# Patient Record
Sex: FEMALE | Race: WHITE | NOT HISPANIC OR LATINO | URBAN - METROPOLITAN AREA
[De-identification: names, ages, dates, MRNs, and addresses within clinical notes are randomized per-mention and may not be internally consistent; named-entity substitution may affect disease eponyms.]

---

## 2017-04-22 ENCOUNTER — INPATIENT (INPATIENT)
Facility: HOSPITAL | Age: 82
LOS: 10 days | Discharge: NURSING, REHAB OR LONG-TERM CARE | DRG: 291 | End: 2017-05-03
Attending: INTERNAL MEDICINE | Admitting: INTERNAL MEDICINE
Payer: MEDICARE

## 2017-04-22 VITALS
HEART RATE: 68 BPM | RESPIRATION RATE: 14 BRPM | HEIGHT: 60 IN | DIASTOLIC BLOOD PRESSURE: 69 MMHG | OXYGEN SATURATION: 95 % | WEIGHT: 125 LBS | SYSTOLIC BLOOD PRESSURE: 132 MMHG | TEMPERATURE: 98 F

## 2017-04-22 DIAGNOSIS — J96.01 ACUTE RESPIRATORY FAILURE WITH HYPOXIA: ICD-10-CM

## 2017-04-22 DIAGNOSIS — E89.2 POSTPROCEDURAL HYPOPARATHYROIDISM: Chronic | ICD-10-CM

## 2017-04-22 DIAGNOSIS — E87.1 HYPO-OSMOLALITY AND HYPONATREMIA: ICD-10-CM

## 2017-04-22 DIAGNOSIS — Z29.9 ENCOUNTER FOR PROPHYLACTIC MEASURES, UNSPECIFIED: ICD-10-CM

## 2017-04-22 DIAGNOSIS — E21.3 HYPERPARATHYROIDISM, UNSPECIFIED: ICD-10-CM

## 2017-04-22 DIAGNOSIS — I10 ESSENTIAL (PRIMARY) HYPERTENSION: ICD-10-CM

## 2017-04-22 DIAGNOSIS — I50.9 HEART FAILURE, UNSPECIFIED: ICD-10-CM

## 2017-04-22 DIAGNOSIS — Z90.49 ACQUIRED ABSENCE OF OTHER SPECIFIED PARTS OF DIGESTIVE TRACT: Chronic | ICD-10-CM

## 2017-04-22 DIAGNOSIS — Z90.89 ACQUIRED ABSENCE OF OTHER ORGANS: Chronic | ICD-10-CM

## 2017-04-22 LAB
ALBUMIN SERPL ELPH-MCNC: 3.4 G/DL — SIGNIFICANT CHANGE UP (ref 3.3–5)
ALP SERPL-CCNC: 93 U/L — SIGNIFICANT CHANGE UP (ref 40–120)
ALT FLD-CCNC: 28 U/L — SIGNIFICANT CHANGE UP (ref 12–78)
ANION GAP SERPL CALC-SCNC: 12 MMOL/L — SIGNIFICANT CHANGE UP (ref 5–17)
APTT BLD: 28.3 SEC — SIGNIFICANT CHANGE UP (ref 27.5–37.4)
AST SERPL-CCNC: 24 U/L — SIGNIFICANT CHANGE UP (ref 15–37)
BASOPHILS # BLD AUTO: 0.1 K/UL — SIGNIFICANT CHANGE UP (ref 0–0.2)
BASOPHILS NFR BLD AUTO: 0.7 % — SIGNIFICANT CHANGE UP (ref 0–2)
BILIRUB SERPL-MCNC: 0.5 MG/DL — SIGNIFICANT CHANGE UP (ref 0.2–1.2)
BUN SERPL-MCNC: 30 MG/DL — HIGH (ref 7–23)
CALCIUM SERPL-MCNC: 9.4 MG/DL — SIGNIFICANT CHANGE UP (ref 8.5–10.1)
CHLORIDE SERPL-SCNC: 91 MMOL/L — LOW (ref 96–108)
CK MB BLD-MCNC: 3.7 % — HIGH (ref 0–3.5)
CK MB CFR SERPL CALC: 3 NG/ML — SIGNIFICANT CHANGE UP (ref 0–3.6)
CK SERPL-CCNC: 81 U/L — SIGNIFICANT CHANGE UP (ref 26–192)
CO2 SERPL-SCNC: 24 MMOL/L — SIGNIFICANT CHANGE UP (ref 22–31)
CREAT SERPL-MCNC: 0.75 MG/DL — SIGNIFICANT CHANGE UP (ref 0.5–1.3)
EOSINOPHIL # BLD AUTO: 0.1 K/UL — SIGNIFICANT CHANGE UP (ref 0–0.5)
EOSINOPHIL NFR BLD AUTO: 0.8 % — SIGNIFICANT CHANGE UP (ref 0–6)
GLUCOSE SERPL-MCNC: 94 MG/DL — SIGNIFICANT CHANGE UP (ref 70–99)
HCT VFR BLD CALC: 32.5 % — LOW (ref 34.5–45)
HGB BLD-MCNC: 10.9 G/DL — LOW (ref 11.5–15.5)
INR BLD: 1.09 RATIO — SIGNIFICANT CHANGE UP (ref 0.88–1.16)
LACTATE SERPL-SCNC: 0.7 MMOL/L — SIGNIFICANT CHANGE UP (ref 0.7–2)
LYMPHOCYTES # BLD AUTO: 1.1 K/UL — SIGNIFICANT CHANGE UP (ref 1–3.3)
LYMPHOCYTES # BLD AUTO: 6.6 % — LOW (ref 13–44)
MCHC RBC-ENTMCNC: 29.9 PG — SIGNIFICANT CHANGE UP (ref 27–34)
MCHC RBC-ENTMCNC: 33.6 GM/DL — SIGNIFICANT CHANGE UP (ref 32–36)
MCV RBC AUTO: 89 FL — SIGNIFICANT CHANGE UP (ref 80–100)
MONOCYTES # BLD AUTO: 1.8 K/UL — HIGH (ref 0–0.9)
MONOCYTES NFR BLD AUTO: 11.1 % — HIGH (ref 1–9)
NEUTROPHILS # BLD AUTO: 13.4 K/UL — HIGH (ref 1.8–7.4)
NEUTROPHILS NFR BLD AUTO: 80.8 % — HIGH (ref 43–77)
NT-PROBNP SERPL-SCNC: 2347 PG/ML — HIGH (ref 0–450)
PLATELET # BLD AUTO: 227 K/UL — SIGNIFICANT CHANGE UP (ref 150–400)
POTASSIUM SERPL-MCNC: 4.6 MMOL/L — SIGNIFICANT CHANGE UP (ref 3.5–5.3)
POTASSIUM SERPL-SCNC: 4.6 MMOL/L — SIGNIFICANT CHANGE UP (ref 3.5–5.3)
PROCALCITONIN SERPL-MCNC: 0.07 NG/ML — HIGH (ref 0–0.05)
PROT SERPL-MCNC: 7.3 G/DL — SIGNIFICANT CHANGE UP (ref 6–8.3)
PROTHROM AB SERPL-ACNC: 11.9 SEC — SIGNIFICANT CHANGE UP (ref 9.8–12.7)
RBC # BLD: 3.65 M/UL — LOW (ref 3.8–5.2)
RBC # FLD: 12.5 % — SIGNIFICANT CHANGE UP (ref 10.3–14.5)
SODIUM SERPL-SCNC: 127 MMOL/L — LOW (ref 135–145)
TROPONIN I SERPL-MCNC: <.015 NG/ML — SIGNIFICANT CHANGE UP (ref 0.01–0.04)
TROPONIN I SERPL-MCNC: <.015 NG/ML — SIGNIFICANT CHANGE UP (ref 0.01–0.04)
WBC # BLD: 16.6 K/UL — HIGH (ref 3.8–10.5)
WBC # FLD AUTO: 16.6 K/UL — HIGH (ref 3.8–10.5)

## 2017-04-22 PROCEDURE — 99223 1ST HOSP IP/OBS HIGH 75: CPT

## 2017-04-22 PROCEDURE — 71010: CPT | Mod: 26

## 2017-04-22 PROCEDURE — 93010 ELECTROCARDIOGRAM REPORT: CPT

## 2017-04-22 PROCEDURE — 99285 EMERGENCY DEPT VISIT HI MDM: CPT

## 2017-04-22 RX ORDER — CARVEDILOL PHOSPHATE 80 MG/1
25 CAPSULE, EXTENDED RELEASE ORAL EVERY 12 HOURS
Qty: 0 | Refills: 0 | Status: DISCONTINUED | OUTPATIENT
Start: 2017-04-22 | End: 2017-05-03

## 2017-04-22 RX ORDER — HYDRALAZINE HCL 50 MG
10 TABLET ORAL ONCE
Qty: 0 | Refills: 0 | Status: COMPLETED | OUTPATIENT
Start: 2017-04-22 | End: 2017-04-22

## 2017-04-22 RX ORDER — AZITHROMYCIN 500 MG/1
500 TABLET, FILM COATED ORAL EVERY 24 HOURS
Qty: 0 | Refills: 0 | Status: COMPLETED | OUTPATIENT
Start: 2017-04-23 | End: 2017-04-26

## 2017-04-22 RX ORDER — FUROSEMIDE 40 MG
40 TABLET ORAL DAILY
Qty: 0 | Refills: 0 | Status: DISCONTINUED | OUTPATIENT
Start: 2017-04-22 | End: 2017-04-26

## 2017-04-22 RX ORDER — ALBUTEROL 90 UG/1
2.5 AEROSOL, METERED ORAL EVERY 8 HOURS
Qty: 0 | Refills: 0 | Status: DISCONTINUED | OUTPATIENT
Start: 2017-04-22 | End: 2017-05-03

## 2017-04-22 RX ORDER — CARVEDILOL PHOSPHATE 80 MG/1
25 CAPSULE, EXTENDED RELEASE ORAL ONCE
Qty: 0 | Refills: 0 | Status: COMPLETED | OUTPATIENT
Start: 2017-04-22 | End: 2017-04-22

## 2017-04-22 RX ORDER — FUROSEMIDE 40 MG
40 TABLET ORAL ONCE
Qty: 0 | Refills: 0 | Status: COMPLETED | OUTPATIENT
Start: 2017-04-22 | End: 2017-04-22

## 2017-04-22 RX ORDER — HYDRALAZINE HCL 50 MG
10 TABLET ORAL
Qty: 0 | Refills: 0 | Status: DISCONTINUED | OUTPATIENT
Start: 2017-04-22 | End: 2017-05-03

## 2017-04-22 RX ORDER — HYDRALAZINE HCL 50 MG
20 TABLET ORAL
Qty: 0 | Refills: 0 | Status: DISCONTINUED | OUTPATIENT
Start: 2017-04-22 | End: 2017-05-03

## 2017-04-22 RX ORDER — AZITHROMYCIN 500 MG/1
500 TABLET, FILM COATED ORAL ONCE
Qty: 0 | Refills: 0 | Status: COMPLETED | OUTPATIENT
Start: 2017-04-22 | End: 2017-04-22

## 2017-04-22 RX ORDER — AZITHROMYCIN 500 MG/1
500 TABLET, FILM COATED ORAL DAILY
Qty: 0 | Refills: 0 | Status: DISCONTINUED | OUTPATIENT
Start: 2017-04-23 | End: 2017-04-22

## 2017-04-22 RX ORDER — ENOXAPARIN SODIUM 100 MG/ML
40 INJECTION SUBCUTANEOUS EVERY 24 HOURS
Qty: 0 | Refills: 0 | Status: DISCONTINUED | OUTPATIENT
Start: 2017-04-22 | End: 2017-05-03

## 2017-04-22 RX ORDER — POTASSIUM CHLORIDE 20 MEQ
20 PACKET (EA) ORAL DAILY
Qty: 0 | Refills: 0 | Status: DISCONTINUED | OUTPATIENT
Start: 2017-04-22 | End: 2017-05-03

## 2017-04-22 RX ORDER — LACTOBACILLUS ACIDOPHILUS 100MM CELL
1 CAPSULE ORAL
Qty: 0 | Refills: 0 | Status: DISCONTINUED | OUTPATIENT
Start: 2017-04-22 | End: 2017-04-22

## 2017-04-22 RX ORDER — SODIUM CHLORIDE 9 MG/ML
1000 INJECTION INTRAMUSCULAR; INTRAVENOUS; SUBCUTANEOUS ONCE
Qty: 0 | Refills: 0 | Status: COMPLETED | OUTPATIENT
Start: 2017-04-22 | End: 2017-04-22

## 2017-04-22 RX ORDER — POTASSIUM CHLORIDE 20 MEQ
20 PACKET (EA) ORAL DAILY
Qty: 0 | Refills: 0 | Status: DISCONTINUED | OUTPATIENT
Start: 2017-04-22 | End: 2017-04-22

## 2017-04-22 RX ORDER — IPRATROPIUM/ALBUTEROL SULFATE 18-103MCG
3 AEROSOL WITH ADAPTER (GRAM) INHALATION ONCE
Qty: 0 | Refills: 0 | Status: COMPLETED | OUTPATIENT
Start: 2017-04-22 | End: 2017-04-22

## 2017-04-22 RX ORDER — ASPIRIN/CALCIUM CARB/MAGNESIUM 324 MG
81 TABLET ORAL ONCE
Qty: 0 | Refills: 0 | Status: COMPLETED | OUTPATIENT
Start: 2017-04-22 | End: 2017-04-22

## 2017-04-22 RX ORDER — CINACALCET 30 MG/1
30 TABLET, FILM COATED ORAL AT BEDTIME
Qty: 0 | Refills: 0 | Status: DISCONTINUED | OUTPATIENT
Start: 2017-04-22 | End: 2017-05-03

## 2017-04-22 RX ORDER — ASPIRIN/CALCIUM CARB/MAGNESIUM 324 MG
81 TABLET ORAL DAILY
Qty: 0 | Refills: 0 | Status: DISCONTINUED | OUTPATIENT
Start: 2017-04-23 | End: 2017-05-03

## 2017-04-22 RX ORDER — FAMOTIDINE 10 MG/ML
20 INJECTION INTRAVENOUS DAILY
Qty: 0 | Refills: 0 | Status: DISCONTINUED | OUTPATIENT
Start: 2017-04-22 | End: 2017-05-03

## 2017-04-22 RX ORDER — DOXAZOSIN MESYLATE 4 MG
4 TABLET ORAL AT BEDTIME
Qty: 0 | Refills: 0 | Status: DISCONTINUED | OUTPATIENT
Start: 2017-04-22 | End: 2017-05-03

## 2017-04-22 RX ORDER — LACTOBACILLUS ACIDOPHILUS 100MM CELL
1 CAPSULE ORAL
Qty: 0 | Refills: 0 | Status: DISCONTINUED | OUTPATIENT
Start: 2017-04-22 | End: 2017-05-03

## 2017-04-22 RX ORDER — CEFTRIAXONE 500 MG/1
1 INJECTION, POWDER, FOR SOLUTION INTRAMUSCULAR; INTRAVENOUS ONCE
Qty: 0 | Refills: 0 | Status: COMPLETED | OUTPATIENT
Start: 2017-04-22 | End: 2017-04-22

## 2017-04-22 RX ADMIN — Medication 3 MILLILITER(S): at 16:38

## 2017-04-22 RX ADMIN — CEFTRIAXONE 100 GRAM(S): 500 INJECTION, POWDER, FOR SOLUTION INTRAMUSCULAR; INTRAVENOUS at 17:37

## 2017-04-22 RX ADMIN — Medication 200 MILLIGRAM(S): at 23:30

## 2017-04-22 RX ADMIN — Medication 10 MILLIGRAM(S): at 19:40

## 2017-04-22 RX ADMIN — CINACALCET 30 MILLIGRAM(S): 30 TABLET, FILM COATED ORAL at 23:30

## 2017-04-22 RX ADMIN — AZITHROMYCIN 255 MILLIGRAM(S): 500 TABLET, FILM COATED ORAL at 19:00

## 2017-04-22 RX ADMIN — CARVEDILOL PHOSPHATE 25 MILLIGRAM(S): 80 CAPSULE, EXTENDED RELEASE ORAL at 19:40

## 2017-04-22 RX ADMIN — Medication 4 MILLIGRAM(S): at 23:30

## 2017-04-22 RX ADMIN — SODIUM CHLORIDE 1000 MILLILITER(S): 9 INJECTION INTRAMUSCULAR; INTRAVENOUS; SUBCUTANEOUS at 17:38

## 2017-04-22 RX ADMIN — ALBUTEROL 2.5 MILLIGRAM(S): 90 AEROSOL, METERED ORAL at 23:08

## 2017-04-22 RX ADMIN — Medication 40 MILLIGRAM(S): at 19:40

## 2017-04-22 RX ADMIN — ENOXAPARIN SODIUM 40 MILLIGRAM(S): 100 INJECTION SUBCUTANEOUS at 23:30

## 2017-04-22 RX ADMIN — Medication 81 MILLIGRAM(S): at 21:50

## 2017-04-22 NOTE — CONSULT NOTE ADULT - PROBLEM SELECTOR RECOMMENDATION 9
robitussin  check BIOMARKERS  albuterol BID  Zithromax - 5 day course  monitor sat at rest and on exertion  pt has LE doppler - 2 weeks ago, neg for DVT as per DTR  Incentive George  will rpt CXR post diuresis and cardiac optimization  discussed with CARDIO and Family

## 2017-04-22 NOTE — H&P ADULT - HISTORY OF PRESENT ILLNESS
89F PMHx HTN, hyperparathyroidism s/p parathyroidectomy, anemia, LE edema sent in from urgent care c/o cough, sore throat and weakness. Pt states sore throat began 6 days ago, 4 days ago developed productive cough. States she has been feeling weak and getting more tired the past few days. Tried Delsym relieved cough, but made her feel dizzy. CXRay at Urgent care showed B/L PNA and suggestive of CHF, sent her to ED. Pt has been having increased swelling in legs L>R for the past few months, had doppler US which was negative for DVT, planned to have carotid doppler ultrasound, but has not yet gone.   Received her flu shot this year. Denies fever, SOB, chest pain, palpitations, loss of appetite, N/V/D, sick contacts.     In the ER O2 90% on RA, afebrile, WBC 16.6, Na 127, procalcitonin 0.07, proBNP 2,347, troponin <0.015. CXR showed Stable cardiomegaly. New vascular congestion interstitial edema consistent with fluid overload or mild/early CHF. Chronic pleural  parenchymal changes left base similar to prior. No definite focal infiltrate. Given IV Rocephin and Azithromax, 1L NS, 40 IV Lasix, Duoneb. 89F PMHx HTN, hyperparathyroidism s/p parathyroidectomy, anemia, LE edema sent in from urgent care c/o cough, sore throat and weakness. Pt states sore throat began 6 days ago, 4 days ago developed productive cough. States she has been feeling weak and getting more tired the past few days. Tried Delsym relieved cough, but made her feel dizzy. CXRay at Urgent care showed B/L PNA and suggestive of CHF, sent her to ED. Pt has been having increased swelling in legs L>R for the past few months, had doppler US which was negative for DVT, planned to have carotid doppler ultrasound, but has not yet gone.   Received her flu shot this year. Denies fever, SOB, chest pain, palpitations, loss of appetite, N/V/D, sick contacts.     In the ER O2 90% on RA, afebrile, WBC 16.6, Na 127, procalcitonin 0.07, proBNP 2,347, troponin <0.015. CXR showed Stable cardiomegaly. New vascular congestion interstitial edema consistent with fluid overload or mild/early CHF. Chronic pleural  parenchymal changes left base similar to prior. No definite focal infiltrate. Given IV Rocephin and Azithromax, 1L NS, 40 IV Lasix, Duoneb. seen in er by dr king advised zmax to continue

## 2017-04-22 NOTE — H&P ADULT - NEGATIVE GASTROINTESTINAL SYMPTOMS
no vomiting/no constipation/no diarrhea/no nausea/no change in bowel habits/no abdominal pain/no hematochezia

## 2017-04-22 NOTE — CONSULT NOTE ADULT - SUBJECTIVE AND OBJECTIVE BOX
Date/Time Patient Seen:  		  Referring MD:   Data Reviewed	       Patient is a 89y old  Female who presents with a chief complaint of SOB and COUGH  vs and meds reviewed  seen in [facility]  sent in for eval of PNA  vs and meds reviewed      Subjective/HPI       Medication list         MEDICATIONS  (STANDING):  azithromycin  IVPB 500milliGRAM(s) IV Intermittent once  furosemide   Injectable 40milliGRAM(s) IV Push Once  hydrALAZINE 10milliGRAM(s) Oral Once  carvedilol 25milliGRAM(s) Oral once  ALBUTerol    0.083% 2.5milliGRAM(s) Nebulizer every 8 hours  guaiFENesin    Syrup 200milliGRAM(s) Oral every 8 hours    MEDICATIONS  (PRN):         Vitals log        ICU Vital Signs Last 24 Hrs  T(C): 36.7, Max: 36.7 (04-22 @ 15:48)  T(F): 98, Max: 98 (04-22 @ 15:48)  HR: 73 (68 - 73)  BP: 132/69 (132/69 - 132/69)  BP(mean): --  ABP: --  ABP(mean): --  RR: 14 (14 - 14)  SpO2: 95% (95% - 95%)           Input and Output:  I&O's Detail      Lab Data                        10.9   16.6  )-----------( 227      ( 22 Apr 2017 16:59 )             32.5     04-22    127<L>  |  91<L>  |  30<H>  ----------------------------<  94  4.6   |  24  |  0.75    Ca    9.4      22 Apr 2017 17:00    TPro  7.3  /  Alb  3.4  /  TBili  0.5  /  DBili  x   /  AST  24  /  ALT  28  /  AlkPhos  93  04-22      CARDIAC MARKERS ( 22 Apr 2017 17:00 )  <.015 ng/mL / x     / 81 U/L / x     / 3.0 ng/mL    non smoker  non drinker  retired  accompanied by dtr      Review of Systems	      Objective     Physical Examination    heart - s1s2  lungs - dec BS  abd - soft  cn grossly int  extr - left LE - edema chronic  kyphosis    Pertinent Lab findings & Imaging      Ramos:  NO   Adequate UO     I&O's Detail           Discussed with:     Cultures:	        Radiology    cxr PVC atelectasis

## 2017-04-22 NOTE — H&P ADULT - PROBLEM SELECTOR PLAN 2
likely hypervolemic hyponatremia due to chf  check ua, urine na and urine osmolality             serum tsh and uric acid  recheck bmp am   will ask nephrology to evaluate  oral fluid restrict for now 1 liter daily no water pitcher at bedside

## 2017-04-22 NOTE — PATIENT PROFILE ADULT. - VISION (WITH CORRECTIVE LENSES IF THE PATIENT USUALLY WEARS THEM):
Partially impaired: cannot see medication labels or newsprint, but can see obstacles in path, and the surrounding layout; can count fingers at arm's length/Reading glasses

## 2017-04-22 NOTE — CONSULT NOTE ADULT - SUBJECTIVE AND OBJECTIVE BOX
CHIEF COMPLAINT: Patient is a 89y old  Female who presents with a chief complaint of cough (22 Apr 2017 18:30)      HPI:  89F PMHx HTN, hyperparathyroidism s/p parathyroidectomy, anemia, LE edema sent in from urgent care c/o cough, sore throat and weakness. She has been complaining of increased orthopnea and cough that is productive with clear thin phlegm.    States she has been feeling weak and getting more tired the past few days. Tried Delsym relieved cough, but made her feel dizzy. CXRay at Urgent care showed B/L PNA and suggestive of CHF, sent her to ED. Pt has been having increased swelling in legs L>R for the past few months, had doppler US which was negative for DVT,    Received her flu shot this year. Denies fever, SOB, chest pain, palpitations, loss of appetite, N/V/D, sick contacts.     In the ER O2 90% on RA, afebrile, WBC 16.6, Na 127, procalcitonin 0.07, proBNP 2,347, troponin <0.015. CXR showed Stable cardiomegaly. New vascular congestion interstitial edema consistent with fluid overload or mild/early CHF. Chronic pleural  parenchymal changes left base similar to prior. No definite focal infiltrate. Given IV Rocephin and Azithromax, 1L NS, 40 IV Lasix, Duoneb. seen in er by dr king advised zmax to continue (22 Apr 2017 18:30)      PAST MEDICAL & SURGICAL HISTORY:  Anemia, unspecified type  Edema of lower extremity  Heart murmur  Hypertension  Hypercalcemia  History of appendectomy  S/P tonsillectomy  H/O parathyroidectomy      SOCIAL HISTORY:  No tobacco, ethanol, or drug abuse.    FAMILY HISTORY:  No pertinent family history in first degree relatives    No family history of acute MI or sudden cardiac death.    MEDICATIONS  (STANDING):  ALBUTerol    0.083% 2.5milliGRAM(s) Nebulizer every 8 hours  guaiFENesin    Syrup 200milliGRAM(s) Oral every 8 hours  enoxaparin Injectable 40milliGRAM(s) SubCutaneous every 24 hours  furosemide   Injectable 40milliGRAM(s) IV Push daily  doxazosin 4milliGRAM(s) Oral at bedtime  famotidine    Tablet 20milliGRAM(s) Oral daily  carvedilol 25milliGRAM(s) Oral every 12 hours  cinacalcet 30milliGRAM(s) Oral at bedtime  hydrALAZINE 10milliGRAM(s) Oral <User Schedule>  hydrALAZINE 20milliGRAM(s) Oral two times a day  aspirin enteric coated 81milliGRAM(s) Oral daily  potassium chloride   Powder 20milliEquivalent(s) Oral daily  azithromycin  IVPB 500milliGRAM(s) IV Intermittent every 24 hours  lactobacillus acidophilus 1Tablet(s) Oral three times a day with meals    MEDICATIONS  (PRN):      Allergies    Vasotec (Unknown)    Intolerances        REVIEW OF SYSTEMS:    CONSTITUTIONAL: No weakness, fevers or chills  EYES/ENT: No visual changes;  No vertigo or throat pain   NECK: No pain or stiffness  RESPIRATORY: see above  CARDIOVASCULAR: see above  GASTROINTESTINAL: No abdominal pain. No nausea, vomiting, or hematemesis; No diarrhea or constipation. No melena or hematochezia.  GENITOURINARY: No dysuria, frequency or hematuria  NEUROLOGICAL: No numbness or weakness  SKIN: No itching or rash  All other review of systems is negative unless indicated above    VITAL SIGNS:   Vital Signs Last 24 Hrs  T(C): 36.6, Max: 36.7 (04-22 @ 15:48)  T(F): 97.9, Max: 98 (04-22 @ 15:48)  HR: 62 (62 - 77)  BP: 147/55 (132/69 - 156/71)  BP(mean): --  RR: 17 (14 - 17)  SpO2: 95% (90% - 96%)    I&O's Summary    I & Os for current day (as of 23 Apr 2017 05:36)  =============================================  IN: 210 ml / OUT: 1075 ml / NET: -865 ml      On Exam:      Constitutional: NAD, awake and alert, well-developed  HEENT: Moist Mucous Membranes, Anicteric  Pulmonary: Non-labored, breath sounds are decreased bilaterally,    Cardiovascular: Regular, S1 and S2, , rubs, gallops oir clicks  Gastrointestinal: Bowel Sounds present, soft, nontender.   Lymph: No peripheral edema. No lymphadenopathy.  Skin: No visible rashes or ulcers.  Psych:  Mood & affect appropriate    LABS: All Labs Reviewed:                        10.9   16.6  )-----------( 227      ( 22 Apr 2017 16:59 )             32.5     22 Apr 2017 17:00    127    |  91     |  30     ----------------------------<  94     4.6     |  24     |  0.75     Ca    9.4        22 Apr 2017 17:00    TPro  7.3    /  Alb  3.4    /  TBili  0.5    /  DBili  x      /  AST  24     /  ALT  28     /  AlkPhos  93     22 Apr 2017 17:00    PT/INR - ( 22 Apr 2017 17:00 )   PT: 11.9 sec;   INR: 1.09 ratio         PTT - ( 22 Apr 2017 17:00 )  PTT:28.3 sec  CARDIAC MARKERS ( 22 Apr 2017 23:29 )  <.015 ng/mL / x     / 64 U/L / x     / 2.7 ng/mL  CARDIAC MARKERS ( 22 Apr 2017 17:00 )  <.015 ng/mL / x     / 81 U/L / x     / 3.0 ng/mL      Blood Culture:   04-22 @ 17:00  Pro Bnp [id number]        RADIOLOGY:   CHIEF COMPLAINT: Patient is a 89y old  Female who presents with a chief complaint of cough (22 Apr 2017 18:30)      HPI:  89F PMHx HTN, hyperparathyroidism s/p parathyroidectomy, anemia, LE edema, HFPEF sent in from urgent care c/o cough, sore throat and weakness. Pt states sore throat began 6 days ago, 4 days ago developed productive cough. States she has been feeling weak and getting more tired the past few days. Tried Delsym relieved cough, but made her feel dizzy. CXRay at Urgent care showed B/L PNA and suggestive of CHF, sent her to ED. Pt has been having increased swelling in legs L>R for the past few months, had doppler US which was negative for DVT, planned to have carotid doppler ultrasound, but has not yet gone.   Received her flu shot this year. Denies fever, SOB, chest pain, palpitations, loss of appetite, N/V/D, sick contacts.     In the ER O2 90% on RA, afebrile, WBC 16.6, Na 127, procalcitonin 0.07, proBNP 2,347, troponin <0.015. CXR showed Stable cardiomegaly. New vascular congestion interstitial edema consistent with fluid overload or mild/early CHF. Chronic pleural  parenchymal changes left base similar to prior. No definite focal infiltrate. Given IV Rocephin and Azithromax, 1L NS, 40 IV Lasix, Duoneb. seen in er by dr king advised zmax to continue (22 Apr 2017 18:30)      PAST MEDICAL & SURGICAL HISTORY:  Anemia, unspecified type  Edema of lower extremity  Heart murmur  Hypertension  Hypercalcemia  History of appendectomy  S/P tonsillectomy  H/O parathyroidectomy      SOCIAL HISTORY:  No tobacco, ethanol, or drug abuse.    FAMILY HISTORY:  No pertinent family history in first degree relatives    No family history of acute MI or sudden cardiac death.    MEDICATIONS  (STANDING):  ALBUTerol    0.083% 2.5milliGRAM(s) Nebulizer every 8 hours  guaiFENesin    Syrup 200milliGRAM(s) Oral every 8 hours  enoxaparin Injectable 40milliGRAM(s) SubCutaneous every 24 hours  furosemide   Injectable 40milliGRAM(s) IV Push daily  doxazosin 4milliGRAM(s) Oral at bedtime  famotidine    Tablet 20milliGRAM(s) Oral daily  carvedilol 25milliGRAM(s) Oral every 12 hours  cinacalcet 30milliGRAM(s) Oral at bedtime  hydrALAZINE 10milliGRAM(s) Oral <User Schedule>  hydrALAZINE 20milliGRAM(s) Oral two times a day  aspirin enteric coated 81milliGRAM(s) Oral daily  potassium chloride   Powder 20milliEquivalent(s) Oral daily  azithromycin  IVPB 500milliGRAM(s) IV Intermittent every 24 hours  lactobacillus acidophilus 1Tablet(s) Oral three times a day with meals    MEDICATIONS  (PRN):      Allergies    Vasotec (Unknown)    Intolerances        REVIEW OF SYSTEMS:    CONSTITUTIONAL: No weakness, fevers or chills  EYES/ENT: No visual changes;  No vertigo or throat pain   NECK: No pain or stiffness  RESPIRATORY: No cough, wheezing, hemoptysis; No shortness of breath  CARDIOVASCULAR: No chest pain or palpitations  GASTROINTESTINAL: No abdominal pain. No nausea, vomiting, or hematemesis; No diarrhea or constipation. No melena or hematochezia.  GENITOURINARY: No dysuria, frequency or hematuria  NEUROLOGICAL: No numbness or weakness  SKIN: No itching or rash  All other review of systems is negative unless indicated above    VITAL SIGNS:   Vital Signs Last 24 Hrs  T(C): 36.6, Max: 36.7 (04-22 @ 15:48)  T(F): 97.9, Max: 98 (04-22 @ 15:48)  HR: 62 (62 - 77)  BP: 147/55 (132/69 - 156/71)  BP(mean): --  RR: 17 (14 - 17)  SpO2: 95% (90% - 96%)    I&O's Summary    I & Os for current day (as of 23 Apr 2017 05:36)  =============================================  IN: 210 ml / OUT: 1075 ml / NET: -865 ml      On Exam:  TELE:   Constitutional: NAD, awake and alert, well-developed  HEENT: Moist Mucous Membranes, Anicteric  Pulmonary: Decreased breath sounds b/l.   Cardiovascular: Regular, S1 and S2, No sig murmurs, distant  Gastrointestinal: Bowel Sounds present, soft, nontender.   Lymph: L>R 2+ pitting edema in lower ext.   Skin: No visible rashes or ulcers.  Psych:  Mood & affect appropriate    LABS: All Labs Reviewed:                        10.9   16.6  )-----------( 227      ( 22 Apr 2017 16:59 )             32.5     22 Apr 2017 17:00    127    |  91     |  30     ----------------------------<  94     4.6     |  24     |  0.75     Ca    9.4        22 Apr 2017 17:00    TPro  7.3    /  Alb  3.4    /  TBili  0.5    /  DBili  x      /  AST  24     /  ALT  28     /  AlkPhos  93     22 Apr 2017 17:00    PT/INR - ( 22 Apr 2017 17:00 )   PT: 11.9 sec;   INR: 1.09 ratio         PTT - ( 22 Apr 2017 17:00 )  PTT:28.3 sec  CARDIAC MARKERS ( 22 Apr 2017 23:29 )  <.015 ng/mL / x     / 64 U/L / x     / 2.7 ng/mL  CARDIAC MARKERS ( 22 Apr 2017 17:00 )  <.015 ng/mL / x     / 81 U/L / x     / 3.0 ng/mL      Blood Culture:   04-22 @ 17:00  Pro Bnp 2347       CXR 4/22/17 -There is new vascular congestion interstitial edema   consistent with fluid overload or mild/early CHF. Chronic pleural   parenchymal changes left base similar to prior. No definite focal   infiltrate.      EKG:

## 2017-04-22 NOTE — H&P ADULT - PROBLEM SELECTOR PLAN 3
continue coreg with parameters   (hold sbp less than 115 hr less than 60)  continue hydralazine with parameters  (hold sbp less than 120)  HOLD losartan until clear bp renal function stable.  HOLD norvasc until clear bp stable  vs q 4.

## 2017-04-22 NOTE — H&P ADULT - NSHPSOCIALHISTORY_GEN_ALL_CORE
Never smoked. Rare alcohol use. No other illicit drugs.   Lives at home with her daughter.  Ambulates with walker.

## 2017-04-22 NOTE — ED ADULT NURSE NOTE - OBJECTIVE STATEMENT
Pt alert and oriented, came to ED with request of MD at urgent care center for pna, labs drawn and sent, advised pt and family on plan of care/hand hygiene, verbalized understanding, awaiting dispo

## 2017-04-22 NOTE — ED PROVIDER NOTE - OBJECTIVE STATEMENT
Pt is a 89 yo f who was sent in to er for admission for bl pn from urgent care. she went there for eval after having cough with phleghm for 3 days  no fever no chills no sob no ill contacts no tavel no trauma no cp sh ehas hx of fluid retention, htn thyroid surgeyr sp appy pmd dr weil

## 2017-04-22 NOTE — CONSULT NOTE ADULT - ASSESSMENT
89 F w HFPEF, HTN, edema p/w ADHF  - Pt sig vol ol. hypoNa likely from vol ol. Lasix 40mg IV q12. Trend Cr, Na. Keep net negative.   - No signs of ischemia.   - Check Echo.   - FU pulm recs.   - Continue Coreg and Hydralazine at home doses. May add nitrate for preload reduction   - Monitor and replete electrolytes. Keep K>4.0 and Mg>2.0.   Further cardiac workup will depend on clinical course.   All other workup per primary team. Will followup.

## 2017-04-22 NOTE — H&P ADULT - PROBLEM SELECTOR PLAN 6
d/w daughter at length.  they wish full resuscitative measures at this point, although would not want artificial feeding, should the situation arise.

## 2017-04-22 NOTE — H&P ADULT - PMH
Anemia, unspecified type    Edema of lower extremity    Heart murmur    Hypercalcemia    Hypertension

## 2017-04-22 NOTE — INPATIENT CERTIFICATION FOR MEDICARE PATIENTS - RISKS OF ADVERSE EVENTS
Concern for renal deterioration/Concern for worsening infectious process/Concern for delay in diagnosis and treatment/Concern for cardiopulmonary deterioration

## 2017-04-22 NOTE — H&P ADULT - PROBLEM SELECTOR PLAN 1
secondary to new onset chf and possible pneumonia  admit to tele with vs q 4 24 hr pulseox  serial ce added first set to er labs  serial ekg  cardio dr carmen kennedy  i/o daily wts  iv lasix 40 iv  once more dose tonight then 40 iv daily  check echo  iv rocephin and zmax to start tomorrow already was dosed in er today  bacid tid  o2 nasal cannula to keep sats above 92 percent  added procalcitonin to er labs  pulm dr king called secondary to new onset chf and possible pneumonia  admit to tele with vs q 4 24 hr pulseox  serial ce added first set to er labs  serial ekg  cardio dr carmen kennedy  i/o daily wts  iv lasix 40 iv  once more dose tonight then 40 iv daily  check echo  iv zmax to start tomorrow already was dosed in er today  bacid tid  o2 nasal cannula to keep sats above 92 percent  added procalcitonin to er labs  pulm dr king called

## 2017-04-23 DIAGNOSIS — Z71.89 OTHER SPECIFIED COUNSELING: ICD-10-CM

## 2017-04-23 LAB
ANION GAP SERPL CALC-SCNC: 10 MMOL/L — SIGNIFICANT CHANGE UP (ref 5–17)
BUN SERPL-MCNC: 25 MG/DL — HIGH (ref 7–23)
CALCIUM SERPL-MCNC: 9 MG/DL — SIGNIFICANT CHANGE UP (ref 8.5–10.1)
CHLORIDE SERPL-SCNC: 94 MMOL/L — LOW (ref 96–108)
CK MB BLD-MCNC: 4.2 % — HIGH (ref 0–3.5)
CK MB BLD-MCNC: 4.8 % — HIGH (ref 0–3.5)
CK MB CFR SERPL CALC: 2.7 NG/ML — SIGNIFICANT CHANGE UP (ref 0–3.6)
CK MB CFR SERPL CALC: 2.7 NG/ML — SIGNIFICANT CHANGE UP (ref 0–3.6)
CK SERPL-CCNC: 56 U/L — SIGNIFICANT CHANGE UP (ref 26–192)
CK SERPL-CCNC: 64 U/L — SIGNIFICANT CHANGE UP (ref 26–192)
CO2 SERPL-SCNC: 27 MMOL/L — SIGNIFICANT CHANGE UP (ref 22–31)
CREAT SERPL-MCNC: 0.69 MG/DL — SIGNIFICANT CHANGE UP (ref 0.5–1.3)
GLUCOSE SERPL-MCNC: 101 MG/DL — HIGH (ref 70–99)
HCT VFR BLD CALC: 29.9 % — LOW (ref 34.5–45)
HGB BLD-MCNC: 9.9 G/DL — LOW (ref 11.5–15.5)
MAGNESIUM SERPL-MCNC: 1.9 MG/DL — SIGNIFICANT CHANGE UP (ref 1.8–2.4)
MCHC RBC-ENTMCNC: 29.7 PG — SIGNIFICANT CHANGE UP (ref 27–34)
MCHC RBC-ENTMCNC: 33.1 GM/DL — SIGNIFICANT CHANGE UP (ref 32–36)
MCV RBC AUTO: 89.8 FL — SIGNIFICANT CHANGE UP (ref 80–100)
OSMOLALITY UR: 218 MOS/KG — SIGNIFICANT CHANGE UP (ref 50–1200)
PHOSPHATE SERPL-MCNC: 2.4 MG/DL — LOW (ref 2.5–4.5)
PLATELET # BLD AUTO: 201 K/UL — SIGNIFICANT CHANGE UP (ref 150–400)
POTASSIUM SERPL-MCNC: 3.5 MMOL/L — SIGNIFICANT CHANGE UP (ref 3.5–5.3)
POTASSIUM SERPL-SCNC: 3.5 MMOL/L — SIGNIFICANT CHANGE UP (ref 3.5–5.3)
RBC # BLD: 3.33 M/UL — LOW (ref 3.8–5.2)
RBC # FLD: 12.2 % — SIGNIFICANT CHANGE UP (ref 10.3–14.5)
SODIUM SERPL-SCNC: 131 MMOL/L — LOW (ref 135–145)
SODIUM UR-SCNC: 48 MMOL/L — SIGNIFICANT CHANGE UP
TROPONIN I SERPL-MCNC: <.015 NG/ML — SIGNIFICANT CHANGE UP (ref 0.01–0.04)
TSH SERPL-MCNC: 1.74 UIU/ML — SIGNIFICANT CHANGE UP (ref 0.36–3.74)
URATE SERPL-MCNC: 6.8 MG/DL — SIGNIFICANT CHANGE UP (ref 2.5–7)
WBC # BLD: 14 K/UL — HIGH (ref 3.8–10.5)
WBC # FLD AUTO: 14 K/UL — HIGH (ref 3.8–10.5)

## 2017-04-23 PROCEDURE — 93010 ELECTROCARDIOGRAM REPORT: CPT

## 2017-04-23 PROCEDURE — 93880 EXTRACRANIAL BILAT STUDY: CPT | Mod: 26

## 2017-04-23 PROCEDURE — 93306 TTE W/DOPPLER COMPLETE: CPT | Mod: 26

## 2017-04-23 PROCEDURE — 99233 SBSQ HOSP IP/OBS HIGH 50: CPT

## 2017-04-23 RX ORDER — ISOSORBIDE MONONITRATE 60 MG/1
30 TABLET, EXTENDED RELEASE ORAL DAILY
Qty: 0 | Refills: 0 | Status: DISCONTINUED | OUTPATIENT
Start: 2017-04-23 | End: 2017-05-03

## 2017-04-23 RX ADMIN — ALBUTEROL 2.5 MILLIGRAM(S): 90 AEROSOL, METERED ORAL at 07:34

## 2017-04-23 RX ADMIN — Medication 20 MILLIGRAM(S): at 17:26

## 2017-04-23 RX ADMIN — ALBUTEROL 2.5 MILLIGRAM(S): 90 AEROSOL, METERED ORAL at 14:38

## 2017-04-23 RX ADMIN — AZITHROMYCIN 255 MILLIGRAM(S): 500 TABLET, FILM COATED ORAL at 18:26

## 2017-04-23 RX ADMIN — Medication 10 MILLIGRAM(S): at 11:49

## 2017-04-23 RX ADMIN — Medication 1 TABLET(S): at 11:49

## 2017-04-23 RX ADMIN — Medication 81 MILLIGRAM(S): at 11:49

## 2017-04-23 RX ADMIN — FAMOTIDINE 20 MILLIGRAM(S): 10 INJECTION INTRAVENOUS at 11:49

## 2017-04-23 RX ADMIN — ALBUTEROL 2.5 MILLIGRAM(S): 90 AEROSOL, METERED ORAL at 23:12

## 2017-04-23 RX ADMIN — Medication 1 TABLET(S): at 08:13

## 2017-04-23 RX ADMIN — Medication 40 MILLIGRAM(S): at 05:27

## 2017-04-23 RX ADMIN — CARVEDILOL PHOSPHATE 25 MILLIGRAM(S): 80 CAPSULE, EXTENDED RELEASE ORAL at 05:27

## 2017-04-23 RX ADMIN — CARVEDILOL PHOSPHATE 25 MILLIGRAM(S): 80 CAPSULE, EXTENDED RELEASE ORAL at 17:26

## 2017-04-23 RX ADMIN — Medication 20 MILLIEQUIVALENT(S): at 11:49

## 2017-04-23 RX ADMIN — Medication 1 TABLET(S): at 17:27

## 2017-04-23 RX ADMIN — Medication 4 MILLIGRAM(S): at 22:20

## 2017-04-23 RX ADMIN — Medication 200 MILLIGRAM(S): at 13:59

## 2017-04-23 RX ADMIN — CINACALCET 30 MILLIGRAM(S): 30 TABLET, FILM COATED ORAL at 22:20

## 2017-04-23 RX ADMIN — Medication 20 MILLIGRAM(S): at 05:27

## 2017-04-23 RX ADMIN — Medication 200 MILLIGRAM(S): at 05:28

## 2017-04-23 RX ADMIN — Medication 200 MILLIGRAM(S): at 22:24

## 2017-04-23 RX ADMIN — ENOXAPARIN SODIUM 40 MILLIGRAM(S): 100 INJECTION SUBCUTANEOUS at 22:21

## 2017-04-23 NOTE — PROGRESS NOTE ADULT - SUBJECTIVE AND OBJECTIVE BOX
[facility] -- Christofer Isaacs Grossman, [place], Hieu Norris      Follow Up:  CHF    Subjective/Observations: Patient seen and examined. Events noted. No complaints of chest pain,  or palpitations reported. Cough and dyspnea  have improved    PAST MEDICAL & SURGICAL HISTORY:  Anemia, unspecified type  Edema of lower extremity  Heart murmur  Hypertension  Hypercalcemia  History of appendectomy  S/P tonsillectomy  H/O parathyroidectomy      MEDICATIONS  (STANDING):  ALBUTerol    0.083% 2.5milliGRAM(s) Nebulizer every 8 hours  guaiFENesin    Syrup 200milliGRAM(s) Oral every 8 hours  enoxaparin Injectable 40milliGRAM(s) SubCutaneous every 24 hours  furosemide   Injectable 40milliGRAM(s) IV Push daily  doxazosin 4milliGRAM(s) Oral at bedtime  famotidine    Tablet 20milliGRAM(s) Oral daily  carvedilol 25milliGRAM(s) Oral every 12 hours  cinacalcet 30milliGRAM(s) Oral at bedtime  hydrALAZINE 10milliGRAM(s) Oral <User Schedule>  hydrALAZINE 20milliGRAM(s) Oral two times a day  aspirin enteric coated 81milliGRAM(s) Oral daily  potassium chloride   Powder 20milliEquivalent(s) Oral daily  azithromycin  IVPB 500milliGRAM(s) IV Intermittent every 24 hours  lactobacillus acidophilus 1Tablet(s) Oral three times a day with meals    MEDICATIONS  (PRN):      Allergies    Vasotec (Unknown)    Intolerances        REVIEW OF SYSTEMS: All other review of systems is negative unless indicated above    Vital Signs Last 24 Hrs  T(C): 36.6, Max: 36.7 (04-22 @ 15:48)  T(F): 97.8, Max: 98 (04-22 @ 15:48)  HR: 64 (60 - 77)  BP: 132/64 (128/55 - 156/71)  BP(mean): --  RR: 16 (14 - 17)  SpO2: 97% (90% - 97%)    I&O's Summary    I & Os for current day (as of 23 Apr 2017 12:58)  =============================================  IN: 210 ml / OUT: 1075 ml / NET: -865 ml    Weight (kg): 56.7 (04-22 @ 15:48)    PHYSICAL EXAM:  TELE:   Constitutional: NAD, awake and alert, well-developed  HEENT: Moist Mucous Membranes, Anicteric  Pulmonary: Decreased breath sounds b/l. Crackles improved  Cardiovascular: Regular, S1 and S2, No sig murmurs, distant  Gastrointestinal: Bowel Sounds present, soft, nontender.   Lymph: L>R trace-1+ pitting edema in lower ext.   Skin: No visible rashes or ulcers.  Psych:  Mood & affect appropriate    LABS: All Labs Reviewed:                        9.9    14.0  )-----------( 201      ( 23 Apr 2017 07:21 )             29.9                         10.9   16.6  )-----------( 227      ( 22 Apr 2017 16:59 )             32.5     23 Apr 2017 07:21    131    |  94     |  25     ----------------------------<  101    3.5     |  27     |  0.69   22 Apr 2017 17:00    127    |  91     |  30     ----------------------------<  94     4.6     |  24     |  0.75     Ca    9.0        23 Apr 2017 07:21  Ca    9.4        22 Apr 2017 17:00  Phos  2.4       23 Apr 2017 07:21  Mg     1.9       23 Apr 2017 07:21    TPro  7.3    /  Alb  3.4    /  TBili  0.5    /  DBili  x      /  AST  24     /  ALT  28     /  AlkPhos  93     22 Apr 2017 17:00    PT/INR - ( 22 Apr 2017 17:00 )   PT: 11.9 sec;   INR: 1.09 ratio         PTT - ( 22 Apr 2017 17:00 )  PTT:28.3 sec  CARDIAC MARKERS ( 23 Apr 2017 07:21 )  <.015 ng/mL / x     / 56 U/L / x     / 2.7 ng/mL  CARDIAC MARKERS ( 22 Apr 2017 23:29 )  <.015 ng/mL / x     / 64 U/L / x     / 2.7 ng/mL  CARDIAC MARKERS ( 22 Apr 2017 17:00 )  <.015 ng/mL / x     / 81 U/L / x     / 3.0 ng/mL      Blood Culture:   04-22 @ 17:00  Pro Bnp 2347    04-23 @ 07:21  TSH: 1.74

## 2017-04-23 NOTE — PROGRESS NOTE ADULT - SUBJECTIVE AND OBJECTIVE BOX
Date/Time Patient Seen:  		  Referring MD:   Data Reviewed	       Patient is a 89y old  Female who presents with a chief complaint of cough (22 Apr 2017 18:30)      Subjective/HPI       Medication list         MEDICATIONS  (STANDING):  ALBUTerol    0.083% 2.5milliGRAM(s) Nebulizer every 8 hours  guaiFENesin    Syrup 200milliGRAM(s) Oral every 8 hours  enoxaparin Injectable 40milliGRAM(s) SubCutaneous every 24 hours  furosemide   Injectable 40milliGRAM(s) IV Push daily  doxazosin 4milliGRAM(s) Oral at bedtime  famotidine    Tablet 20milliGRAM(s) Oral daily  carvedilol 25milliGRAM(s) Oral every 12 hours  cinacalcet 30milliGRAM(s) Oral at bedtime  hydrALAZINE 10milliGRAM(s) Oral <User Schedule>  hydrALAZINE 20milliGRAM(s) Oral two times a day  aspirin enteric coated 81milliGRAM(s) Oral daily  potassium chloride   Powder 20milliEquivalent(s) Oral daily  azithromycin  IVPB 500milliGRAM(s) IV Intermittent every 24 hours  lactobacillus acidophilus 1Tablet(s) Oral three times a day with meals    MEDICATIONS  (PRN):         Vitals log        ICU Vital Signs Last 24 Hrs  T(C): 36.6, Max: 36.7 (04-22 @ 15:48)  T(F): 97.9, Max: 98 (04-22 @ 15:48)  HR: 62 (62 - 77)  BP: 147/55 (132/69 - 156/71)  BP(mean): --  ABP: --  ABP(mean): --  RR: 17 (14 - 17)  SpO2: 95% (90% - 96%)           Input and Output:  I&O's Detail    I & Os for current day (as of 23 Apr 2017 06:15)  =============================================  IN:    Oral Fluid: 210 ml    Total IN: 210 ml  ---------------------------------------------  OUT:    Voided: 1075 ml    Total OUT: 1075 ml  ---------------------------------------------  Total NET: -865 ml      Lab Data                        10.9   16.6  )-----------( 227      ( 22 Apr 2017 16:59 )             32.5     04-22    127<L>  |  91<L>  |  30<H>  ----------------------------<  94  4.6   |  24  |  0.75    Ca    9.4      22 Apr 2017 17:00    TPro  7.3  /  Alb  3.4  /  TBili  0.5  /  DBili  x   /  AST  24  /  ALT  28  /  AlkPhos  93  04-22      CARDIAC MARKERS ( 22 Apr 2017 23:29 )  <.015 ng/mL / x     / 64 U/L / x     / 2.7 ng/mL  CARDIAC MARKERS ( 22 Apr 2017 17:00 )  <.015 ng/mL / x     / 81 U/L / x     / 3.0 ng/mL        Review of Systems	      Objective     Physical Examination  heart - s1s2  lungs - rhonchi and crackles and occ wheeze        Pertinent Lab findings & Imaging      Richard:  NO   Adequate UO     I&O's Detail    I & Os for current day (as of 23 Apr 2017 06:15)  =============================================  IN:    Oral Fluid: 210 ml    Total IN: 210 ml  ---------------------------------------------  OUT:    Voided: 1075 ml    Total OUT: 1075 ml  ---------------------------------------------  Total NET: -865 ml           Discussed with:     Cultures:	        Radiology

## 2017-04-23 NOTE — CONSULT NOTE ADULT - ASSESSMENT
Admitted with apparent PNA and CHF.  Responding to antibiotics and Lasix.  Not toxic.  Hyponatremia secondary to CHF and possibility of reset osmostat in 89 year old.  No mental changes at all.      Will check intact PTH level.

## 2017-04-23 NOTE — PROGRESS NOTE ADULT - PROBLEM SELECTOR PLAN 2
am WBC pending  cont Zithro IV  cont Albuterol TID  procalcitonin very weak   CXR c/w CHF  if WBC trending down will cont current therapy, if WBC is persistently elevated and or climbing will consider addition ABX coverage with Beta Lactam  keep HOB > 30 deg  oral hygiene  out of bed as tolerated

## 2017-04-23 NOTE — PROGRESS NOTE ADULT - ASSESSMENT
89 F w HFPEF, HTN, edema p/w ADHF  - Vol status has markedly improved. hypoNa improving and likely from vol ol. Continue Lasix 40mg IV qday. Trend Cr, Na. Keep net negative.   - No signs of ischemia.   - Check Echo. F/U carotid results  - FU pulm recs. Cont Abx.   - Continue Coreg and Hydralazine at home doses.  add Imdur 30mg Qday for preload reduction   - Monitor and replete electrolytes. Keep K>4.0 and Mg>2.0.   Further cardiac workup will depend on clinical course.   All other workup per primary team. Will followup.

## 2017-04-23 NOTE — CONSULT NOTE ADULT - SUBJECTIVE AND OBJECTIVE BOX
NEPHROLOGY CONSULT  HPI:  89F PMHx HTN, hyperparathyroidism s/p remote parathyroidectomy with recurrent hypercalcemia about 1 year ago and Rx Sensipar Dr Cheryl Galvan, endocrinology, anemia.    LE edema sent in from urgent care c/o cough, sore throat and weakness. Pt states sore throat began 6 days ago, 4 days ago developed productive cough. States she has been feeling weak and getting more tired the past few days. Tried Delsym relieved cough, but made her feel dizzy. CXRay at Urgent care showed B/L PNA and suggestive of CHF, sent her to ED. Pt has been having increased swelling in legs L>R for the past few months, had doppler US which was negative for DVT, planned to have carotid doppler ultrasound, but has not yet gone.   Received her flu shot this year. Denies fever, SOB, chest pain, palpitations, loss of appetite, N/V/D, sick contacts.     In the ER O2 90% on RA, afebrile, WBC 16.6, Na 127, procalcitonin 0.07, proBNP 2,347, troponin <0.015. CXR showed Stable cardiomegaly. New vascular congestion interstitial edema consistent with fluid overload or mild/early CHF. Chronic pleural  parenchymal changes left base similar to prior. No definite focal infiltrate. Given IV Rocephin and Azithromax, 1L NS, 40 IV Lasix, Duoneb. seen in er by dr king advised zmax to continue (2017 18:30)      PAST MEDICAL & SURGICAL HISTORY:  Anemia, unspecified type  Edema of lower extremity  Heart murmur  Hypertension  Hypercalcemia Remote parathyroidectomy  History of appendectomy  S/P tonsillectomy    FAMILY HISTORY:  No pertinent family history in first degree relatives    MEDICATIONS  (STANDING):  ALBUTerol    0.083% 2.5milliGRAM(s) Nebulizer every 8 hours  guaiFENesin    Syrup 200milliGRAM(s) Oral every 8 hours  enoxaparin Injectable 40milliGRAM(s) SubCutaneous every 24 hours  furosemide   Injectable 40milliGRAM(s) IV Push daily  doxazosin 4milliGRAM(s) Oral at bedtime  famotidine    Tablet 20milliGRAM(s) Oral daily  carvedilol 25milliGRAM(s) Oral every 12 hours  cinacalcet 30milliGRAM(s) Oral at bedtime  hydrALAZINE 10milliGRAM(s) Oral <User Schedule>  hydrALAZINE 20milliGRAM(s) Oral two times a day  aspirin enteric coated 81milliGRAM(s) Oral daily  potassium chloride   Powder 20milliEquivalent(s) Oral daily  azithromycin  IVPB 500milliGRAM(s) IV Intermittent every 24 hours  lactobacillus acidophilus 1Tablet(s) Oral three times a day with meals  isosorbide   mononitrate ER Tablet (IMDUR) 30milliGRAM(s) Oral daily    MEDICATIONS  (PRN):      Allergies    Vasotec (Unknown)    Intolerances        I&O's Summary  I & Os for 24h ending 2017 07:00  =============================================  IN: 210 ml / OUT: 1075 ml / NET: -865 ml    I & Os for current day (as of 2017 21:48)  =============================================  IN: 880 ml / OUT: 60 ml / NET: 820 ml        REVIEW OF SYSTEMS:    CONSTITUTIONAL:  Weakness as per HPI.    HEENT:  Eyes:  No diplopia or blurred vision. ENT:  Sore throat no oral ulcerations    CARDIOVASCULAR:  No pressure, squeezing, strangling, tightness, heaviness or aching about the chest, neck, axilla or epigastrium.    RESPIRATORY:  Cough as per HPI.    GASTROINTESTINAL:  No nausea, vomiting or diarrhea.    GENITOURINARY:  No dysuria, frequency or urgency.    MUSCULOSKELETAL:  Negative    SKIN:  No change in skin, hair or nails.    NEUROLOGIC:  Walks with walker.  Chronic ataxia.    PSYCHIATRIC:  No disorder of thought or mood.    ENDOCRINE:  Known hyperparathyroidism.  No dysesthesias. No heat or cold intolerance, polyuria or polydipsia.    HEMATOLOGICAL:  No easy bruising or bleeding.    Vital Signs Last 24 Hrs  T(C): 36.7, Max: 36.7 ( @ 20:00)  T(F): 98.1, Max: 98.1 ( @ 20:00)  HR: 64 (60 - 74)  BP: 129/64 (128/55 - 152/64)  BP(mean): --  RR: 16 (16 - 17)  SpO2: 91% (91% - 97%)  Daily     Daily Weight in k.6 (2017 13:50)  I&O's Summary  I & Os for 24h ending 2017 07:00  =============================================  IN: 210 ml / OUT: 1075 ml / NET: -865 ml    I & Os for current day (as of 2017 21:48)  =============================================  IN: 880 ml / OUT: 60 ml / NET: 820 ml      PHYSICAL EXAM:    General:  Alert, well-developed ,No acute distress.  Alert conversant and totally appropriate.    Neuro:  Alert and oriented to person, place, and time. Able to communicate  well. Cranial nerves 2-12 grossly intact. 5/5 strength in all  extremities bilaterally. Sensation intact in all extremities.  Appropriate affect.     HEENT:  No JVD, no masses, Eyes anicteric, No lymphadenopathy,    Cardiovascular:  Regular rate and rhythm, with normal S1 and S2.Grade 1 systolic murmur,  No rub  or gallops. No JVD.     Lungs:  Coarse BS bilaterally    Abdomen:  Normoactive bowel sounds. Soft, flat, non-tender, and non-distended.  No hepatosplenomegaly, positive bowel sounds    Skin:  Warm, dry, well-perfused. No rashes or other lesions.     Extremities:  Pulses intact n upper and lower extremities. Edema has apparently lessened with bedrest and lasix since adm.  .    LABS:                        9.9    14.0  )-----------( 201      ( 2017 07:21 )             29.9     04-23    131<L>  |  94<L>  |  25<H>  ----------------------------<  101<H>  3.5   |  27  |  0.69    Ca    9.0      2017 07:21  Phos  2.4       Mg     1.9         TPro  7.3  /  Alb  3.4  /  TBili  0.5  /  DBili  x   /  AST  24  /  ALT  28  /  AlkPhos  93      PT/INR - ( 2017 17:00 )   PT: 11.9 sec;   INR: 1.09 ratio         PTT - ( 2017 17:00 )  PTT:28.3 sec    Magnesium, Serum: 1.9 mg/dL ( @ 07:21)  Phosphorus Level, Serum: 2.4 mg/dL ( @ 07:21)

## 2017-04-24 DIAGNOSIS — D64.9 ANEMIA, UNSPECIFIED: ICD-10-CM

## 2017-04-24 LAB
ANION GAP SERPL CALC-SCNC: 9 MMOL/L — SIGNIFICANT CHANGE UP (ref 5–17)
BASOPHILS # BLD AUTO: 0.1 K/UL — SIGNIFICANT CHANGE UP (ref 0–0.2)
BASOPHILS NFR BLD AUTO: 0.9 % — SIGNIFICANT CHANGE UP (ref 0–2)
BUN SERPL-MCNC: 26 MG/DL — HIGH (ref 7–23)
CALCIUM SERPL-MCNC: 9 MG/DL — SIGNIFICANT CHANGE UP (ref 8.5–10.1)
CALCIUM SERPL-MCNC: 9.5 MG/DL — SIGNIFICANT CHANGE UP (ref 8.4–10.5)
CHLORIDE SERPL-SCNC: 94 MMOL/L — LOW (ref 96–108)
CO2 SERPL-SCNC: 27 MMOL/L — SIGNIFICANT CHANGE UP (ref 22–31)
CREAT SERPL-MCNC: 0.62 MG/DL — SIGNIFICANT CHANGE UP (ref 0.5–1.3)
EOSINOPHIL # BLD AUTO: 0.2 K/UL — SIGNIFICANT CHANGE UP (ref 0–0.5)
EOSINOPHIL NFR BLD AUTO: 1.7 % — SIGNIFICANT CHANGE UP (ref 0–6)
GLUCOSE SERPL-MCNC: 108 MG/DL — HIGH (ref 70–99)
HCT VFR BLD CALC: 30.4 % — LOW (ref 34.5–45)
HGB BLD-MCNC: 9.6 G/DL — LOW (ref 11.5–15.5)
LYMPHOCYTES # BLD AUTO: 0.8 K/UL — LOW (ref 1–3.3)
LYMPHOCYTES # BLD AUTO: 5.9 % — LOW (ref 13–44)
MCHC RBC-ENTMCNC: 28.3 PG — SIGNIFICANT CHANGE UP (ref 27–34)
MCHC RBC-ENTMCNC: 31.7 GM/DL — LOW (ref 32–36)
MCV RBC AUTO: 89.3 FL — SIGNIFICANT CHANGE UP (ref 80–100)
MONOCYTES # BLD AUTO: 1.4 K/UL — HIGH (ref 0–0.9)
MONOCYTES NFR BLD AUTO: 11 % — HIGH (ref 1–9)
NEUTROPHILS # BLD AUTO: 10.7 K/UL — HIGH (ref 1.8–7.4)
NEUTROPHILS NFR BLD AUTO: 80.6 % — HIGH (ref 43–77)
PLATELET # BLD AUTO: 209 K/UL — SIGNIFICANT CHANGE UP (ref 150–400)
POTASSIUM SERPL-MCNC: 3.5 MMOL/L — SIGNIFICANT CHANGE UP (ref 3.5–5.3)
POTASSIUM SERPL-SCNC: 3.5 MMOL/L — SIGNIFICANT CHANGE UP (ref 3.5–5.3)
PTH-INTACT FLD-MCNC: 185 PG/ML — HIGH (ref 15–65)
RBC # BLD: 3.4 M/UL — LOW (ref 3.8–5.2)
RBC # FLD: 12.1 % — SIGNIFICANT CHANGE UP (ref 10.3–14.5)
SODIUM SERPL-SCNC: 130 MMOL/L — LOW (ref 135–145)
WBC # BLD: 13.2 K/UL — HIGH (ref 3.8–10.5)
WBC # FLD AUTO: 13.2 K/UL — HIGH (ref 3.8–10.5)

## 2017-04-24 PROCEDURE — 99233 SBSQ HOSP IP/OBS HIGH 50: CPT

## 2017-04-24 RX ORDER — POTASSIUM CHLORIDE 20 MEQ
40 PACKET (EA) ORAL ONCE
Qty: 0 | Refills: 0 | Status: COMPLETED | OUTPATIENT
Start: 2017-04-24 | End: 2017-04-24

## 2017-04-24 RX ADMIN — Medication 1 TABLET(S): at 09:30

## 2017-04-24 RX ADMIN — Medication 81 MILLIGRAM(S): at 13:18

## 2017-04-24 RX ADMIN — CARVEDILOL PHOSPHATE 25 MILLIGRAM(S): 80 CAPSULE, EXTENDED RELEASE ORAL at 18:15

## 2017-04-24 RX ADMIN — CINACALCET 30 MILLIGRAM(S): 30 TABLET, FILM COATED ORAL at 21:44

## 2017-04-24 RX ADMIN — ALBUTEROL 2.5 MILLIGRAM(S): 90 AEROSOL, METERED ORAL at 07:58

## 2017-04-24 RX ADMIN — Medication 200 MILLIGRAM(S): at 05:27

## 2017-04-24 RX ADMIN — Medication 20 MILLIEQUIVALENT(S): at 13:13

## 2017-04-24 RX ADMIN — Medication 1 TABLET(S): at 18:15

## 2017-04-24 RX ADMIN — Medication 20 MILLIGRAM(S): at 05:25

## 2017-04-24 RX ADMIN — FAMOTIDINE 20 MILLIGRAM(S): 10 INJECTION INTRAVENOUS at 13:15

## 2017-04-24 RX ADMIN — Medication 1 TABLET(S): at 13:14

## 2017-04-24 RX ADMIN — CARVEDILOL PHOSPHATE 25 MILLIGRAM(S): 80 CAPSULE, EXTENDED RELEASE ORAL at 05:25

## 2017-04-24 RX ADMIN — ENOXAPARIN SODIUM 40 MILLIGRAM(S): 100 INJECTION SUBCUTANEOUS at 23:11

## 2017-04-24 RX ADMIN — Medication 4 MILLIGRAM(S): at 21:44

## 2017-04-24 RX ADMIN — ISOSORBIDE MONONITRATE 30 MILLIGRAM(S): 60 TABLET, EXTENDED RELEASE ORAL at 13:13

## 2017-04-24 RX ADMIN — Medication 200 MILLIGRAM(S): at 21:45

## 2017-04-24 RX ADMIN — AZITHROMYCIN 255 MILLIGRAM(S): 500 TABLET, FILM COATED ORAL at 18:15

## 2017-04-24 RX ADMIN — Medication 20 MILLIGRAM(S): at 18:15

## 2017-04-24 RX ADMIN — ALBUTEROL 2.5 MILLIGRAM(S): 90 AEROSOL, METERED ORAL at 15:36

## 2017-04-24 RX ADMIN — Medication 200 MILLIGRAM(S): at 13:18

## 2017-04-24 RX ADMIN — Medication 40 MILLIGRAM(S): at 05:25

## 2017-04-24 RX ADMIN — Medication 40 MILLIEQUIVALENT(S): at 13:13

## 2017-04-24 RX ADMIN — Medication 10 MILLIGRAM(S): at 13:14

## 2017-04-24 RX ADMIN — ALBUTEROL 2.5 MILLIGRAM(S): 90 AEROSOL, METERED ORAL at 20:07

## 2017-04-24 NOTE — PROGRESS NOTE ADULT - SUBJECTIVE AND OBJECTIVE BOX
Date/Time Patient Seen:  		  Referring MD:   Data Reviewed	       Patient is a 89y old  Female who presents with a chief complaint of cough (22 Apr 2017 18:30)  in bed  seen and examined  on oxygen  occ cough  alert  nephro and cardio and medicine notes reviewed      Subjective/HPI       Medication list         MEDICATIONS  (STANDING):  ALBUTerol    0.083% 2.5milliGRAM(s) Nebulizer every 8 hours  guaiFENesin    Syrup 200milliGRAM(s) Oral every 8 hours  enoxaparin Injectable 40milliGRAM(s) SubCutaneous every 24 hours  furosemide   Injectable 40milliGRAM(s) IV Push daily  doxazosin 4milliGRAM(s) Oral at bedtime  famotidine    Tablet 20milliGRAM(s) Oral daily  carvedilol 25milliGRAM(s) Oral every 12 hours  cinacalcet 30milliGRAM(s) Oral at bedtime  hydrALAZINE 10milliGRAM(s) Oral <User Schedule>  hydrALAZINE 20milliGRAM(s) Oral two times a day  aspirin enteric coated 81milliGRAM(s) Oral daily  potassium chloride   Powder 20milliEquivalent(s) Oral daily  azithromycin  IVPB 500milliGRAM(s) IV Intermittent every 24 hours  lactobacillus acidophilus 1Tablet(s) Oral three times a day with meals  isosorbide   mononitrate ER Tablet (IMDUR) 30milliGRAM(s) Oral daily    MEDICATIONS  (PRN):         Vitals log        ICU Vital Signs Last 24 Hrs  T(C): 36.6, Max: 36.8 (04-23 @ 23:49)  T(F): 97.8, Max: 98.2 (04-23 @ 23:49)  HR: 75 (60 - 75)  BP: 149/73 (128/55 - 152/64)  BP(mean): --  ABP: --  ABP(mean): --  RR: 16 (16 - 17)  SpO2: 94% (91% - 97%)           Input and Output:  I&O's Detail  I & Os for 24h ending 23 Apr 2017 07:00  =============================================  IN:    Oral Fluid: 210 ml    Total IN: 210 ml  ---------------------------------------------  OUT:    Voided: 1075 ml    Total OUT: 1075 ml  ---------------------------------------------  Total NET: -865 ml    I & Os for current day (as of 24 Apr 2017 06:07)  =============================================  IN:    Oral Fluid: 880 ml    Total IN: 880 ml  ---------------------------------------------  OUT:    Voided: 360 ml    Total OUT: 360 ml  ---------------------------------------------  Total NET: 520 ml      Lab Data                        9.9    14.0  )-----------( 201      ( 23 Apr 2017 07:21 )             29.9     04-23    131<L>  |  94<L>  |  25<H>  ----------------------------<  101<H>  3.5   |  27  |  0.69    Ca    9.0      23 Apr 2017 07:21  Phos  2.4     04-23  Mg     1.9     04-23    TPro  7.3  /  Alb  3.4  /  TBili  0.5  /  DBili  x   /  AST  24  /  ALT  28  /  AlkPhos  93  04-22      CARDIAC MARKERS ( 23 Apr 2017 07:21 )  <.015 ng/mL / x     / 56 U/L / x     / 2.7 ng/mL  CARDIAC MARKERS ( 22 Apr 2017 23:29 )  <.015 ng/mL / x     / 64 U/L / x     / 2.7 ng/mL  CARDIAC MARKERS ( 22 Apr 2017 17:00 )  <.015 ng/mL / x     / 81 U/L / x     / 3.0 ng/mL        Review of Systems	      Objective     Physical Examination  frail  hard of hearing  head at  heart - s1s2  kyphosis  lungs - dec BS  occ crackles        Pertinent Lab findings & Imaging      Richard:  NO   Adequate UO     I&O's Detail  I & Os for 24h ending 23 Apr 2017 07:00  =============================================  IN:    Oral Fluid: 210 ml    Total IN: 210 ml  ---------------------------------------------  OUT:    Voided: 1075 ml    Total OUT: 1075 ml  ---------------------------------------------  Total NET: -865 ml    I & Os for current day (as of 24 Apr 2017 06:07)  =============================================  IN:    Oral Fluid: 880 ml    Total IN: 880 ml  ---------------------------------------------  OUT:    Voided: 360 ml    Total OUT: 360 ml  ---------------------------------------------  Total NET: 520 ml           Discussed with:     Cultures:	        Radiology

## 2017-04-24 NOTE — PROGRESS NOTE ADULT - PROBLEM SELECTOR PLAN 1
New onset. On lasix 40mg IVP daily.    Continue carvedilol 25mg BID. Holding acei for renal function.    Started on Imdur 30mg daily for preload reduction   Carotid dopplers neg for flow limiting stenosis. f/u ECHO.   Monitor I&Os, daily wts.   f/u cardio recs (Dr. Isaacs) New onset. On lasix 40mg IVP daily.    Continue carvedilol 25mg BID. Holding acei for renal function.    Started on Imdur 30mg daily for preload reduction   Carotid dopplers neg for flow limiting stenosis.   ECHO - normal EF  Monitor I&Os, daily wts.   f/u cardio recs (Dr. Isaacs) New onset. On lasix 40mg IVP daily.    Continue carvedilol 25mg BID. Holding acei for renal function.    Started on Imdur 30mg daily for preload reduction   Carotid dopplers neg for flow limiting stenosis.   ECHO - f/u official read   Monitor I&Os, daily wts.   f/u cardio recs (Dr. Isaacs)

## 2017-04-24 NOTE — PROGRESS NOTE ADULT - PROBLEM SELECTOR PLAN 1
on LASIX  monitor I and O  cont tele monitoring  02 support  keep sat > 88 pct  cvs regimen, BP control, cardio to follow up  responding well to lasix

## 2017-04-24 NOTE — PROGRESS NOTE ADULT - PROBLEM SELECTOR PLAN 2
cont CAP tx with Zithro, 5 day course, WBC trending down, Blood cx x 2 Neg, monitor clinical course, Vs and Sats, Resp Rate,   cont albuterol TID to promote cough and secretion mobilization

## 2017-04-24 NOTE — PROGRESS NOTE ADULT - SUBJECTIVE AND OBJECTIVE BOX
Patient is a 89y old  Female who presents with a chief complaint of cough (22 Apr 2017 18:30)      INTERVAL HPI/OVERNIGHT EVENTS:    MEDICATIONS  (STANDING):  ALBUTerol    0.083% 2.5milliGRAM(s) Nebulizer every 8 hours  guaiFENesin    Syrup 200milliGRAM(s) Oral every 8 hours  enoxaparin Injectable 40milliGRAM(s) SubCutaneous every 24 hours  furosemide   Injectable 40milliGRAM(s) IV Push daily  doxazosin 4milliGRAM(s) Oral at bedtime  famotidine    Tablet 20milliGRAM(s) Oral daily  carvedilol 25milliGRAM(s) Oral every 12 hours  cinacalcet 30milliGRAM(s) Oral at bedtime  hydrALAZINE 10milliGRAM(s) Oral <User Schedule>  hydrALAZINE 20milliGRAM(s) Oral two times a day  aspirin enteric coated 81milliGRAM(s) Oral daily  potassium chloride   Powder 20milliEquivalent(s) Oral daily  azithromycin  IVPB 500milliGRAM(s) IV Intermittent every 24 hours  lactobacillus acidophilus 1Tablet(s) Oral three times a day with meals  isosorbide   mononitrate ER Tablet (IMDUR) 30milliGRAM(s) Oral daily    MEDICATIONS  (PRN):      Allergies  Vasotec (Unknown)    Intolerances        REVIEW OF SYSTEMS:  CONSTITUTIONAL: No fever, No chills,No fatigue,No myalgia,No Body ache  EYES: No eye pain, visual disturbances, or discharge  ENMT:  No ear pain, No nose bleed, No vertigo; No sinus or throat pain  NECK: No pain, No stiffness  RESPIRATORY: No cough, wheezing, No  hemoptysis; No shortness of breath  CARDIOVASCULAR: No chest pain, palpitations, leg swelling  GASTROINTESTINAL: No abdominal or epigastric pain. No nausea, No vomiting; No diarrhea or constipation. [ ] BM  GENITOURINARY: No dysuria, No frequency, No urgency, No hematuria, or incontinence  NEUROLOGICAL: alert and oriented x 3,  No headaches, No dizziness, No numbness,  SKIN:   No itching, burning, rashes, or lesions   MUSCULOSKELETAL: No joint pain or swelling; No muscle pain, No back pain, No extremity pain  PSYCHIATRIC: No depression, anxiety, mood swings, or difficulty sleeping  ROS  [ ] Unable to obtain   REST OF REVIEW Of SYSTEM - [ ] Normal     Height (cm): 152.4 (04-22 @ 15:48)  Weight (kg): 56.7 (04-22 @ 15:48)  BMI (kg/m2): 24.4 (04-22 @ 15:48)  BSA (m2): 1.53 (04-22 @ 15:48)    Vital Signs Last 24 Hrs  T(C): 36.8, Max: 36.8 (04-23 @ 23:49)  T(F): 98.3, Max: 98.3 (04-24 @ 07:57)  HR: 66 (64 - 75)  BP: 122/68 (122/68 - 152/64)  RR: 20 (16 - 20)  SpO2: 93% (91% - 97%)  [ ] room air   [ ] 02    PHYSICAL EXAM:  GENERAL:  No acute distresss, well appearing, [ ] Agitated, [ ] Lethargy, [ ] confused   HEAD:  normal  ENMT: normal  NECK:  normal    NERVOUS SYSTEM:  Alert & Oriented X3, no focal deficits [ ]Confusion  [ ] Encephalopathic [ ] Sedated [ ] Other  CHEST/LUNG: Clear to auscultation bilaterally,  [ ] decreased breath sounds at bases  [ ] wheezing   [ ] rhonchi  [ ] crackles  HEART:  Regular rate and rhythm, No murmurs, rubs, or gallops,  [ ] irregular   ABDOMEN:  soft, nontender, nondistended, positive bowel sounds   [ ] obese  EXTREMITIES: No clubbing, cyanosis or edema  SKIN: [ ] venous stasis skin changes    LABS:                        9.6    13.2  )-----------( 209      ( 24 Apr 2017 06:58 )             30.4     24 Apr 2017 06:58    130    |  94     |  26     ----------------------------<  108    3.5     |  27     |  0.62     Ca    9.0        24 Apr 2017 06:58      PT/INR - ( 22 Apr 2017 17:00 )   PT: 11.9 sec;   INR: 1.09 ratio         PTT - ( 22 Apr 2017 17:00 )  PTT:28.3 sec      CAPILLARY BLOOD GLUCOSE    Cultures:  Blood Culture Results:   No growth to date. (04-22 @ 22:12)  Blood Culture Results:   No growth to date. (04-22 @ 22:12)      culture blood  -- .Blood Blood-Peripheral 04-22 @ 22:12    culture urine  --  04-22 @ 22:12          Care Discussed with [X] Consultants  [X] Patient  [ ] Family  [X]   /   [ ] Other; RN  DVT prophylaxis [X] lovenox   [ ] subq heparin  [ ] coumadin  [ ] venodynes [ ] ambulating frequently at how risk for vte and no pharm         or  mechanical prophylaxis required    [ ] other   Advanced directive:    [ ]pt has hcp     [ ] pt declined to assign hcp  Discussed with pt @ bedside Patient is a 89y old  Female who presents with a chief complaint of cough (22 Apr 2017 18:30)      INTERVAL HPI/OVERNIGHT EVENTS: Pt cough and LE edema has improved. Denies SOB. No acute events overnight.     MEDICATIONS  (STANDING):  ALBUTerol    0.083% 2.5milliGRAM(s) Nebulizer every 8 hours  guaiFENesin    Syrup 200milliGRAM(s) Oral every 8 hours  enoxaparin Injectable 40milliGRAM(s) SubCutaneous every 24 hours  furosemide   Injectable 40milliGRAM(s) IV Push daily  doxazosin 4milliGRAM(s) Oral at bedtime  famotidine    Tablet 20milliGRAM(s) Oral daily  carvedilol 25milliGRAM(s) Oral every 12 hours  cinacalcet 30milliGRAM(s) Oral at bedtime  hydrALAZINE 10milliGRAM(s) Oral <User Schedule>  hydrALAZINE 20milliGRAM(s) Oral two times a day  aspirin enteric coated 81milliGRAM(s) Oral daily  potassium chloride   Powder 20milliEquivalent(s) Oral daily  azithromycin  IVPB 500milliGRAM(s) IV Intermittent every 24 hours  lactobacillus acidophilus 1Tablet(s) Oral three times a day with meals  isosorbide   mononitrate ER Tablet (IMDUR) 30milliGRAM(s) Oral daily    MEDICATIONS  (PRN):      Allergies  Vasotec (Unknown)    Intolerances        REVIEW OF SYSTEMS:  CONSTITUTIONAL: No fever, No chills, No fatigue.   RESPIRATORY: Cough. No SOB, wheezing, No  hemoptysis.  CARDIOVASCULAR: No chest pain, palpitations. LE edema improved.   GASTROINTESTINAL: No abdominal or epigastric pain. No nausea, No vomiting; No diarrhea or constipation. [ ] BM  GENITOURINARY: No dysuria, No frequency, No urgency, No hematuria, or incontinence  NEUROLOGICAL: alert and oriented x 3,  No headaches, No dizziness, No numbness,  SKIN:   No itching, burning, rashes, or lesions   MUSCULOSKELETAL: No joint pain or swelling; No muscle pain, No back pain, No extremity pain  PSYCHIATRIC: No depression, anxiety, mood swings, or difficulty sleeping  ROS  [ ] Unable to obtain   REST OF REVIEW Of SYSTEM - [X] Normal     Height (cm): 152.4 (04-22 @ 15:48)  Weight (kg): 56.7 (04-22 @ 15:48)  BMI (kg/m2): 24.4 (04-22 @ 15:48)  BSA (m2): 1.53 (04-22 @ 15:48)    Vital Signs Last 24 Hrs  T(C): 36.8, Max: 36.8 (04-23 @ 23:49)  T(F): 98.3, Max: 98.3 (04-24 @ 07:57)  HR: 66 (64 - 75)  BP: 122/68 (122/68 - 152/64)  RR: 20 (16 - 20)  SpO2: 93% (91% - 97%)  [ X] room air   [ ] 02    PHYSICAL EXAM:  GENERAL:  NAD, well appearing  HEAD:  normal  ENMT: normal  NECK:  normal    NERVOUS SYSTEM:  Alert & Oriented X3, no focal deficits  CHEST/LUNG: [X] decreased breath sounds at bases  [ ] wheezing   [ ] rhonchi  [X] crackles  HEART:  Regular rate and rhythm, No murmurs, rubs, or gallops  ABDOMEN:  soft, nontender, nondistended, positive bowel sounds   EXTREMITIES: No clubbing, cyanosis or edema  SKIN: No venous stasis changes     LABS:                        9.6    13.2  )-----------( 209      ( 24 Apr 2017 06:58 )             30.4     24 Apr 2017 06:58    130    |  94     |  26     ----------------------------<  108    3.5     |  27     |  0.62     Ca    9.0        24 Apr 2017 06:58      PT/INR - ( 22 Apr 2017 17:00 )   PT: 11.9 sec;   INR: 1.09 ratio         PTT - ( 22 Apr 2017 17:00 )  PTT:28.3 sec      CAPILLARY BLOOD GLUCOSE    Cultures:  Blood Culture Results:   No growth to date. (04-22 @ 22:12)  Blood Culture Results:   No growth to date. (04-22 @ 22:12)      culture blood  -- .Blood Blood-Peripheral 04-22 @ 22:12    culture urine  --  04-22 @ 22:12          Care Discussed with [X] Consultants  [X] Patient  [ ] Family  [X]   /   [ ] Other; RN  DVT prophylaxis [X] lovenox   [ ] subq heparin  [ ] coumadin  [ ] venodynes [ ] ambulating frequently at how risk for vte and no pharm         or  mechanical prophylaxis required    [ ] other   Advanced directive:    [ ]pt has hcp     [ ] pt declined to assign hcp  Discussed with pt @ bedside Patient is a 89y old  Female who presents with a chief complaint of cough (22 Apr 2017 18:30)      HPI: 89F PMHx HTN, hyperparathyroidism s/p parathyroidectomy, anemia, LE edema sent in from urgent care c/o cough, sore throat and weakness. Pt states sore throat began 6 days ago, 4 days ago developed productive cough. States she has been feeling weak and getting more tired the past few days. Tried Delsym relieved cough, but made her feel dizzy. CXRay at Urgent care showed B/L PNA and suggestive of CHF, sent her to ED. Pt has been having increased swelling in legs L>R for the past few months, had doppler US which was negative for DVT, planned to have carotid doppler ultrasound, but has not yet gone.   Received her flu shot this year. Denies fever, SOB, chest pain, palpitations, loss of appetite, N/V/D, sick contacts.     In the ER O2 90% on RA, afebrile, WBC 16.6, Na 127, procalcitonin 0.07, proBNP 2,347, troponin <0.015. CXR showed Stable cardiomegaly. New vascular congestion interstitial edema consistent with fluid overload or mild/early CHF. Chronic pleural  parenchymal changes left base similar to prior. No definite focal infiltrate. Given IV Rocephin and Azithromax, 1L NS, 40 IV Lasix, Duoneb. seen in er by dr king advised zmax to continue      INTERVAL HPI/OVERNIGHT EVENTS:  Pt cough and LE edema has improved. Denies SOB. No acute events overnight.       MEDICATIONS  (STANDING):  ALBUTerol    0.083% 2.5milliGRAM(s) Nebulizer every 8 hours  guaiFENesin    Syrup 200milliGRAM(s) Oral every 8 hours  enoxaparin Injectable 40milliGRAM(s) SubCutaneous every 24 hours  furosemide   Injectable 40milliGRAM(s) IV Push daily  doxazosin 4milliGRAM(s) Oral at bedtime  famotidine    Tablet 20milliGRAM(s) Oral daily  carvedilol 25milliGRAM(s) Oral every 12 hours  cinacalcet 30milliGRAM(s) Oral at bedtime  hydrALAZINE 10milliGRAM(s) Oral <User Schedule>  hydrALAZINE 20milliGRAM(s) Oral two times a day  aspirin enteric coated 81milliGRAM(s) Oral daily  potassium chloride   Powder 20milliEquivalent(s) Oral daily  azithromycin  IVPB 500milliGRAM(s) IV Intermittent every 24 hours  lactobacillus acidophilus 1Tablet(s) Oral three times a day with meals  isosorbide   mononitrate ER Tablet (IMDUR) 30milliGRAM(s) Oral daily    MEDICATIONS  (PRN):      Allergies  Vasotec (Unknown)    Intolerances        REVIEW OF SYSTEMS:  CONSTITUTIONAL: No fever, No chills, No fatigue.   RESPIRATORY: Cough. No SOB, wheezing, No  hemoptysis.  CARDIOVASCULAR: No chest pain, palpitations. LE edema improved.   GASTROINTESTINAL: No abdominal or epigastric pain. No nausea, No vomiting; No diarrhea or constipation. [ ] BM  GENITOURINARY: No dysuria, No frequency, No urgency, No hematuria, or incontinence  NEUROLOGICAL: alert and oriented x 3,  No headaches, No dizziness, No numbness,  SKIN:   No itching, burning, rashes, or lesions   MUSCULOSKELETAL: No joint pain or swelling; No muscle pain, No back pain, No extremity pain  PSYCHIATRIC: No depression, anxiety, mood swings, or difficulty sleeping  ROS  [ ] Unable to obtain   REST OF REVIEW Of SYSTEM - [X] Normal     Height (cm): 152.4 (04-22 @ 15:48)  Weight (kg): 56.7 (04-22 @ 15:48)  BMI (kg/m2): 24.4 (04-22 @ 15:48)  BSA (m2): 1.53 (04-22 @ 15:48)    Vital Signs Last 24 Hrs  T(C): 36.8, Max: 36.8 (04-23 @ 23:49)  T(F): 98.3, Max: 98.3 (04-24 @ 07:57)  HR: 66 (64 - 75)  BP: 122/68 (122/68 - 152/64)  RR: 20 (16 - 20)  SpO2: 93% (91% - 97%)  [ X] room air   [ ] 02    PHYSICAL EXAM:  GENERAL:  NAD, well appearing  HEAD:  normal  ENMT: normal  NECK:  normal    NERVOUS SYSTEM:  Alert & Oriented X3, no focal deficits  CHEST/LUNG: [X] decreased breath sounds at bases  [ ] wheezing   [ ] rhonchi  [X] crackles  HEART:  Regular rate and rhythm, No murmurs, rubs, or gallops  ABDOMEN:  soft, nontender, nondistended, positive bowel sounds   EXTREMITIES: No clubbing, cyanosis or edema  SKIN: No venous stasis changes     LABS:                        9.6    13.2  )-----------( 209      ( 24 Apr 2017 06:58 )             30.4     24 Apr 2017 06:58    130    |  94     |  26     ----------------------------<  108    3.5     |  27     |  0.62     Ca    9.0        24 Apr 2017 06:58      PT/INR - ( 22 Apr 2017 17:00 )   PT: 11.9 sec;   INR: 1.09 ratio         PTT - ( 22 Apr 2017 17:00 )  PTT:28.3 sec      CAPILLARY BLOOD GLUCOSE    Cultures:  Blood Culture Results:   No growth to date. (04-22 @ 22:12)  Blood Culture Results:   No growth to date. (04-22 @ 22:12)      culture blood  -- .Blood Blood-Peripheral 04-22 @ 22:12    culture urine  --  04-22 @ 22:12          Care Discussed with [X] Consultants  [X] Patient  [ ] Family  [X]   /   [ ] Other; RN  DVT prophylaxis [X] lovenox   [ ] subq heparin  [ ] coumadin  [ ] venodynes [ ] ambulating frequently at how risk for vte and no pharm         or  mechanical prophylaxis required    [ ] other   Advanced directive:    [ ]pt has hcp     [ ] pt declined to assign hcp  Discussed with pt @ bedside

## 2017-04-24 NOTE — PROGRESS NOTE ADULT - PROBLEM SELECTOR PLAN 4
Continue carvedilol 25mg BID and hydralazine 10mg TID.   HOLD losartan until renal function stable.  HOLD norvasc until BP stable.

## 2017-04-24 NOTE — GOALS OF CARE CONVERSATION - PERSONAL ADVANCE DIRECTIVE - CONVERSATION DETAILS
met pt, A&O. daughter has all her papers, hcp. inquired pt has had discussions w daughter of her directives: pt may want resuscitation but not live on machines. contacted Eula on phone, will locate hcp & living will, she needs to look at her directives, knows pt does not want a feed tube, but would allow paddles. will revisit after finds papers and talk to pt. contact # given. will revisit after papers and daughter present.

## 2017-04-24 NOTE — PROGRESS NOTE ADULT - PROBLEM SELECTOR PLAN 3
Likely hypervolemic hyponatremia due to CHF  Urine sodium and osmolarity - normal    TSH and uric acid - normal   Fluid restriction.   Monitor BMP.   f/u renal recs (Dr. Keller)

## 2017-04-24 NOTE — PROGRESS NOTE ADULT - ASSESSMENT
89F PMHx HTN, hyperparathyroidism s/p parathyroidectomy, anemia, LE edema admitted for new onset CHF and possible pneumonia with hyponatremia.

## 2017-04-24 NOTE — PROGRESS NOTE ADULT - PROBLEM SELECTOR PLAN 2
2/2 to new onset chf and possible pneumonia. Procalcitonin elevated.  Continue zithromax for CAP (5 day course).    Continue albuterol TID.    O2 supplementation, keep O2 sats >88%   f/u pulm recs (Dr. Valdovinos) 2/2 to new onset chf and possible pneumonia.   Procalcitonin elevated. Blood cx - NEG   Continue zithromax for CAP (5 day course).    Continue albuterol TID.    O2 supplementation, keep O2 sats >88%   f/u pulm recs (Dr. Valdovinos) 2/2 to new onset CHF and possible pneumonia.   Procalcitonin elevated. Blood cx - NEG   Continue zithromax for CAP (5 day course).    Continue albuterol TID.    O2 supplementation, keep O2 sats >88%   f/u pulm recs (Dr. Valdovinos)

## 2017-04-24 NOTE — PROGRESS NOTE ADULT - ASSESSMENT
89 F w HFPEF, HTN, edema p/w ADHF  - Vol status has markedly improved. hypoNa improving and likely from vol ol. Continue Lasix 40mg IV qday. Trend Cr, Na. Keep net negative.   - No signs of ischemia.   - Echo. Normal EF, mild AS, Large Bilat pleural Eff, PAP=58F/U   carotid min plaque  - FU pulm recs. Cont Abx.   - Continue Coreg and Hydralazine at home doses.  add Imdur 30mg Qday for preload reduction   - Monitor and replete electrolytes. Keep K>4.0 and Mg>2.0   ABS.   Further cardiac workup will depend on clinical course.   All other workup per primary team. Will followup.

## 2017-04-24 NOTE — PROGRESS NOTE ADULT - SUBJECTIVE AND OBJECTIVE BOX
NEPHROLOGY INTERVAL HPI/OVERNIGHT EVENTS:    MEDICATIONS  (STANDING):  ALBUTerol    0.083% 2.5milliGRAM(s) Nebulizer every 8 hours  guaiFENesin    Syrup 200milliGRAM(s) Oral every 8 hours  enoxaparin Injectable 40milliGRAM(s) SubCutaneous every 24 hours  furosemide   Injectable 40milliGRAM(s) IV Push daily  doxazosin 4milliGRAM(s) Oral at bedtime  famotidine    Tablet 20milliGRAM(s) Oral daily  carvedilol 25milliGRAM(s) Oral every 12 hours  cinacalcet 30milliGRAM(s) Oral at bedtime  hydrALAZINE 10milliGRAM(s) Oral <User Schedule>  hydrALAZINE 20milliGRAM(s) Oral two times a day  aspirin enteric coated 81milliGRAM(s) Oral daily  potassium chloride   Powder 20milliEquivalent(s) Oral daily  azithromycin  IVPB 500milliGRAM(s) IV Intermittent every 24 hours  lactobacillus acidophilus 1Tablet(s) Oral three times a day with meals  isosorbide   mononitrate ER Tablet (IMDUR) 30milliGRAM(s) Oral daily    MEDICATIONS  (PRN):    Allergies  Vasotec (Unknown)    Vital Signs Last 24 Hrs  T(C): 36.8, Max: 36.8 ( @ 23:49)  T(F): 98.3, Max: 98.3 ( @ 07:57)  HR: 66 (64 - 75)  BP: 122/68 (122/68 - 152/64)  BP(mean): --  RR: 20 (16 - 20)  SpO2: 93% (91% - 97%)  Daily     Daily Weight in k.1 (2017 04:41)    PHYSICAL EXAM:    GENERAL: No complaints  EYES: No icterus  ENMT: No oral ulcerations  NECK: No JVD  CHEST/LUNG:  Coarse BS  HEART: Regular no rub  ABDOMEN: Neg  EXTREMITIES:  No edema in bedrest  SKIN: negative    LABS:Improved WBC .  Improved 'BUN. Stable Na                        9.6    13.2  )-----------( 209      ( 2017 06:58 )             30.4     04-24    130<L>  |  94<L>  |  26<H>  ----------------------------<  108<H>  3.5   |  27  |  0.62    Ca    9.0      2017 06:58  Phos  2.4       Mg     1.9         TPro  7.3  /  Alb  3.4  /  TBili  0.5  /  DBili  x   /  AST  24  /  ALT  28  /  AlkPhos  93  04-22    PT/INR - ( 2017 17:00 )   PT: 11.9 sec;   INR: 1.09 ratio         PTT - ( 2017 17:00 )  PTT:28.3 sec      Intact PTH pending      RADIOLOGY & ADDITIONAL TESTS:

## 2017-04-24 NOTE — PROGRESS NOTE ADULT - PROBLEM SELECTOR PLAN 7
Family wish full resuscitative measures at this point, although would not want artificial feeding, should the situation arise. DVT ppx with lovenox.   GI ppx with pepcid

## 2017-04-24 NOTE — PROGRESS NOTE ADULT - SUBJECTIVE AND OBJECTIVE BOX
[facility]    Christofer Isaacs, Sorin, Marisa, Hieu Norris      [phone]    CHIEF COMPLAINT: Patient is a 89y old  Female who presents with a chief complaint of cough (22 Apr 2017 18:30)      Follow Up: hypertension. heart failurewith preserved ejection fraction, volume overload, COPD    Interim history:he may begin improvement in cough and shortness of breath with Lasix. Edema has improved. No chest pain or dizziness. No    MEDICATIONS  (STANDING):  ALBUTerol    0.083% 2.5milliGRAM(s) Nebulizer every 8 hours  guaiFENesin    Syrup 200milliGRAM(s) Oral every 8 hours  enoxaparin Injectable 40milliGRAM(s) SubCutaneous every 24 hours  furosemide   Injectable 40milliGRAM(s) IV Push daily  doxazosin 4milliGRAM(s) Oral at bedtime  famotidine    Tablet 20milliGRAM(s) Oral daily  carvedilol 25milliGRAM(s) Oral every 12 hours  cinacalcet 30milliGRAM(s) Oral at bedtime  hydrALAZINE 10milliGRAM(s) Oral <User Schedule>  hydrALAZINE 20milliGRAM(s) Oral two times a day  aspirin enteric coated 81milliGRAM(s) Oral daily  potassium chloride   Powder 20milliEquivalent(s) Oral daily  azithromycin  IVPB 500milliGRAM(s) IV Intermittent every 24 hours  lactobacillus acidophilus 1Tablet(s) Oral three times a day with meals  isosorbide   mononitrate ER Tablet (IMDUR) 30milliGRAM(s) Oral daily  potassium chloride    Tablet ER 40milliEquivalent(s) Oral once    MEDICATIONS  (PRN):      REVIEW OF SYSTEMS:  eye, ent, GI, , allergic, dermatologic, musculoskeletal and neurologic are negative except as described above    Vital Signs Last 24 Hrs  T(C): 36.8, Max: 36.8 (04-23 @ 23:49)  T(F): 98.3, Max: 98.3 (04-24 @ 07:57)  HR: 66 (64 - 75)  BP: 122/68 (122/68 - 152/64)  BP(mean): --  RR: 20 (16 - 20)  SpO2: 93% (91% - 95%)    I&O's Summary    I & Os for current day (as of 24 Apr 2017 11:58)  =============================================  IN: 880 ml / OUT: 360 ml / NET: 520 ml      Telemetry past 24h:RSR    PHYSICAL EXAM:    Constitutional: well-nourished, well-developed, NAD   HEENT:  MMM, sclerae anicteric, conjunctivae clear, no oral cyanosis.  Pulmonary: Non-labored, breath sounds are clear anteriorly. Decreased breath sounds at bases appear, No wheezing, rales or rhonchi  Cardiovascular: Regular, S1 and S2, ALE, rubs, gallops or clicks  Gastrointestinal: Bowel Sounds present, soft, nontender.   Lymph: Trace to 1+ edema. No lymphadenopathy.  Neurological: Alert, no focal deficits  Skin: No rashes.  Psych:  Mood & affect appropriate    LABS: All Labs Reviewed:                        9.6    13.2  )-----------( 209      ( 24 Apr 2017 06:58 )             30.4                         9.9    14.0  )-----------( 201      ( 23 Apr 2017 07:21 )             29.9                         10.9   16.6  )-----------( 227      ( 22 Apr 2017 16:59 )             32.5     24 Apr 2017 06:58    130    |  94     |  26     ----------------------------<  108    3.5     |  27     |  0.62   23 Apr 2017 07:21    131    |  94     |  25     ----------------------------<  101    3.5     |  27     |  0.69   22 Apr 2017 17:00    127    |  91     |  30     ----------------------------<  94     4.6     |  24     |  0.75     Ca    9.0        24 Apr 2017 06:58  Ca    9.0        23 Apr 2017 07:21  Ca    9.4        22 Apr 2017 17:00  Phos  2.4       23 Apr 2017 07:21  Mg     1.9       23 Apr 2017 07:21    TPro  7.3    /  Alb  3.4    /  TBili  0.5    /  DBili  x      /  AST  24     /  ALT  28     /  AlkPhos  93     22 Apr 2017 17:00    PT/INR - ( 22 Apr 2017 17:00 )   PT: 11.9 sec;   INR: 1.09 ratio         PTT - ( 22 Apr 2017 17:00 )  PTT:28.3 sec  CARDIAC MARKERS ( 23 Apr 2017 07:21 )  <.015 ng/mL / x     / 56 U/L / x     / 2.7 ng/mL  CARDIAC MARKERS ( 22 Apr 2017 23:29 )  <.015 ng/mL / x     / 64 U/L / x     / 2.7 ng/mL  CARDIAC MARKERS ( 22 Apr 2017 17:00 )  <.015 ng/mL / x     / 81 U/L / x     / 3.0 ng/mL      Blood Culture: Organism --  Gram Stain Blood -- Gram Stain --  Specimen Source .Blood Blood-Peripheral  Culture-Blood --      04-22 @ 17:00  Pro Bnp [id number]    04-23 @ 07:21  TSH: 1.74      RADIOLOGY:    EKG:

## 2017-04-24 NOTE — PHYSICAL THERAPY INITIAL EVALUATION ADULT - PERTINENT HX OF CURRENT PROBLEM, REHAB EVAL
sent in from urgent care c/o cough, sore throat and weakness. Pt states sore throat began 6 days ago, 4 days ago developed productive cough. States she has been feeling weak and getting more tired the past few days.

## 2017-04-25 ENCOUNTER — TRANSCRIPTION ENCOUNTER (OUTPATIENT)
Age: 82
End: 2017-04-25

## 2017-04-25 DIAGNOSIS — I50.31 ACUTE DIASTOLIC (CONGESTIVE) HEART FAILURE: ICD-10-CM

## 2017-04-25 LAB
ANION GAP SERPL CALC-SCNC: 9 MMOL/L — SIGNIFICANT CHANGE UP (ref 5–17)
BASOPHILS # BLD AUTO: 0.1 K/UL — SIGNIFICANT CHANGE UP (ref 0–0.2)
BASOPHILS NFR BLD AUTO: 1.1 % — SIGNIFICANT CHANGE UP (ref 0–2)
BUN SERPL-MCNC: 24 MG/DL — HIGH (ref 7–23)
CALCIUM SERPL-MCNC: 9.8 MG/DL — SIGNIFICANT CHANGE UP (ref 8.5–10.1)
CHLORIDE SERPL-SCNC: 96 MMOL/L — SIGNIFICANT CHANGE UP (ref 96–108)
CO2 SERPL-SCNC: 26 MMOL/L — SIGNIFICANT CHANGE UP (ref 22–31)
CREAT SERPL-MCNC: 0.69 MG/DL — SIGNIFICANT CHANGE UP (ref 0.5–1.3)
EOSINOPHIL # BLD AUTO: 0.3 K/UL — SIGNIFICANT CHANGE UP (ref 0–0.5)
EOSINOPHIL NFR BLD AUTO: 2.5 % — SIGNIFICANT CHANGE UP (ref 0–6)
GLUCOSE SERPL-MCNC: 104 MG/DL — HIGH (ref 70–99)
HCT VFR BLD CALC: 31.5 % — LOW (ref 34.5–45)
HGB BLD-MCNC: 10.6 G/DL — LOW (ref 11.5–15.5)
LYMPHOCYTES # BLD AUTO: 1.5 K/UL — SIGNIFICANT CHANGE UP (ref 1–3.3)
LYMPHOCYTES # BLD AUTO: 11.2 % — LOW (ref 13–44)
MCHC RBC-ENTMCNC: 30.1 PG — SIGNIFICANT CHANGE UP (ref 27–34)
MCHC RBC-ENTMCNC: 33.8 GM/DL — SIGNIFICANT CHANGE UP (ref 32–36)
MCV RBC AUTO: 89.1 FL — SIGNIFICANT CHANGE UP (ref 80–100)
MONOCYTES # BLD AUTO: 1.2 K/UL — HIGH (ref 0–0.9)
MONOCYTES NFR BLD AUTO: 9.3 % — HIGH (ref 1–9)
NEUTROPHILS # BLD AUTO: 10.1 K/UL — HIGH (ref 1.8–7.4)
NEUTROPHILS NFR BLD AUTO: 76 % — SIGNIFICANT CHANGE UP (ref 43–77)
PLATELET # BLD AUTO: 263 K/UL — SIGNIFICANT CHANGE UP (ref 150–400)
POTASSIUM SERPL-MCNC: 4.3 MMOL/L — SIGNIFICANT CHANGE UP (ref 3.5–5.3)
POTASSIUM SERPL-SCNC: 4.3 MMOL/L — SIGNIFICANT CHANGE UP (ref 3.5–5.3)
RBC # BLD: 3.53 M/UL — LOW (ref 3.8–5.2)
RBC # FLD: 12.4 % — SIGNIFICANT CHANGE UP (ref 10.3–14.5)
SODIUM SERPL-SCNC: 131 MMOL/L — LOW (ref 135–145)
WBC # BLD: 13.3 K/UL — HIGH (ref 3.8–10.5)
WBC # FLD AUTO: 13.3 K/UL — HIGH (ref 3.8–10.5)

## 2017-04-25 PROCEDURE — 71010: CPT | Mod: 26

## 2017-04-25 PROCEDURE — 99233 SBSQ HOSP IP/OBS HIGH 50: CPT

## 2017-04-25 RX ADMIN — Medication 200 MILLIGRAM(S): at 21:39

## 2017-04-25 RX ADMIN — ALBUTEROL 2.5 MILLIGRAM(S): 90 AEROSOL, METERED ORAL at 20:02

## 2017-04-25 RX ADMIN — Medication 20 MILLIEQUIVALENT(S): at 11:46

## 2017-04-25 RX ADMIN — Medication 20 MILLIGRAM(S): at 17:47

## 2017-04-25 RX ADMIN — Medication 200 MILLIGRAM(S): at 13:33

## 2017-04-25 RX ADMIN — Medication 200 MILLIGRAM(S): at 06:04

## 2017-04-25 RX ADMIN — Medication 1 TABLET(S): at 11:46

## 2017-04-25 RX ADMIN — Medication 1 TABLET(S): at 17:47

## 2017-04-25 RX ADMIN — Medication 4 MILLIGRAM(S): at 21:39

## 2017-04-25 RX ADMIN — ALBUTEROL 2.5 MILLIGRAM(S): 90 AEROSOL, METERED ORAL at 16:00

## 2017-04-25 RX ADMIN — Medication 40 MILLIGRAM(S): at 06:04

## 2017-04-25 RX ADMIN — ALBUTEROL 2.5 MILLIGRAM(S): 90 AEROSOL, METERED ORAL at 07:52

## 2017-04-25 RX ADMIN — CARVEDILOL PHOSPHATE 25 MILLIGRAM(S): 80 CAPSULE, EXTENDED RELEASE ORAL at 06:04

## 2017-04-25 RX ADMIN — CINACALCET 30 MILLIGRAM(S): 30 TABLET, FILM COATED ORAL at 21:39

## 2017-04-25 RX ADMIN — Medication 20 MILLIGRAM(S): at 06:05

## 2017-04-25 RX ADMIN — FAMOTIDINE 20 MILLIGRAM(S): 10 INJECTION INTRAVENOUS at 11:46

## 2017-04-25 RX ADMIN — AZITHROMYCIN 255 MILLIGRAM(S): 500 TABLET, FILM COATED ORAL at 18:58

## 2017-04-25 RX ADMIN — Medication 81 MILLIGRAM(S): at 11:46

## 2017-04-25 RX ADMIN — CARVEDILOL PHOSPHATE 25 MILLIGRAM(S): 80 CAPSULE, EXTENDED RELEASE ORAL at 17:47

## 2017-04-25 RX ADMIN — Medication 10 MILLIGRAM(S): at 11:46

## 2017-04-25 RX ADMIN — ENOXAPARIN SODIUM 40 MILLIGRAM(S): 100 INJECTION SUBCUTANEOUS at 23:23

## 2017-04-25 RX ADMIN — ISOSORBIDE MONONITRATE 30 MILLIGRAM(S): 60 TABLET, EXTENDED RELEASE ORAL at 11:46

## 2017-04-25 NOTE — DISCHARGE NOTE ADULT - CARE PROVIDER_API CALL
Frankie Rowell), Cardiovascular Disease  [address] [place] [address]  Phone: [phone]  Fax: [phone]    brianna alvarado  Phone: (   )    -  Fax: (   )    -    Vance Coffey), Internal Medicine; Nephrology  [address] [place] [address]  Phone: [phone]  Fax: [phone] Frankie Rowell), Cardiovascular Disease  [address] [place] [address]  Phone: [phone]  Fax: [phone]    Vance Coffey), Internal Medicine; Nephrology  [address] [place] [address]  Phone: [phone]  Fax: [phone]    brianna alvarado  Phone: (   )    -  Fax: (   )    -    Weil, Peter A (MD), Critical Care Medicine; Internal Medicine; Pulmonary Disease  [address] [facility] [address]  [place] [address]  Phone: [phone]  Fax: [phone]

## 2017-04-25 NOTE — DISCHARGE NOTE ADULT - CARE PROVIDERS DIRECT ADDRESSES
,[email or web address],DirectAddress_Unknown,DirectAddress_Unknown,DirectAddress_Unknown ,[email or web address],DirectAddress_Unknown,DirectAddress_Unknown,[email or web address]

## 2017-04-25 NOTE — PROGRESS NOTE ADULT - PROBLEM SELECTOR PLAN 8
Family wish full resuscitative measures at this point, although would not want artificial feeding, should the situation arise. Family wish full resuscitative measures at this point, although would not want artificial feeding, should the situation arise.  will address hcp w family

## 2017-04-25 NOTE — PROGRESS NOTE ADULT - SUBJECTIVE AND OBJECTIVE BOX
Patient is a 89y old  Female who presents with a chief complaint of cough (22 Apr 2017 18:30)      INTERVAL HPI/OVERNIGHT EVENTS:    MEDICATIONS  (STANDING):  ALBUTerol    0.083% 2.5milliGRAM(s) Nebulizer every 8 hours  guaiFENesin    Syrup 200milliGRAM(s) Oral every 8 hours  enoxaparin Injectable 40milliGRAM(s) SubCutaneous every 24 hours  furosemide   Injectable 40milliGRAM(s) IV Push daily  doxazosin 4milliGRAM(s) Oral at bedtime  famotidine    Tablet 20milliGRAM(s) Oral daily  carvedilol 25milliGRAM(s) Oral every 12 hours  cinacalcet 30milliGRAM(s) Oral at bedtime  hydrALAZINE 10milliGRAM(s) Oral <User Schedule>  hydrALAZINE 20milliGRAM(s) Oral two times a day  aspirin enteric coated 81milliGRAM(s) Oral daily  potassium chloride   Powder 20milliEquivalent(s) Oral daily  azithromycin  IVPB 500milliGRAM(s) IV Intermittent every 24 hours  lactobacillus acidophilus 1Tablet(s) Oral three times a day with meals  isosorbide   mononitrate ER Tablet (IMDUR) 30milliGRAM(s) Oral daily    MEDICATIONS  (PRN):      Allergies  Vasotec (Unknown)    Intolerances        REVIEW OF SYSTEMS:  CONSTITUTIONAL: No fever, No chills,No fatigue,No myalgia,No Body ache  EYES: No eye pain, visual disturbances, or discharge  ENMT:  No ear pain, No nose bleed, No vertigo; No sinus or throat pain  NECK: No pain, No stiffness  RESPIRATORY: No cough, wheezing, No  hemoptysis; No shortness of breath  CARDIOVASCULAR: No chest pain, palpitations, leg swelling  GASTROINTESTINAL: No abdominal or epigastric pain. No nausea, No vomiting; No diarrhea or constipation. [ ] BM  GENITOURINARY: No dysuria, No frequency, No urgency, No hematuria, or incontinence  NEUROLOGICAL: alert and oriented x 3,  No headaches, No dizziness, No numbness,  SKIN:   No itching, burning, rashes, or lesions   MUSCULOSKELETAL: No joint pain or swelling; No muscle pain, No back pain, No extremity pain  PSYCHIATRIC: No depression, anxiety, mood swings, or difficulty sleeping  ROS  [ ] Unable to obtain   REST OF REVIEW Of SYSTEM - [ ] Normal     Height (cm): 152.4 (04-22 @ 15:48)  Weight (kg): 56.7 (04-22 @ 15:48)  BMI (kg/m2): 24.4 (04-22 @ 15:48)  BSA (m2): 1.53 (04-22 @ 15:48)    Vital Signs Last 24 Hrs  T(C): 36.7, Max: 36.7 (04-25 @ 04:58)  T(F): 98, Max: 98 (04-25 @ 04:58)  HR: 68 (62 - 77)  BP: 148/66 (125/60 - 148/66)  RR: 20 (20 - 22)  SpO2: 93% (87% - 95%)  [ ] room air   [X] 02    PHYSICAL EXAM:  GENERAL:  No acute distresss, well appearing, [ ] Agitated, [ ] Lethargy, [ ] confused   HEAD:  normal  ENMT: normal  NECK:  normal    NERVOUS SYSTEM:  Alert & Oriented X3, no focal deficits [ ]Confusion  [ ] Encephalopathic [ ] Sedated [ ] Other  CHEST/LUNG: Clear to auscultation bilaterally,  [ ] decreased breath sounds at bases  [ ] wheezing   [ ] rhonchi  [ ] crackles  HEART:  Regular rate and rhythm, No murmurs, rubs, or gallops,  [ ] irregular   ABDOMEN:  soft, nontender, nondistended, positive bowel sounds   [ ] obese  EXTREMITIES: No clubbing, cyanosis or edema  SKIN: [ ] venous stasis skin changes    LABS:                        10.6   13.3  )-----------( 263      ( 25 Apr 2017 06:19 )             31.5     25 Apr 2017 06:19    131    |  96     |  24     ----------------------------<  104    4.3     |  26     |  0.69     Ca    9.8        25 Apr 2017 06:19            CAPILLARY BLOOD GLUCOSE    Cultures  Culture Results:   No growth to date. (04-22 @ 22:12)  Culture Results:   No growth to date. (04-22 @ 22:12)          RADIOLOGY & ADDITIONAL TESTS:      Care Discussed with [X] Consultants  [ ] Patient  [ ] Family  [X]   /   [ ] Other; RN  DVT prophylaxis [ ] lovenox   [ ] subq heparin  [ ] coumadin  [ ] venodynes [ ] ambulating frequently at how risk for vte and no pharm         or  mechanical prophylaxis required    [ ] other   Advanced directive:    [ ]pt has hcp     [ ] pt declined to assign hcp  Discussed with pt @ bedside Patient is a 89y old  Female who presents with a chief complaint of cough (22 Apr 2017 18:30)      INTERVAL HPI/OVERNIGHT EVENTS: Pt it feeling well today. Pt desat to mid 80s overnight and with PT. States she took off her oxygen last night but was placed back on O2 and sats improved.     MEDICATIONS  (STANDING):  ALBUTerol    0.083% 2.5milliGRAM(s) Nebulizer every 8 hours  guaiFENesin    Syrup 200milliGRAM(s) Oral every 8 hours  enoxaparin Injectable 40milliGRAM(s) SubCutaneous every 24 hours  furosemide   Injectable 40milliGRAM(s) IV Push daily  doxazosin 4milliGRAM(s) Oral at bedtime  famotidine    Tablet 20milliGRAM(s) Oral daily  carvedilol 25milliGRAM(s) Oral every 12 hours  cinacalcet 30milliGRAM(s) Oral at bedtime  hydrALAZINE 10milliGRAM(s) Oral <User Schedule>  hydrALAZINE 20milliGRAM(s) Oral two times a day  aspirin enteric coated 81milliGRAM(s) Oral daily  potassium chloride   Powder 20milliEquivalent(s) Oral daily  azithromycin  IVPB 500milliGRAM(s) IV Intermittent every 24 hours  lactobacillus acidophilus 1Tablet(s) Oral three times a day with meals  isosorbide   mononitrate ER Tablet (IMDUR) 30milliGRAM(s) Oral daily    MEDICATIONS  (PRN):      Allergies  Vasotec (Unknown)    Intolerances        REVIEW OF SYSTEMS:  CONSTITUTIONAL: No fever, No chills, No fatigue, No myalgia, No Body ache  RESPIRATORY: Productive cough. No wheezing, No  hemoptysis; No shortness of breath  CARDIOVASCULAR: No chest pain, palpitations, leg swelling  GASTROINTESTINAL: No abdominal or epigastric pain. No nausea, No vomiting; No diarrhea or constipation. [ ] BM  GENITOURINARY: No dysuria, No frequency, No urgency, No hematuria, or incontinence  NEUROLOGICAL: alert and oriented x 3,  No headaches, No dizziness, No numbness,  SKIN:   No itching, burning, rashes, or lesions   MUSCULOSKELETAL: No joint pain or swelling; No muscle pain, No back pain, No extremity pain  PSYCHIATRIC: No depression, anxiety, mood swings, or difficulty sleeping  ROS  [ ] Unable to obtain   REST OF REVIEW Of SYSTEM - [ ] Normal     Height (cm): 152.4 (04-22 @ 15:48)  Weight (kg): 56.7 (04-22 @ 15:48)  BMI (kg/m2): 24.4 (04-22 @ 15:48)  BSA (m2): 1.53 (04-22 @ 15:48)    Vital Signs Last 24 Hrs  T(C): 36.7, Max: 36.7 (04-25 @ 04:58)  T(F): 98, Max: 98 (04-25 @ 04:58)  HR: 68 (62 - 77)  BP: 148/66 (125/60 - 148/66)  RR: 20 (20 - 22)  SpO2: 93% (87% - 95%)  [ ] room air   [X] 02    PHYSICAL EXAM:  GENERAL:  No acute distresss, well appearing, [ ] Agitated, [ ] Lethargy, [ ] confused   HEAD:  normal  ENMT: normal  NECK:  normal    NERVOUS SYSTEM:  Alert & Oriented X3, no focal deficits [ ]Confusion  [ ] Encephalopathic [ ] Sedated [ ] Other  CHEST/LUNG: Clear to auscultation bilaterally,  [ ] decreased breath sounds at bases  [ ] wheezing   [ ] rhonchi  [ ] crackles  HEART:  Regular rate and rhythm, No murmurs, rubs, or gallops,  [ ] irregular   ABDOMEN:  soft, nontender, nondistended, positive bowel sounds   [ ] obese  EXTREMITIES: No clubbing, cyanosis or edema  SKIN: [ ] venous stasis skin changes    LABS:                        10.6   13.3  )-----------( 263      ( 25 Apr 2017 06:19 )             31.5     25 Apr 2017 06:19    131    |  96     |  24     ----------------------------<  104    4.3     |  26     |  0.69     Ca    9.8        25 Apr 2017 06:19            CAPILLARY BLOOD GLUCOSE    Cultures  Culture Results:   No growth to date. (04-22 @ 22:12)  Culture Results:   No growth to date. (04-22 @ 22:12)          RADIOLOGY & ADDITIONAL TESTS:      Care Discussed with [X] Consultants  [ ] Patient  [ ] Family  [X]   /   [ ] Other; RN  DVT prophylaxis [ ] lovenox   [ ] subq heparin  [ ] coumadin  [ ] venodynes [ ] ambulating frequently at how risk for vte and no pharm         or  mechanical prophylaxis required    [ ] other   Advanced directive:    [ ]pt has hcp     [ ] pt declined to assign hcp  Discussed with pt @ bedside Patient is a 89y old  Female who presents with a chief complaint of cough (22 Apr 2017 18:30)      INTERVAL HPI/OVERNIGHT EVENTS: Pt it feeling well today. Pt desat to mid 80s overnight and with PT. States she took off her oxygen last night but was placed back on O2 and sats improved.     MEDICATIONS  (STANDING):  ALBUTerol    0.083% 2.5milliGRAM(s) Nebulizer every 8 hours  guaiFENesin    Syrup 200milliGRAM(s) Oral every 8 hours  enoxaparin Injectable 40milliGRAM(s) SubCutaneous every 24 hours  furosemide   Injectable 40milliGRAM(s) IV Push daily  doxazosin 4milliGRAM(s) Oral at bedtime  famotidine    Tablet 20milliGRAM(s) Oral daily  carvedilol 25milliGRAM(s) Oral every 12 hours  cinacalcet 30milliGRAM(s) Oral at bedtime  hydrALAZINE 10milliGRAM(s) Oral <User Schedule>  hydrALAZINE 20milliGRAM(s) Oral two times a day  aspirin enteric coated 81milliGRAM(s) Oral daily  potassium chloride   Powder 20milliEquivalent(s) Oral daily  azithromycin  IVPB 500milliGRAM(s) IV Intermittent every 24 hours  lactobacillus acidophilus 1Tablet(s) Oral three times a day with meals  isosorbide   mononitrate ER Tablet (IMDUR) 30milliGRAM(s) Oral daily    MEDICATIONS  (PRN):      Allergies  Vasotec (Unknown)    Intolerances        REVIEW OF SYSTEMS:  CONSTITUTIONAL: No fever, No chills, No fatigue, No myalgia, No Body ache  RESPIRATORY: Productive cough. No wheezing, No  hemoptysis; No shortness of breath  CARDIOVASCULAR: No chest pain, palpitations, leg swelling  GASTROINTESTINAL: No abdominal or epigastric pain. No nausea, No vomiting; No diarrhea or constipation. [ ] BM  GENITOURINARY: No dysuria, No frequency, No urgency, No hematuria, or incontinence  NEUROLOGICAL: alert and oriented x 3,  No headaches, No dizziness, No numbness,  SKIN:   No itching, burning, rashes, or lesions   MUSCULOSKELETAL: No joint pain or swelling; No muscle pain, No back pain, No extremity pain  PSYCHIATRIC: No depression, anxiety, mood swings, or difficulty sleeping  ROS  [ ] Unable to obtain   REST OF REVIEW Of SYSTEM - [ ] Normal     Height (cm): 152.4 (04-22 @ 15:48)  Weight (kg): 56.7 (04-22 @ 15:48)  BMI (kg/m2): 24.4 (04-22 @ 15:48)  BSA (m2): 1.53 (04-22 @ 15:48)    Vital Signs Last 24 Hrs  T(C): 36.7, Max: 36.7 (04-25 @ 04:58)  T(F): 98, Max: 98 (04-25 @ 04:58)  HR: 68 (62 - 77)  BP: 148/66 (125/60 - 148/66)  RR: 20 (20 - 22)  SpO2: 93% (87% - 95%)  [ ] room air   [X] 02    PHYSICAL EXAM:  GENERAL:  No acute distresss, well appearing, [ ] Agitated, [ ] Lethargy, [ ] confused   HEAD:  normal  ENMT: normal  NECK:  normal    NERVOUS SYSTEM:  Alert & Oriented X3, no focal deficits [ ]Confusion  [ ] Encephalopathic [ ] Sedated [ ] Other  CHEST/LUNG: Clear to auscultation bilaterally,  [ ] decreased breath sounds at bases  [ ] wheezing   [ ] rhonchi  [ x] crackles bilateral bases with decreased bs overall  HEART:  Regular rate and rhythm, No murmurs, rubs, or gallops,  [ ] irregular   ABDOMEN:  soft, nontender, nondistended, positive bowel sounds   [ ] obese  EXTREMITIES: No clubbing, cyanosis pos edema much improved  SKIN: [ ] venous stasis skin changes    LABS:                        10.6   13.3  )-----------( 263      ( 25 Apr 2017 06:19 )             31.5     25 Apr 2017 06:19    131    |  96     |  24     ----------------------------<  104    4.3     |  26     |  0.69     Ca    9.8        25 Apr 2017 06:19            CAPILLARY BLOOD GLUCOSE    Cultures  Culture Results:   No growth to date. (04-22 @ 22:12)  Culture Results:   No growth to date. (04-22 @ 22:12)          RADIOLOGY & ADDITIONAL TESTS:      Care Discussed with [X] Consultants  [ ] Patient  [ ] Family  [X]   /   [ ] Other; RN  DVT prophylaxis [ ] lovenox   [ ] subq heparin  [ ] coumadin  [ ] venodynes [ ] ambulating frequently at how risk for vte and no pharm         or  mechanical prophylaxis required    [ ] other   Advanced directive:    [ ]pt has hcp     [ ] pt declined to assign hcp  Discussed with pt @ bedside

## 2017-04-25 NOTE — DISCHARGE NOTE ADULT - ADDITIONAL INSTRUCTIONS
1. dr alvarado  2. dr cannon  3. dr ordonez 1. dr alvarado  2. dr cannon  3. dr grossman dw weil after dc from rehab

## 2017-04-25 NOTE — DISCHARGE NOTE ADULT - CARE PLAN
Principal Discharge DX:	Acute respiratory failure with hypoxia  Goal:	continued recovery  Instructions for follow-up, activity and diet:	secondary to acute diastolic chf and possible (less likely) comm acquired pneumonia  meds as above  completed 5 d course of z max  strict i/o daily wts  continued education on fluid and salt restriction   out pt follow up with cardiology  Secondary Diagnosis:	Hyponatremia  Instructions for follow-up, activity and diet:	hypervolemic hyponatremia due to chf  improved  out pt follow up dr cannon  follow labs at rehab and out pt  Secondary Diagnosis:	Essential hypertension  Instructions for follow-up, activity and diet:	2 gm na diet   meds as above  Secondary Diagnosis:	Other hypoparathyroidism  Instructions for follow-up, activity and diet:	secondary to hx of parathyroidectomy  continue sensipar  out pt follow up dr cannon Principal Discharge DX:	Acute respiratory failure with hypoxia  Goal:	Continued recovery  Instructions for follow-up, activity and diet:	secondary to acute diastolic chf and CAP  meds as above  completed 5 d course of z max  strict i/o daily wts  continued education on fluid and salt restriction   out pt follow up with cardiology  Secondary Diagnosis:	Hyponatremia  Instructions for follow-up, activity and diet:	hypervolemic hyponatremia due to chf  improved  out pt follow up dr cannon  follow labs at rehab and out pt  Secondary Diagnosis:	Essential hypertension  Instructions for follow-up, activity and diet:	Sodium restiction diet (2g/day)   meds as above  Secondary Diagnosis:	Other hypoparathyroidism  Instructions for follow-up, activity and diet:	secondary to hx of parathyroidectomy  continue sensipar  out pt follow up dr cannon Principal Discharge DX:	Acute respiratory failure with hypoxia  Goal:	Continued recovery  Instructions for follow-up, activity and diet:	secondary to acute diastolic chf and CAP  meds as above  completed 5 d course of z max  strict i/o daily wts  continued education on fluid and salt restriction   out pt follow up with cardiology  Secondary Diagnosis:	Hyponatremia  Instructions for follow-up, activity and diet:	hypervolemic hyponatremia due to chf  improved  out pt follow up dr cannon  follow labs at rehab and out pt  Secondary Diagnosis:	Essential hypertension  Instructions for follow-up, activity and diet:	Sodium restiction diet (2g/day)   meds as above  Secondary Diagnosis:	Other hypoparathyroidism  Instructions for follow-up, activity and diet:	secondary to hx of parathyroidectomy  continue sensipar  out pt follow up dr cannon  Secondary Diagnosis:	Pneumonia due to infectious organism, unspecified laterality, unspecified part of lung  Instructions for follow-up, activity and diet:	Continue with Levaquin for 3 more days. Principal Discharge DX:	Acute respiratory failure with hypoxia  Goal:	Continued recovery  Instructions for follow-up, activity and diet:	secondary to acute diastolic chf and CAP  meds as above  completed 5 d course of z max  strict i/o daily wts  continued education on fluid and salt restriction   out pt follow up with cardiology  Secondary Diagnosis:	Hyponatremia  Instructions for follow-up, activity and diet:	hypervolemic hyponatremia due to chf  improved  out pt follow up dr cannon  follow labs at rehab and out pt  Secondary Diagnosis:	Essential hypertension  Instructions for follow-up, activity and diet:	Sodium restiction diet (2g/day)   meds as above  Secondary Diagnosis:	Other hypoparathyroidism  Instructions for follow-up, activity and diet:	secondary to hx of parathyroidectomy  continue sensipar  out pt follow up dr cannon  Secondary Diagnosis:	Pneumonia due to infectious organism, unspecified laterality, unspecified part of lung  Instructions for follow-up, activity and diet:	community acquired pneumonia  finished antibiotics in hospital  continue bacid for 1 more week Principal Discharge DX:	Acute respiratory failure with hypoxia  Goal:	Continued recovery  Instructions for follow-up, activity and diet:	secondary to acute diastolic chf and CAP  meds as above antibiotic course. CT chest after discharge from rehab. pulm and  cardiology follow up post rehab.  please follow WBC daily for next few days (remains mildly elevated at 14.9 on dc, but trending downwards.  Secondary Diagnosis:	Hyponatremia  Instructions for follow-up, activity and diet:	hypervolemic hyponatremia due to chf  improved  out pt follow up dr cannon  follow labs at rehab and out pt  Secondary Diagnosis:	Essential hypertension  Instructions for follow-up, activity and diet:	Sodium restiction diet (2g/day)   meds as above  Secondary Diagnosis:	Other hypoparathyroidism  Instructions for follow-up, activity and diet:	secondary to hx of parathyroidectomy  continue sensipar  out pt follow up dr cannon  Secondary Diagnosis:	Pneumonia due to infectious organism, unspecified laterality, unspecified part of lung  Instructions for follow-up, activity and diet:	community acquired pneumonia  finished antibiotics in hospital  continue bacid for 1 more week

## 2017-04-25 NOTE — DISCHARGE NOTE ADULT - HOSPITAL COURSE
89F PMHx HTN, hyperparathyroidism s/p parathyroidectomy, anemia, LE edema sent in from urgent care c/o productive cough, sore throat and weakness admitted for new onset CHF, PNA and hyponatremia. During hospital stay, pt tx with IV zithromax for cap, seen by Dr. Valdovinos. Hyponatremia improved over hospital stay with fluid restruction, seen by Dr. Coffey. CHF improved with diuresis with lasix, seen by Dr. Shital newman. Pt still felt congestion after finishing 5 day course of iV zithromax. CT chest showd multifocal pna and pt subsequently put on PO levaquin by Dr Whitehead for another 5 days. Pt was medically optimized to be discharged to [facility]. 89F PMHx HTN, hyperparathyroidism s/p parathyroidectomy, anemia, LE edema sent in from urgent care c/o productive cough, sore throat and weakness admitted for new onset CHF, PNA and hyponatremia. During hospital stay, pt tx with IV zithromax for cap, seen by Dr. Valdovinos. Hyponatremia improved over hospital stay with fluid restruction, seen by Dr. Coffey. CHF improved with diuresis with lasix, seen by Dr. Shital newman. Pt still felt congestion after finishing 5 day course of iV zithromax. CT chest showd multifocal pna and pt subsequently put on PO levaquin by Dr Whitehead for another 5 days. Pt was medically optimized to be discharged to [facility].       REVIEW OF SYSTEMS:  CONSTITUTIONAL: No fever, No chills. Weak.   EYES: No eye pain, visual disturbances, or discharge  ENMT:  No ear pain, No nose bleed, No vertigo; No sinus or throat pain  NECK: No pain, No stiffness  RESPIRATORY: Cough and congestion. No wheezing, No hemoptysis; No shortness of breath  CARDIOVASCULAR: No chest pain, palpitations, leg swelling  GASTROINTESTINAL: No abdominal or epigastric pain. No nausea, No vomiting; No diarrhea or constipation.  GENITOURINARY: No dysuria, No frequency, No urgency, No hematuria, or incontinence  NEUROLOGICAL: alert and oriented x 3,  No headaches, No dizziness, No numbness,  SKIN:   No itching, burning, rashes, or lesions   MUSCULOSKELETAL: No joint pain or swelling; No muscle pain, No back pain, No extremity pain  PSYCHIATRIC: No depression, anxiety, mood swings, or difficulty sleeping  REST OF REVIEW Of SYSTEM - [ ] Normal     Height (cm): 152.4 (04-22 @ 15:48)  Weight (kg): 56.7 (04-22 @ 15:48)  BMI (kg/m2): 24.4 (04-22 @ 15:48)  BSA (m2): 1.53 (04-22 @ 15:48)  Vital Signs Last 24 Hrs  T(C): 36.7, Max: 36.7 (04-27 @ 23:17)  T(F): 98, Max: 98.1 (04-27 @ 23:17)  HR: 67 (67 - 77)  BP: 147/66 (119/60 - 147/73)  BP(mean): --  RR: 16 (16 - 16)  SpO2: 94% (93% - 97%)  [ ] room air   [X] 02: 2L     PHYSICAL EXAM:  GENERAL:  NAD, no respiratory distress, sitting comfortably in bed   HEAD:  normal  ENMT: normal  NECK:  normal    NERVOUS SYSTEM:  Alert & Oriented X3, no focal deficits  CHEST/LUNG:  Decreased breath sounds at bases. No wheezing or crackles.   HEART:  Regular rate and rhythm, No murmurs, rubs, or gallops  ABDOMEN:  soft, nontender, nondistended, positive bowel sounds  EXTREMITIES: No clubbing, cyanosis or edema  SKIN: No venous stasis skin changes    LABS:                        10.2   16.1  )-----------( 335      ( 28 Apr 2017 06:56 )             29.8     28 Apr 2017 06:56    132    |  97     |  28     ----------------------------<  101    3.9     |  26     |  0.88     Ca    9.6        28 Apr 2017 06:56 89F PMHx HTN, hyperparathyroidism s/p parathyroidectomy, anemia, LE edema sent in from urgent care c/o productive cough, sore throat and weakness admitted for new onset diastolic CHF, community acquired  PNA and hyponatremia. During hospital stay, pt tx with IV zithromax for cap, seen by Dr. Valdovinos. Hyponatremia improved over hospital stay , seen by Dr. Coffey. CHF improved with diuresis with iv lasix, seen by Dr. Isaacs. Pt still c/o cough  after finishing 4  day course of iV zithromax. and with persistent leukocytosis CT chest ordered showd multilobar  pna (as per id  partially treated cap) and subsequently put on PO levaquin by Dr Whitehead for another 5 days. leukocytosis persisted in absence of infection symptoms and hematology consult was requested. pt was seen by dr gray who felt it was stress mediated and not leukemia.    After being cleared for discharge by id, cardiology and hematology pt was discharged to [facility].

## 2017-04-25 NOTE — PROGRESS NOTE ADULT - SUBJECTIVE AND OBJECTIVE BOX
Date/Time Patient Seen:  		  Referring MD:   Data Reviewed	       Patient is a 89y old  Female who presents with a chief complaint of cough (22 Apr 2017 18:30)    in bed, seen and examined, on o2, vs and meds reviewed, am CBC noted  on Zithromax      Subjective/HPI       Medication list         MEDICATIONS  (STANDING):  ALBUTerol    0.083% 2.5milliGRAM(s) Nebulizer every 8 hours  guaiFENesin    Syrup 200milliGRAM(s) Oral every 8 hours  enoxaparin Injectable 40milliGRAM(s) SubCutaneous every 24 hours  furosemide   Injectable 40milliGRAM(s) IV Push daily  doxazosin 4milliGRAM(s) Oral at bedtime  famotidine    Tablet 20milliGRAM(s) Oral daily  carvedilol 25milliGRAM(s) Oral every 12 hours  cinacalcet 30milliGRAM(s) Oral at bedtime  hydrALAZINE 10milliGRAM(s) Oral <User Schedule>  hydrALAZINE 20milliGRAM(s) Oral two times a day  aspirin enteric coated 81milliGRAM(s) Oral daily  potassium chloride   Powder 20milliEquivalent(s) Oral daily  azithromycin  IVPB 500milliGRAM(s) IV Intermittent every 24 hours  lactobacillus acidophilus 1Tablet(s) Oral three times a day with meals  isosorbide   mononitrate ER Tablet (IMDUR) 30milliGRAM(s) Oral daily    MEDICATIONS  (PRN):         Vitals log        ICU Vital Signs Last 24 Hrs  T(C): 36.7, Max: 36.8 (04-24 @ 07:57)  T(F): 98, Max: 98.3 (04-24 @ 07:57)  HR: 68 (62 - 77)  BP: 148/66 (122/68 - 148/66)  BP(mean): --  ABP: --  ABP(mean): --  RR: 20 (20 - 22)  SpO2: 93% (87% - 95%)           Input and Output:  I&O's Detail    I & Os for current day (as of 25 Apr 2017 07:11)  =============================================  IN:    Solution: 250 ml    Oral Fluid: 240 ml    Total IN: 490 ml  ---------------------------------------------  OUT:    Voided: 200 ml    Total OUT: 200 ml  ---------------------------------------------  Total NET: 290 ml      Lab Data                        10.6   13.3  )-----------( 263      ( 25 Apr 2017 06:19 )             31.5     04-24    130<L>  |  94<L>  |  26<H>  ----------------------------<  108<H>  3.5   |  27  |  0.62    Ca    9.0      24 Apr 2017 06:58  Phos  2.4     04-23  Mg     1.9     04-23        CARDIAC MARKERS ( 23 Apr 2017 07:21 )  <.015 ng/mL / x     / 56 U/L / x     / 2.7 ng/mL        Review of Systems	      Objective     Physical Examination  head at, heart - s1s2, lungs - dec BS, occ crackles, abd - soft, cn grossly in,       Pertinent Lab findings & Imaging      Richard:  NO   Adequate UO     I&O's Detail    I & Os for current day (as of 25 Apr 2017 07:11)  =============================================  IN:    Solution: 250 ml    Oral Fluid: 240 ml    Total IN: 490 ml  ---------------------------------------------  OUT:    Voided: 200 ml    Total OUT: 200 ml  ---------------------------------------------  Total NET: 290 ml           Discussed with:     Cultures:	        Radiology

## 2017-04-25 NOTE — PROGRESS NOTE ADULT - PROBLEM SELECTOR PLAN 3
Likely hypervolemic hyponatremia due to CHF. Slowly Improving.   Urine sodium and osmolarity - normal    TSH and uric acid - normal   Fluid restriction.   Monitor BMP.   f/u renal recs (Dr. Keller)

## 2017-04-25 NOTE — PROGRESS NOTE ADULT - ASSESSMENT
89 F w HFPEF, HTN, edema p/w ADHF  - Vol status has markedly improved. hypoNa resolved. Continue Lasix 40mg IV qday. Trend Cr, Na. Keep net negative. Transition to PO in next 24 tp 48 hours.   - No signs of ischemia.   - Cont Abx.   - Continue Coreg and Hydralazine at home doses.  Cont Imdur 30mg Qday for preload reduction   - Monitor and replete electrolytes. Keep K>4.0 and Mg>2.0.   Resume ACE  Further cardiac workup will depend on clinical course.   All other workup per primary team. Will followup.

## 2017-04-25 NOTE — PROGRESS NOTE ADULT - PROBLEM SELECTOR PLAN 1
echo shows EF preserved, pulm HTN and valvular heart disease  cont lasix  monitor sats  cont tele monitor  monitor cr and lytes

## 2017-04-25 NOTE — DISCHARGE NOTE ADULT - PROVIDER TOKENS
TOKEN:'[id number]',FREE:[LAST:[christiano],FIRST:[brianna],PHONE:[(   )    -],FAX:[(   )    -]],TOKEN:'[id number]' TOKEN:'[id number]',TOKEN:'[id number]',FREE:[LAST:[christiano],FIRST:[brianna],PHONE:[(   )    -],FAX:[(   )    -]],TOKEN:'[id number]'

## 2017-04-25 NOTE — PROGRESS NOTE ADULT - SUBJECTIVE AND OBJECTIVE BOX
[facility] -- Christofer Isaacs Grossman, W[place]man, Hieu Norris      Follow Up:  CHF    Subjective/Observations: Patient seen and examined. Events noted. No complaints of chest pain, dyspnea, or palpitations reported.     PAST MEDICAL & SURGICAL HISTORY:  Anemia, unspecified type  Edema of lower extremity  Heart murmur  Hypertension  Hypercalcemia  History of appendectomy  S/P tonsillectomy  H/O parathyroidectomy      MEDICATIONS  (STANDING):  ALBUTerol    0.083% 2.5milliGRAM(s) Nebulizer every 8 hours  guaiFENesin    Syrup 200milliGRAM(s) Oral every 8 hours  enoxaparin Injectable 40milliGRAM(s) SubCutaneous every 24 hours  furosemide   Injectable 40milliGRAM(s) IV Push daily  doxazosin 4milliGRAM(s) Oral at bedtime  famotidine    Tablet 20milliGRAM(s) Oral daily  carvedilol 25milliGRAM(s) Oral every 12 hours  cinacalcet 30milliGRAM(s) Oral at bedtime  hydrALAZINE 10milliGRAM(s) Oral <User Schedule>  hydrALAZINE 20milliGRAM(s) Oral two times a day  aspirin enteric coated 81milliGRAM(s) Oral daily  potassium chloride   Powder 20milliEquivalent(s) Oral daily  azithromycin  IVPB 500milliGRAM(s) IV Intermittent every 24 hours  lactobacillus acidophilus 1Tablet(s) Oral three times a day with meals  isosorbide   mononitrate ER Tablet (IMDUR) 30milliGRAM(s) Oral daily    MEDICATIONS  (PRN):      Allergies    Vasotec (Unknown)    Intolerances        REVIEW OF SYSTEMS: All other review of systems is negative unless indicated above    Vital Signs Last 24 Hrs  T(C): 36.6, Max: 36.7 (04-25 @ 04:58)  T(F): 97.8, Max: 98 (04-25 @ 04:58)  HR: 68 (62 - 77)  BP: 133/77 (133/77 - 148/66)  BP(mean): --  RR: 19 (19 - 22)  SpO2: 96% (87% - 96%)    I&O's Summary  I & Os for 24h ending 25 Apr 2017 07:00  =============================================  IN: 490 ml / OUT: 200 ml / NET: 290 ml    I & Os for current day (as of 25 Apr 2017 13:11)  =============================================  IN: 120 ml / OUT: 0 ml / NET: 120 ml        PHYSICAL EXAM:  TELE:   Constitutional: NAD, awake and alert, well-developed  HEENT: Moist Mucous Membranes, Anicteric  Pulmonary: Decreased breath sounds b/l. Crackles improved now at bases  Cardiovascular: Regular, S1 and S2, 2/6 SM,    Gastrointestinal: Bowel Sounds present, soft, nontender.   Lymph: L>R trace  pitting edema in lower ext.   Skin: No visible rashes or ulcers.  Psych:  Mood & affect appropriate    LABS: All Labs Reviewed:                        10.6   13.3  )-----------( 263      ( 25 Apr 2017 06:19 )             31.5                         9.6    13.2  )-----------( 209      ( 24 Apr 2017 06:58 )             30.4                         9.9    14.0  )-----------( 201      ( 23 Apr 2017 07:21 )             29.9     25 Apr 2017 06:19    131    |  96     |  24     ----------------------------<  104    4.3     |  26     |  0.69   24 Apr 2017 06:58    130    |  94     |  26     ----------------------------<  108    3.5     |  27     |  0.62   23 Apr 2017 07:21    131    |  94     |  25     ----------------------------<  101    3.5     |  27     |  0.69     Ca    9.8        25 Apr 2017 06:19  Ca    9.0        24 Apr 2017 06:58  Ca    9.0        23 Apr 2017 07:21  Phos  2.4       23 Apr 2017 07:21  Mg     1.9       23 Apr 2017 07:21    TPro  7.3    /  Alb  3.4    /  TBili  0.5    /  DBili  x      /  AST  24     /  ALT  28     /  AlkPhos  93     22 Apr 2017 17:00          Blood Culture: Organism --  Gram Stain Blood -- Gram Stain --  Specimen Source .Blood Blood-Peripheral  Culture-Blood --      04-22 @ 17:00  Pro Bnp [id number]    04-23 @ 07:21  TSH: 1.74    TTE 4/23/17 - Normal chamber sizes with satisfactory contractility of the left   ventricle. Mild to moderate AS with mild AI. Mild MR. Moderate TR with   pulmonary hypertension. Bilateral pleural effusion

## 2017-04-25 NOTE — DISCHARGE NOTE ADULT - SECONDARY DIAGNOSIS.
Hyponatremia Essential hypertension Other hypoparathyroidism Pneumonia due to infectious organism, unspecified laterality, unspecified part of lung

## 2017-04-25 NOTE — DISCHARGE NOTE ADULT - VISION (WITH CORRECTIVE LENSES IF THE PATIENT USUALLY WEARS THEM):
Reading glasses/Partially impaired: cannot see medication labels or newsprint, but can see obstacles in path, and the surrounding layout; can count fingers at arm's length

## 2017-04-25 NOTE — PROGRESS NOTE ADULT - ATTENDING COMMENTS
pt seen examined agree with above as amended and planned with dr hansen. will continue w above plan  hypoxic will likely need o2 upon dc  pt recommending neftaly

## 2017-04-25 NOTE — DISCHARGE NOTE ADULT - NS AS ACTIVITY OBS
No Heavy lifting/straining/Do not make important decisions/Do not drive or operate machinery/with oxygen and assist/Walking-Indoors allowed/Bathing allowed

## 2017-04-25 NOTE — DISCHARGE NOTE ADULT - MEDICATION SUMMARY - MEDICATIONS TO CHANGE
I will SWITCH the dose or number of times a day I take the medications listed below when I get home from the hospital:    furosemide 20 mg oral tablet  -- 1 tab(s) by mouth once a day  -- occasionally takes a second dose in the afternoon

## 2017-04-25 NOTE — DISCHARGE NOTE ADULT - PLAN OF CARE
secondary to acute diastolic chf and possible (less likely) comm acquired pneumonia  meds as above  completed 5 d course of z max  strict i/o daily wts  continued education on fluid and salt restriction   out pt follow up with cardiology hypervolemic hyponatremia due to chf  improved  out pt follow up dr cannon  follow labs at rehab and out pt 2 gm na diet   meds as above secondary to hx of parathyroidectomy  continue sensipar  out pt follow up dr cannon continued recovery secondary to acute diastolic chf and CAP  meds as above  completed 5 d course of z max  strict i/o daily wts  continued education on fluid and salt restriction   out pt follow up with cardiology Sodium restiction diet (2g/day)   meds as above Continued recovery Continue with Levaquin for 3 more days. community acquired pneumonia  finished antibiotics in hospital  continue bacid for 1 more week secondary to acute diastolic chf and CAP  meds as above antibiotic course. CT chest after discharge from rehab. pulm and  cardiology follow up post rehab.  please follow WBC daily for next few days (remains mildly elevated at 14.9 on dc, but trending downwards.

## 2017-04-25 NOTE — PROGRESS NOTE ADULT - PROBLEM SELECTOR PLAN 2
o2 support  may need JESSICA  incentive renzo  albuterol  zithromax x 5 days  am WBC is still elevated, cont to monitor

## 2017-04-26 DIAGNOSIS — J18.9 PNEUMONIA, UNSPECIFIED ORGANISM: ICD-10-CM

## 2017-04-26 DIAGNOSIS — I50.21 ACUTE SYSTOLIC (CONGESTIVE) HEART FAILURE: ICD-10-CM

## 2017-04-26 LAB
ANION GAP SERPL CALC-SCNC: 10 MMOL/L — SIGNIFICANT CHANGE UP (ref 5–17)
BUN SERPL-MCNC: 24 MG/DL — HIGH (ref 7–23)
CALCIUM SERPL-MCNC: 9.9 MG/DL — SIGNIFICANT CHANGE UP (ref 8.5–10.1)
CHLORIDE SERPL-SCNC: 96 MMOL/L — SIGNIFICANT CHANGE UP (ref 96–108)
CO2 SERPL-SCNC: 26 MMOL/L — SIGNIFICANT CHANGE UP (ref 22–31)
CREAT SERPL-MCNC: 0.61 MG/DL — SIGNIFICANT CHANGE UP (ref 0.5–1.3)
GLUCOSE SERPL-MCNC: 104 MG/DL — HIGH (ref 70–99)
HCT VFR BLD CALC: 31.7 % — LOW (ref 34.5–45)
HGB BLD-MCNC: 10.9 G/DL — LOW (ref 11.5–15.5)
MCHC RBC-ENTMCNC: 30.5 PG — SIGNIFICANT CHANGE UP (ref 27–34)
MCHC RBC-ENTMCNC: 34.4 GM/DL — SIGNIFICANT CHANGE UP (ref 32–36)
MCV RBC AUTO: 88.8 FL — SIGNIFICANT CHANGE UP (ref 80–100)
PLATELET # BLD AUTO: 276 K/UL — SIGNIFICANT CHANGE UP (ref 150–400)
POTASSIUM SERPL-MCNC: 4.2 MMOL/L — SIGNIFICANT CHANGE UP (ref 3.5–5.3)
POTASSIUM SERPL-SCNC: 4.2 MMOL/L — SIGNIFICANT CHANGE UP (ref 3.5–5.3)
RBC # BLD: 3.57 M/UL — LOW (ref 3.8–5.2)
RBC # FLD: 12.3 % — SIGNIFICANT CHANGE UP (ref 10.3–14.5)
SODIUM SERPL-SCNC: 132 MMOL/L — LOW (ref 135–145)
WBC # BLD: 14 K/UL — HIGH (ref 3.8–10.5)
WBC # FLD AUTO: 14 K/UL — HIGH (ref 3.8–10.5)

## 2017-04-26 PROCEDURE — 99232 SBSQ HOSP IP/OBS MODERATE 35: CPT

## 2017-04-26 PROCEDURE — 71250 CT THORAX DX C-: CPT | Mod: 26

## 2017-04-26 RX ORDER — LOSARTAN POTASSIUM 100 MG/1
25 TABLET, FILM COATED ORAL DAILY
Qty: 0 | Refills: 0 | Status: DISCONTINUED | OUTPATIENT
Start: 2017-04-26 | End: 2017-05-03

## 2017-04-26 RX ORDER — FUROSEMIDE 40 MG
40 TABLET ORAL DAILY
Qty: 0 | Refills: 0 | Status: DISCONTINUED | OUTPATIENT
Start: 2017-04-26 | End: 2017-05-03

## 2017-04-26 RX ADMIN — ALBUTEROL 2.5 MILLIGRAM(S): 90 AEROSOL, METERED ORAL at 19:21

## 2017-04-26 RX ADMIN — ALBUTEROL 2.5 MILLIGRAM(S): 90 AEROSOL, METERED ORAL at 15:16

## 2017-04-26 RX ADMIN — Medication 200 MILLIGRAM(S): at 22:02

## 2017-04-26 RX ADMIN — Medication 81 MILLIGRAM(S): at 12:33

## 2017-04-26 RX ADMIN — LOSARTAN POTASSIUM 25 MILLIGRAM(S): 100 TABLET, FILM COATED ORAL at 12:39

## 2017-04-26 RX ADMIN — Medication 20 MILLIGRAM(S): at 17:04

## 2017-04-26 RX ADMIN — AZITHROMYCIN 255 MILLIGRAM(S): 500 TABLET, FILM COATED ORAL at 18:05

## 2017-04-26 RX ADMIN — Medication 40 MILLIGRAM(S): at 06:34

## 2017-04-26 RX ADMIN — CARVEDILOL PHOSPHATE 25 MILLIGRAM(S): 80 CAPSULE, EXTENDED RELEASE ORAL at 06:35

## 2017-04-26 RX ADMIN — Medication 200 MILLIGRAM(S): at 13:01

## 2017-04-26 RX ADMIN — ALBUTEROL 2.5 MILLIGRAM(S): 90 AEROSOL, METERED ORAL at 07:59

## 2017-04-26 RX ADMIN — Medication 20 MILLIGRAM(S): at 06:35

## 2017-04-26 RX ADMIN — CARVEDILOL PHOSPHATE 25 MILLIGRAM(S): 80 CAPSULE, EXTENDED RELEASE ORAL at 17:04

## 2017-04-26 RX ADMIN — Medication 1 TABLET(S): at 17:04

## 2017-04-26 RX ADMIN — Medication 200 MILLIGRAM(S): at 06:34

## 2017-04-26 RX ADMIN — ENOXAPARIN SODIUM 40 MILLIGRAM(S): 100 INJECTION SUBCUTANEOUS at 22:03

## 2017-04-26 RX ADMIN — Medication 4 MILLIGRAM(S): at 22:02

## 2017-04-26 RX ADMIN — Medication 20 MILLIEQUIVALENT(S): at 12:33

## 2017-04-26 RX ADMIN — Medication 10 MILLIGRAM(S): at 12:33

## 2017-04-26 RX ADMIN — CINACALCET 30 MILLIGRAM(S): 30 TABLET, FILM COATED ORAL at 22:02

## 2017-04-26 RX ADMIN — Medication 1 TABLET(S): at 12:33

## 2017-04-26 RX ADMIN — ISOSORBIDE MONONITRATE 30 MILLIGRAM(S): 60 TABLET, EXTENDED RELEASE ORAL at 12:33

## 2017-04-26 RX ADMIN — FAMOTIDINE 20 MILLIGRAM(S): 10 INJECTION INTRAVENOUS at 12:33

## 2017-04-26 RX ADMIN — Medication 1 TABLET(S): at 09:18

## 2017-04-26 NOTE — PROGRESS NOTE ADULT - SUBJECTIVE AND OBJECTIVE BOX
Patient is a 89y old  Female who presents with a chief complaint of cough (25 Apr 2017 19:53)      INTERVAL HPI/OVERNIGHT EVENTS:    MEDICATIONS  (STANDING):  ALBUTerol    0.083% 2.5milliGRAM(s) Nebulizer every 8 hours  guaiFENesin    Syrup 200milliGRAM(s) Oral every 8 hours  enoxaparin Injectable 40milliGRAM(s) SubCutaneous every 24 hours  furosemide   Injectable 40milliGRAM(s) IV Push daily  doxazosin 4milliGRAM(s) Oral at bedtime  famotidine    Tablet 20milliGRAM(s) Oral daily  carvedilol 25milliGRAM(s) Oral every 12 hours  cinacalcet 30milliGRAM(s) Oral at bedtime  hydrALAZINE 10milliGRAM(s) Oral <User Schedule>  hydrALAZINE 20milliGRAM(s) Oral two times a day  aspirin enteric coated 81milliGRAM(s) Oral daily  potassium chloride   Powder 20milliEquivalent(s) Oral daily  azithromycin  IVPB 500milliGRAM(s) IV Intermittent every 24 hours  lactobacillus acidophilus 1Tablet(s) Oral three times a day with meals  isosorbide   mononitrate ER Tablet (IMDUR) 30milliGRAM(s) Oral daily    MEDICATIONS  (PRN):      Allergies    Vasotec (Unknown)    Intolerances        REVIEW OF SYSTEMS:  CONSTITUTIONAL: No fever, No chills,No fatigue,No myalgia,No Body ache  EYES: No eye pain, visual disturbances, or discharge  ENMT:  No ear pain, No nose bleed, No vertigo; No sinus or throat pain  NECK: No pain, No stiffness  RESPIRATORY: No cough, wheezing, No  hemoptysis; No shortness of breath  CARDIOVASCULAR: No chest pain, palpitations, leg swelling  GASTROINTESTINAL: No abdominal or epigastric pain. No nausea, No vomiting; No diarrhea or constipation. [ ] BM  GENITOURINARY: No dysuria, No frequency, No urgency, No hematuria, or incontinence  NEUROLOGICAL: alert and oriented x 3,  No headaches, No dizziness, No numbness,  SKIN:   No itching, burning, rashes, or lesions   MUSCULOSKELETAL: No joint pain or swelling; No muscle pain, No back pain, No extremity pain  PSYCHIATRIC: No depression, anxiety, mood swings, or difficulty sleeping  ROS  [ ] Unable to obtain   REST OF REVIEW Of SYSTEM - [ ] Normal     Height (cm): 152.4 (04-22 @ 15:48)  Weight (kg): 56.7 (04-22 @ 15:48)  BMI (kg/m2): 24.4 (04-22 @ 15:48)  BSA (m2): 1.53 (04-22 @ 15:48)  Vital Signs Last 24 Hrs  T(C): 36.6, Max: 36.7 (04-25 @ 15:41)  T(F): 97.9, Max: 98.1 (04-25 @ 15:41)  HR: 81 (65 - 81)  BP: 156/66 (125/62 - 156/66)  BP(mean): --  RR: 20 (19 - 20)  SpO2: 90% (90% - 96%)  [ ] room air   [ ] 02    PHYSICAL EXAM:  GENERAL:  No acute distresss, well appearing, [ ] Agitated, [ ] Lethargy, [ ] confused   HEAD:  normal  ENMT: normal  NECK:  normal    NERVOUS SYSTEM:  Alert & Oriented X3, no focal deficits [ ]Confusion  [ ] Encephalopathic [ ] Sedated [ ] Other  CHEST/LUNG: Clear to auscultation bilaterally,  [ ] decreased breath sounds at bases  [ ] wheezing   [ ] rhonchi  [ ] crackles  HEART:  Regular rate and rhythm, No murmurs, rubs, or gallops,  [ ] irregular   ABDOMEN:  soft, nontender, nondistended, positive bowel sounds   [ ] obese  EXTREMITIES: No clubbing, cyanosis or edema  SKIN: [ ] venous stasis skin changes    LABS:                        10.9   14.0  )-----------( 276      ( 26 Apr 2017 07:01 )             31.7     26 Apr 2017 07:01    132    |  96     |  24     ----------------------------<  104    4.2     |  26     |  0.61     Ca    9.9        26 Apr 2017 07:01            CAPILLARY BLOOD GLUCOSE    Cultures  Culture Results:   No growth to date. (04-22 @ 22:12)  Culture Results:   No growth to date. (04-22 @ 22:12)          RADIOLOGY & ADDITIONAL TESTS:      Care Discussed with [X] Consultants  [ ] Patient  [ ] Family  [X]   /   [ ] Other; RN  DVT prophylaxis [ ] lovenox   [ ] subq heparin  [ ] coumadin  [ ] venodynes [ ] ambulating frequently at how risk for vte and no pharm         or  mechanical prophylaxis required    [ ] other   Advanced directive:    [ ]pt has hcp     [ ] pt declined to assign hcp  Discussed with pt @ bedside Patient is a 89y old  Female who presents with a chief complaint of cough (25 Apr 2017 19:53)  still w dry nonproductive cought is however improved  no sob or chest pain    INTERVAL HPI/OVERNIGHT EVENTS:    MEDICATIONS  (STANDING):  ALBUTerol    0.083% 2.5milliGRAM(s) Nebulizer every 8 hours  guaiFENesin    Syrup 200milliGRAM(s) Oral every 8 hours  enoxaparin Injectable 40milliGRAM(s) SubCutaneous every 24 hours  furosemide   Injectable 40milliGRAM(s) IV Push daily  doxazosin 4milliGRAM(s) Oral at bedtime  famotidine    Tablet 20milliGRAM(s) Oral daily  carvedilol 25milliGRAM(s) Oral every 12 hours  cinacalcet 30milliGRAM(s) Oral at bedtime  hydrALAZINE 10milliGRAM(s) Oral <User Schedule>  hydrALAZINE 20milliGRAM(s) Oral two times a day  aspirin enteric coated 81milliGRAM(s) Oral daily  potassium chloride   Powder 20milliEquivalent(s) Oral daily  azithromycin  IVPB 500milliGRAM(s) IV Intermittent every 24 hours  lactobacillus acidophilus 1Tablet(s) Oral three times a day with meals  isosorbide   mononitrate ER Tablet (IMDUR) 30milliGRAM(s) Oral daily    MEDICATIONS  (PRN):      Allergies    Vasotec (Unknown)    Intolerances        REVIEW OF SYSTEMS:  CONSTITUTIONAL: No fever, No chills,No fatigue,No myalgia,No Body ache  EYES: No eye pain, visual disturbances, or discharge  ENMT:  No ear pain, No nose bleed, No vertigo; No sinus or throat pain  NECK: No pain, No stiffness  RESPIRATORY: No cough, wheezing, No  hemoptysis; No shortness of breath  CARDIOVASCULAR: No chest pain, palpitations, leg swelling  GASTROINTESTINAL: No abdominal or epigastric pain. No nausea, No vomiting; No diarrhea or constipation. [ ] BM  GENITOURINARY: No dysuria, No frequency, No urgency, No hematuria, or incontinence  NEUROLOGICAL: alert and oriented x 3,  No headaches, No dizziness, No numbness,  SKIN:   No itching, burning, rashes, or lesions   MUSCULOSKELETAL: No joint pain or swelling; No muscle pain, No back pain, No extremity pain  PSYCHIATRIC: No depression, anxiety, mood swings, or difficulty sleeping  ROS  [ ] Unable to obtain   REST OF REVIEW Of SYSTEM - [ ] Normal     Height (cm): 152.4 (04-22 @ 15:48)  Weight (kg): 56.7 (04-22 @ 15:48)  BMI (kg/m2): 24.4 (04-22 @ 15:48)  BSA (m2): 1.53 (04-22 @ 15:48)  Vital Signs Last 24 Hrs  T(C): 36.6, Max: 36.7 (04-25 @ 15:41)  T(F): 97.9, Max: 98.1 (04-25 @ 15:41)  HR: 81 (65 - 81)  BP: 156/66 (125/62 - 156/66)  BP(mean): --  RR: 20 (19 - 20)  SpO2: 90% (90% - 96%)  [ ] room air   [ 2] 02    PHYSICAL EXAM:  GENERAL:  No acute distresss, well appearing, [ ] Agitated, [ ] Lethargy, [ ] confused   HEAD:  normal  ENMT: normal  NECK:  normal    NERVOUS SYSTEM:  Alert & Oriented X3, no focal deficits [ ]Confusion  [ ] Encephalopathic [ ] Sedated [ ] Other  CHEST/LUNG: Clear to auscultation bilaterally,  [ ] decreased breath sounds at bases  [ ] wheezing   [ ] rhonchi  [ x] crackles at bases bilaterally  HEART:  Regular rate and rhythm, No murmurs, rubs, or gallops,  [ ] irregular   ABDOMEN:  soft, nontender, nondistended, positive bowel sounds   [ ] obese  EXTREMITIES: No clubbing, cyanosis trace edema much improved  SKIN: [ ] venous stasis skin changes    LABS:                        10.9   14.0  )-----------( 276      ( 26 Apr 2017 07:01 )             31.7     26 Apr 2017 07:01    132    |  96     |  24     ----------------------------<  104    4.2     |  26     |  0.61     Ca    9.9        26 Apr 2017 07:01            CAPILLARY BLOOD GLUCOSE    Cultures  Culture Results:   No growth to date. (04-22 @ 22:12)  Culture Results:   No growth to date. (04-22 @ 22:12)          RADIOLOGY & ADDITIONAL TESTS:      Care Discussed with [X] Consultants  [ ] Patient  [ ] Family  [X]   /   [ ] Other; RN  DVT prophylaxis [ x] lovenox   [ ] subq heparin  [ ] coumadin  [ ] venodynes [ ] ambulating frequently at how risk for vte and no pharm         or  mechanical prophylaxis required    [ ] other   Advanced directive:    [ x]pt has hcp     [ ] pt declined to assign hcp  Discussed with pt @ bedside Patient is a 89y old  Female who presents with a chief complaint of cough (25 Apr 2017 19:53)      INTERVAL HPI/OVERNIGHT EVENTS: Pt still c/o dry nonproductive cough but better since admission. Denies sob or chest pain.     MEDICATIONS  (STANDING):  ALBUTerol    0.083% 2.5milliGRAM(s) Nebulizer every 8 hours  guaiFENesin    Syrup 200milliGRAM(s) Oral every 8 hours  enoxaparin Injectable 40milliGRAM(s) SubCutaneous every 24 hours  furosemide   Injectable 40milliGRAM(s) IV Push daily  doxazosin 4milliGRAM(s) Oral at bedtime  famotidine    Tablet 20milliGRAM(s) Oral daily  carvedilol 25milliGRAM(s) Oral every 12 hours  cinacalcet 30milliGRAM(s) Oral at bedtime  hydrALAZINE 10milliGRAM(s) Oral <User Schedule>  hydrALAZINE 20milliGRAM(s) Oral two times a day  aspirin enteric coated 81milliGRAM(s) Oral daily  potassium chloride   Powder 20milliEquivalent(s) Oral daily  azithromycin  IVPB 500milliGRAM(s) IV Intermittent every 24 hours  lactobacillus acidophilus 1Tablet(s) Oral three times a day with meals  isosorbide   mononitrate ER Tablet (IMDUR) 30milliGRAM(s) Oral daily    MEDICATIONS  (PRN):      Allergies: Vasotec (Unknown)    Intolerances: None         REVIEW OF SYSTEMS:  CONSTITUTIONAL: No fever, No chills, No fatigue, No Body ache  RESPIRATORY: Dry cough. No wheezing, No hemoptysis; No SOB.   CARDIOVASCULAR: No chest pain, palpitations, leg swelling  GASTROINTESTINAL: No abdominal or epigastric pain. No nausea, No vomiting; No diarrhea or constipation. [ ] BM  GENITOURINARY: No dysuria, No frequency, No urgency, No hematuria, or incontinence  NEUROLOGICAL: alert and oriented x 3,  No headaches, No dizziness, No numbness,  SKIN:   No itching, burning, rashes, or lesions   MUSCULOSKELETAL: No joint pain or swelling; No muscle pain, No back pain, No extremity pain  PSYCHIATRIC: No depression, anxiety, mood swings, or difficulty sleeping  ROS  [ ] Unable to obtain   REST OF REVIEW Of SYSTEM - [ ] Normal     Height (cm): 152.4 (04-22 @ 15:48)  Weight (kg): 56.7 (04-22 @ 15:48)  BMI (kg/m2): 24.4 (04-22 @ 15:48)  BSA (m2): 1.53 (04-22 @ 15:48)    Vital Signs Last 24 Hrs  T(C): 36.6, Max: 36.7 (04-25 @ 15:41)  T(F): 97.9, Max: 98.1 (04-25 @ 15:41)  HR: 81 (65 - 81)  BP: 156/66 (125/62 - 156/66)  RR: 20 (19 - 20)  SpO2: 90% (90% - 96%)  [ ] room air   [ X] 02: 2L NC    PHYSICAL EXAM:  GENERAL:  NAD, well appearing, laying comfortably in bed.   HEAD:  normal  ENMT: normal  NECK:  normal    NERVOUS SYSTEM:  Alert & Oriented X3, no focal deficits   CHEST/LUNG:  Crackles at bases bilaterally. No wheezing, rhonchi.   HEART:  Regular rate and rhythm, No murmurs, rubs, or gallops  ABDOMEN:  soft, nontender, nondistended, positive bowel sounds  EXTREMITIES: No clubbing, cyanosis trace edema much improved  SKIN: No venous stasis skin changes    LABS:                        10.9   14.0  )-----------( 276      ( 26 Apr 2017 07:01 )             31.7     26 Apr 2017 07:01    132    |  96     |  24     ----------------------------<  104    4.2     |  26     |  0.61     Ca    9.9        26 Apr 2017 07:01            CAPILLARY BLOOD GLUCOSE    Cultures  Culture Results:   No growth to date. (04-22 @ 22:12)  Culture Results:   No growth to date. (04-22 @ 22:12)          RADIOLOGY & ADDITIONAL TESTS:      Care Discussed with [X] Consultants  [ ] Patient  [ ] Family  [X]   /   [ ] Other; RN  DVT prophylaxis [ x] lovenox   [ ] subq heparin  [ ] coumadin  [ ] venodynes [ ] ambulating frequently at how risk for vte and no pharm or  mechanical prophylaxis required    [ ] other   Advanced directive:    [ x]pt has hcp     [ ] pt declined to assign hcp  Discussed with pt @ bedside Patient is a 89y old  Female who presents with a chief complaint of cough (25 Apr 2017 19:53)      INTERVAL HPI/OVERNIGHT EVENTS: Pt still c/o dry nonproductive cough but better since admission. Denies sob or chest pain.     MEDICATIONS  (STANDING):  ALBUTerol    0.083% 2.5milliGRAM(s) Nebulizer every 8 hours  guaiFENesin    Syrup 200milliGRAM(s) Oral every 8 hours  enoxaparin Injectable 40milliGRAM(s) SubCutaneous every 24 hours  furosemide   Injectable 40milliGRAM(s) IV Push daily  doxazosin 4milliGRAM(s) Oral at bedtime  famotidine    Tablet 20milliGRAM(s) Oral daily  carvedilol 25milliGRAM(s) Oral every 12 hours  cinacalcet 30milliGRAM(s) Oral at bedtime  hydrALAZINE 10milliGRAM(s) Oral <User Schedule>  hydrALAZINE 20milliGRAM(s) Oral two times a day  aspirin enteric coated 81milliGRAM(s) Oral daily  potassium chloride   Powder 20milliEquivalent(s) Oral daily  azithromycin  IVPB 500milliGRAM(s) IV Intermittent every 24 hours  lactobacillus acidophilus 1Tablet(s) Oral three times a day with meals  isosorbide   mononitrate ER Tablet (IMDUR) 30milliGRAM(s) Oral daily    MEDICATIONS  (PRN):      Allergies: Vasotec (Unknown)    Intolerances: None         REVIEW OF SYSTEMS:  CONSTITUTIONAL: No fever, No chills, No fatigue, No Body ache  RESPIRATORY: Dry cough. No wheezing, No hemoptysis; mild cain.   CARDIOVASCULAR: No chest pain, palpitations, leg swelling  GASTROINTESTINAL: No abdominal or epigastric pain. No nausea, No vomiting; No diarrhea or constipation. [ ] BM  GENITOURINARY: No dysuria, No frequency, No urgency, No hematuria, or incontinence  NEUROLOGICAL: alert and oriented x 3,  No headaches, No dizziness, No numbness,  SKIN:   No itching, burning, rashes, or lesions   MUSCULOSKELETAL: No joint pain No muscle pain, No back pain, No extremity pain edema improved  PSYCHIATRIC: No depression, anxiety, mood swings, or difficulty sleeping  ROS  [ ] Unable to obtain   REST OF REVIEW Of SYSTEM - [ ] Normal     Height (cm): 152.4 (04-22 @ 15:48)  Weight (kg): 56.7 (04-22 @ 15:48)  BMI (kg/m2): 24.4 (04-22 @ 15:48)  BSA (m2): 1.53 (04-22 @ 15:48)    Vital Signs Last 24 Hrs  T(C): 36.6, Max: 36.7 (04-25 @ 15:41)  T(F): 97.9, Max: 98.1 (04-25 @ 15:41)  HR: 81 (65 - 81)  BP: 156/66 (125/62 - 156/66)  RR: 20 (19 - 20)  SpO2: 90% (90% - 96%)  [ ] room air   [ X] 02: 2L NC    PHYSICAL EXAM:  GENERAL:  NAD, well appearing, laying comfortably in bed without orthopnea  HEAD:  normal  ENMT: normal  NECK:  normal    NERVOUS SYSTEM:  Alert & Oriented X3, no focal deficits   CHEST/LUNG:  Crackles at bases bilaterally. clears somewhat with cough.  No wheezing, rhonchi.   HEART:  Regular rate and rhythm, No murmurs, rubs, or gallops  ABDOMEN:  soft, nontender, nondistended, positive bowel sounds  EXTREMITIES: No clubbing, cyanosis trace edema much improved.  SKIN: No venous stasis skin changes    LABS:                        10.9   14.0  )-----------( 276      ( 26 Apr 2017 07:01 )             31.7     26 Apr 2017 07:01    132    |  96     |  24     ----------------------------<  104    4.2     |  26     |  0.61     Ca    9.9        26 Apr 2017 07:01            CAPILLARY BLOOD GLUCOSE    Cultures  Culture Results:   No growth to date. (04-22 @ 22:12)  Culture Results:   No growth to date. (04-22 @ 22:12)          RADIOLOGY & ADDITIONAL TESTS:      Care Discussed with [X] Consultants  [ ] Patient  [ ] Family  [X]   /   [ ] Other; RN  DVT prophylaxis [ x] lovenox   [ ] subq heparin  [ ] coumadin  [ ] venodynes [ ] ambulating frequently at how risk for vte and no pharm or  mechanical prophylaxis required    [ ] other   Advanced directive:    [ x]pt has hcp     [ ] pt declined to assign hcp  Discussed with pt @ bedside

## 2017-04-26 NOTE — PROGRESS NOTE ADULT - PROBLEM SELECTOR PLAN 4
Continue carvedilol 25mg BID and hydralazine 10mg TID.   HOLD losartan until renal function stable.  HOLD norvasc until BP stable. Continue carvedilol 25mg BID and hydralazine 10mg TID.   HOLD losartan for now sbp 120 range  HOLD norvasc for now Likely hypervolemic hyponatremia due to CHF. Slowly Improving.   Urine sodium and osmolarity - normal    TSH and uric acid - normal   Fluid restriction.   Monitor BMP.   renal follow up

## 2017-04-26 NOTE — PROGRESS NOTE ADULT - PROBLEM SELECTOR PLAN 2
CXR shows CHF  cont lasix  cvs regimen  monitor cr and lytes  likely for dc today to [facility]  no objection to dc from Pulmonary point  Cardio to follow up

## 2017-04-26 NOTE — PROGRESS NOTE ADULT - PROBLEM SELECTOR PLAN 9
Family wish full resuscitative measures at this point, although would not want artificial feeding, should the situation arise  hcp in place

## 2017-04-26 NOTE — PROGRESS NOTE ADULT - SUBJECTIVE AND OBJECTIVE BOX
NEPHROLOGY INTERVAL HPI/OVERNIGHT EVENTS:    MEDICATIONS  (STANDING):  ALBUTerol    0.083% 2.5milliGRAM(s) Nebulizer every 8 hours  guaiFENesin    Syrup 200milliGRAM(s) Oral every 8 hours  enoxaparin Injectable 40milliGRAM(s) SubCutaneous every 24 hours  doxazosin 4milliGRAM(s) Oral at bedtime  famotidine    Tablet 20milliGRAM(s) Oral daily  carvedilol 25milliGRAM(s) Oral every 12 hours  cinacalcet 30milliGRAM(s) Oral at bedtime  hydrALAZINE 10milliGRAM(s) Oral <User Schedule>  hydrALAZINE 20milliGRAM(s) Oral two times a day  aspirin enteric coated 81milliGRAM(s) Oral daily  potassium chloride   Powder 20milliEquivalent(s) Oral daily  lactobacillus acidophilus 1Tablet(s) Oral three times a day with meals  isosorbide   mononitrate ER Tablet (IMDUR) 30milliGRAM(s) Oral daily  losartan 25milliGRAM(s) Oral daily  furosemide    Tablet 40milliGRAM(s) Oral daily  levoFLOXacin  Tablet 750milliGRAM(s) Oral every 48 hours    MEDICATIONS  (PRN):      Allergies  Vasotec (Unknown)    Vital Signs Last 24 Hrs  T(C): 3, Max: 36.7 ( @ 11:42)  T(F): 37.4, Max: 98.1 ( @ 11:42)  HR: 74 (65 - 95)  BP: 135/71 (122/52 - 156/66)  BP(mean): --  RR: 20 (20 - 24)  SpO2: 61% (61% - 95%)  Daily     Daily Weight in k.3 (2017 05:13)    PHYSICAL EXAM:    GENERAL:   HEAD:  Unremarkable  EYES: No icterus  ENMT: No oral ulcerations  NECK: No JVD  NERVOUS SYSTEM:  Unremarkable  CHEST/LUNG:  Slight coarse BS  HEART: No rub  ABDOMEN: Negative  EXTREMITIES:  No phlebitis    LABS:                        10.9   14.0  )-----------( 276      ( 2017 07:01 )             31.7     -    132<L>  |  96  |  24<H>  ----------------------------<  104<H>  4.2   |  26  |  0.61    Ca    9.9      2017 07:01                  RADIOLOGY & ADDITIONAL TESTS:

## 2017-04-26 NOTE — PROGRESS NOTE ADULT - PROBLEM SELECTOR PLAN 2
2/2 to new onset CHF and possible pneumonia. WBC trending up.   Procalcitonin elevated. Blood cx - NEG   Last dose of zithromax today (5/5 days)   Continue albuterol TID.   O2 supplementation, keep O2 sats >88%   f/u pulm recs (Dr. Valdovinos) 2/2 to new onset CHF and possible pneumonia. WBC trending up.   Procalcitonin only mildly elevated. Blood cx - NEG   Last dose of zithromax today (5/5 days)   Continue albuterol TID.   still w wbc ct 13-14 range  check ct chest and id eval dr rivera will follow up  O2 supplementation, keep O2 sats >88%   f/u pulm recs (Dr. Valdovinos) New onset. Improving with diuresis.   On lasix 40mg IV daily. Transition to Lasix 40mg PO today  Continue carvedilol 25mg BID.   Continue Imdur 30mg daily for preload reduction   Carotid dopplers neg for flow limiting stenosis.   ECHO - EF 60-65%, valvular heart disease, pulm hypertension.  Monitor I&Os, daily wts.   f/u cardio recs (Dr. Cunningham)

## 2017-04-26 NOTE — PROGRESS NOTE ADULT - PROBLEM SELECTOR PLAN 5
PTH elevated. Ca wnl. Continue cincalcet 30mg QD. Continue carvedilol 25mg BID and hydralazine 10mg TID.   Restart losartan 25 QD. HOLD norvasc for now Continue carvedilol 25mg BID and hydralazine 10mg TID.   Restart losartan 25 QD. HOLD norvasc for now.

## 2017-04-26 NOTE — PROGRESS NOTE ADULT - ATTENDING COMMENTS
pt seen and examined agree with all of above as written together with dr hansen. will get ct chest and follow up with id. already cleared for dc by pulmonary and cardiology.   will follow up.   dw daughter dr emilio king pt seen and examined agree with all of above as written together with dr hansen. will get ct chest and follow up with id. already cleared for dc by pulmonary and cardiology.   will follow up.   dw daughter dr emilio king    follow up addendum.  ct chest results noted and reviewed with dr oreilly. bilateral infiltrates c/w pneumonia. d/w dr elmore probable partially tx pneumonia advised adding po levaquin for 5 days  cleared for dc by cardio id and pulmonary. discussed at length twice with daughter today. reviewed cat scan results and id recommendations with her at length. all questions answered to best of my ability.   Will follow up cbc tomorrow and reevaluate again at that time

## 2017-04-26 NOTE — PROGRESS NOTE ADULT - PROBLEM SELECTOR PLAN 3
Likely hypervolemic hyponatremia due to CHF. Slowly Improving.   Urine sodium and osmolarity - normal    TSH and uric acid - normal   Fluid restriction.   Monitor BMP.   f/u renal recs (Dr. Keller) Likely hypervolemic hyponatremia due to CHF. Slowly Improving.   Urine sodium and osmolarity - normal    TSH and uric acid - normal   Fluid restriction.   Monitor BMP.   renal follow up 2/2 to new onset CHF and pneumonia.   Continue albuterol TID.   O2 supplementation, keep O2 sats >88%   f/u pulm recs (Dr. Valdovinos) 2/2 to new onset CHF and possible pneumonia.   Continue albuterol TID.   O2 supplementation, keep O2 sats above 90 at rest 93 percent on room air.  f/u pulm recs (Dr. Valdovinos). advised stable for dc one more d zmax

## 2017-04-26 NOTE — PROGRESS NOTE ADULT - ASSESSMENT
89 F w HFPEF, HTN, edema p/w ADHF  - Vol status has overall improved. hypoNa further improved.   -has been on Lasix 40mg IV qday. can transition to 40 po daily, which is higher than her prior outpt dose of 20 daily. hopefully will remain euvolemic on this  - No signs of ischemia.   - Cont Abx.   - Continue Coreg and Hydralazine at home doses.  Cont Imdur 30mg Qday for preload reduction   - Monitor and replete electrolytes. Keep K>4.0 and Mg>2.0.   - resume low dose arb, losartan 25 daily    dc planning to [facility]

## 2017-04-26 NOTE — PROGRESS NOTE ADULT - PROBLEM SELECTOR PLAN 8
Family wish full resuscitative measures at this point, although would not want artificial feeding, should the situation arise.  will address hcp w family Family wish full resuscitative measures at this point, although would not want artificial feeding, should the situation arise  hcp in place DVT ppx with lovenox.   GI ppx with pepcid

## 2017-04-26 NOTE — PROGRESS NOTE ADULT - PROBLEM SELECTOR PLAN 1
New onset. Improving with diuresis.   Continue lasix 40mg IV and transition to PO in 24-48hrs per cardio.   Continue carvedilol 25mg BID. Holding acei for renal function.    Started on Imdur 30mg daily for preload reduction   Carotid dopplers neg for flow limiting stenosis.   ECHO - EF 60-65%, valvular heart disease, pulm hypertension.  Monitor I&Os, daily wts.   f/u cardio recs (Dr. Isaacs) New onset. Improving with diuresis.   Continue lasix 40mg IV and transition to PO today  Continue carvedilol 25mg BID.   Started on Imdur 30mg daily for preload reduction   Carotid dopplers neg for flow limiting stenosis.   ECHO - EF 60-65%, valvular heart disease, pulm hypertension.  Monitor I&Os, daily wts.   f/u cardio recs (Dr. Isaacs) Unclear whether CAP or HCAP.   Procalcitonin only mildly elevated at admission. Blood cx - NEG   Last dose of zithromax today (5/5 days). WBC still trending up.   ID consulted (Dr. Whitehead) Unclear whether CAP or HCAP.   Procalcitonin only mildly elevated at admission and adm cxr consistent w chf.    Blood cx - NEG   Last dose of zithromax today (5/5 days). WBC remains in 13-14 range.  given persistent leukocytosis  id evaluation.  ID consulted (Dr. Whitehead)

## 2017-04-26 NOTE — PROGRESS NOTE ADULT - SUBJECTIVE AND OBJECTIVE BOX
Date/Time Patient Seen:  		  Referring MD:   Data Reviewed	       Patient is a 89y old  Female who presents with a chief complaint of cough (25 Apr 2017 19:53)  in bed  seen and examined  vs and meds reviewed  on o2  am LABS pending      Subjective/HPI       Medication list         MEDICATIONS  (STANDING):  ALBUTerol    0.083% 2.5milliGRAM(s) Nebulizer every 8 hours  guaiFENesin    Syrup 200milliGRAM(s) Oral every 8 hours  enoxaparin Injectable 40milliGRAM(s) SubCutaneous every 24 hours  furosemide   Injectable 40milliGRAM(s) IV Push daily  doxazosin 4milliGRAM(s) Oral at bedtime  famotidine    Tablet 20milliGRAM(s) Oral daily  carvedilol 25milliGRAM(s) Oral every 12 hours  cinacalcet 30milliGRAM(s) Oral at bedtime  hydrALAZINE 10milliGRAM(s) Oral <User Schedule>  hydrALAZINE 20milliGRAM(s) Oral two times a day  aspirin enteric coated 81milliGRAM(s) Oral daily  potassium chloride   Powder 20milliEquivalent(s) Oral daily  azithromycin  IVPB 500milliGRAM(s) IV Intermittent every 24 hours  lactobacillus acidophilus 1Tablet(s) Oral three times a day with meals  isosorbide   mononitrate ER Tablet (IMDUR) 30milliGRAM(s) Oral daily    MEDICATIONS  (PRN):         Vitals log        ICU Vital Signs Last 24 Hrs  T(C): 36.6, Max: 36.7 (04-25 @ 15:41)  T(F): 97.9, Max: 98.1 (04-25 @ 15:41)  HR: 81 (65 - 81)  BP: 156/66 (125/62 - 156/66)  BP(mean): --  ABP: --  ABP(mean): --  RR: 20 (19 - 20)  SpO2: 90% (90% - 96%)           Input and Output:  I&O's Detail    I & Os for current day (as of 26 Apr 2017 07:21)  =============================================  IN:    Oral Fluid: 120 ml    Total IN: 120 ml  ---------------------------------------------  OUT:    Total OUT: 0 ml  ---------------------------------------------  Total NET: 120 ml      Lab Data                        10.6   13.3  )-----------( 263      ( 25 Apr 2017 06:19 )             31.5     04-25    131<L>  |  96  |  24<H>  ----------------------------<  104<H>  4.3   |  26  |  0.69    Ca    9.8      25 Apr 2017 06:19              Review of Systems	      Objective     Physical Examination  head at  heart - s1s2  lungs - dec BS, occ rhonchi  abd - soft        Pertinent Lab findings & Imaging      Richard:  NO   Adequate UO     I&O's Detail    I & Os for current day (as of 26 Apr 2017 07:21)  =============================================  IN:    Oral Fluid: 120 ml    Total IN: 120 ml  ---------------------------------------------  OUT:    Total OUT: 0 ml  ---------------------------------------------  Total NET: 120 ml           Discussed with:     Cultures:	        Radiology

## 2017-04-26 NOTE — PROGRESS NOTE ADULT - SUBJECTIVE AND OBJECTIVE BOX
[facility]    Christofer Isaacs, Sorin, Marisa, Jr, Hieu      [phone]    CHIEF COMPLAINT: Patient is a 89y old  Female who presents with a chief complaint of cough (25 Apr 2017 19:53)      Follow Up: vol ol    Interim history: does not report sob. denies cp    MEDICATIONS  (STANDING):  ALBUTerol    0.083% 2.5milliGRAM(s) Nebulizer every 8 hours  guaiFENesin    Syrup 200milliGRAM(s) Oral every 8 hours  enoxaparin Injectable 40milliGRAM(s) SubCutaneous every 24 hours  furosemide   Injectable 40milliGRAM(s) IV Push daily  doxazosin 4milliGRAM(s) Oral at bedtime  famotidine    Tablet 20milliGRAM(s) Oral daily  carvedilol 25milliGRAM(s) Oral every 12 hours  cinacalcet 30milliGRAM(s) Oral at bedtime  hydrALAZINE 10milliGRAM(s) Oral <User Schedule>  hydrALAZINE 20milliGRAM(s) Oral two times a day  aspirin enteric coated 81milliGRAM(s) Oral daily  potassium chloride   Powder 20milliEquivalent(s) Oral daily  azithromycin  IVPB 500milliGRAM(s) IV Intermittent every 24 hours  lactobacillus acidophilus 1Tablet(s) Oral three times a day with meals  isosorbide   mononitrate ER Tablet (IMDUR) 30milliGRAM(s) Oral daily    MEDICATIONS  (PRN):      REVIEW OF SYSTEMS:  eye, ent, GI, , allergic, dermatologic, musculoskeletal and neurologic are negative except as described above    Vital Signs Last 24 Hrs  T(C): 36.5, Max: 36.7 (04-25 @ 15:41)  T(F): 97.7, Max: 98.1 (04-25 @ 15:41)  HR: 76 (65 - 93)  BP: 122/52 (122/52 - 156/66)  BP(mean): --  RR: 24 (19 - 24)  SpO2: 95% (90% - 96%)    I&O's Summary    I & Os for current day (as of 26 Apr 2017 08:52)  =============================================  IN: 120 ml / OUT: 0 ml / NET: 120 ml      Telemetry past 24h: sr    PHYSICAL EXAM:    Constitutional: well-nourished, well-developed, NAD   HEENT:  MMM, sclerae anicteric, conjunctivae clear, no oral cyanosis.  Pulmonary: Non-labored, breath sounds are diminished at bases bilaterally, rhonchi left base  Cardiovascular: Regular, S1 and S2, 1/6 sys murmur, no rubs, gallops or clicks  Gastrointestinal: Bowel Sounds present, soft, nontender.   Lymph: No peripheral edema. No lymphadenopathy.  Neurological: Alert, no focal deficits  Skin: No rashes.  Psych:  Mood & affect appropriate    LABS: All Labs Reviewed:                        10.9   14.0  )-----------( 276      ( 26 Apr 2017 07:01 )             31.7                         10.6   13.3  )-----------( 263      ( 25 Apr 2017 06:19 )             31.5                         9.6    13.2  )-----------( 209      ( 24 Apr 2017 06:58 )             30.4     26 Apr 2017 07:01    132    |  96     |  24     ----------------------------<  104    4.2     |  26     |  0.61   25 Apr 2017 06:19    131    |  96     |  24     ----------------------------<  104    4.3     |  26     |  0.69   24 Apr 2017 06:58    130    |  94     |  26     ----------------------------<  108    3.5     |  27     |  0.62     Ca    9.9        26 Apr 2017 07:01  Ca    9.8        25 Apr 2017 06:19  Ca    9.0        24 Apr 2017 06:58            Blood Culture: Organism --  Gram Stain Blood -- Gram Stain --  Specimen Source .Blood Blood-Peripheral  Culture-Blood --            RADIOLOGY:    EKG:    Echo:     EXAM:  ECHO TTE W/O CON COMP W/DOPPLR         PROCEDURE DATE:  04/23/2017        INTERPRETATION:  Ordering Physician: TERESITA MCADAMS    Indication: Congestive heart failure    Technician: EDOUARD    Study Quality: Fair   A complete echocardiographic study was performed utilizing standard   protocol including spectral and color Doppler in all echocardiographic   windows.    Height: 1 52-cm  Weight: 56 kg  BSA: 1.5 sq m  Blood Pressure: 128/55    MEASUREMENTS  IVS: 1.0  PWT: 0.7  LA: 3.5  AO: 2.7  LVIDd: 3.3  LVIDs: 2.1  LVOT: 1.7    LVEF: 60-65%  RVSP: 59 mmHg  RA Pressure: 12 mmHg  IVC: IVC mildly dilated    FINDINGS  Left Ventricle: Normal size with satisfactory contractility  Right Ventricle: Normal size with satisfactory contractility  Left Atrium: Normal size  Right Atrium: Normal size  Mitral Valve:     Calcified leaflets with mild MR  Aortic Valve: Calcified leaflets with mild-mod aortic stenosis. Gradient   35 mmHg with valve area 1.1 sq cm. Mild AI  Tricuspid Valve: Moderate TR  Pulmonic Valve: No significant PI  Diastolic Function: Unremarkable  Pericardium/Pleura: No significant pericardial effusion. Moderate-sized   bilateral pleural effusion      CONCLUSIONS:  Normal chamber sizes with satisfactory contractility of the left   ventricle. Mild to moderate AS with mild AI. Mild MR. Moderate TR with   pulmonary hypertension. Bilateral pleural effusion                  RILEY MCNEIL M.D., ATTENDING CARDIOLOGIST  This document has been electronically signed. Apr 24 2017  5:07PM

## 2017-04-26 NOTE — PROGRESS NOTE ADULT - PROBLEM SELECTOR PLAN 1
keep sat > 88 pct, supp 02  HOB > 30 deg  oral hygiene  chest PT prn  albuterol TID to promote mucociliary clearance   pt has significant kyphosis which likely results in restrictive lung disease  may benefit from Incentive Spirometry  planned for discharge today to [facility]  complete last dose of Zithromax today

## 2017-04-26 NOTE — CONSULT NOTE ADULT - PROBLEM SELECTOR RECOMMENDATION 9
difficult presentation with initial presentation with unremarkable CXR but now diffuse areas of increased opacification on CT, compelling story. May be partially treated with Azithro but with continued elevation of WBC recommend:    Levaquin 750mg PO Q-day x 5days. Patient clinically very stable so this could be done in outpt or rehab setting unless other social or medical issues prevent a safe discharge. difficult presentation with initial presentation with unremarkable CXR but now diffuse areas of increased opacification on CT, compelling story. May be partially treated with Azithro but with continued elevation of WBC recommend:    Levaquin 750mg PO Q-day x 5days. (adjusted for renal function this will be q48)      Patient clinically very stable so this could be done in outpt or rehab setting unless other social or medical issues prevent a safe discharge.

## 2017-04-27 LAB
ANION GAP SERPL CALC-SCNC: 10 MMOL/L — SIGNIFICANT CHANGE UP (ref 5–17)
BUN SERPL-MCNC: 24 MG/DL — HIGH (ref 7–23)
CALCIUM SERPL-MCNC: 10 MG/DL — SIGNIFICANT CHANGE UP (ref 8.5–10.1)
CHLORIDE SERPL-SCNC: 94 MMOL/L — LOW (ref 96–108)
CO2 SERPL-SCNC: 27 MMOL/L — SIGNIFICANT CHANGE UP (ref 22–31)
CREAT SERPL-MCNC: 0.71 MG/DL — SIGNIFICANT CHANGE UP (ref 0.5–1.3)
CULTURE RESULTS: SIGNIFICANT CHANGE UP
CULTURE RESULTS: SIGNIFICANT CHANGE UP
GLUCOSE SERPL-MCNC: 102 MG/DL — HIGH (ref 70–99)
HCT VFR BLD CALC: 30.4 % — LOW (ref 34.5–45)
HGB BLD-MCNC: 10.4 G/DL — LOW (ref 11.5–15.5)
MCHC RBC-ENTMCNC: 30.4 PG — SIGNIFICANT CHANGE UP (ref 27–34)
MCHC RBC-ENTMCNC: 34.4 GM/DL — SIGNIFICANT CHANGE UP (ref 32–36)
MCV RBC AUTO: 88.6 FL — SIGNIFICANT CHANGE UP (ref 80–100)
PLATELET # BLD AUTO: 321 K/UL — SIGNIFICANT CHANGE UP (ref 150–400)
POTASSIUM SERPL-MCNC: 3.9 MMOL/L — SIGNIFICANT CHANGE UP (ref 3.5–5.3)
POTASSIUM SERPL-SCNC: 3.9 MMOL/L — SIGNIFICANT CHANGE UP (ref 3.5–5.3)
RAPID RVP RESULT: SIGNIFICANT CHANGE UP
RBC # BLD: 3.43 M/UL — LOW (ref 3.8–5.2)
RBC # FLD: 12.5 % — SIGNIFICANT CHANGE UP (ref 10.3–14.5)
SODIUM SERPL-SCNC: 131 MMOL/L — LOW (ref 135–145)
SPECIMEN SOURCE: SIGNIFICANT CHANGE UP
SPECIMEN SOURCE: SIGNIFICANT CHANGE UP
WBC # BLD: 15.5 K/UL — HIGH (ref 3.8–10.5)
WBC # FLD AUTO: 15.5 K/UL — HIGH (ref 3.8–10.5)

## 2017-04-27 PROCEDURE — 71010: CPT | Mod: 26

## 2017-04-27 PROCEDURE — 99233 SBSQ HOSP IP/OBS HIGH 50: CPT

## 2017-04-27 RX ORDER — POTASSIUM CHLORIDE 20 MEQ
20 PACKET (EA) ORAL ONCE
Qty: 0 | Refills: 0 | Status: COMPLETED | OUTPATIENT
Start: 2017-04-27 | End: 2017-04-27

## 2017-04-27 RX ADMIN — Medication 20 MILLIEQUIVALENT(S): at 12:43

## 2017-04-27 RX ADMIN — ISOSORBIDE MONONITRATE 30 MILLIGRAM(S): 60 TABLET, EXTENDED RELEASE ORAL at 12:43

## 2017-04-27 RX ADMIN — ALBUTEROL 2.5 MILLIGRAM(S): 90 AEROSOL, METERED ORAL at 19:53

## 2017-04-27 RX ADMIN — Medication 20 MILLIGRAM(S): at 17:53

## 2017-04-27 RX ADMIN — Medication 40 MILLIGRAM(S): at 05:55

## 2017-04-27 RX ADMIN — Medication 200 MILLIGRAM(S): at 05:55

## 2017-04-27 RX ADMIN — Medication 4 MILLIGRAM(S): at 21:43

## 2017-04-27 RX ADMIN — Medication 1 TABLET(S): at 12:43

## 2017-04-27 RX ADMIN — ALBUTEROL 2.5 MILLIGRAM(S): 90 AEROSOL, METERED ORAL at 15:27

## 2017-04-27 RX ADMIN — Medication 20 MILLIEQUIVALENT(S): at 13:43

## 2017-04-27 RX ADMIN — Medication 200 MILLIGRAM(S): at 21:43

## 2017-04-27 RX ADMIN — ALBUTEROL 2.5 MILLIGRAM(S): 90 AEROSOL, METERED ORAL at 08:03

## 2017-04-27 RX ADMIN — LOSARTAN POTASSIUM 25 MILLIGRAM(S): 100 TABLET, FILM COATED ORAL at 05:55

## 2017-04-27 RX ADMIN — Medication 1 TABLET(S): at 17:53

## 2017-04-27 RX ADMIN — FAMOTIDINE 20 MILLIGRAM(S): 10 INJECTION INTRAVENOUS at 12:43

## 2017-04-27 RX ADMIN — ENOXAPARIN SODIUM 40 MILLIGRAM(S): 100 INJECTION SUBCUTANEOUS at 21:43

## 2017-04-27 RX ADMIN — CINACALCET 30 MILLIGRAM(S): 30 TABLET, FILM COATED ORAL at 21:43

## 2017-04-27 RX ADMIN — Medication 10 MILLIGRAM(S): at 12:43

## 2017-04-27 RX ADMIN — CARVEDILOL PHOSPHATE 25 MILLIGRAM(S): 80 CAPSULE, EXTENDED RELEASE ORAL at 05:55

## 2017-04-27 RX ADMIN — Medication 200 MILLIGRAM(S): at 13:43

## 2017-04-27 RX ADMIN — Medication 20 MILLIGRAM(S): at 05:55

## 2017-04-27 RX ADMIN — CARVEDILOL PHOSPHATE 25 MILLIGRAM(S): 80 CAPSULE, EXTENDED RELEASE ORAL at 17:53

## 2017-04-27 RX ADMIN — Medication 81 MILLIGRAM(S): at 12:43

## 2017-04-27 NOTE — PROGRESS NOTE ADULT - ASSESSMENT
89F PMHx HTN, hyperparathyroidism s/p parathyroidectomy, anemia, LE edema admitted for new onset CHF and PNA with hyponatremia. 89F PMHx HTN, hyperparathyroidism s/p parathyroidectomy, anemia, LE edema admitted for new onset CHF and PNA with hyponatremia. Stable to be D/C to [facility].

## 2017-04-27 NOTE — PROGRESS NOTE ADULT - SUBJECTIVE AND OBJECTIVE BOX
Follow up: hfpef, HTN, mild AS    HPI:  89F PMHx HTN, hyperparathyroidism s/p parathyroidectomy, anemia, LE edema sent in from urgent care c/o cough, sore throat and weakness. Pt states sore throat began 6 days ago, 4 days ago developed productive cough. States she has been feeling weak and getting more tired the past few days. Tried Delsym relieved cough, but made her feel dizzy. CXRay at Urgent care showed B/L PNA and suggestive of CHF, sent her to ED. Pt has been having increased swelling in legs L>R for the past few months, had doppler US which was negative for DVT, planned to have carotid doppler ultrasound, but has not yet gone.   Myla is resting comfortably in bed this morning, breathing somewhat easier today when compared to yesterday. She has some phlegm at times but this is improved.      PAST MEDICAL & SURGICAL HISTORY:  Anemia, unspecified type  Edema of lower extremity  Heart murmur  Hypertension  Hypercalcemia  History of appendectomy  S/P tonsillectomy  H/O parathyroidectomy      MEDICATIONS  (STANDING):  ALBUTerol    0.083% 2.5milliGRAM(s) Nebulizer every 8 hours  guaiFENesin    Syrup 200milliGRAM(s) Oral every 8 hours  enoxaparin Injectable 40milliGRAM(s) SubCutaneous every 24 hours  doxazosin 4milliGRAM(s) Oral at bedtime  famotidine    Tablet 20milliGRAM(s) Oral daily  carvedilol 25milliGRAM(s) Oral every 12 hours  cinacalcet 30milliGRAM(s) Oral at bedtime  hydrALAZINE 10milliGRAM(s) Oral <User Schedule>  hydrALAZINE 20milliGRAM(s) Oral two times a day  aspirin enteric coated 81milliGRAM(s) Oral daily  potassium chloride   Powder 20milliEquivalent(s) Oral daily  lactobacillus acidophilus 1Tablet(s) Oral three times a day with meals  isosorbide   mononitrate ER Tablet (IMDUR) 30milliGRAM(s) Oral daily  losartan 25milliGRAM(s) Oral daily  furosemide    Tablet 40milliGRAM(s) Oral daily  levoFLOXacin  Tablet 750milliGRAM(s) Oral every 48 hours  potassium chloride    Tablet ER 20milliEquivalent(s) Oral once  Vital Signs Last 24 Hrs  T(C): 36.7, Max: 37.2 (04-27 @ 05:01)  T(F): 98.1, Max: 99 (04-27 @ 05:01)  HR: 67 (65 - 84)  BP: 124/73 (124/73 - 154/68)  BP(mean): --  RR: 16 (16 - 20)  SpO2: 95% (61% - 95%)    I&O's Summary  I & Os for 24h ending 27 Apr 2017 07:00  =============================================  IN: 1095 ml / OUT: 0 ml / NET: 1095 ml    I & Os for current day (as of 27 Apr 2017 12:38)  =============================================  IN: 240 ml / OUT: 0 ml / NET: 240 ml      PHYSICAL EXAM:    Constitutional: NAD, awake and alert, frail  Eyes:  EOMI,  Pupils round, no lesions  ENMT: no exudate or erythema  Pulmonary: Non-labored, breath sounds are clear bilaterally, dcreased BS at both bases  Cardiovascular: PMI not palpable RRR normal S1 and S2, no murmurs, rubs, gallops or clicks  Gastrointestinal: Bowel Sounds present, soft, nontender.   Lymph: No cervical lymphadenopathy.  Neurological: Alert, no focal deficits  Skin: No rashes. Changes of chronic venous stasis. No cyanosis.  Psych:  Mood & affect appropriate                                 10.4   15.5  )-----------( 321      ( 27 Apr 2017 06:54 )             30.4     CBC Full  -  ( 27 Apr 2017 06:54 )  WBC Count : 15.5 K/uL  Hemoglobin : 10.4 g/dL  Hematocrit : 30.4 %  Platelet Count - Automated : 321 K/uL  Mean Cell Volume : 88.6 fl  Mean Cell Hemoglobin : 30.4 pg  Mean Cell Hemoglobin Concentration : 34.4 gm/dL  Auto Neutrophil # : x  Auto Lymphocyte # : x  Auto Monocyte # : x  Auto Eosinophil # : x  Auto Basophil # : x  Auto Neutrophil % : x  Auto Lymphocyte % : x  Auto Monocyte % : x  Auto Eosinophil % : x  Auto Basophil % : x    04-27    131<L>  |  94<L>  |  24<H>  ----------------------------<  102<H>  3.9   |  27  |  0.71    Ca    10.0      27 Apr 2017 06:34      Ventricular Rate 62 BPM    Atrial Rate 62 BPM    P-R Interval 138 ms    QRS Duration 86 ms    Q-T Interval 416 ms    QTC Calculation(Bezet) 422 ms    P Axis 67 degrees    R Axis 9 degrees    T Axis 20 degrees    Diagnosis Line Sinus rhythm with premature atrial complexes  Otherwise normal ECG    Confirmed by Samson Worthington MD ([id number]) on 4/23/2017 8:41:27 AM     EXAM:  ECHO TTE W/O CON COMP W/DOPPLR         PROCEDURE DATE:  04/23/2017        INTERPRETATION:  Ordering Physician: TERESITA MCADAMS    Indication: Congestive heart failure    Technician: EDOUARD    Study Quality: Fair   A complete echocardiographic study was performed utilizing standard   protocol including spectral and color Doppler in all echocardiographic   windows.    Height: 1 52-cm  Weight: 56 kg  BSA: 1.5 sq m  Blood Pressure: 128/55    MEASUREMENTS  IVS: 1.0  PWT: 0.7  LA: 3.5  AO: 2.7  LVIDd: 3.3  LVIDs: 2.1  LVOT: 1.7    LVEF: 60-65%  RVSP: 59 mmHg  RA Pressure: 12 mmHg  IVC: IVC mildly dilated    FINDINGS  Left Ventricle: Normal size with satisfactory contractility  Right Ventricle: Normal size with satisfactory contractility  Left Atrium: Normal size  Right Atrium: Normal size  Mitral Valve:     Calcified leaflets with mild MR  Aortic Valve: Calcified leaflets with mild-mod aortic stenosis. Gradient   35 mmHg with valve area 1.1 sq cm. Mild AI  Tricuspid Valve: Moderate TR  Pulmonic Valve: No significant PI  Diastolic Function: Unremarkable  Pericardium/Pleura: No significant pericardial effusion. Moderate-sized   bilateral pleural effusion      CONCLUSIONS:  Normal chamber sizes with satisfactory contractility of the left   ventricle. Mild to moderate AS with mild AI. Mild MR. Moderate TR with   pulmonary hypertension. Bilateral pleural effusion                  RILEY MCNEIL M.D., ATTENDING CARDIOLOGIST  This document has been electronically signed. Apr 24 2017  5:07PM              EXAM:  PORTABLE CHEST                            PROCEDURE DATE:  04/27/2017        INTERPRETATION:  CLINICAL STATEMENT: Follow-up chest pain.    TECHNIQUE: AP view of the chest.    COMPARISON: 4/25/2017    FINDINGS/  IMPRESSION:  Surgical clips present.    Hyper aerated lungs with increased interstitial lung markings without   significant change. Better aeration of bilateral lungs compared to prior   exam overall with residual nodular opacities present. Small bilateral   pleural effusions withadjacent airspace opacities slightly increased   perfusion on the right.    Heart size cannot be accurately assessed in this projection, but appear   enlarged.              NICHOLAS OLIVA M.D., ATTENDING RADIOLOGIST  This document has been electronicallysigned. Apr 27 2017 11:10AM

## 2017-04-27 NOTE — PROGRESS NOTE ADULT - PROBLEM SELECTOR PLAN 1
Unclear whether CAP or HCAP. CT show multifocal pna. WBC trending upward.   Procalcitonin only mildly elevated at admission adm CXR consistent with CHF.    Completed 5 day course of zithromax, now on levaquin 750mg PO q48 for 5 days.   Blood cx - NEG  f/u ID recs (Dr. Whitehead) Unclear whether CAP or HCAP. CT show multifocal pna. WBC trending upward.   Procalcitonin only mildly elevated at admission adm CXR consistent with CHF.    Completed 5 day course of zithromax, now on levaquin 750mg PO q48 for 5 days.   Blood cx - NEG  f/u ID recs (Dr. Whitehead)  describes occasional "food stuck"- will get speech and swallow

## 2017-04-27 NOTE — PROGRESS NOTE ADULT - SUBJECTIVE AND OBJECTIVE BOX
infectious diseases progress note:    SAIRA SANTANA is a 89y y. o.Female patient    Patient reports: she could be better, pt is a bit confused    ROS:    EYES:  Negative  blurry vision or double vision  GASTROINTESTINAL:  Negative for nausea, vomiting, diarrhea  -otherwise negative except for subjective    Allergies    Vasotec (Unknown)    Intolerances        ANTIBIOTICS/RELEVANT:  antimicrobials  levoFLOXacin  Tablet 750milliGRAM(s) Oral every 48 hours    immunologic:    OTHER:  ALBUTerol    0.083% 2.5milliGRAM(s) Nebulizer every 8 hours  guaiFENesin    Syrup 200milliGRAM(s) Oral every 8 hours  enoxaparin Injectable 40milliGRAM(s) SubCutaneous every 24 hours  doxazosin 4milliGRAM(s) Oral at bedtime  famotidine    Tablet 20milliGRAM(s) Oral daily  carvedilol 25milliGRAM(s) Oral every 12 hours  cinacalcet 30milliGRAM(s) Oral at bedtime  hydrALAZINE 10milliGRAM(s) Oral <User Schedule>  hydrALAZINE 20milliGRAM(s) Oral two times a day  aspirin enteric coated 81milliGRAM(s) Oral daily  potassium chloride   Powder 20milliEquivalent(s) Oral daily  lactobacillus acidophilus 1Tablet(s) Oral three times a day with meals  isosorbide   mononitrate ER Tablet (IMDUR) 30milliGRAM(s) Oral daily  losartan 25milliGRAM(s) Oral daily  furosemide    Tablet 40milliGRAM(s) Oral daily  potassium chloride    Tablet ER 20milliEquivalent(s) Oral once      Objective:  Vital Signs Last 24 Hrs  T(C): 36.7, Max: 37.2 (04-27 @ 05:01)  T(F): 98.1, Max: 99 (04-27 @ 05:01)  HR: 73 (65 - 95)  BP: 147/61 (135/71 - 154/71)  BP(mean): --  RR: 16 (16 - 22)  SpO2: 93% (61% - 95%)    PHYSICAL EXAM:  Constitutional:Well-developed, well nourished  Eyes:PERRLA, EOMI, noted arcus senilis  Ear/Nose/Throat: oropharynx normal	  Neck:no JVD, no lymphadenopathy, supple  Respiratory: no accessory muscle use, lung fields clear on left but increased ronchi on left  Cardiovascular:RRR, normal S1, S2 no m/r/g  Gastrointestinal:soft, NT, no HSM, BS-normal  Extremities:no clubbing, no cyanosis, edema absent  Neuro-patient alert, but a little confused  Skin-no sig lesions      LABS:                        10.4   15.5  )-----------( 321      ( 27 Apr 2017 06:54 )             30.4   WBC Count: 14.0 K/uL (04.26.17 @ 07:01)    WBC Count: 13.3 K/uL (04.25.17 @ 06:19)    WBC Count: 13.2 K/uL (04.24.17 @ 06:58)        04-27    131<L>  |  94<L>  |  24<H>  ----------------------------<  102<H>  3.9   |  27  |  0.71    Ca    10.0      27 Apr 2017 06:34              MICROBIOLOGY:        RADIOLOGY & ADDITIONAL STUDIES:

## 2017-04-27 NOTE — PROGRESS NOTE ADULT - PROBLEM SELECTOR PLAN 2
completed Zithro course, now on Levaquin as per ID  CT chest reviewed - equivocal for PNA, may represent CHF plus / minus PNA  pt clinically appears better  WBC pending this am  overall this is a 89 plus female, weak and frail who is recovering from lower resp tract infection complicated by multiple comorbidities  prognosis guarded as expectations have to be realistic  planned for dc to [facility]

## 2017-04-27 NOTE — PROGRESS NOTE ADULT - SUBJECTIVE AND OBJECTIVE BOX
Patient is a 89y old  Female who presents with a chief complaint of cough (25 Apr 2017 19:53)      INTERVAL HPI/OVERNIGHT EVENTS: Pt     MEDICATIONS  (STANDING):  ALBUTerol    0.083% 2.5milliGRAM(s) Nebulizer every 8 hours  guaiFENesin    Syrup 200milliGRAM(s) Oral every 8 hours  enoxaparin Injectable 40milliGRAM(s) SubCutaneous every 24 hours  doxazosin 4milliGRAM(s) Oral at bedtime  famotidine    Tablet 20milliGRAM(s) Oral daily  carvedilol 25milliGRAM(s) Oral every 12 hours  cinacalcet 30milliGRAM(s) Oral at bedtime  hydrALAZINE 10milliGRAM(s) Oral <User Schedule>  hydrALAZINE 20milliGRAM(s) Oral two times a day  aspirin enteric coated 81milliGRAM(s) Oral daily  potassium chloride   Powder 20milliEquivalent(s) Oral daily  lactobacillus acidophilus 1Tablet(s) Oral three times a day with meals  isosorbide   mononitrate ER Tablet (IMDUR) 30milliGRAM(s) Oral daily  losartan 25milliGRAM(s) Oral daily  furosemide    Tablet 40milliGRAM(s) Oral daily  levoFLOXacin  Tablet 750milliGRAM(s) Oral every 48 hours    MEDICATIONS  (PRN):      Allergies: Vasotec (Unknown)    Intolerances        REVIEW OF SYSTEMS:  CONSTITUTIONAL: No fever, No chills,No fatigue,No myalgia,No Body ache  EYES: No eye pain, visual disturbances, or discharge  ENMT:  No ear pain, No nose bleed, No vertigo; No sinus or throat pain  NECK: No pain, No stiffness  RESPIRATORY: No cough, wheezing, No  hemoptysis; No shortness of breath  CARDIOVASCULAR: No chest pain, palpitations, leg swelling  GASTROINTESTINAL: No abdominal or epigastric pain. No nausea, No vomiting; No diarrhea or constipation. [ ] BM  GENITOURINARY: No dysuria, No frequency, No urgency, No hematuria, or incontinence  NEUROLOGICAL: alert and oriented x 3,  No headaches, No dizziness, No numbness,  SKIN:   No itching, burning, rashes, or lesions   MUSCULOSKELETAL: No joint pain or swelling; No muscle pain, No back pain, No extremity pain  PSYCHIATRIC: No depression, anxiety, mood swings, or difficulty sleeping  ROS  [ ] Unable to obtain   REST OF REVIEW Of SYSTEM - [ ] Normal     Height (cm): 152.4 (04-22 @ 15:48)  Weight (kg): 56.7 (04-22 @ 15:48)  BMI (kg/m2): 24.4 (04-22 @ 15:48)  BSA (m2): 1.53 (04-22 @ 15:48)  Vital Signs Last 24 Hrs  T(C): 37.2, Max: 37.2 (04-27 @ 05:01)  T(F): 99, Max: 99 (04-27 @ 05:01)  HR: 73 (65 - 95)  BP: 154/68 (135/71 - 154/71)  BP(mean): --  RR: 20 (20 - 22)  SpO2: 94% (61% - 95%)  [ ] room air   [ ] 02    PHYSICAL EXAM:  GENERAL:  No acute distresss, well appearing, [ ] Agitated, [ ] Lethargy, [ ] confused   HEAD:  normal  ENMT: normal  NECK:  normal    NERVOUS SYSTEM:  Alert & Oriented X3, no focal deficits [ ]Confusion  [ ] Encephalopathic [ ] Sedated [ ] Other  CHEST/LUNG: Clear to auscultation bilaterally,  [ ] decreased breath sounds at bases  [ ] wheezing   [ ] rhonchi  [ ] crackles  HEART:  Regular rate and rhythm, No murmurs, rubs, or gallops,  [ ] irregular   ABDOMEN:  soft, nontender, nondistended, positive bowel sounds   [ ] obese  EXTREMITIES: No clubbing, cyanosis or edema  SKIN: [ ] venous stasis skin changes    LABS:                        10.4   15.5  )-----------( 321      ( 27 Apr 2017 06:54 )             30.4     27 Apr 2017 06:34    131    |  94     |  24     ----------------------------<  102    3.9     |  27     |  0.71     Ca    10.0       27 Apr 2017 06:34            CAPILLARY BLOOD GLUCOSE    Cultures  Culture Results:   No growth to date. (04-22 @ 22:12)  Culture Results:   No growth to date. (04-22 @ 22:12)          RADIOLOGY & ADDITIONAL TESTS:      Care Discussed with [X] Consultants  [ ] Patient  [ ] Family  [X]   /   [ ] Other; RN  DVT prophylaxis [ ] lovenox   [ ] subq heparin  [ ] coumadin  [ ] venodynes [ ] ambulating frequently at how risk for vte and no pharm         or  mechanical prophylaxis required    [ ] other   Advanced directive:    [ ]pt has hcp     [ ] pt declined to assign hcp  Discussed with pt @ bedside Patient is a 89y old  Female who presents with a chief complaint of cough (25 Apr 2017 19:53)      INTERVAL HPI/OVERNIGHT EVENTS: Pt c/o congestion but denies SOB. No acute events overnight. Plan for DC to [facility].     MEDICATIONS  (STANDING):  ALBUTerol    0.083% 2.5milliGRAM(s) Nebulizer every 8 hours  guaiFENesin    Syrup 200milliGRAM(s) Oral every 8 hours  enoxaparin Injectable 40milliGRAM(s) SubCutaneous every 24 hours  doxazosin 4milliGRAM(s) Oral at bedtime  famotidine    Tablet 20milliGRAM(s) Oral daily  carvedilol 25milliGRAM(s) Oral every 12 hours  cinacalcet 30milliGRAM(s) Oral at bedtime  hydrALAZINE 10milliGRAM(s) Oral <User Schedule>  hydrALAZINE 20milliGRAM(s) Oral two times a day  aspirin enteric coated 81milliGRAM(s) Oral daily  potassium chloride   Powder 20milliEquivalent(s) Oral daily  lactobacillus acidophilus 1Tablet(s) Oral three times a day with meals  isosorbide   mononitrate ER Tablet (IMDUR) 30milliGRAM(s) Oral daily  losartan 25milliGRAM(s) Oral daily  furosemide    Tablet 40milliGRAM(s) Oral daily  levoFLOXacin  Tablet 750milliGRAM(s) Oral every 48 hours    MEDICATIONS  (PRN):      Allergies: Vasotec (Unknown)    Intolerances        REVIEW OF SYSTEMS:  CONSTITUTIONAL: No fever, No chills, No fatigue, No myalgia, No Body aches  RESPIRATORY: Cough and congestion. No wheezing, No  hemoptysis; No shortness of breath  CARDIOVASCULAR: No chest pain, palpitations, leg swelling  GASTROINTESTINAL: No abdominal or epigastric pain. No nausea, No vomiting; No diarrhea or constipation. [X] BM  GENITOURINARY: No dysuria, No frequency, No urgency, No hematuria, or incontinence  NEUROLOGICAL: alert and oriented x 3,  No headaches, No dizziness, No numbness,  SKIN:   No itching, burning, rashes, or lesions   MUSCULOSKELETAL: No joint pain or swelling; No muscle pain, No back pain, No extremity pain  PSYCHIATRIC: No depression, anxiety, mood swings, or difficulty sleeping  ROS  [ ] Unable to obtain   REST OF REVIEW Of SYSTEM - [ ] Normal     Height (cm): 152.4 (04-22 @ 15:48)  Weight (kg): 56.7 (04-22 @ 15:48)  BMI (kg/m2): 24.4 (04-22 @ 15:48)  BSA (m2): 1.53 (04-22 @ 15:48)    Vital Signs Last 24 Hrs  T(C): 37.2, Max: 37.2 (04-27 @ 05:01)  T(F): 99, Max: 99 (04-27 @ 05:01)  HR: 73 (65 - 95)  BP: 154/68 (135/71 - 154/71)  BP(mean): --  RR: 20 (20 - 22)  SpO2: 94% (61% - 95%)  [ ] room air   [X] 02: 2L     PHYSICAL EXAM:  GENERAL: NAD, well appearing, laying comfortably in bed.   HEAD:  normal  ENMT: normal  NECK:  normal    NERVOUS SYSTEM:  Alert & Oriented X3, no focal deficits [ ]Confusion  [ ] Encephalopathic [ ] Sedated [ ] Other  CHEST/LUNG: [X] decreased breath sounds bilaterally [ ] wheezing   [ ] rhonchi  [ ] crackles  HEART:  Regular rate and rhythm, No murmurs, rubs, or gallops,    ABDOMEN:  soft, nontender, nondistended, positive bowel sounds    EXTREMITIES: No clubbing, cyanosis or edema  SKIN: No venous stasis skin changes    LABS:                        10.4   15.5  )-----------( 321      ( 27 Apr 2017 06:54 )             30.4     27 Apr 2017 06:34    131    |  94     |  24     ----------------------------<  102    3.9     |  27     |  0.71     Ca    10.0       27 Apr 2017 06:34            CAPILLARY BLOOD GLUCOSE    Cultures  Culture Results:   No growth to date. (04-22 @ 22:12)  Culture Results:   No growth to date. (04-22 @ 22:12)          RADIOLOGY & ADDITIONAL TESTS:      Care Discussed with [X] Consultants  [X] Patient  [ ] Family  [X]   /   [ ] Other; RN  DVT prophylaxis [X] lovenox   [ ] subq heparin  [ ] coumadin  [ ] venodynes [ ] ambulating frequently at how risk for vte and no pharm or  mechanical prophylaxis required    [ ] other   Advanced directive:    [X]pt has hcp: Daughter     [ ] pt declined to assign hcp  Discussed with pt @ bedside

## 2017-04-27 NOTE — PROGRESS NOTE ADULT - SUBJECTIVE AND OBJECTIVE BOX
Date/Time Patient Seen:  		  Referring MD:   Data Reviewed	       Patient is a 89y old  Female who presents with a chief complaint of cough (25 Apr 2017 19:53)  in bed  seen and examined  vs and meds reviewed  ID eval noted, addition of ABX noted      Subjective/HPI       Medication list         MEDICATIONS  (STANDING):  ALBUTerol    0.083% 2.5milliGRAM(s) Nebulizer every 8 hours  guaiFENesin    Syrup 200milliGRAM(s) Oral every 8 hours  enoxaparin Injectable 40milliGRAM(s) SubCutaneous every 24 hours  doxazosin 4milliGRAM(s) Oral at bedtime  famotidine    Tablet 20milliGRAM(s) Oral daily  carvedilol 25milliGRAM(s) Oral every 12 hours  cinacalcet 30milliGRAM(s) Oral at bedtime  hydrALAZINE 10milliGRAM(s) Oral <User Schedule>  hydrALAZINE 20milliGRAM(s) Oral two times a day  aspirin enteric coated 81milliGRAM(s) Oral daily  potassium chloride   Powder 20milliEquivalent(s) Oral daily  lactobacillus acidophilus 1Tablet(s) Oral three times a day with meals  isosorbide   mononitrate ER Tablet (IMDUR) 30milliGRAM(s) Oral daily  losartan 25milliGRAM(s) Oral daily  furosemide    Tablet 40milliGRAM(s) Oral daily  levoFLOXacin  Tablet 750milliGRAM(s) Oral every 48 hours    MEDICATIONS  (PRN):         Vitals log        ICU Vital Signs Last 24 Hrs  T(C): 37.2, Max: 37.2 (04-27 @ 05:01)  T(F): 99, Max: 99 (04-27 @ 05:01)  HR: 73 (65 - 95)  BP: 154/68 (122/52 - 154/71)  BP(mean): --  ABP: --  ABP(mean): --  RR: 20 (20 - 24)  SpO2: 94% (61% - 95%)           Input and Output:  I&O's Detail  I & Os for 24h ending 26 Apr 2017 07:00  =============================================  IN:    Oral Fluid: 120 ml    Total IN: 120 ml  ---------------------------------------------  OUT:    Total OUT: 0 ml  ---------------------------------------------  Total NET: 120 ml    I & Os for current day (as of 27 Apr 2017 06:14)  =============================================  IN:    Oral Fluid: 840 ml    Solution: 255 ml    Total IN: 1095 ml  ---------------------------------------------  OUT:    Total OUT: 0 ml  ---------------------------------------------  Total NET: 1095 ml      Lab Data                        10.9   14.0  )-----------( 276      ( 26 Apr 2017 07:01 )             31.7     04-26    132<L>  |  96  |  24<H>  ----------------------------<  104<H>  4.2   |  26  |  0.61    Ca    9.9      26 Apr 2017 07:01              Review of Systems	  weak and frail      Objective     Physical Examination  heart - s1s2  lungs - dec BS  abd - soft  occ crackles        Pertinent Lab findings & Imaging      Richard:  NO   Adequate UO     I&O's Detail  I & Os for 24h ending 26 Apr 2017 07:00  =============================================  IN:    Oral Fluid: 120 ml    Total IN: 120 ml  ---------------------------------------------  OUT:    Total OUT: 0 ml  ---------------------------------------------  Total NET: 120 ml    I & Os for current day (as of 27 Apr 2017 06:14)  =============================================  IN:    Oral Fluid: 840 ml    Solution: 255 ml    Total IN: 1095 ml  ---------------------------------------------  OUT:    Total OUT: 0 ml  ---------------------------------------------  Total NET: 1095 ml           Discussed with:     Cultures:	        Radiology        ct chest reviewed  multifocal pna, although chf is in the differential as per rads interpretation

## 2017-04-27 NOTE — PROGRESS NOTE ADULT - PROBLEM SELECTOR PLAN 1
with asymetrical exam with ronchi on left and opacifications on CT consistent with PNA. Pt still with rising wbc and confusion but currently only requiring O2 for ambulation.  Would continue levaquin for full 5 days of therapy.  Spoke with daughter at length yesterday and again this morning about her concerns and our current treatment plan.

## 2017-04-27 NOTE — PROGRESS NOTE ADULT - ASSESSMENT
89 F w HFPEF, HTN, edema p/w ADHF; also being treated for pneumonia  - Vol status has overall improved.  -Cont furosemide 40 po daily, which is higher than her prior outpt dose of 20 daily. hopefully will remain euvolemic on this  - No signs of ischemia.   - Cont Abx.   - Continue Coreg and Hydralazine at home doses.  Cont Imdur 30mg Qday for preload reduction   - Monitor and replete electrolytes. Keep K>4.0 and Mg>2.0.   - resume low dose arb, losartan 25 daily  - DVT prophylaxis    dc planning to [facility]

## 2017-04-27 NOTE — PROGRESS NOTE ADULT - PROBLEM SELECTOR PLAN 9
Family wish full resuscitative measures at this point, although would not want artificial feeding, should the situation arise.  hcp in place

## 2017-04-27 NOTE — PROGRESS NOTE ADULT - PROBLEM SELECTOR PLAN 3
2/2 to new onset CHF and pneumonia.   Continue albuterol TID.   O2 supplementation, keep O2 sats above 90 at rest 93 percent on room air.  f/u pulm recs (Dr. Valdovinos).

## 2017-04-27 NOTE — PROGRESS NOTE ADULT - PROBLEM SELECTOR PLAN 5
Continue carvedilol 25mg BID and hydralazine 10mg TID.   Restart losartan 25 QD. HOLD norvasc for now. Continue carvedilol 25mg BID, losartan 25 QD, and hydralazine 10mg TID. HOLD norvasc for now.

## 2017-04-27 NOTE — PROGRESS NOTE ADULT - PROBLEM SELECTOR PLAN 2
New onset. Improving with diuresis.   Carotid dopplers neg for flow limiting stenosis.   ECHO - EF 60-65%, valvular heart disease, pulm hypertension.  Continue PO lasix 40mg.   Continue carvedilol 25mg BID.   Continue Imdur 30mg daily for preload reduction   Monitor I&Os, daily wts.   f/u cardio recs (Dr. Cunningham)

## 2017-04-28 DIAGNOSIS — R54 AGE-RELATED PHYSICAL DEBILITY: ICD-10-CM

## 2017-04-28 LAB
ANION GAP SERPL CALC-SCNC: 9 MMOL/L — SIGNIFICANT CHANGE UP (ref 5–17)
BASOPHILS NFR BLD AUTO: 1 % — SIGNIFICANT CHANGE UP (ref 0–2)
BUN SERPL-MCNC: 28 MG/DL — HIGH (ref 7–23)
CALCIUM SERPL-MCNC: 9.6 MG/DL — SIGNIFICANT CHANGE UP (ref 8.5–10.1)
CHLORIDE SERPL-SCNC: 97 MMOL/L — SIGNIFICANT CHANGE UP (ref 96–108)
CO2 SERPL-SCNC: 26 MMOL/L — SIGNIFICANT CHANGE UP (ref 22–31)
CREAT SERPL-MCNC: 0.88 MG/DL — SIGNIFICANT CHANGE UP (ref 0.5–1.3)
EOSINOPHIL NFR BLD AUTO: 3 % — SIGNIFICANT CHANGE UP (ref 0–6)
GLUCOSE SERPL-MCNC: 101 MG/DL — HIGH (ref 70–99)
HCT VFR BLD CALC: 29.8 % — LOW (ref 34.5–45)
HGB BLD-MCNC: 10.2 G/DL — LOW (ref 11.5–15.5)
LYMPHOCYTES # BLD AUTO: 7 % — LOW (ref 13–44)
MCHC RBC-ENTMCNC: 30.2 PG — SIGNIFICANT CHANGE UP (ref 27–34)
MCHC RBC-ENTMCNC: 34.1 GM/DL — SIGNIFICANT CHANGE UP (ref 32–36)
MCV RBC AUTO: 88.6 FL — SIGNIFICANT CHANGE UP (ref 80–100)
MONOCYTES NFR BLD AUTO: 11 % — HIGH (ref 1–9)
NEUTROPHILS NFR BLD AUTO: 75 % — SIGNIFICANT CHANGE UP (ref 43–77)
PLATELET # BLD AUTO: 335 K/UL — SIGNIFICANT CHANGE UP (ref 150–400)
POTASSIUM SERPL-MCNC: 3.9 MMOL/L — SIGNIFICANT CHANGE UP (ref 3.5–5.3)
POTASSIUM SERPL-SCNC: 3.9 MMOL/L — SIGNIFICANT CHANGE UP (ref 3.5–5.3)
RBC # BLD: 3.37 M/UL — LOW (ref 3.8–5.2)
RBC # FLD: 12.6 % — SIGNIFICANT CHANGE UP (ref 10.3–14.5)
SODIUM SERPL-SCNC: 132 MMOL/L — LOW (ref 135–145)
WBC # BLD: 16.1 K/UL — HIGH (ref 3.8–10.5)
WBC # FLD AUTO: 16.1 K/UL — HIGH (ref 3.8–10.5)

## 2017-04-28 PROCEDURE — 99233 SBSQ HOSP IP/OBS HIGH 50: CPT

## 2017-04-28 RX ORDER — ASPIRIN/CALCIUM CARB/MAGNESIUM 324 MG
1 TABLET ORAL
Qty: 0 | Refills: 0 | COMMUNITY
Start: 2017-04-28

## 2017-04-28 RX ORDER — ISOSORBIDE MONONITRATE 60 MG/1
1 TABLET, EXTENDED RELEASE ORAL
Qty: 0 | Refills: 0 | COMMUNITY
Start: 2017-04-28

## 2017-04-28 RX ADMIN — Medication 20 MILLIGRAM(S): at 17:44

## 2017-04-28 RX ADMIN — CARVEDILOL PHOSPHATE 25 MILLIGRAM(S): 80 CAPSULE, EXTENDED RELEASE ORAL at 05:16

## 2017-04-28 RX ADMIN — Medication 1 TABLET(S): at 17:44

## 2017-04-28 RX ADMIN — Medication 1 TABLET(S): at 08:34

## 2017-04-28 RX ADMIN — Medication 4 MILLIGRAM(S): at 22:21

## 2017-04-28 RX ADMIN — CARVEDILOL PHOSPHATE 25 MILLIGRAM(S): 80 CAPSULE, EXTENDED RELEASE ORAL at 17:44

## 2017-04-28 RX ADMIN — ALBUTEROL 2.5 MILLIGRAM(S): 90 AEROSOL, METERED ORAL at 07:51

## 2017-04-28 RX ADMIN — Medication 20 MILLIEQUIVALENT(S): at 12:53

## 2017-04-28 RX ADMIN — Medication 40 MILLIGRAM(S): at 05:16

## 2017-04-28 RX ADMIN — Medication 200 MILLIGRAM(S): at 22:21

## 2017-04-28 RX ADMIN — ISOSORBIDE MONONITRATE 30 MILLIGRAM(S): 60 TABLET, EXTENDED RELEASE ORAL at 12:53

## 2017-04-28 RX ADMIN — Medication 10 MILLIGRAM(S): at 12:53

## 2017-04-28 RX ADMIN — FAMOTIDINE 20 MILLIGRAM(S): 10 INJECTION INTRAVENOUS at 12:53

## 2017-04-28 RX ADMIN — Medication 1 TABLET(S): at 12:53

## 2017-04-28 RX ADMIN — Medication 200 MILLIGRAM(S): at 05:16

## 2017-04-28 RX ADMIN — Medication 81 MILLIGRAM(S): at 12:53

## 2017-04-28 RX ADMIN — ALBUTEROL 2.5 MILLIGRAM(S): 90 AEROSOL, METERED ORAL at 15:38

## 2017-04-28 RX ADMIN — ENOXAPARIN SODIUM 40 MILLIGRAM(S): 100 INJECTION SUBCUTANEOUS at 22:30

## 2017-04-28 RX ADMIN — Medication 200 MILLIGRAM(S): at 13:13

## 2017-04-28 RX ADMIN — ALBUTEROL 2.5 MILLIGRAM(S): 90 AEROSOL, METERED ORAL at 23:02

## 2017-04-28 RX ADMIN — Medication 20 MILLIGRAM(S): at 05:16

## 2017-04-28 RX ADMIN — CINACALCET 30 MILLIGRAM(S): 30 TABLET, FILM COATED ORAL at 22:21

## 2017-04-28 RX ADMIN — LOSARTAN POTASSIUM 25 MILLIGRAM(S): 100 TABLET, FILM COATED ORAL at 05:16

## 2017-04-28 NOTE — PROGRESS NOTE ADULT - SUBJECTIVE AND OBJECTIVE BOX
[facility] - Christofer Isaacs Grossman, Wachsman, Pannella, Patel    Patient sitting in chair in NAD.  Breathing reportedly improved.  No complaints of chest pain, dyspnea, palpitations, PND, or orthopnea.  Reports difficulty walking.    Telemetry:  Normal sinus rhythm with no events.    MEDICATIONS  (STANDING):  ALBUTerol    0.083% 2.5milliGRAM(s) Nebulizer every 8 hours  guaiFENesin    Syrup 200milliGRAM(s) Oral every 8 hours  enoxaparin Injectable 40milliGRAM(s) SubCutaneous every 24 hours  doxazosin 4milliGRAM(s) Oral at bedtime  famotidine    Tablet 20milliGRAM(s) Oral daily  carvedilol 25milliGRAM(s) Oral every 12 hours  cinacalcet 30milliGRAM(s) Oral at bedtime  hydrALAZINE 10milliGRAM(s) Oral <User Schedule>  hydrALAZINE 20milliGRAM(s) Oral two times a day  aspirin enteric coated 81milliGRAM(s) Oral daily  potassium chloride   Powder 20milliEquivalent(s) Oral daily  lactobacillus acidophilus 1Tablet(s) Oral three times a day with meals  isosorbide   mononitrate ER Tablet (IMDUR) 30milliGRAM(s) Oral daily  losartan 25milliGRAM(s) Oral daily  furosemide    Tablet 40milliGRAM(s) Oral daily  levoFLOXacin  Tablet 750milliGRAM(s) Oral every 48 hours      Allergies    Vasotec (Unknown)          Vital Signs Last 24 Hrs  T(C): 36.6, Max: 36.7 (04-27 @ 23:17)  T(F): 97.9, Max: 98.1 (04-27 @ 23:17)  HR: 68 (67 - 77)  BP: 131/68 (119/60 - 147/73)  BP(mean): --  RR: 16 (16 - 16)  SpO2: 93% (93% - 97%)    I&O's Summary    I & Os for current day (as of 28 Apr 2017 07:58)  =============================================  IN: 240 ml / OUT: 0 ml / NET: 240 ml      ON EXAM:    General: NAD, awake and alert, oriented x 3  HEENT: Mucous membranes are dry, anicteric  Lungs: Non-labored, breath sounds are clear bilaterally, No wheezing, rales or rhonchi.  Decreased breath sounds at the bases  Cardiovascular: Regular, S1 and S2, no rubs, or gallops.  2/6 systolic murmur  Gastrointestinal: Bowel Sounds present, soft, nontender.   Lymph: Minimal bilateral edema  Skin: No rashes or ulcers  Psych:  Mood & affect appropriate    LABS: All Labs Reviewed:                        10.4   15.5  )-----------( 321      ( 27 Apr 2017 06:54 )             30.4                         10.9   14.0  )-----------( 276      ( 26 Apr 2017 07:01 )             31.7     28 Apr 2017 06:56    132    |  97     |  28     ----------------------------<  101    3.9     |  26     |  0.88   27 Apr 2017 06:34    131    |  94     |  24     ----------------------------<  102    3.9     |  27     |  0.71   26 Apr 2017 07:01    132    |  96     |  24     ----------------------------<  104    4.2     |  26     |  0.61     Ca    9.6        28 Apr 2017 06:56  Ca    10.0       27 Apr 2017 06:34  Ca    9.9        26 Apr 2017 07:01        Assessment/Plan:  89 F w HFPEF, HTN, edema p/w ADHF; also being treated for pneumonia:    - Vol status has overall improved.  - Cont furosemide 40 po daily, which is higher than her prior outpt dose of 20 daily. Hopefully will remain euvolemic on this  - No signs of ischemia.   - Continue Coreg and Hydralazine at home doses.  Cont Imdur 30mg Qday for preload reduction   - Monitor and replete electrolytes. Keep K>4.0 and Mg>2.0.   - Continue losartan 25 daily  - Continue aspirin 81 QD  - Supplemental oxygen as needed  - D/C plan to [facility]. Stable for d/c today from a cardiac point of view.

## 2017-04-28 NOTE — PROGRESS NOTE ADULT - PROBLEM SELECTOR PLAN 1
completed Zithromax  at present on Levaquin  WBC cont to rise  consider CLL in this patient  doubt patient needs LEVAQUIN  ct chest most likely a mix of CHF and possible Lower resp tract infection  would caution against over - antibiosing this frail and elderly patient at the risk of C Diff and other complications of prolonged ABX therapy

## 2017-04-28 NOTE — PROGRESS NOTE ADULT - ASSESSMENT
89F PMHx HTN, hyperparathyroidism s/p parathyroidectomy, anemia, LE edema admitted for new onset CHF and PNA with hyponatremia. WBC trending up on abx.

## 2017-04-28 NOTE — SWALLOW BEDSIDE ASSESSMENT ADULT - COMMENTS
Pt alert, cooperative.  Pt with CHF, pna.  Pt reported difficulty swallowing large pills.  Pt presents with oraldysphagia characterized by labored formation of the bolus and prolonged mastication.  She was trialed with hard solids and thin liquids with not overt s/s of aspiration.  RN advised to crush meds when feasible and pt was instructed to drink water with medication.

## 2017-04-28 NOTE — SWALLOW BEDSIDE ASSESSMENT ADULT - ASR SWALLOW ASPIRATION MONITOR
pneumonia/change of breathing pattern/position upright (90Y)/oral hygiene/fever/throat clearing/upper respiratory infection/gurgly voice/cough

## 2017-04-28 NOTE — PROGRESS NOTE ADULT - SUBJECTIVE AND OBJECTIVE BOX
Date/Time Patient Seen:  		  Referring MD:   Data Reviewed	       Patient is a 89y old  Female who presents with a chief complaint of Cough (25 Apr 2017 19:53)  in bed  seen and examined  frail and weak  vs and meds reviewed  on Levaquin      Subjective/HPI       Medication list         MEDICATIONS  (STANDING):  ALBUTerol    0.083% 2.5milliGRAM(s) Nebulizer every 8 hours  guaiFENesin    Syrup 200milliGRAM(s) Oral every 8 hours  enoxaparin Injectable 40milliGRAM(s) SubCutaneous every 24 hours  doxazosin 4milliGRAM(s) Oral at bedtime  famotidine    Tablet 20milliGRAM(s) Oral daily  carvedilol 25milliGRAM(s) Oral every 12 hours  cinacalcet 30milliGRAM(s) Oral at bedtime  hydrALAZINE 10milliGRAM(s) Oral <User Schedule>  hydrALAZINE 20milliGRAM(s) Oral two times a day  aspirin enteric coated 81milliGRAM(s) Oral daily  potassium chloride   Powder 20milliEquivalent(s) Oral daily  lactobacillus acidophilus 1Tablet(s) Oral three times a day with meals  isosorbide   mononitrate ER Tablet (IMDUR) 30milliGRAM(s) Oral daily  losartan 25milliGRAM(s) Oral daily  furosemide    Tablet 40milliGRAM(s) Oral daily  levoFLOXacin  Tablet 750milliGRAM(s) Oral every 48 hours    MEDICATIONS  (PRN):         Vitals log        ICU Vital Signs Last 24 Hrs  T(C): 36.6, Max: 36.7 (04-27 @ 07:55)  T(F): 97.9, Max: 98.1 (04-27 @ 07:55)  HR: 68 (67 - 77)  BP: 131/68 (119/60 - 147/73)  BP(mean): --  ABP: --  ABP(mean): --  RR: 16 (16 - 16)  SpO2: 93% (92% - 97%)           Input and Output:  I&O's Detail  I & Os for 24h ending 27 Apr 2017 07:00  =============================================  IN:    Oral Fluid: 840 ml    Solution: 255 ml    Total IN: 1095 ml  ---------------------------------------------  OUT:    Total OUT: 0 ml  ---------------------------------------------  Total NET: 1095 ml    I & Os for current day (as of 28 Apr 2017 06:12)  =============================================  IN:    Oral Fluid: 240 ml    Total IN: 240 ml  ---------------------------------------------  OUT:    Total OUT: 0 ml  ---------------------------------------------  Total NET: 240 ml      Lab Data                        10.4   15.5  )-----------( 321      ( 27 Apr 2017 06:54 )             30.4     04-27    131<L>  |  94<L>  |  24<H>  ----------------------------<  102<H>  3.9   |  27  |  0.71    Ca    10.0      27 Apr 2017 06:34              Review of Systems	      Objective     Physical Examination    heart - s1s2  lungs - dec BS  abd - soft  extr - chr changes  kyphosis  frail and weak      Pertinent Lab findings & Imaging      Richard:  NO   Adequate UO     I&O's Detail  I & Os for 24h ending 27 Apr 2017 07:00  =============================================  IN:    Oral Fluid: 840 ml    Solution: 255 ml    Total IN: 1095 ml  ---------------------------------------------  OUT:    Total OUT: 0 ml  ---------------------------------------------  Total NET: 1095 ml    I & Os for current day (as of 28 Apr 2017 06:12)  =============================================  IN:    Oral Fluid: 240 ml    Total IN: 240 ml  ---------------------------------------------  OUT:    Total OUT: 0 ml  ---------------------------------------------  Total NET: 240 ml           Discussed with:     Cultures:	        Radiology

## 2017-04-28 NOTE — PROGRESS NOTE ADULT - ATTENDING COMMENTS
pt comfortable has a rising white count and  slight increase in the pleural effusion on repaet cxr today.  discussed with ID. will conmtinue IV abx

## 2017-04-28 NOTE — PROGRESS NOTE ADULT - PROBLEM SELECTOR PLAN 1
with asymetrical exam with ronchi and opacifications on CT consistent with PNA. Pt still with elevated wbc and confusion but currently only requiring O2 for ambulation.  Would continue levaquin for full 5 days of therapy.

## 2017-04-28 NOTE — PROGRESS NOTE ADULT - SUBJECTIVE AND OBJECTIVE BOX
Patient is a 89y old  Female who presents with a chief complaint of Cough (25 Apr 2017 19:53)      INTERVAL HPI/OVERNIGHT EVENTS: Pt feels weak and not ready to go to [facility]. WBC trending up on abx. Cough and breathing have improved. Denies CP and SOB.     MEDICATIONS  (STANDING):  ALBUTerol    0.083% 2.5milliGRAM(s) Nebulizer every 8 hours  guaiFENesin    Syrup 200milliGRAM(s) Oral every 8 hours  enoxaparin Injectable 40milliGRAM(s) SubCutaneous every 24 hours  doxazosin 4milliGRAM(s) Oral at bedtime  famotidine    Tablet 20milliGRAM(s) Oral daily  carvedilol 25milliGRAM(s) Oral every 12 hours  cinacalcet 30milliGRAM(s) Oral at bedtime  hydrALAZINE 10milliGRAM(s) Oral <User Schedule>  hydrALAZINE 20milliGRAM(s) Oral two times a day  aspirin enteric coated 81milliGRAM(s) Oral daily  potassium chloride   Powder 20milliEquivalent(s) Oral daily  lactobacillus acidophilus 1Tablet(s) Oral three times a day with meals  isosorbide   mononitrate ER Tablet (IMDUR) 30milliGRAM(s) Oral daily  losartan 25milliGRAM(s) Oral daily  furosemide    Tablet 40milliGRAM(s) Oral daily  levoFLOXacin  Tablet 750milliGRAM(s) Oral every 48 hours    MEDICATIONS  (PRN):      Allergies    Vasotec (Unknown)    Intolerances        REVIEW OF SYSTEMS:  CONSTITUTIONAL: No fever, No chills. Weak.   EYES: No eye pain, visual disturbances, or discharge  ENMT:  No ear pain, No nose bleed, No vertigo; No sinus or throat pain  NECK: No pain, No stiffness  RESPIRATORY: Cough and congestion. No wheezing, No hemoptysis; No shortness of breath  CARDIOVASCULAR: No chest pain, palpitations, leg swelling  GASTROINTESTINAL: No abdominal or epigastric pain. No nausea, No vomiting; No diarrhea or constipation.  GENITOURINARY: No dysuria, No frequency, No urgency, No hematuria, or incontinence  NEUROLOGICAL: alert and oriented x 3,  No headaches, No dizziness, No numbness,  SKIN:   No itching, burning, rashes, or lesions   MUSCULOSKELETAL: No joint pain or swelling; No muscle pain, No back pain, No extremity pain  PSYCHIATRIC: No depression, anxiety, mood swings, or difficulty sleeping  REST OF REVIEW Of SYSTEM - [ ] Normal     Height (cm): 152.4 (04-22 @ 15:48)  Weight (kg): 56.7 (04-22 @ 15:48)  BMI (kg/m2): 24.4 (04-22 @ 15:48)  BSA (m2): 1.53 (04-22 @ 15:48)  Vital Signs Last 24 Hrs  T(C): 36.5, Max: 36.7 (04-27 @ 23:17)  T(F): 97.7, Max: 98.1 (04-27 @ 23:17)  HR: 67 (67 - 77)  BP: 152/68 (119/60 - 152/68)  BP(mean): --  RR: 18 (16 - 18)  SpO2: 91% (91% - 97%)  [ ] room air   [ ] 02    PHYSICAL EXAM:  GENERAL:  NAD, no respiratory distress, sitting comfortably in bed but weak.   HEAD:  normal  ENMT: normal  NECK:  normal    NERVOUS SYSTEM:  Alert & Oriented X3, no focal deficits  CHEST/LUNG:  Decreased breath sounds at bases. No wheezing or crackles.   HEART:  Regular rate and rhythm, No murmurs, rubs, or gallops  ABDOMEN:  soft, nontender, nondistended, positive bowel sounds  EXTREMITIES: No clubbing, cyanosis or edema  SKIN: No venous stasis skin changes    LABS:                        10.2   16.1  )-----------( 335      ( 28 Apr 2017 06:56 )             29.8     28 Apr 2017 06:56    132    |  97     |  28     ----------------------------<  101    3.9     |  26     |  0.88     Ca    9.6        28 Apr 2017 06:56            CAPILLARY BLOOD GLUCOSE    Cultures  Culture Results:   No growth at 5 days. (04-22 @ 22:12)  Culture Results:   No growth at 5 days. (04-22 @ 22:12)          RADIOLOGY & ADDITIONAL TESTS:      Care Discussed with [X] Consultants  [X] Patient  [ ] Family  [X]   /   [ ] Other; RN  DVT prophylaxis [X] lovenox   [ ] subq heparin  [ ] coumadin  [ ] venodynes [ ] ambulating frequently at how risk for vte and no pharm or  mechanical prophylaxis required    [ ] other   Advanced directive:    [X]pt has hcp     [ ] pt declined to assign hcp  Discussed with pt @ bedside

## 2017-04-28 NOTE — SWALLOW BEDSIDE ASSESSMENT ADULT - SWALLOW EVAL: RECOMMENDED FEEDING/EATING TECHNIQUES
check mouth frequently for oral residue/pocketing/maintain upright posture during/after eating for 30 mins/crush medication (when feasible)/hard swallow w/ each bite or sip

## 2017-04-28 NOTE — PROGRESS NOTE ADULT - PROBLEM SELECTOR PLAN 3
frail and weak  significant kyphosis which probably contributes to PEPE in the form of restrictive lung disease  overall age, functional capacity and comorbidities make prognosis poor  will need [facility]

## 2017-04-28 NOTE — PROGRESS NOTE ADULT - PROBLEM SELECTOR PLAN 1
Unclear whether CAP or HCAP. CT show multifocal pna. WBC trending upward.   Procalcitonin only mildly elevated at admission adm CXR consistent with CHF.    Completed 5 day course of zithromax, now on levaquin 750mg PO q48 for 5 days.   Blood cx - NEG  f/u ID recs (Dr. Whitehead)

## 2017-04-29 DIAGNOSIS — D72.829 ELEVATED WHITE BLOOD CELL COUNT, UNSPECIFIED: ICD-10-CM

## 2017-04-29 LAB
ANION GAP SERPL CALC-SCNC: 8 MMOL/L — SIGNIFICANT CHANGE UP (ref 5–17)
BUN SERPL-MCNC: 29 MG/DL — HIGH (ref 7–23)
CALCIUM SERPL-MCNC: 9.7 MG/DL — SIGNIFICANT CHANGE UP (ref 8.5–10.1)
CHLORIDE SERPL-SCNC: 98 MMOL/L — SIGNIFICANT CHANGE UP (ref 96–108)
CO2 SERPL-SCNC: 27 MMOL/L — SIGNIFICANT CHANGE UP (ref 22–31)
CREAT SERPL-MCNC: 0.81 MG/DL — SIGNIFICANT CHANGE UP (ref 0.5–1.3)
GLUCOSE SERPL-MCNC: 99 MG/DL — SIGNIFICANT CHANGE UP (ref 70–99)
HCT VFR BLD CALC: 28.8 % — LOW (ref 34.5–45)
HGB BLD-MCNC: 9.8 G/DL — LOW (ref 11.5–15.5)
MCHC RBC-ENTMCNC: 30.6 PG — SIGNIFICANT CHANGE UP (ref 27–34)
MCHC RBC-ENTMCNC: 34.2 GM/DL — SIGNIFICANT CHANGE UP (ref 32–36)
MCV RBC AUTO: 89.4 FL — SIGNIFICANT CHANGE UP (ref 80–100)
PLATELET # BLD AUTO: 365 K/UL — SIGNIFICANT CHANGE UP (ref 150–400)
POTASSIUM SERPL-MCNC: 4 MMOL/L — SIGNIFICANT CHANGE UP (ref 3.5–5.3)
POTASSIUM SERPL-SCNC: 4 MMOL/L — SIGNIFICANT CHANGE UP (ref 3.5–5.3)
RBC # BLD: 3.22 M/UL — LOW (ref 3.8–5.2)
RBC # FLD: 12.7 % — SIGNIFICANT CHANGE UP (ref 10.3–14.5)
SODIUM SERPL-SCNC: 133 MMOL/L — LOW (ref 135–145)
WBC # BLD: 17 K/UL — HIGH (ref 3.8–10.5)
WBC # FLD AUTO: 17 K/UL — HIGH (ref 3.8–10.5)

## 2017-04-29 PROCEDURE — 99291 CRITICAL CARE FIRST HOUR: CPT

## 2017-04-29 RX ORDER — PIPERACILLIN AND TAZOBACTAM 4; .5 G/20ML; G/20ML
3.38 INJECTION, POWDER, LYOPHILIZED, FOR SOLUTION INTRAVENOUS EVERY 12 HOURS
Qty: 0 | Refills: 0 | Status: DISCONTINUED | OUTPATIENT
Start: 2017-04-29 | End: 2017-05-01

## 2017-04-29 RX ADMIN — ISOSORBIDE MONONITRATE 30 MILLIGRAM(S): 60 TABLET, EXTENDED RELEASE ORAL at 12:45

## 2017-04-29 RX ADMIN — Medication 40 MILLIGRAM(S): at 06:56

## 2017-04-29 RX ADMIN — ALBUTEROL 2.5 MILLIGRAM(S): 90 AEROSOL, METERED ORAL at 23:01

## 2017-04-29 RX ADMIN — Medication 200 MILLIGRAM(S): at 13:27

## 2017-04-29 RX ADMIN — Medication 1 TABLET(S): at 08:38

## 2017-04-29 RX ADMIN — Medication 10 MILLIGRAM(S): at 12:48

## 2017-04-29 RX ADMIN — ALBUTEROL 2.5 MILLIGRAM(S): 90 AEROSOL, METERED ORAL at 15:08

## 2017-04-29 RX ADMIN — Medication 200 MILLIGRAM(S): at 21:17

## 2017-04-29 RX ADMIN — Medication 1 TABLET(S): at 17:59

## 2017-04-29 RX ADMIN — ENOXAPARIN SODIUM 40 MILLIGRAM(S): 100 INJECTION SUBCUTANEOUS at 21:59

## 2017-04-29 RX ADMIN — FAMOTIDINE 20 MILLIGRAM(S): 10 INJECTION INTRAVENOUS at 12:45

## 2017-04-29 RX ADMIN — Medication 4 MILLIGRAM(S): at 21:17

## 2017-04-29 RX ADMIN — Medication 20 MILLIEQUIVALENT(S): at 12:45

## 2017-04-29 RX ADMIN — Medication 81 MILLIGRAM(S): at 12:45

## 2017-04-29 RX ADMIN — Medication 200 MILLIGRAM(S): at 06:56

## 2017-04-29 RX ADMIN — CARVEDILOL PHOSPHATE 25 MILLIGRAM(S): 80 CAPSULE, EXTENDED RELEASE ORAL at 17:59

## 2017-04-29 RX ADMIN — PIPERACILLIN AND TAZOBACTAM 25 GRAM(S): 4; .5 INJECTION, POWDER, LYOPHILIZED, FOR SOLUTION INTRAVENOUS at 18:59

## 2017-04-29 RX ADMIN — Medication 1 TABLET(S): at 12:45

## 2017-04-29 RX ADMIN — CINACALCET 30 MILLIGRAM(S): 30 TABLET, FILM COATED ORAL at 21:17

## 2017-04-29 RX ADMIN — CARVEDILOL PHOSPHATE 25 MILLIGRAM(S): 80 CAPSULE, EXTENDED RELEASE ORAL at 06:56

## 2017-04-29 RX ADMIN — LOSARTAN POTASSIUM 25 MILLIGRAM(S): 100 TABLET, FILM COATED ORAL at 06:56

## 2017-04-29 RX ADMIN — Medication 20 MILLIGRAM(S): at 06:56

## 2017-04-29 RX ADMIN — Medication 20 MILLIGRAM(S): at 17:59

## 2017-04-29 RX ADMIN — ALBUTEROL 2.5 MILLIGRAM(S): 90 AEROSOL, METERED ORAL at 08:06

## 2017-04-29 NOTE — PROGRESS NOTE ADULT - SUBJECTIVE AND OBJECTIVE BOX
[facility] - Christofer Isaacs, Marisa Rowell, Hieu Norris    Patient resting comfortably in bed in NAD.  No complaints of chest pain, increased dyspnea, palpitations.        MEDICATIONS  (STANDING):  ALBUTerol    0.083% 2.5milliGRAM(s) Nebulizer every 8 hours  guaiFENesin    Syrup 200milliGRAM(s) Oral every 8 hours  enoxaparin Injectable 40milliGRAM(s) SubCutaneous every 24 hours  doxazosin 4milliGRAM(s) Oral at bedtime  famotidine    Tablet 20milliGRAM(s) Oral daily  carvedilol 25milliGRAM(s) Oral every 12 hours  cinacalcet 30milliGRAM(s) Oral at bedtime  hydrALAZINE 10milliGRAM(s) Oral <User Schedule>  hydrALAZINE 20milliGRAM(s) Oral two times a day  aspirin enteric coated 81milliGRAM(s) Oral daily  potassium chloride   Powder 20milliEquivalent(s) Oral daily  lactobacillus acidophilus 1Tablet(s) Oral three times a day with meals  isosorbide   mononitrate ER Tablet (IMDUR) 30milliGRAM(s) Oral daily  losartan 25milliGRAM(s) Oral daily  furosemide    Tablet 40milliGRAM(s) Oral daily  levoFLOXacin  Tablet 750milliGRAM(s) Oral every 48 hours      Allergies    Vasotec (Unknown)        Vital Signs Last 24 Hrs  T(C): 36.7, Max: 36.7 (04-28 @ 08:28)  T(F): 98.1, Max: 98.1 (04-29 @ 05:23)  HR: 72 (67 - 88)  BP: 128/58 (111/54 - 152/68)  BP(mean): --  RR: 18 (16 - 18)  SpO2: 95% (91% - 97%)    I&O's Summary    I & Os for current day (as of 29 Apr 2017 06:39)  =============================================  IN: 240 ml / OUT: 0 ml / NET: 240 ml      ON EXAM:    General: NAD, awake and alert, oriented x 3  HEENT: Mucous membranes are dry, anicteric  Lungs: Non-labored, breath sounds are clear bilaterally, No wheezing, rales or rhonchi.  Decreased breath sounds at the bases  Cardiovascular: Regular, S1 and S2, no rubs, or gallops.  2/6 systolic murmur  Gastrointestinal: Bowel Sounds present, soft, nontender.   Lymph: Minimal bilateral edema  Skin: No rashes or ulcers  Psych:  Mood & affect appropriate    LABS: All Labs Reviewed:                        10.2   16.1  )-----------( 335      ( 28 Apr 2017 06:56 )             29.8                         10.4   15.5  )-----------( 321      ( 27 Apr 2017 06:54 )             30.4                         10.9   14.0  )-----------( 276      ( 26 Apr 2017 07:01 )             31.7     28 Apr 2017 06:56    132    |  97     |  28     ----------------------------<  101    3.9     |  26     |  0.88   27 Apr 2017 06:34    131    |  94     |  24     ----------------------------<  102    3.9     |  27     |  0.71   26 Apr 2017 07:01    132    |  96     |  24     ----------------------------<  104    4.2     |  26     |  0.61     Ca    9.6        28 Apr 2017 06:56  Ca    10.0       27 Apr 2017 06:34  Ca    9.9        26 Apr 2017 07:01      Assessment/Plan:  89 F w HFPEF, HTN, edema p/w ADHF; also being treated for pneumonia, clinically improved:    - Vol status has overall improved.  - Cont furosemide 40 po daily, which is higher than her prior outpatient dose of 20 daily. Hopefully will remain euvolemic on this  - No signs of ischemia.   - Continue Coreg and Hydralazine at home doses.  Cont Imdur 30mg Qday for preload reduction   - Monitor and replete electrolytes. Keep K>4.0 and Mg>2.0.   - Continue losartan 25 daily  - Continue aspirin 81 QD  - Supplemental oxygen as needed  - D/C plan to [facility]. Stable for d/c today from a cardiac point of view.

## 2017-04-29 NOTE — PROGRESS NOTE ADULT - PROBLEM SELECTOR PLAN 1
completed Zithromax  on Levaquin  WBC cont to be increased  consider causes other than infection that cause increased WBC

## 2017-04-29 NOTE — PROGRESS NOTE ADULT - SUBJECTIVE AND OBJECTIVE BOX
Patient is a 89y old  Female who presents with a chief complaint of Cough (25 Apr 2017 19:53) adm with dch pneumonia  no cough sob feeling better       INTERVAL HPI/OVERNIGHT EVENTS:    MEDICATIONS  (STANDING):  ALBUTerol    0.083% 2.5milliGRAM(s) Nebulizer every 8 hours  guaiFENesin    Syrup 200milliGRAM(s) Oral every 8 hours  enoxaparin Injectable 40milliGRAM(s) SubCutaneous every 24 hours  doxazosin 4milliGRAM(s) Oral at bedtime  famotidine    Tablet 20milliGRAM(s) Oral daily  carvedilol 25milliGRAM(s) Oral every 12 hours  cinacalcet 30milliGRAM(s) Oral at bedtime  hydrALAZINE 10milliGRAM(s) Oral <User Schedule>  hydrALAZINE 20milliGRAM(s) Oral two times a day  aspirin enteric coated 81milliGRAM(s) Oral daily  potassium chloride   Powder 20milliEquivalent(s) Oral daily  lactobacillus acidophilus 1Tablet(s) Oral three times a day with meals  isosorbide   mononitrate ER Tablet (IMDUR) 30milliGRAM(s) Oral daily  losartan 25milliGRAM(s) Oral daily  furosemide    Tablet 40milliGRAM(s) Oral daily  levoFLOXacin  Tablet 750milliGRAM(s) Oral every 48 hours    MEDICATIONS  (PRN):      Allergies    Vasotec (Unknown)    Intolerances        REVIEW OF SYSTEMS:  CONSTITUTIONAL: No fever, No chills,No fatigue,No myalgia,No Body ache  EYES: No eye pain, visual disturbances, or discharge  ENMT:  No ear pain, No nose bleed, No vertigo; No sinus or throat pain  NECK: No pain, No stiffness  RESPIRATORY: No cough, wheezing, No  hemoptysis; No shortness of breath  CARDIOVASCULAR: No chest pain, palpitations, edema present improved  GASTROINTESTINAL: No abdominal or epigastric pain. No nausea, No vomiting; No diarrhea or constipation. [ ] BM  GENITOURINARY: No dysuria, No frequency, No urgency, No hematuria, or incontinence  NEUROLOGICAL: alert and oriented x 3,  No headaches, No dizziness, No numbness,  SKIN:   No itching, burning, rashes, or lesions   MUSCULOSKELETAL: No joint pain or swelling; No muscle pain, No back pain, No extremity pain  PSYCHIATRIC: No depression, anxiety, mood swings, or difficulty sleeping  ROS  [ ] Unable to obtain   REST OF REVIEW Of SYSTEM - [ ] Normal     Height (cm): 152.4 (04-22 @ 15:48)  Weight (kg): 56.7 (04-22 @ 15:48)  BMI (kg/m2): 24.4 (04-22 @ 15:48)  BSA (m2): 1.53 (04-22 @ 15:48)  Vital Signs Last 24 Hrs  T(C): 36.6, Max: 36.7 (04-29 @ 05:23)  T(F): 97.9, Max: 98.1 (04-29 @ 05:23)  HR: 64 (63 - 93)  BP: 140/68 (111/54 - 146/63)  BP(mean): --  RR: 19 (17 - 20)  SpO2: 98% (87% - 98%)  [ ] room air   [x ] 02    PHYSICAL EXAM:  GENERAL:  No acute distresss, well appearing, [ ] Agitated, [ ] Lethargy, [ ] confused   HEAD:  normal  ENMT: normal  NECK:  normal    NERVOUS SYSTEM:  Alert & Oriented X3, no focal deficits [ ]Confusion  [ ] Encephalopathic [ ] Sedated [ ] Other  CHEST/LUNG: no rhonchi crackles decreased bs bases  HEART:  Regular rate and rhythm, No murmurs, rubs, or gallops,  [ ] irregular   ABDOMEN:  soft, nontender, nondistended, positive bowel sounds   [ ] obese  EXTREMITIES: No clubbing, cyanosis or edema  SKIN: [x ] venous stasis skin changes edema improved    LABS:                        9.8    17.0  )-----------( 365      ( 29 Apr 2017 06:36 )             28.8     29 Apr 2017 06:36    133    |  98     |  29     ----------------------------<  99     4.0     |  27     |  0.81     Ca    9.7        29 Apr 2017 06:36            CAPILLARY BLOOD GLUCOSE    Cultures  Culture Results:   No growth at 5 days. (04-22 @ 22:12)  Culture Results:   No growth at 5 days. (04-22 @ 22:12)          RADIOLOGY & ADDITIONAL TESTS:      Care Discussed with [X] Consultants  [ ] Patient  [ ] Family  []   /   [ ] Other; RN  DVT prophylaxis [ x] lovenox   [ ] subq heparin  [ ] coumadin  [ ] venodynes [ ] ambulating frequently at how risk for vte and no pharm         or  mechanical prophylaxis required    [ ] other   Advanced directive:    [ x]pt has hcp     [ ] pt declined to assign hcp  Discussed with pt @ bedside

## 2017-04-29 NOTE — PROGRESS NOTE ADULT - PROBLEM SELECTOR PLAN 4
consider other causes  doubt pt has residual PNA  cxr better  pt better  overall very frail and weak  supportive care

## 2017-04-29 NOTE — PROGRESS NOTE ADULT - PROBLEM SELECTOR PLAN 1
Unclear whether CAP or HCAP. CT show multifocal pna. WBC trending upward. 17 today  Completed 5 day course of zithromax, now on levaquin 750mg PO q48 for 5 days.   Blood cx - NEG  over all clinically improved will discuss with dr priscilla rodriguez after he evaluates if no new recommendations made will ask heme to eval for persistent leukocytosis

## 2017-04-29 NOTE — PROGRESS NOTE ADULT - SUBJECTIVE AND OBJECTIVE BOX
ID progress note covering [facility]    Name: SAIRA SANTANA  Age: 89y  Gender: Female  MRN: [id number]    Interval History-- Events noted, chart reviewed , and discussed with daughter who is very concerned about  progressive leukocytosis. Patient feels well, not using any oxygen. has minimal cough . Daughter feels she is more confused . denies any diarrhea   Notes reviewed    Past Medical History--  Anemia, unspecified type  Edema of lower extremity  Heart murmur  Hypertension  Hypercalcemia  History of appendectomy  S/P tonsillectomy  H/O parathyroidectomy      For details regarding the patient's social history, family history, and other miscellaneous elements, please refer the initial infectious diseases consultation and/or the admitting history and physical examination for this admission.    Allergies--  Allergies    Vasotec (Unknown)    Intolerances  Medications--  Antibiotics:  levoFLOXacin  Tablet 750milliGRAM(s) Oral every 48 hours    Immunologic:    Other:  ALBUTerol    0.083%  guaiFENesin    Syrup  enoxaparin Injectable  doxazosin  famotidine    Tablet  carvedilol  cinacalcet  hydrALAZINE  hydrALAZINE  aspirin enteric coated  potassium chloride   Powder  lactobacillus acidophilus  isosorbide   mononitrate ER Tablet (IMDUR)  losartan  furosemide    Tablet      Review of Systems--  A 10-point review of systems was obtained.     Pertinent positives and negatives--  Constitutional: No fevers. No Chills. No Rigors.   Cardiovascular: No chest pain. No palpitations.  Respiratory: Pos for shortness of breath and  cough.  Gastrointestinal: No nausea or vomiting. No diarrhea or constipation.   Psychiatric: Pleasant. Appropriate affect.    Review of systems otherwise negative except as previously noted.    Physical Examination--  PHYSICAL EXAM:  General: Nontoxic-appearing Female in no acute distress.  Vital Signs: T(F): 97.6, Max: 98.1 (04-29 @ 05:23)  HR: 74  BP: 113/59  RR: 18  SpO2: 94%  Wt(kg): --    HEENT: AT/NC. PERRL. EOMI. Oropharynx clear. Dentition fair.  Neck: No rigid. No sense of mass.  Nodes: None palpable.  Lungs: Coarse bilateral  rales, No wheezing or rhonchi  Heart: Regular rate and rhythm. No Murmur. No rub. No gallop. No palpable thrill.  Abdomen: Bowel sounds present and normoactive. Soft. Nondistended. Nontender. No sense of mass. No organomegaly.  Back: No spinal tenderness. No costovertebral angle tenderness.   Extremities: No cyanosis or clubbing. No edema.   Skin: Warm. Dry. Good turgor. No rash. No vasculitic stigmata.  Psychiatric: Appropriate affect and mood for situation.         Laboratory Studies--  CBC                        9.8    17.0  )-----------( 365      ( 29 Apr 2017 06:36 )             28.8       Chemistries  04-29    133<L>  |  98  |  29<H>  ----------------------------<  99  4.0   |  27  |  0.81    Ca    9.7      29 Apr 2017 06:36        Assessment--    89F PMHx HTN, hyperparathyroidism s/p parathyroidectomy, anemia, LE edema, HFPEF sent in from urgent care c/o cough, sore throat and weakness admitted with acute hypoxic respiratory failure secondary to acute diastolic heart failure . She had leukocytosis and CXR and CT chest consistent with bilateral multifocal pneumonia . She was initially treated with Zithromax then changed to Levaquin due to ct chest findings and  leukocytosis .    Patient is non toxic and looking good. Leukocytosis likely reactive . Confusion could be related to Levaquin as she is getting a very high dose. She has seen hematology Dr. Temple for anemia .    Suggestions--  - will change to IV Zosyn 3.375 gm q 12 hours , needs 3 more days of antibiotics .  - repeat procalcitonin , her initial level was 0.07 .  - trend CBC   - consider hematology evaluation .    Discussed with Dr. Issa.    I have discussed the above plan of care with patient and her daughter in detail. They expressed understanding of the treatment plan . Risks, benefits and alternatives discussed in detail. I have asked if they have any questions or concerns and appropriately addressed them to the best of my ability .      > 35 minutes spent in direct patient care reviewing  the notes, lab data/ imaging , discussion with multidisciplinary team. All questions were addressed and answered to the best of my capacity .    Thank you for allowing me to participate in the care of your patient .      Cassandra Galvan MD  [phone]

## 2017-04-29 NOTE — PROGRESS NOTE ADULT - SUBJECTIVE AND OBJECTIVE BOX
Date/Time Patient Seen:  		  Referring MD:   Data Reviewed	       Patient is a 89y old  Female who presents with a chief complaint of Cough (25 Apr 2017 19:53)  in bed  seen and examined  on oxygen  vs and meds reviewed  weak and frail      Subjective/HPI       Medication list         MEDICATIONS  (STANDING):  ALBUTerol    0.083% 2.5milliGRAM(s) Nebulizer every 8 hours  guaiFENesin    Syrup 200milliGRAM(s) Oral every 8 hours  enoxaparin Injectable 40milliGRAM(s) SubCutaneous every 24 hours  doxazosin 4milliGRAM(s) Oral at bedtime  famotidine    Tablet 20milliGRAM(s) Oral daily  carvedilol 25milliGRAM(s) Oral every 12 hours  cinacalcet 30milliGRAM(s) Oral at bedtime  hydrALAZINE 10milliGRAM(s) Oral <User Schedule>  hydrALAZINE 20milliGRAM(s) Oral two times a day  aspirin enteric coated 81milliGRAM(s) Oral daily  potassium chloride   Powder 20milliEquivalent(s) Oral daily  lactobacillus acidophilus 1Tablet(s) Oral three times a day with meals  isosorbide   mononitrate ER Tablet (IMDUR) 30milliGRAM(s) Oral daily  losartan 25milliGRAM(s) Oral daily  furosemide    Tablet 40milliGRAM(s) Oral daily  levoFLOXacin  Tablet 750milliGRAM(s) Oral every 48 hours    MEDICATIONS  (PRN):         Vitals log        ICU Vital Signs Last 24 Hrs  T(C): 36.7, Max: 36.7 (04-28 @ 08:28)  T(F): 98.1, Max: 98.1 (04-29 @ 05:23)  HR: 72 (67 - 88)  BP: 128/58 (111/54 - 152/68)  BP(mean): --  ABP: --  ABP(mean): --  RR: 18 (16 - 18)  SpO2: 95% (91% - 97%)           Input and Output:  I&O's Detail    I & Os for current day (as of 29 Apr 2017 06:33)  =============================================  IN:    Oral Fluid: 240 ml    Total IN: 240 ml  ---------------------------------------------  OUT:    Total OUT: 0 ml  ---------------------------------------------  Total NET: 240 ml      Lab Data                        10.2   16.1  )-----------( 335      ( 28 Apr 2017 06:56 )             29.8     04-28    132<L>  |  97  |  28<H>  ----------------------------<  101<H>  3.9   |  26  |  0.88    Ca    9.6      28 Apr 2017 06:56              Review of Systems	      Objective     Physical Examination    heart - s1s2, lungs - dec BS, abd - soft, extr - chr changes, frail and weak      Pertinent Lab findings & Imaging      Richard:  NO   Adequate UO     I&O's Detail    I & Os for current day (as of 29 Apr 2017 06:33)  =============================================  IN:    Oral Fluid: 240 ml    Total IN: 240 ml  ---------------------------------------------  OUT:    Total OUT: 0 ml  ---------------------------------------------  Total NET: 240 ml           Discussed with:     Cultures:	        Radiology

## 2017-04-29 NOTE — PROGRESS NOTE ADULT - PROBLEM SELECTOR PLAN 2
New onset. Improving with diuresis.  now euvolemic  ECHO - EF 60-65%, valvular heart disease, pulm hypertension.  Continue PO lasix 40mg.   Continue carvedilol 25mg BID.   Continue Imdur 30mg daily for preload reduction   Monitor I&Os, daily wts.   cardio follow up

## 2017-04-30 DIAGNOSIS — D72.828 OTHER ELEVATED WHITE BLOOD CELL COUNT: ICD-10-CM

## 2017-04-30 LAB
ANION GAP SERPL CALC-SCNC: 9 MMOL/L — SIGNIFICANT CHANGE UP (ref 5–17)
BASOPHILS # BLD AUTO: 0.1 K/UL — SIGNIFICANT CHANGE UP (ref 0–0.2)
BASOPHILS NFR BLD AUTO: 0.7 % — SIGNIFICANT CHANGE UP (ref 0–2)
BUN SERPL-MCNC: 37 MG/DL — HIGH (ref 7–23)
CALCIUM SERPL-MCNC: 10.1 MG/DL — SIGNIFICANT CHANGE UP (ref 8.5–10.1)
CHLORIDE SERPL-SCNC: 100 MMOL/L — SIGNIFICANT CHANGE UP (ref 96–108)
CO2 SERPL-SCNC: 25 MMOL/L — SIGNIFICANT CHANGE UP (ref 22–31)
CREAT SERPL-MCNC: 0.84 MG/DL — SIGNIFICANT CHANGE UP (ref 0.5–1.3)
EOSINOPHIL # BLD AUTO: 0.6 K/UL — HIGH (ref 0–0.5)
EOSINOPHIL NFR BLD AUTO: 3.3 % — SIGNIFICANT CHANGE UP (ref 0–6)
GLUCOSE SERPL-MCNC: 116 MG/DL — HIGH (ref 70–99)
HCT VFR BLD CALC: 30.8 % — LOW (ref 34.5–45)
HGB BLD-MCNC: 10.5 G/DL — LOW (ref 11.5–15.5)
LYMPHOCYTES # BLD AUTO: 1.8 K/UL — SIGNIFICANT CHANGE UP (ref 1–3.3)
LYMPHOCYTES # BLD AUTO: 10.2 % — LOW (ref 13–44)
MCHC RBC-ENTMCNC: 30.5 PG — SIGNIFICANT CHANGE UP (ref 27–34)
MCHC RBC-ENTMCNC: 34.1 GM/DL — SIGNIFICANT CHANGE UP (ref 32–36)
MCV RBC AUTO: 89.5 FL — SIGNIFICANT CHANGE UP (ref 80–100)
MONOCYTES # BLD AUTO: 1.2 K/UL — HIGH (ref 0–0.9)
MONOCYTES NFR BLD AUTO: 7 % — SIGNIFICANT CHANGE UP (ref 1–9)
NEUTROPHILS # BLD AUTO: 14 K/UL — HIGH (ref 1.8–7.4)
NEUTROPHILS NFR BLD AUTO: 78.7 % — HIGH (ref 43–77)
PLATELET # BLD AUTO: 375 K/UL — SIGNIFICANT CHANGE UP (ref 150–400)
POTASSIUM SERPL-MCNC: 4 MMOL/L — SIGNIFICANT CHANGE UP (ref 3.5–5.3)
POTASSIUM SERPL-SCNC: 4 MMOL/L — SIGNIFICANT CHANGE UP (ref 3.5–5.3)
PROCALCITONIN SERPL-MCNC: <0.05 NG/ML — SIGNIFICANT CHANGE UP (ref 0–0.05)
RBC # BLD: 3.43 M/UL — LOW (ref 3.8–5.2)
RBC # FLD: 12.5 % — SIGNIFICANT CHANGE UP (ref 10.3–14.5)
SODIUM SERPL-SCNC: 134 MMOL/L — LOW (ref 135–145)
WBC # BLD: 17.8 K/UL — HIGH (ref 3.8–10.5)
WBC # FLD AUTO: 17.8 K/UL — HIGH (ref 3.8–10.5)

## 2017-04-30 PROCEDURE — 99232 SBSQ HOSP IP/OBS MODERATE 35: CPT

## 2017-04-30 RX ADMIN — ENOXAPARIN SODIUM 40 MILLIGRAM(S): 100 INJECTION SUBCUTANEOUS at 21:17

## 2017-04-30 RX ADMIN — PIPERACILLIN AND TAZOBACTAM 25 GRAM(S): 4; .5 INJECTION, POWDER, LYOPHILIZED, FOR SOLUTION INTRAVENOUS at 17:44

## 2017-04-30 RX ADMIN — Medication 20 MILLIGRAM(S): at 17:44

## 2017-04-30 RX ADMIN — Medication 4 MILLIGRAM(S): at 21:16

## 2017-04-30 RX ADMIN — LOSARTAN POTASSIUM 25 MILLIGRAM(S): 100 TABLET, FILM COATED ORAL at 05:37

## 2017-04-30 RX ADMIN — ALBUTEROL 2.5 MILLIGRAM(S): 90 AEROSOL, METERED ORAL at 23:12

## 2017-04-30 RX ADMIN — Medication 20 MILLIGRAM(S): at 05:37

## 2017-04-30 RX ADMIN — Medication 200 MILLIGRAM(S): at 13:22

## 2017-04-30 RX ADMIN — ALBUTEROL 2.5 MILLIGRAM(S): 90 AEROSOL, METERED ORAL at 07:30

## 2017-04-30 RX ADMIN — FAMOTIDINE 20 MILLIGRAM(S): 10 INJECTION INTRAVENOUS at 12:29

## 2017-04-30 RX ADMIN — Medication 1 TABLET(S): at 12:29

## 2017-04-30 RX ADMIN — Medication 40 MILLIGRAM(S): at 05:37

## 2017-04-30 RX ADMIN — Medication 20 MILLIEQUIVALENT(S): at 12:29

## 2017-04-30 RX ADMIN — CARVEDILOL PHOSPHATE 25 MILLIGRAM(S): 80 CAPSULE, EXTENDED RELEASE ORAL at 05:37

## 2017-04-30 RX ADMIN — Medication 200 MILLIGRAM(S): at 05:37

## 2017-04-30 RX ADMIN — ISOSORBIDE MONONITRATE 30 MILLIGRAM(S): 60 TABLET, EXTENDED RELEASE ORAL at 12:30

## 2017-04-30 RX ADMIN — CINACALCET 30 MILLIGRAM(S): 30 TABLET, FILM COATED ORAL at 21:14

## 2017-04-30 RX ADMIN — PIPERACILLIN AND TAZOBACTAM 25 GRAM(S): 4; .5 INJECTION, POWDER, LYOPHILIZED, FOR SOLUTION INTRAVENOUS at 05:37

## 2017-04-30 RX ADMIN — Medication 10 MILLIGRAM(S): at 12:29

## 2017-04-30 RX ADMIN — Medication 1 TABLET(S): at 17:44

## 2017-04-30 RX ADMIN — Medication 81 MILLIGRAM(S): at 12:30

## 2017-04-30 RX ADMIN — Medication 200 MILLIGRAM(S): at 21:14

## 2017-04-30 RX ADMIN — CARVEDILOL PHOSPHATE 25 MILLIGRAM(S): 80 CAPSULE, EXTENDED RELEASE ORAL at 17:44

## 2017-04-30 RX ADMIN — Medication 1 TABLET(S): at 08:28

## 2017-04-30 RX ADMIN — ALBUTEROL 2.5 MILLIGRAM(S): 90 AEROSOL, METERED ORAL at 14:33

## 2017-04-30 NOTE — CONSULT NOTE ADULT - PROBLEM SELECTOR RECOMMENDATION 9
most likely reactive to underlying process. Review of PBS without obvious evidence of a primary bone marrow process

## 2017-04-30 NOTE — PROGRESS NOTE ADULT - PROBLEM SELECTOR PLAN 3
weak  frail  unable to clear secretions  cont albuterol  assist with adl  supportive care  elderly female in frail state  prognosis guarded to poor

## 2017-04-30 NOTE — PROGRESS NOTE ADULT - SUBJECTIVE AND OBJECTIVE BOX
Patient is a 89y old  Female who presents with a chief complaint of Cough (25 Apr 2017 19:53)      INTERVAL HPI/OVERNIGHT EVENTS: lookd better, no cough  T(C): 36.4, Max: 36.8 (04-29 @ 20:12)  HR: 65 (59 - 94)  BP: 125/66 (125/66 - 145/70)  RR: 19 (16 - 20)  SpO2: 95% (91% - 98%)  Wt(kg): --  I&O's Summary    I & Os for current day (as of 30 Apr 2017 16:11)  =============================================  IN: 690 ml / OUT: 525 ml / NET: 165 ml      LABS:                        10.5   17.8  )-----------( 375      ( 30 Apr 2017 06:55 )             30.8     04-30    134<L>  |  100  |  37<H>  ----------------------------<  116<H>  4.0   |  25  |  0.84    Ca    10.1      30 Apr 2017 06:55          CAPILLARY BLOOD GLUCOSE            MEDICATIONS  (STANDING):  ALBUTerol    0.083% 2.5milliGRAM(s) Nebulizer every 8 hours  guaiFENesin    Syrup 200milliGRAM(s) Oral every 8 hours  enoxaparin Injectable 40milliGRAM(s) SubCutaneous every 24 hours  doxazosin 4milliGRAM(s) Oral at bedtime  famotidine    Tablet 20milliGRAM(s) Oral daily  carvedilol 25milliGRAM(s) Oral every 12 hours  cinacalcet 30milliGRAM(s) Oral at bedtime  hydrALAZINE 10milliGRAM(s) Oral <User Schedule>  hydrALAZINE 20milliGRAM(s) Oral two times a day  aspirin enteric coated 81milliGRAM(s) Oral daily  potassium chloride   Powder 20milliEquivalent(s) Oral daily  lactobacillus acidophilus 1Tablet(s) Oral three times a day with meals  isosorbide   mononitrate ER Tablet (IMDUR) 30milliGRAM(s) Oral daily  losartan 25milliGRAM(s) Oral daily  furosemide    Tablet 40milliGRAM(s) Oral daily  piperacillin/tazobactam IVPB. 3.375Gram(s) IV Intermittent every 12 hours    MEDICATIONS  (PRN):      REVIEW OF SYSTEMS:  CONSTITUTIONAL: No fever, weight loss, or fatigue  EYES: No eye pain, visual disturbances, or discharge  ENMT:  No difficulty hearing, tinnitus, vertigo; No sinus or throat pain  NECK: No pain or stiffness  RESPIRATORY: No cough, wheezing, chills or hemoptysis; No shortness of breath  CARDIOVASCULAR: No chest pain, palpitations, dizziness, or leg swelling  GASTROINTESTINAL: No abdominal or epigastric pain. No nausea, vomiting, or hematemesis; No diarrhea or constipation. No melena or hematochezia.  GENITOURINARY: No dysuria, frequency, hematuria, or incontinence  NEUROLOGICAL: No headaches, memory loss, loss of strength, numbness, or tremors  SKIN: No itching, burning, rashes, or lesions   LYMPH NODES: No enlarged glands  ENDOCRINE: No heat or cold intolerance; No hair loss  MUSCULOSKELETAL: No joint pain or swelling; No muscle, back, or extremity pain  PSYCHIATRIC: No depression, anxiety, mood swings, or difficulty sleeping  HEME/LYMPH: No easy bruising, or bleeding gums  ALLERY AND IMMUNOLOGIC: No hives or eczema    RADIOLOGY & ADDITIONAL TESTS:    Imaging Personally Reviewed:  [ ] YES  [ ] NO    Consultant(s) Notes Reviewed:  [ ] YES  [ ] NO    PHYSICAL EXAM:  GENERAL: NAD, well-groomed, well-developed  HEAD:  Atraumatic, Normocephalic  EYES: EOMI, PERRLA, conjunctiva and sclera clear  ENMT: No tonsillar erythema, exudates, or enlargement; Moist mucous membranes, Good dentition, No lesions  NECK: Supple, No JVD, Normal thyroid  NERVOUS SYSTEM:  Alert & Oriented X3, Good concentration; Motor Strength 5/5 B/L upper and lower extremities; DTRs 2+ intact and symmetric  CHEST/LUNG: Clear to percussion bilaterally; No rales, rhonchi, wheezing, or rubs  HEART: Regular rate and rhythm; No murmurs, rubs, or gallops  ABDOMEN: Soft, Nontender, Nondistended; Bowel sounds present  EXTREMITIES:  2+ Peripheral Pulses, No clubbing, cyanosis, or edema  LYMPH: No lymphadenopathy noted  SKIN: No rashes or lesions    Care Discussed with Consultants/Other Providers [ ] YES  [ ] NO

## 2017-04-30 NOTE — CONSULT NOTE ADULT - ASSESSMENT
leukocytosis secondary to persistent pulmonary process. is clinically improved but still has changes on CT and CXR which may take time to improve. review of PBS without obvious evidence of a primary bone marrow disorder  Is now on zosyn and will evaluate for improvement

## 2017-04-30 NOTE — CONSULT NOTE ADULT - PROBLEM SELECTOR RECOMMENDATION 2
lasix  monitor cr and lytes  Na rpt  I and O  tele admit  echo   ProBNP  cardio following  dietary discretion
Patient currently is well managed with no orthopnea ans with current weight matching intake weight this may be due in part to improvement of PNA.    Thank you for consulting us and involving us in the management of this most interesting and challenging case.     We will follow along in the care of this patient.
is clinically improved, but still has elevated WBC, abnormal CT scan. has had a change in abx.

## 2017-04-30 NOTE — PROGRESS NOTE ADULT - SUBJECTIVE AND OBJECTIVE BOX
Date/Time Patient Seen:  		  Referring MD:   Data Reviewed	       Patient is a 89y old  Female who presents with a chief complaint of Cough (25 Apr 2017 19:53)  in bed  seen and examined  on oxygen  vs and meds reviewed      Subjective/HPI       Medication list         MEDICATIONS  (STANDING):  ALBUTerol    0.083% 2.5milliGRAM(s) Nebulizer every 8 hours  guaiFENesin    Syrup 200milliGRAM(s) Oral every 8 hours  enoxaparin Injectable 40milliGRAM(s) SubCutaneous every 24 hours  doxazosin 4milliGRAM(s) Oral at bedtime  famotidine    Tablet 20milliGRAM(s) Oral daily  carvedilol 25milliGRAM(s) Oral every 12 hours  cinacalcet 30milliGRAM(s) Oral at bedtime  hydrALAZINE 10milliGRAM(s) Oral <User Schedule>  hydrALAZINE 20milliGRAM(s) Oral two times a day  aspirin enteric coated 81milliGRAM(s) Oral daily  potassium chloride   Powder 20milliEquivalent(s) Oral daily  lactobacillus acidophilus 1Tablet(s) Oral three times a day with meals  isosorbide   mononitrate ER Tablet (IMDUR) 30milliGRAM(s) Oral daily  losartan 25milliGRAM(s) Oral daily  furosemide    Tablet 40milliGRAM(s) Oral daily  piperacillin/tazobactam IVPB. 3.375Gram(s) IV Intermittent every 12 hours    MEDICATIONS  (PRN):         Vitals log        ICU Vital Signs Last 24 Hrs  T(C): 36.6, Max: 36.8 (04-29 @ 20:12)  T(F): 97.8, Max: 98.2 (04-29 @ 20:12)  HR: 66 (63 - 94)  BP: 145/70 (113/59 - 145/70)  BP(mean): --  ABP: --  ABP(mean): --  RR: 18 (16 - 20)  SpO2: 98% (87% - 98%)           Input and Output:  I&O's Detail    I & Os for current day (as of 30 Apr 2017 06:04)  =============================================  IN:    Oral Fluid: 240 ml    Solution: 50 ml    Total IN: 290 ml  ---------------------------------------------  OUT:    Voided: 225 ml    Total OUT: 225 ml  ---------------------------------------------  Total NET: 65 ml      Lab Data                        9.8    17.0  )-----------( 365      ( 29 Apr 2017 06:36 )             28.8     04-29    133<L>  |  98  |  29<H>  ----------------------------<  99  4.0   |  27  |  0.81    Ca    9.7      29 Apr 2017 06:36              Review of Systems	      Objective     Physical Examination    heart - s1s2  lungs - dec BS  abd - soft  frail and weak      Pertinent Lab findings & Imaging      Richard:  NO   Adequate UO     I&O's Detail    I & Os for current day (as of 30 Apr 2017 06:04)  =============================================  IN:    Oral Fluid: 240 ml    Solution: 50 ml    Total IN: 290 ml  ---------------------------------------------  OUT:    Voided: 225 ml    Total OUT: 225 ml  ---------------------------------------------  Total NET: 65 ml           Discussed with:     Cultures:	        Radiology

## 2017-04-30 NOTE — CONSULT NOTE ADULT - PROBLEM SELECTOR PROBLEM 2
Acute systolic congestive heart failure
CHF (congestive heart failure)
Pneumonia due to infectious organism, unspecified laterality, unspecified part of lung

## 2017-04-30 NOTE — PROGRESS NOTE ADULT - SUBJECTIVE AND OBJECTIVE BOX
[facility] - Christofer Isaacs, Sorin, Marisa, Hieu Norris    Patient resting comfortably in bed in NAD.  Laying flat with no respiratory distress.  No complaints of chest pain, dyspnea, palpitations, PND, or orthopnea.    Telemetry:  OFF    MEDICATIONS  (STANDING):  ALBUTerol    0.083% 2.5milliGRAM(s) Nebulizer every 8 hours  guaiFENesin    Syrup 200milliGRAM(s) Oral every 8 hours  enoxaparin Injectable 40milliGRAM(s) SubCutaneous every 24 hours  doxazosin 4milliGRAM(s) Oral at bedtime  famotidine    Tablet 20milliGRAM(s) Oral daily  carvedilol 25milliGRAM(s) Oral every 12 hours  cinacalcet 30milliGRAM(s) Oral at bedtime  hydrALAZINE 10milliGRAM(s) Oral <User Schedule>  hydrALAZINE 20milliGRAM(s) Oral two times a day  aspirin enteric coated 81milliGRAM(s) Oral daily  potassium chloride   Powder 20milliEquivalent(s) Oral daily  lactobacillus acidophilus 1Tablet(s) Oral three times a day with meals  isosorbide   mononitrate ER Tablet (IMDUR) 30milliGRAM(s) Oral daily  losartan 25milliGRAM(s) Oral daily  furosemide    Tablet 40milliGRAM(s) Oral daily  piperacillin/tazobactam IVPB. 3.375Gram(s) IV Intermittent every 12 hours      Allergies    Vasotec (Unknown)      Vital Signs Last 24 Hrs  T(C): 36.6, Max: 36.8 (04-29 @ 20:12)  T(F): 97.8, Max: 98.2 (04-29 @ 20:12)  HR: 66 (63 - 94)  BP: 145/70 (113/59 - 145/70)  BP(mean): --  RR: 18 (16 - 20)  SpO2: 98% (87% - 98%)    I&O's Summary    I & Os for current day (as of 30 Apr 2017 07:16)  =============================================  IN: 690 ml / OUT: 525 ml / NET: 165 ml      ON EXAM:  General: NAD, awake and alert, oriented x 3  HEENT: Mucous membranes are dry, anicteric  Lungs: Non-labored, breath sounds are clear bilaterally, No wheezing, rales or rhonchi.  Decreased breath sounds at the bases  Cardiovascular: Regular, S1 and S2, no rubs, or gallops.  2/6 systolic murmur  Gastrointestinal: Bowel Sounds present, soft, nontender.   Lymph: No significant LE edema  Skin: No rashes or ulcers    LABS: All Labs Reviewed:                        9.8    17.0  )-----------( 365      ( 29 Apr 2017 06:36 )             28.8                         10.2   16.1  )-----------( 335      ( 28 Apr 2017 06:56 )             29.8     29 Apr 2017 06:36    133    |  98     |  29     ----------------------------<  99     4.0     |  27     |  0.81   28 Apr 2017 06:56    132    |  97     |  28     ----------------------------<  101    3.9     |  26     |  0.88     Ca    9.7        29 Apr 2017 06:36  Ca    9.6        28 Apr 2017 06:56        Assessment/Plan:  89 F w HFPEF, HTN, edema p/w ADHF; also being treated for pneumonia, clinically improved:    - Vol status has overall improved.  - Cont furosemide 40 po daily, which is higher than her prior outpatient dose of 20 daily. Hopefully will remain euvolemic on this  - No signs of ischemia.   - Continue Coreg and Hydralazine at home doses.  Cont Imdur 30mg Qday for preload reduction   - Monitor and replete electrolytes. Keep K>4.0 and Mg>2.0.   - Continue losartan 25 daily  - Continue aspirin 81 QD  - Supplemental oxygen as needed  - D/C plan to [facility]. Stable for d/c today from a cardiac point of view.

## 2017-04-30 NOTE — PROGRESS NOTE ADULT - PROBLEM SELECTOR PLAN 2
doubt pt has residual PNA  now on 3rd ABX  caution with SE and complications  pt is overall better  does not show clinical evidence of untreated PNA

## 2017-04-30 NOTE — CONSULT NOTE ADULT - SUBJECTIVE AND OBJECTIVE BOX
Patient is a 89y old  Female who presents with a chief complaint of Cough (25 Apr 2017 19:53)      HPI:  89F PMHx HTN, hyperparathyroidism s/p parathyroidectomy, anemia, LE edema sent in from urgent care c/o cough, sore throat and weakness. Pt states sore throat began 6 days ago, 4 days ago developed productive cough. States she has been feeling weak and getting more tired the past few days. Tried Delsym relieved cough, but made her feel dizzy. CXRay at Urgent care showed B/L PNA and suggestive of CHF, sent her to ED. Pt has been having increased swelling in legs L>R for the past few months, had doppler US which was negative for DVT, planned to have carotid doppler ultrasound, but has not yet gone.   Received her flu shot this year. Denies fever, SOB, chest pain, palpitations, loss of appetite, N/V/D, sick contacts.     In the ER O2 90% on RA, afebrile, WBC 16.6, Na 127, procalcitonin 0.07, proBNP 2,347, troponin <0.015. CXR showed Stable cardiomegaly. New vascular congestion interstitial edema consistent with fluid overload or mild/early CHF. Chronic pleural  parenchymal changes left base similar to prior. No definite focal infiltrate. Given IV Rocephin and Azithromax, 1L NS, 40 IV Lasix, Duoneb. seen in er by dr king advised zmax to continue (22 Apr 2017 18:30)     is followed by me in office for fe deficiency anemia. has been off venofer since 2014. last seen 4/7/2017 with a WBC 7.0    ROS:  Negative except for: weakness and fatigue. no hemoptysis. breathing is improved    PAST MEDICAL & SURGICAL HISTORY:  Anemia, unspecified type. ioe fe deficiency. has been off venofer since 5/2014 with stable labs  Edema of lower extremity  Heart murmur  Hypertension  Hypercalcemia  History of appendectomy  S/P tonsillectomy  H/O parathyroidectomy      SOCIAL HISTORY:    FAMILY HISTORY:  No pertinent family history in first degree relatives      MEDICATIONS  (STANDING):  ALBUTerol    0.083% 2.5milliGRAM(s) Nebulizer every 8 hours  guaiFENesin    Syrup 200milliGRAM(s) Oral every 8 hours  enoxaparin Injectable 40milliGRAM(s) SubCutaneous every 24 hours  doxazosin 4milliGRAM(s) Oral at bedtime  famotidine    Tablet 20milliGRAM(s) Oral daily  carvedilol 25milliGRAM(s) Oral every 12 hours  cinacalcet 30milliGRAM(s) Oral at bedtime  hydrALAZINE 10milliGRAM(s) Oral <User Schedule>  hydrALAZINE 20milliGRAM(s) Oral two times a day  aspirin enteric coated 81milliGRAM(s) Oral daily  potassium chloride   Powder 20milliEquivalent(s) Oral daily  lactobacillus acidophilus 1Tablet(s) Oral three times a day with meals  isosorbide   mononitrate ER Tablet (IMDUR) 30milliGRAM(s) Oral daily  losartan 25milliGRAM(s) Oral daily  furosemide    Tablet 40milliGRAM(s) Oral daily  piperacillin/tazobactam IVPB. 3.375Gram(s) IV Intermittent every 12 hours    MEDICATIONS  (PRN):      Allergies    Vasotec (Unknown)    Intolerances        Vital Signs Last 24 Hrs  T(C): 36.4, Max: 36.8 (04-29 @ 20:12)  T(F): 97.5, Max: 98.2 (04-29 @ 20:12)  HR: 59 (59 - 94)  BP: 129/75 (113/59 - 145/70)  BP(mean): --  RR: 19 (16 - 19)  SpO2: 91% (91% - 98%)    PHYSICAL EXAM  General: adult in NAD  HEENT: clear oropharynx, anicteric sclera, pink conjunctivae  Neck: supple  CV: normal S1S2 with no murmur rubs or gallops  Lungs: decreased breath sounds bilaterally  Abdomen: soft non-tender non-distended, no hepato/splenomegaly  Ext: no clubbing cyanosis or edema  Skin: no rashes and no petichiae  Neuro: alert and oriented X3 no focal deficits      LABS:    CBC Full  -  ( 30 Apr 2017 06:55 )  WBC Count : 17.8 K/uL  Hemoglobin : 10.5 g/dL  Hematocrit : 30.8 %  Platelet Count - Automated : 375 K/uL  Mean Cell Volume : 89.5 fl  Mean Cell Hemoglobin : 30.5 pg  Mean Cell Hemoglobin Concentration : 34.1 gm/dL  Auto Neutrophil # : 14.0 K/uL  Auto Lymphocyte # : 1.8 K/uL  Auto Monocyte # : 1.2 K/uL  Auto Eosinophil # : 0.6 K/uL  Auto Basophil # : 0.1 K/uL  Auto Neutrophil % : 78.7 %  Auto Lymphocyte % : 10.2 %  Auto Monocyte % : 7.0 %  Auto Eosinophil % : 3.3 %  Auto Basophil % : 0.7 %    04-30    134<L>  |  100  |  37<H>  ----------------------------<  116<H>  4.0   |  25  |  0.84    Ca    10.1      30 Apr 2017 06:55                BLOOD SMEAR INTERPRETATION:    WBC: increased in number with predominant neutophils with slight left shift  RBC:  NCNC. no NRBC or polychromatophilia. mild aniso, poik  platelet: normal number without clumping    RADIOLOGY & ADDITIONAL STUDIES:    EXAM:  PORTABLE CHEST                            PROCEDURE DATE:  04/27/2017        INTERPRETATION:  CLINICAL STATEMENT: Follow-up chest pain.    TECHNIQUE: AP view of the chest.    COMPARISON: 4/25/2017    FINDINGS/  IMPRESSION:  Surgical clips present.    Hyper aerated lungs with increased interstitial lung markings without   significant change. Better aeration of bilateral lungs compared to prior   exam overall with residual nodular opacities present. Small bilateral   pleural effusions withadjacent airspace opacities slightly increased   perfusion on the right.    Heart size cannot be accurately assessed in this projection, but appear   enlarged.              NICHOLAS OLIVA M.D., ATTENDING RADIOLOGIST  This document has been electronicallysigned. Apr 27 2017 11:10AM        EXAM:  CT CHEST                            PROCEDURE DATE:  04/26/2017        INTERPRETATION:  CLINICAL INFORMATION:  Shortness of breath, elevated   white blood cell count.    PROCEDURE:  Using multislice helical CT, 2.5 mm sections were obtained   from the thoracic inlet through the lung bases.  Multiplanar reformatted images.    COMPARISON: Chest radiograph 4/25/2017.    FINDINGS:      There are patchy airspace opacities and surrounding gland glass opacities   bilateral lung apices, anteriorand posterior segment right upper lobe,   right middle lobe, and left lingula lobe. Some of these particularly in   the lingula and right middle lobe are associated with bronchial wall   thickening. Findings may represent multilobar pneumonia in the   appropriate clinical setting. Atelectasis.    The central airways remain patent. No endobronchial lesion is noted.    There is a low-density nodule right lobe of the thyroid gland, correlate   clinically.  There are small left axillary lymph nodes.  No enlarged mediastinal lymphadenopathy is noted.  The evaluation of the pulmonary hilum is limited without intravenous   contrast.    There is arteriosclerotic calcification of the thoracic aorta.  Coronary artery calcification is present.    There is fusiform thickening of the left adrenal gland.    There are multilevel degenerative changes of the thoracic spine.    There are prominent degenerative changes at both shoulder joints.    Impression:    Multilobar airspace/groundglass opacities as discussed may reflect   multilobar pneumonia.    Small bilateral pleural effusions with underlying atelectasis.    Other findings as discussed above.                            UMAIR LIM M.D., ATTENDING RADIOLOGIST  This document has been electronically signed. Apr 26 2017 12:09PM

## 2017-04-30 NOTE — CONSULT NOTE ADULT - PROBLEM SELECTOR PROBLEM 1
Acute respiratory failure with hypoxia
Other elevated white blood cell (WBC) count
Pneumonia due to infectious organism, unspecified laterality, unspecified part of lung

## 2017-04-30 NOTE — PROGRESS NOTE ADULT - PROBLEM SELECTOR PLAN 1
Unclear whether CAP or HCAP. CT show multifocal pna. WBC trending upward. 17 today  2 more days iv zosyn per ID.   Blood cx - NEG  over all clinically improved will discuss with dr priscilla rodriguez after he evaluates if no new recommendations made will ask heme to eval for persistent leukocytosis

## 2017-05-01 DIAGNOSIS — D50.9 IRON DEFICIENCY ANEMIA, UNSPECIFIED: ICD-10-CM

## 2017-05-01 PROCEDURE — 71010: CPT | Mod: 26

## 2017-05-01 PROCEDURE — 99233 SBSQ HOSP IP/OBS HIGH 50: CPT

## 2017-05-01 RX ORDER — POTASSIUM CHLORIDE 20 MEQ
1 PACKET (EA) ORAL
Qty: 0 | Refills: 0 | COMMUNITY
Start: 2017-05-01

## 2017-05-01 RX ORDER — PIPERACILLIN AND TAZOBACTAM 4; .5 G/20ML; G/20ML
3.38 INJECTION, POWDER, LYOPHILIZED, FOR SOLUTION INTRAVENOUS EVERY 8 HOURS
Qty: 0 | Refills: 0 | Status: DISCONTINUED | OUTPATIENT
Start: 2017-05-01 | End: 2017-05-03

## 2017-05-01 RX ORDER — ALBUTEROL 90 UG/1
1 AEROSOL, METERED ORAL
Qty: 0 | Refills: 0 | COMMUNITY
Start: 2017-05-01

## 2017-05-01 RX ORDER — LOSARTAN POTASSIUM 100 MG/1
1 TABLET, FILM COATED ORAL
Qty: 0 | Refills: 0 | COMMUNITY
Start: 2017-05-01

## 2017-05-01 RX ORDER — LACTOBACILLUS ACIDOPHILUS 100MM CELL
1 CAPSULE ORAL
Qty: 0 | Refills: 0 | COMMUNITY
Start: 2017-05-01

## 2017-05-01 RX ORDER — ENOXAPARIN SODIUM 100 MG/ML
40 INJECTION SUBCUTANEOUS
Qty: 0 | Refills: 0 | COMMUNITY
Start: 2017-05-01

## 2017-05-01 RX ORDER — FAMOTIDINE 10 MG/ML
1 INJECTION INTRAVENOUS
Qty: 0 | Refills: 0 | COMMUNITY
Start: 2017-05-01

## 2017-05-01 RX ORDER — HYDRALAZINE HCL 50 MG
1 TABLET ORAL
Qty: 0 | Refills: 0 | COMMUNITY
Start: 2017-05-01

## 2017-05-01 RX ORDER — HYDRALAZINE HCL 50 MG
2 TABLET ORAL
Qty: 0 | Refills: 0 | COMMUNITY
Start: 2017-05-01

## 2017-05-01 RX ORDER — FUROSEMIDE 40 MG
1 TABLET ORAL
Qty: 0 | Refills: 0 | COMMUNITY
Start: 2017-05-01

## 2017-05-01 RX ADMIN — FAMOTIDINE 20 MILLIGRAM(S): 10 INJECTION INTRAVENOUS at 12:38

## 2017-05-01 RX ADMIN — Medication 200 MILLIGRAM(S): at 13:35

## 2017-05-01 RX ADMIN — Medication 1 TABLET(S): at 12:38

## 2017-05-01 RX ADMIN — ALBUTEROL 2.5 MILLIGRAM(S): 90 AEROSOL, METERED ORAL at 07:42

## 2017-05-01 RX ADMIN — ALBUTEROL 2.5 MILLIGRAM(S): 90 AEROSOL, METERED ORAL at 14:50

## 2017-05-01 RX ADMIN — Medication 10 MILLIGRAM(S): at 12:38

## 2017-05-01 RX ADMIN — PIPERACILLIN AND TAZOBACTAM 25 GRAM(S): 4; .5 INJECTION, POWDER, LYOPHILIZED, FOR SOLUTION INTRAVENOUS at 05:04

## 2017-05-01 RX ADMIN — Medication 200 MILLIGRAM(S): at 05:04

## 2017-05-01 RX ADMIN — Medication 81 MILLIGRAM(S): at 12:38

## 2017-05-01 RX ADMIN — Medication 20 MILLIGRAM(S): at 05:03

## 2017-05-01 RX ADMIN — PIPERACILLIN AND TAZOBACTAM 25 GRAM(S): 4; .5 INJECTION, POWDER, LYOPHILIZED, FOR SOLUTION INTRAVENOUS at 13:35

## 2017-05-01 RX ADMIN — Medication 40 MILLIGRAM(S): at 05:04

## 2017-05-01 RX ADMIN — CINACALCET 30 MILLIGRAM(S): 30 TABLET, FILM COATED ORAL at 21:49

## 2017-05-01 RX ADMIN — CARVEDILOL PHOSPHATE 25 MILLIGRAM(S): 80 CAPSULE, EXTENDED RELEASE ORAL at 05:04

## 2017-05-01 RX ADMIN — ISOSORBIDE MONONITRATE 30 MILLIGRAM(S): 60 TABLET, EXTENDED RELEASE ORAL at 12:38

## 2017-05-01 RX ADMIN — CARVEDILOL PHOSPHATE 25 MILLIGRAM(S): 80 CAPSULE, EXTENDED RELEASE ORAL at 18:23

## 2017-05-01 RX ADMIN — ENOXAPARIN SODIUM 40 MILLIGRAM(S): 100 INJECTION SUBCUTANEOUS at 23:24

## 2017-05-01 RX ADMIN — Medication 20 MILLIGRAM(S): at 18:23

## 2017-05-01 RX ADMIN — Medication 1 TABLET(S): at 18:23

## 2017-05-01 RX ADMIN — LOSARTAN POTASSIUM 25 MILLIGRAM(S): 100 TABLET, FILM COATED ORAL at 05:04

## 2017-05-01 RX ADMIN — PIPERACILLIN AND TAZOBACTAM 25 GRAM(S): 4; .5 INJECTION, POWDER, LYOPHILIZED, FOR SOLUTION INTRAVENOUS at 23:24

## 2017-05-01 RX ADMIN — Medication 4 MILLIGRAM(S): at 21:49

## 2017-05-01 RX ADMIN — Medication 20 MILLIEQUIVALENT(S): at 12:38

## 2017-05-01 RX ADMIN — Medication 1 TABLET(S): at 08:57

## 2017-05-01 RX ADMIN — Medication 200 MILLIGRAM(S): at 21:49

## 2017-05-01 NOTE — PROGRESS NOTE ADULT - PROBLEM SELECTOR PLAN 7
Has been off venofer since 2014.  Monitor H/H, transfuse as needed.   f/u Heme recs Has been off venofer since 2014.  Monitor H/H, transfuse as needed.   f/u Heme recs ([place]) Has been off venofer since 2014.  Monitor H/H, transfuse as needed.   f/u Heme recs ([facility])

## 2017-05-01 NOTE — PROGRESS NOTE ADULT - PROBLEM SELECTOR PLAN 2
New onset. Improved with diuresis.  Now euvolemic  ECHO - EF 60-65%, valvular heart disease, pulm hypertension.  Continue PO lasix 40mg.   Continue carvedilol 25mg BID.   Continue Imdur 30mg daily for preload reduction   Monitor I&Os, daily wts.   f/u cardio recs New onset. Improved with diuresis.  Now euvolemic  ECHO - EF 60-65%, valvular heart disease, pulm hypertension.  Continue PO lasix 40mg.   Continue carvedilol 25mg BID.   Continue Imdur 30mg daily for preload reduction   Monitor I&Os, daily wts.   f/u cardio recs (H[facility])

## 2017-05-01 NOTE — PROGRESS NOTE ADULT - SUBJECTIVE AND OBJECTIVE BOX
Date/Time Patient Seen:  		  Referring MD:   Data Reviewed	       Patient is a 89y old  Female who presents with a chief complaint of Cough (25 Apr 2017 19:53)  weak and frail  heme eval noted  on Zosyn ABX  vs and meds reviewed      Subjective/HPI       Medication list         MEDICATIONS  (STANDING):  ALBUTerol    0.083% 2.5milliGRAM(s) Nebulizer every 8 hours  guaiFENesin    Syrup 200milliGRAM(s) Oral every 8 hours  enoxaparin Injectable 40milliGRAM(s) SubCutaneous every 24 hours  doxazosin 4milliGRAM(s) Oral at bedtime  famotidine    Tablet 20milliGRAM(s) Oral daily  carvedilol 25milliGRAM(s) Oral every 12 hours  cinacalcet 30milliGRAM(s) Oral at bedtime  hydrALAZINE 10milliGRAM(s) Oral <User Schedule>  hydrALAZINE 20milliGRAM(s) Oral two times a day  aspirin enteric coated 81milliGRAM(s) Oral daily  potassium chloride   Powder 20milliEquivalent(s) Oral daily  lactobacillus acidophilus 1Tablet(s) Oral three times a day with meals  isosorbide   mononitrate ER Tablet (IMDUR) 30milliGRAM(s) Oral daily  losartan 25milliGRAM(s) Oral daily  furosemide    Tablet 40milliGRAM(s) Oral daily  piperacillin/tazobactam IVPB. 3.375Gram(s) IV Intermittent every 12 hours    MEDICATIONS  (PRN):         Vitals log        ICU Vital Signs Last 24 Hrs  T(C): 36.3, Max: 36.4 (04-30 @ 08:30)  T(F): 97.3, Max: 97.5 (04-30 @ 08:30)  HR: 67 (59 - 94)  BP: 149/72 (121/62 - 149/72)  BP(mean): --  ABP: --  ABP(mean): --  RR: 18 (18 - 20)  SpO2: 95% (91% - 96%)           Input and Output:  I&O's Detail  I & Os for 24h ending 30 Apr 2017 07:00  =============================================  IN:    Oral Fluid: 540 ml    Solution: 200 ml    Total IN: 740 ml  ---------------------------------------------  OUT:    Voided: 525 ml    Total OUT: 525 ml  ---------------------------------------------  Total NET: 215 ml    I & Os for current day (as of 01 May 2017 06:42)  =============================================  IN:    Oral Fluid: 200 ml    Solution: 200 ml    Total IN: 400 ml  ---------------------------------------------  OUT:    Total OUT: 0 ml  ---------------------------------------------  Total NET: 400 ml      Lab Data                        10.5   17.8  )-----------( 375      ( 30 Apr 2017 06:55 )             30.8     04-30    134<L>  |  100  |  37<H>  ----------------------------<  116<H>  4.0   |  25  |  0.84    Ca    10.1      30 Apr 2017 06:55              Review of Systems	      Objective     Physical Examination  heart - s1s2  lungs - dec BS  abd - soft  weak and frail        Pertinent Lab findings & Imaging      iRchard:  NO   Adequate UO     I&O's Detail  I & Os for 24h ending 30 Apr 2017 07:00  =============================================  IN:    Oral Fluid: 540 ml    Solution: 200 ml    Total IN: 740 ml  ---------------------------------------------  OUT:    Voided: 525 ml    Total OUT: 525 ml  ---------------------------------------------  Total NET: 215 ml    I & Os for current day (as of 01 May 2017 06:42)  =============================================  IN:    Oral Fluid: 200 ml    Solution: 200 ml    Total IN: 400 ml  ---------------------------------------------  OUT:    Total OUT: 0 ml  ---------------------------------------------  Total NET: 400 ml           Discussed with:     Cultures:	        Radiology

## 2017-05-01 NOTE — PROGRESS NOTE ADULT - PROBLEM SELECTOR PLAN 1
Unclear whether CAP or HCAP. CT show multifocal pna. WBC trending upward - 17.8  2 more days iv zosyn per ID.   Blood cx - NEG  Clinically improved will discuss with dr priscilla rodriguez after he evaluates if no new recommendations made will ask heme to eval for persistent leukocytosis Unclear whether CAP or HCAP. CT show multifocal pna. repeat CXR - No active infiltrates.   WBC trending upward but clinically improving.   Continue with Zosyn q8h  Blood cx - NEG  f/u ID recs ([facility])   f/u pulm recs (christine)

## 2017-05-01 NOTE — PROGRESS NOTE ADULT - SUBJECTIVE AND OBJECTIVE BOX
Patient is a 89y old  Female who presents with a chief complaint of Cough (25 Apr 2017 19:53)      INTERVAL HPI/OVERNIGHT EVENTS: Pt clinically improving and doing better. Pt states she feels well and cough has improved significantly. Denies CP and SOB.     MEDICATIONS  (STANDING):  ALBUTerol    0.083% 2.5milliGRAM(s) Nebulizer every 8 hours  guaiFENesin    Syrup 200milliGRAM(s) Oral every 8 hours  enoxaparin Injectable 40milliGRAM(s) SubCutaneous every 24 hours  doxazosin 4milliGRAM(s) Oral at bedtime  famotidine    Tablet 20milliGRAM(s) Oral daily  carvedilol 25milliGRAM(s) Oral every 12 hours  cinacalcet 30milliGRAM(s) Oral at bedtime  hydrALAZINE 10milliGRAM(s) Oral <User Schedule>  hydrALAZINE 20milliGRAM(s) Oral two times a day  aspirin enteric coated 81milliGRAM(s) Oral daily  potassium chloride   Powder 20milliEquivalent(s) Oral daily  lactobacillus acidophilus 1Tablet(s) Oral three times a day with meals  isosorbide   mononitrate ER Tablet (IMDUR) 30milliGRAM(s) Oral daily  losartan 25milliGRAM(s) Oral daily  furosemide    Tablet 40milliGRAM(s) Oral daily  piperacillin/tazobactam IVPB. 3.375Gram(s) IV Intermittent every 8 hours    MEDICATIONS  (PRN):      Allergies    Vasotec (Unknown)    Intolerances        REVIEW OF SYSTEMS:  CONSTITUTIONAL: No fever, No chills, No fatigue No myalgia, No Body ache  EYES: No eye pain, visual disturbances, or discharge  ENMT:  No ear pain, No nose bleed, No vertigo; No sinus or throat pain  NECK: No pain, No stiffness  RESPIRATORy: cough. No wheezing, No  hemoptysis; No shortness of breath  CARDIOVASCULAR: No chest pain, palpitations, leg swelling  GASTROINTESTINAL: No abdominal or epigastric pain. No nausea, No vomiting; No diarrhea or constipation. [X] BM  GENITOURINARY: No dysuria, No frequency, No urgency, No hematuria, or incontinence  NEUROLOGICAL: alert and oriented x 3,  No headaches, No dizziness, No numbness,  SKIN:   No itching, burning, rashes, or lesions   MUSCULOSKELETAL: No joint pain or swelling; No muscle pain, No back pain, No extremity pain  PSYCHIATRIC: No depression, anxiety, mood swings, or difficulty sleeping  ROS  [ ] Unable to obtain   REST OF REVIEW Of SYSTEM - [X] Normal     Height (cm): 152.4 (04-22 @ 15:48)  Weight (kg): 56.7 (04-22 @ 15:48)  BMI (kg/m2): 24.4 (04-22 @ 15:48)  BSA (m2): 1.53 (04-22 @ 15:48)    Vital Signs Last 24 Hrs  T(C): 36.6, Max: 36.6 (05-01 @ 11:36)  T(F): 97.8, Max: 97.8 (05-01 @ 11:36)  HR: 70 (64 - 94)  BP: 146/77 (121/62 - 149/72)  RR: 20 (16 - 20)  SpO2: 98% (94% - 98%)  [X] room air   [ ] 02    PHYSICAL EXAM:  GENERAL:  NAD , well appearing, sitting comfortably in chair.   HEAD:  normal  ENMT: normal  NECK:  normal    NERVOUS SYSTEM:  Alert & Oriented X3, no focal deficits   CHEST/LUNG: Clear to auscultation bilaterally, No wheezing, rhonchi, crackles.   HEART:  Regular rate and rhythm, No murmurs, rubs, or gallops  ABDOMEN:  soft, nontender, nondistended, positive bowel sounds  EXTREMITIES: No clubbing, cyanosis or edema  SKIN: No venous stasis skin changes    LABS:                        10.2   17.6  )-----------( 384      ( 01 May 2017 07:38 )             30.6     01 May 2017 07:38    136    |  100    |  38     ----------------------------<  115    3.7     |  26     |  0.82     Ca    10.1       01 May 2017 07:38            CAPILLARY BLOOD GLUCOSE    Cultures  Culture Results:   No growth at 5 days. (04-22 @ 22:12)  Culture Results:   No growth at 5 days. (04-22 @ 22:12)          RADIOLOGY & ADDITIONAL TESTS:      Care Discussed with [X] Consultants  [X] Patient  [ ] Family  [X]   /   [ ] Other; RN  DVT prophylaxis [X] lovenox   [ ] subq heparin  [ ] coumadin  [ ] venodynes [ ] ambulating frequently at how risk for vte and no pharm or  mechanical prophylaxis required    [ ] other   Advanced directive:    [ ]pt has hcp     [ ] pt declined to assign hcp  Discussed with pt @ bedside

## 2017-05-01 NOTE — PROGRESS NOTE ADULT - SUBJECTIVE AND OBJECTIVE BOX
[facility] -- Christofer Isaacs, Sorin, Marisa, Jr, Hieu      Follow Up:      Subjective/Observations: Patient seen and examined. Events noted. Sitting comfortably. SOB improved. No complaints of chest pain,  or palpitations reported.     PAST MEDICAL & SURGICAL HISTORY:  Anemia, unspecified type  Edema of lower extremity  Heart murmur  Hypertension  Hypercalcemia  History of appendectomy  S/P tonsillectomy  H/O parathyroidectomy      MEDICATIONS  (STANDING):  ALBUTerol    0.083% 2.5milliGRAM(s) Nebulizer every 8 hours  guaiFENesin    Syrup 200milliGRAM(s) Oral every 8 hours  enoxaparin Injectable 40milliGRAM(s) SubCutaneous every 24 hours  doxazosin 4milliGRAM(s) Oral at bedtime  famotidine    Tablet 20milliGRAM(s) Oral daily  carvedilol 25milliGRAM(s) Oral every 12 hours  cinacalcet 30milliGRAM(s) Oral at bedtime  hydrALAZINE 10milliGRAM(s) Oral <User Schedule>  hydrALAZINE 20milliGRAM(s) Oral two times a day  aspirin enteric coated 81milliGRAM(s) Oral daily  potassium chloride   Powder 20milliEquivalent(s) Oral daily  lactobacillus acidophilus 1Tablet(s) Oral three times a day with meals  isosorbide   mononitrate ER Tablet (IMDUR) 30milliGRAM(s) Oral daily  losartan 25milliGRAM(s) Oral daily  furosemide    Tablet 40milliGRAM(s) Oral daily  piperacillin/tazobactam IVPB. 3.375Gram(s) IV Intermittent every 8 hours    MEDICATIONS  (PRN):      Allergies    Vasotec (Unknown)    Intolerances        REVIEW OF SYSTEMS: All other review of systems is negative unless indicated above    Vital Signs Last 24 Hrs  T(C): 36.6, Max: 36.6 (05-01 @ 11:36)  T(F): 97.8, Max: 97.8 (05-01 @ 11:36)  HR: 70 (64 - 94)  BP: 146/77 (121/62 - 149/72)  BP(mean): --  RR: 20 (16 - 20)  SpO2: 98% (94% - 98%)    I&O's Summary  I & Os for 24h ending 01 May 2017 07:00  =============================================  IN: 400 ml / OUT: 0 ml / NET: 400 ml    I & Os for current day (as of 01 May 2017 12:46)  =============================================  IN: 300 ml / OUT: 0 ml / NET: 300 ml        PHYSICAL EXAM:  TELE: off  Constitutional: NAD, awake and alert, well-developed  HEENT: Moist Mucous Membranes, Anicteric  Pulmonary: Decreased breath sounds b/l. crackles improved.   Cardiovascular: Regular, S1 and S2, No murmurs, rubs, gallops oir clicks  Gastrointestinal: Bowel Sounds present, soft, nontender.   Lymph: No peripheral edema. No lymphadenopathy.  Skin: No visible rashes or ulcers.  Psych:  Mood & affect appropriate    LABS: All Labs Reviewed:                        10.2   17.6  )-----------( 384      ( 01 May 2017 07:38 )             30.6                         10.5   17.8  )-----------( 375      ( 30 Apr 2017 06:55 )             30.8                         9.8    17.0  )-----------( 365      ( 29 Apr 2017 06:36 )             28.8     01 May 2017 07:38    136    |  100    |  38     ----------------------------<  115    3.7     |  26     |  0.82   30 Apr 2017 06:55    134    |  100    |  37     ----------------------------<  116    4.0     |  25     |  0.84   29 Apr 2017 06:36    133    |  98     |  29     ----------------------------<  99     4.0     |  27     |  0.81     Ca    10.1       01 May 2017 07:38  Ca    10.1       30 Apr 2017 06:55  Ca    9.7        29 Apr 2017 06:36            Blood Culture: [facility] -- Christofer Isaacs, Sorin, Marisa, Hieu Norris      Follow Up:  CHF    Subjective/Observations: Patient seen and examined. Events noted. Sitting comfortably. SOB improved. No complaints of chest pain,  or palpitations reported.     PAST MEDICAL & SURGICAL HISTORY:  Anemia, unspecified type  Edema of lower extremity  Heart murmur  Hypertension  Hypercalcemia  History of appendectomy  S/P tonsillectomy  H/O parathyroidectomy      MEDICATIONS  (STANDING):  ALBUTerol    0.083% 2.5milliGRAM(s) Nebulizer every 8 hours  guaiFENesin    Syrup 200milliGRAM(s) Oral every 8 hours  enoxaparin Injectable 40milliGRAM(s) SubCutaneous every 24 hours  doxazosin 4milliGRAM(s) Oral at bedtime  famotidine    Tablet 20milliGRAM(s) Oral daily  carvedilol 25milliGRAM(s) Oral every 12 hours  cinacalcet 30milliGRAM(s) Oral at bedtime  hydrALAZINE 10milliGRAM(s) Oral <User Schedule>  hydrALAZINE 20milliGRAM(s) Oral two times a day  aspirin enteric coated 81milliGRAM(s) Oral daily  potassium chloride   Powder 20milliEquivalent(s) Oral daily  lactobacillus acidophilus 1Tablet(s) Oral three times a day with meals  isosorbide   mononitrate ER Tablet (IMDUR) 30milliGRAM(s) Oral daily  losartan 25milliGRAM(s) Oral daily  furosemide    Tablet 40milliGRAM(s) Oral daily  piperacillin/tazobactam IVPB. 3.375Gram(s) IV Intermittent every 8 hours    MEDICATIONS  (PRN):      Allergies    Vasotec (Unknown)    Intolerances        REVIEW OF SYSTEMS: All other review of systems is negative unless indicated above    Vital Signs Last 24 Hrs  T(C): 36.6, Max: 36.6 (05-01 @ 11:36)  T(F): 97.8, Max: 97.8 (05-01 @ 11:36)  HR: 70 (64 - 94)  BP: 146/77 (121/62 - 149/72)  BP(mean): --  RR: 20 (16 - 20)  SpO2: 98% (94% - 98%)    I&O's Summary  I & Os for 24h ending 01 May 2017 07:00  =============================================  IN: 400 ml / OUT: 0 ml / NET: 400 ml    I & Os for current day (as of 01 May 2017 12:46)  =============================================  IN: 300 ml / OUT: 0 ml / NET: 300 ml        PHYSICAL EXAM:  TELE: off  Constitutional: NAD, awake and alert, well-developed  HEENT: Moist Mucous Membranes, Anicteric  Pulmonary: Decreased breath sounds b/l. crackles improved.   Cardiovascular: Regular, S1 and S2, No murmurs, rubs, gallops oir clicks  Gastrointestinal: Bowel Sounds present, soft, nontender.   Lymph: No peripheral edema. No lymphadenopathy.  Skin: No visible rashes or ulcers.  Psych:  Mood & affect appropriate    LABS: All Labs Reviewed:                        10.2   17.6  )-----------( 384      ( 01 May 2017 07:38 )             30.6                         10.5   17.8  )-----------( 375      ( 30 Apr 2017 06:55 )             30.8                         9.8    17.0  )-----------( 365      ( 29 Apr 2017 06:36 )             28.8     01 May 2017 07:38    136    |  100    |  38     ----------------------------<  115    3.7     |  26     |  0.82   30 Apr 2017 06:55    134    |  100    |  37     ----------------------------<  116    4.0     |  25     |  0.84   29 Apr 2017 06:36    133    |  98     |  29     ----------------------------<  99     4.0     |  27     |  0.81     Ca    10.1       01 May 2017 07:38  Ca    10.1       30 Apr 2017 06:55  Ca    9.7        29 Apr 2017 06:36            Blood Culture:

## 2017-05-01 NOTE — PROGRESS NOTE ADULT - PROBLEM SELECTOR PLAN 1
seriously doubt that pt has Leukocytosis due to PNA  at present she has been antibiosed and there is no clinical evidence of untreated PNA  pt has been seen by ID and Heme  Procalcitonin is negative  pt clinically better, however, very weak and frail  would consider causes other than PNA as the source of Leukocytosis  supportive care, dc Planning to [facility]  monitor sat, resp rate and vs  prognosis guarded to poor  age and functional capacity make prognosis poor

## 2017-05-01 NOTE — PROGRESS NOTE ADULT - ASSESSMENT
89 yo woman with history of iron deficiency followed in office and receives IV iron therapy, presented on 4/22/17 with cough and weakness; found to have bilateral infiltrates on CT chest supsicious for multilobar pneumonia as per radiology however per pulmonary clinically no change in respiratory symptoms and no clinical evidence of pneumonia; Patient with leukocytosis which is likely reactive as peripheral blood smear without obvious evidence of a primary bone marrow disorder;

## 2017-05-01 NOTE — PROGRESS NOTE ADULT - SUBJECTIVE AND OBJECTIVE BOX
Patient seen and examined;  Chart reviewed and events noted;   Feeling much better; reports breathing is better  Afebrile      MEDICATIONS  (STANDING):  ALBUTerol    0.083% 2.5milliGRAM(s) Nebulizer every 8 hours  guaiFENesin    Syrup 200milliGRAM(s) Oral every 8 hours  enoxaparin Injectable 40milliGRAM(s) SubCutaneous every 24 hours  doxazosin 4milliGRAM(s) Oral at bedtime  famotidine    Tablet 20milliGRAM(s) Oral daily  carvedilol 25milliGRAM(s) Oral every 12 hours  cinacalcet 30milliGRAM(s) Oral at bedtime  hydrALAZINE 10milliGRAM(s) Oral <User Schedule>  hydrALAZINE 20milliGRAM(s) Oral two times a day  aspirin enteric coated 81milliGRAM(s) Oral daily  potassium chloride   Powder 20milliEquivalent(s) Oral daily  lactobacillus acidophilus 1Tablet(s) Oral three times a day with meals  isosorbide   mononitrate ER Tablet (IMDUR) 30milliGRAM(s) Oral daily  losartan 25milliGRAM(s) Oral daily  furosemide    Tablet 40milliGRAM(s) Oral daily  piperacillin/tazobactam IVPB. 3.375Gram(s) IV Intermittent every 8 hours      Vital Signs Last 24 Hrs  T(C): 36.3, Max: 36.4 (04-30 @ 15:34)  T(F): 97.4, Max: 97.5 (04-30 @ 15:34)  HR: 73 (62 - 94)  BP: 135/66 (121/62 - 149/72)  BP(mean): --  RR: 16 (16 - 20)  SpO2: 95% (94% - 96%)    PHYSICAL EXAM  General: adult elderly frail female in NAD  HEENT: clear oropharynx, anicteric sclera, pink conjunctivae  Neck: supple  CV: normal S1S2 with no murmur rubs or gallops  Lungs: clear to auscultation, no wheezes, no rhales  Abdomen: soft non-tender non-distended, no hepato/splenomegaly  Ext: no clubbing cyanosis or edema  Skin: senile purpura  Neuro: alert and oriented X3 no focal deficits      LABS:                        10.2   17.6  )-----------( 384      ( 01 May 2017 07:38 )             30.6     WBC Count: 17.6 K/uL (05-01 @ 07:38)  WBC Count: 17.8 K/uL (04-30 @ 06:55)  WBC Count: 17.0 K/uL (04-29 @ 06:36)  WBC Count: 16.1 K/uL (04-28 @ 06:56)  WBC Count: 15.5 K/uL (04-27 @ 06:54)        136  |  100  |  38<H>  ----------------------------<  115<H>  3.7   |  26  |  0.82    Ca    10.1      01 May 2017 07:38

## 2017-05-01 NOTE — PROGRESS NOTE ADULT - SUBJECTIVE AND OBJECTIVE BOX
[facility], Division of Infectious Diseases  [facility]    Name: SAIRA SANTANA  Age: 89y  Gender: Female  MRN: [id number]    Interval History--  Notes reviewed. Feels ok. Oriented x3. No specific complaints. "My oxygen was 96." No SOB. No CP. Denies diarrhea. No abdominal pain. LE edema much improved "It's remarkable!" Quiet night. Main complaint presently is that the food has no taste "because there is no salt."    Past Medical History--  Anemia, unspecified type  Edema of lower extremity  Heart murmur  Hypertension  Hypercalcemia  History of appendectomy  S/P tonsillectomy  H/O parathyroidectomy      For details regarding the patient's social history, family history, and other miscellaneous elements, please refer the initial infectious diseases consultation and/or the admitting history and physical examination for this admission.    Allergies    Vasotec (Unknown)        Medications--  Antibiotics:  piperacillin/tazobactam IVPB. 3.375Gram(s) IV Intermittent every 12 hours    Immunologic:    Other:  ALBUTerol    0.083%  guaiFENesin    Syrup  enoxaparin Injectable  doxazosin  famotidine    Tablet  carvedilol  cinacalcet  hydrALAZINE  hydrALAZINE  aspirin enteric coated  potassium chloride   Powder  lactobacillus acidophilus  isosorbide   mononitrate ER Tablet (IMDUR)  losartan  furosemide    Tablet      Review of Systems--  A 10-point review of systems was obtained.     Pertinent positives and negatives--  Constitutional: No fevers. No Chills. No Rigors.   Cardiovascular: No chest pain. No palpitations.  Respiratory: No shortness of breath. No cough.  Gastrointestinal: No nausea or vomiting. No diarrhea or constipation.   Psychiatric: Pleasant. Appropriate affect.    Review of systems otherwise negative except as previously noted.    Physical Examination--  PHYSICAL EXAM:  General: Nontoxic-appearing frail elderly woman in no acute distress.  Vital Signs: T(F): 97.4, Max: 97.5 (04-30 @ 15:34)  HR: 65  BP: 135/66  RR: 16  SpO2: 95%  Wt(kg): --  HEENT: AT/NC. PERRL. EOMI. Oropharynx clear. Dentition fair.  Neck: Not rigid. No sense of mass.  Nodes: None palpable.  Lungs: diminshed breath sounds bilaterally without rales, wheezing or ronchi  Heart: Regular rate and rhythm. No Murmur. No rub. No gallop. No palpable thrill.  Abdomen: Bowel sounds present and normoactive. Soft. Mildly distended. Nontender. No sense of mass. No organomegaly.  Back: No spinal tenderness. No costovertebral angle tenderness.   Extremities: No cyanosis or clubbing. 1+ LE edema. Somewhat wasted appearing.   Skin: Warm. Dry. Good turgor. Fragile. No rash. No vasculitic stigmata.  Psychiatric: Appropriate affect and mood for situation.         Laboratory Studies--  CBC                        10.2   17.6  )-----------( 384      ( 01 May 2017 07:38 )             30.6       Chemistries  05-01    136  |  100  |  38<H>  ----------------------------<  115<H>  3.7   |  26  |  0.82    Ca    10.1      01 May 2017 07:38      Procalcitonin, Serum (04.30.17 @ 16:43)    Procalcitonin, Serum: <0.05    Culture Data  Culture - Blood (04.22.17 @ 22:12)    Specimen Source: .Blood Blood-Peripheral    Culture Results:   No growth at 5 days.    Culture - Blood (04.22.17 @ 22:12)    Specimen Source: .Blood Blood-Peripheral    Culture Results:   No growth at 5 days.      Assessment--  89F with persistent leukocytosis despite Rx for pneumonia and CHF. No concern of uncontrolled infection exam. Oxygenation is normal on room air, and chest exam is unimpressive (though CT with GGO prior). ?Atypicals. Doesn't seem ill enough to have legionella. Patient seen by Dr. Temple of H/O, WBC previously normal. Presently his suspicion is that WBC is reactive to pulmonary process. No concern of leukemia. No left shift/toxic granulation/Dohle bodies etc. to implicate infection. Not on steroids. No diarrhea to suggest active C. difficile (leukocytosis may herald). Abdomen remains completely benign.  Abx have not really made a positive impact on her WBC. Improvement in oxygenation is at least in part due to diuresis. Procalcitonin, for whatever it may or may not be work, remains normal.    Suggestions--  Liberalize Zosyn dose to Q8H  Follow up CBC  Serial exams  Heme follow up.  Chest CT findings will need to be followed to resolution of changes.      Willem Helton MD  [phone]

## 2017-05-01 NOTE — PROGRESS NOTE ADULT - ASSESSMENT
89F PMHx HTN, hyperparathyroidism s/p parathyroidectomy, anemia, LE edema admitted for new onset CHF and PNA with hyponatremia. WBC still trending up on abx.

## 2017-05-01 NOTE — PROGRESS NOTE ADULT - PROBLEM SELECTOR PLAN 1
- likely reactive with no evidence of left shift on peripheral smear  - clinically improving on antibiotics although per pulmonary no clinical evidence of pneumonia  - given patient's advanced age and comorbidities, would opt conservative management and avoid invasive procedures such as bone marrow aspirate/biopsy to further evaluate leukocytosis; would consider flow cytometry if persistent as outpatient

## 2017-05-01 NOTE — PROGRESS NOTE ADULT - ASSESSMENT
89 F w HFPEF, HTN, edema p/w ADHF; also being treated for pneumonia, clinically improved:    - Vol status has overall improved.  - Cont furosemide 40 po daily, which is higher than her prior outpatient dose of 20 daily. Hopefully will remain euvolemic on this  - No signs of ischemia.   - Continue Coreg and Hydralazine at home doses.  Cont Imdur 30mg Qday for preload reduction   - Monitor and replete electrolytes. Keep K>4.0 and Mg>2.0.   - Continue losartan 25 daily  - Continue aspirin 81 QD  - Supplemental oxygen as needed  - D/C plan to [facility]. Stable for d/c today from a cardiac point of view. 89 F w HFPEF, HTN, edema p/w ADHF; also being treated for pneumonia, clinically improved:  - No signs of significant ischemia.  - Appears compensated from a HF POV. Vol status has overall improved. Cont furosemide 40 po daily   - No signs of ischemia.   - Continue Coreg and Hydralazine at home doses.  Cont Imdur 30mg Qday for preload reduction   - Monitor and replete electrolytes. Keep K>4.0 and Mg>2.0.   - Continue losartan 25 daily  - Continue aspirin 81 QD  - Supplemental oxygen as needed  - Abx per ID  - All other workup per primary team. Will followup.

## 2017-05-02 LAB
ANION GAP SERPL CALC-SCNC: 7 MMOL/L — SIGNIFICANT CHANGE UP (ref 5–17)
BASOPHILS # BLD AUTO: 0.1 K/UL — SIGNIFICANT CHANGE UP (ref 0–0.2)
BASOPHILS NFR BLD AUTO: 0.5 % — SIGNIFICANT CHANGE UP (ref 0–2)
BUN SERPL-MCNC: 34 MG/DL — HIGH (ref 7–23)
CALCIUM SERPL-MCNC: 10.2 MG/DL — HIGH (ref 8.5–10.1)
CHLORIDE SERPL-SCNC: 99 MMOL/L — SIGNIFICANT CHANGE UP (ref 96–108)
CO2 SERPL-SCNC: 27 MMOL/L — SIGNIFICANT CHANGE UP (ref 22–31)
CREAT SERPL-MCNC: 0.81 MG/DL — SIGNIFICANT CHANGE UP (ref 0.5–1.3)
EOSINOPHIL # BLD AUTO: 0.6 K/UL — HIGH (ref 0–0.5)
EOSINOPHIL NFR BLD AUTO: 3.5 % — SIGNIFICANT CHANGE UP (ref 0–6)
GLUCOSE SERPL-MCNC: 100 MG/DL — HIGH (ref 70–99)
HCT VFR BLD CALC: 29.7 % — LOW (ref 34.5–45)
HGB BLD-MCNC: 10 G/DL — LOW (ref 11.5–15.5)
LYMPHOCYTES # BLD AUTO: 1.8 K/UL — SIGNIFICANT CHANGE UP (ref 1–3.3)
LYMPHOCYTES # BLD AUTO: 10.9 % — LOW (ref 13–44)
MCHC RBC-ENTMCNC: 29.9 PG — SIGNIFICANT CHANGE UP (ref 27–34)
MCHC RBC-ENTMCNC: 33.5 GM/DL — SIGNIFICANT CHANGE UP (ref 32–36)
MCV RBC AUTO: 89.3 FL — SIGNIFICANT CHANGE UP (ref 80–100)
MONOCYTES # BLD AUTO: 1.4 K/UL — HIGH (ref 0–0.9)
MONOCYTES NFR BLD AUTO: 8.7 % — SIGNIFICANT CHANGE UP (ref 1–9)
NEUTROPHILS # BLD AUTO: 12.5 K/UL — HIGH (ref 1.8–7.4)
NEUTROPHILS NFR BLD AUTO: 76.4 % — SIGNIFICANT CHANGE UP (ref 43–77)
PLATELET # BLD AUTO: 400 K/UL — SIGNIFICANT CHANGE UP (ref 150–400)
POTASSIUM SERPL-MCNC: 4.2 MMOL/L — SIGNIFICANT CHANGE UP (ref 3.5–5.3)
POTASSIUM SERPL-SCNC: 4.2 MMOL/L — SIGNIFICANT CHANGE UP (ref 3.5–5.3)
RBC # BLD: 3.33 M/UL — LOW (ref 3.8–5.2)
RBC # FLD: 12.5 % — SIGNIFICANT CHANGE UP (ref 10.3–14.5)
SODIUM SERPL-SCNC: 133 MMOL/L — LOW (ref 135–145)
WBC # BLD: 16.4 K/UL — HIGH (ref 3.8–10.5)
WBC # FLD AUTO: 16.4 K/UL — HIGH (ref 3.8–10.5)

## 2017-05-02 PROCEDURE — 99233 SBSQ HOSP IP/OBS HIGH 50: CPT

## 2017-05-02 RX ADMIN — FAMOTIDINE 20 MILLIGRAM(S): 10 INJECTION INTRAVENOUS at 11:49

## 2017-05-02 RX ADMIN — Medication 20 MILLIGRAM(S): at 17:22

## 2017-05-02 RX ADMIN — ISOSORBIDE MONONITRATE 30 MILLIGRAM(S): 60 TABLET, EXTENDED RELEASE ORAL at 11:50

## 2017-05-02 RX ADMIN — PIPERACILLIN AND TAZOBACTAM 25 GRAM(S): 4; .5 INJECTION, POWDER, LYOPHILIZED, FOR SOLUTION INTRAVENOUS at 15:10

## 2017-05-02 RX ADMIN — PIPERACILLIN AND TAZOBACTAM 25 GRAM(S): 4; .5 INJECTION, POWDER, LYOPHILIZED, FOR SOLUTION INTRAVENOUS at 23:24

## 2017-05-02 RX ADMIN — ALBUTEROL 2.5 MILLIGRAM(S): 90 AEROSOL, METERED ORAL at 07:41

## 2017-05-02 RX ADMIN — Medication 1 TABLET(S): at 11:50

## 2017-05-02 RX ADMIN — Medication 40 MILLIGRAM(S): at 06:24

## 2017-05-02 RX ADMIN — Medication 20 MILLIEQUIVALENT(S): at 11:50

## 2017-05-02 RX ADMIN — LOSARTAN POTASSIUM 25 MILLIGRAM(S): 100 TABLET, FILM COATED ORAL at 06:24

## 2017-05-02 RX ADMIN — Medication 1 TABLET(S): at 17:22

## 2017-05-02 RX ADMIN — ALBUTEROL 2.5 MILLIGRAM(S): 90 AEROSOL, METERED ORAL at 15:49

## 2017-05-02 RX ADMIN — ALBUTEROL 2.5 MILLIGRAM(S): 90 AEROSOL, METERED ORAL at 19:15

## 2017-05-02 RX ADMIN — ENOXAPARIN SODIUM 40 MILLIGRAM(S): 100 INJECTION SUBCUTANEOUS at 23:24

## 2017-05-02 RX ADMIN — Medication 81 MILLIGRAM(S): at 11:49

## 2017-05-02 RX ADMIN — Medication 200 MILLIGRAM(S): at 06:23

## 2017-05-02 RX ADMIN — Medication 200 MILLIGRAM(S): at 15:10

## 2017-05-02 RX ADMIN — Medication 1 TABLET(S): at 09:00

## 2017-05-02 RX ADMIN — Medication 200 MILLIGRAM(S): at 22:10

## 2017-05-02 RX ADMIN — CINACALCET 30 MILLIGRAM(S): 30 TABLET, FILM COATED ORAL at 22:10

## 2017-05-02 RX ADMIN — Medication 4 MILLIGRAM(S): at 22:10

## 2017-05-02 RX ADMIN — Medication 20 MILLIGRAM(S): at 06:23

## 2017-05-02 RX ADMIN — Medication 10 MILLIGRAM(S): at 11:50

## 2017-05-02 RX ADMIN — CARVEDILOL PHOSPHATE 25 MILLIGRAM(S): 80 CAPSULE, EXTENDED RELEASE ORAL at 06:26

## 2017-05-02 RX ADMIN — PIPERACILLIN AND TAZOBACTAM 25 GRAM(S): 4; .5 INJECTION, POWDER, LYOPHILIZED, FOR SOLUTION INTRAVENOUS at 06:24

## 2017-05-02 RX ADMIN — CARVEDILOL PHOSPHATE 25 MILLIGRAM(S): 80 CAPSULE, EXTENDED RELEASE ORAL at 17:22

## 2017-05-02 NOTE — PROGRESS NOTE ADULT - PROBLEM SELECTOR PLAN 1
- normal differential and morphology on review of peripheral blood smear. Most c/w reactive changes. No acute Heme intervention needed, especially as serial levels stable and given patient's advanced age and comorbidities. Continue conservative monitoring from Heme standpoint.

## 2017-05-02 NOTE — PROGRESS NOTE ADULT - PROBLEM SELECTOR PLAN 2
on 3rd ABX  ID following  monitor for SE   dc planning  at risk for asp pna  frail and weak, with advanced age and comorbidities  CT chest - non specific findings / atelectasis / CHD / possible LRTI  supportive care  prognosis guarded to poor

## 2017-05-02 NOTE — PROGRESS NOTE ADULT - PROBLEM SELECTOR PLAN 3
2/2 to new onset CHF and pneumonia.   Continue albuterol TID.   O2 supplementation, keep O2 sats above 90 at rest 93 percent on room air.  f/u pulm recs (Dr. Valdovinos). 2/2 to new onset CHF and cap  Continue albuterol TID.   O2 supplementation, keep O2 sats above 90 at rest 93 percent on room air.  f/u pulm recs (Dr. Valdovinos).

## 2017-05-02 NOTE — PROGRESS NOTE ADULT - ATTENDING COMMENTS
pt seen and examined with dr olea   agree with all of above as outlined together with her.   will follow reevaluate for possible dc tomorrow pending cbc

## 2017-05-02 NOTE — PROGRESS NOTE ADULT - SUBJECTIVE AND OBJECTIVE BOX
All interim records and events noted.    Asleep in chair, readily arousable.  Reports feeling much improved since hospitalization, no longer w the cough and weakness      MEDICATIONS  (STANDING):  ALBUTerol    0.083% 2.5milliGRAM(s) Nebulizer every 8 hours  guaiFENesin    Syrup 200milliGRAM(s) Oral every 8 hours  enoxaparin Injectable 40milliGRAM(s) SubCutaneous every 24 hours  doxazosin 4milliGRAM(s) Oral at bedtime  famotidine    Tablet 20milliGRAM(s) Oral daily  carvedilol 25milliGRAM(s) Oral every 12 hours  cinacalcet 30milliGRAM(s) Oral at bedtime  hydrALAZINE 10milliGRAM(s) Oral <User Schedule>  hydrALAZINE 20milliGRAM(s) Oral two times a day  aspirin enteric coated 81milliGRAM(s) Oral daily  potassium chloride   Powder 20milliEquivalent(s) Oral daily  lactobacillus acidophilus 1Tablet(s) Oral three times a day with meals  isosorbide   mononitrate ER Tablet (IMDUR) 30milliGRAM(s) Oral daily  losartan 25milliGRAM(s) Oral daily  furosemide    Tablet 40milliGRAM(s) Oral daily  piperacillin/tazobactam IVPB. 3.375Gram(s) IV Intermittent every 8 hours    MEDICATIONS  (PRN):      Vital Signs Last 24 Hrs  T(C): 36.3, Max: 36.6 (05-01 @ 11:36)  T(F): 97.3, Max: 97.8 (05-01 @ 11:36)  HR: 62 (61 - 74)  BP: 155/71 (108/64 - 155/71)  BP(mean): --  RR: 19 (18 - 20)  SpO2: 99% (95% - 99%)    PHYSICAL EXAM  General: well developed  well nourished, but frail, in no acute distress  Head: atraumatic, normocephalic  ENT: sclera anicteric, buccal mucosa moist  Neck: supple, trachea midline  CV: S1 S2, regular rate   Lungs: clear to auscultation  Abdomen: soft, nontender, bowel sounds present, no palpable masses  Extrem: no clubbing/cyanosis/edema  Skin: no significant increased ecchymosis/petechiae  Neuro: alert and oriented X3,  no focal deficits      LABS:                        10.0   16.4  )-----------( 400      ( 02 May 2017 06:20 )             29.7   Complete Blood Count + Automated Diff in AM (05.02.17 @ 06:20)    WBC Count: 16.4 K/uL    RBC Count: 3.33 M/uL    Hemoglobin: 10.0 g/dL    Hematocrit: 29.7 %    Mean Cell Volume: 89.3 fl    Mean Cell Hemoglobin: 29.9 pg    Mean Cell Hemoglobin Conc: 33.5 gm/dL    Red Cell Distrib Width: 12.5 %    Platelet Count - Automated: 400 K/uL    Auto Neutrophil #: 12.5 K/uL    Auto Lymphocyte #: 1.8 K/uL    Auto Monocyte #: 1.4 K/uL    Auto Eosinophil #: 0.6 K/uL    Auto Basophil #: 0.1 K/uL    Auto Neutrophil %: 76.4: Differential percentages must be correlated with absolute numbers for  clinical significance. %    Auto Lymphocyte %: 10.9 %    Auto Monocyte %: 8.7 %    Auto Eosinophil %: 3.5 %    Auto Basophil %: 0.5 %          05-02    133<L>  |  99  |  34<H>  ----------------------------<  100<H>  4.2   |  27  |  0.81    Ca    10.2<H>      02 May 2017 06:20            RADIOLOGY & ADDITIONAL STUDIES:    IMPRESSION/RECOMMENDATIONS:

## 2017-05-02 NOTE — PROGRESS NOTE ADULT - SUBJECTIVE AND OBJECTIVE BOX
Patient is a 89y old  Female who presents with a chief complaint of Cough (25 Apr 2017 19:53)      INTERVAL HPI/OVERNIGHT EVENTS:    MEDICATIONS  (STANDING):  ALBUTerol    0.083% 2.5milliGRAM(s) Nebulizer every 8 hours  guaiFENesin    Syrup 200milliGRAM(s) Oral every 8 hours  enoxaparin Injectable 40milliGRAM(s) SubCutaneous every 24 hours  doxazosin 4milliGRAM(s) Oral at bedtime  famotidine    Tablet 20milliGRAM(s) Oral daily  carvedilol 25milliGRAM(s) Oral every 12 hours  cinacalcet 30milliGRAM(s) Oral at bedtime  hydrALAZINE 10milliGRAM(s) Oral <User Schedule>  hydrALAZINE 20milliGRAM(s) Oral two times a day  aspirin enteric coated 81milliGRAM(s) Oral daily  potassium chloride   Powder 20milliEquivalent(s) Oral daily  lactobacillus acidophilus 1Tablet(s) Oral three times a day with meals  isosorbide   mononitrate ER Tablet (IMDUR) 30milliGRAM(s) Oral daily  losartan 25milliGRAM(s) Oral daily  furosemide    Tablet 40milliGRAM(s) Oral daily  piperacillin/tazobactam IVPB. 3.375Gram(s) IV Intermittent every 8 hours    MEDICATIONS  (PRN):      Allergies    Vasotec (Unknown)    Intolerances        REVIEW OF SYSTEMS:  CONSTITUTIONAL: No fever, No chills,No fatigue,No myalgia,No Body ache  EYES: No eye pain, visual disturbances, or discharge  ENMT:  No ear pain, No nose bleed, No vertigo; No sinus or throat pain  NECK: No pain, No stiffness  RESPIRATORY: No cough, wheezing, No  hemoptysis; No shortness of breath  CARDIOVASCULAR: No chest pain, palpitations, leg swelling  GASTROINTESTINAL: No abdominal or epigastric pain. No nausea, No vomiting; No diarrhea or constipation. [ ] BM  GENITOURINARY: No dysuria, No frequency, No urgency, No hematuria, or incontinence  NEUROLOGICAL: alert and oriented x 3,  No headaches, No dizziness, No numbness,  SKIN:   No itching, burning, rashes, or lesions   MUSCULOSKELETAL: No joint pain or swelling; No muscle pain, No back pain, No extremity pain  PSYCHIATRIC: No depression, anxiety, mood swings, or difficulty sleeping  ROS  [ ] Unable to obtain   REST OF REVIEW Of SYSTEM - [ ] Normal     Height (cm): 152.4 (04-22 @ 15:48)  Weight (kg): 56.7 (04-22 @ 15:48)  BMI (kg/m2): 24.4 (04-22 @ 15:48)  BSA (m2): 1.53 (04-22 @ 15:48)    Vital Signs Last 24 Hrs  T(C): 36.4, Max: 36.6 (05-01 @ 11:36)  T(F): 97.6, Max: 97.8 (05-01 @ 11:36)  HR: 69 (61 - 74)  BP: 146/61 (108/64 - 151/72)  RR: 18 (16 - 20)  SpO2: 95% (95% - 99%)  [X] room air   [ ] 02    PHYSICAL EXAM:  GENERAL:  NAD, well appearing,   HEAD:  normal  ENMT: normal  NECK:  normal    NERVOUS SYSTEM:  Alert & Oriented X3, no focal deficits [ ]Confusion  [ ] Encephalopathic [ ] Sedated [ ] Other  CHEST/LUNG: Clear to auscultation bilaterally,  [ ] decreased breath sounds at bases  [ ] wheezing   [ ] rhonchi  [ ] crackles  HEART:  Regular rate and rhythm, No murmurs, rubs, or gallops,  [ ] irregular   ABDOMEN:  soft, nontender, nondistended, positive bowel sounds   [ ] obese  EXTREMITIES: No clubbing, cyanosis or edema  SKIN: [ ] venous stasis skin changes    LABS:                        10.0   16.4  )-----------( 400      ( 02 May 2017 06:20 )             29.7     02 May 2017 06:20    133    |  99     |  34     ----------------------------<  100    4.2     |  27     |  0.81     Ca    10.2       02 May 2017 06:20            CAPILLARY BLOOD GLUCOSE    Cultures  Culture Results:   No growth at 5 days. (04-22 @ 22:12)  Culture Results:   No growth at 5 days. (04-22 @ 22:12)          RADIOLOGY & ADDITIONAL TESTS:      Care Discussed with [X] Consultants  [ ] Patient  [ ] Family  [X]   /   [ ] Other; RN  DVT prophylaxis [ ] lovenox   [ ] subq heparin  [ ] coumadin  [ ] venodynes [ ] ambulating frequently at how risk for vte and no pharm         or  mechanical prophylaxis required    [ ] other   Advanced directive:    [ ]pt has hcp     [ ] pt declined to assign hcp  Discussed with pt @ bedside Patient is a 89y old  Female who presents with a chief complaint of Cough (25 Apr 2017 19:53)      INTERVAL HPI/OVERNIGHT EVENTS: Pt feeling and looking clinically better. No acute events overnight.     MEDICATIONS  (STANDING):  ALBUTerol    0.083% 2.5milliGRAM(s) Nebulizer every 8 hours  guaiFENesin    Syrup 200milliGRAM(s) Oral every 8 hours  enoxaparin Injectable 40milliGRAM(s) SubCutaneous every 24 hours  doxazosin 4milliGRAM(s) Oral at bedtime  famotidine    Tablet 20milliGRAM(s) Oral daily  carvedilol 25milliGRAM(s) Oral every 12 hours  cinacalcet 30milliGRAM(s) Oral at bedtime  hydrALAZINE 10milliGRAM(s) Oral <User Schedule>  hydrALAZINE 20milliGRAM(s) Oral two times a day  aspirin enteric coated 81milliGRAM(s) Oral daily  potassium chloride   Powder 20milliEquivalent(s) Oral daily  lactobacillus acidophilus 1Tablet(s) Oral three times a day with meals  isosorbide   mononitrate ER Tablet (IMDUR) 30milliGRAM(s) Oral daily  losartan 25milliGRAM(s) Oral daily  furosemide    Tablet 40milliGRAM(s) Oral daily  piperacillin/tazobactam IVPB. 3.375Gram(s) IV Intermittent every 8 hours    MEDICATIONS  (PRN):      Allergies    Vasotec (Unknown)    Intolerances        REVIEW OF SYSTEMS:  CONSTITUTIONAL: No fever, No chills,No fatigue,No myalgia,No Body ache  EYES: No eye pain, visual disturbances, or discharge  ENMT:  No ear pain, No nose bleed, No vertigo; No sinus or throat pain  NECK: No pain, No stiffness  RESPIRATORY: No cough, wheezing, No  hemoptysis; No shortness of breath  CARDIOVASCULAR: No chest pain, palpitations, leg swelling  GASTROINTESTINAL: No abdominal or epigastric pain. No nausea, No vomiting; No diarrhea or constipation. [ ] BM  GENITOURINARY: No dysuria, No frequency, No urgency, No hematuria, or incontinence  NEUROLOGICAL: alert and oriented x 3,  No headaches, No dizziness, No numbness,  SKIN:   No itching, burning, rashes, or lesions   MUSCULOSKELETAL: No joint pain or swelling; No muscle pain, No back pain, No extremity pain  PSYCHIATRIC: No depression, anxiety, mood swings, or difficulty sleeping  ROS  [ ] Unable to obtain   REST OF REVIEW Of SYSTEM - [ ] Normal     Height (cm): 152.4 (04-22 @ 15:48)  Weight (kg): 56.7 (04-22 @ 15:48)  BMI (kg/m2): 24.4 (04-22 @ 15:48)  BSA (m2): 1.53 (04-22 @ 15:48)    Vital Signs Last 24 Hrs  T(C): 36.4, Max: 36.6 (05-01 @ 11:36)  T(F): 97.6, Max: 97.8 (05-01 @ 11:36)  HR: 69 (61 - 74)  BP: 146/61 (108/64 - 151/72)  RR: 18 (16 - 20)  SpO2: 95% (95% - 99%)  [X] room air   [ ] 02    PHYSICAL EXAM:  GENERAL:  NAD, well appearing, laying comfortably in bed.   HEAD:  normal  ENMT: normal  NECK:  normal    NERVOUS SYSTEM:  Alert & Oriented X3, no focal deficits [ ]Confusion  [ ] Encephalopathic [ ] Sedated [ ] Other  CHEST/LUNG: Clear to auscultation bilaterally,  [ ] decreased breath sounds at bases  [ ] wheezing   [ ] rhonchi  [ ] crackles  HEART:  Regular rate and rhythm, No murmurs, rubs, or gallops  ABDOMEN:  soft, nontender, nondistended, positive bowel sounds  EXTREMITIES: No clubbing, cyanosis or edema  SKIN: No venous stasis changes     LABS:                        10.0   16.4  )-----------( 400      ( 02 May 2017 06:20 )             29.7     02 May 2017 06:20    133    |  99     |  34     ----------------------------<  100    4.2     |  27     |  0.81     Ca    10.2       02 May 2017 06:20            CAPILLARY BLOOD GLUCOSE    Cultures  Culture Results:   No growth at 5 days. (04-22 @ 22:12)  Culture Results:   No growth at 5 days. (04-22 @ 22:12)          RADIOLOGY & ADDITIONAL TESTS:      Care Discussed with [X] Consultants  [X Patient  [ ] Family  [X]   /   [ ] Other; RN  DVT prophylaxis [X] lovenox   [ ] subq heparin  [ ] coumadin  [ ] venodynes [ ] ambulating frequently at how risk for vte and no pharm or  mechanical prophylaxis required    [ ] other   Advanced directive:  HCP - daughter   Discussed with pt @ bedside Patient is a 89y old  Female who presents with a chief complaint of Cough (25 Apr 2017 19:53)      INTERVAL HPI/OVERNIGHT EVENTS: Pt feeling and looking clinically better. Denies SOB, wheezing, persistent cough. WBC trending down. No acute events overnight.     MEDICATIONS  (STANDING):  ALBUTerol    0.083% 2.5milliGRAM(s) Nebulizer every 8 hours  guaiFENesin    Syrup 200milliGRAM(s) Oral every 8 hours  enoxaparin Injectable 40milliGRAM(s) SubCutaneous every 24 hours  doxazosin 4milliGRAM(s) Oral at bedtime  famotidine    Tablet 20milliGRAM(s) Oral daily  carvedilol 25milliGRAM(s) Oral every 12 hours  cinacalcet 30milliGRAM(s) Oral at bedtime  hydrALAZINE 10milliGRAM(s) Oral <User Schedule>  hydrALAZINE 20milliGRAM(s) Oral two times a day  aspirin enteric coated 81milliGRAM(s) Oral daily  potassium chloride   Powder 20milliEquivalent(s) Oral daily  lactobacillus acidophilus 1Tablet(s) Oral three times a day with meals  isosorbide   mononitrate ER Tablet (IMDUR) 30milliGRAM(s) Oral daily  losartan 25milliGRAM(s) Oral daily  furosemide    Tablet 40milliGRAM(s) Oral daily  piperacillin/tazobactam IVPB. 3.375Gram(s) IV Intermittent every 8 hours    MEDICATIONS  (PRN):      Allergies    Vasotec (Unknown)    Intolerances        REVIEW OF SYSTEMS:  CONSTITUTIONAL: No fever, No chills, No fatigue, No myalgia, No Body ache  EYES: No eye pain, visual disturbances, or discharge  ENMT:  No ear pain, No nose bleed, No vertigo; No sinus or throat pain  NECK: No pain, No stiffness  RESPIRATORY: Slight cough. No wheezing, No  hemoptysis; No shortness of breath  CARDIOVASCULAR: No chest pain, palpitations, leg swelling  GASTROINTESTINAL: No abdominal or epigastric pain. No nausea, No vomiting; No diarrhea or constipation. [X] BM: this AM   GENITOURINARY: No dysuria, No frequency, No urgency, No hematuria, or incontinence  NEUROLOGICAL: alert and oriented x 3,  No headaches, No dizziness, No numbness,  SKIN:   No itching, burning, rashes, or lesions   MUSCULOSKELETAL: No joint pain or swelling; No muscle pain, No back pain, No extremity pain  PSYCHIATRIC: No depression, anxiety, mood swings, or difficulty sleeping  ROS  [ ] Unable to obtain   REST OF REVIEW Of SYSTEM - [X] Normal     Height (cm): 152.4 (04-22 @ 15:48)  Weight (kg): 56.7 (04-22 @ 15:48)  BMI (kg/m2): 24.4 (04-22 @ 15:48)  BSA (m2): 1.53 (04-22 @ 15:48)    Vital Signs Last 24 Hrs  T(C): 36.4, Max: 36.6 (05-01 @ 11:36)  T(F): 97.6, Max: 97.8 (05-01 @ 11:36)  HR: 69 (61 - 74)  BP: 146/61 (108/64 - 151/72)  RR: 18 (16 - 20)  SpO2: 95% (95% - 99%)  [X] room air   [ ] 02    PHYSICAL EXAM:  GENERAL:  NAD, well appearing, laying comfortably in bed.   HEAD:  normal  ENMT: normal  NECK:  normal    NERVOUS SYSTEM:  Alert & Oriented X3, no focal deficits [ ]Confusion  [ ] Encephalopathic [ ] Sedated [ ] Other  CHEST/LUNG: Clear to auscultation bilaterally  HEART:  Regular rate and rhythm, No murmurs, rubs, or gallops  ABDOMEN:  soft, nontender, nondistended, positive bowel sounds  EXTREMITIES: No clubbing, cyanosis or edema  SKIN: No venous stasis changes     LABS:                        10.0   16.4  )-----------( 400      ( 02 May 2017 06:20 )             29.7     02 May 2017 06:20    133    |  99     |  34     ----------------------------<  100    4.2     |  27     |  0.81     Ca    10.2       02 May 2017 06:20            CAPILLARY BLOOD GLUCOSE    Cultures  Culture Results:   No growth at 5 days. (04-22 @ 22:12)  Culture Results:   No growth at 5 days. (04-22 @ 22:12)          RADIOLOGY & ADDITIONAL TESTS:      Care Discussed with [X] Consultants  [X Patient  [ ] Family  [X]   /   [ ] Other; RN  DVT prophylaxis [X] lovenox   [ ] subq heparin  [ ] coumadin  [ ] venodynes [ ] ambulating frequently at how risk for vte and no pharm or  mechanical prophylaxis required    [ ] other   Advanced directive:  HCP - daughter   Discussed with pt @ bedside Patient is a 89y old  Female who presents with a chief complaint of Cough (25 Apr 2017 19:53)      INTERVAL HPI/OVERNIGHT EVENTS: Pt feeling and looking clinically better. Denies SOB, wheezing, persistent cough. WBC trending down slightly but remains elevated . no nausea vomiting abd pain no diarrhea no dysuria.  No acute events overnight.     MEDICATIONS  (STANDING):  ALBUTerol    0.083% 2.5milliGRAM(s) Nebulizer every 8 hours  guaiFENesin    Syrup 200milliGRAM(s) Oral every 8 hours  enoxaparin Injectable 40milliGRAM(s) SubCutaneous every 24 hours  doxazosin 4milliGRAM(s) Oral at bedtime  famotidine    Tablet 20milliGRAM(s) Oral daily  carvedilol 25milliGRAM(s) Oral every 12 hours  cinacalcet 30milliGRAM(s) Oral at bedtime  hydrALAZINE 10milliGRAM(s) Oral <User Schedule>  hydrALAZINE 20milliGRAM(s) Oral two times a day  aspirin enteric coated 81milliGRAM(s) Oral daily  potassium chloride   Powder 20milliEquivalent(s) Oral daily  lactobacillus acidophilus 1Tablet(s) Oral three times a day with meals  isosorbide   mononitrate ER Tablet (IMDUR) 30milliGRAM(s) Oral daily  losartan 25milliGRAM(s) Oral daily  furosemide    Tablet 40milliGRAM(s) Oral daily  piperacillin/tazobactam IVPB. 3.375Gram(s) IV Intermittent every 8 hours    MEDICATIONS  (PRN):      Allergies    Vasotec (Unknown)    Intolerances        REVIEW OF SYSTEMS:  CONSTITUTIONAL: No fever, No chills, No fatigue, No myalgia, No Body ache  EYES: No eye pain, visual disturbances, or discharge  ENMT:  No ear pain, No nose bleed, No vertigo; No sinus or throat pain  NECK: No pain, No stiffness  RESPIRATORY: Slight cough. No wheezing, No  hemoptysis; No shortness of breath  CARDIOVASCULAR: No chest pain, palpitations, leg swelling  GASTROINTESTINAL: No abdominal or epigastric pain. No nausea, No vomiting; No diarrhea or constipation. [X] BM: this AM   GENITOURINARY: No dysuria, No frequency, No urgency, No hematuria, or incontinence  NEUROLOGICAL: alert and oriented x 3,  No headaches, No dizziness, No numbness,  SKIN:   No itching, burning, rashes, or lesions   MUSCULOSKELETAL: No joint pain or swelling; No muscle pain, No back pain, No extremity pain  PSYCHIATRIC: No depression, anxiety, mood swings, or difficulty sleeping  ROS  [ ] Unable to obtain   REST OF REVIEW Of SYSTEM - [X] Normal     Height (cm): 152.4 (04-22 @ 15:48)  Weight (kg): 56.7 (04-22 @ 15:48)  BMI (kg/m2): 24.4 (04-22 @ 15:48)  BSA (m2): 1.53 (04-22 @ 15:48)    Vital Signs Last 24 Hrs  T(C): 36.4, Max: 36.6 (05-01 @ 11:36)  T(F): 97.6, Max: 97.8 (05-01 @ 11:36)  HR: 69 (61 - 74)  BP: 146/61 (108/64 - 151/72)  RR: 18 (16 - 20)  SpO2: 95% (95% - 99%)  [X] room air   [ ] 02    PHYSICAL EXAM:  GENERAL:  NAD, well appearing, laying comfortably in bed.   HEAD:  normal  ENMT: normal  NECK:  normal    NERVOUS SYSTEM:  Alert & Oriented X3, no focal deficits [ ]Confusion  [ ] Encephalopathic [ ] Sedated [ ] Other  CHEST/LUNG: Clear to auscultation bilaterally  HEART:  Regular rate and rhythm, No murmurs, rubs, or gallops  ABDOMEN:  soft, nontender, nondistended, positive bowel sounds  EXTREMITIES: No clubbing, cyanosis or edema  SKIN: No venous stasis changes     LABS:                        10.0   16.4  )-----------( 400      ( 02 May 2017 06:20 )             29.7     02 May 2017 06:20    133    |  99     |  34     ----------------------------<  100    4.2     |  27     |  0.81     Ca    10.2       02 May 2017 06:20            CAPILLARY BLOOD GLUCOSE    Cultures  Culture Results:   No growth at 5 days. (04-22 @ 22:12)  Culture Results:   No growth at 5 days. (04-22 @ 22:12)          RADIOLOGY & ADDITIONAL TESTS:      Care Discussed with [X] Consultants  [X Patient  [ ] Family  [X]   /   [ ] Other; RN  DVT prophylaxis [X] lovenox   [ ] subq heparin  [ ] coumadin  [ ] venodynes [ ] ambulating frequently at how risk for vte and no pharm or  mechanical prophylaxis required    [ ] other   Advanced directive:  HCP - daughter   Discussed with pt @ bedside

## 2017-05-02 NOTE — PROGRESS NOTE ADULT - ASSESSMENT
89 yo woman with history of iron deficiency followed in office,on IV iron therapy. Adm 4/22/17 with cough and weakness, noted leukocytosis. Found with bilateral infiltrates on CT chest suspicious for multilobar pneumonia as per radiology but per pulmonary no change in respiratory symptoms and no clinical evidence of pneumonia. On Zosyn.   Clinically improving but leukocytosis persists

## 2017-05-02 NOTE — PROGRESS NOTE ADULT - SUBJECTIVE AND OBJECTIVE BOX
[facility]    Christofer Isaacs, Sorin, Marisa, Jr, Hieu      [phone]    CHIEF COMPLAINT: Patient is a 89y old  Female who presents with a chief complaint of Cough (25 Apr 2017 19:53)      Follow Up:  vol ol, improved    Interim history: The patient reports no new symptoms.  Denies chest discomfort and shortness of breath.  No abdominal pain.  No new neurologic symptoms.      MEDICATIONS  (STANDING):  ALBUTerol    0.083% 2.5milliGRAM(s) Nebulizer every 8 hours  guaiFENesin    Syrup 200milliGRAM(s) Oral every 8 hours  enoxaparin Injectable 40milliGRAM(s) SubCutaneous every 24 hours  doxazosin 4milliGRAM(s) Oral at bedtime  famotidine    Tablet 20milliGRAM(s) Oral daily  carvedilol 25milliGRAM(s) Oral every 12 hours  cinacalcet 30milliGRAM(s) Oral at bedtime  hydrALAZINE 10milliGRAM(s) Oral <User Schedule>  hydrALAZINE 20milliGRAM(s) Oral two times a day  aspirin enteric coated 81milliGRAM(s) Oral daily  potassium chloride   Powder 20milliEquivalent(s) Oral daily  lactobacillus acidophilus 1Tablet(s) Oral three times a day with meals  isosorbide   mononitrate ER Tablet (IMDUR) 30milliGRAM(s) Oral daily  losartan 25milliGRAM(s) Oral daily  furosemide    Tablet 40milliGRAM(s) Oral daily  piperacillin/tazobactam IVPB. 3.375Gram(s) IV Intermittent every 8 hours    MEDICATIONS  (PRN):      REVIEW OF SYSTEMS:  eye, ent, GI, , allergic, dermatologic, musculoskeletal and neurologic are negative except as described above    Vital Signs Last 24 Hrs  T(C): 36.4, Max: 36.6 (05-01 @ 11:36)  T(F): 97.6, Max: 97.8 (05-01 @ 11:36)  HR: 69 (61 - 74)  BP: 146/61 (108/64 - 151/72)  BP(mean): --  RR: 18 (18 - 20)  SpO2: 95% (95% - 99%)    I&O's Summary    I & Os for current day (as of 02 May 2017 08:58)  =============================================  IN: 500 ml / OUT: 0 ml / NET: 500 ml      Telemetry past 24h: ot    PHYSICAL EXAM:    Constitutional: well-nourished, well-developed, NAD   HEENT:  MMM, sclerae anicteric, conjunctivae clear, no oral cyanosis.  Pulmonary: Non-labored, breath sounds are clear bilaterally, No wheezing, rales or rhonchi  Cardiovascular: Regular, S1 and S2, No murmurs, rubs, gallops or clicks  Gastrointestinal: Bowel Sounds present, soft, nontender.   Lymph: 1+ peripheral edema. No lymphadenopathy.  Neurological: Alert, no focal deficits  Skin: No rashes.  Psych:  Mood & affect appropriate    LABS: All Labs Reviewed:                        10.0   16.4  )-----------( 400      ( 02 May 2017 06:20 )             29.7                         10.2   17.6  )-----------( 384      ( 01 May 2017 07:38 )             30.6                         10.5   17.8  )-----------( 375      ( 30 Apr 2017 06:55 )             30.8     02 May 2017 06:20    133    |  99     |  34     ----------------------------<  100    4.2     |  27     |  0.81   01 May 2017 07:38    136    |  100    |  38     ----------------------------<  115    3.7     |  26     |  0.82   30 Apr 2017 06:55    134    |  100    |  37     ----------------------------<  116    4.0     |  25     |  0.84     Ca    10.2       02 May 2017 06:20  Ca    10.1       01 May 2017 07:38  Ca    10.1       30 Apr 2017 06:55            Blood Culture:         RADIOLOGY:    EKG:    Echo:       EXAM:  ECHO TTE W/O CON COMP W/DOPPLR         PROCEDURE DATE:  04/23/2017        INTERPRETATION:  Ordering Physician: TERESITA MCADAMS    Indication: Congestive heart failure    Technician: EDOUARD    Study Quality: Fair   A complete echocardiographic study was performed utilizing standard   protocol including spectral and color Doppler in all echocardiographic   windows.    Height: 1 52-cm  Weight: 56 kg  BSA: 1.5 sq m  Blood Pressure: 128/55    MEASUREMENTS  IVS: 1.0  PWT: 0.7  LA: 3.5  AO: 2.7  LVIDd: 3.3  LVIDs: 2.1  LVOT: 1.7    LVEF: 60-65%  RVSP: 59 mmHg  RA Pressure: 12 mmHg  IVC: IVC mildly dilated    FINDINGS  Left Ventricle: Normal size with satisfactory contractility  Right Ventricle: Normal size with satisfactory contractility  Left Atrium: Normal size  Right Atrium: Normal size  Mitral Valve:     Calcified leaflets with mild MR  Aortic Valve: Calcified leaflets with mild-mod aortic stenosis. Gradient   35 mmHg with valve area 1.1 sq cm. Mild AI  Tricuspid Valve: Moderate TR  Pulmonic Valve: No significant PI  Diastolic Function: Unremarkable  Pericardium/Pleura: No significant pericardial effusion. Moderate-sized   bilateral pleural effusion      CONCLUSIONS:  Normal chamber sizes with satisfactory contractility of the left   ventricle. Mild to moderate AS with mild AI. Mild MR. Moderate TR with   pulmonary hypertension. Bilateral pleural effusion                  RILEY MCNEIL M.D., ATTENDING CARDIOLOGIST  This document has been electronically signed. Apr 24 2017  5:07PM

## 2017-05-02 NOTE — PROGRESS NOTE ADULT - PROBLEM SELECTOR PLAN 1
am WBC pending  again, doubt that Leukocytosis is solely in response to Lower resp tract infection  pt is clinically better and has been better  would not correlate clinical status and WBC response

## 2017-05-02 NOTE — PROGRESS NOTE ADULT - PROBLEM SELECTOR PLAN 2
New onset. Improved with diuresis.  Now euvolemic  ECHO - EF 60-65%, valvular heart disease, pulm hypertension.  Continue PO lasix 40mg.   Continue carvedilol 25mg BID.   Continue Imdur 30mg daily for preload reduction   Monitor I&Os, daily wts.   f/u cardio recs (Marisa)

## 2017-05-02 NOTE — PROGRESS NOTE ADULT - ASSESSMENT
89F PMHx HTN, hyperparathyroidism s/p parathyroidectomy, anemia, LE edema admitted for new onset CHF and PNA with hyponatremia. WBC trending down on increased dose of abx. 89F PMHx HTN, hyperparathyroidism s/p parathyroidectomy, anemia, LE edema admitted for new onset CHF and PNA with hyponatremia. WBC trending down slightly on increased dose of abx.

## 2017-05-02 NOTE — PROGRESS NOTE ADULT - SUBJECTIVE AND OBJECTIVE BOX
[facility], Division of Infectious Diseases  [facility]    Name: SAIRA SANTANA  Age: 89y  Gender: Female  MRN: [id number]    Interval History--  Notes reviewed. Slightly forgetful. No new issues. Scant dry cough. No SOB, CP. Denies F/C/R. No N/V/D.    Past Medical History--  Anemia, unspecified type  Edema of lower extremity  Heart murmur  Hypertension  Hypercalcemia  History of appendectomy  S/P tonsillectomy  H/O parathyroidectomy      For details regarding the patient's social history, family history, and other miscellaneous elements, please refer the initial infectious diseases consultation and/or the admitting history and physical examination for this admission.    Allergies    Vasotec (Unknown)    Intolerances        Medications--  Antibiotics:  piperacillin/tazobactam IVPB. 3.375Gram(s) IV Intermittent every 8 hours    Immunologic:    Other:  ALBUTerol    0.083%  guaiFENesin    Syrup  enoxaparin Injectable  doxazosin  famotidine    Tablet  carvedilol  cinacalcet  hydrALAZINE  hydrALAZINE  aspirin enteric coated  potassium chloride   Powder  lactobacillus acidophilus  isosorbide   mononitrate ER Tablet (IMDUR)  losartan  furosemide    Tablet      Review of Systems--  ROS unchanged compared with yesterday's visit.    Physical Examination--  PHYSICAL EXAM:  General: Frail but nontoxic-appearing Female in no acute distress.  Vital Signs: T(F): 97.3, Max: 97.6 (05-01 @ 15:49)  HR: 63  BP: 136/63  RR: 17  SpO2: 95%  Wt(kg): --  HEENT: AT/NC. PERRL. EOMI. Oropharynx clear. Dentition fair.  Neck: Not rigid. No sense of mass.  Nodes: None palpable.  Lungs: diminshed breath sounds bilaterally without rales, wheezing or ronchi  Heart: Regular rate and rhythm. No Murmur. No rub. No gallop. No palpable thrill.  Abdomen: Bowel sounds present and normoactive. Soft. Mildly distended. Nontender. No sense of mass. No organomegaly.  Back: No spinal tenderness. No costovertebral angle tenderness.   Extremities: No cyanosis or clubbing. 1+ LE edema. Somewhat wasted appearing.   Skin: Warm. Dry. Good turgor. Fragile. No rash. No vasculitic stigmata.  Psychiatric: Appropriate affect and mood for situation.       Laboratory Studies--  CBC                        10.0   16.4  )-----------( 400      ( 02 May 2017 06:20 )             29.7       Chemistries  05-02    133<L>  |  99  |  34<H>  ----------------------------<  100<H>  4.2   |  27  |  0.81    Ca    10.2<H>      02 May 2017 06:20        Culture Data: no new data    Assessment--  CHF, diuresis  Possible pneumonia, on serial antibiotics  Leukocytosis about the same range but no concern of active/uncontrolled infection on exam.  Cultures negative thus far.    Suggestions--  Follow up CBC tomorrow  Likely complete antibiotics tomorrow and reassess  If WBC significantly increases will consider CT A/P but if it improves would just follow up the WBC at rehab to make sure that it normalizes.  Discussed with Aruna Gómez and Brittanie.      Willem Helton MD  [phone]

## 2017-05-02 NOTE — PROGRESS NOTE ADULT - PROBLEM SELECTOR PLAN 9
Family wish full resuscitative measures at this point, although would not want artificial feeding, should the situation arise.  hcp in place Family wish full resuscitative measures at this point, although would not want artificial feeding, should the situation arise.  hcp in place  D/C planner to [facility] - [facility].

## 2017-05-02 NOTE — PROGRESS NOTE ADULT - SUBJECTIVE AND OBJECTIVE BOX
Date/Time Patient Seen:  		  Referring MD:   Data Reviewed	       Patient is a 89y old  Female who presents with a chief complaint of Cough (25 Apr 2017 19:53)  pt in bed  on o2  feels better  vs and meds reviewed  remains on ABX      Subjective/HPI       Medication list         MEDICATIONS  (STANDING):  ALBUTerol    0.083% 2.5milliGRAM(s) Nebulizer every 8 hours  guaiFENesin    Syrup 200milliGRAM(s) Oral every 8 hours  enoxaparin Injectable 40milliGRAM(s) SubCutaneous every 24 hours  doxazosin 4milliGRAM(s) Oral at bedtime  famotidine    Tablet 20milliGRAM(s) Oral daily  carvedilol 25milliGRAM(s) Oral every 12 hours  cinacalcet 30milliGRAM(s) Oral at bedtime  hydrALAZINE 10milliGRAM(s) Oral <User Schedule>  hydrALAZINE 20milliGRAM(s) Oral two times a day  aspirin enteric coated 81milliGRAM(s) Oral daily  potassium chloride   Powder 20milliEquivalent(s) Oral daily  lactobacillus acidophilus 1Tablet(s) Oral three times a day with meals  isosorbide   mononitrate ER Tablet (IMDUR) 30milliGRAM(s) Oral daily  losartan 25milliGRAM(s) Oral daily  furosemide    Tablet 40milliGRAM(s) Oral daily  piperacillin/tazobactam IVPB. 3.375Gram(s) IV Intermittent every 8 hours    MEDICATIONS  (PRN):         Vitals log        ICU Vital Signs Last 24 Hrs  T(C): 36.4, Max: 36.6 (05-01 @ 11:36)  T(F): 97.6, Max: 97.8 (05-01 @ 11:36)  HR: 69 (61 - 74)  BP: 146/61 (108/64 - 151/72)  BP(mean): --  ABP: --  ABP(mean): --  RR: 18 (16 - 20)  SpO2: 95% (95% - 99%)           Input and Output:  I&O's Detail  I & Os for 24h ending 01 May 2017 07:00  =============================================  IN:    Oral Fluid: 200 ml    Solution: 200 ml    Total IN: 400 ml  ---------------------------------------------  OUT:    Total OUT: 0 ml  ---------------------------------------------  Total NET: 400 ml    I & Os for current day (as of 02 May 2017 06:42)  =============================================  IN:    Oral Fluid: 500 ml    Total IN: 500 ml  ---------------------------------------------  OUT:    Total OUT: 0 ml  ---------------------------------------------  Total NET: 500 ml      Lab Data                        10.0   16.4  )-----------( 400      ( 02 May 2017 06:20 )             29.7     05-01    136  |  100  |  38<H>  ----------------------------<  115<H>  3.7   |  26  |  0.82    Ca    10.1      01 May 2017 07:38              Review of Systems	      Objective     Physical Examination  heart - s1s2, lungs - dec BS, abd - soft        Pertinent Lab findings & Imaging      Richard:  NO   Adequate UO     I&O's Detail  I & Os for 24h ending 01 May 2017 07:00  =============================================  IN:    Oral Fluid: 200 ml    Solution: 200 ml    Total IN: 400 ml  ---------------------------------------------  OUT:    Total OUT: 0 ml  ---------------------------------------------  Total NET: 400 ml    I & Os for current day (as of 02 May 2017 06:42)  =============================================  IN:    Oral Fluid: 500 ml    Total IN: 500 ml  ---------------------------------------------  OUT:    Total OUT: 0 ml  ---------------------------------------------  Total NET: 500 ml           Discussed with:     Cultures:	        Radiology

## 2017-05-02 NOTE — PROGRESS NOTE ADULT - ASSESSMENT
89 F w HFPEF, HTN, edema p/w ADHF; also being treated for pneumonia, clinically improved:  - No signs of significant ischemia.  - Appears compensated from a HF POV. Vol status has overall improved. Cont furosemide 40 po daily   - No signs of ischemia.   - Continue Coreg and Hydralazine at home doses.  Cont Imdur 30mg Qday for preload reduction   - Monitor and replete electrolytes. Keep K>4.0 and Mg>2.0.   - Continue losartan 25 daily  - Continue aspirin 81 QD  - Supplemental oxygen as needed  - Abx per ID  - All other workup per primary team. Will followup.

## 2017-05-02 NOTE — PROGRESS NOTE ADULT - PROBLEM SELECTOR PLAN 7
Has been off venofer since 2014.  Monitor H/H, transfuse as needed.   f/u Heme recs ([facility]) Has been off venofer since 2014.  Monitor H/H, transfuse as needed.   f/u Heme recs (sun)

## 2017-05-03 VITALS
HEART RATE: 63 BPM | OXYGEN SATURATION: 98 % | SYSTOLIC BLOOD PRESSURE: 162 MMHG | DIASTOLIC BLOOD PRESSURE: 67 MMHG | RESPIRATION RATE: 18 BRPM | TEMPERATURE: 97 F

## 2017-05-03 LAB
ANION GAP SERPL CALC-SCNC: 10 MMOL/L — SIGNIFICANT CHANGE UP (ref 5–17)
BASOPHILS # BLD AUTO: 0.1 K/UL — SIGNIFICANT CHANGE UP (ref 0–0.2)
BASOPHILS NFR BLD AUTO: 0.7 % — SIGNIFICANT CHANGE UP (ref 0–2)
BUN SERPL-MCNC: 29 MG/DL — HIGH (ref 7–23)
CALCIUM SERPL-MCNC: 10.2 MG/DL — HIGH (ref 8.5–10.1)
CHLORIDE SERPL-SCNC: 100 MMOL/L — SIGNIFICANT CHANGE UP (ref 96–108)
CO2 SERPL-SCNC: 27 MMOL/L — SIGNIFICANT CHANGE UP (ref 22–31)
CREAT SERPL-MCNC: 0.96 MG/DL — SIGNIFICANT CHANGE UP (ref 0.5–1.3)
EOSINOPHIL # BLD AUTO: 0.4 K/UL — SIGNIFICANT CHANGE UP (ref 0–0.5)
EOSINOPHIL NFR BLD AUTO: 2.7 % — SIGNIFICANT CHANGE UP (ref 0–6)
GLUCOSE SERPL-MCNC: 187 MG/DL — HIGH (ref 70–99)
HCT VFR BLD CALC: 29.7 % — LOW (ref 34.5–45)
HGB BLD-MCNC: 9.8 G/DL — LOW (ref 11.5–15.5)
LYMPHOCYTES # BLD AUTO: 1.7 K/UL — SIGNIFICANT CHANGE UP (ref 1–3.3)
LYMPHOCYTES # BLD AUTO: 12.3 % — LOW (ref 13–44)
MCHC RBC-ENTMCNC: 29.8 PG — SIGNIFICANT CHANGE UP (ref 27–34)
MCHC RBC-ENTMCNC: 33 GM/DL — SIGNIFICANT CHANGE UP (ref 32–36)
MCV RBC AUTO: 90.3 FL — SIGNIFICANT CHANGE UP (ref 80–100)
MONOCYTES # BLD AUTO: 1.1 K/UL — HIGH (ref 0–0.9)
MONOCYTES NFR BLD AUTO: 8.2 % — SIGNIFICANT CHANGE UP (ref 1–9)
NEUTROPHILS # BLD AUTO: 10.5 K/UL — HIGH (ref 1.8–7.4)
NEUTROPHILS NFR BLD AUTO: 76 % — SIGNIFICANT CHANGE UP (ref 43–77)
PLATELET # BLD AUTO: 351 K/UL — SIGNIFICANT CHANGE UP (ref 150–400)
POTASSIUM SERPL-MCNC: 3.5 MMOL/L — SIGNIFICANT CHANGE UP (ref 3.5–5.3)
POTASSIUM SERPL-SCNC: 3.5 MMOL/L — SIGNIFICANT CHANGE UP (ref 3.5–5.3)
RBC # BLD: 3.29 M/UL — LOW (ref 3.8–5.2)
RBC # FLD: 12.7 % — SIGNIFICANT CHANGE UP (ref 10.3–14.5)
SODIUM SERPL-SCNC: 137 MMOL/L — SIGNIFICANT CHANGE UP (ref 135–145)
WBC # BLD: 13.9 K/UL — HIGH (ref 3.8–10.5)
WBC # FLD AUTO: 13.9 K/UL — HIGH (ref 3.8–10.5)

## 2017-05-03 PROCEDURE — 87798 DETECT AGENT NOS DNA AMP: CPT

## 2017-05-03 PROCEDURE — 84484 ASSAY OF TROPONIN QUANT: CPT

## 2017-05-03 PROCEDURE — 84100 ASSAY OF PHOSPHORUS: CPT

## 2017-05-03 PROCEDURE — 96365 THER/PROPH/DIAG IV INF INIT: CPT

## 2017-05-03 PROCEDURE — 87633 RESP VIRUS 12-25 TARGETS: CPT

## 2017-05-03 PROCEDURE — 97161 PT EVAL LOW COMPLEX 20 MIN: CPT

## 2017-05-03 PROCEDURE — 93306 TTE W/DOPPLER COMPLETE: CPT

## 2017-05-03 PROCEDURE — 85610 PROTHROMBIN TIME: CPT

## 2017-05-03 PROCEDURE — 84145 PROCALCITONIN (PCT): CPT

## 2017-05-03 PROCEDURE — 93005 ELECTROCARDIOGRAM TRACING: CPT

## 2017-05-03 PROCEDURE — 85730 THROMBOPLASTIN TIME PARTIAL: CPT

## 2017-05-03 PROCEDURE — 99233 SBSQ HOSP IP/OBS HIGH 50: CPT

## 2017-05-03 PROCEDURE — 82553 CREATINE MB FRACTION: CPT

## 2017-05-03 PROCEDURE — 87040 BLOOD CULTURE FOR BACTERIA: CPT

## 2017-05-03 PROCEDURE — 83935 ASSAY OF URINE OSMOLALITY: CPT

## 2017-05-03 PROCEDURE — 96367 TX/PROPH/DG ADDL SEQ IV INF: CPT

## 2017-05-03 PROCEDURE — 84550 ASSAY OF BLOOD/URIC ACID: CPT

## 2017-05-03 PROCEDURE — 84443 ASSAY THYROID STIM HORMONE: CPT

## 2017-05-03 PROCEDURE — 87581 M.PNEUMON DNA AMP PROBE: CPT

## 2017-05-03 PROCEDURE — 83735 ASSAY OF MAGNESIUM: CPT

## 2017-05-03 PROCEDURE — 94640 AIRWAY INHALATION TREATMENT: CPT

## 2017-05-03 PROCEDURE — 97530 THERAPEUTIC ACTIVITIES: CPT

## 2017-05-03 PROCEDURE — 99285 EMERGENCY DEPT VISIT HI MDM: CPT | Mod: 25

## 2017-05-03 PROCEDURE — 85027 COMPLETE CBC AUTOMATED: CPT

## 2017-05-03 PROCEDURE — 83880 ASSAY OF NATRIURETIC PEPTIDE: CPT

## 2017-05-03 PROCEDURE — 93880 EXTRACRANIAL BILAT STUDY: CPT

## 2017-05-03 PROCEDURE — 94760 N-INVAS EAR/PLS OXIMETRY 1: CPT

## 2017-05-03 PROCEDURE — 82550 ASSAY OF CK (CPK): CPT

## 2017-05-03 PROCEDURE — 80053 COMPREHEN METABOLIC PANEL: CPT

## 2017-05-03 PROCEDURE — 84300 ASSAY OF URINE SODIUM: CPT

## 2017-05-03 PROCEDURE — 83970 ASSAY OF PARATHORMONE: CPT

## 2017-05-03 PROCEDURE — 83605 ASSAY OF LACTIC ACID: CPT

## 2017-05-03 PROCEDURE — 80048 BASIC METABOLIC PNL TOTAL CA: CPT

## 2017-05-03 PROCEDURE — 99221 1ST HOSP IP/OBS SF/LOW 40: CPT

## 2017-05-03 PROCEDURE — 99231 SBSQ HOSP IP/OBS SF/LOW 25: CPT

## 2017-05-03 PROCEDURE — 87486 CHLMYD PNEUM DNA AMP PROBE: CPT

## 2017-05-03 PROCEDURE — 71250 CT THORAX DX C-: CPT

## 2017-05-03 PROCEDURE — 71045 X-RAY EXAM CHEST 1 VIEW: CPT

## 2017-05-03 PROCEDURE — 97116 GAIT TRAINING THERAPY: CPT

## 2017-05-03 PROCEDURE — 82310 ASSAY OF CALCIUM: CPT

## 2017-05-03 RX ORDER — POTASSIUM CHLORIDE 20 MEQ
20 PACKET (EA) ORAL
Qty: 0 | Refills: 0 | COMMUNITY

## 2017-05-03 RX ADMIN — Medication 1 TABLET(S): at 11:44

## 2017-05-03 RX ADMIN — LOSARTAN POTASSIUM 25 MILLIGRAM(S): 100 TABLET, FILM COATED ORAL at 06:05

## 2017-05-03 RX ADMIN — ISOSORBIDE MONONITRATE 30 MILLIGRAM(S): 60 TABLET, EXTENDED RELEASE ORAL at 11:44

## 2017-05-03 RX ADMIN — ALBUTEROL 2.5 MILLIGRAM(S): 90 AEROSOL, METERED ORAL at 07:41

## 2017-05-03 RX ADMIN — Medication 10 MILLIGRAM(S): at 11:44

## 2017-05-03 RX ADMIN — FAMOTIDINE 20 MILLIGRAM(S): 10 INJECTION INTRAVENOUS at 11:44

## 2017-05-03 RX ADMIN — CARVEDILOL PHOSPHATE 25 MILLIGRAM(S): 80 CAPSULE, EXTENDED RELEASE ORAL at 06:06

## 2017-05-03 RX ADMIN — Medication 20 MILLIGRAM(S): at 06:05

## 2017-05-03 RX ADMIN — Medication 20 MILLIEQUIVALENT(S): at 11:44

## 2017-05-03 RX ADMIN — PIPERACILLIN AND TAZOBACTAM 25 GRAM(S): 4; .5 INJECTION, POWDER, LYOPHILIZED, FOR SOLUTION INTRAVENOUS at 06:05

## 2017-05-03 RX ADMIN — Medication 81 MILLIGRAM(S): at 11:44

## 2017-05-03 RX ADMIN — Medication 1 TABLET(S): at 09:47

## 2017-05-03 RX ADMIN — Medication 200 MILLIGRAM(S): at 06:05

## 2017-05-03 RX ADMIN — Medication 40 MILLIGRAM(S): at 06:06

## 2017-05-03 NOTE — PROGRESS NOTE ADULT - SUBJECTIVE AND OBJECTIVE BOX
Date/Time Patient Seen:  		  Referring MD:   Data Reviewed	       Patient is a 89y old  Female who presents with a chief complaint of Cough (25 Apr 2017 19:53)  in bed  seen and examined  vs and meds reviewed  frail and weak  on IV abx      Subjective/HPI       Medication list         MEDICATIONS  (STANDING):  ALBUTerol    0.083% 2.5milliGRAM(s) Nebulizer every 8 hours  guaiFENesin    Syrup 200milliGRAM(s) Oral every 8 hours  enoxaparin Injectable 40milliGRAM(s) SubCutaneous every 24 hours  doxazosin 4milliGRAM(s) Oral at bedtime  famotidine    Tablet 20milliGRAM(s) Oral daily  carvedilol 25milliGRAM(s) Oral every 12 hours  cinacalcet 30milliGRAM(s) Oral at bedtime  hydrALAZINE 10milliGRAM(s) Oral <User Schedule>  hydrALAZINE 20milliGRAM(s) Oral two times a day  aspirin enteric coated 81milliGRAM(s) Oral daily  potassium chloride   Powder 20milliEquivalent(s) Oral daily  lactobacillus acidophilus 1Tablet(s) Oral three times a day with meals  isosorbide   mononitrate ER Tablet (IMDUR) 30milliGRAM(s) Oral daily  losartan 25milliGRAM(s) Oral daily  furosemide    Tablet 40milliGRAM(s) Oral daily  piperacillin/tazobactam IVPB. 3.375Gram(s) IV Intermittent every 8 hours    MEDICATIONS  (PRN):         Vitals log        ICU Vital Signs Last 24 Hrs  T(C): 36.3, Max: 36.6 (05-02 @ 16:25)  T(F): 97.3, Max: 97.9 (05-02 @ 16:25)  HR: 63 (62 - 72)  BP: 155/63 (125/75 - 175/67)  BP(mean): --  ABP: --  ABP(mean): --  RR: 18 (16 - 19)  SpO2: 96% (94% - 100%)           Input and Output:  I&O's Detail    I & Os for current day (as of 03 May 2017 06:21)  =============================================  IN:    Oral Fluid: 500 ml    Total IN: 500 ml  ---------------------------------------------  OUT:    Total OUT: 0 ml  ---------------------------------------------  Total NET: 500 ml      Lab Data                        10.0   16.4  )-----------( 400      ( 02 May 2017 06:20 )             29.7     05-02    133<L>  |  99  |  34<H>  ----------------------------<  100<H>  4.2   |  27  |  0.81    Ca    10.2<H>      02 May 2017 06:20              Review of Systems	      Objective     Physical Examination  heart - s1s2, lungs - dec BS, abd - soft, extr - chr changes        Pertinent Lab findings & Imaging      Richard:  NO   Adequate UO     I&O's Detail    I & Os for current day (as of 03 May 2017 06:21)  =============================================  IN:    Oral Fluid: 500 ml    Total IN: 500 ml  ---------------------------------------------  OUT:    Total OUT: 0 ml  ---------------------------------------------  Total NET: 500 ml           Discussed with:     Cultures:	        Radiology

## 2017-05-03 NOTE — PROGRESS NOTE ADULT - PROBLEM SELECTOR PLAN 1
CT show multifocal pna. as per dr rivera partially treated CAP. Repeat CXR - improved Leukocytosis most c/w reactive changes per heme. WBC now trending down.   On Zosyn q8h (day 5/5)   Blood cx - NEG  f/u ID recs (Dr. Helton)

## 2017-05-03 NOTE — PROGRESS NOTE ADULT - ASSESSMENT
89F PMHx HTN, hyperparathyroidism s/p parathyroidectomy, anemia, LE edema admitted for new onset CHF and PNA with hyponatremia. WBC now trending down on increased dose of abx. Clinically better.

## 2017-05-03 NOTE — PROGRESS NOTE ADULT - PROBLEM SELECTOR PLAN 2
on ABX  supportive care  on 3rd abx  ID following  overall better  for dc to [facility]  frail and weak  advanced age

## 2017-05-03 NOTE — PROGRESS NOTE ADULT - SUBJECTIVE AND OBJECTIVE BOX
Patient is a 89y old  Female who presents with a chief complaint of Cough (25 Apr 2017 19:53)      INTERVAL HPI/OVERNIGHT EVENTS: Pt feeling well with no complaints. Cough has improved. Denies CP and SOB. No acute events overnight    MEDICATIONS  (STANDING):  ALBUTerol    0.083% 2.5milliGRAM(s) Nebulizer every 8 hours  guaiFENesin    Syrup 200milliGRAM(s) Oral every 8 hours  enoxaparin Injectable 40milliGRAM(s) SubCutaneous every 24 hours  doxazosin 4milliGRAM(s) Oral at bedtime  famotidine    Tablet 20milliGRAM(s) Oral daily  carvedilol 25milliGRAM(s) Oral every 12 hours  cinacalcet 30milliGRAM(s) Oral at bedtime  hydrALAZINE 10milliGRAM(s) Oral <User Schedule>  hydrALAZINE 20milliGRAM(s) Oral two times a day  aspirin enteric coated 81milliGRAM(s) Oral daily  potassium chloride   Powder 20milliEquivalent(s) Oral daily  lactobacillus acidophilus 1Tablet(s) Oral three times a day with meals  isosorbide   mononitrate ER Tablet (IMDUR) 30milliGRAM(s) Oral daily  losartan 25milliGRAM(s) Oral daily  furosemide    Tablet 40milliGRAM(s) Oral daily  piperacillin/tazobactam IVPB. 3.375Gram(s) IV Intermittent every 8 hours    MEDICATIONS  (PRN):      Allergies    Vasotec (Unknown)    Intolerances        REVIEW OF SYSTEMS:  CONSTITUTIONAL: No fever, No chills,No fatigue, No body aches.   EYES: No eye pain, visual disturbances, or discharge  ENMT:  No ear pain, No nose bleed, No vertigo; No sinus or throat pain  NECK: No pain, No stiffness  RESPIRATORY: Cough. No wheezing, No  hemoptysis; No shortness of breath  CARDIOVASCULAR: No chest pain, palpitations, leg swelling  GASTROINTESTINAL: No abdominal or epigastric pain. No nausea, No vomiting; No diarrhea or constipation. [X] BM  GENITOURINARY: No dysuria, No frequency, No urgency, No hematuria, or incontinence  NEUROLOGICAL: alert and oriented x 3,  No headaches, No dizziness, No numbness,  SKIN:   No itching, burning, rashes, or lesions   MUSCULOSKELETAL: No joint pain or swelling; No muscle pain, No back pain, No extremity pain  PSYCHIATRIC: No depression, anxiety, mood swings, or difficulty sleeping  ROS  [ ] Unable to obtain   REST OF REVIEW Of SYSTEM - [X] Normal     Height (cm): 152.4 (04-22 @ 15:48)  Weight (kg): 56.7 (04-22 @ 15:48)  BMI (kg/m2): 24.4 (04-22 @ 15:48)  BSA (m2): 1.53 (04-22 @ 15:48)    Vital Signs Last 24 Hrs  T(C): 36.3, Max: 36.6 (05-02 @ 16:25)  T(F): 97.3, Max: 97.9 (05-02 @ 16:25)  HR: 63 (63 - 72)  BP: 155/63 (125/75 - 175/67)  RR: 18 (16 - 18)  SpO2: 96% (94% - 100%)  [X] room air   [ ] 02    PHYSICAL EXAM:  GENERAL:  No acute distress, well appearing, laying comfortably in bed.   HEAD:  normal  ENMT: normal  NECK:  normal    NERVOUS SYSTEM:  Alert & Oriented X3, no focal deficits  CHEST/LUNG: Clear to auscultation bilaterally  HEART:  Regular rate and rhythm, No murmurs, rubs, or gallops  ABDOMEN:  soft, nontender, nondistended, positive bowel sounds  EXTREMITIES: No clubbing, cyanosis or edema  SKIN: No venous stasis skin changes    LABS:                        9.8    13.9  )-----------( 351      ( 03 May 2017 07:22 )             29.7       Ca    10.2       02 May 2017 06:20            CAPILLARY BLOOD GLUCOSE    Cultures          RADIOLOGY & ADDITIONAL TESTS:      Care Discussed with [X] Consultants  [X] Patient  [ ] Family  [X]   /   [ ] Other; RN  DVT prophylaxis [X] lovenox   [ ] subq heparin  [ ] coumadin  [ ] venodynes [ ] ambulating frequently at how risk for vte and no pharm or  mechanical prophylaxis required    [ ] other   Advanced directive:    [X]pt has hcp     [ ] pt declined to assign hcp  Discussed with pt @ bedside

## 2017-05-03 NOTE — PROGRESS NOTE ADULT - PROBLEM SELECTOR PLAN 3
2/2 to new onset CHF and cap  Continue albuterol TID.   O2 supplementation, keep O2 sats above 90 at rest 93 percent on room air.  f/u pulm recs (Dr. Valdovinos).

## 2017-05-03 NOTE — PROGRESS NOTE ADULT - PROBLEM SELECTOR PLAN 2
New onset. Improved with diuresis.  Now euvolemic  ECHO - EF 60-65%, valvular heart disease, pulm hypertension.  Continue PO lasix 40mg.   Continue carvedilol 25mg BID.   Continue Imdur 30mg daily for preload reduction   Monitor I&Os, daily wts.   f/u cardio recs (Dr. Landaverde)

## 2017-05-03 NOTE — PROGRESS NOTE ADULT - PROBLEM SELECTOR PROBLEM 9
Advance care planning

## 2017-05-03 NOTE — PROGRESS NOTE ADULT - ATTENDING COMMENTS
pt seen and agree w housestaff.  dw daughter at length- dc to rehab.  completed abx  follow ct chest post rehab.

## 2017-05-03 NOTE — PROGRESS NOTE ADULT - PROBLEM SELECTOR PLAN 9
Family wish full resuscitative measures at this point, although would not want artificial feeding, should the situation arise.  hcp in place  D/C planner to [facility] [place] [facility].

## 2017-05-03 NOTE — PROGRESS NOTE ADULT - SUBJECTIVE AND OBJECTIVE BOX
[facility] - Christofer Isaacs, Marisa Rowell, Hieu Norris    Patient sitting comfortably in a chair in NAD.  No complaints of chest pain, dyspnea, palpitations, PND, or orthopnea.    MEDICATIONS  (STANDING):  ALBUTerol    0.083% 2.5milliGRAM(s) Nebulizer every 8 hours  guaiFENesin    Syrup 200milliGRAM(s) Oral every 8 hours  enoxaparin Injectable 40milliGRAM(s) SubCutaneous every 24 hours  doxazosin 4milliGRAM(s) Oral at bedtime  famotidine    Tablet 20milliGRAM(s) Oral daily  carvedilol 25milliGRAM(s) Oral every 12 hours  cinacalcet 30milliGRAM(s) Oral at bedtime  hydrALAZINE 10milliGRAM(s) Oral <User Schedule>  hydrALAZINE 20milliGRAM(s) Oral two times a day  aspirin enteric coated 81milliGRAM(s) Oral daily  potassium chloride   Powder 20milliEquivalent(s) Oral daily  lactobacillus acidophilus 1Tablet(s) Oral three times a day with meals  isosorbide   mononitrate ER Tablet (IMDUR) 30milliGRAM(s) Oral daily  losartan 25milliGRAM(s) Oral daily  furosemide    Tablet 40milliGRAM(s) Oral daily  piperacillin/tazobactam IVPB. 3.375Gram(s) IV Intermittent every 8 hours    Allergies    Vasotec (Unknown)        Vital Signs Last 24 Hrs  T(C): 36.3, Max: 36.6 (05-02 @ 16:25)  T(F): 97.3, Max: 97.9 (05-02 @ 16:25)  HR: 63 (63 - 72)  BP: 155/63 (125/75 - 175/67)  BP(mean): --  RR: 18 (16 - 18)  SpO2: 96% (94% - 100%)    I&O's Summary    I & Os for current day (as of 03 May 2017 08:32)  =============================================  IN: 200 ml / OUT: 0 ml / NET: 200 ml      ON EXAM:    General: NAD, awake and alert, oriented x 3  HEENT: Mucous membranes are dry, anicteric  Lungs: Non-labored, breath sounds are clear bilaterally, No wheezing, rales or rhonchi.  Decreased breath sounds at the bases  Cardiovascular: Regular, S1 and S2, no rubs, or gallops.  2/6 systolic murmur  Gastrointestinal: Bowel Sounds present, soft, nontender.   Lymph: No significant LE edema  Skin: No rashes or ulcers    LABS: All Labs Reviewed:                        9.8    13.9  )-----------( 351      ( 03 May 2017 07:22 )             29.7                         10.0   16.4  )-----------( 400      ( 02 May 2017 06:20 )             29.7                         10.2   17.6  )-----------( 384      ( 01 May 2017 07:38 )             30.6     02 May 2017 06:20    133    |  99     |  34     ----------------------------<  100    4.2     |  27     |  0.81   01 May 2017 07:38    136    |  100    |  38     ----------------------------<  115    3.7     |  26     |  0.82     Ca    10.2       02 May 2017 06:20  Ca    10.1       01 May 2017 07:38      Assessment/Plan:  89 F w HFPEF, HTN, edema p/w ADHF; also being treated for pneumonia, clinically improved:    - No signs of significant ischemia.  - Appears compensated from a HF POV. Vol status has overall improved. Cont furosemide 40 po daily   - Continue Coreg and Hydralazine at home doses.  Cont Imdur 30mg Qday for preload reduction   - Monitor and replete electrolytes. Keep K>4.0 and Mg>2.0.   - Continue losartan 25 daily  - Continue aspirin 81 QD  - Supplemental oxygen as needed  - Abx per ID  - To follow

## 2017-05-03 NOTE — PROGRESS NOTE ADULT - PROBLEM SELECTOR PLAN 1
normal differential and morphology on review of peripheral blood smear.   neutrophilic, most likely reactive to inflammation   remains stable and is trending down with benign fluctuation , which is most c/w reactive changes  stable for d/c from hem standpoint  with  conservative monitoring

## 2017-05-03 NOTE — PROGRESS NOTE ADULT - SUBJECTIVE AND OBJECTIVE BOX
[facility], Division of Infectious Diseases  TD Hadley A. Lee    Name: SAIRA SANTANA  Age: 89y  Gender: Female  MRN: [id number]    Interval History--  Notes reviewed. Quiet night. No complaints. Ambulated with me approximately 100 feet with rolling walker without any problems.  Discussed with patient's daughter yesterday, who essentially demanded that the patient stay in hospital until her WBC is normal.    Past Medical History--  Anemia, unspecified type  Edema of lower extremity  Heart murmur  Hypertension  Hypercalcemia  History of appendectomy  S/P tonsillectomy  H/O parathyroidectomy      For details regarding the patient's social history, family history, and other miscellaneous elements, please refer the initial infectious diseases consultation and/or the admitting history and physical examination for this admission.    Allergies    Vasotec (Unknown)    Intolerances        Medications--  Antibiotics:  piperacillin/tazobactam IVPB. 3.375Gram(s) IV Intermittent every 8 hours    Immunologic:    Other:  ALBUTerol    0.083%  guaiFENesin    Syrup  enoxaparin Injectable  doxazosin  famotidine    Tablet  carvedilol  cinacalcet  hydrALAZINE  hydrALAZINE  aspirin enteric coated  potassium chloride   Powder  lactobacillus acidophilus  isosorbide   mononitrate ER Tablet (IMDUR)  losartan  furosemide    Tablet      Review of Systems--  A 10-point review of systems was obtained.     Pertinent positives and negatives--  Constitutional: No fevers. No Chills. No Rigors.   Cardiovascular: No chest pain. No palpitations.  Respiratory: No shortness of breath. No cough.  Gastrointestinal: No nausea or vomiting. No diarrhea or constipation.   Psychiatric: Pleasant. Appropriate affect.    Review of systems otherwise negative except as previously noted.    Physical Examination--  PHYSICAL EXAM:  General: Nontoxic-appearing Female in no acute distress.  Vital Signs: T(F): 97.7, Max: 97.9 (05-02 @ 16:25)  HR: 62  BP: 133/67  RR: 18  SpO2: 95%  Wt(kg): --  HEENT: AT/NC. PERRL. EOMI. Oropharynx clear. Dentition fair.  Neck: Not rigid. No sense of mass.  Nodes: None palpable.  Lungs: diminshed breath sounds bilaterally without rales, wheezing or ronchi  Heart: Regular rate and rhythm. No Murmur. No rub. No gallop. No palpable thrill.  Abdomen: Bowel sounds present and normoactive. Soft. Mildly distended. Nontender. No sense of mass. No organomegaly.  Back: No spinal tenderness. No costovertebral angle tenderness.   Extremities: No cyanosis or clubbing. 1+ LE edema. Somewhat wasted appearing.   Skin: Warm. Dry. Good turgor. Fragile. No rash. No vasculitic stigmata.  Psychiatric: Appropriate affect and mood for situation.       Laboratory Studies--  CBC                        9.8    13.9  )-----------( 351      ( 03 May 2017 07:22 )             29.7       WBC Count: 16.4 K/uL (05.02.17 @ 06:20)      Chemistries  05-02    133<L>  |  99  |  34<H>  ----------------------------<  100<H>  4.2   |  27  |  0.81    Ca    10.2<H>      02 May 2017 06:20        Culture Data  No new culture data    Assessment--  Finishing treatement for possible pneumonia today, though I suspect that the bulk of her presentation was due to volume overload. No concern of active/uncontroled infection on exam. WBC continues to moderate.    I do not feel that the risk:benefit ratio of keeping this frail elderly woman in hospital is favorable. I am worried she will develop a complication from staying in hospital or some other untoward event (e.g. fall). The patient tells me that she has not been walked much. I think she needs physical therapy more than inpatient hospitalization at this point and I am comfortable with sending patient to rehab, with WBC to be followed up as an outpatient.    Suggestions--  Stop Abx  Transfer to rehab  Monitor CBC as outpatient  Discussed with Dr. Valdovinos  Discussed with Dr. Issa yesterday.  If the patient's daughter disagrees with my assessment then I would endorse a second ID opinion.      Willem Helton MD  [phone]

## 2017-05-03 NOTE — PROGRESS NOTE ADULT - SUBJECTIVE AND OBJECTIVE BOX
Interval History:    Chart reviewed and events noted;   ROS:negative exept    MEDICATIONS  (STANDING):  ALBUTerol    0.083% 2.5milliGRAM(s) Nebulizer every 8 hours  guaiFENesin    Syrup 200milliGRAM(s) Oral every 8 hours  enoxaparin Injectable 40milliGRAM(s) SubCutaneous every 24 hours  doxazosin 4milliGRAM(s) Oral at bedtime  famotidine    Tablet 20milliGRAM(s) Oral daily  carvedilol 25milliGRAM(s) Oral every 12 hours  cinacalcet 30milliGRAM(s) Oral at bedtime  hydrALAZINE 10milliGRAM(s) Oral <User Schedule>  hydrALAZINE 20milliGRAM(s) Oral two times a day  aspirin enteric coated 81milliGRAM(s) Oral daily  potassium chloride   Powder 20milliEquivalent(s) Oral daily  lactobacillus acidophilus 1Tablet(s) Oral three times a day with meals  isosorbide   mononitrate ER Tablet (IMDUR) 30milliGRAM(s) Oral daily  losartan 25milliGRAM(s) Oral daily  furosemide    Tablet 40milliGRAM(s) Oral daily  piperacillin/tazobactam IVPB. 3.375Gram(s) IV Intermittent every 8 hours    MEDICATIONS  (PRN):      Vital Signs Last 24 Hrs  T(C): 36.5, Max: 36.6 (05-02 @ 16:25)  T(F): 97.7, Max: 97.9 (05-02 @ 16:25)  HR: 62 (62 - 72)  BP: 133/67 (125/75 - 175/67)  BP(mean): --  RR: 18 (16 - 18)  SpO2: 95% (94% - 100%)    PHYSICAL EXAM  General: adult in NAD  HEENT: clear oropharynx, anicteric sclera, pink conjunctivae  Neck: supple  CV: normal S1S2 with no murmur rubs or gallops  Lungs: occasional scattered crackles with rhonchi in left base  Abdomen: soft non-tender non-distended, no hepatosplenomegaly  Ext: no clubbing cyanosis or edema  Skin: no rashes and no petechiae  Neuro: alert and oriented X3 no focal deficits      LABS:                        9.8    13.9  )-----------( 351      ( 03 May 2017 07:22 )             29.7     05-03    137  |  100  |  29<H>  ----------------------------<  187<H>  3.5   |  27  |  0.96    Ca    10.2<H>      03 May 2017 09:58            RADIOLOGY & ADDITIONAL STUDIES:    IMPRESSION/RECOMMENDATIONS: Interval History:no acute events , feels better , no complains     Chart reviewed and events noted;       MEDICATIONS  (STANDING):  ALBUTerol    0.083% 2.5milliGRAM(s) Nebulizer every 8 hours  guaiFENesin    Syrup 200milliGRAM(s) Oral every 8 hours  enoxaparin Injectable 40milliGRAM(s) SubCutaneous every 24 hours  doxazosin 4milliGRAM(s) Oral at bedtime  famotidine    Tablet 20milliGRAM(s) Oral daily  carvedilol 25milliGRAM(s) Oral every 12 hours  cinacalcet 30milliGRAM(s) Oral at bedtime  hydrALAZINE 10milliGRAM(s) Oral <User Schedule>  hydrALAZINE 20milliGRAM(s) Oral two times a day  aspirin enteric coated 81milliGRAM(s) Oral daily  potassium chloride   Powder 20milliEquivalent(s) Oral daily  lactobacillus acidophilus 1Tablet(s) Oral three times a day with meals  isosorbide   mononitrate ER Tablet (IMDUR) 30milliGRAM(s) Oral daily  losartan 25milliGRAM(s) Oral daily  furosemide    Tablet 40milliGRAM(s) Oral daily  piperacillin/tazobactam IVPB. 3.375Gram(s) IV Intermittent every 8 hours    MEDICATIONS  (PRN):      Vital Signs Last 24 Hrs  T(C): 36.5, Max: 36.6 (05-02 @ 16:25)  T(F): 97.7, Max: 97.9 (05-02 @ 16:25)  HR: 62 (62 - 72)  BP: 133/67 (125/75 - 175/67)  BP(mean): --  RR: 18 (16 - 18)  SpO2: 95% (94% - 100%)    PHYSICAL EXAM  General: adult in NAD  HEENT: clear oropharynx, anicteric sclera, pink conjunctivae  Neck: supple  CV: normal S1S2 with no murmur rubs or gallops  Lungs: occasional scattered crackles with rhonchi in left base  Abdomen: soft non-tender non-distended, no hepatosplenomegaly  Ext: no clubbing cyanosis or edema  Skin: no rashes and no petechiae  Neuro: alert and oriented X3 no focal deficits      LABS:                        9.8    13.9  )-----------( 351      ( 03 May 2017 07:22 )             29.7     05-03    137  |  100  |  29<H>  ----------------------------<  187<H>  3.5   |  27  |  0.96    Ca    10.2<H>      03 May 2017 09:58

## 2017-05-03 NOTE — PROGRESS NOTE ADULT - ASSESSMENT
89 yo woman with history of iron deficiency followed in office,on IV iron therapy. Adm 4/22/17 with cough and weakness, noted leukocytosis. Found with bilateral infiltrates on CT chest suspicious for multilobar pneumonia as per radiology but per pulmonary no change in respiratory symptoms and no clinical evidence of pneumonia. patient improved on Zosyn, plan is to d.c on Levaquin as per ID. 89 yo woman with history of iron deficiency followed in office,on IV iron therapy. Adm 4/22/17 with cough and weakness, noted leukocytosis. Found with bilateral infiltrates on CT chest suspicious for multilobar pneumonia as per radiology but per pulmonary no change in respiratory symptoms and no clinical evidence of pneumonia. patient improved on Zosyn, pending  d.c home  on Levaquin as per ID.

## 2017-05-08 DIAGNOSIS — I07.1 RHEUMATIC TRICUSPID INSUFFICIENCY: ICD-10-CM

## 2017-05-08 DIAGNOSIS — E87.1 HYPO-OSMOLALITY AND HYPONATREMIA: ICD-10-CM

## 2017-05-08 DIAGNOSIS — I35.1 NONRHEUMATIC AORTIC (VALVE) INSUFFICIENCY: ICD-10-CM

## 2017-05-08 DIAGNOSIS — H91.90 UNSPECIFIED HEARING LOSS, UNSPECIFIED EAR: ICD-10-CM

## 2017-05-08 DIAGNOSIS — D50.9 IRON DEFICIENCY ANEMIA, UNSPECIFIED: ICD-10-CM

## 2017-05-08 DIAGNOSIS — R01.1 CARDIAC MURMUR, UNSPECIFIED: ICD-10-CM

## 2017-05-08 DIAGNOSIS — I34.0 NONRHEUMATIC MITRAL (VALVE) INSUFFICIENCY: ICD-10-CM

## 2017-05-08 DIAGNOSIS — R63.0 ANOREXIA: ICD-10-CM

## 2017-05-08 DIAGNOSIS — I11.0 HYPERTENSIVE HEART DISEASE WITH HEART FAILURE: ICD-10-CM

## 2017-05-08 DIAGNOSIS — R54 AGE-RELATED PHYSICAL DEBILITY: ICD-10-CM

## 2017-05-08 DIAGNOSIS — E21.3 HYPERPARATHYROIDISM, UNSPECIFIED: ICD-10-CM

## 2017-05-08 DIAGNOSIS — I35.0 NONRHEUMATIC AORTIC (VALVE) STENOSIS: ICD-10-CM

## 2017-05-08 DIAGNOSIS — J44.9 CHRONIC OBSTRUCTIVE PULMONARY DISEASE, UNSPECIFIED: ICD-10-CM

## 2017-05-08 DIAGNOSIS — D72.829 ELEVATED WHITE BLOOD CELL COUNT, UNSPECIFIED: ICD-10-CM

## 2017-05-08 DIAGNOSIS — I50.31 ACUTE DIASTOLIC (CONGESTIVE) HEART FAILURE: ICD-10-CM

## 2017-05-08 DIAGNOSIS — Z88.8 ALLERGY STATUS TO OTHER DRUGS, MEDICAMENTS AND BIOLOGICAL SUBSTANCES STATUS: ICD-10-CM

## 2017-05-08 DIAGNOSIS — E83.52 HYPERCALCEMIA: ICD-10-CM

## 2017-05-08 DIAGNOSIS — I27.2 OTHER SECONDARY PULMONARY HYPERTENSION: ICD-10-CM

## 2017-05-08 DIAGNOSIS — J96.01 ACUTE RESPIRATORY FAILURE WITH HYPOXIA: ICD-10-CM

## 2017-05-08 DIAGNOSIS — M40.209 UNSPECIFIED KYPHOSIS, SITE UNSPECIFIED: ICD-10-CM

## 2017-05-08 DIAGNOSIS — I38 ENDOCARDITIS, VALVE UNSPECIFIED: ICD-10-CM

## 2017-05-08 DIAGNOSIS — J18.9 PNEUMONIA, UNSPECIFIED ORGANISM: ICD-10-CM

## 2017-06-05 ENCOUNTER — RESULT CHARGE (OUTPATIENT)
Age: 82
End: 2017-06-05

## 2017-06-05 ENCOUNTER — APPOINTMENT (OUTPATIENT)
Dept: CARDIOLOGY | Facility: CLINIC | Age: 82
End: 2017-06-05

## 2017-06-05 ENCOUNTER — NON-APPOINTMENT (OUTPATIENT)
Age: 82
End: 2017-06-05

## 2017-06-05 VITALS
DIASTOLIC BLOOD PRESSURE: 80 MMHG | OXYGEN SATURATION: 96 % | HEART RATE: 64 BPM | WEIGHT: 117 LBS | HEIGHT: 60 IN | BODY MASS INDEX: 22.97 KG/M2 | SYSTOLIC BLOOD PRESSURE: 223 MMHG

## 2017-06-05 DIAGNOSIS — Z86.2 PERSONAL HISTORY OF DISEASES OF THE BLOOD AND BLOOD-FORMING ORGANS AND CERTAIN DISORDERS INVOLVING THE IMMUNE MECHANISM: ICD-10-CM

## 2017-06-05 RX ORDER — CINACALCET HYDROCHLORIDE 30 MG/1
30 TABLET, COATED ORAL
Refills: 0 | Status: ACTIVE | COMMUNITY

## 2017-06-05 RX ORDER — DOXAZOSIN 4 MG/1
4 TABLET ORAL DAILY
Qty: 90 | Refills: 3 | Status: ACTIVE | COMMUNITY

## 2017-06-06 ENCOUNTER — NON-APPOINTMENT (OUTPATIENT)
Age: 82
End: 2017-06-06

## 2017-06-06 PROBLEM — Z86.2 HISTORY OF ANEMIA: Status: RESOLVED | Noted: 2017-06-06 | Resolved: 2017-06-06

## 2017-07-03 ENCOUNTER — EMERGENCY (EMERGENCY)
Facility: HOSPITAL | Age: 82
LOS: 1 days | Discharge: HOME | End: 2017-07-03
Attending: EMERGENCY MEDICINE | Admitting: EMERGENCY MEDICINE
Payer: MEDICARE

## 2017-07-03 VITALS
OXYGEN SATURATION: 96 % | HEART RATE: 60 BPM | TEMPERATURE: 98 F | DIASTOLIC BLOOD PRESSURE: 73 MMHG | RESPIRATION RATE: 16 BRPM | SYSTOLIC BLOOD PRESSURE: 206 MMHG

## 2017-07-03 DIAGNOSIS — Z90.49 ACQUIRED ABSENCE OF OTHER SPECIFIED PARTS OF DIGESTIVE TRACT: Chronic | ICD-10-CM

## 2017-07-03 DIAGNOSIS — E89.2 POSTPROCEDURAL HYPOPARATHYROIDISM: Chronic | ICD-10-CM

## 2017-07-03 DIAGNOSIS — Z90.89 ACQUIRED ABSENCE OF OTHER ORGANS: Chronic | ICD-10-CM

## 2017-07-03 DIAGNOSIS — I10 ESSENTIAL (PRIMARY) HYPERTENSION: ICD-10-CM

## 2017-07-03 DIAGNOSIS — Z90.49 ACQUIRED ABSENCE OF OTHER SPECIFIED PARTS OF DIGESTIVE TRACT: ICD-10-CM

## 2017-07-03 DIAGNOSIS — Z98.890 OTHER SPECIFIED POSTPROCEDURAL STATES: ICD-10-CM

## 2017-07-03 DIAGNOSIS — N39.0 URINARY TRACT INFECTION, SITE NOT SPECIFIED: ICD-10-CM

## 2017-07-03 DIAGNOSIS — Z88.8 ALLERGY STATUS TO OTHER DRUGS, MEDICAMENTS AND BIOLOGICAL SUBSTANCES STATUS: ICD-10-CM

## 2017-07-03 PROCEDURE — 99285 EMERGENCY DEPT VISIT HI MDM: CPT | Mod: 25

## 2017-07-03 RX ORDER — DOXAZOSIN MESYLATE 4 MG
1 TABLET ORAL
Qty: 0 | Refills: 0 | COMMUNITY

## 2017-07-03 RX ORDER — ONDANSETRON 8 MG/1
4 TABLET, FILM COATED ORAL ONCE
Qty: 0 | Refills: 0 | Status: COMPLETED | OUTPATIENT
Start: 2017-07-03 | End: 2017-07-03

## 2017-07-03 RX ORDER — CINACALCET 30 MG/1
1 TABLET, FILM COATED ORAL
Qty: 0 | Refills: 0 | COMMUNITY

## 2017-07-03 RX ORDER — SODIUM CHLORIDE 9 MG/ML
1000 INJECTION INTRAMUSCULAR; INTRAVENOUS; SUBCUTANEOUS
Qty: 0 | Refills: 0 | Status: DISCONTINUED | OUTPATIENT
Start: 2017-07-03 | End: 2017-07-03

## 2017-07-03 NOTE — ED PROVIDER NOTE - PROGRESS NOTE DETAILS
pt reevalutaed, feeling better, pt got up and ambulated to the bathroom, pt to be d/ chome follow up with pmd and return if any symtpms rigoberto

## 2017-07-03 NOTE — ED ADULT NURSE NOTE - OBJECTIVE STATEMENT
89 year old female brought in by EMS from home with daughter for complaints of increased weakness, nausea and midsternal chest pain. Daughter reports the pain lasted about 30 minutes before called EMS. On arrival to the ED patient is bradycardic and hypertensive. Patient reports the pain feels more like indigestion, midsternal chest with nausea. Denies fevers/chills. Denies palpitations or shortness of breath.

## 2017-07-03 NOTE — ED PROVIDER NOTE - OBJECTIVE STATEMENT
89yo female bib ems with weakness and nausea tonite. pt states she ate and then had chest tightness with abd pain, +nausea no vomiting or diarrhea, no fever, chills, pt feels weakness and states she could not stand, no dizziness, no headache, no other compalint

## 2017-07-04 VITALS
TEMPERATURE: 98 F | OXYGEN SATURATION: 96 % | SYSTOLIC BLOOD PRESSURE: 150 MMHG | DIASTOLIC BLOOD PRESSURE: 56 MMHG | RESPIRATION RATE: 14 BRPM | HEART RATE: 53 BPM

## 2017-07-04 LAB
ALBUMIN SERPL ELPH-MCNC: 3.7 G/DL — SIGNIFICANT CHANGE UP (ref 3.3–5)
ALP SERPL-CCNC: 78 U/L — SIGNIFICANT CHANGE UP (ref 40–120)
ALT FLD-CCNC: 27 U/L — SIGNIFICANT CHANGE UP (ref 12–78)
ANION GAP SERPL CALC-SCNC: 8 MMOL/L — SIGNIFICANT CHANGE UP (ref 5–17)
APPEARANCE UR: ABNORMAL
AST SERPL-CCNC: 22 U/L — SIGNIFICANT CHANGE UP (ref 15–37)
BACTERIA # UR AUTO: ABNORMAL
BASOPHILS # BLD AUTO: 0.1 K/UL — SIGNIFICANT CHANGE UP (ref 0–0.2)
BASOPHILS NFR BLD AUTO: 1.2 % — SIGNIFICANT CHANGE UP (ref 0–2)
BILIRUB SERPL-MCNC: 0.2 MG/DL — SIGNIFICANT CHANGE UP (ref 0.2–1.2)
BILIRUB UR-MCNC: NEGATIVE — SIGNIFICANT CHANGE UP
BUN SERPL-MCNC: 23 MG/DL — SIGNIFICANT CHANGE UP (ref 7–23)
CALCIUM SERPL-MCNC: 9.6 MG/DL — SIGNIFICANT CHANGE UP (ref 8.5–10.1)
CHLORIDE SERPL-SCNC: 90 MMOL/L — LOW (ref 96–108)
CK MB BLD-MCNC: 4 % — HIGH (ref 0–3.5)
CK MB CFR SERPL CALC: 3 NG/ML — SIGNIFICANT CHANGE UP (ref 0–3.6)
CK SERPL-CCNC: 75 U/L — SIGNIFICANT CHANGE UP (ref 26–192)
CO2 SERPL-SCNC: 28 MMOL/L — SIGNIFICANT CHANGE UP (ref 22–31)
COLOR SPEC: YELLOW — SIGNIFICANT CHANGE UP
CREAT SERPL-MCNC: 0.71 MG/DL — SIGNIFICANT CHANGE UP (ref 0.5–1.3)
DIFF PNL FLD: ABNORMAL
EOSINOPHIL # BLD AUTO: 0.2 K/UL — SIGNIFICANT CHANGE UP (ref 0–0.5)
EOSINOPHIL NFR BLD AUTO: 1.7 % — SIGNIFICANT CHANGE UP (ref 0–6)
EPI CELLS # UR: ABNORMAL
GLUCOSE SERPL-MCNC: 106 MG/DL — HIGH (ref 70–99)
GLUCOSE UR QL: NEGATIVE — SIGNIFICANT CHANGE UP
HCT VFR BLD CALC: 31.5 % — LOW (ref 34.5–45)
HGB BLD-MCNC: 10.9 G/DL — LOW (ref 11.5–15.5)
KETONES UR-MCNC: NEGATIVE — SIGNIFICANT CHANGE UP
LACTATE SERPL-SCNC: 0.5 MMOL/L — LOW (ref 0.7–2)
LEUKOCYTE ESTERASE UR-ACNC: ABNORMAL
LYMPHOCYTES # BLD AUTO: 1.8 K/UL — SIGNIFICANT CHANGE UP (ref 1–3.3)
LYMPHOCYTES # BLD AUTO: 20.4 % — SIGNIFICANT CHANGE UP (ref 13–44)
MCHC RBC-ENTMCNC: 30.4 PG — SIGNIFICANT CHANGE UP (ref 27–34)
MCHC RBC-ENTMCNC: 34.5 GM/DL — SIGNIFICANT CHANGE UP (ref 32–36)
MCV RBC AUTO: 87.9 FL — SIGNIFICANT CHANGE UP (ref 80–100)
MONOCYTES # BLD AUTO: 0.9 K/UL — SIGNIFICANT CHANGE UP (ref 0–0.9)
MONOCYTES NFR BLD AUTO: 9.9 % — HIGH (ref 1–9)
NEUTROPHILS # BLD AUTO: 5.9 K/UL — SIGNIFICANT CHANGE UP (ref 1.8–7.4)
NEUTROPHILS NFR BLD AUTO: 66.7 % — SIGNIFICANT CHANGE UP (ref 43–77)
NITRITE UR-MCNC: NEGATIVE — SIGNIFICANT CHANGE UP
PH UR: 6 — SIGNIFICANT CHANGE UP (ref 5–8)
PLATELET # BLD AUTO: 227 K/UL — SIGNIFICANT CHANGE UP (ref 150–400)
POTASSIUM SERPL-MCNC: 4 MMOL/L — SIGNIFICANT CHANGE UP (ref 3.5–5.3)
POTASSIUM SERPL-SCNC: 4 MMOL/L — SIGNIFICANT CHANGE UP (ref 3.5–5.3)
PROT SERPL-MCNC: 6.7 G/DL — SIGNIFICANT CHANGE UP (ref 6–8.3)
PROT UR-MCNC: 75 MG/DL
RBC # BLD: 3.58 M/UL — LOW (ref 3.8–5.2)
RBC # FLD: 11.8 % — SIGNIFICANT CHANGE UP (ref 10.3–14.5)
RBC CASTS # UR COMP ASSIST: SIGNIFICANT CHANGE UP /HPF (ref 0–4)
SODIUM SERPL-SCNC: 126 MMOL/L — LOW (ref 135–145)
SP GR SPEC: 1.01 — SIGNIFICANT CHANGE UP (ref 1.01–1.02)
TROPONIN I SERPL-MCNC: 0.02 NG/ML — SIGNIFICANT CHANGE UP (ref 0.01–0.04)
UROBILINOGEN FLD QL: NEGATIVE — SIGNIFICANT CHANGE UP
WBC # BLD: 8.8 K/UL — SIGNIFICANT CHANGE UP (ref 3.8–10.5)
WBC # FLD AUTO: 8.8 K/UL — SIGNIFICANT CHANGE UP (ref 3.8–10.5)
WBC UR QL: >50

## 2017-07-04 PROCEDURE — 71010: CPT | Mod: 26

## 2017-07-04 PROCEDURE — 93010 ELECTROCARDIOGRAM REPORT: CPT

## 2017-07-04 RX ORDER — CARVEDILOL PHOSPHATE 80 MG/1
1 CAPSULE, EXTENDED RELEASE ORAL
Qty: 0 | Refills: 0 | COMMUNITY

## 2017-07-04 RX ORDER — HYDRALAZINE HCL 50 MG
25 TABLET ORAL ONCE
Qty: 0 | Refills: 0 | Status: COMPLETED | OUTPATIENT
Start: 2017-07-04 | End: 2017-07-04

## 2017-07-04 RX ORDER — SODIUM CHLORIDE 9 MG/ML
1000 INJECTION INTRAMUSCULAR; INTRAVENOUS; SUBCUTANEOUS ONCE
Qty: 0 | Refills: 0 | Status: COMPLETED | OUTPATIENT
Start: 2017-07-04 | End: 2017-07-04

## 2017-07-04 RX ORDER — CEFOXITIN 1 G/1
1 INJECTION, POWDER, FOR SOLUTION INTRAVENOUS
Qty: 14 | Refills: 0 | OUTPATIENT
Start: 2017-07-04 | End: 2017-07-11

## 2017-07-04 RX ORDER — CEFTRIAXONE 500 MG/1
1 INJECTION, POWDER, FOR SOLUTION INTRAMUSCULAR; INTRAVENOUS ONCE
Qty: 0 | Refills: 0 | Status: COMPLETED | OUTPATIENT
Start: 2017-07-04 | End: 2017-07-04

## 2017-07-04 RX ORDER — HYDRALAZINE HCL 50 MG
30 TABLET ORAL
Qty: 0 | Refills: 0 | COMMUNITY

## 2017-07-04 RX ORDER — CARVEDILOL PHOSPHATE 80 MG/1
6.25 CAPSULE, EXTENDED RELEASE ORAL EVERY 12 HOURS
Qty: 0 | Refills: 0 | Status: DISCONTINUED | OUTPATIENT
Start: 2017-07-04 | End: 2017-07-07

## 2017-07-04 RX ADMIN — ONDANSETRON 4 MILLIGRAM(S): 8 TABLET, FILM COATED ORAL at 00:22

## 2017-07-04 RX ADMIN — CEFTRIAXONE 100 GRAM(S): 500 INJECTION, POWDER, FOR SOLUTION INTRAMUSCULAR; INTRAVENOUS at 02:11

## 2017-07-04 RX ADMIN — Medication 25 MILLIGRAM(S): at 06:46

## 2017-07-04 RX ADMIN — Medication 0.1 MILLIGRAM(S): at 03:55

## 2017-07-04 RX ADMIN — SODIUM CHLORIDE 500 MILLILITER(S): 9 INJECTION INTRAMUSCULAR; INTRAVENOUS; SUBCUTANEOUS at 00:21

## 2017-07-04 NOTE — ED ADULT NURSE REASSESSMENT NOTE - NS ED NURSE REASSESS COMMENT FT1
Daughter refusing staff to wake patient up to ambulate due to time. ED MD bedside explaining plan to discharge home. Daughter increasingly agitated, raising voice and cursing at MD Rivera. Charge RN bedside. Daughter leaving at this time to get clothes for patient, will reassess when she returns. Patient remains on bedside cardiac monitor, vital signs as documented. Denies pain, sleeping comfortably. Safety maintained.

## 2017-07-04 NOTE — ED ADULT NURSE REASSESSMENT NOTE - NS ED NURSE REASSESS COMMENT FT1
Patient given medication for elevated BP, patient remaining asymptomatic. Recheck 139/67, ED MD aware. Plan to ambulate patient and discharge home. Daughter bedside. Safety maintained.

## 2017-07-05 ENCOUNTER — APPOINTMENT (OUTPATIENT)
Dept: CARDIOLOGY | Facility: CLINIC | Age: 82
End: 2017-07-05

## 2017-07-05 ENCOUNTER — NON-APPOINTMENT (OUTPATIENT)
Age: 82
End: 2017-07-05

## 2017-07-05 VITALS
OXYGEN SATURATION: 94 % | DIASTOLIC BLOOD PRESSURE: 81 MMHG | HEIGHT: 60 IN | BODY MASS INDEX: 23.36 KG/M2 | HEART RATE: 67 BPM | SYSTOLIC BLOOD PRESSURE: 238 MMHG | WEIGHT: 119 LBS

## 2017-07-05 LAB
CULTURE RESULTS: SIGNIFICANT CHANGE UP
SPECIMEN SOURCE: SIGNIFICANT CHANGE UP

## 2017-07-05 RX ORDER — LOSARTAN POTASSIUM 100 MG/1
100 TABLET, FILM COATED ORAL DAILY
Qty: 90 | Refills: 3 | Status: DISCONTINUED | COMMUNITY
End: 2017-07-05

## 2017-07-05 RX ORDER — POTASSIUM CHLORIDE 1500 MG/1
20 TABLET, EXTENDED RELEASE ORAL
Qty: 90 | Refills: 3 | Status: DISCONTINUED | COMMUNITY
End: 2017-07-05

## 2017-07-06 ENCOUNTER — INPATIENT (INPATIENT)
Facility: HOSPITAL | Age: 82
LOS: 7 days | Discharge: NURSING, REHAB OR LONG-TERM CARE | DRG: 640 | End: 2017-07-14
Attending: INTERNAL MEDICINE | Admitting: INTERNAL MEDICINE
Payer: MEDICARE

## 2017-07-06 VITALS
RESPIRATION RATE: 15 BRPM | HEART RATE: 71 BPM | TEMPERATURE: 98 F | SYSTOLIC BLOOD PRESSURE: 154 MMHG | WEIGHT: 110.01 LBS | DIASTOLIC BLOOD PRESSURE: 86 MMHG | OXYGEN SATURATION: 95 %

## 2017-07-06 DIAGNOSIS — I16.0 HYPERTENSIVE URGENCY: ICD-10-CM

## 2017-07-06 DIAGNOSIS — I10 ESSENTIAL (PRIMARY) HYPERTENSION: ICD-10-CM

## 2017-07-06 DIAGNOSIS — Z90.89 ACQUIRED ABSENCE OF OTHER ORGANS: Chronic | ICD-10-CM

## 2017-07-06 DIAGNOSIS — N39.0 URINARY TRACT INFECTION, SITE NOT SPECIFIED: ICD-10-CM

## 2017-07-06 DIAGNOSIS — Z90.49 ACQUIRED ABSENCE OF OTHER SPECIFIED PARTS OF DIGESTIVE TRACT: Chronic | ICD-10-CM

## 2017-07-06 DIAGNOSIS — E87.1 HYPO-OSMOLALITY AND HYPONATREMIA: ICD-10-CM

## 2017-07-06 DIAGNOSIS — Z71.89 OTHER SPECIFIED COUNSELING: ICD-10-CM

## 2017-07-06 DIAGNOSIS — G93.41 METABOLIC ENCEPHALOPATHY: ICD-10-CM

## 2017-07-06 DIAGNOSIS — R60.0 LOCALIZED EDEMA: ICD-10-CM

## 2017-07-06 DIAGNOSIS — E89.2 POSTPROCEDURAL HYPOPARATHYROIDISM: Chronic | ICD-10-CM

## 2017-07-06 PROCEDURE — 70450 CT HEAD/BRAIN W/O DYE: CPT | Mod: 26

## 2017-07-06 PROCEDURE — 99223 1ST HOSP IP/OBS HIGH 75: CPT

## 2017-07-06 PROCEDURE — 71010: CPT | Mod: 26

## 2017-07-06 PROCEDURE — 93971 EXTREMITY STUDY: CPT | Mod: 26,LT

## 2017-07-06 PROCEDURE — 74177 CT ABD & PELVIS W/CONTRAST: CPT | Mod: 26

## 2017-07-06 RX ORDER — CARVEDILOL PHOSPHATE 80 MG/1
6.25 CAPSULE, EXTENDED RELEASE ORAL EVERY 12 HOURS
Qty: 0 | Refills: 0 | Status: DISCONTINUED | OUTPATIENT
Start: 2017-07-06 | End: 2017-07-11

## 2017-07-06 RX ORDER — CEFUROXIME AXETIL 250 MG
500 TABLET ORAL EVERY 12 HOURS
Qty: 0 | Refills: 0 | Status: DISCONTINUED | OUTPATIENT
Start: 2017-07-06 | End: 2017-07-09

## 2017-07-06 RX ORDER — HYDRALAZINE HCL 50 MG
30 TABLET ORAL
Qty: 0 | Refills: 0 | COMMUNITY

## 2017-07-06 RX ORDER — HYDRALAZINE HCL 50 MG
10 TABLET ORAL ONCE
Qty: 0 | Refills: 0 | Status: DISCONTINUED | OUTPATIENT
Start: 2017-07-06 | End: 2017-07-14

## 2017-07-06 RX ORDER — CINACALCET 30 MG/1
30 TABLET, FILM COATED ORAL AT BEDTIME
Qty: 0 | Refills: 0 | Status: DISCONTINUED | OUTPATIENT
Start: 2017-07-06 | End: 2017-07-11

## 2017-07-06 RX ORDER — ONDANSETRON 8 MG/1
4 TABLET, FILM COATED ORAL EVERY 6 HOURS
Qty: 0 | Refills: 0 | Status: DISCONTINUED | OUTPATIENT
Start: 2017-07-06 | End: 2017-07-14

## 2017-07-06 RX ORDER — ENOXAPARIN SODIUM 100 MG/ML
40 INJECTION SUBCUTANEOUS DAILY
Qty: 0 | Refills: 0 | Status: DISCONTINUED | OUTPATIENT
Start: 2017-07-06 | End: 2017-07-06

## 2017-07-06 RX ORDER — HYDRALAZINE HCL 50 MG
40 TABLET ORAL THREE TIMES A DAY
Qty: 0 | Refills: 0 | Status: DISCONTINUED | OUTPATIENT
Start: 2017-07-06 | End: 2017-07-08

## 2017-07-06 RX ORDER — ACETAMINOPHEN 500 MG
650 TABLET ORAL EVERY 6 HOURS
Qty: 0 | Refills: 0 | Status: DISCONTINUED | OUTPATIENT
Start: 2017-07-06 | End: 2017-07-14

## 2017-07-06 RX ORDER — VALSARTAN 80 MG/1
320 TABLET ORAL DAILY
Qty: 0 | Refills: 0 | Status: DISCONTINUED | OUTPATIENT
Start: 2017-07-06 | End: 2017-07-14

## 2017-07-06 RX ORDER — ENOXAPARIN SODIUM 100 MG/ML
30 INJECTION SUBCUTANEOUS DAILY
Qty: 0 | Refills: 0 | Status: DISCONTINUED | OUTPATIENT
Start: 2017-07-06 | End: 2017-07-14

## 2017-07-06 RX ORDER — DOXAZOSIN MESYLATE 4 MG
4 TABLET ORAL AT BEDTIME
Qty: 0 | Refills: 0 | Status: DISCONTINUED | OUTPATIENT
Start: 2017-07-06 | End: 2017-07-14

## 2017-07-06 RX ADMIN — ENOXAPARIN SODIUM 30 MILLIGRAM(S): 100 INJECTION SUBCUTANEOUS at 22:49

## 2017-07-06 RX ADMIN — CINACALCET 30 MILLIGRAM(S): 30 TABLET, FILM COATED ORAL at 21:49

## 2017-07-06 RX ADMIN — Medication 40 MILLIGRAM(S): at 21:49

## 2017-07-06 RX ADMIN — Medication 4 MILLIGRAM(S): at 22:21

## 2017-07-06 NOTE — CONSULT NOTE ADULT - ASSESSMENT
Myla's decompensation is unclear but her mental status changes may be due to hyponatremia. She has no evidence for an acute coronary process. She continues to have accelerated hypertension.    Plan    Admit to telemetry  Neurology evaluation  Nephrology evaluation  Fluid restriction  Continue antihypertensive therapy including hydralazine  Review old records  Echocardiography evaluation  Follow electrolytes closely  We will follow closely with you

## 2017-07-06 NOTE — H&P ADULT - PROBLEM SELECTOR PLAN 2
-start clonidine 0.1mg q8h  -con't coreg 6.25 q12h  -con't hydralazine 40mg q8h  -con't valsartan 320mg qd  -cardio consult -start clonidine 0.1mg q8h  -con't coreg 6.25 q12h (daughter reports patient become bradycardic when coreg dose was increased in the past)  -con't hydralazine 40mg q8h  -con't valsartan 320mg qd  -cardio consult

## 2017-07-06 NOTE — PATIENT PROFILE ADULT. - FALL HARM RISK
confused at times/bones(Osteoporosis,prev fx,steroid use,metastatic bone ca/age(85 years old or older)

## 2017-07-06 NOTE — H&P ADULT - HISTORY OF PRESENT ILLNESS
This is a 89 F with PMH of HTN, LE edema who was brought in by her daughter b/c of increased confusion this morning associated with nausea and weakness. She also complained of SOB. She denies fevers, chills, dysuria, cough and CP. Patient was in the ER on 7/3, diagnosed with UTI and sent home on po antibiotics.

## 2017-07-06 NOTE — H&P ADULT - PROBLEM SELECTOR PLAN 1
-Admit  -hold all diuretics  -fluid restrict 1000cc/day  -renal consult  -serial labs -Admit  -hold all diuretics  -fluid restrict 1000cc/day  -regular diet (not low salt)  -renal consult  -serial labs

## 2017-07-06 NOTE — CONSULT NOTE ADULT - SUBJECTIVE AND OBJECTIVE BOX
Nephrology Consultation: MD ESTHER Mckinney, SAIRA  89y    89 white female with a history of critical AS, HTN (poorly controlled) now presented with mental status changes associated with nausea. She had UTI recently and was treated with ceftin. She also has been on lasix for CHF and has been drinking a lot of water as advised by the nurse. she denies any prior history of renal disease or kidney stone disease. Denies any history of fever, chills. She complains of head aches now and has a BP of around 200 systolic. she has a history of parathyroidectomy and has been on sensipar.       PAST MEDICAL & SURGICAL HISTORY:  Anemia, unspecified type  Edema of lower extremity  Heart murmur  Hypertension  Hypercalcemia  History of appendectomy  S/P tonsillectomy  H/O parathyroidectomy      Allergies    Vasotec (Unknown)    Intolerances            FAMILY HISTORY:  No pertinent family history in first degree relatives      SOCIAL HISTORY:    REVIEW OF SYSTEMS:    Constitutional: No fever, weight loss or fatigue  Eyes: No eye pain, visual disturbances, or discharge  ENT:  No difficulty hearing, tinnitus, vertigo; No sinus or throat pain  Neck: No pain or stiffness  Breasts: No pain, masses or nipple discharge  Respiratory: No cough, wheezing, chills or hemoptysis  Cardiovascular: No chest pain, palpitations, shortness of breath, dizziness or leg swelling  Gastrointestinal: No abdominal or epigastric pain. No nausea, vomiting or hematemesis; No diarrhea or constipation. No melena or hematochezia.  Genitourinary: No dysuria, frequency, hematuria or incontinence  Rectal: No pain, hemorrhoids or incontinence  Neurological: No headaches, memory loss, loss of strength, numbness or tremors  Skin: No itching, burning, rashes or lesions   Lymph Nodes: No enlarged glands  Endocrine: No heat or cold intolerance; No hair loss  Musculoskeletal: No joint pain or swelling; No muscle, back or extremity pain  Psychiatric: No depression, anxiety, mood swings or difficulty sleeping  Heme/Lymph: No easy bruising or bleeding gums  Allergy and Immunologic: No hives or eczema    carvedilol 6.25 milliGRAM(s) Oral every 12 hours      Vital Signs Last 24 Hrs  T(C): --  T(F): --  HR: --  BP: --  BP(mean): --  RR: --  SpO2: --    PHYSICAL EXAM:    Constitutional: NAD, well-groomed, well-developed  HEENT: PERRLA, EOMI, Normal Hearing, MMM  Neck: No LAD, No JVD  Back: Normal spine flexure, No CVA tenderness  Respiratory: CTAB/L   Cardiovascular: S1 and S2, RRR, no M/G/R  Gastrointestinal: BS+, soft, NT/ND  Extremities: No peripheral edema  Vascular: 2+ peripheral pulses  Neurological: A/O x 3, no focal deficits  Skin: No rashes      LABS:      HGB 11.4, , K 3.7, CL 82, BUN 21, CR 0.61          MICROBIOLOGY:  RECENT CULTURES:        RADIOLOGY & ADDITIONAL STUDIES:    < from: Xray Chest 1 View AP/PA (07.04.17 @ 00:39) >  INTERPRETATION:  Portable chest radiograph        CLINICAL INFORMATION:   Generalized weakness. Malaise. Prior history of   pneumonia and effusions on 5/1/2017.    TECHNIQUE:  Portable  AP view of the chest was obtained.    COMPARISON: 5/1/2017 chest available for review.    FINDINGS:   The lungs  are clear.  No pleural abnormality is seen.         The  heart is enlarged in transverse diameter. No hilar mass. Trachea   midline.        Visualized osseous structures are intact. Bilateral shoulder   osteoarthritis noted.        IMPRESSION:   No evidence of active chest disease.

## 2017-07-06 NOTE — CONSULT NOTE ADULT - ASSESSMENT
89 White female with accelerated HTN, CHF, AF now admitted with head aches, nausea associated with accelerated HTN and hyponatremia.  this may be due to diuretics and excessive water intake.     plan for HTN, maintain on coreg, hydralazine and valsartan    will give a dose of clonidine at 0.1 mg stat    in view of severe HTN, will hold off on hypertonic saline or NS for now.    recheck the sodium and magnesium. over all prognosis is poor. spoke to patient's dtr at bed side in detail. 89 White female with accelerated HTN, CHF, AF now admitted with head aches, nausea associated with accelerated HTN and hyponatremia.  this may be due to diuretics and excessive water intake.   hold the lasix and other diuretics for now.   plan for HTN, maintain on coreg, hydralazine and valsartan    will give a dose of clonidine at 0.1 mg stat    in view of severe HTN, will hold off on hypertonic saline or NS for now.    recheck the sodium and magnesium. over all prognosis is poor. spoke to patient's dtr at bed side in detail.

## 2017-07-06 NOTE — H&P ADULT - PROBLEM SELECTOR PLAN 4
-r/o chf  -minimal LE edema, CXR clear  -r/o chf  -check 2decho  -cardio consult  -hold diuretics for

## 2017-07-07 ENCOUNTER — TRANSCRIPTION ENCOUNTER (OUTPATIENT)
Age: 82
End: 2017-07-07

## 2017-07-07 DIAGNOSIS — I10 ESSENTIAL (PRIMARY) HYPERTENSION: ICD-10-CM

## 2017-07-07 DIAGNOSIS — D64.9 ANEMIA, UNSPECIFIED: ICD-10-CM

## 2017-07-07 DIAGNOSIS — Z29.9 ENCOUNTER FOR PROPHYLACTIC MEASURES, UNSPECIFIED: ICD-10-CM

## 2017-07-07 DIAGNOSIS — I50.30 UNSPECIFIED DIASTOLIC (CONGESTIVE) HEART FAILURE: ICD-10-CM

## 2017-07-07 DIAGNOSIS — E87.6 HYPOKALEMIA: ICD-10-CM

## 2017-07-07 LAB
ALBUMIN SERPL ELPH-MCNC: 3.3 G/DL — SIGNIFICANT CHANGE UP (ref 3.3–5)
ALP SERPL-CCNC: 63 U/L — SIGNIFICANT CHANGE UP (ref 40–120)
ALT FLD-CCNC: 25 U/L — SIGNIFICANT CHANGE UP (ref 12–78)
ANION GAP SERPL CALC-SCNC: 6 MMOL/L — SIGNIFICANT CHANGE UP (ref 5–17)
ANION GAP SERPL CALC-SCNC: 6 MMOL/L — SIGNIFICANT CHANGE UP (ref 5–17)
APPEARANCE UR: ABNORMAL
AST SERPL-CCNC: 21 U/L — SIGNIFICANT CHANGE UP (ref 15–37)
BILIRUB SERPL-MCNC: 0.3 MG/DL — SIGNIFICANT CHANGE UP (ref 0.2–1.2)
BILIRUB UR-MCNC: NEGATIVE — SIGNIFICANT CHANGE UP
BUN SERPL-MCNC: 16 MG/DL — SIGNIFICANT CHANGE UP (ref 7–23)
BUN SERPL-MCNC: 21 MG/DL — SIGNIFICANT CHANGE UP (ref 7–23)
CALCIUM SERPL-MCNC: 8.1 MG/DL — LOW (ref 8.5–10.1)
CALCIUM SERPL-MCNC: 8.6 MG/DL — SIGNIFICANT CHANGE UP (ref 8.5–10.1)
CHLORIDE SERPL-SCNC: 87 MMOL/L — LOW (ref 96–108)
CHLORIDE SERPL-SCNC: 88 MMOL/L — LOW (ref 96–108)
CO2 SERPL-SCNC: 29 MMOL/L — SIGNIFICANT CHANGE UP (ref 22–31)
CO2 SERPL-SCNC: 31 MMOL/L — SIGNIFICANT CHANGE UP (ref 22–31)
COLOR SPEC: SIGNIFICANT CHANGE UP
CREAT SERPL-MCNC: 0.62 MG/DL — SIGNIFICANT CHANGE UP (ref 0.5–1.3)
CREAT SERPL-MCNC: 0.73 MG/DL — SIGNIFICANT CHANGE UP (ref 0.5–1.3)
DIFF PNL FLD: NEGATIVE — SIGNIFICANT CHANGE UP
GLUCOSE SERPL-MCNC: 102 MG/DL — HIGH (ref 70–99)
GLUCOSE SERPL-MCNC: 89 MG/DL — SIGNIFICANT CHANGE UP (ref 70–99)
GLUCOSE UR QL: NEGATIVE — SIGNIFICANT CHANGE UP
HCT VFR BLD CALC: 30.7 % — LOW (ref 34.5–45)
HGB BLD-MCNC: 10.5 G/DL — LOW (ref 11.5–15.5)
KETONES UR-MCNC: NEGATIVE — SIGNIFICANT CHANGE UP
LEUKOCYTE ESTERASE UR-ACNC: ABNORMAL
MAGNESIUM SERPL-MCNC: 1.6 MG/DL — SIGNIFICANT CHANGE UP (ref 1.6–2.6)
MCHC RBC-ENTMCNC: 30 PG — SIGNIFICANT CHANGE UP (ref 27–34)
MCHC RBC-ENTMCNC: 34.1 GM/DL — SIGNIFICANT CHANGE UP (ref 32–36)
MCV RBC AUTO: 87.9 FL — SIGNIFICANT CHANGE UP (ref 80–100)
NITRITE UR-MCNC: NEGATIVE — SIGNIFICANT CHANGE UP
PH UR: 7 — SIGNIFICANT CHANGE UP (ref 5–8)
PLATELET # BLD AUTO: 208 K/UL — SIGNIFICANT CHANGE UP (ref 150–400)
POTASSIUM SERPL-MCNC: 3.3 MMOL/L — LOW (ref 3.5–5.3)
POTASSIUM SERPL-MCNC: 3.9 MMOL/L — SIGNIFICANT CHANGE UP (ref 3.5–5.3)
POTASSIUM SERPL-SCNC: 3.3 MMOL/L — LOW (ref 3.5–5.3)
POTASSIUM SERPL-SCNC: 3.9 MMOL/L — SIGNIFICANT CHANGE UP (ref 3.5–5.3)
PROT SERPL-MCNC: 6.4 G/DL — SIGNIFICANT CHANGE UP (ref 6–8.3)
PROT UR-MCNC: 75 MG/DL
RBC # BLD: 3.49 M/UL — LOW (ref 3.8–5.2)
RBC # FLD: 11.7 % — SIGNIFICANT CHANGE UP (ref 10.3–14.5)
SODIUM SERPL-SCNC: 122 MMOL/L — LOW (ref 135–145)
SODIUM SERPL-SCNC: 125 MMOL/L — LOW (ref 135–145)
SP GR SPEC: 1 — LOW (ref 1.01–1.02)
UROBILINOGEN FLD QL: NEGATIVE — SIGNIFICANT CHANGE UP
WBC # BLD: 9.7 K/UL — SIGNIFICANT CHANGE UP (ref 3.8–10.5)
WBC # FLD AUTO: 9.7 K/UL — SIGNIFICANT CHANGE UP (ref 3.8–10.5)

## 2017-07-07 PROCEDURE — 99233 SBSQ HOSP IP/OBS HIGH 50: CPT

## 2017-07-07 RX ORDER — SODIUM CHLORIDE 9 MG/ML
1 INJECTION INTRAMUSCULAR; INTRAVENOUS; SUBCUTANEOUS THREE TIMES A DAY
Qty: 0 | Refills: 0 | Status: DISCONTINUED | OUTPATIENT
Start: 2017-07-07 | End: 2017-07-08

## 2017-07-07 RX ORDER — POTASSIUM CHLORIDE 20 MEQ
40 PACKET (EA) ORAL ONCE
Qty: 0 | Refills: 0 | Status: COMPLETED | OUTPATIENT
Start: 2017-07-07 | End: 2017-07-07

## 2017-07-07 RX ADMIN — VALSARTAN 320 MILLIGRAM(S): 80 TABLET ORAL at 05:09

## 2017-07-07 RX ADMIN — Medication 40 MILLIGRAM(S): at 22:13

## 2017-07-07 RX ADMIN — Medication 40 MILLIGRAM(S): at 14:14

## 2017-07-07 RX ADMIN — Medication 500 MILLIGRAM(S): at 05:09

## 2017-07-07 RX ADMIN — CARVEDILOL PHOSPHATE 6.25 MILLIGRAM(S): 80 CAPSULE, EXTENDED RELEASE ORAL at 17:29

## 2017-07-07 RX ADMIN — ENOXAPARIN SODIUM 30 MILLIGRAM(S): 100 INJECTION SUBCUTANEOUS at 13:28

## 2017-07-07 RX ADMIN — Medication 500 MILLIGRAM(S): at 17:30

## 2017-07-07 RX ADMIN — Medication 4 MILLIGRAM(S): at 22:14

## 2017-07-07 RX ADMIN — Medication 40 MILLIGRAM(S): at 05:09

## 2017-07-07 RX ADMIN — SODIUM CHLORIDE 1 GRAM(S): 9 INJECTION INTRAMUSCULAR; INTRAVENOUS; SUBCUTANEOUS at 13:27

## 2017-07-07 RX ADMIN — Medication 40 MILLIEQUIVALENT(S): at 13:27

## 2017-07-07 RX ADMIN — CINACALCET 30 MILLIGRAM(S): 30 TABLET, FILM COATED ORAL at 22:13

## 2017-07-07 RX ADMIN — SODIUM CHLORIDE 1 GRAM(S): 9 INJECTION INTRAMUSCULAR; INTRAVENOUS; SUBCUTANEOUS at 22:13

## 2017-07-07 NOTE — PROGRESS NOTE ADULT - PROBLEM SELECTOR PLAN 3
likely 2/2 lasix - D/C lasix   Na 122 - fluid restriction  F/U uric acid  NaCl tabs TID - F/U repeat BMP at 14:00  Apprec Dr. Sarah housers

## 2017-07-07 NOTE — INPATIENT CERTIFICATION FOR MEDICARE PATIENTS - RISKS OF ADVERSE EVENTS
Concern for renal deterioration/Concern for delay in diagnosis and treatment Concern for renal deterioration/Concern for cardiopulmonary deterioration/Concern for delay in diagnosis and treatment

## 2017-07-07 NOTE — DISCHARGE NOTE ADULT - HOSPITAL COURSE
89y/o F PMHx HTN, hypoparathyroidism s/p parathyroidectomy, anemia, diastolic HF presented with AMS, nausea, and lethargy. Patient was seen in [place] ED on 7/4/17 for nausea and elevated BP. Diagnosed at that time with UTI started on ceftin. The next day was seen by Dr. Rowell outpatient for elevated BP and hydralazine was increased to 40mg TID. Later that night nausea returned with delirium. Patient was also recently admitted to [place] on 4/28/17 for PNA, new onset diastolic HF, and hyponatremia and D/C to rehab on 5/3/17. In the ED, trop neg. BNP 1285.  CT abd/pelvis found no specific acute inflammatory or obstructive pathology. Nonobstructive left nephrolithiasis. Markedly distended urinary bladder.Trace pelvic ascites.CT head no cont - No acute intracranial path. Doppler Left LE - NO DVT. left bakers cyst. CXR 7/4/2017. No change heart mediastinum. Hyperinflated lungs. No consolidation pneumothorax or effusion. 7/4/17: Blood cx neg x 2. Urine cx contaminated. Repeated.     Patient was admitted with metabolic encephalopathy likely 2/2 to UTI, hyponatremia, and accelerated HTN. Ceftin was continued for UTI. Furosemide and KCl was held. Dr. Rowell cardio consulted. Echo ordered.  (Renal) started NaCl tabs TID and clonidine patch. Potassium was repleted. 89y/o F PMHx HTN, hypoparathyroidism s/p parathyroidectomy, anemia, diastolic HF presented with AMS, nausea, and lethargy. Patient was seen in [place] ED on 7/4/17 for nausea and elevated BP. Diagnosed at that time with UTI started on ceftin. The next day was seen by Dr. Rowell outpatient for elevated BP and hydralazine was increased to 40mg TID. Later that night nausea returned with delirium. Patient was also recently admitted to [place] on 4/28/17 for PNA, new onset diastolic HF, and hyponatremia and D/C to rehab on 5/3/17. In the ED, trop neg. BNP 1285.  CT abd/pelvis found no specific acute inflammatory or obstructive pathology. Nonobstructive left nephrolithiasis. Markedly distended urinary bladder.Trace pelvic ascites.CT head no cont - No acute intracranial path. Doppler Left LE - NO DVT. left bakers cyst. CXR 7/4/2017. No change heart mediastinum. Hyperinflated lungs. No consolidation pneumothorax or effusion. 7/4/17: Blood cx neg x 2. Urine cx contaminated. Repeated.     Patient was admitted with metabolic encephalopathy likely 2/2 to UTI, hyponatremia, and accelerated HTN. Ceftin was continued for UTI. Furosemide and KCl was held. Dr. Rowell cardio consulted. Echo ordered.  (Renal) started NaCl tabs TID and clonidine patch. Potassium was repleted.   VRE in urine - seen by id- asymptomatic bacteriuria, no role for further tx.  lasix remains on hold for now, will likely need to be resumed post dc.

## 2017-07-07 NOTE — PROGRESS NOTE ADULT - PROBLEM SELECTOR PLAN 4
Improved but still elevated  - Continue with Coreg, po hydralazine, and clonidine topical patch with hold parameters  s/p IV hydralazine 10mg x1

## 2017-07-07 NOTE — DISCHARGE NOTE ADULT - MEDICATION SUMMARY - MEDICATIONS TO CHANGE
I will SWITCH the dose or number of times a day I take the medications listed below when I get home from the hospital:    carvedilol 6.25 mg oral tablet  -- 1 tab(s) by mouth 2 times a day    hydrALAZINE  -- 40 milligram(s) by mouth 3 times a day

## 2017-07-07 NOTE — DISCHARGE NOTE ADULT - CARE PROVIDER_API CALL
Weil, Peter A (MD), Critical Care Medicine; Internal Medicine; Pulmonary Disease  [address]  [place] [address]  Phone: [phone]  Fax: [phone]    Frankie Rowell), Cardiovascular Disease  [address] [place] [address]  Phone: [phone]  Fax: [phone]

## 2017-07-07 NOTE — PROGRESS NOTE ADULT - ASSESSMENT
89 white female with history of poorly controlled HTN, CHF now admitted with decreased PO intake and nausea.  she is found to have accelerated HTN and also hyponatremia. Diuretic induced hyponatremia.  lasix was stopped. will check uric acid.  will start on salt tablets and also clonidine patch  spoke to patient's dtr at bed side.  may need to straight cath today.  continue on fluid restriction.

## 2017-07-07 NOTE — PROGRESS NOTE ADULT - ASSESSMENT
89y/o F PMHx HTN, hyperparathyroidism s/p parathyroidectomy, anemia, diastolic HF admitted with metabolic encephalopathy likely 2/2 UTI vs hyponatremic and accelerated HTN. 89y/o F PMHx HTN, hypoparathyroidism s/p parathyroidectomy, anemia, diastolic HF admitted with metabolic encephalopathy likely 2/2 UTI vs hyponatremic and accelerated HTN.

## 2017-07-07 NOTE — PROGRESS NOTE ADULT - SUBJECTIVE AND OBJECTIVE BOX
[facility] - Christofer Isaacs, Sorin, Marisa, Hieu Norris  Office Number:  [phone]    Patient resting comfortably in bed in NAD.  Laying flat with no respiratory distress.  No complaints of chest pain, dyspnea, palpitations, PND, or orthopnea.    Telemetry:  Sinus bradycardia    MEDICATIONS  (STANDING):  valsartan 320 milliGRAM(s) Oral daily  doxazosin 4 milliGRAM(s) Oral at bedtime  carvedilol 6.25 milliGRAM(s) Oral every 12 hours  cefuroxime   Tablet 500 milliGRAM(s) Oral every 12 hours  cinacalcet 30 milliGRAM(s) Oral at bedtime  hydrALAZINE 40 milliGRAM(s) Oral three times a day  enoxaparin Injectable 30 milliGRAM(s) SubCutaneous daily  hydrALAZINE Injectable 10 milliGRAM(s) IV Push once    MEDICATIONS  (PRN):  ondansetron Injectable 4 milliGRAM(s) IV Push every 6 hours PRN Nausea and/or Vomiting  acetaminophen   Tablet 650 milliGRAM(s) Oral every 6 hours PRN For Temp greater than 38 C (100.4 F)  acetaminophen   Tablet. 650 milliGRAM(s) Oral every 6 hours PRN Mild Pain (1 - 3)      Allergies    Vasotec (Unknown)      Vital Signs Last 24 Hrs  T(C): 36.3 (07 Jul 2017 08:00), Max: 36.8 (06 Jul 2017 19:45)  T(F): 97.4 (07 Jul 2017 08:00), Max: 98.2 (06 Jul 2017 19:45)  HR: 67 (07 Jul 2017 08:00) (58 - 71)  BP: 153/76 (07 Jul 2017 08:00) (127/61 - 158/60)  BP(mean): --  RR: 18 (07 Jul 2017 08:00) (15 - 18)  SpO2: 93% (07 Jul 2017 08:00) (93% - 97%)    I&O's Summary    06 Jul 2017 07:01  -  07 Jul 2017 07:00  --------------------------------------------------------  IN: 180 mL / OUT: 600 mL / NET: -420 mL        ON EXAM:    General: NAD  HEENT: Mucous membranes are dry, anicteric  Lungs: Non-labored, breath sounds are clear bilaterally, No wheezing, rales or rhonchi  Cardiovascular: Regular, S1 and S2, 2/6 systolic murmur no rubs, or gallops  Gastrointestinal: Bowel Sounds present, soft, nontender.   Lymph: No peripheral edema. No lymphadenopathy.  Skin: No rashes or ulcers  Psych:  Mood & affect appropriate    LABS: All Labs Reviewed:                        10.5   9.7   )-----------( 208      ( 07 Jul 2017 06:38 )             30.7                         11.4   8.6   )-----------( 240      ( 06 Jul 2017 12:23 )             33.4     07 Jul 2017 06:38    122    |  87     |  16     ----------------------------<  89     3.3     |  29     |  0.62   06 Jul 2017 12:23    123    |  86     |  21     ----------------------------<  106    3.7     |  30     |  0.61     Ca    8.1        07 Jul 2017 06:38  Ca    8.7        06 Jul 2017 12:23  Mg     1.6       07 Jul 2017 06:38    TPro  7.3    /  Alb  3.8    /  TBili  0.3    /  DBili  x      /  AST  23     /  ALT  26     /  AlkPhos  67     06 Jul 2017 12:23    PT/INR - ( 06 Jul 2017 12:23 )   PT: 11.9 sec;   INR: 1.09 ratio         PTT - ( 06 Jul 2017 12:23 )  PTT:29.4 sec  CARDIAC MARKERS ( 06 Jul 2017 12:23 )  .026 ng/mL / x     / 75 U/L / x     / 4.6 ng/mL    07-06 @ 12:23  Pro Bnp 1285        Assessment/Plan:  89y Female with hypertension, mental status changes, hyponatremia:    - Continue telemetry  - Neuro and renal follow up  - Fluid restriction  - Continue antihypertensive therapy including hydralazine as written.  BP better controlled  - Echo pending  - Follow electrolytes closely

## 2017-07-07 NOTE — DISCHARGE NOTE ADULT - SECONDARY DIAGNOSIS.
Hyponatremia Accelerated hypertension Diastolic heart failure Anemia, unspecified type Hypercalcemia

## 2017-07-07 NOTE — DISCHARGE NOTE ADULT - PLAN OF CARE
prevent recurrence Continue with hydralazine, coreg  F/U outpatient with Dr. Rowell Stable Continue with valsartan Continue with Ceftin   Continue with Asymptomatic - F/U outpatient with Continue with sensipar please check BMP twice weekly at rehab.  if low, may need to consider NaCl 1 g daily (low dose) but caution re chf hx resolve Continue with hydralazine, coreg, valsartan  F/U outpatient with Dr. Rowell lasix on hold- clinically euvolemic presently.  may need to resume lasix in next few days Completed ceftin.  VRE in urine - asymptomatic bacteruria per ID eval

## 2017-07-07 NOTE — DISCHARGE NOTE ADULT - PATIENT PORTAL LINK FT
“You can access the Follow[facility] Patient Portal, offered by [facility], by registering with the following website: [email or web address]”

## 2017-07-07 NOTE — PROVIDER CONTACT NOTE (OTHER) - SITUATION
post void bladder scan 526 cc.  Orders received to straight cath pt.  When pt notified of these orders she declined to be straight cathed stating she did not want it done then stated she thought she

## 2017-07-07 NOTE — DISCHARGE NOTE ADULT - CARE PROVIDERS DIRECT ADDRESSES
[email or web address]

## 2017-07-07 NOTE — DISCHARGE NOTE ADULT - MEDICATION SUMMARY - MEDICATIONS TO TAKE
I will START or STAY ON the medications listed below when I get home from the hospital:    acetaminophen 325 mg oral tablet  -- 2 tab(s) by mouth every 6 hours, As needed, Mild Pain (1 - 3)  -- Indication: For Discomfort    valsartan 320 mg oral tablet  -- 1 tab(s) by mouth once a day  -- Indication: For Htn    doxazosin 4 mg oral tablet  -- 1 tab(s) by mouth once a day (at bedtime)  -- Indication: For Htn    enoxaparin  -- 30 milligram(s) subcutaneously once a day  -- Indication: For Dvt proph    carvedilol 12.5 mg oral tablet  -- 1 tab(s) by mouth every 12 hours  -- Indication: For Htn    lidocaine 5% topical film  -- Apply on skin to affected area once a day  -- Indication: For Pain    furosemide 40 mg oral tablet  -- 1 tab(s) by mouth once a day  -- Indication: For History of chf- please hold this for now- may need to resume over next several days    senna oral tablet  -- 2 tab(s) by mouth once a day (at bedtime)  -- Indication: For Constipation    docusate sodium 100 mg oral capsule  -- 1 cap(s) by mouth 2 times a day  -- Indication: For Constipation    polyethylene glycol 3350 oral powder for reconstitution  -- 17 gram(s) by mouth 2 times a day  -- Indication: For Constipation    Sensipar 30 mg oral tablet  -- 1 tab(s) by mouth once a day  -- Indication: For Hypercalcemia    hydrALAZINE 50 mg oral tablet  -- 1 tab(s) by mouth 3 times a day  -- Indication: For Htn

## 2017-07-07 NOTE — DISCHARGE NOTE ADULT - CARE PLAN
Principal Discharge DX:	Urinary tract infection without hematuria, site unspecified  Goal:	prevent recurrence  Instructions for follow-up, activity and diet:	Continue with Ceftin   Continue with  Secondary Diagnosis:	Hyponatremia  Goal:	prevent recurrence  Secondary Diagnosis:	Accelerated hypertension  Goal:	prevent recurrence  Instructions for follow-up, activity and diet:	Continue with hydralazine, coreg  F/U outpatient with Dr. Rowell  Secondary Diagnosis:	Diastolic heart failure  Goal:	Stable  Instructions for follow-up, activity and diet:	Continue with valsartan  Secondary Diagnosis:	Anemia, unspecified type  Goal:	Stable  Instructions for follow-up, activity and diet:	Asymptomatic - F/U outpatient with Principal Discharge DX:	Urinary tract infection without hematuria, site unspecified  Goal:	prevent recurrence  Instructions for follow-up, activity and diet:	Continue with Ceftin   Continue with  Secondary Diagnosis:	Hyponatremia  Goal:	prevent recurrence  Secondary Diagnosis:	Accelerated hypertension  Goal:	prevent recurrence  Instructions for follow-up, activity and diet:	Continue with hydralazine, coreg  F/U outpatient with Dr. Rowell  Secondary Diagnosis:	Diastolic heart failure  Goal:	Stable  Instructions for follow-up, activity and diet:	Continue with valsartan  Secondary Diagnosis:	Anemia, unspecified type  Goal:	Stable  Instructions for follow-up, activity and diet:	Asymptomatic - F/U outpatient with  Secondary Diagnosis:	Hypercalcemia  Goal:	Stable  Instructions for follow-up, activity and diet:	Continue with sensipar Principal Discharge DX:	Urinary tract infection without hematuria, site unspecified  Goal:	resolve  Instructions for follow-up, activity and diet:	Completed ceftin.  VRE in urine - asymptomatic bacteruria per ID eval  Secondary Diagnosis:	Hyponatremia  Goal:	prevent recurrence  Instructions for follow-up, activity and diet:	please check BMP twice weekly at rehab.  if low, may need to consider NaCl 1 g daily (low dose) but caution re chf hx  Secondary Diagnosis:	Accelerated hypertension  Goal:	prevent recurrence  Instructions for follow-up, activity and diet:	Continue with hydralazine, coreg, valsartan  F/U outpatient with Dr. Rowell  Secondary Diagnosis:	Diastolic heart failure  Goal:	Stable  Instructions for follow-up, activity and diet:	lasix on hold- clinically euvolemic presently.  may need to resume lasix in next few days  Secondary Diagnosis:	Anemia, unspecified type  Goal:	Stable  Instructions for follow-up, activity and diet:	Asymptomatic - F/U outpatient with  Secondary Diagnosis:	Hypercalcemia  Goal:	Stable  Instructions for follow-up, activity and diet:	Continue with sensipar

## 2017-07-07 NOTE — PROVIDER CONTACT NOTE (OTHER) - BACKGROUND
Pt stted she thought she could void more.    amb to   BR - with asst and voided and additional 150 cc

## 2017-07-07 NOTE — PROGRESS NOTE ADULT - SUBJECTIVE AND OBJECTIVE BOX
SAIRA SANTANA  89y  Female    Patient is a 89y old  Female who presents with a chief complaint of weakness, nausea, confusion today (2017 18:33)    awake and alert. family at bed side. BP has improved with present anti HTN meds.         PAST MEDICAL & SURGICAL HISTORY:  Anemia, unspecified type  Edema of lower extremity  Heart murmur  Hypertension  Hypercalcemia  History of appendectomy  S/P tonsillectomy  H/O parathyroidectomy          PHYSICAL EXAM:    T(C): 36.3 (17 @ 08:00), Max: 36.8 (17 @ 19:45)  HR: 67 (17 @ 08:00) (58 - 71)  BP: 153/76 (17 @ 08:00) (127/61 - 158/60)  RR: 18 (17 @ 08:00) (15 - 18)  SpO2: 93% (17 @ 08:00) (93% - 97%)  Wt(kg): --    I&O's Detail    2017 07:01  -  2017 07:00  --------------------------------------------------------  IN:    Oral Fluid: 180 mL  Total IN: 180 mL    OUT:    Voided: 600 mL  Total OUT: 600 mL    Total NET: -420 mL          Respiratory: clear anteriorly, decreased BS at bases  Cardiovascular: S1 S2  Gastrointestinal: soft NT ND +BS  Extremities: edema   Neuro: Awake and alert    MEDICATIONS  (STANDING):  valsartan 320 milliGRAM(s) Oral daily  doxazosin 4 milliGRAM(s) Oral at bedtime  carvedilol 6.25 milliGRAM(s) Oral every 12 hours  cefuroxime   Tablet 500 milliGRAM(s) Oral every 12 hours  cinacalcet 30 milliGRAM(s) Oral at bedtime  hydrALAZINE 40 milliGRAM(s) Oral three times a day  enoxaparin Injectable 30 milliGRAM(s) SubCutaneous daily  hydrALAZINE Injectable 10 milliGRAM(s) IV Push once  sodium chloride 1 Gram(s) Oral three times a day  cloNIDine Patch 0.1 mG/24Hr(s) 1 patch Topical every 7 days  potassium chloride    Tablet ER 40 milliEquivalent(s) Oral once    MEDICATIONS  (PRN):  ondansetron Injectable 4 milliGRAM(s) IV Push every 6 hours PRN Nausea and/or Vomiting  acetaminophen   Tablet 650 milliGRAM(s) Oral every 6 hours PRN For Temp greater than 38 C (100.4 F)  acetaminophen   Tablet. 650 milliGRAM(s) Oral every 6 hours PRN Mild Pain (1 - 3)                            10.5   9.7   )-----------( 208      ( 2017 06:38 )             30.7           122<L>  |  87<L>  |  16  ----------------------------<  89  3.3<L>   |  29  |  0.62    Ca    8.1<L>      2017 06:38  Mg     1.6         TPro  7.3  /  Alb  3.8  /  TBili  0.3  /  DBili  x   /  AST  23  /  ALT  26  /  AlkPhos  67        Urinalysis Basic - ( 2017 14:33 )    Color: x / Appearance: x / S.010 / pH: x  Gluc: x / Ketone: Negative  / Bili: Negative / Urobili: Negative   Blood: x / Protein: 75 mg/dL / Nitrite: Negative   Leuk Esterase: Small / RBC: x / WBC 6-10   Sq Epi: x / Non Sq Epi: Few / Bacteria: Moderate      INTERPRETATION:  History: Nausea vomiting abdominal pain.    Double contrast CT abdomen and pelvis. Prior 2016.  100 cc Omnipaque 350 injected intravenously.  Global cardiomegaly with coronary artery calcification. Hypoventilatory   changes at the lung bases.  No calcified gallstones or biliary dilatation. Liver spleen unremarkable.   Mild prominence main pancreatic duct likely age-related. Stomach not   adequately distended.  Multiple punctate nonobstructive left renal calculi. Mild fullness of the   left renal collecting system and ureter probably related to markedly   distended urinary bladder. No obstructing urinary tract calculi.  No bowel obstruction or inflammation. Moderate retained colonic stool.  Trace pelvic ascites. No extraluminal gas.  Atrophic postmenopausal uterus. Stable right adnexal cystic lesion.  Advanced spinal degenerative change. Severe degenerative change bilateral   hips.    Impression:    No specific acute inflammatory or obstructive pathology.  Nonobstructive left nephrolithiasis.  Markedly distended urinary bladder.  Trace pelvic ascites.

## 2017-07-07 NOTE — PROGRESS NOTE ADULT - SUBJECTIVE AND OBJECTIVE BOX
Patient is a 89y old  Female who presents with a chief complaint of weakness, nausea, confusion today (2017 18:33)    INTERVAL HPI/OVERNIGHT EVENTS:    MEDICATIONS  (STANDING):  valsartan 320 milliGRAM(s) Oral daily  doxazosin 4 milliGRAM(s) Oral at bedtime  carvedilol 6.25 milliGRAM(s) Oral every 12 hours  cefuroxime   Tablet 500 milliGRAM(s) Oral every 12 hours  cinacalcet 30 milliGRAM(s) Oral at bedtime  hydrALAZINE 40 milliGRAM(s) Oral three times a day  enoxaparin Injectable 30 milliGRAM(s) SubCutaneous daily  hydrALAZINE Injectable 10 milliGRAM(s) IV Push once    MEDICATIONS  (PRN):  ondansetron Injectable 4 milliGRAM(s) IV Push every 6 hours PRN Nausea and/or Vomiting  acetaminophen   Tablet 650 milliGRAM(s) Oral every 6 hours PRN For Temp greater than 38 C (100.4 F)  acetaminophen   Tablet. 650 milliGRAM(s) Oral every 6 hours PRN Mild Pain (1 - 3)      Allergies    Vasotec (Unknown)    Intolerances    REVIEW OF SYSTEMS:  CONSTITUTIONAL: No fever, No chills,No fatigue,No myalgia,No Body ache  EYES: No eye pain, visual disturbances, or discharge  ENMT:  No ear pain, No nose bleed, No vertigo; No sinus or throat pain  NECK: No pain, No stiffness  RESPIRATORY: No cough, wheezing, No  hemoptysis; No shortness of breath  CARDIOVASCULAR: No chest pain, palpitations, leg swelling  GASTROINTESTINAL: No abdominal or epigastric pain. No nausea, No vomiting; No diarrhea or constipation. [ ] BM  GENITOURINARY: No dysuria, No frequency, No urgency, No hematuria, or incontinence  NEUROLOGICAL: alert and oriented x 3,  No headaches, No dizziness, No numbness,  SKIN:   No itching, burning, rashes, or lesions   MUSCULOSKELETAL: No joint pain or swelling; No muscle pain, No back pain, No extremity pain  PSYCHIATRIC: No depression, anxiety, mood swings, or difficulty sleeping  ROS  [ ] Unable to obtain   REST OF REVIEW Of SYSTEM - [ ] Normal     Height (cm): 152.4 ( @ 21:16)  Weight (kg): 49.9 ( @ 19:45)  BMI (kg/m2): 21.5 ( @ 21:16)  BSA (m2): 1.45 ( @ 21:16)  Vital Signs Last 24 Hrs  T(C): 36.3 (2017 08:00), Max: 36.8 (2017 19:45)  T(F): 97.4 (2017 08:00), Max: 98.2 (2017 19:45)  HR: 67 (2017 08:00) (58 - 71)  BP: 153/76 (2017 08:00) (127/61 - 158/60)  BP(mean): --  RR: 18 (2017 08:00) (15 - 18)  SpO2: 93% (2017 08:00) (93% - 97%)  [ ] room air   [ ] 02    PHYSICAL EXAM:  GENERAL:  No acute distresss,  [ ] Agitated, [ ] Lethargy, [ ] confused   HEAD:  normal  ENMT: normal  NECK:  normal    NERVOUS SYSTEM:  Alert & Oriented X3, no focal deficits [ ]Confusion  [ ] Encephalopathic [ ] Sedated [ ] Other  CHEST/LUNG: Clear to auscultation bilaterally,  [ ] decreased breath sounds at bases  [ ] wheezing   [ ] rhonchi  [ ] crackles  HEART:  Regular rate and rhythm, No murmurs, rubs, or gallops,  [ ] irregular   ABDOMEN:  soft, nontender, nondistended, positive bowel sounds   [ ] obese  EXTREMITIES: No clubbing, cyanosis or edema  SKIN: [ ] venous stasis skin changes    LABS:                        10.5   9.7   )-----------( 208      ( 2017 06:38 )             30.7     2017 06:38    122    |  87     |  16     ----------------------------<  89     3.3     |  29     |  0.62     Ca    8.1        2017 06:38  Mg     1.6       2017 06:38    TPro  7.3    /  Alb  3.8    /  TBili  0.3    /  DBili  x      /  AST  23     /  ALT  26     /  AlkPhos  67     2017 12:23    PT/INR - ( 2017 12:23 )   PT: 11.9 sec;   INR: 1.09 ratio         PTT - ( 2017 12:23 )  PTT:29.4 sec    Urinalysis Basic - ( 2017 14:33 )    Color: x / Appearance: x / S.010 / pH: x  Gluc: x / Ketone: Negative  / Bili: Negative / Urobili: Negative   Blood: x / Protein: 75 mg/dL / Nitrite: Negative   Leuk Esterase: Small / RBC: x / WBC 6-10   Sq Epi: x / Non Sq Epi: Few / Bacteria: Moderate      CAPILLARY BLOOD GLUCOSE    Cultures    RADIOLOGY & ADDITIONAL TESTS:      Care Discussed with [X] Consultants  [ ] Patient  [ ] Family  [X]   /   [ ] Other; RN  DVT prophylaxis [ ] lovenox   [ ] subq heparin  [ ] coumadin  [ ] venodynes [ ] ambulating frequently at how risk for vte and no pharm         or  mechanical prophylaxis required    [ ] other   Advanced directive:    [ ]pt has hcp     [ ] pt declined to assign hcp  Discussed with pt @ bedside Patient is a 89y old  Female who presents with a chief complaint of weakness, nausea, confusion today (2017 18:33)    INTERVAL HPI : 89y/o F PMHx HTN, hypoparathyroidism s/p parathyroidectomy, anemia, diastolic HF presented with AMS, nausea, and lethargy admitted with AMS likely 2/2 UTI, hyponatremia, and accelerated HTN. Patient was seen in [place] ED on 17 for nausea and elevated BP. Diagnosed at that time with UTI started on ceftin. The next day was seen by Dr. Rowell outpatient for elevated BP and hydralazine was increased to 40mg TID. Later that night nausea returned with delirium. Patient was also recently admitted to [facility] on 17 for PNA, new onset diastolic HF, and hyponatremia and D/C to rehab on 5/3/17.    OVERNIGHT EVENTS: No acute events overnight. Denies SOB, CP, N/V/D, abd pain, dysuria, hematuria.     MEDICATIONS  (STANDING):  valsartan 320 milliGRAM(s) Oral daily  doxazosin 4 milliGRAM(s) Oral at bedtime  carvedilol 6.25 milliGRAM(s) Oral every 12 hours  cefuroxime   Tablet 500 milliGRAM(s) Oral every 12 hours  cinacalcet 30 milliGRAM(s) Oral at bedtime  hydrALAZINE 40 milliGRAM(s) Oral three times a day  enoxaparin Injectable 30 milliGRAM(s) SubCutaneous daily  hydrALAZINE Injectable 10 milliGRAM(s) IV Push once  sodium chloride 1 Gram(s) Oral three times a day  cloNIDine Patch 0.1 mG/24Hr(s) 1 patch Topical every 7 days  potassium chloride    Tablet ER 40 milliEquivalent(s) Oral once    MEDICATIONS  (PRN):  ondansetron Injectable 4 milliGRAM(s) IV Push every 6 hours PRN Nausea and/or Vomiting  acetaminophen   Tablet 650 milliGRAM(s) Oral every 6 hours PRN For Temp greater than 38 C (100.4 F)  acetaminophen   Tablet. 650 milliGRAM(s) Oral every 6 hours PRN Mild Pain (1 - 3)      Allergies    Vasotec (Unknown)    Intolerances    REVIEW OF SYSTEMS:  CONSTITUTIONAL: No fever, No chills, No fatigue, No myalgia, No Body ache  EYES: No eye pain, visual disturbances, or discharge  ENMT:  No ear pain, No nose bleed, No vertigo; No sinus or throat pain  NECK: No pain, No stiffness  RESPIRATORY: No cough, wheezing, No  hemoptysis; No shortness of breath  CARDIOVASCULAR: No chest pain, palpitations, leg swelling  GASTROINTESTINAL: No abdominal or epigastric pain. No nausea, No vomiting; No diarrhea or constipation. [ ] BM  GENITOURINARY: No dysuria, No frequency, No urgency, No hematuria, or incontinence  NEUROLOGICAL: alert and oriented x 3,  No headaches, No dizziness, No numbness,  SKIN:   No itching, burning, rashes, or lesions   MUSCULOSKELETAL: No joint pain or swelling; No muscle pain, No back pain, No extremity pain  PSYCHIATRIC: No depression, anxiety, mood swings, or difficulty sleeping  REST OF REVIEW Of SYSTEM - [X] Normal     Height (cm): 152.4 ( @ 21:16)  Weight (kg): 49.9 ( @ 19:45)  BMI (kg/m2): 21.5 ( @ 21:16)  BSA (m2): 1.45 ( @ 21:16)  Vital Signs Last 24 Hrs  T(C): 36.3 (2017 08:00), Max: 36.8 (2017 19:45)  T(F): 97.4 (2017 08:00), Max: 98.2 (2017 19:45)  HR: 67 (2017 08:00) (58 - 71)  BP: 153/76 (2017 08:00) (127/61 - 158/60)  RR: 18 (2017 08:00) (15 - 18)  SpO2: 93% (2017 08:00) (93% - 97%)  [ ] room air   [ ] 02    PHYSICAL EXAM:  GENERAL:  No acute distresss,    HEAD:  normal, NCAT  ENMT: normal  NECK:  normal    NERVOUS SYSTEM:  Alert & Oriented X3, no focal deficits   CHEST/LUNG: Clear to auscultation bilaterally,    HEART:  Regular rate and rhythm, No murmurs, rubs, or gallops,    ABDOMEN:  soft, nontender, nondistended, positive bowel sounds     EXTREMITIES: No clubbing, cyanosis or edema  SKIN: No venous stasis skin changes    LABS:                        10.5   9.7   )-----------( 208      ( 2017 06:38 )             30.7     2017 06:38    122    |  87     |  16     ----------------------------<  89     3.3     |  29     |  0.62     Ca    8.1        2017 06:38  Mg     1.6       2017 06:38    TPro  7.3    /  Alb  3.8    /  TBili  0.3    /  DBili  x      /  AST  23     /  ALT  26     /  AlkPhos  67     2017 12:23    PT/INR - ( 2017 12:23 )   PT: 11.9 sec;   INR: 1.09 ratio         PTT - ( 2017 12:23 )  PTT:29.4 sec    Urinalysis Basic - ( 2017 14:33 )    Color: x / Appearance: x / S.010 / pH: x  Gluc: x / Ketone: Negative  / Bili: Negative / Urobili: Negative   Blood: x / Protein: 75 mg/dL / Nitrite: Negative   Leuk Esterase: Small / RBC: x / WBC 6-10   Sq Epi: x / Non Sq Epi: Few / Bacteria: Moderate      CAPILLARY BLOOD GLUCOSE    Cultures:  Blood cx negative x2 (17)  Urine Cx contaminated (17)    RADIOLOGY & ADDITIONAL TESTS:    CT head no cont  Involutional and chronic microangiopathic changes commensurate with the   patient's stated chronologic age, similar to prior. There are no adverse   interval changes. No intra-axial or extra-axial hemorrhage territorial   infarct or mass effect. Clear sinuses. Cranium intact. 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 Status post bilateral scleral banding and   cataract surgery. Impression: No acute intracranial pathology. Stable brain appearance    CT ABD/PELVIS  Impression:  No specific acute inflammatory or obstructive pathology.  Nonobstructive left nephrolithiasis.  Markedly distended urinary bladder.  Trace pelvic ascites.  Additional findings as discussed    CXR - AP chest. Prior 2017. No change heart mediastinum. Hyperinflated   lungs. No consolidation pneumothorax or effusion.    Care Discussed with [X] Consultants  [X] Patient  [X] Family  []   /   [ ] Other; RN  DVT prophylaxis [X] lovenox   [ ] subq heparin  [ ] coumadin  [ ] venodynes [ ] ambulating frequently at how risk for vte and no pharm         or  mechanical prophylaxis required    [ ] other   Advanced directive:    [ ]pt has hcp     [ ] pt declined to assign hcp  Discussed with pt @ bedside

## 2017-07-07 NOTE — DISCHARGE NOTE ADULT - MEDICATION SUMMARY - MEDICATIONS TO STOP TAKING
I will STOP taking the medications listed below when I get home from the hospital:    furosemide 40 mg oral tablet  -- 1 tab(s) by mouth once a day    potassium chloride 20 mEq oral granule, extended release  -- 20 milliequivalent(s) by mouth once a day    Ceftin 500 mg oral tablet  -- 1 tab(s) by mouth every 12 hours  -- Finish all this medication unless otherwise directed by prescriber.  Medication should be taken with plenty of water.  Take with food or milk.

## 2017-07-07 NOTE — PROGRESS NOTE ADULT - PROBLEM SELECTOR PLAN 8
DVT PPX - LMWH  Fall risk, OOB with assistance, ambulate with assistance  DIET DASH/ TLC fluid rest 1000mL DVT PPX - LMWH  Fall risk, OOB with assistance, ambulate with assistance  DIET DASH/ TLC fluid rest 1000mL  Consider Hold Sensipar - Ca WNL

## 2017-07-07 NOTE — PROGRESS NOTE ADULT - PROBLEM SELECTOR PLAN 1
Resolved - likely 2/2 UTI and hyponatremia. Resolved - likely 2/2 UTI and hyponatremia.  Ct head neg  Dr. Nuñez consulted.

## 2017-07-08 LAB
ANION GAP SERPL CALC-SCNC: 7 MMOL/L — SIGNIFICANT CHANGE UP (ref 5–17)
BUN SERPL-MCNC: 16 MG/DL — SIGNIFICANT CHANGE UP (ref 7–23)
CALCIUM SERPL-MCNC: 8.9 MG/DL — SIGNIFICANT CHANGE UP (ref 8.5–10.1)
CHLORIDE SERPL-SCNC: 95 MMOL/L — LOW (ref 96–108)
CO2 SERPL-SCNC: 29 MMOL/L — SIGNIFICANT CHANGE UP (ref 22–31)
CREAT SERPL-MCNC: 0.59 MG/DL — SIGNIFICANT CHANGE UP (ref 0.5–1.3)
FOLATE SERPL-MCNC: 11.5 NG/ML — SIGNIFICANT CHANGE UP (ref 4.8–24.2)
GLUCOSE SERPL-MCNC: 102 MG/DL — HIGH (ref 70–99)
HCT VFR BLD CALC: 33.2 % — LOW (ref 34.5–45)
HGB BLD-MCNC: 11.5 G/DL — SIGNIFICANT CHANGE UP (ref 11.5–15.5)
POTASSIUM SERPL-MCNC: 4.1 MMOL/L — SIGNIFICANT CHANGE UP (ref 3.5–5.3)
POTASSIUM SERPL-SCNC: 4.1 MMOL/L — SIGNIFICANT CHANGE UP (ref 3.5–5.3)
SODIUM SERPL-SCNC: 131 MMOL/L — LOW (ref 135–145)
TSH SERPL-MCNC: 3.23 UIU/ML — SIGNIFICANT CHANGE UP (ref 0.36–3.74)
VIT B12 SERPL-MCNC: 425 PG/ML — SIGNIFICANT CHANGE UP (ref 243–894)

## 2017-07-08 PROCEDURE — 99233 SBSQ HOSP IP/OBS HIGH 50: CPT

## 2017-07-08 RX ORDER — LANOLIN ALCOHOL/MO/W.PET/CERES
3 CREAM (GRAM) TOPICAL AT BEDTIME
Qty: 0 | Refills: 0 | Status: DISCONTINUED | OUTPATIENT
Start: 2017-07-08 | End: 2017-07-14

## 2017-07-08 RX ORDER — HYDRALAZINE HCL 50 MG
50 TABLET ORAL THREE TIMES A DAY
Qty: 0 | Refills: 0 | Status: DISCONTINUED | OUTPATIENT
Start: 2017-07-08 | End: 2017-07-09

## 2017-07-08 RX ADMIN — VALSARTAN 320 MILLIGRAM(S): 80 TABLET ORAL at 08:16

## 2017-07-08 RX ADMIN — Medication 50 MILLIGRAM(S): at 22:05

## 2017-07-08 RX ADMIN — CARVEDILOL PHOSPHATE 6.25 MILLIGRAM(S): 80 CAPSULE, EXTENDED RELEASE ORAL at 18:15

## 2017-07-08 RX ADMIN — ENOXAPARIN SODIUM 30 MILLIGRAM(S): 100 INJECTION SUBCUTANEOUS at 12:05

## 2017-07-08 RX ADMIN — CINACALCET 30 MILLIGRAM(S): 30 TABLET, FILM COATED ORAL at 22:05

## 2017-07-08 RX ADMIN — SODIUM CHLORIDE 1 GRAM(S): 9 INJECTION INTRAMUSCULAR; INTRAVENOUS; SUBCUTANEOUS at 06:42

## 2017-07-08 RX ADMIN — Medication 500 MILLIGRAM(S): at 06:42

## 2017-07-08 RX ADMIN — Medication 50 MILLIGRAM(S): at 13:51

## 2017-07-08 RX ADMIN — SODIUM CHLORIDE 1 GRAM(S): 9 INJECTION INTRAMUSCULAR; INTRAVENOUS; SUBCUTANEOUS at 12:05

## 2017-07-08 RX ADMIN — Medication 500 MILLIGRAM(S): at 18:15

## 2017-07-08 RX ADMIN — Medication 40 MILLIGRAM(S): at 06:42

## 2017-07-08 RX ADMIN — Medication 4 MILLIGRAM(S): at 22:05

## 2017-07-08 RX ADMIN — CARVEDILOL PHOSPHATE 6.25 MILLIGRAM(S): 80 CAPSULE, EXTENDED RELEASE ORAL at 08:16

## 2017-07-08 NOTE — PROGRESS NOTE ADULT - SUBJECTIVE AND OBJECTIVE BOX
[facility] - Christofer Isaacs, Marisa Rowell, Hieu Norris  Office Number:  [phone]    Patient resting comfortably in bed in NAD.  Laying flat with no respiratory distress.  No complaints of chest pain, dyspnea, palpitations, PND, or orthopnea.    Telemetry:  Sinus rhythm and sinus tachycardia.    MEDICATIONS  (STANDING):  valsartan 320 milliGRAM(s) Oral daily  doxazosin 4 milliGRAM(s) Oral at bedtime  carvedilol 6.25 milliGRAM(s) Oral every 12 hours  cefuroxime   Tablet 500 milliGRAM(s) Oral every 12 hours  cinacalcet 30 milliGRAM(s) Oral at bedtime  hydrALAZINE 40 milliGRAM(s) Oral three times a day  enoxaparin Injectable 30 milliGRAM(s) SubCutaneous daily  hydrALAZINE Injectable 10 milliGRAM(s) IV Push once  sodium chloride 1 Gram(s) Oral three times a day  cloNIDine Patch 0.1 mG/24Hr(s) 1 patch Topical every 7 days    MEDICATIONS  (PRN):  ondansetron Injectable 4 milliGRAM(s) IV Push every 6 hours PRN Nausea and/or Vomiting  acetaminophen   Tablet 650 milliGRAM(s) Oral every 6 hours PRN For Temp greater than 38 C (100.4 F)  acetaminophen   Tablet. 650 milliGRAM(s) Oral every 6 hours PRN Mild Pain (1 - 3)  melatonin 3 milliGRAM(s) Oral at bedtime PRN Insomnia      Allergies    Vasotec (Unknown)        Vital Signs Last 24 Hrs  T(C): 36.7 (08 Jul 2017 08:00), Max: 36.7 (08 Jul 2017 08:00)  T(F): 98 (08 Jul 2017 08:00), Max: 98 (08 Jul 2017 08:00)  HR: 72 (08 Jul 2017 08:00) (58 - 72)  BP: 186/60 (08 Jul 2017 08:00) (149/64 - 187/62)  BP(mean): --  RR: 15 (08 Jul 2017 08:00) (15 - 18)  SpO2: 95% (08 Jul 2017 08:00) (95% - 97%)    I&O's Summary    07 Jul 2017 07:01  -  08 Jul 2017 07:00  --------------------------------------------------------  IN: 480 mL / OUT: 1050 mL / NET: -570 mL        ON EXAM:    General: NAD  HEENT: Mucous membranes are dry, anicteric  Lungs: Non-labored, breath sounds are clear bilaterally, No wheezing, rales or rhonchi  Cardiovascular: Regular, S1 and S2, 2/6 systolic murmur no rubs, or gallops  Gastrointestinal: Bowel Sounds present, soft, nontender.   Lymph: No peripheral edema. No lymphadenopathy.  Skin: No rashes or ulcers  Psych:  Mood & affect appropriate    LABS: All Labs Reviewed:                        11.5   x     )-----------( x        ( 08 Jul 2017 06:26 )             33.2                         10.5   9.7   )-----------( 208      ( 07 Jul 2017 06:38 )             30.7                         11.4   8.6   )-----------( 240      ( 06 Jul 2017 12:23 )             33.4     08 Jul 2017 06:26    131    |  95     |  16     ----------------------------<  102    4.1     |  29     |  0.59   07 Jul 2017 14:54    125    |  88     |  21     ----------------------------<  102    3.9     |  31     |  0.73   07 Jul 2017 06:38    122    |  87     |  16     ----------------------------<  89     3.3     |  29     |  0.62     Ca    8.9        08 Jul 2017 06:26  Ca    8.6        07 Jul 2017 14:54  Ca    8.1        07 Jul 2017 06:38  Mg     1.6       07 Jul 2017 06:38    TPro  6.4    /  Alb  3.3    /  TBili  0.3    /  DBili  x      /  AST  21     /  ALT  25     /  AlkPhos  63     07 Jul 2017 14:54  TPro  7.3    /  Alb  3.8    /  TBili  0.3    /  DBili  x      /  AST  23     /  ALT  26     /  AlkPhos  67     06 Jul 2017 12:23    PT/INR - ( 06 Jul 2017 12:23 )   PT: 11.9 sec;   INR: 1.09 ratio         PTT - ( 06 Jul 2017 12:23 )  PTT:29.4 sec  CARDIAC MARKERS ( 06 Jul 2017 12:23 )  .026 ng/mL / x     / 75 U/L / x     / 4.6 ng/mL    07-06 @ 12:23  Pro Bnp 1285    07-08 @ 06:26  TSH: 3.23      Assessment/Plan:  89y Female with hypertension, mental status changes, hyponatremia:    - d/c telemetry  - Sodium improving with salt tablets  - Neuro and renal follow up  - Fluid restriction  - BP continues high.  Increase hydralazine to 50 TID.  Continue clonidine patch  - Continue coreg 6.25 BID  - Continue valsartan 320 QD  - Echo pending  - Follow electrolytes closely

## 2017-07-08 NOTE — PROGRESS NOTE ADULT - SUBJECTIVE AND OBJECTIVE BOX
Patient is a 89y old  Female who presents with a chief complaint of weakness, nausea, confusion today (2017 13:18)      INTERVAL HPI/OVERNIGHT EVENTS: Patient seen and examined. NAD. Confused.      Vital Signs Last 24 Hrs  T(C): 36.4 (2017 11:35), Max: 36.7 (2017 08:00)  T(F): 97.6 (2017 11:35), Max: 98 (2017 08:00)  HR: 67 (2017 11:35) (61 - 74)  BP: 147/73 (2017 11:35) (145/70 - 187/62)  BP(mean): --  RR: 16 (2017 11:35) (15 - 18)  SpO2: 94% (2017 11:35) (94% - 97%)I&O's Summary    2017 07:01  -  2017 07:00  --------------------------------------------------------  IN: 480 mL / OUT: 1050 mL / NET: -570 mL    2017 07:01  -  2017 14:59  --------------------------------------------------------  IN: 0 mL / OUT: 500 mL / NET: -500 mL        LABS:                        11.5   x     )-----------( x        ( 2017 06:26 )             33.2     07-08    131<L>  |  95<L>  |  16  ----------------------------<  102<H>  4.1   |  29  |  0.59    Ca    8.9      2017 06:26  Mg     1.6     07-07    TPro  6.4  /  Alb  3.3  /  TBili  0.3  /  DBili  x   /  AST  21  /  ALT  25  /  AlkPhos  63  07-07      Urinalysis Basic - ( 2017 15:36 )    Color: x / Appearance: x / S.005 / pH: x  Gluc: x / Ketone: Negative  / Bili: Negative / Urobili: Negative   Blood: x / Protein: 75 mg/dL / Nitrite: Negative   Leuk Esterase: Trace / RBC: 0-2 /HPF / WBC 6-10   Sq Epi: x / Non Sq Epi: Few / Bacteria: Moderate      CAPILLARY BLOOD GLUCOSE              valsartan 320 milliGRAM(s) Oral daily  doxazosin 4 milliGRAM(s) Oral at bedtime  carvedilol 6.25 milliGRAM(s) Oral every 12 hours  cefuroxime   Tablet 500 milliGRAM(s) Oral every 12 hours  cinacalcet 30 milliGRAM(s) Oral at bedtime  ondansetron Injectable 4 milliGRAM(s) IV Push every 6 hours PRN  acetaminophen   Tablet 650 milliGRAM(s) Oral every 6 hours PRN  acetaminophen   Tablet. 650 milliGRAM(s) Oral every 6 hours PRN  enoxaparin Injectable 30 milliGRAM(s) SubCutaneous daily  hydrALAZINE Injectable 10 milliGRAM(s) IV Push once  sodium chloride 1 Gram(s) Oral three times a day  cloNIDine Patch 0.1 mG/24Hr(s) 1 patch Topical every 7 days  melatonin 3 milliGRAM(s) Oral at bedtime PRN  hydrALAZINE 50 milliGRAM(s) Oral three times a day      REVIEW OF SYSTEMS:   CONSTITUTIONAL: No fever, weight loss, or fatigue  NECK: No pain or stiffness  RESPIRATORY: No cough, wheezing, chills or hemoptysis; No shortness of breath  CARDIOVASCULAR: No chest pain, palpitations, dizziness, or leg swelling  GASTROINTESTINAL: No abdominal or epigastric pain. No nausea, vomiting, or hematemesis; No diarrhea or constipation. No melena or hematochezia.  GENITOURINARY: No dysuria, frequency, hematuria, or incontinence  NEUROLOGICAL: No headaches, loss of strength, numbness, or tremors  SKIN: No itching, burning  MUSCULOSKELETAL: No joint pain or swelling; No muscle, back, or extremity pain  PSYCHIATRIC: confused  HEME/LYMPH: No easy bruising, or bleeding gums  ALLERY AND IMMUNOLOGIC: No hives       Consultant(s) Notes Reviewed:  [x ] YES  [ ] NO    PHYSICAL EXAM:  GENERAL: NAD, well-groomed, well-developed  HEAD:  Atraumatic, Normocephalic  EYES: EOMI, PERRLA, conjunctiva and sclera clear  ENMT: No tonsillar erythema, exudates, or enlargement; Moist mucous membranes  NECK: Supple, No JVD  NERVOUS SYSTEM:  Awake & alert but confused   CHEST/LUNG: Clear to auscultation bilaterally; No rales, rhonchi, wheezing,  HEART: Regular rate and rhythm  ABDOMEN: Soft, Nontender, Nondistended; Bowel sounds present  EXTREMITIES:  No clubbing, cyanosis, or edema  LYMPH: No lymphadenopathy noted  SKIN: No rashes      Advanced care planning discussed with patient/family [x ] YES   [ ] NO

## 2017-07-08 NOTE — PROGRESS NOTE ADULT - PROBLEM SELECTOR PLAN 2
Continue with Ceftin  F/U repeat UA and Urine cx  Continue with doxazosin   Zofran prn for N/V  Tylenol prn for fever/pain

## 2017-07-08 NOTE — PHYSICAL THERAPY INITIAL EVALUATION ADULT - PERTINENT HX OF CURRENT PROBLEM, REHAB EVAL
This is a 89 F with PMH of HTN, LE edema who was brought in by her daughter b/c of increased confusion this morning associated with nausea and weakness. She also complained of SOB.

## 2017-07-08 NOTE — PROGRESS NOTE ADULT - PROBLEM SELECTOR PLAN 8
DVT PPX - LMWH  Fall risk, OOB with assistance, ambulate with assistance  DIET DASH/ TLC fluid rest 1000mL  Consider Hold Sensipar - Ca WNL

## 2017-07-08 NOTE — PROGRESS NOTE ADULT - SUBJECTIVE AND OBJECTIVE BOX
Subjective: c/o weakness. BP improved.       MEDICATIONS  (STANDING):  valsartan 320 milliGRAM(s) Oral daily  doxazosin 4 milliGRAM(s) Oral at bedtime  carvedilol 6.25 milliGRAM(s) Oral every 12 hours  cefuroxime   Tablet 500 milliGRAM(s) Oral every 12 hours  cinacalcet 30 milliGRAM(s) Oral at bedtime  enoxaparin Injectable 30 milliGRAM(s) SubCutaneous daily  hydrALAZINE Injectable 10 milliGRAM(s) IV Push once  cloNIDine Patch 0.1 mG/24Hr(s) 1 patch Topical every 7 days  hydrALAZINE 50 milliGRAM(s) Oral three times a day    MEDICATIONS  (PRN):  ondansetron Injectable 4 milliGRAM(s) IV Push every 6 hours PRN Nausea and/or Vomiting  acetaminophen   Tablet 650 milliGRAM(s) Oral every 6 hours PRN For Temp greater than 38 C (100.4 F)  acetaminophen   Tablet. 650 milliGRAM(s) Oral every 6 hours PRN Mild Pain (1 - 3)  melatonin 3 milliGRAM(s) Oral at bedtime PRN Insomnia          T(C): 36.4 (17 @ 15:22), Max: 36.7 (17 @ 08:00)  HR: 88 (17 @ 15:22) (61 - 88)  BP: 166/75 (17 @ 15:22) (145/70 - 187/62)  RR: 18 (17 @ 15:22) (15 - 18)  SpO2: 96% (17 @ 15:22) (94% - 97%)  Wt(kg): --        I&O's Detail    2017 07:  -  2017 07:00  --------------------------------------------------------  IN:    Oral Fluid: 480 mL  Total IN: 480 mL    OUT:    Voided: 1050 mL  Total OUT: 1050 mL    Total NET: -570 mL      2017 07:  -  2017 15:37  --------------------------------------------------------  IN:  Total IN: 0 mL    OUT:    Voided: 500 mL  Total OUT: 500 mL    Total NET: -500 mL               PHYSICAL EXAM:    GENERAL: anxious  EYES: EOMI, PERRLA, conjunctiva and sclera clear  NECK: Supple, no inc in JVP  CHEST/LUNG: Clear  HEART: S1S2  ABDOMEN: Soft, Nontender, Nondistended; Bowel sounds present  EXTREMITIES: no edema   NEURO: no asterixis      LABS:  CBC Full  -  ( 2017 06:26 )  WBC Count : x  Hemoglobin : 11.5 g/dL  Hematocrit : 33.2 %  Platelet Count - Automated : x  Mean Cell Volume : x  Mean Cell Hemoglobin : x  Mean Cell Hemoglobin Concentration : x  Auto Neutrophil # : x  Auto Lymphocyte # : x  Auto Monocyte # : x  Auto Eosinophil # : x  Auto Basophil # : x  Auto Neutrophil % : x  Auto Lymphocyte % : x  Auto Monocyte % : x  Auto Eosinophil % : x  Auto Basophil % : x    07-08    131<L>  |  95<L>  |  16  ----------------------------<  102<H>  4.1   |  29  |  0.59    Ca    8.9      2017 06:26  Mg     1.6     -    TPro  6.4  /  Alb  3.3  /  TBili  0.3  /  DBili  x   /  AST  21  /  ALT  25  /  AlkPhos  63  07-07      Urinalysis Basic - ( 2017 15:36 )    Color: x / Appearance: x / S.005 / pH: x  Gluc: x / Ketone: Negative  / Bili: Negative / Urobili: Negative   Blood: x / Protein: 75 mg/dL / Nitrite: Negative   Leuk Esterase: Trace / RBC: 0-2 /HPF / WBC 6-10   Sq Epi: x / Non Sq Epi: Few / Bacteria: Moderate          ASSESSMENT and PLAN:    * Hypovolemic hypoNa -- improved off diuretic, on salt tabs. D/c NaCl tabs at this juncture. Monitor on oral FR. Urate is inconsistent with SIADH.

## 2017-07-08 NOTE — PROGRESS NOTE ADULT - ASSESSMENT
89y/o F PMHx HTN, hypoparathyroidism s/p parathyroidectomy, anemia, diastolic HF admitted with metabolic encephalopathy likely 2/2 UTI vs hyponatremic and accelerated HTN.

## 2017-07-09 LAB
ANION GAP SERPL CALC-SCNC: 7 MMOL/L — SIGNIFICANT CHANGE UP (ref 5–17)
BUN SERPL-MCNC: 19 MG/DL — SIGNIFICANT CHANGE UP (ref 7–23)
CALCIUM SERPL-MCNC: 9.3 MG/DL — SIGNIFICANT CHANGE UP (ref 8.5–10.1)
CHLORIDE SERPL-SCNC: 97 MMOL/L — SIGNIFICANT CHANGE UP (ref 96–108)
CO2 SERPL-SCNC: 28 MMOL/L — SIGNIFICANT CHANGE UP (ref 22–31)
CREAT SERPL-MCNC: 0.61 MG/DL — SIGNIFICANT CHANGE UP (ref 0.5–1.3)
CULTURE RESULTS: SIGNIFICANT CHANGE UP
CULTURE RESULTS: SIGNIFICANT CHANGE UP
GLUCOSE SERPL-MCNC: 104 MG/DL — HIGH (ref 70–99)
HCT VFR BLD CALC: 32.8 % — LOW (ref 34.5–45)
HGB BLD-MCNC: 11.1 G/DL — LOW (ref 11.5–15.5)
MAGNESIUM SERPL-MCNC: 1.6 MG/DL — SIGNIFICANT CHANGE UP (ref 1.6–2.6)
MCHC RBC-ENTMCNC: 30.2 PG — SIGNIFICANT CHANGE UP (ref 27–34)
MCHC RBC-ENTMCNC: 34 GM/DL — SIGNIFICANT CHANGE UP (ref 32–36)
MCV RBC AUTO: 89 FL — SIGNIFICANT CHANGE UP (ref 80–100)
PLATELET # BLD AUTO: 257 K/UL — SIGNIFICANT CHANGE UP (ref 150–400)
POTASSIUM SERPL-MCNC: 3.8 MMOL/L — SIGNIFICANT CHANGE UP (ref 3.5–5.3)
POTASSIUM SERPL-SCNC: 3.8 MMOL/L — SIGNIFICANT CHANGE UP (ref 3.5–5.3)
RBC # BLD: 3.68 M/UL — LOW (ref 3.8–5.2)
RBC # FLD: 12.1 % — SIGNIFICANT CHANGE UP (ref 10.3–14.5)
SODIUM SERPL-SCNC: 132 MMOL/L — LOW (ref 135–145)
SPECIMEN SOURCE: SIGNIFICANT CHANGE UP
SPECIMEN SOURCE: SIGNIFICANT CHANGE UP
WBC # BLD: 10.8 K/UL — HIGH (ref 3.8–10.5)
WBC # FLD AUTO: 10.8 K/UL — HIGH (ref 3.8–10.5)

## 2017-07-09 PROCEDURE — 99233 SBSQ HOSP IP/OBS HIGH 50: CPT | Mod: 25

## 2017-07-09 PROCEDURE — 93306 TTE W/DOPPLER COMPLETE: CPT | Mod: 26

## 2017-07-09 RX ORDER — POTASSIUM CHLORIDE 20 MEQ
40 PACKET (EA) ORAL ONCE
Qty: 0 | Refills: 0 | Status: DISCONTINUED | OUTPATIENT
Start: 2017-07-09 | End: 2017-07-09

## 2017-07-09 RX ORDER — HYDRALAZINE HCL 50 MG
50 TABLET ORAL THREE TIMES A DAY
Qty: 0 | Refills: 0 | Status: DISCONTINUED | OUTPATIENT
Start: 2017-07-10 | End: 2017-07-14

## 2017-07-09 RX ORDER — POTASSIUM CHLORIDE 20 MEQ
40 PACKET (EA) ORAL ONCE
Qty: 0 | Refills: 0 | Status: COMPLETED | OUTPATIENT
Start: 2017-07-09 | End: 2017-07-09

## 2017-07-09 RX ORDER — MAGNESIUM SULFATE 500 MG/ML
2 VIAL (ML) INJECTION ONCE
Qty: 0 | Refills: 0 | Status: COMPLETED | OUTPATIENT
Start: 2017-07-09 | End: 2017-07-09

## 2017-07-09 RX ORDER — HYDRALAZINE HCL 50 MG
75 TABLET ORAL THREE TIMES A DAY
Qty: 0 | Refills: 0 | Status: DISCONTINUED | OUTPATIENT
Start: 2017-07-09 | End: 2017-07-09

## 2017-07-09 RX ADMIN — CARVEDILOL PHOSPHATE 6.25 MILLIGRAM(S): 80 CAPSULE, EXTENDED RELEASE ORAL at 18:24

## 2017-07-09 RX ADMIN — Medication 500 MILLIGRAM(S): at 06:08

## 2017-07-09 RX ADMIN — ENOXAPARIN SODIUM 30 MILLIGRAM(S): 100 INJECTION SUBCUTANEOUS at 11:02

## 2017-07-09 RX ADMIN — CINACALCET 30 MILLIGRAM(S): 30 TABLET, FILM COATED ORAL at 21:41

## 2017-07-09 RX ADMIN — Medication 4 MILLIGRAM(S): at 21:41

## 2017-07-09 RX ADMIN — Medication 50 GRAM(S): at 11:12

## 2017-07-09 RX ADMIN — Medication 50 MILLIGRAM(S): at 06:08

## 2017-07-09 RX ADMIN — Medication 40 MILLIEQUIVALENT(S): at 11:02

## 2017-07-09 RX ADMIN — VALSARTAN 320 MILLIGRAM(S): 80 TABLET ORAL at 06:08

## 2017-07-09 RX ADMIN — CARVEDILOL PHOSPHATE 6.25 MILLIGRAM(S): 80 CAPSULE, EXTENDED RELEASE ORAL at 06:08

## 2017-07-09 RX ADMIN — Medication 75 MILLIGRAM(S): at 13:49

## 2017-07-09 NOTE — PROGRESS NOTE ADULT - SUBJECTIVE AND OBJECTIVE BOX
HPI:  This is a 89 F with PMH of HTN, LE edema who was brought in by her daughter b/c of increased confusion this morning associated with nausea and weakness. She also complained of SOB. She denies fevers, chills, dysuria, cough and CP. Patient was in the ER on 7/3, diagnosed with UTI and sent home on po antibiotics. (2017 18:33)      SUBJECTIVE:  Patient is a 89y old  Female who presents with a chief complaint of weakness, nausea, confusion today (2017 13:18)  Seen awake and alert, sitting up in chair, able to do simple self-care.  Denies CP, palpitations, SOB.  Comfortable on RA.    OBJECTIVE:  Review Of Systems:  Constitutional: [ ] Fever [ ] Chills [ ] Fatigue [ ] Weight change   HEENT: [ ] Blurred vision [ ] Eye Pain [ ] Headache [ ] Runny nose [ ] Sore Throat   Respiratory: [ ] Cough [ ] Wheezing [ ] Shortness of breath  Cardiovascular: [ ] Chest Pain [ ] Palpitations [ ] PEPE [ ] PND [ ] Orthopnea  Gastrointestinal: [ ] Abdominal Pain [ ] Diarrhea [ ] Constipation [ ] Hemorrhoids [ ] Nausea [ ] Vomiting  Genitourinary: [ ] Nocturia [ ] Dysuria [ ] Incontinence  Extremities: [ ] Swelling [ ] Joint Pain  Neurologic: [ ] Focal deficit [ ] Paresthesias [ ] Syncope  Lymphatic: [ ] Swelling [ ] Lymphadenopathy   Skin: [ ] Rash [ ] Ecchymoses [ ] Wounds [ ] Lesions  Psychiatry: [ ] Depression [ ] Suicidal/Homicidal Ideation [ ] Anxiety [ ] Sleep Disturbances  [ ] 10 point review of systems is otherwise negative except as mentioned above            [ ]Unable to obtain    Allergy:  Allergies    Vasotec (Unknown)    Intolerances    Medications:  MEDICATIONS  (STANDING):  valsartan 320 milliGRAM(s) Oral daily  doxazosin 4 milliGRAM(s) Oral at bedtime  carvedilol 6.25 milliGRAM(s) Oral every 12 hours  cefuroxime   Tablet 500 milliGRAM(s) Oral every 12 hours  cinacalcet 30 milliGRAM(s) Oral at bedtime  enoxaparin Injectable 30 milliGRAM(s) SubCutaneous daily  hydrALAZINE Injectable 10 milliGRAM(s) IV Push once  cloNIDine Patch 0.1 mG/24Hr(s) 1 patch Topical every 7 days  hydrALAZINE 50 milliGRAM(s) Oral three times a day  potassium chloride   Solution 40 milliEquivalent(s) Oral once  magnesium sulfate  IVPB 2 Gram(s) IV Intermittent once    MEDICATIONS  (PRN):  ondansetron Injectable 4 milliGRAM(s) IV Push every 6 hours PRN Nausea and/or Vomiting  acetaminophen   Tablet 650 milliGRAM(s) Oral every 6 hours PRN For Temp greater than 38 C (100.4 F)  acetaminophen   Tablet. 650 milliGRAM(s) Oral every 6 hours PRN Mild Pain (1 - 3)  melatonin 3 milliGRAM(s) Oral at bedtime PRN Insomnia      PMH/PSH/FH/SH: [ ] Unchanged    Vitals:  T(C): 36.6 (17 @ 04:57), Max: 37 (17 @ 20:23)  HR: 90 (17 @ 04:57) (67 - 90)  BP: 189/63 (17 @ 04:57) (147/73 - 189/63)  BP(mean): --  RR: 18 (17 @ 04:57) (16 - 18)  SpO2: 95% (17 @ 04:57) (94% - 96%)  Wt(kg): --  Daily     Daily Weight in k.2 (2017 04:57)  I&O's Summary    2017 07:01  -  2017 07:00  --------------------------------------------------------  IN: 0 mL / OUT: 1000 mL / NET: -1000 mL    Labs:                        11.1   10.8  )-----------( 257      ( 2017 06:17 )             32.8         132<L>  |  97  |  19  ----------------------------<  104<H>  3.8   |  28  |  0.61    Ca    9.3      2017 06:17  Mg     1.6         TPro  6.4  /  Alb  3.3  /  TBili  0.3  /  DBili  x   /  AST  21  /  ALT  25  /  AlkPhos  63  -      Magnesium, Serum: 1.6 mg/dL ( @ 06:17)      ECG:  < from: 12 Lead ECG (17 @ 00:02) >    Ventricular Rate 59 BPM    Atrial Rate 59 BPM    P-R Interval 172 ms    QRS Duration 86 ms    Q-T Interval 406 ms    QTC Calculation(Bezet) 401 ms    P Axis 60 degrees    R Axis 7 degrees    T Axis 39 degrees    Diagnosis Line Sinus bradycardia  Possible Left atrial enlargement  Nonspecific ST and T wave abnormality    Confirmed by Vance Stephenson MD ([id number]) on 2017 1:15:49 PM    < end of copied text >    Echo:  < from: TTE Echo Doppler w/o Cont (17 @ 08:32) >     EXAM:  ECHO TTE W/O CON COMP W/DOPPLR         PROCEDURE DATE:  2017        INTERPRETATION:  Ordering Physician: Unknown Doctor    Indication: Hypertension    Study Quality: Technically fair  A complete echocardiographic study was performed utilizing standard   protocol including spectral and color Doppler in all echocardiographic   windows.    Height: 152 cm  Weight: 49 kg  BSA: 1.4 sq m  Blood Pressure: 189/63    MEASUREMENTS  IVS: 1.0cm  PWT: 1.0cm  LA: 4.2cm  AO: 2.7cm  LVIDd: 4.5cm  LVIDs: 3.3cm    LVEF: 65%  RVSP: 54mmHg    FINDINGS  Left Ventricle:  Normal left ventricular systolic function.  Aortic Valve: Calcified trileaflet aortic valve. Mild aortic   insufficiency.  Mitral Valve: Mitral annular calcification and calcified mitral valve   leaflets with normal diastolic opening. Mild mitral insufficiency.  Tricuspid Valve: Normal tricuspid valve. Mild tricuspid insufficiency.  Pulmonic Valve: Normal pulmonic valve. Mild pulmonic insufficiency.  Left Atrium: Mildly enlarged.  Right Ventricle: Normal right ventricular size and systolic function.  Right Atrium: Normal  Diastolic Function: Grade 1 diastolic dysfunction.  Pericardium/Pleura: Normal pericardium with no pericardial effusion.    MORENA ADORNO M.D., ATTENDING CARDIOLOGIST  This document has been electronically signed. 2017 10:13AM      < end of copied text >    Stress Testing:     Cath:    Imaging:  < from: CT Head No Cont (17 @ 15:49) >    EXAM:  CT BRAIN                            PROCEDURE DATE:  2017      INTERPRETATION:  History: Altered mental status, hypertension.    Noncontrast brain CT. Prior 3/30/2016.    Involutional and chronic microangiopathic changes commensurate with the   patient's stated chronologic age, similar to prior. There are no adverse   interval changes. No intra-axial or extra-axial hemorrhage territorial   infarct or mass effect. Clear sinuses. Cranium intact. 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 Status post bilateral scleral banding and   cataract surgery.    Impression: No acute intracranial pathology. Stable brain appearance    JULIETTE PATRICIA M.D., ATTENDING RADIOLOGIST  This document has been electronically signed. 2017  3:55PM    < end of copied text >    Interpretation of Telemetry:  Not on tele    Physical Exam:  Appearance: [x ] Normal  [ ] abnormal [x ] NAD   Eyes: [x ] PERRL [ ] EOMI  HENT: [x ] Normal [ ] Abnormal oral muscosa [ ]NC/AT  Cardiovascular: [x ] S1 [x ] S2 [x ] RRR [x ] m/r/g [ ]edema [ ] JVP  Procedural Access Site: [ ]  hematoma [ ] tender to palpation [ ] 2+ pulse [ ] bruit [ ] Ecchymosis  Respiratory: [ ] Clear to auscultation bilaterally [x] fine rales on right base  Gastrointestinal: [x ] Soft [ ] tenderness[ ] distension [x ] BS  Musculoskeletal: [ ] clubbing [ ] joint deformity   Neurologic: [ ] Non-focal  Lymphatic: [ ] lymphadenopathy  Psychiatry: [x ] AAOx3  [ ] confused [ ] disoriented [ ] Mood & affect appropriate  Skin: [ ]  rashes [ ] ecchymoses [ ] cyanosis

## 2017-07-09 NOTE — PROGRESS NOTE ADULT - SUBJECTIVE AND OBJECTIVE BOX
Patient is a 89y old  Female who presents with a chief complaint of weakness, nausea, confusion today (2017 13:18)      INTERVAL HPI/OVERNIGHT EVENTS:  T(C): 36.8 (17 @ 11:45), Max: 37 (17 @ 20:23)  HR: 74 (17 @ 11:45) (74 - 90)  BP: 129/68 (17 @ 11:45) (129/68 - 189/63)  RR: 15 (17 @ 11:45) (15 - 18)  SpO2: 96% (17 @ 11:45) (94% - 96%)  Wt(kg): --  I&O's Summary    2017 07:01  -  2017 07:00  --------------------------------------------------------  IN: 0 mL / OUT: 1000 mL / NET: -1000 mL        LABS:                        11.1   10.8  )-----------( 257      ( 2017 06:17 )             32.8         132<L>  |  97  |  19  ----------------------------<  104<H>  3.8   |  28  |  0.61    Ca    9.3      2017 06:17  Mg     1.6     -        Urinalysis Basic - ( 2017 15:36 )    Color: x / Appearance: x / S.005 / pH: x  Gluc: x / Ketone: Negative  / Bili: Negative / Urobili: Negative   Blood: x / Protein: 75 mg/dL / Nitrite: Negative   Leuk Esterase: Trace / RBC: 0-2 /HPF / WBC 6-10   Sq Epi: x / Non Sq Epi: Few / Bacteria: Moderate              Urinalysis Basic - ( 2017 15:36 )    Color: x / Appearance: x / S.005 / pH: x  Gluc: x / Ketone: Negative  / Bili: Negative / Urobili: Negative   Blood: x / Protein: 75 mg/dL / Nitrite: Negative   Leuk Esterase: Trace / RBC: 0-2 /HPF / WBC 6-10   Sq Epi: x / Non Sq Epi: Few / Bacteria: Moderate        MEDICATIONS  (STANDING):  valsartan 320 milliGRAM(s) Oral daily  doxazosin 4 milliGRAM(s) Oral at bedtime  carvedilol 6.25 milliGRAM(s) Oral every 12 hours  cinacalcet 30 milliGRAM(s) Oral at bedtime  enoxaparin Injectable 30 milliGRAM(s) SubCutaneous daily  hydrALAZINE Injectable 10 milliGRAM(s) IV Push once  cloNIDine Patch 0.1 mG/24Hr(s) 1 patch Topical every 7 days  hydrALAZINE 75 milliGRAM(s) Oral three times a day    MEDICATIONS  (PRN):  ondansetron Injectable 4 milliGRAM(s) IV Push every 6 hours PRN Nausea and/or Vomiting  acetaminophen   Tablet 650 milliGRAM(s) Oral every 6 hours PRN For Temp greater than 38 C (100.4 F)  acetaminophen   Tablet. 650 milliGRAM(s) Oral every 6 hours PRN Mild Pain (1 - 3)  melatonin 3 milliGRAM(s) Oral at bedtime PRN Insomnia      REVIEW OF SYSTEMS:  CONSTITUTIONAL: No fever, weight loss, or fatigue  EYES: No eye pain, visual disturbances, or discharge  ENMT:  No difficulty hearing, tinnitus, vertigo; No sinus or throat pain  NECK: No pain or stiffness  RESPIRATORY: No cough, wheezing, chills or hemoptysis; No shortness of breath  CARDIOVASCULAR: No chest pain, palpitations, dizziness, or leg swelling  GASTROINTESTINAL: No abdominal or epigastric pain. No nausea, vomiting, or hematemesis; No diarrhea or constipation. No melena or hematochezia.  GENITOURINARY: No dysuria, frequency, hematuria, or incontinence  NEUROLOGICAL: No headaches, memory loss, loss of strength, numbness, or tremors  SKIN: No itching, burning, rashes, or lesions   LYMPH NODES: No enlarged glands  ENDOCRINE: No heat or cold intolerance; No hair loss  MUSCULOSKELETAL: No joint pain or swelling; No muscle, back, or extremity pain  PSYCHIATRIC: No depression, anxiety, mood swings, or difficulty sleeping  HEME/LYMPH: No easy bruising, or bleeding gums  ALLERY AND IMMUNOLOGIC: No hives or eczema    RADIOLOGY & ADDITIONAL TESTS:    Imaging Personally Reviewed:  [x ] YES  [ ] NO    Consultant(s) Notes Reviewed:  [ ] YES  [ ] NO    PHYSICAL EXAM:  GENERAL: NAD, well-groomed, well-developed  HEAD:  Atraumatic, Normocephalic  EYES: EOMI, PERRLA, conjunctiva and sclera clear  ENMT: No tonsillar erythema, exudates, or enlargement; Moist mucous membranes, Good dentition, No lesions  NECK: Supple, No JVD, Normal thyroid  NERVOUS SYSTEM:  Alert & Oriented X3, Good concentration; Motor Strength 5/5 B/L upper and lower extremities; DTRs 2+ intact and symmetric  CHEST/LUNG: Clear to percussion bilaterally; No rales, rhonchi, wheezing, or rubs  HEART: Regular rate and rhythm; No murmurs, rubs, or gallops  ABDOMEN: Soft, Nontender, Nondistended; Bowel sounds present  EXTREMITIES:  2+ Peripheral Pulses, No clubbing, cyanosis, or edema  LYMPH: No lymphadenopathy noted  SKIN: No rashes or lesions    Care Discussed with Consultants/Other Providers [ ] YES  [ ] NO)

## 2017-07-09 NOTE — CHART NOTE - NSCHARTNOTEFT_GEN_A_CORE
Called by RN to examine patient for dizziness and tremors, and for patient seeming more lethargic as per family. Patient was assessed and examined at bedside. Patient seemed lethargic, exam limited due to patient falling asleep intermittently. Patient was AAOx3 and admitted to weakness, dizziness, and lethargy. They did not complain of any CP, SOB, f/c, headaches. Patient also went for an Echo earlier in the AM.    Vital Signs Last 24 Hrs  T(C): 36.7 (09 Jul 2017 15:25), Max: 37 (08 Jul 2017 20:23)  T(F): 98 (09 Jul 2017 15:25), Max: 98.6 (08 Jul 2017 20:23)  HR: 85 (09 Jul 2017 15:25) (74 - 90)  BP: 154/66 (09 Jul 2017 15:25) (129/68 - 189/63)  BP(mean): --  RR: 18 (09 Jul 2017 15:25) (15 - 18)  SpO2: 96% (09 Jul 2017 15:25) (94% - 96%)    Physical Exam:  General: lethargic, tired appearing, NAD  HEENT: NCAT, PERRLA, EOMI bl, moist mucous membranes   Neck: Supple, nontender, no mass  Neurology: A&Ox3, CN exam limited 2/2 patient feeling sleepy  Respiratory: CTA B/L, No W/R/R  CV: RRR, +S1/S2, no murmurs, rubs or gallops  Abdominal: Soft, NT, ND +BSx4  Extremities: 2+ peripheral pulses      A/P: This is a 89 y.o. F patient with PMH of diastolic CHF admitted with metabolic encephalopathy 2/2 UTI  vs hyponatremia, now complaining of dizziness and tremors and lethargy.  -Hydralazine was changed from 50 mg  tid to 75 mg tid by cardio today. Discussed with Dr. Perlman, he recommended to hold the evening dose of hydralazine 75mg and to decrease next day dose to 50mg tid.  -Plan discussed on phone with Dr. Perlman, for which he agreed upon.

## 2017-07-09 NOTE — PROGRESS NOTE ADULT - ASSESSMENT
89y/o F PMHx HTN, hypoparathyroidism s/p parathyroidectomy, anemia, diastolic HF admitted with metabolic encephalopathy likely 2/2 UTI vs hyponatremic and accelerated HTN.

## 2017-07-09 NOTE — PROGRESS NOTE ADULT - SUBJECTIVE AND OBJECTIVE BOX
[facility] - Christofer Isaacs, Marisa Rowell, Hieu Norris  Office Number:  [phone]    Patient resting comfortably in bed in NAD.  Laying flat with no respiratory distress.  No complaints of chest pain, dyspnea, palpitations, PND, or orthopnea.  BP remains uncontrolled.     MEDICATIONS  (STANDING):  valsartan 320 milliGRAM(s) Oral daily  doxazosin 4 milliGRAM(s) Oral at bedtime  carvedilol 6.25 milliGRAM(s) Oral every 12 hours  cefuroxime   Tablet 500 milliGRAM(s) Oral every 12 hours  cinacalcet 30 milliGRAM(s) Oral at bedtime  enoxaparin Injectable 30 milliGRAM(s) SubCutaneous daily  hydrALAZINE Injectable 10 milliGRAM(s) IV Push once  cloNIDine Patch 0.1 mG/24Hr(s) 1 patch Topical every 7 days  hydrALAZINE 50 milliGRAM(s) Oral three times a day  magnesium sulfate  IVPB 2 Gram(s) IV Intermittent once  potassium chloride   Powder 40 milliEquivalent(s) Oral once    MEDICATIONS  (PRN):  ondansetron Injectable 4 milliGRAM(s) IV Push every 6 hours PRN Nausea and/or Vomiting  acetaminophen   Tablet 650 milliGRAM(s) Oral every 6 hours PRN For Temp greater than 38 C (100.4 F)  acetaminophen   Tablet. 650 milliGRAM(s) Oral every 6 hours PRN Mild Pain (1 - 3)  melatonin 3 milliGRAM(s) Oral at bedtime PRN Insomnia      Allergies    Vasotec (Unknown)        Vital Signs Last 24 Hrs  T(C): 36.7 (09 Jul 2017 08:00), Max: 37 (08 Jul 2017 20:23)  T(F): 98 (09 Jul 2017 08:00), Max: 98.6 (08 Jul 2017 20:23)  HR: 86 (09 Jul 2017 08:00) (67 - 90)  BP: 180/60 (09 Jul 2017 08:00) (147/73 - 189/63)  BP(mean): --  RR: 17 (09 Jul 2017 08:00) (16 - 18)  SpO2: 96% (09 Jul 2017 08:00) (94% - 96%)    I&O's Summary    08 Jul 2017 07:01  -  09 Jul 2017 07:00  --------------------------------------------------------  IN: 0 mL / OUT: 1000 mL / NET: -1000 mL        ON EXAM:    General: NAD  HEENT: Mucous membranes are dry, anicteric  Lungs: Non-labored, breath sounds are clear bilaterally, No wheezing, rales or rhonchi  Cardiovascular: Regular, S1 and S2, 2/6 systolic murmur no rubs, or gallops  Gastrointestinal: Bowel Sounds present, soft, nontender.   Lymph: No peripheral edema. No lymphadenopathy.  Skin: No rashes or ulcers  Psych:  Mood & affect appropriate      LABS: All Labs Reviewed:                        11.1   10.8  )-----------( 257      ( 09 Jul 2017 06:17 )             32.8                         11.5   x     )-----------( x        ( 08 Jul 2017 06:26 )             33.2                         10.5   9.7   )-----------( 208      ( 07 Jul 2017 06:38 )             30.7     09 Jul 2017 06:17    132    |  97     |  19     ----------------------------<  104    3.8     |  28     |  0.61   08 Jul 2017 06:26    131    |  95     |  16     ----------------------------<  102    4.1     |  29     |  0.59   07 Jul 2017 14:54    125    |  88     |  21     ----------------------------<  102    3.9     |  31     |  0.73     Ca    9.3        09 Jul 2017 06:17  Ca    8.9        08 Jul 2017 06:26  Ca    8.6        07 Jul 2017 14:54  Mg     1.6       09 Jul 2017 06:17  Mg     1.6       07 Jul 2017 06:38    TPro  6.4    /  Alb  3.3    /  TBili  0.3    /  DBili  x      /  AST  21     /  ALT  25     /  AlkPhos  63     07 Jul 2017 14:54  TPro  7.3    /  Alb  3.8    /  TBili  0.3    /  DBili  x      /  AST  23     /  ALT  26     /  AlkPhos  67     06 Jul 2017 12:23     07-06 @ 12:23  Pro Bnp 1285    07-08 @ 06:26  TSH: 3.23      Echo:    < from: TTE Echo Doppler w/o Cont (07.09.17 @ 08:32) >  LVEF: 65%  RVSP: 54mmHg    FINDINGS  Left Ventricle:  Normal left ventricular systolic function.  Aortic Valve: Calcified trileaflet aortic valve. Mild aortic   insufficiency.  Mitral Valve: Mitral annular calcification and calcified mitral valve   leaflets with normal diastolic opening. Mild mitral insufficiency.  Tricuspid Valve: Normal tricuspid valve. Mild tricuspid insufficiency.  Pulmonic Valve: Normal pulmonic valve. Mild pulmonic insufficiency.  Left Atrium: Mildly enlarged.  Right Ventricle: Normal right ventricular size and systolic function.  Right Atrium: Normal  Diastolic Function: Grade 1 diastolic dysfunction.  Pericardium/Pleura: Normal pericardium with no pericardial effusion.    < end of copied text >      Assessment/Plan:   89y Female with hypertension, mental status changes, hyponatremia:    - Sodium improved.  Renal follow up.  - BP continues high.  Increase hydralazine to 75 TID.  Continue clonidine patch  - Continue coreg 6.25 BID  - Continue valsartan 320 QD  - Echo unremarkable  - Follow electrolytes closely

## 2017-07-09 NOTE — PROGRESS NOTE ADULT - PROBLEM SELECTOR PLAN 4
Improved but still elevated  - Continue with Coreg, po hydralazine, and clonidine topical patch with hold parameters  s/p IV hydralazine 10mg x1  hydralazine to be increased

## 2017-07-09 NOTE — PROGRESS NOTE ADULT - ASSESSMENT
Assessment/Plan:  89y Female with hypertension, mental status changes, hyponatremia:    - Sodium improving with salt tablets, now = 132  - Neuro and renal follow up  - Fluid restriction  - BP continues to be high at 180's.  Increase hydralazine to 75 TID.  Continue clonidine patch  - Continue coreg 6.25 BID  - Continue valsartan 320 QD  - Echo shows normal LVSF with no significant valvular abnormalities  - Follow electrolytes closely; Keep K>4 and Mag>.  Replete as needed.  - Will follow with you.

## 2017-07-09 NOTE — PROGRESS NOTE ADULT - SUBJECTIVE AND OBJECTIVE BOX
Subjective: multiple non-specific complaints. Mainly fatigued.       MEDICATIONS  (STANDING):  valsartan 320 milliGRAM(s) Oral daily  doxazosin 4 milliGRAM(s) Oral at bedtime  carvedilol 6.25 milliGRAM(s) Oral every 12 hours  cinacalcet 30 milliGRAM(s) Oral at bedtime  enoxaparin Injectable 30 milliGRAM(s) SubCutaneous daily  hydrALAZINE Injectable 10 milliGRAM(s) IV Push once  cloNIDine Patch 0.1 mG/24Hr(s) 1 patch Topical every 7 days  hydrALAZINE 75 milliGRAM(s) Oral three times a day    MEDICATIONS  (PRN):  ondansetron Injectable 4 milliGRAM(s) IV Push every 6 hours PRN Nausea and/or Vomiting  acetaminophen   Tablet 650 milliGRAM(s) Oral every 6 hours PRN For Temp greater than 38 C (100.4 F)  acetaminophen   Tablet. 650 milliGRAM(s) Oral every 6 hours PRN Mild Pain (1 - 3)  melatonin 3 milliGRAM(s) Oral at bedtime PRN Insomnia          T(C): 36.8 (17 @ 11:45), Max: 37 (17 @ 20:23)  HR: 74 (17 @ 11:45) (74 - 90)  BP: 129/68 (17 @ 11:45) (129/68 - 189/63)  RR: 15 (17 @ 11:45) (15 - 18)  SpO2: 96% (17 @ 11:45) (94% - 96%)  Wt(kg): --        I&O's Detail    2017 07:01  -  2017 07:00  --------------------------------------------------------  IN:  Total IN: 0 mL    OUT:    Voided: 1000 mL  Total OUT: 1000 mL    Total NET: -1000 mL               PHYSICAL EXAM:    GENERAL: anxious  EYES: EOMI, PERRLA, conjunctiva and sclera clear  NECK: Supple, no inc in JVP  CHEST/LUNG: Clear  HEART: S1S2  ABDOMEN: Soft, Nontender, Nondistended; Bowel sounds present  EXTREMITIES: no edema   NEURO: no asterixis      LABS:  CBC Full  -  ( 2017 06:17 )  WBC Count : 10.8 K/uL  Hemoglobin : 11.1 g/dL  Hematocrit : 32.8 %  Platelet Count - Automated : 257 K/uL  Mean Cell Volume : 89.0 fl  Mean Cell Hemoglobin : 30.2 pg  Mean Cell Hemoglobin Concentration : 34.0 gm/dL  Auto Neutrophil # : x  Auto Lymphocyte # : x  Auto Monocyte # : x  Auto Eosinophil # : x  Auto Basophil # : x  Auto Neutrophil % : x  Auto Lymphocyte % : x  Auto Monocyte % : x  Auto Eosinophil % : x  Auto Basophil % : x        132<L>  |  97  |  19  ----------------------------<  104<H>  3.8   |  28  |  0.61    Ca    9.3      2017 06:17  Mg     1.6     -        Urinalysis Basic - ( 2017 15:36 )    Color: x / Appearance: x / S.005 / pH: x  Gluc: x / Ketone: Negative  / Bili: Negative / Urobili: Negative   Blood: x / Protein: 75 mg/dL / Nitrite: Negative   Leuk Esterase: Trace / RBC: 0-2 /HPF / WBC 6-10   Sq Epi: x / Non Sq Epi: Few / Bacteria: Moderate              ASSESSMENT and PLAN:      * Hypovolemic hypoNa -- improved off diuretic, on salt tabs. NaCl tabs dced at this juncture. Monitor on oral FR. Urate is inconsistent with SIADH.

## 2017-07-09 NOTE — PROGRESS NOTE ADULT - SUBJECTIVE AND OBJECTIVE BOX
Neurology Follow up note    SAIRA [id number]    HPI:  This is a 89 F with PMH of HTN, LE edema who was brought in by her daughter b/c of increased confusion this morning associated with nausea and weakness. She also complained of SOB. She denies fevers, chills, dysuria, cough and CP. Patient was in the ER on 7/3, diagnosed with UTI and sent home on po antibiotics. (06 Jul 2017 18:33)      Interval History - I am feeling weak.    Patient is seen, chart was reviewed and case was discussed with the treatment team.  Pt is not in any distress.   Lying on bed comfortably.   No events reported overnight.   No clinical seizure was reported.  Sitting on chair bed comfortably.    is at bedside.    Vital Signs Last 24 Hrs  T(C): 36.7 (09 Jul 2017 15:25), Max: 37 (08 Jul 2017 20:23)  T(F): 98 (09 Jul 2017 15:25), Max: 98.6 (08 Jul 2017 20:23)  HR: 85 (09 Jul 2017 15:25) (74 - 90)  BP: 154/66 (09 Jul 2017 15:25) (129/68 - 189/63)  BP(mean): --  RR: 18 (09 Jul 2017 15:25) (15 - 18)  SpO2: 96% (09 Jul 2017 15:25) (94% - 96%)        REVIEW OF SYSTEMS:    Constitutional: No fever, weight loss or fatigue  Eyes: No eye pain, visual disturbances, or discharge  ENT:  No difficulty hearing, tinnitus, vertigo; No sinus or throat pain  Neck: No pain or stiffness  Respiratory: No cough, wheezing, chills or hemoptysis  Cardiovascular: No chest pain, palpitations, shortness of breath, dizziness or leg swelling  Gastrointestinal: No abdominal or epigastric pain. No nausea, vomiting or hematemesis; No diarrhea or constipation. No melena or hematochezia.  Genitourinary: No dysuria, frequency, hematuria or incontinence  Neurological: No headaches, memory loss, loss of strength, numbness or tremors  Psychiatric: No depression, anxiety, mood swings or difficulty sleeping  Musculoskeletal: No joint pain or swelling; No muscle, back or extremity pain  Skin: No itching, burning, rashes or lesions   Lymph Nodes: No enlarged glands  Endocrine: No heat or cold intolerance; No hair loss, No h/o diabetes or thyroid dysfunction  Allergy and Immunologic: No hives or eczema    On Neurological Examination:    Mental Status - Pt is alert, awake, oriented X2, Follows commands well and able to answer questions.    Speech - dysarthria.    Cranial Nerves - Pupils 3 mm equal and reactive to light, extraocular eye movements intact. Pt has no visual field deficit.  Pt has no facial asymmetry. Facial sensation is intact.Tongue - is in midline.    Muscle tone - is normal all over. Moves all extremities equally. No asymmetry is seen.      Motor Exam - 4/5 all over, No drift. No shaking or tremors.    Sensory Exam - Pin prick, temperature, joint position and vibration are intact on either side. Pt withdraws all extremities equally on stimulation. No asymmetry seen. No complaints of tingling, numbness.  .        coordination:    Finger to nose: normal.    Heel to shin:     Deep tendon Reflexes - 2 plus all over.        Romberg - Negative.    Neck Supple -  Yes.     MEDICATIONS    valsartan 320 milliGRAM(s) Oral daily  doxazosin 4 milliGRAM(s) Oral at bedtime  carvedilol 6.25 milliGRAM(s) Oral every 12 hours  cinacalcet 30 milliGRAM(s) Oral at bedtime  ondansetron Injectable 4 milliGRAM(s) IV Push every 6 hours PRN  acetaminophen   Tablet 650 milliGRAM(s) Oral every 6 hours PRN  acetaminophen   Tablet. 650 milliGRAM(s) Oral every 6 hours PRN  enoxaparin Injectable 30 milliGRAM(s) SubCutaneous daily  hydrALAZINE Injectable 10 milliGRAM(s) IV Push once  cloNIDine Patch 0.1 mG/24Hr(s) 1 patch Topical every 7 days  melatonin 3 milliGRAM(s) Oral at bedtime PRN      Allergies    Vasotec (Unknown)    Intolerances        LABS:  CBC Full  -  ( 09 Jul 2017 06:17 )  WBC Count : 10.8 K/uL  Hemoglobin : 11.1 g/dL  Hematocrit : 32.8 %  Platelet Count - Automated : 257 K/uL  Mean Cell Volume : 89.0 fl  Mean Cell Hemoglobin : 30.2 pg  Mean Cell Hemoglobin Concentration : 34.0 gm/dL  Auto Neutrophil # : x  Auto Lymphocyte # : x  Auto Monocyte # : x  Auto Eosinophil # : x  Auto Basophil # : x  Auto Neutrophil % : x  Auto Lymphocyte % : x  Auto Monocyte % : x  Auto Eosinophil % : x  Auto Basophil % : x      07-09    132<L>  |  97  |  19  ----------------------------<  104<H>  3.8   |  28  |  0.61    Ca    9.3      09 Jul 2017 06:17  Mg     1.6     07-09      Hemoglobin A1C:     Vitamin B12     RADIOLOGY    ASSESSMENT AND PLAN:      AMS IMPROVING.    FOLLOW UP LYTES.  Physical therapy evaluation.  OOB to chair/ambulation with assistance only.  Would continue to follow.

## 2017-07-09 NOTE — PROGRESS NOTE ADULT - PROBLEM SELECTOR PLAN 2
dc ceftin for now  F/U repeat UA and Urine cx  Continue with doxazosin   Zofran prn for N/V  Tylenol prn for fever/pain

## 2017-07-10 DIAGNOSIS — Z29.9 ENCOUNTER FOR PROPHYLACTIC MEASURES, UNSPECIFIED: ICD-10-CM

## 2017-07-10 DIAGNOSIS — R82.71 BACTERIURIA: ICD-10-CM

## 2017-07-10 LAB
-  AMPICILLIN: SIGNIFICANT CHANGE UP
-  CIPROFLOXACIN: SIGNIFICANT CHANGE UP
-  NITROFURANTOIN: SIGNIFICANT CHANGE UP
-  TETRACYCLINE: SIGNIFICANT CHANGE UP
-  VANCOMYCIN: SIGNIFICANT CHANGE UP
CULTURE RESULTS: SIGNIFICANT CHANGE UP
METHOD TYPE: SIGNIFICANT CHANGE UP
ORGANISM # SPEC MICROSCOPIC CNT: SIGNIFICANT CHANGE UP
ORGANISM # SPEC MICROSCOPIC CNT: SIGNIFICANT CHANGE UP
SPECIMEN SOURCE: SIGNIFICANT CHANGE UP

## 2017-07-10 PROCEDURE — 99233 SBSQ HOSP IP/OBS HIGH 50: CPT

## 2017-07-10 RX ORDER — LIDOCAINE 4 G/100G
1 CREAM TOPICAL DAILY
Qty: 0 | Refills: 0 | Status: DISCONTINUED | OUTPATIENT
Start: 2017-07-10 | End: 2017-07-14

## 2017-07-10 RX ADMIN — LIDOCAINE 1 PATCH: 4 CREAM TOPICAL at 12:02

## 2017-07-10 RX ADMIN — Medication 50 MILLIGRAM(S): at 13:45

## 2017-07-10 RX ADMIN — CARVEDILOL PHOSPHATE 6.25 MILLIGRAM(S): 80 CAPSULE, EXTENDED RELEASE ORAL at 06:22

## 2017-07-10 RX ADMIN — ENOXAPARIN SODIUM 30 MILLIGRAM(S): 100 INJECTION SUBCUTANEOUS at 12:02

## 2017-07-10 RX ADMIN — CINACALCET 30 MILLIGRAM(S): 30 TABLET, FILM COATED ORAL at 21:36

## 2017-07-10 RX ADMIN — Medication 50 MILLIGRAM(S): at 21:36

## 2017-07-10 RX ADMIN — Medication 50 MILLIGRAM(S): at 06:22

## 2017-07-10 RX ADMIN — Medication 4 MILLIGRAM(S): at 21:36

## 2017-07-10 RX ADMIN — VALSARTAN 320 MILLIGRAM(S): 80 TABLET ORAL at 06:22

## 2017-07-10 RX ADMIN — CARVEDILOL PHOSPHATE 6.25 MILLIGRAM(S): 80 CAPSULE, EXTENDED RELEASE ORAL at 17:53

## 2017-07-10 NOTE — PROGRESS NOTE ADULT - ASSESSMENT
89y/o F PMHx HTN, hypoparathyroidism s/p parathyroidectomy, anemia, diastolic HF admitted with metabolic encephalopathy likely 2/2 UTI vs hyponatremic and accelerated HTN. Currently has no complaints.

## 2017-07-10 NOTE — PROGRESS NOTE ADULT - SUBJECTIVE AND OBJECTIVE BOX
Patient is a 89y old  Female who presents with a chief complaint of weakness, nausea, confusion today (07 Jul 2017 13:18)      Patient seen in follow up for hyponatremia. Improving sodium levels. BP remains elevated. Dose titrated by cardio. Pt's son at bedside.     PAST MEDICAL HISTORY:  Anemia, unspecified type  Edema of lower extremity  Heart murmur  Hypertension  Hypercalcemia    MEDICATIONS  (STANDING):  valsartan 320 milliGRAM(s) Oral daily  doxazosin 4 milliGRAM(s) Oral at bedtime  carvedilol 6.25 milliGRAM(s) Oral every 12 hours  cinacalcet 30 milliGRAM(s) Oral at bedtime  enoxaparin Injectable 30 milliGRAM(s) SubCutaneous daily  hydrALAZINE Injectable 10 milliGRAM(s) IV Push once  cloNIDine Patch 0.1 mG/24Hr(s) 1 patch Topical every 7 days  hydrALAZINE 50 milliGRAM(s) Oral three times a day  lidocaine   Patch 1 Patch Transdermal daily    MEDICATIONS  (PRN):  ondansetron Injectable 4 milliGRAM(s) IV Push every 6 hours PRN Nausea and/or Vomiting  acetaminophen   Tablet 650 milliGRAM(s) Oral every 6 hours PRN For Temp greater than 38 C (100.4 F)  acetaminophen   Tablet. 650 milliGRAM(s) Oral every 6 hours PRN Mild Pain (1 - 3)  melatonin 3 milliGRAM(s) Oral at bedtime PRN Insomnia    T(C): 36.7 (07-10-17 @ 07:38), Max: 37.2 (07-10-17 @ 00:09)  HR: 76 (07-10-17 @ 07:38) (69 - 90)  BP: 172/62 (07-10-17 @ 07:38) (122/57 - 189/63)  RR: 16 (07-10-17 @ 07:38) (15 - 18)  SpO2: 16% (07-10-17 @ 07:38) (16% - 96%)  Wt(kg): --  I&O's Detail      PHYSICAL EXAM:  General: NAD  Respiratory: b/l air entry  Cardiovascular: S1 S2  Gastrointestinal: soft  Extremities:  no edema                          11.1   10.8  )-----------( 257      ( 09 Jul 2017 06:17 )             32.8     07-09    132<L>  |  97  |  19  ----------------------------<  104<H>  3.8   |  28  |  0.61    Ca    9.3      09 Jul 2017 06:17  Mg     1.6     07-09                Sodium, Serum: 132 (07-09 @ 06:17)  Sodium, Serum: 131 (07-08 @ 06:26)  Sodium, Serum: 125 (07-07 @ 14:54)  Sodium, Serum: 122 (07-07 @ 06:38)    Creatinine, Serum: 0.61 (07-09 @ 06:17)  Creatinine, Serum: 0.59 (07-08 @ 06:26)  Creatinine, Serum: 0.73 (07-07 @ 14:54)  Creatinine, Serum: 0.62 (07-07 @ 06:38)  Creatinine, Serum: 0.61 (07-06 @ 12:23)    Potassium, Serum: 3.8 (07-09 @ 06:17)  Potassium, Serum: 4.1 (07-08 @ 06:26)  Potassium, Serum: 3.9 (07-07 @ 14:54)  Potassium, Serum: 3.3 (07-07 @ 06:38)    Hemoglobin: 11.1 (07-09 @ 06:17)  Hemoglobin: 11.5 (07-08 @ 06:26)  Hemoglobin: 10.5 (07-07 @ 06:38)  Hemoglobin: 11.4 (07-06 @ 12:23)

## 2017-07-10 NOTE — PROGRESS NOTE ADULT - PROBLEM SELECTOR PLAN 1
likely 2/2 UTI and hyponatremia.  urine cx as noted- dc ceftin, ID eval  Ct head neg  Dr. Nuñez consulted.

## 2017-07-10 NOTE — PROGRESS NOTE ADULT - SUBJECTIVE AND OBJECTIVE BOX
Patient is a 89y old  Female who presents with a chief complaint of weakness, nausea, confusion today (07 Jul 2017 13:18)      INTERVAL HPI/OVERNIGHT EVENTS: seemingly more lucid (reportedly more confused yesterday afternoon)  T(C): 36.7 (07-10-17 @ 07:38), Max: 37.2 (07-10-17 @ 00:09)  HR: 76 (07-10-17 @ 07:38) (69 - 85)  BP: 172/62 (07-10-17 @ 07:38) (122/57 - 187/74)  RR: 16 (07-10-17 @ 07:38) (15 - 18)  SpO2: 16% (07-10-17 @ 07:38) (16% - 96%)  Wt(kg): --  I&O's Summary      LABS:                        11.1   10.8  )-----------( 257      ( 09 Jul 2017 06:17 )             32.8     07-09    132<L>  |  97  |  19  ----------------------------<  104<H>  3.8   |  28  |  0.61    Ca    9.3      09 Jul 2017 06:17  Mg     1.6     07-09            MEDICATIONS  (STANDING):  valsartan 320 milliGRAM(s) Oral daily  doxazosin 4 milliGRAM(s) Oral at bedtime  carvedilol 6.25 milliGRAM(s) Oral every 12 hours  cinacalcet 30 milliGRAM(s) Oral at bedtime  enoxaparin Injectable 30 milliGRAM(s) SubCutaneous daily  hydrALAZINE Injectable 10 milliGRAM(s) IV Push once  cloNIDine Patch 0.1 mG/24Hr(s) 1 patch Topical every 7 days  hydrALAZINE 50 milliGRAM(s) Oral three times a day  lidocaine   Patch 1 Patch Transdermal daily    MEDICATIONS  (PRN):  ondansetron Injectable 4 milliGRAM(s) IV Push every 6 hours PRN Nausea and/or Vomiting  acetaminophen   Tablet 650 milliGRAM(s) Oral every 6 hours PRN For Temp greater than 38 C (100.4 F)  acetaminophen   Tablet. 650 milliGRAM(s) Oral every 6 hours PRN Mild Pain (1 - 3)  melatonin 3 milliGRAM(s) Oral at bedtime PRN Insomnia      REVIEW OF SYSTEMS:  CONSTITUTIONAL: No fever  EYES: No eye pain, visual disturbances, or discharge  NECK: No pain or stiffness  RESPIRATORY: No cough, wheezing, chills or hemoptysis; No shortness of breath  CARDIOVASCULAR: No chest pain, palpitations, dizziness, or leg swelling  GASTROINTESTINAL: No abdominal or epigastric pain. No nausea, vomiting, or hematemesis; No diarrhea or constipation. No melena or hematochezia.  GENITOURINARY: No dysuria, frequency, hematuria,  NEUROLOGICAL: No headaches, memory loss, loss of strength, numbness, or tremors  SKIN: No itching, burning, rashes, or lesions   LYMPH NODES: No enlarged glands  ENDOCRINE: No heat or cold intolerance; No hair loss  MUSCULOSKELETAL: No joint pain or swelling; No muscle, back, or extremity pain  PSYCHIATRIC: No depression, anxiety, mood swings, or difficulty sleeping  HEME/LYMPH: No easy bruising, or bleeding gums  ALLERY AND IMMUNOLOGIC: No hives or eczema    RADIOLOGY & ADDITIONAL TESTS:    Imaging Personally Reviewed:  x[ ] YES  [ ] NO    Consultant(s) Notes Reviewed:  [x ] YES  [ ] NO    PHYSICAL EXAM:  GENERAL: NAD  HEAD:  Atraumatic, Normocephalic  EYES: EOMI, PERRLA, conjunctiva and sclera clear  ENMT: No tonsillar erythema, exudates, or enlargement; Moist mucous membranes, Good dentition, No lesions  NECK: Supple, No JVD, Normal thyroid  NERVOUS SYSTEM:  Alert & Oriented X2,  Motor Strength 5/5 B/L upper and lower extremities; DTRs 2+ intact and symmetric  CHEST/LUNG: Clear to percussion bilaterally; No rales, rhonchi, wheezing, or rubs  HEART: Regular rate and rhythm; No murmurs, rubs, or gallops  ABDOMEN: Soft, Nontender, Nondistended; Bowel sounds present  EXTREMITIES:  2+ Peripheral Pulses, No clubbing, cyanosis, or edema  LYMPH: No lymphadenopathy noted  SKIN: No rashes or lesions    Care Discussed with Consultants/Other Providers [x ] YES  [ ] NO

## 2017-07-10 NOTE — PROGRESS NOTE ADULT - ASSESSMENT
89y Female with hypertension, mental status changes, hyponatremia:    - Sodium improving  - Neuro and renal follow up  - free water restriction  - BP continues to be high to the 180's at times.    -suggest increasing clonidine patch to 0.2  - Continue coreg 6.25 BID  - Continue valsartan 320 QD  - continue hydralazine  - Echo shows normal LVEF with no significant valvular abnormalities  - Follow electrolytes closely; Keep K>4 and Mag>.  Replete as needed.  - Will follow with you.

## 2017-07-10 NOTE — PROGRESS NOTE ADULT - ASSESSMENT
·	Hyponatremia  ·	Hypertension    Improving sodium levels. Maintain PO fluid restriction. Avoid hypotonic fluids. Monitor BP closely. Check serial sodium levels. BP meds adjusted by cardio. D/w pt's son at bedside.

## 2017-07-10 NOTE — CONSULT NOTE ADULT - SUBJECTIVE AND OBJECTIVE BOX
HPI:  This is a 89 F with PMH of HTN, LE edema who was brought in by her daughter b/c of increased confusion this morning associated with nausea and weakness. She also complained of SOB. She denies fevers, chills, dysuria, cough and CP. Patient was in the ER on 7/3, diagnosed with UTI and sent home on po antibiotics. (06 Jul 2017 18:33) Patient hospitalizecd and treated with cephalosporin therapy. Now we are called to evaluate with enterococcus growing in urine culture.      PAST MEDICAL & SURGICAL HISTORY:  Anemia, unspecified type  Edema of lower extremity  Heart murmur  Hypertension  Hypercalcemia  History of appendectomy  S/P tonsillectomy  H/O parathyroidectomy      Antimicrobials  NONE    Immunological      Other  valsartan 320 milliGRAM(s) Oral daily  doxazosin 4 milliGRAM(s) Oral at bedtime  carvedilol 6.25 milliGRAM(s) Oral every 12 hours  cinacalcet 30 milliGRAM(s) Oral at bedtime  ondansetron Injectable 4 milliGRAM(s) IV Push every 6 hours PRN  acetaminophen   Tablet 650 milliGRAM(s) Oral every 6 hours PRN  acetaminophen   Tablet. 650 milliGRAM(s) Oral every 6 hours PRN  enoxaparin Injectable 30 milliGRAM(s) SubCutaneous daily  hydrALAZINE Injectable 10 milliGRAM(s) IV Push once  cloNIDine Patch 0.1 mG/24Hr(s) 1 patch Topical every 7 days  melatonin 3 milliGRAM(s) Oral at bedtime PRN  hydrALAZINE 50 milliGRAM(s) Oral three times a day  lidocaine   Patch 1 Patch Transdermal daily      Allergies    Vasotec (Unknown)    Intolerances        SOCIAL HISTORY: no current tobacco    FAMILY HISTORY:  No pertinent family history in first degree relatives      ROS:    EYES:  Negative  blurry vision or double vision  GASTROINTESTINAL:  Negative for nausea, vomiting, diarrhea  -otherwise negative except for subjective    Vital Signs Last 24 Hrs  T(C): 36.6 (10 Jul 2017 12:26), Max: 37.2 (10 Jul 2017 00:09)  T(F): 97.9 (10 Jul 2017 12:26), Max: 98.9 (10 Jul 2017 00:09)  HR: 65 (10 Jul 2017 12:26) (65 - 85)  BP: 152/54 (10 Jul 2017 12:26) (122/57 - 187/74)  BP(mean): --  RR: 16 (10 Jul 2017 12:26) (15 - 18)  SpO2: 97% (10 Jul 2017 12:26) (16% - 97%)    PE:  WDWN in no distress  HEENT:  NC, PERRL, sclerae anicteric, arcus senilis, conjunctivae clear, EOMI.  Sinuses nontender, no nasal exudate.  No buccal or pharyngeal lesions, erythema or exudate  Neck:  Supple, no adenopathy  Lungs:  No accessory muscle use, bilaterally clear to auscultation  Cor:  RRR, S1, S2, no murmur appreciated  Abd:  Symmetric, normoactive BS.  Soft, nontender, no masses, guarding or rebound.  Liver and spleen not enlarged  Extrem:  No cyanosis or edema  Skin:  No rashes.  Neuro: grossly intact, some cognitive decline  Musc: moving all limbs freely, no focal deficits    LABS:                        11.1   10.8  )-----------( 257      ( 09 Jul 2017 06:17 )             32.8     07-09    132<L>  |  97  |  19  ----------------------------<  104<H>  3.8   |  28  |  0.61    Ca    9.3      09 Jul 2017 06:17  Mg     1.6     07-09      Urinalysis (07.07.17 @ 15:36)    Glucose Qualitative, Urine: Negative    Blood, Urine: Negative    pH Urine: 7.0    Color: Pale Yellow    Urine Appearance: Slightly Turbid    Bilirubin: Negative    Ketone - Urine: Negative    Specific Gravity: 1.005    Protein, Urine: 75 mg/dL    Urobilinogen: Negative    Nitrite: Negative    Leukocyte Esterase Concentration: Trace        MICROBIOLOGY:    Culture - Urine (07.07.17 @ 22:19)    Specimen Source: .Urine Clean Catch (Midstream)    Culture Results:   >100,000 CFU/ml Enterococcus faecium  <10,000 CFU/ml Normal Urogenital storm present        RADIOLOGY & ADDITIONAL STUDIES:    --< from: CT Abdomen and Pelvis w/ Oral Cont and w/ IV Cont (07.06.17 @ 15:55) >  EXAM:  CT ABDOMEN AND PELVIS OC IC                            PROCEDURE DATE:  07/06/2017          INTERPRETATION:  History: Nausea vomiting abdominal pain.    Double contrast CT abdomen and pelvis. Prior 7/7/2016.  100 cc Omnipaque 350 injected intravenously.  Global cardiomegaly with coronary artery calcification. Hypoventilatory   changes at the lung bases.  No calcified gallstones or biliary dilatation. Liver spleen unremarkable.   Mild prominence main pancreatic duct likely age-related. Stomach not   adequately distended.  Multiple punctate nonobstructive left renal calculi. Mild fullness of the   left renal collecting system and ureter probably related to markedly   distended urinary bladder. No obstructing urinary tract calculi.  No bowel obstruction or inflammation. Moderate retained colonic stool.  Trace pelvic ascites. No extraluminal gas.  Atrophic postmenopausal uterus. Stable right adnexal cystic lesion.  Advanced spinal degenerative change. Severe degenerative change bilateral   hips.    Impression:    No specific acute inflammatory or obstructive pathology.  Nonobstructive left nephrolithiasis.  Markedly distended urinary bladder.  Trace pelvic ascites.  Additional findings as discussed    < end of copied text >
CMS possible metabolic questionable uti and hyponatremia  also suspect MCI   antibiotics as needed  monitor electrolytes  spoke to daughter mentals status is better
[facility] Consultation    CHIEF COMPLAINT: Patient is a 89y old  Female who presents with a chief complaint of weakness, nausea, confusion today (06 Jul 2017 18:33)      HPI:  This is a 89 F with PMH of HTN, LE edema who was brought in by her daughter b/c of increased confusion this morning associated with nausea and weakness. She also complained of SOB. She denies fevers, chills, dysuria, cough and CP. Patient was in the ER on 7/3, diagnosed with UTI and sent home on po antibiotics. (06 Jul 2017 18:33)      PAST MEDICAL & SURGICAL HISTORY:  Anemia, unspecified type  Edema of lower extremity  Heart murmur  Hypertension  Hypercalcemia  History of appendectomy  S/P tonsillectomy  H/O parathyroidectomy      SOCIAL HISTORY: no tob/etoh    FAMILY HISTORY:   No pertinent family history in first degree relatives      MEDICATIONS  (STANDING):  valsartan 320 milliGRAM(s) Oral daily  doxazosin 4 milliGRAM(s) Oral at bedtime  carvedilol 6.25 milliGRAM(s) Oral every 12 hours  cefuroxime   Tablet 500 milliGRAM(s) Oral every 12 hours  cinacalcet 30 milliGRAM(s) Oral at bedtime  hydrALAZINE 40 milliGRAM(s) Oral three times a day  enoxaparin Injectable 30 milliGRAM(s) SubCutaneous daily    MEDICATIONS  (PRN):  ondansetron Injectable 4 milliGRAM(s) IV Push every 6 hours PRN Nausea and/or Vomiting  acetaminophen   Tablet 650 milliGRAM(s) Oral every 6 hours PRN For Temp greater than 38 C (100.4 F)  acetaminophen   Tablet. 650 milliGRAM(s) Oral every 6 hours PRN Mild Pain (1 - 3)      Allergies    Vasotec (Unknown)      REVIEW OF SYSTEMS: unobtainable    VITAL SIGNS:   Vital Signs Last 24 Hrs  T(C): 36.5 (06 Jul 2017 21:12), Max: 36.8 (06 Jul 2017 19:45)  T(F): 97.7 (06 Jul 2017 21:12), Max: 98.2 (06 Jul 2017 19:45)  HR: 59 (06 Jul 2017 21:12) (59 - 71)  BP: 156/50 (06 Jul 2017 21:12) (154/86 - 156/50)  BP(mean): --  RR: 16 (06 Jul 2017 21:12) (15 - 16)  SpO2: 97% (06 Jul 2017 21:12) (95% - 97%)    I&O's Summary      PHYSICAL EXAM:    Constitutional: NAD, frail  Eyes: ,  Pupils round, no lesions  ENMT: no exudate or erythema  Pulmonary: Non-labored, breath sounds are clear bilaterally, No wheezing, rales or rhonchi  Cardiovascular: PMI not palpable  Regular S1 and S2, no murmurs, rubs, gallops or clicks  Gastrointestinal: Bowel Sounds present, soft, nontender.   Lymph: No peripheral edema. No cervical lymphadenopathy.  Neurological:  no focal deficits  Skin: No rashes.. No cyanosis.  Psych:cannot assess    LABS: All Labs Reviewed:                        11.4   8.6   )-----------( 240      ( 06 Jul 2017 12:23 )             33.4                         10.9   8.8   )-----------( 227      ( 04 Jul 2017 00:47 )             31.5     06 Jul 2017 12:23    123    |  86     |  21     ----------------------------<  106    3.7     |  30     |  0.61   04 Jul 2017 00:47    126    |  90     |  23     ----------------------------<  106    4.0     |  28     |  0.71     Ca    8.7        06 Jul 2017 12:23  Ca    9.6        04 Jul 2017 00:47    TPro  7.3    /  Alb  3.8    /  TBili  0.3    /  DBili  x      /  AST  23     /  ALT  26     /  AlkPhos  67     06 Jul 2017 12:23  TPro  6.7    /  Alb  3.7    /  TBili  0.2    /  DBili  x      /  AST  22     /  ALT  27     /  AlkPhos  78     04 Jul 2017 00:47    PT/INR - ( 06 Jul 2017 12:23 )   PT: 11.9 sec;   INR: 1.09 ratio         PTT - ( 06 Jul 2017 12:23 )  PTT:29.4 sec  CARDIAC MARKERS ( 06 Jul 2017 12:23 )  .026 ng/mL / x     / 75 U/L / x     / 4.6 ng/mL        07-06 @ 12:23  Pro Bnp [id number]        RADIOLOGY/EKG:    ECG: sinus rhythm, possible left ventricular hypertrophy, early transition, nonspecific T wave abnormalities    < from: Xray Chest 1 View AP-PORTABLE IMMEDIATE (07.06.17 @ 12:35) >    EXAM:  CHEST PORTABLE IMMED                            PROCEDURE DATE:  07/06/2017          INTERPRETATION:  Hypertension, altered mental status.    AP chest. Prior 7/4/2017. No change heart mediastinum. Hyperinflated   lungs. No consolidation pneumothorax or effusion.    Impression: As above                JULIETTE PATRICIA M.D., ATTENDING RADIOLOGIST  This document has been electronically signed. Jul 6 2017 12:43PM                < end of copied text >

## 2017-07-10 NOTE — PROGRESS NOTE ADULT - PROBLEM SELECTOR PLAN 4
Improved but still elevated  - Continue with Coreg, po hydralazine, and clonidine topical patch with hold parameters  s/p IV hydralazine 10mg x1  hydralazine dose decreased yesterday

## 2017-07-10 NOTE — CONSULT NOTE ADULT - ASSESSMENT
89 yo female recently treated with abx for UTI now growing Enterococcus in urine with no sig inflammation

## 2017-07-10 NOTE — PROGRESS NOTE ADULT - PROBLEM SELECTOR PLAN 3
BP continues to be high at 180's.  Continue valsartan 320 QD  Continue coreg 6.25 BID  Continue Hydralazine 50mg tid

## 2017-07-10 NOTE — PROGRESS NOTE ADULT - SUBJECTIVE AND OBJECTIVE BOX
Patient is a 89y old  Female who presents with a chief complaint of weakness, nausea, confusion today (07 Jul 2017 13:18) Patient ha      INTERVAL HPI/OVERNIGHT EVENTS:    MEDICATIONS  (STANDING):  valsartan 320 milliGRAM(s) Oral daily  doxazosin 4 milliGRAM(s) Oral at bedtime  carvedilol 6.25 milliGRAM(s) Oral every 12 hours  cinacalcet 30 milliGRAM(s) Oral at bedtime  enoxaparin Injectable 30 milliGRAM(s) SubCutaneous daily  hydrALAZINE Injectable 10 milliGRAM(s) IV Push once  cloNIDine Patch 0.1 mG/24Hr(s) 1 patch Topical every 7 days  hydrALAZINE 50 milliGRAM(s) Oral three times a day    MEDICATIONS  (PRN):  ondansetron Injectable 4 milliGRAM(s) IV Push every 6 hours PRN Nausea and/or Vomiting  acetaminophen   Tablet 650 milliGRAM(s) Oral every 6 hours PRN For Temp greater than 38 C (100.4 F)  acetaminophen   Tablet. 650 milliGRAM(s) Oral every 6 hours PRN Mild Pain (1 - 3)  melatonin 3 milliGRAM(s) Oral at bedtime PRN Insomnia      Allergies    Vasotec (Unknown)    Intolerances        REVIEW OF SYSTEMS:  CONSTITUTIONAL: No fever, No chills,No fatigue,No myalgia,No Body ache  EYES: No eye pain, visual disturbances, or discharge  ENMT:  No ear pain, No nose bleed, No vertigo; No sinus or throat pain  NECK: No pain, No stiffness  RESPIRATORY: No cough, wheezing, No  hemoptysis; No shortness of breath  CARDIOVASCULAR: No chest pain, palpitations, leg swelling  GASTROINTESTINAL: No abdominal or epigastric pain. No nausea, No vomiting; No diarrhea or constipation. [ ] BM  GENITOURINARY: No dysuria, No frequency, No urgency, No hematuria, or incontinence  NEUROLOGICAL: alert and oriented x 3,  No headaches, No dizziness, No numbness,  SKIN:   No itching, burning, rashes, or lesions   MUSCULOSKELETAL: No joint pain or swelling; No muscle pain, No back pain, No extremity pain  PSYCHIATRIC: No depression, anxiety, mood swings, or difficulty sleeping  ROS  [ ] Unable to obtain   REST OF REVIEW Of SYSTEM - [ ] Normal     Height (cm): 152.4 (07-06 @ 21:16)  Weight (kg): 49.9 (07-06 @ 19:45)  BMI (kg/m2): 21.5 (07-06 @ 21:16)  BSA (m2): 1.45 (07-06 @ 21:16)  Vital Signs Last 24 Hrs  T(C): 36.7 (10 Jul 2017 07:38), Max: 37.2 (10 Jul 2017 00:09)  T(F): 98.1 (10 Jul 2017 07:38), Max: 98.9 (10 Jul 2017 00:09)  HR: 76 (10 Jul 2017 07:38) (69 - 85)  BP: 172/62 (10 Jul 2017 07:38) (122/57 - 187/74)  BP(mean): --  RR: 16 (10 Jul 2017 07:38) (15 - 18)  SpO2: 16% (10 Jul 2017 07:38) (16% - 96%)  [ ] room air   [ ] 02    PHYSICAL EXAM:  GENERAL:  No acute distresss,  [ ] Agitated, [ ] Lethargy, [ ] confused   HEAD:  normal  ENMT: normal  NECK:  normal    NERVOUS SYSTEM:  Alert & Oriented X3, no focal deficits [ ]Confusion  [ ] Encephalopathic [ ] Sedated [ ] Other  CHEST/LUNG: Clear to auscultation bilaterally,  [ ] decreased breath sounds at bases  [ ] wheezing   [ ] rhonchi  [ ] crackles  HEART:  Regular rate and rhythm, No murmurs, rubs, or gallops,  [ ] irregular   ABDOMEN:  soft, nontender, nondistended, positive bowel sounds   [ ] obese  EXTREMITIES: No clubbing, cyanosis or edema  SKIN: [ ] venous stasis skin changes    LABS:      Ca    9.3        09 Jul 2017 06:17            CAPILLARY BLOOD GLUCOSE        CulturesPatient is a 89y old  Female who presents with a chief complaint of weakness, nausea, confusion today (07 Jul 2017 13:18)      INTERVAL HPI/OVERNIGHT EVENTS:    MEDICATIONS  (STANDING):  valsartan 320 milliGRAM(s) Oral daily  doxazosin 4 milliGRAM(s) Oral at bedtime  carvedilol 6.25 milliGRAM(s) Oral every 12 hours  cinacalcet 30 milliGRAM(s) Oral at bedtime  enoxaparin Injectable 30 milliGRAM(s) SubCutaneous daily  hydrALAZINE Injectable 10 milliGRAM(s) IV Push once  cloNIDine Patch 0.1 mG/24Hr(s) 1 patch Topical every 7 days  hydrALAZINE 50 milliGRAM(s) Oral three times a day    MEDICATIONS  (PRN):  ondansetron Injectable 4 milliGRAM(s) IV Push every 6 hours PRN Nausea and/or Vomiting  acetaminophen   Tablet 650 milliGRAM(s) Oral every 6 hours PRN For Temp greater than 38 C (100.4 F)  acetaminophen   Tablet. 650 milliGRAM(s) Oral every 6 hours PRN Mild Pain (1 - 3)  melatonin 3 milliGRAM(s) Oral at bedtime PRN Insomnia      Allergies    Vasotec (Unknown)    Intolerances        REVIEW OF SYSTEMS:  CONSTITUTIONAL: No fever, No chills,No fatigue,No myalgia,No Body ache  EYES: No eye pain, visual disturbances, or discharge  ENMT:  No ear pain, No nose bleed, No vertigo; No sinus or throat pain  NECK: No pain, No stiffness  RESPIRATORY: No cough, wheezing, No  hemoptysis; No shortness of breath  CARDIOVASCULAR: No chest pain, palpitations, leg swelling  GASTROINTESTINAL: No abdominal or epigastric pain. No nausea, No vomiting; No diarrhea or constipation. [ ] BM  GENITOURINARY: No dysuria, No frequency, No urgency, No hematuria, or incontinence  NEUROLOGICAL: alert and oriented x 3,  No headaches, No dizziness, No numbness,  SKIN:   No itching, burning, rashes, or lesions   MUSCULOSKELETAL: No joint pain or swelling; No muscle pain, No back pain, No extremity pain  PSYCHIATRIC: No depression, anxiety, mood swings, or difficulty sleeping  ROS  [ ] Unable to obtain   REST OF REVIEW Of SYSTEM - [ ] Normal     Height (cm): 152.4 (07-06 @ 21:16)  Weight (kg): 49.9 (07-06 @ 19:45)  BMI (kg/m2): 21.5 (07-06 @ 21:16)  BSA (m2): 1.45 (07-06 @ 21:16)  Vital Signs Last 24 Hrs  T(C): 36.7 (10 Jul 2017 07:38), Max: 37.2 (10 Jul 2017 00:09)  T(F): 98.1 (10 Jul 2017 07:38), Max: 98.9 (10 Jul 2017 00:09)  HR: 76 (10 Jul 2017 07:38) (69 - 85)  BP: 172/62 (10 Jul 2017 07:38) (122/57 - 187/74)  BP(mean): --  RR: 16 (10 Jul 2017 07:38) (15 - 18)  SpO2: 16% (10 Jul 2017 07:38) (16% - 96%)  [ ] room air   [ ] 02    PHYSICAL EXAM:  GENERAL:  No acute distresss,  [ ] Agitated, [ ] Lethargy, [ ] confused   HEAD:  normal  ENMT: normal  NECK:  normal    NERVOUS SYSTEM:  Alert & Oriented X3, no focal deficits [ ]Confusion  [ ] Encephalopathic [ ] Sedated [ ] Other  CHEST/LUNG: Clear to auscultation bilaterally,  [ ] decreased breath sounds at bases  [ ] wheezing   [ ] rhonchi  [ ] crackles  HEART:  Regular rate and rhythm, No murmurs, rubs, or gallops,  [ ] irregular   ABDOMEN:  soft, nontender, nondistended, positive bowel sounds   [ ] obese  EXTREMITIES: No clubbing, cyanosis or edema  SKIN: [ ] venous stasis skin changes    LABS:      Ca    9.3        09 Jul 2017 06:17            CAPILLARY BLOOD GLUCOSE        CulturesPatient is a 89y old  Female who presents with a chief complaint of weakness, nausea, confusion today (07 Jul 2017 13:18)      INTERVAL HPI/OVERNIGHT EVENTS:    MEDICATIONS  (STANDING):  valsartan 320 milliGRAM(s) Oral daily  doxazosin 4 milliGRAM(s) Oral at bedtime  carvedilol 6.25 milliGRAM(s) Oral every 12 hours  cinacalcet 30 milliGRAM(s) Oral at bedtime  enoxaparin Injectable 30 milliGRAM(s) SubCutaneous daily  hydrALAZINE Injectable 10 milliGRAM(s) IV Push once  cloNIDine Patch 0.1 mG/24Hr(s) 1 patch Topical every 7 days  hydrALAZINE 50 milliGRAM(s) Oral three times a day    MEDICATIONS  (PRN):  ondansetron Injectable 4 milliGRAM(s) IV Push every 6 hours PRN Nausea and/or Vomiting  acetaminophen   Tablet 650 milliGRAM(s) Oral every 6 hours PRN For Temp greater than 38 C (100.4 F)  acetaminophen   Tablet. 650 milliGRAM(s) Oral every 6 hours PRN Mild Pain (1 - 3)  melatonin 3 milliGRAM(s) Oral at bedtime PRN Insomnia      Allergies    Vasotec (Unknown)    Intolerances        REVIEW OF SYSTEMS:  CONSTITUTIONAL: No fever, No chills,No fatigue,No myalgia,No Body ache  EYES: No eye pain, visual disturbances, or discharge  ENMT:  No ear pain, No nose bleed, No vertigo; No sinus or throat pain  NECK: No pain, No stiffness  RESPIRATORY: No cough, wheezing, No  hemoptysis; No shortness of breath  CARDIOVASCULAR: No chest pain, palpitations, leg swelling  GASTROINTESTINAL: No abdominal or epigastric pain. No nausea, No vomiting; No diarrhea or constipation. [ ] BM  GENITOURINARY: No dysuria, No frequency, No urgency, No hematuria, or incontinence  NEUROLOGICAL: alert and oriented x 3,  No headaches, No dizziness, No numbness,  SKIN:   No itching, burning, rashes, or lesions   MUSCULOSKELETAL: No joint pain or swelling; No muscle pain, No back pain, No extremity pain  PSYCHIATRIC: No depression, anxiety, mood swings, or difficulty sleeping  ROS  [ ] Unable to obtain   REST OF REVIEW Of SYSTEM - [ ] Normal     Height (cm): 152.4 (07-06 @ 21:16)  Weight (kg): 49.9 (07-06 @ 19:45)  BMI (kg/m2): 21.5 (07-06 @ 21:16)  BSA (m2): 1.45 (07-06 @ 21:16)  Vital Signs Last 24 Hrs  T(C): 36.7 (10 Jul 2017 07:38), Max: 37.2 (10 Jul 2017 00:09)  T(F): 98.1 (10 Jul 2017 07:38), Max: 98.9 (10 Jul 2017 00:09)  HR: 76 (10 Jul 2017 07:38) (69 - 85)  BP: 172/62 (10 Jul 2017 07:38) (122/57 - 187/74)  BP(mean): --  RR: 16 (10 Jul 2017 07:38) (15 - 18)  SpO2: 16% (10 Jul 2017 07:38) (16% - 96%)  [ ] room air   [ ] 02    PHYSICAL EXAM:  GENERAL:  No acute distresss,  [ ] Agitated, [ ] Lethargy, [ ] confused   HEAD:  normal  ENMT: normal  NECK:  normal    NERVOUS SYSTEM:  Alert & Oriented X3, no focal deficits [ ]Confusion  [ ] Encephalopathic [ ] Sedated [ ] Other  CHEST/LUNG: Clear to auscultation bilaterally,  [ ] decreased breath sounds at bases  [ ] wheezing   [ ] rhonchi  [ ] crackles  HEART:  Regular rate and rhythm, No murmurs, rubs, or gallops,  [ ] irregular   ABDOMEN:  soft, nontender, nondistended, positive bowel sounds   [ ] obese  EXTREMITIES: No clubbing, cyanosis or edema  SKIN: [ ] venous stasis skin changes    LABS:      Ca    9.3        09 Jul 2017 06:17            CAPILLARY BLOOD GLUCOSE        Cultures          RADIOLOGY & ADDITIONAL TESTS:      Care Discussed with [X] Consultants  [ ] Patient  [ ] Family  [X]   /   [ ] Other; RN  DVT prophylaxis [ ] lovenox   [ ] subq heparin  [ ] coumadin  [ ] venodynes [ ] ambulating frequently at how risk for vte and no pharm         or  mechanical prophylaxis required    [ ] other   Advanced directive:    [ ]pt has hcp     [ ] pt declined to assign hcp  Discussed with pt @ bedside            RADIOLOGY & ADDITIONAL TESTS:      Care Discussed with [X] Consultants  [ ] Patient  [ ] Family  [X]   /   [ ] Other; RN  DVT prophylaxis [ ] lovenox   [ ] subq heparin  [ ] coumadin  [ ] venodynes [ ] ambulating frequently at how risk for vte and no pharm         or  mechanical prophylaxis required    [ ] other   Advanced directive:    [ ]pt has hcp     [ ] pt declined to assign hcp  Discussed with pt @ bedside            RADIOLOGY & ADDITIONAL TESTS:      Care Discussed with [X] Consultants  [ ] Patient  [ ] Family  [X]   /   [ ] Other; RN  DVT prophylaxis [ ] lovenox   [ ] subq heparin  [ ] coumadin  [ ] venodynes [ ] ambulating frequently at how risk for vte and no pharm         or  mechanical prophylaxis required    [ ] other   Advanced directive:    [ ]pt has hcp     [ ] pt declined to assign hcp  Discussed with pt @ bedside Patient is a 89y old  Female who presents with a chief complaint of weakness, nausea, confusion today (07 Jul 2017 13:18)       INTERVAL HPI/OVERNIGHT EVENTS: PGY 1 called overnight by RN to examine patient for dizziness and tremors, and for patient seeming more lethargic as per family. Patient seemed lethargic and was falling asleep intermittently. Evening dose of hydralazine 75mg was held and today's dose was decreased to 50mg tid.  Symptoms resolved. Patient currently has no complaints. She is AAOx3. Denies SOB, CP, palpitations, HA, weakness, dizziness, and fatigue.     MEDICATIONS  (STANDING):  valsartan 320 milliGRAM(s) Oral daily  doxazosin 4 milliGRAM(s) Oral at bedtime  carvedilol 6.25 milliGRAM(s) Oral every 12 hours  cinacalcet 30 milliGRAM(s) Oral at bedtime  enoxaparin Injectable 30 milliGRAM(s) SubCutaneous daily  hydrALAZINE Injectable 10 milliGRAM(s) IV Push once  cloNIDine Patch 0.1 mG/24Hr(s) 1 patch Topical every 7 days  hydrALAZINE 50 milliGRAM(s) Oral three times a day    MEDICATIONS  (PRN):  ondansetron Injectable 4 milliGRAM(s) IV Push every 6 hours PRN Nausea and/or Vomiting  acetaminophen   Tablet 650 milliGRAM(s) Oral every 6 hours PRN For Temp greater than 38 C (100.4 F)  acetaminophen   Tablet. 650 milliGRAM(s) Oral every 6 hours PRN Mild Pain (1 - 3)  melatonin 3 milliGRAM(s) Oral at bedtime PRN Insomnia      Allergies    Vasotec (Unknown)    Intolerances        REVIEW OF SYSTEMS:  CONSTITUTIONAL: No fever, No chills,No fatigue,No myalgia,No Body ache  EYES: No eye pain or visual disturbances  ENMT:  No ear pain, No nose bleed, No vertigo; No sinus or throat pain  NECK: No pain, No stiffness  RESPIRATORY: No cough, wheezing, No  hemoptysis; No shortness of breath  CARDIOVASCULAR: No chest pain, palpitations, leg swelling  GASTROINTESTINAL: No abdominal or epigastric pain. No nausea, No vomiting; No diarrhea or constipation. [ ] BM  GENITOURINARY: No dysuria, No frequency, No urgency, No hematuria, or incontinence  NEUROLOGICAL: alert and oriented x 3,  No headaches, No dizziness, No numbness,  SKIN:   No itching, burning, rashes, or lesions   MUSCULOSKELETAL: No joint pain or swelling; No muscle pain, No back pain, No extremity pain  PSYCHIATRIC: No depression, anxiety, mood swings, or difficulty sleeping  ROS  [ ] Unable to obtain   REST OF REVIEW Of SYSTEM - [ ] Normal     Height (cm): 152.4 (07-06 @ 21:16)  Weight (kg): 49.9 (07-06 @ 19:45)  BMI (kg/m2): 21.5 (07-06 @ 21:16)  BSA (m2): 1.45 (07-06 @ 21:16)  Vital Signs Last 24 Hrs  T(C): 36.7 (10 Jul 2017 07:38), Max: 37.2 (10 Jul 2017 00:09)  T(F): 98.1 (10 Jul 2017 07:38), Max: 98.9 (10 Jul 2017 00:09)  HR: 76 (10 Jul 2017 07:38) (69 - 85)  BP: 172/62 (10 Jul 2017 07:38) (122/57 - 187/74)  BP(mean): --  RR: 16 (10 Jul 2017 07:38) (15 - 18)  SpO2: 16% (10 Jul 2017 07:38) (16% - 96%)  [X] room air   [ ] 02    PHYSICAL EXAM:  GENERAL:  No acute distresss, Seated bedside eating breakfast   HEAD:  normal  ENMT: normal  NECK:  normal    NERVOUS SYSTEM:  Alert & Oriented X3, no focal deficits [ ]Confusion  [ ] Encephalopathic [ ] Sedated [ ] Other  CHEST/LUNG: Clear to auscultation bilaterally,  [ ] decreased breath sounds at bases  [ ] wheezing   [ ] rhonchi  [ ] crackles  HEART:  Regular rate and rhythm, No murmurs, rubs, or gallops,  [ ] irregular   ABDOMEN:  soft, nontender, nondistended, positive bowel sounds   [ ] obese  EXTREMITIES: No clubbing, cyanosis or edema  SKIN: [ ] venous stasis skin changes    LABS:      Ca    9.3        09 Jul 2017 06:17          MEDICATIONS  (PRN):  ondansetron Injectable 4 milliGRAM(s) IV Push every 6 hours PRN Nausea and/or Vomiting  acetaminophen   Tablet 650 milliGRAM(s) Oral every 6 hours PRN For Temp greater than 38 C (100.4 F)  acetaminophen   Tablet. 650 milliGRAM(s) Oral every 6 hours PRN Mild Pain (1 - 3)  melatonin 3 milliGRAM(s) Oral at bedtime PRN Insomnia      Allergies    Vasotec (Unknown)    Intolerances      LABS:  Glucose, Serum: 104 mg/dL (07.09.17 @ 06:17)        Ca    9.3        09 Jul 2017 06:17      RADIOLOGY & ADDITIONAL TESTS:  < from: TTE Echo Doppler w/o Cont (07.09.17 @ 08:32) >  EXAM:  ECHO TTE W/O CON COMP W/DOPPLR         PROCEDURE DATE:  07/09/2017        INTERPRETATION:  Ordering Physician: Unknown Doctor    Indication: Hypertension    Study Quality: Technically fair  A complete echocardiographic study was performed utilizing standard   protocol including spectral and color Doppler in all echocardiographic   windows.    Height: 152 cm  Weight: 49 kg  BSA: 1.4 sq m  Blood Pressure: 189/63    MEASUREMENTS  IVS: 1.0cm  PWT: 1.0cm  LA: 4.2cm  AO: 2.7cm  LVIDd: 4.5cm  LVIDs: 3.3cm    LVEF: 65%  RVSP: 54mmHg    FINDINGS  Left Ventricle:  Normal left ventricular systolic function.  Aortic Valve: Calcified trileaflet aortic valve. Mild aortic   insufficiency.  Mitral Valve: Mitral annular calcification and calcified mitral valve   leaflets with normal diastolic opening. Mild mitral insufficiency.  Tricuspid Valve: Normal tricuspid valve. Mild tricuspid insufficiency.  Pulmonic Valve: Normal pulmonic valve. Mild pulmonic insufficiency.  Left Atrium: Mildly enlarged.  Right Ventricle: Normal right ventricular size and systolic function.  Right Atrium: Normal  Diastolic Function: Grade 1 diastolic dysfunction.  Pericardium/Pleura: Normal pericardium with no pericardial effusion.      MORENA ADORNO M.D., ATTENDING CARDIOLOGIST  This document has been electronically signed. Jul 9 2017 10:13AM        Care Discussed with [X] Consultants  [ ] Patient  [ ] Family  [X]   /   [ ] Other; RN  DVT prophylaxis [ ] lovenox   [ ] subq heparin  [ ] coumadin  [ ] venodynes [ ] ambulating frequently at how risk for vte and no pharm         or  mechanical prophylaxis required    [ ] other   Advanced directive:    [ ]pt has hcp     [ ] pt declined to assign hcp  Discussed with pt @ bedside Patient is a 89y old  Female who presents with a chief complaint of weakness, nausea, confusion today (07 Jul 2017 13:18)       INTERVAL HPI/OVERNIGHT EVENTS: PGY 1 called overnight by RN to examine patient for dizziness and tremors, and for patient seeming more lethargic as per family. Patient seemed lethargic and was falling asleep intermittently. Evening dose of hydralazine 75mg was held and today's dose was decreased to 50mg tid.  Symptoms resolved. Patient currently has no complaints. She is AAOx3. Denies SOB, CP, palpitations, HA, weakness, dizziness, and fatigue.     MEDICATIONS  (STANDING):  valsartan 320 milliGRAM(s) Oral daily  doxazosin 4 milliGRAM(s) Oral at bedtime  carvedilol 6.25 milliGRAM(s) Oral every 12 hours  cinacalcet 30 milliGRAM(s) Oral at bedtime  enoxaparin Injectable 30 milliGRAM(s) SubCutaneous daily  hydrALAZINE Injectable 10 milliGRAM(s) IV Push once  cloNIDine Patch 0.1 mG/24Hr(s) 1 patch Topical every 7 days  hydrALAZINE 50 milliGRAM(s) Oral three times a day    MEDICATIONS  (PRN):  ondansetron Injectable 4 milliGRAM(s) IV Push every 6 hours PRN Nausea and/or Vomiting  acetaminophen   Tablet 650 milliGRAM(s) Oral every 6 hours PRN For Temp greater than 38 C (100.4 F)  acetaminophen   Tablet. 650 milliGRAM(s) Oral every 6 hours PRN Mild Pain (1 - 3)  melatonin 3 milliGRAM(s) Oral at bedtime PRN Insomnia      Allergies    Vasotec (Unknown)    Intolerances        REVIEW OF SYSTEMS:  CONSTITUTIONAL: No fever, No chills,No fatigue,No myalgia,No Body ache  EYES: No eye pain or visual disturbances  ENMT:  No ear pain, No nose bleed, No vertigo; No sinus or throat pain  NECK: No pain, No stiffness  RESPIRATORY: No cough, wheezing, No  hemoptysis; No shortness of breath  CARDIOVASCULAR: No chest pain, palpitations, leg swelling  GASTROINTESTINAL: No abdominal or epigastric pain. No nausea, No vomiting; No diarrhea or constipation. [ ] BM  GENITOURINARY: No dysuria, No frequency, No urgency, No hematuria, or incontinence  NEUROLOGICAL: alert and oriented x 3,  No headaches, No dizziness, No numbness,  SKIN:   No itching, burning, rashes, or lesions   MUSCULOSKELETAL: No joint pain or swelling; No muscle pain, No back pain, No extremity pain  PSYCHIATRIC: No depression, anxiety, mood swings, or difficulty sleeping  ROS  [ ] Unable to obtain   REST OF REVIEW Of SYSTEM - [ ] Normal     Height (cm): 152.4 (07-06 @ 21:16)  Weight (kg): 49.9 (07-06 @ 19:45)  BMI (kg/m2): 21.5 (07-06 @ 21:16)  BSA (m2): 1.45 (07-06 @ 21:16)  Vital Signs Last 24 Hrs  T(C): 36.7 (10 Jul 2017 07:38), Max: 37.2 (10 Jul 2017 00:09)  T(F): 98.1 (10 Jul 2017 07:38), Max: 98.9 (10 Jul 2017 00:09)  HR: 76 (10 Jul 2017 07:38) (69 - 85)  BP: 172/62 (10 Jul 2017 07:38) (122/57 - 187/74)  BP(mean): --  RR: 16 (10 Jul 2017 07:38) (15 - 18)  SpO2: 16% (10 Jul 2017 07:38) (16% - 96%)  [X] room air   [ ] 02    PHYSICAL EXAM:  GENERAL:  No acute distresss, Seated bedside eating breakfast   HEAD:  normal  ENMT: normal  NECK:  normal    NERVOUS SYSTEM:  Alert & Oriented X3, no focal deficits [ ]Confusion  [ ] Encephalopathic [ ] Sedated [ ] Other  CHEST/LUNG: Clear to auscultation bilaterally,  [ ] decreased breath sounds at bases  [ ] wheezing   [ ] rhonchi  [ ] crackles  HEART:  Regular rate and rhythm, No murmurs, rubs, or gallops,  [ ] irregular   ABDOMEN:  soft, nontender, nondistended, positive bowel sounds   [ ] obese  EXTREMITIES: No clubbing, cyanosis or edema  SKIN: [ ] venous stasis skin changes    LABS:  Glucose, Serum: 104 mg/dL (07.09.17 @ 06:17)        Ca    9.3        09 Jul 2017 06:17      RADIOLOGY & ADDITIONAL TESTS:  < from: TTE Echo Doppler w/o Cont (07.09.17 @ 08:32) >  EXAM:  ECHO TTE W/O CON COMP W/DOPPLR         PROCEDURE DATE:  07/09/2017        INTERPRETATION:  Ordering Physician: Unknown Doctor    Indication: Hypertension    Study Quality: Technically fair  A complete echocardiographic study was performed utilizing standard   protocol including spectral and color Doppler in all echocardiographic   windows.    Height: 152 cm  Weight: 49 kg  BSA: 1.4 sq m  Blood Pressure: 189/63    MEASUREMENTS  IVS: 1.0cm  PWT: 1.0cm  LA: 4.2cm  AO: 2.7cm  LVIDd: 4.5cm  LVIDs: 3.3cm    LVEF: 65%  RVSP: 54mmHg    FINDINGS  Left Ventricle:  Normal left ventricular systolic function.  Aortic Valve: Calcified trileaflet aortic valve. Mild aortic   insufficiency.  Mitral Valve: Mitral annular calcification and calcified mitral valve   leaflets with normal diastolic opening. Mild mitral insufficiency.  Tricuspid Valve: Normal tricuspid valve. Mild tricuspid insufficiency.  Pulmonic Valve: Normal pulmonic valve. Mild pulmonic insufficiency.  Left Atrium: Mildly enlarged.  Right Ventricle: Normal right ventricular size and systolic function.  Right Atrium: Normal  Diastolic Function: Grade 1 diastolic dysfunction.  Pericardium/Pleura: Normal pericardium with no pericardial effusion.      MORENA ADORNO M.D., ATTENDING CARDIOLOGIST  This document has been electronically signed. Jul 9 2017 10:13AM        Care Discussed with [X] Consultants  [ ] Patient  [ ] Family  [X]   /   [ ] Other; RN  DVT prophylaxis [ ] lovenox   [ ] subq heparin  [ ] coumadin  [ ] venodynes [ ] ambulating frequently at how risk for vte and no pharm         or  mechanical prophylaxis required    [ ] other   Advanced directive:    [ ]pt has hcp     [ ] pt declined to assign hcp  Discussed with pt @ bedside Patient is a 89y old  Female who presents with a chief complaint of weakness, nausea, confusion today (07 Jul 2017 13:18)       INTERVAL HPI/OVERNIGHT EVENTS: PGY 1 called overnight by RN to examine patient for dizziness and tremors, and for patient seeming more lethargic as per family. Evening dose of hydralazine 75mg was held and today's dose was decreased to 50mg tid.  Symptoms resolved. Patient currently has no complaints. She is AAOx3. Denies SOB, CP, palpitations, HA, weakness, dizziness, and fatigue. Denies abdominal pain, suprapubic pain, dysuria, hematuria and blood in the stool.    MEDICATIONS  (STANDING):  valsartan 320 milliGRAM(s) Oral daily  doxazosin 4 milliGRAM(s) Oral at bedtime  carvedilol 6.25 milliGRAM(s) Oral every 12 hours  cinacalcet 30 milliGRAM(s) Oral at bedtime  enoxaparin Injectable 30 milliGRAM(s) SubCutaneous daily  hydrALAZINE Injectable 10 milliGRAM(s) IV Push once  cloNIDine Patch 0.1 mG/24Hr(s) 1 patch Topical every 7 days  hydrALAZINE 50 milliGRAM(s) Oral three times a day    MEDICATIONS  (PRN):  ondansetron Injectable 4 milliGRAM(s) IV Push every 6 hours PRN Nausea and/or Vomiting  acetaminophen   Tablet 650 milliGRAM(s) Oral every 6 hours PRN For Temp greater than 38 C (100.4 F)  acetaminophen   Tablet. 650 milliGRAM(s) Oral every 6 hours PRN Mild Pain (1 - 3)  melatonin 3 milliGRAM(s) Oral at bedtime PRN Insomnia      Allergies    Vasotec (Unknown)    Intolerances      REVIEW OF SYSTEMS:  CONSTITUTIONAL: No fever, No chills, No fatigue, No myalgia, No Body ache  EYES: No eye pain or visual disturbances  ENMT:  No ear pain, No nose bleed, No vertigo; No sinus or throat pain  NECK: No pain, No stiffness  RESPIRATORY: No cough, wheezing, No  hemoptysis; No shortness of breath  CARDIOVASCULAR: No chest pain, palpitations, leg swelling  GASTROINTESTINAL: No abdominal or epigastric pain. No nausea, No vomiting; No diarrhea or constipation. [ ] BM  GENITOURINARY: No dysuria, No frequency, No urgency, No hematuria, or incontinence  NEUROLOGICAL: alert and oriented x 3,  No headaches, No dizziness, No numbness,  SKIN:   No itching, burning, rashes, or lesions   MUSCULOSKELETAL: No muscle pain, No back pain, b/l knee pain upon standing  PSYCHIATRIC: No depression, anxiety, mood swings, or difficulty sleeping  ROS  [ ] Unable to obtain   REST OF REVIEW Of SYSTEM - [ ] Normal     Height (cm): 152.4 (07-06 @ 21:16)  Weight (kg): 49.9 (07-06 @ 19:45)  BMI (kg/m2): 21.5 (07-06 @ 21:16)  BSA (m2): 1.45 (07-06 @ 21:16)  Vital Signs Last 24 Hrs  T(C): 36.7 (10 Jul 2017 07:38), Max: 37.2 (10 Jul 2017 00:09)  T(F): 98.1 (10 Jul 2017 07:38), Max: 98.9 (10 Jul 2017 00:09)  HR: 76 (10 Jul 2017 07:38) (69 - 85)  BP: 172/62 (10 Jul 2017 07:38) (122/57 - 187/74)  BP(mean): --  RR: 16 (10 Jul 2017 07:38) (15 - 18)  SpO2: 16% (10 Jul 2017 07:38) (16% - 96%)  [X] room air   [ ] 02    PHYSICAL EXAM:  GENERAL:  No acute distress.  HEAD:  normal  ENMT: normal  NECK:  normal    NERVOUS SYSTEM:  Alert & Oriented X3, no focal deficits [ ]Confusion  [ ] Encephalopathic [ ] Sedated [ ] Other  CHEST/LUNG: Clear to auscultation bilaterally,  [ ] decreased breath sounds at bases  [ ] wheezing   [ ] rhonchi  [ ] crackles  HEART:  Regular rate and rhythm, No murmurs, rubs, or gallops,  [ ] irregular   ABDOMEN:  soft, nontender, nondistended, positive bowel sounds   [ ] obese  EXTREMITIES: No clubbing, cyanosis or edema  SKIN: [ ] venous stasis skin changes    LABS:  Glucose, Serum: 104 mg/dL (07.09.17 @ 06:17)        Ca    9.3        09 Jul 2017 06:17      RADIOLOGY & ADDITIONAL TESTS:  < from: TTE Echo Doppler w/o Cont (07.09.17 @ 08:32) >  EXAM:  ECHO TTE W/O CON COMP W/DOPPLR         PROCEDURE DATE:  07/09/2017        INTERPRETATION:  Ordering Physician: Unknown Doctor    Indication: Hypertension    Study Quality: Technically fair  A complete echocardiographic study was performed utilizing standard   protocol including spectral and color Doppler in all echocardiographic   windows.    Height: 152 cm  Weight: 49 kg  BSA: 1.4 sq m  Blood Pressure: 189/63    MEASUREMENTS  IVS: 1.0cm  PWT: 1.0cm  LA: 4.2cm  AO: 2.7cm  LVIDd: 4.5cm  LVIDs: 3.3cm    LVEF: 65%  RVSP: 54mmHg    FINDINGS  Left Ventricle:  Normal left ventricular systolic function.  Aortic Valve: Calcified trileaflet aortic valve. Mild aortic   insufficiency.  Mitral Valve: Mitral annular calcification and calcified mitral valve   leaflets with normal diastolic opening. Mild mitral insufficiency.  Tricuspid Valve: Normal tricuspid valve. Mild tricuspid insufficiency.  Pulmonic Valve: Normal pulmonic valve. Mild pulmonic insufficiency.  Left Atrium: Mildly enlarged.  Right Ventricle: Normal right ventricular size and systolic function.  Right Atrium: Normal  Diastolic Function: Grade 1 diastolic dysfunction.  Pericardium/Pleura: Normal pericardium with no pericardial effusion.      MORENA ADORNO M.D., ATTENDING CARDIOLOGIST  This document has been electronically signed. Jul 9 2017 10:13AM    Culture - Urine (07.07.17 @ 22:19)    Specimen Source: .Urine Clean Catch (Midstream)    Culture Results:   >100,000 CFU/ml Enterococcus faecium  <10,000 CFU/ml Normal Urogenital storm present      Care Discussed with [X] Consultants  [ ] Patient  [ ] Family  [X]   /   [ ] Other; RN  DVT prophylaxis [ ] lovenox   [ ] subq heparin  [ ] coumadin  [ ] venodynes [ ] ambulating frequently at how risk for vte and no pharm         or  mechanical prophylaxis required    [ ] other   Advanced directive:    [ ]pt has hcp     [ ] pt declined to assign hcp  Discussed with pt @ bedside

## 2017-07-10 NOTE — PROGRESS NOTE ADULT - SUBJECTIVE AND OBJECTIVE BOX
Neurology follow up note    SAIRA [id number]      Interval History:    Patient complaints of knee pain     MEDICATIONS    valsartan 320 milliGRAM(s) Oral daily  doxazosin 4 milliGRAM(s) Oral at bedtime  carvedilol 6.25 milliGRAM(s) Oral every 12 hours  cinacalcet 30 milliGRAM(s) Oral at bedtime  ondansetron Injectable 4 milliGRAM(s) IV Push every 6 hours PRN  acetaminophen   Tablet 650 milliGRAM(s) Oral every 6 hours PRN  acetaminophen   Tablet. 650 milliGRAM(s) Oral every 6 hours PRN  enoxaparin Injectable 30 milliGRAM(s) SubCutaneous daily  hydrALAZINE Injectable 10 milliGRAM(s) IV Push once  cloNIDine Patch 0.1 mG/24Hr(s) 1 patch Topical every 7 days  melatonin 3 milliGRAM(s) Oral at bedtime PRN  hydrALAZINE 50 milliGRAM(s) Oral three times a day      Allergies    Vasotec (Unknown)    Intolerances            Vital Signs Last 24 Hrs  T(C): 36.7 (10 Jul 2017 07:38), Max: 37.2 (10 Jul 2017 00:09)  T(F): 98.1 (10 Jul 2017 07:38), Max: 98.9 (10 Jul 2017 00:09)  HR: 76 (10 Jul 2017 07:38) (69 - 85)  BP: 172/62 (10 Jul 2017 07:38) (122/57 - 187/74)  BP(mean): --  RR: 16 (10 Jul 2017 07:38) (15 - 18)  SpO2: 16% (10 Jul 2017 07:38) (16% - 96%)      REVIEW OF SYSTEMS:     Constitutional: No fever, chills, fatigue, weakness  Eyes: no eye pain, visual disturbances, or discharge  ENT:  No difficulty hearing, tinnitus, vertigo; No sinus or throat pain  Neck: No pain or stiffness  Respiratory: No cough, dyspnea, wheezing   Cardiovascular: No chest pain, palpitations,   Gastrointestinal: No abdominal or epigastric pain. No nausea, vomiting  No diarrhea or constipation.   Genitourinary: No dysuria, frequency, hematuria or incontinence  Neurological: No headaches, lightheadedness, vertigo, numbness or tremors  Psychiatric: No depression, anxiety, mood swings or difficulty sleeping  Musculoskeletal: No joint pain or swelling; No muscle, back or extremity pain  Skin: No itching, burning, rashes or lesions   Lymph Nodes: No enlarged glands  Endocrine: No heat or cold intolerance; No hair loss   Allergy and Immunologic: No hives or eczema    On Neurological Examination:    Mental Status - Patient is alert, awake,   forgetfulness   [facility] year 17 month july     Follow simple commands    Speech -   Fluent                   Cranial Nerves - Pupils 3 mm equal and reactive to light,   extraocular eye movements intact.   smile symmetric  intact bilateral NLF    Motor Exam -   Right upper 4/5  Left upper 4/5  Right lower3/5  Left lower 3/5    With stimuli positive movement of all 4 extremities    Muscle tone - is normal all over.  No asymmetry is seen.      Sensory    Bilateral intact to light touch    GENERAL Exam: Nontoxic , No Acute Distress   	  HEENT:  normocephalic, atraumatic  		  LUNGS: Clear bilaterally    	  HEART: Normal S1S2   No murmur RRR        	  GI/ ABDOMEN:  Soft  Non tender    EXTREMITIES:   No Edema  No Clubbing  No Cyanosis No Edema    MUSCULOSKELETAL: decreased  bilateral knees Range of Motion  	   SKIN: Normal  No Ecchymosis               LABS:  CBC Full  -  ( 09 Jul 2017 06:17 )  WBC Count : 10.8 K/uL  Hemoglobin : 11.1 g/dL  Hematocrit : 32.8 %  Platelet Count - Automated : 257 K/uL  Mean Cell Volume : 89.0 fl  Mean Cell Hemoglobin : 30.2 pg  Mean Cell Hemoglobin Concentration : 34.0 gm/dL  Auto Neutrophil # : x  Auto Lymphocyte # : x  Auto Monocyte # : x  Auto Eosinophil # : x  Auto Basophil # : x  Auto Neutrophil % : x      07-09    132<L>  |  97  |  19  ----------------------------<  104<H>  3.8   |  28  |  0.61    Ca    9.3      09 Jul 2017 06:17  Mg     1.6     07-09      Hemoglobin A1C:       Vitamin B12         RADIOLOGY        ANALYSIS AND PLAN:  This is a 89-year-old with episodes of changes in mental status.    1.	Changes in mental status, possibly metabolic encephalopathy, questionable secondary to underlying urinary tract infection along with hyponatremia, also suspect the patient has subtle mild cognitive impairment that may become worse in the hospital setting.  2.	Recommend antibiotics as needed.  3.	Recommend to monitor electrolytes and sodium.  4.	For mild cognitive impairment versus subtle dementia secondary to the patient's age, would recommend supportive therapy.    Spoke with daughter, Eula, at [phone]. left message today 7/10/17  5 knee pain will try lidoderm patches

## 2017-07-10 NOTE — CONSULT NOTE ADULT - PROBLEM SELECTOR RECOMMENDATION 9
At this point with minimal inflammatory changes, no fever, no symptoms suggest this si asymptomatic bacteriuria.  Recommend no abx now. Patient is clearly at risk for future UTIs.    Thank you for consulting us and involving us in the management of this most interesting and challenging case.     Please Call with any further questions

## 2017-07-10 NOTE — PROGRESS NOTE ADULT - SUBJECTIVE AND OBJECTIVE BOX
[facility]    Christofer Isaacs, Sorin, Marisa, Hieu Norris      [phone]    CHIEF COMPLAINT: Patient is a 89y old  Female who presents with a chief complaint of weakness, nausea, confusion today (07 Jul 2017 13:18)      Follow Up: htn, altered ms    Interim history: confused, discussing her orthopedic pains and apparent disorientation    MEDICATIONS  (STANDING):  valsartan 320 milliGRAM(s) Oral daily  doxazosin 4 milliGRAM(s) Oral at bedtime  carvedilol 6.25 milliGRAM(s) Oral every 12 hours  cinacalcet 30 milliGRAM(s) Oral at bedtime  enoxaparin Injectable 30 milliGRAM(s) SubCutaneous daily  hydrALAZINE Injectable 10 milliGRAM(s) IV Push once  cloNIDine Patch 0.1 mG/24Hr(s) 1 patch Topical every 7 days  hydrALAZINE 50 milliGRAM(s) Oral three times a day  lidocaine   Patch 1 Patch Transdermal daily    MEDICATIONS  (PRN):  ondansetron Injectable 4 milliGRAM(s) IV Push every 6 hours PRN Nausea and/or Vomiting  acetaminophen   Tablet 650 milliGRAM(s) Oral every 6 hours PRN For Temp greater than 38 C (100.4 F)  acetaminophen   Tablet. 650 milliGRAM(s) Oral every 6 hours PRN Mild Pain (1 - 3)  melatonin 3 milliGRAM(s) Oral at bedtime PRN Insomnia      REVIEW OF SYSTEMS:  eye, ent, GI, , allergic, dermatologic, musculoskeletal and neurologic are negative except as described above    Vital Signs Last 24 Hrs  T(C): 36.7 (10 Jul 2017 07:38), Max: 37.2 (10 Jul 2017 00:09)  T(F): 98.1 (10 Jul 2017 07:38), Max: 98.9 (10 Jul 2017 00:09)  HR: 76 (10 Jul 2017 07:38) (69 - 85)  BP: 172/62 (10 Jul 2017 07:38) (122/57 - 187/74)  BP(mean): --  RR: 16 (10 Jul 2017 07:38) (15 - 18)  SpO2: 16% (10 Jul 2017 07:38) (16% - 96%)    I&O's Summary      Telemetry past 24h: off    PHYSICAL EXAM:    Constitutional: well-nourished, well-developed, NAD   HEENT:  MMM, sclerae anicteric, conjunctivae clear, no oral cyanosis.  Pulmonary: Non-labored, breath sounds are clear bilaterally, No wheezing, rales or rhonchi  Cardiovascular: Regular, S1 and S2.  No murmur.  No rubs, gallops or clicks  Gastrointestinal: Bowel Sounds present, soft, nontender.   Lymph: No peripheral edema.   Neurological: Alert, no focal deficits  Skin: No rashes.  Psych:  Mood & affect appropriate    LABS: All Labs Reviewed:                        11.1   10.8  )-----------( 257      ( 09 Jul 2017 06:17 )             32.8                         11.5   x     )-----------( x        ( 08 Jul 2017 06:26 )             33.2     09 Jul 2017 06:17    132    |  97     |  19     ----------------------------<  104    3.8     |  28     |  0.61   08 Jul 2017 06:26    131    |  95     |  16     ----------------------------<  102    4.1     |  29     |  0.59   07 Jul 2017 14:54    125    |  88     |  21     ----------------------------<  102    3.9     |  31     |  0.73     Ca    9.3        09 Jul 2017 06:17  Ca    8.9        08 Jul 2017 06:26  Ca    8.6        07 Jul 2017 14:54  Mg     1.6       09 Jul 2017 06:17    TPro  6.4    /  Alb  3.3    /  TBili  0.3    /  DBili  x      /  AST  21     /  ALT  25     /  AlkPhos  63     07 Jul 2017 14:54          Blood Culture:     07-08 @ 06:26  TSH: 3.23      RADIOLOGY:    EKG:    Echo:    < from: TTE Echo Doppler w/o Cont (07.09.17 @ 08:32) >     EXAM:  ECHO TTE W/O CON COMP W/DOPPLR         PROCEDURE DATE:  07/09/2017        INTERPRETATION:  Ordering Physician: Unknown Doctor    Indication: Hypertension    Study Quality: Technically fair  A complete echocardiographic study was performed utilizing standard   protocol including spectral and color Doppler in all echocardiographic   windows.    Height: 152 cm  Weight: 49 kg  BSA: 1.4 sq m  Blood Pressure: 189/63    MEASUREMENTS  IVS: 1.0cm  PWT: 1.0cm  LA: 4.2cm  AO: 2.7cm  LVIDd: 4.5cm  LVIDs: 3.3cm    LVEF: 65%  RVSP: 54mmHg    FINDINGS  Left Ventricle:  Normal left ventricular systolic function.  Aortic Valve: Calcified trileaflet aortic valve. Mild aortic   insufficiency.  Mitral Valve: Mitral annular calcification and calcified mitral valve   leaflets with normal diastolic opening. Mild mitral insufficiency.  Tricuspid Valve: Normal tricuspid valve. Mild tricuspid insufficiency.  Pulmonic Valve: Normal pulmonic valve. Mild pulmonic insufficiency.  Left Atrium: Mildly enlarged.  Right Ventricle: Normal right ventricular size and systolic function.  Right Atrium: Normal  Diastolic Function: Grade 1 diastolic dysfunction.  Pericardium/Pleura: Normal pericardium with no pericardial effusion.                      MORENA ADORNO M.D., ATTENDING CARDIOLOGIST  This document has been electronically signed. Jul 9 2017 10:13AM                < end of copied text >

## 2017-07-11 DIAGNOSIS — M25.561 PAIN IN RIGHT KNEE: ICD-10-CM

## 2017-07-11 DIAGNOSIS — K59.00 CONSTIPATION, UNSPECIFIED: ICD-10-CM

## 2017-07-11 LAB
ANION GAP SERPL CALC-SCNC: 5 MMOL/L — SIGNIFICANT CHANGE UP (ref 5–17)
BUN SERPL-MCNC: 22 MG/DL — SIGNIFICANT CHANGE UP (ref 7–23)
CALCIUM SERPL-MCNC: 10.5 MG/DL — SIGNIFICANT CHANGE UP (ref 8.4–10.5)
CALCIUM SERPL-MCNC: 9.9 MG/DL — SIGNIFICANT CHANGE UP (ref 8.5–10.1)
CHLORIDE SERPL-SCNC: 96 MMOL/L — SIGNIFICANT CHANGE UP (ref 96–108)
CO2 SERPL-SCNC: 28 MMOL/L — SIGNIFICANT CHANGE UP (ref 22–31)
CREAT SERPL-MCNC: 0.62 MG/DL — SIGNIFICANT CHANGE UP (ref 0.5–1.3)
GLUCOSE SERPL-MCNC: 103 MG/DL — HIGH (ref 70–99)
POTASSIUM SERPL-MCNC: 4.4 MMOL/L — SIGNIFICANT CHANGE UP (ref 3.5–5.3)
POTASSIUM SERPL-SCNC: 4.4 MMOL/L — SIGNIFICANT CHANGE UP (ref 3.5–5.3)
PTH-INTACT FLD-MCNC: 163 PG/ML — HIGH (ref 15–65)
SODIUM SERPL-SCNC: 129 MMOL/L — LOW (ref 135–145)

## 2017-07-11 PROCEDURE — 99233 SBSQ HOSP IP/OBS HIGH 50: CPT

## 2017-07-11 RX ORDER — SODIUM CHLORIDE 9 MG/ML
2 INJECTION INTRAMUSCULAR; INTRAVENOUS; SUBCUTANEOUS
Qty: 0 | Refills: 0 | Status: DISCONTINUED | OUTPATIENT
Start: 2017-07-11 | End: 2017-07-12

## 2017-07-11 RX ORDER — DOCUSATE SODIUM 100 MG
100 CAPSULE ORAL
Qty: 0 | Refills: 0 | Status: DISCONTINUED | OUTPATIENT
Start: 2017-07-11 | End: 2017-07-14

## 2017-07-11 RX ORDER — SENNA PLUS 8.6 MG/1
2 TABLET ORAL AT BEDTIME
Qty: 0 | Refills: 0 | Status: DISCONTINUED | OUTPATIENT
Start: 2017-07-11 | End: 2017-07-14

## 2017-07-11 RX ORDER — SODIUM CHLORIDE 9 MG/ML
1 INJECTION INTRAMUSCULAR; INTRAVENOUS; SUBCUTANEOUS
Qty: 0 | Refills: 0 | Status: DISCONTINUED | OUTPATIENT
Start: 2017-07-11 | End: 2017-07-11

## 2017-07-11 RX ORDER — MAGNESIUM HYDROXIDE 400 MG/1
30 TABLET, CHEWABLE ORAL ONCE
Qty: 0 | Refills: 0 | Status: COMPLETED | OUTPATIENT
Start: 2017-07-11 | End: 2017-07-11

## 2017-07-11 RX ORDER — CARVEDILOL PHOSPHATE 80 MG/1
12.5 CAPSULE, EXTENDED RELEASE ORAL EVERY 12 HOURS
Qty: 0 | Refills: 0 | Status: DISCONTINUED | OUTPATIENT
Start: 2017-07-11 | End: 2017-07-14

## 2017-07-11 RX ADMIN — Medication 1 PATCH: at 12:22

## 2017-07-11 RX ADMIN — LIDOCAINE 1 PATCH: 4 CREAM TOPICAL at 12:12

## 2017-07-11 RX ADMIN — VALSARTAN 320 MILLIGRAM(S): 80 TABLET ORAL at 05:41

## 2017-07-11 RX ADMIN — ENOXAPARIN SODIUM 30 MILLIGRAM(S): 100 INJECTION SUBCUTANEOUS at 12:12

## 2017-07-11 RX ADMIN — Medication 100 MILLIGRAM(S): at 17:22

## 2017-07-11 RX ADMIN — Medication 50 MILLIGRAM(S): at 05:41

## 2017-07-11 RX ADMIN — Medication 4 MILLIGRAM(S): at 21:22

## 2017-07-11 RX ADMIN — SODIUM CHLORIDE 1 GRAM(S): 9 INJECTION INTRAMUSCULAR; INTRAVENOUS; SUBCUTANEOUS at 12:11

## 2017-07-11 RX ADMIN — SODIUM CHLORIDE 2 GRAM(S): 9 INJECTION INTRAMUSCULAR; INTRAVENOUS; SUBCUTANEOUS at 17:22

## 2017-07-11 RX ADMIN — Medication 50 MILLIGRAM(S): at 13:12

## 2017-07-11 RX ADMIN — CARVEDILOL PHOSPHATE 6.25 MILLIGRAM(S): 80 CAPSULE, EXTENDED RELEASE ORAL at 05:41

## 2017-07-11 RX ADMIN — SENNA PLUS 2 TABLET(S): 8.6 TABLET ORAL at 21:22

## 2017-07-11 RX ADMIN — CARVEDILOL PHOSPHATE 12.5 MILLIGRAM(S): 80 CAPSULE, EXTENDED RELEASE ORAL at 17:22

## 2017-07-11 RX ADMIN — MAGNESIUM HYDROXIDE 30 MILLILITER(S): 400 TABLET, CHEWABLE ORAL at 13:12

## 2017-07-11 RX ADMIN — LIDOCAINE 1 PATCH: 4 CREAM TOPICAL at 00:24

## 2017-07-11 RX ADMIN — Medication 50 MILLIGRAM(S): at 21:22

## 2017-07-11 NOTE — PROGRESS NOTE ADULT - SUBJECTIVE AND OBJECTIVE BOX
Patient is a 89y old  Female who presents with a chief complaint of weakness, nausea, confusion today (07 Jul 2017 13:18)      INTERVAL HPI/OVERNIGHT EVENTS: No acute events overnight.  She is AAOx3. Denies SOB, CP, palpitations, HA, weakness, dizziness, and fatigue. Denies abdominal pain, suprapubic pain, dysuria, hematuria, and blood in the stool. She had a BM yesterday.     MEDICATIONS  (STANDING):  valsartan 320 milliGRAM(s) Oral daily  doxazosin 4 milliGRAM(s) Oral at bedtime  carvedilol 6.25 milliGRAM(s) Oral every 12 hours  enoxaparin Injectable 30 milliGRAM(s) SubCutaneous daily  hydrALAZINE Injectable 10 milliGRAM(s) IV Push once  hydrALAZINE 50 milliGRAM(s) Oral three times a day  lidocaine   Patch 1 Patch Transdermal daily  cloNIDine Patch 0.2 mG/24Hr(s) 1 patch Topical every 7 days    MEDICATIONS  (PRN):  ondansetron Injectable 4 milliGRAM(s) IV Push every 6 hours PRN Nausea and/or Vomiting  acetaminophen   Tablet 650 milliGRAM(s) Oral every 6 hours PRN For Temp greater than 38 C (100.4 F)  acetaminophen   Tablet. 650 milliGRAM(s) Oral every 6 hours PRN Mild Pain (1 - 3)  melatonin 3 milliGRAM(s) Oral at bedtime PRN Insomnia      Allergies    Vasotec (Unknown)    Intolerances        REVIEW OF SYSTEMS:  CONSTITUTIONAL: No fever, No chills,No fatigue,No myalgia,No Body ache  EYES: No eye pain, visual disturbances, or discharge  ENMT:  No ear pain, No nose bleed, No vertigo; No sinus or throat pain  NECK: No pain, No stiffness  RESPIRATORY: No cough, wheezing, No  hemoptysis; No shortness of breath  CARDIOVASCULAR: No chest pain, palpitations, leg swelling  GASTROINTESTINAL: No abdominal or epigastric pain. No nausea, No vomiting; No diarrhea or constipation. [ ] BM  GENITOURINARY: No dysuria, No frequency, No urgency, No hematuria, or incontinence  NEUROLOGICAL: alert and oriented x 3,  No headaches, No dizziness, No numbness,  SKIN:   No itching, burning, rashes, or lesions   MUSCULOSKELETAL: No joint pain or swelling; No muscle pain, No back pain, No extremity pain  PSYCHIATRIC: No depression, anxiety, mood swings, or difficulty sleeping  ROS  [ ] Unable to obtain   REST OF REVIEW Of SYSTEM - [ ] Normal     Height (cm): 152.4 (07-06 @ 21:16)  Weight (kg): 49.9 (07-06 @ 19:45)  BMI (kg/m2): 21.5 (07-06 @ 21:16)  BSA (m2): 1.45 (07-06 @ 21:16)  Vital Signs Last 24 Hrs  T(C): 36.9 (11 Jul 2017 04:26), Max: 37.1 (10 Jul 2017 20:08)  T(F): 98.4 (11 Jul 2017 04:26), Max: 98.8 (10 Jul 2017 20:08)  HR: 79 (11 Jul 2017 04:26) (65 - 81)  BP: 165/55 (11 Jul 2017 04:26) (137/64 - 193/67)  BP(mean): --  RR: 17 (11 Jul 2017 04:26) (16 - 18)  SpO2: 94% (11 Jul 2017 04:26) (94% - 97%)  [ ] room air   [ ] 02    PHYSICAL EXAM:  GENERAL:  No acute distresss, seated bedside eating breakfast   HEAD:  normal  ENMT: normal  NECK:  normal    NERVOUS SYSTEM:  Alert & Oriented X3, no focal deficits [ ]Confusion  [ ] Encephalopathic [ ] Sedated [ ] Other  CHEST/LUNG: Clear to auscultation bilaterally,  [ ] decreased breath sounds at bases  [ ] wheezing   [ ] rhonchi  [ ] crackles  HEART:  Regular rate and rhythm, No murmurs, rubs, or gallops,  [ ] irregular   ABDOMEN:  soft, nontender, nondistended, positive bowel sounds   [ ] obese  EXTREMITIES: No clubbing, cyanosis or edema  SKIN: [ ] venous stasis skin changes    LABS:    11 Jul 2017 06:58    129    |  96     |  22     ----------------------------<  103    4.4     |  28     |  0.62     Ca    9.9        11 Jul 2017 06:58            CAPILLARY BLOOD GLUCOSE Glucose, Serum: 103 mg/dL (07.11.17 @ 06:58)          Cultures            RADIOLOGY & ADDITIONAL TESTS:         Care Discussed with [X] Consultants  [ ] Patient  [ ] Family  [X]   /   [ ] Other; RN  DVT prophylaxis [ ] lovenox   [ ] subq heparin  [ ] coumadin  [ ] venodynes [ ] ambulating frequently at how risk for vte and no pharm         or  mechanical prophylaxis required    [ ] other   Advanced directive:    [ ]pt has hcp     [ ] pt declined to assign hcp  Discussed with pt @ bedside Patient is a 89y old  Female who presents with a chief complaint of weakness, nausea, confusion today (07 Jul 2017 13:18)      INTERVAL HPI: This is a 89 F with PMH of HTN, LE edema who was brought in by her daughter b/c of increased confusion this morning associated with nausea and weakness. She also complained of SOB. She denied fevers, chills, dysuria, cough and CP. Patient was in the ER on 7/3, diagnosed with UTI and sent home on po antibiotics. Pt returned as was a/w AMS likely 2/2 hyponatremia and, metabolic encephalopathy, and accelerated HTN.    OVERNIGHT EVENTS: No acute events overnight.  Pt is AAOx3. Denies SOB, CP, palpitations, HA, weakness, dizziness, and fatigue. Denies abdominal pain, suprapubic pain, dysuria, hematuria, and blood in the stool. Pt c/o constipation.     MEDICATIONS  (STANDING):  valsartan 320 milliGRAM(s) Oral daily  doxazosin 4 milliGRAM(s) Oral at bedtime  carvedilol 6.25 milliGRAM(s) Oral every 12 hours  enoxaparin Injectable 30 milliGRAM(s) SubCutaneous daily  hydrALAZINE Injectable 10 milliGRAM(s) IV Push once  hydrALAZINE 50 milliGRAM(s) Oral three times a day  lidocaine   Patch 1 Patch Transdermal daily  cloNIDine Patch 0.2 mG/24Hr(s) 1 patch Topical every 7 days    MEDICATIONS  (PRN):  ondansetron Injectable 4 milliGRAM(s) IV Push every 6 hours PRN Nausea and/or Vomiting  acetaminophen   Tablet 650 milliGRAM(s) Oral every 6 hours PRN For Temp greater than 38 C (100.4 F)  acetaminophen   Tablet. 650 milliGRAM(s) Oral every 6 hours PRN Mild Pain (1 - 3)  melatonin 3 milliGRAM(s) Oral at bedtime PRN Insomnia      Allergies    Vasotec (Unknown)    Intolerances        REVIEW OF SYSTEMS:  CONSTITUTIONAL: No fever, No chills,No fatigue,No myalgia,No Body ache  EYES: No eye pain, visual disturbances, or discharge  ENMT:  No ear pain, No nose bleed, No vertigo; No sinus or throat pain  NECK: No pain, No stiffness  RESPIRATORY: No cough, wheezing, No  hemoptysis; No shortness of breath  CARDIOVASCULAR: No chest pain, palpitations, leg swelling  GASTROINTESTINAL: No abdominal or epigastric pain. No nausea, No vomiting; No diarrhea or constipation. [ ] BM  GENITOURINARY: No dysuria, No frequency, No urgency, No hematuria, or incontinence  NEUROLOGICAL: alert and oriented x 3,  No headaches, No dizziness, No numbness,  SKIN:   No itching, burning, rashes, or lesions   MUSCULOSKELETAL: No joint swelling; No muscle pain, No back pain, + b/l knee pain  PSYCHIATRIC: No depression, anxiety, mood swings, or difficulty sleeping  ROS  [ ] Unable to obtain   REST OF REVIEW Of SYSTEM - [ ] Normal     Height (cm): 152.4 (07-06 @ 21:16)  Weight (kg): 49.9 (07-06 @ 19:45)  BMI (kg/m2): 21.5 (07-06 @ 21:16)  BSA (m2): 1.45 (07-06 @ 21:16)  Vital Signs Last 24 Hrs  T(C): 36.9 (11 Jul 2017 04:26), Max: 37.1 (10 Jul 2017 20:08)  T(F): 98.4 (11 Jul 2017 04:26), Max: 98.8 (10 Jul 2017 20:08)  HR: 79 (11 Jul 2017 04:26) (65 - 81)  BP: 165/55 (11 Jul 2017 04:26) (137/64 - 193/67)  BP(mean): --  RR: 17 (11 Jul 2017 04:26) (16 - 18)  SpO2: 94% (11 Jul 2017 04:26) (94% - 97%)  [ ] room air   [ ] 02    PHYSICAL EXAM:  GENERAL:  No acute distresss, seated bedside eating breakfast   HEAD:  normal  ENMT: normal  NECK:  normal    NERVOUS SYSTEM:  Alert & Oriented X3, no focal deficits [ ]Confusion  [ ] Encephalopathic [ ] Sedated [ ] Other  CHEST/LUNG: Clear to auscultation bilaterally,  [ ] decreased breath sounds at bases  [ ] wheezing   [ ] rhonchi  [ ] crackles  HEART:  Regular rate and rhythm, No murmurs, rubs, or gallops,  [ ] irregular   ABDOMEN:  soft, nontender, nondistended, positive bowel sounds   [ ] obese  EXTREMITIES: No clubbing, cyanosis or edema, + TTP medial aspect of knee b/l likely 2/2 OA with deconditioning  SKIN: [ ] venous stasis skin changes    LABS:    11 Jul 2017 06:58    129    |  96     |  22     ----------------------------<  103    4.4     |  28     |  0.62     Ca    9.9        11 Jul 2017 06:58        CAPILLARY BLOOD GLUCOSE Glucose, Serum: 103 mg/dL (07.11.17 @ 06:58)      Cultures  Culture - Urine (07.07.17 @ 22:19)    -  Tetra/Doxy: R >8    -  Ciprofloxacin: R >2    -  Nitrofurantoin: S <=32    -  Vancomycin: R >16    -  Ampicillin: R >8    Specimen Source: .Urine Clean Catch (Midstream)    Culture Results:   >100,000 CFU/ml Enterococcus faecium (vancomycin resistant)  <10,000 CFU/ml Normal Urogenital storm present    Organism Identification: Enterococcus faecium (vancomycin resistant)    Organism: Enterococcus faecium (vancomycin resistant)    Method Type: JOSE        RADIOLOGY & ADDITIONAL TESTS:         Care Discussed with [X] Consultants  [ ] Patient  [x ] Family  []   /   [ ] Other; RN  DVT prophylaxis [ ] lovenox   [ ] subq heparin  [ ] coumadin  [ ] venodynes [ ] ambulating frequently at how risk for vte and no pharm         or  mechanical prophylaxis required    [ ] other   Advanced directive:    [ ]pt has hcp     [ ] pt declined to assign hcp  Discussed with pt @ bedside Patient is a 89y old  Female who presents with a chief complaint of weakness, nausea, confusion (07 Jul 2017 13:18)      INTERVAL HPI: This is a 89 F with PMH of HTN, LE edema who was brought in by her daughter b/c of increased confusion this morning associated with nausea and weakness. She also complained of SOB. She denied fevers, chills, dysuria, cough and CP. Patient was in the ER on 7/3, diagnosed with UTI and sent home on po antibiotics. Pt returned as was a/w AMS likely 2/2 hyponatremia and, metabolic encephalopathy, and accelerated HTN.    OVERNIGHT EVENTS: No acute events overnight.  Pt is AAOx3. Denies SOB, CP, palpitations, HA, weakness, dizziness, and fatigue. Denies abdominal pain, suprapubic pain, dysuria, hematuria, and blood in the stool. Pt c/o constipation.     MEDICATIONS  (STANDING):  valsartan 320 milliGRAM(s) Oral daily  doxazosin 4 milliGRAM(s) Oral at bedtime  carvedilol 6.25 milliGRAM(s) Oral every 12 hours  enoxaparin Injectable 30 milliGRAM(s) SubCutaneous daily  hydrALAZINE Injectable 10 milliGRAM(s) IV Push once  hydrALAZINE 50 milliGRAM(s) Oral three times a day  lidocaine   Patch 1 Patch Transdermal daily  cloNIDine Patch 0.2 mG/24Hr(s) 1 patch Topical every 7 days    MEDICATIONS  (PRN):  ondansetron Injectable 4 milliGRAM(s) IV Push every 6 hours PRN Nausea and/or Vomiting  acetaminophen   Tablet 650 milliGRAM(s) Oral every 6 hours PRN For Temp greater than 38 C (100.4 F)  acetaminophen   Tablet. 650 milliGRAM(s) Oral every 6 hours PRN Mild Pain (1 - 3)  melatonin 3 milliGRAM(s) Oral at bedtime PRN Insomnia      Allergies    Vasotec (Unknown)    Intolerances        REVIEW OF SYSTEMS:  CONSTITUTIONAL: No fever, No chills,No fatigue,No myalgia,No Body ache  EYES: No eye pain, visual disturbances, or discharge  ENMT:  No ear pain, No nose bleed, No vertigo; No sinus or throat pain  NECK: No pain, No stiffness  RESPIRATORY: No cough, wheezing, No  hemoptysis; No shortness of breath  CARDIOVASCULAR: No chest pain, palpitations, leg swelling  GASTROINTESTINAL: No abdominal or epigastric pain. No nausea, No vomiting; No diarrhea or constipation. [ ] BM  GENITOURINARY: No dysuria, No frequency, No urgency, No hematuria, or incontinence  NEUROLOGICAL: alert and oriented x 3,  No headaches, No dizziness, No numbness,  SKIN:   No itching, burning, rashes, or lesions   MUSCULOSKELETAL: No joint swelling; No muscle pain, No back pain, + b/l knee pain  PSYCHIATRIC: No depression, anxiety, mood swings, or difficulty sleeping  ROS  [ ] Unable to obtain   REST OF REVIEW Of SYSTEM - [ ] Normal     Height (cm): 152.4 (07-06 @ 21:16)  Weight (kg): 49.9 (07-06 @ 19:45)  BMI (kg/m2): 21.5 (07-06 @ 21:16)  BSA (m2): 1.45 (07-06 @ 21:16)  Vital Signs Last 24 Hrs  T(C): 36.9 (11 Jul 2017 04:26), Max: 37.1 (10 Jul 2017 20:08)  T(F): 98.4 (11 Jul 2017 04:26), Max: 98.8 (10 Jul 2017 20:08)  HR: 79 (11 Jul 2017 04:26) (65 - 81)  BP: 165/55  manually checked by me (MY) 160/70  BP(mean): --  RR: 17 (11 Jul 2017 04:26) (16 - 18)  SpO2: 94% (11 Jul 2017 04:26) (94% - 97%)  [x ] room air   [ ] 02    PHYSICAL EXAM:  GENERAL:  No acute distresss, seated bedside eating breakfast   HEAD:  normal  ENMT: normal  NECK:  normal    NERVOUS SYSTEM:  Alert & Oriented X3, no focal deficits [ ]Confusion  [ ] Encephalopathic [ ] Sedated [ ] Other  CHEST/LUNG: Clear to auscultation bilaterally,  [ x] decreased breath sounds at bases  [ ] wheezing   [ ] rhonchi  [ ] crackles  HEART:  Regular rate and rhythm, No murmurs, rubs, or gallops,  [ ] irregular   ABDOMEN:  soft, nontender, nondistended, positive bowel sounds   [ ] obese  EXTREMITIES: No clubbing, cyanosis or edema, + TTP medial aspect of knee b/l likely 2/2 OA with deconditioning  SKIN: [ ] venous stasis skin changes    LABS:    11 Jul 2017 06:58    129    |  96     |  22     ----------------------------<  103    4.4     |  28     |  0.62     Ca    9.9        11 Jul 2017 06:58        CAPILLARY BLOOD GLUCOSE Glucose, Serum: 103 mg/dL (07.11.17 @ 06:58)      Cultures  Culture - Urine (07.07.17 @ 22:19)    -  Tetra/Doxy: R >8    -  Ciprofloxacin: R >2    -  Nitrofurantoin: S <=32    -  Vancomycin: R >16    -  Ampicillin: R >8    Specimen Source: .Urine Clean Catch (Midstream)    Culture Results:   >100,000 CFU/ml Enterococcus faecium (vancomycin resistant)  <10,000 CFU/ml Normal Urogenital storm present    Organism Identification: Enterococcus faecium (vancomycin resistant)    Organism: Enterococcus faecium (vancomycin resistant)    Method Type: JOSE        RADIOLOGY & ADDITIONAL TESTS:         Care Discussed with [X] Consultants  [ ] Patient  [x ] Family  []   /   [ ] Other; RN  DVT prophylaxis [x ] lovenox   [ ] subq heparin  [ ] coumadin  [ ] venodynes [ ] ambulating frequently at how risk for vte and no pharm         or  mechanical prophylaxis required    [ ] other   Advanced directive:    [x ]pt has hcp     [ ] pt declined to assign hcp  Discussed with pt @ bedside

## 2017-07-11 NOTE — PROGRESS NOTE ADULT - SUBJECTIVE AND OBJECTIVE BOX
[facility] -- Christofer Isaacs Grossman, Marisa, Hieu Norris      Follow Up:  HTN    Subjective/Observations: Patient seen and examined. Events noted. Resting comfortably in chair. No complaints of chest pain, dyspnea, or palpitations reported.       REVIEW OF SYSTEMS: All other review of systems is negative unless indicated above    PAST MEDICAL & SURGICAL HISTORY:  Anemia, unspecified type  Edema of lower extremity  Heart murmur  Hypertension  Hypercalcemia  History of appendectomy  S/P tonsillectomy  H/O parathyroidectomy      MEDICATIONS  (STANDING):  valsartan 320 milliGRAM(s) Oral daily  doxazosin 4 milliGRAM(s) Oral at bedtime  enoxaparin Injectable 30 milliGRAM(s) SubCutaneous daily  hydrALAZINE Injectable 10 milliGRAM(s) IV Push once  hydrALAZINE 50 milliGRAM(s) Oral three times a day  lidocaine   Patch 1 Patch Transdermal daily  cloNIDine Patch 0.2 mG/24Hr(s) 1 patch Topical every 7 days  carvedilol 12.5 milliGRAM(s) Oral every 12 hours  sodium chloride 2 Gram(s) Oral two times a day  docusate sodium 100 milliGRAM(s) Oral two times a day  senna 2 Tablet(s) Oral at bedtime    MEDICATIONS  (PRN):  ondansetron Injectable 4 milliGRAM(s) IV Push every 6 hours PRN Nausea and/or Vomiting  acetaminophen   Tablet 650 milliGRAM(s) Oral every 6 hours PRN For Temp greater than 38 C (100.4 F)  acetaminophen   Tablet. 650 milliGRAM(s) Oral every 6 hours PRN Mild Pain (1 - 3)  melatonin 3 milliGRAM(s) Oral at bedtime PRN Insomnia  sodium biphosphate Rectal Enema 1 Enema Rectal once PRN no bm by tonight      Allergies    Vasotec (Unknown)    Intolerances            Vital Signs Last 24 Hrs  T(C): 36.9 (11 Jul 2017 11:27), Max: 37.1 (10 Jul 2017 20:08)  T(F): 98.4 (11 Jul 2017 11:27), Max: 98.8 (10 Jul 2017 20:08)  HR: 69 (11 Jul 2017 11:27) (69 - 81)  BP: 160/70 (11 Jul 2017 12:04) (137/64 - 193/67)  BP(mean): --  RR: 18 (11 Jul 2017 11:27) (16 - 18)  SpO2: 96% (11 Jul 2017 11:27) (93% - 96%)    I&O's Summary    10 Jul 2017 07:01  -  11 Jul 2017 07:00  --------------------------------------------------------  IN: 350 mL / OUT: 350 mL / NET: 0 mL    11 Jul 2017 07:01  -  11 Jul 2017 15:11  --------------------------------------------------------  IN: 300 mL / OUT: 2 mL / NET: 298 mL          PHYSICAL EXAM:  TELE: off  Constitutional: NAD, awake and alert, well-developed  HEENT: Moist Mucous Membranes, Anicteric  Pulmonary: Non-labored, breath sounds are clear bilaterally but decreased  Cardiovascular: Regular, S1 and S2, distant  Gastrointestinal: Bowel Sounds present, soft, nontender.   Lymph: No peripheral edema. No lymphadenopathy.  Skin: No visible rashes or ulcers.  Psych:  Mood & affect appropriate    LABS: All Labs Reviewed:                        11.1   10.8  )-----------( 257      ( 09 Jul 2017 06:17 )             32.8     11 Jul 2017 06:58    129    |  96     |  22     ----------------------------<  103    4.4     |  28     |  0.62   09 Jul 2017 06:17    132    |  97     |  19     ----------------------------<  104    3.8     |  28     |  0.61     Ca    9.9        11 Jul 2017 06:58  Ca    9.3        09 Jul 2017 06:17  Mg     1.6       09 Jul 2017 06:17

## 2017-07-11 NOTE — PROGRESS NOTE ADULT - ASSESSMENT
89y Female with hypertension, mental status changes, hyponatremia:  - NA is relatively stable.   - Neuro and renal follow up  - free water restriction  - BP still with wide range.   - Continue  clonidine patch to 0.2  - Agree with increasing coreg to 12.5 BID  - Continue valsartan 320 QD  - Increase hydralazine to 75 mg q8 if BP still elevated  - Echo shows normal LVEF with no significant valvular abnormalities  - Follow electrolytes closely; Keep K>4 and Mag>.  Replete as needed.  - Further cardiac workup will depend on clinical course.   - All other workup per primary team. Will followup.

## 2017-07-11 NOTE — PROGRESS NOTE ADULT - SUBJECTIVE AND OBJECTIVE BOX
SAIRA SANTANA  89y  Female    Patient is a 89y old  Female who presents with a chief complaint of weakness, nausea, confusion today (07 Jul 2017 13:18)    out of bed to chair. family at bed side. Denies any chest pain or shortness of breath.       PAST MEDICAL & SURGICAL HISTORY:  Anemia, unspecified type  Edema of lower extremity  Heart murmur  Hypertension  Hypercalcemia  History of appendectomy  S/P tonsillectomy  H/O parathyroidectomy          PHYSICAL EXAM:    T(C): 36.8 (07-11-17 @ 07:46), Max: 37.1 (07-10-17 @ 20:08)  HR: 76 (07-11-17 @ 07:46) (69 - 81)  BP: 160/70 (07-11-17 @ 12:04) (137/64 - 193/67)  RR: 18 (07-11-17 @ 07:46) (16 - 18)  SpO2: 93% (07-11-17 @ 07:46) (93% - 95%)  Wt(kg): --    I&O's Detail    10 Jul 2017 07:01  -  11 Jul 2017 07:00  --------------------------------------------------------  IN:    Oral Fluid: 350 mL  Total IN: 350 mL    OUT:    Voided: 350 mL  Total OUT: 350 mL    Total NET: 0 mL          Respiratory: clear anteriorly, decreased BS at bases  Cardiovascular: S1 S2  Gastrointestinal: soft NT ND +BS  Extremities: edema   Neuro: Awake and alert    MEDICATIONS  (STANDING):  valsartan 320 milliGRAM(s) Oral daily  doxazosin 4 milliGRAM(s) Oral at bedtime  enoxaparin Injectable 30 milliGRAM(s) SubCutaneous daily  hydrALAZINE Injectable 10 milliGRAM(s) IV Push once  hydrALAZINE 50 milliGRAM(s) Oral three times a day  lidocaine   Patch 1 Patch Transdermal daily  cloNIDine Patch 0.2 mG/24Hr(s) 1 patch Topical every 7 days  carvedilol 12.5 milliGRAM(s) Oral every 12 hours  sodium chloride 2 Gram(s) Oral two times a day    MEDICATIONS  (PRN):  ondansetron Injectable 4 milliGRAM(s) IV Push every 6 hours PRN Nausea and/or Vomiting  acetaminophen   Tablet 650 milliGRAM(s) Oral every 6 hours PRN For Temp greater than 38 C (100.4 F)  acetaminophen   Tablet. 650 milliGRAM(s) Oral every 6 hours PRN Mild Pain (1 - 3)  melatonin 3 milliGRAM(s) Oral at bedtime PRN Insomnia          07-11    129<L>  |  96  |  22  ----------------------------<  103<H>  4.4   |  28  |  0.62    Ca    9.9      11 Jul 2017 06:58

## 2017-07-11 NOTE — PROGRESS NOTE ADULT - PROBLEM SELECTOR PLAN 3
likely 2/2 lasix - hold lasix   -Now 129, restart sodium tabs, BID per nephro Dr. Smith  -PO fluid restriction likely 2/2 lasix - hold lasix   -Now 129, restart sodium tabs, BID per nephro Dr. Smith  -PO fluid restriction  as per renal stable for dc

## 2017-07-11 NOTE — PROGRESS NOTE ADULT - PROBLEM SELECTOR PLAN 8
DVT PPX - LMWH  -Fall risk, OOB with assistance, ambulate with assistance  -Diet DASH/ TLC fluid rest 1000mL  -Sensipar discontinued per nephro

## 2017-07-11 NOTE — PROGRESS NOTE ADULT - SUBJECTIVE AND OBJECTIVE BOX
Neurology follow up note    SAIRA [id number]      Interval History:    Patient seen with son-- was able to walk does have leg knee pain     MEDICATIONS    valsartan 320 milliGRAM(s) Oral daily  doxazosin 4 milliGRAM(s) Oral at bedtime  carvedilol 6.25 milliGRAM(s) Oral every 12 hours  ondansetron Injectable 4 milliGRAM(s) IV Push every 6 hours PRN  acetaminophen   Tablet 650 milliGRAM(s) Oral every 6 hours PRN  acetaminophen   Tablet. 650 milliGRAM(s) Oral every 6 hours PRN  enoxaparin Injectable 30 milliGRAM(s) SubCutaneous daily  hydrALAZINE Injectable 10 milliGRAM(s) IV Push once  melatonin 3 milliGRAM(s) Oral at bedtime PRN  hydrALAZINE 50 milliGRAM(s) Oral three times a day  lidocaine   Patch 1 Patch Transdermal daily  cloNIDine Patch 0.2 mG/24Hr(s) 1 patch Topical every 7 days  sodium chloride 1 Gram(s) Oral two times a day      Allergies    Vasotec (Unknown)    Intolerances            Vital Signs Last 24 Hrs  T(C): 36.8 (11 Jul 2017 07:46), Max: 37.1 (10 Jul 2017 20:08)  T(F): 98.2 (11 Jul 2017 07:46), Max: 98.8 (10 Jul 2017 20:08)  HR: 76 (11 Jul 2017 07:46) (65 - 81)  BP: 156/55 (11 Jul 2017 07:46) (137/64 - 193/67)  BP(mean): --  RR: 18 (11 Jul 2017 07:46) (16 - 18)  SpO2: 93% (11 Jul 2017 07:46) (93% - 97%)    REVIEW OF SYSTEMS:     Constitutional: No fever, chills, fatigue, weakness  Eyes: no eye pain, visual disturbances, or discharge  ENT:  No difficulty hearing, tinnitus, vertigo; No sinus or throat pain  Neck: No pain or stiffness  Respiratory: No cough, dyspnea, wheezing   Cardiovascular: No chest pain, palpitations,   Gastrointestinal: No abdominal or epigastric pain. No nausea, vomiting  No diarrhea or constipation.   Genitourinary: No dysuria, frequency, hematuria or incontinence  Neurological: No headaches, lightheadedness, vertigo, numbness or tremors  Psychiatric: No depression, anxiety, mood swings or difficulty sleeping  Musculoskeletal: No joint pain or swelling; + extremity pain  Skin: No itching, burning, rashes or lesions   Lymph Nodes: No enlarged glands  Endocrine: No heat or cold intolerance; No hair loss   Allergy and Immunologic: No hives or eczema    On Neurological Examination:    Mental Status - Patient is alert, awake,   forgetfulness     Follow simple commands    Speech -   Fluent                   Cranial Nerves - Pupils 3 mm equal and reactive to light,   extraocular eye movements intact.   smile symmetric  intact bilateral NLF    Motor Exam -   Right upper 4/5  Left upper 4/5  Right lower3/5  Left lower 3/5    Muscle tone - is normal all over.  No asymmetry is seen.      Sensory    Bilateral intact to light touch    GENERAL Exam: Nontoxic , No Acute Distress   	  HEENT:  normocephalic, atraumatic  		  LUNGS: Clear bilaterally    	  HEART: Normal S1S2   No murmur RRR        	  GI/ ABDOMEN:  Soft  Non tender    EXTREMITIES:   No Edema  No Clubbing  No Cyanosis No Edema    MUSCULOSKELETAL: decreased  bilateral knees Range of Motion  	   SKIN: Normal  No Ecchymosis                 LABS:      07-11    129<L>  |  96  |  22  ----------------------------<  103<H>  4.4   |  28  |  0.62    Ca    9.9      11 Jul 2017 06:58      Hemoglobin A1C:       Vitamin B12         RADIOLOGY      ANALYSIS AND PLAN:  This is a 89-year-old with episodes of changes in mental status.    1.	Changes in mental status, possibly metabolic encephalopathy, questionable secondary to underlying urinary tract infection along with hyponatremia, also suspect the patient has subtle mild cognitive impairment that may become worse in the hospital setting.  2.	S/P antibiotics   3.	Recommend to monitor electrolytes and sodium as needed.  4.	For mild cognitive impairment versus subtle dementia secondary to the patient's age, would recommend supportive therapy.    Spoke with daughter, Eula, at [phone]. spoke to son today at bedside 7/11/7  5 knee pain will try lidoderm patches      neurology wise cleared for discharge planning

## 2017-07-11 NOTE — PROGRESS NOTE ADULT - SUBJECTIVE AND OBJECTIVE BOX
Rn Called me 0611 to make me aware that she got a call from the lab regarding the urine culture that was documented in Dr. Whitehead's note yesterday--    Problem: Bacteria in urine. Recommendation: At this point with minimal inflammatory changes, no fever, no symptoms suggest this si asymptomatic bacteriuria.  Recommend no abx now. Patient is clearly at risk for future UTIs.    RN couldn't tell me why she needed to relay this information to me, other than that the lab called her.    In reviewing the culture result presently, perhaps it was because it was VRE?  Not clear that this was a critical value that needed communication.  Will discuss with infection control and nursing admin.    Willem Helton MD  [phone]

## 2017-07-11 NOTE — PROGRESS NOTE ADULT - PROBLEM SELECTOR PLAN 4
-Clonidine patch increase to .2mg from .1mg per cardio Dr. Cunningham  -Coreg increased to 12.5mg BID per nephro Dr. Smith  -Continue PO hydralazine

## 2017-07-11 NOTE — PROGRESS NOTE ADULT - ASSESSMENT
89 white female with a history of HTN, CAD, CHF now admitted with confusion and found to have hyponatremia.  Sodium improved with addition of salt tablets. Increase to two bid.  BP is better controlled. will increase the Corge 12.5 mg twice a day.  spoke to the family at bed side.

## 2017-07-12 LAB
ANION GAP SERPL CALC-SCNC: 6 MMOL/L — SIGNIFICANT CHANGE UP (ref 5–17)
BUN SERPL-MCNC: 35 MG/DL — HIGH (ref 7–23)
CALCIUM SERPL-MCNC: 10.3 MG/DL — HIGH (ref 8.5–10.1)
CHLORIDE SERPL-SCNC: 97 MMOL/L — SIGNIFICANT CHANGE UP (ref 96–108)
CO2 SERPL-SCNC: 27 MMOL/L — SIGNIFICANT CHANGE UP (ref 22–31)
CREAT SERPL-MCNC: 0.78 MG/DL — SIGNIFICANT CHANGE UP (ref 0.5–1.3)
GLUCOSE SERPL-MCNC: 114 MG/DL — HIGH (ref 70–99)
POTASSIUM SERPL-MCNC: 4.9 MMOL/L — SIGNIFICANT CHANGE UP (ref 3.5–5.3)
POTASSIUM SERPL-SCNC: 4.9 MMOL/L — SIGNIFICANT CHANGE UP (ref 3.5–5.3)
SODIUM SERPL-SCNC: 130 MMOL/L — LOW (ref 135–145)

## 2017-07-12 PROCEDURE — 99233 SBSQ HOSP IP/OBS HIGH 50: CPT

## 2017-07-12 RX ORDER — POLYETHYLENE GLYCOL 3350 17 G/17G
17 POWDER, FOR SOLUTION ORAL
Qty: 0 | Refills: 0 | Status: DISCONTINUED | OUTPATIENT
Start: 2017-07-12 | End: 2017-07-14

## 2017-07-12 RX ORDER — CINACALCET 30 MG/1
30 TABLET, FILM COATED ORAL AT BEDTIME
Qty: 0 | Refills: 0 | Status: DISCONTINUED | OUTPATIENT
Start: 2017-07-12 | End: 2017-07-14

## 2017-07-12 RX ADMIN — POLYETHYLENE GLYCOL 3350 17 GRAM(S): 17 POWDER, FOR SOLUTION ORAL at 18:07

## 2017-07-12 RX ADMIN — CARVEDILOL PHOSPHATE 12.5 MILLIGRAM(S): 80 CAPSULE, EXTENDED RELEASE ORAL at 18:07

## 2017-07-12 RX ADMIN — Medication 50 MILLIGRAM(S): at 21:36

## 2017-07-12 RX ADMIN — Medication 4 MILLIGRAM(S): at 21:36

## 2017-07-12 RX ADMIN — ENOXAPARIN SODIUM 30 MILLIGRAM(S): 100 INJECTION SUBCUTANEOUS at 11:45

## 2017-07-12 RX ADMIN — Medication 50 MILLIGRAM(S): at 05:11

## 2017-07-12 RX ADMIN — Medication 50 MILLIGRAM(S): at 14:46

## 2017-07-12 RX ADMIN — SODIUM CHLORIDE 2 GRAM(S): 9 INJECTION INTRAMUSCULAR; INTRAVENOUS; SUBCUTANEOUS at 05:11

## 2017-07-12 RX ADMIN — LIDOCAINE 1 PATCH: 4 CREAM TOPICAL at 23:39

## 2017-07-12 RX ADMIN — CINACALCET 30 MILLIGRAM(S): 30 TABLET, FILM COATED ORAL at 21:36

## 2017-07-12 RX ADMIN — LIDOCAINE 1 PATCH: 4 CREAM TOPICAL at 01:17

## 2017-07-12 RX ADMIN — SENNA PLUS 2 TABLET(S): 8.6 TABLET ORAL at 21:36

## 2017-07-12 RX ADMIN — CARVEDILOL PHOSPHATE 12.5 MILLIGRAM(S): 80 CAPSULE, EXTENDED RELEASE ORAL at 05:12

## 2017-07-12 RX ADMIN — Medication 100 MILLIGRAM(S): at 18:07

## 2017-07-12 RX ADMIN — Medication 100 MILLIGRAM(S): at 05:12

## 2017-07-12 RX ADMIN — LIDOCAINE 1 PATCH: 4 CREAM TOPICAL at 11:45

## 2017-07-12 RX ADMIN — VALSARTAN 320 MILLIGRAM(S): 80 TABLET ORAL at 05:11

## 2017-07-12 NOTE — PROGRESS NOTE ADULT - SUBJECTIVE AND OBJECTIVE BOX
Patient is a 89y old  Female who presents with a chief complaint of weakness, nausea, confusion today (07 Jul 2017 13:18)      Patient seen in follow up for hyponatremia. Improving sodium levels. BP labile but better controlled overall.     MEDICATIONS  (STANDING):  valsartan 320 milliGRAM(s) Oral daily  doxazosin 4 milliGRAM(s) Oral at bedtime  enoxaparin Injectable 30 milliGRAM(s) SubCutaneous daily  hydrALAZINE Injectable 10 milliGRAM(s) IV Push once  hydrALAZINE 50 milliGRAM(s) Oral three times a day  lidocaine   Patch 1 Patch Transdermal daily  carvedilol 12.5 milliGRAM(s) Oral every 12 hours  sodium chloride 2 Gram(s) Oral two times a day  docusate sodium 100 milliGRAM(s) Oral two times a day  senna 2 Tablet(s) Oral at bedtime    MEDICATIONS  (PRN):  ondansetron Injectable 4 milliGRAM(s) IV Push every 6 hours PRN Nausea and/or Vomiting  acetaminophen   Tablet 650 milliGRAM(s) Oral every 6 hours PRN For Temp greater than 38 C (100.4 F)  acetaminophen   Tablet. 650 milliGRAM(s) Oral every 6 hours PRN Mild Pain (1 - 3)  melatonin 3 milliGRAM(s) Oral at bedtime PRN Insomnia  sodium biphosphate Rectal Enema 1 Enema Rectal once PRN no bm by tonight    T(C): 36.7 (07-12-17 @ 08:08), Max: 37.1 (07-10-17 @ 20:08)  HR: 52 (07-12-17 @ 08:08) (52 - 81)  BP: 129/62 (07-12-17 @ 08:08) (107/62 - 193/67)  RR: 15 (07-12-17 @ 08:08)  SpO2: 96% (07-12-17 @ 08:08)  Wt(kg): --  I&O's Detail    11 Jul 2017 07:01  -  12 Jul 2017 07:00  --------------------------------------------------------  IN:    Oral Fluid: 300 mL  Total IN: 300 mL    OUT:    Voided: 2 mL  Total OUT: 2 mL    Total NET: 298 mL          PHYSICAL EXAM:  General: NAD  Respiratory: b/l air entry  Cardiovascular: S1 S2  Gastrointestinal: soft  Extremities:  no edema            LABORATORY:    07-11    129<L>  |  96  |  22  ----------------------------<  103<H>  4.4   |  28  |  0.62    Ca    9.9      11 Jul 2017 06:58      Sodium, Serum: 129 mmol/L (07-11 @ 06:58)    Potassium, Serum: 4.4 mmol/L (07-11 @ 06:58)      Creatinine, Serum 0.62 (07-11 @ 06:58)

## 2017-07-12 NOTE — PROGRESS NOTE ADULT - PROBLEM SELECTOR PLAN 3
likely 2/2 lasix - hold lasix   -Sodium tabs dc'd  -PO fluid restriction  -Avoid hypotonic fluids  as per renal stable for dc likely 2/2 lasix - hold lasix as per nephrology  -Sodium tabs dc'd  -PO fluid restriction  -Avoid hypotonic fluids  as per renal stable for dc

## 2017-07-12 NOTE — PROGRESS NOTE ADULT - PROBLEM SELECTOR PLAN 1
likely 2/2 UTI and hyponatremia.    -Abx complete, asymptomatic likely 2/2 UTI and hyponatremia.    -Abx complete, UTI asymptomatic, back to baseline mental status

## 2017-07-12 NOTE — PROGRESS NOTE ADULT - SUBJECTIVE AND OBJECTIVE BOX
Patient is a 89y old  Female who presents with a chief complaint of weakness, nausea, confusion today (07 Jul 2017 13:18)      INTERVAL HPI/OVERNIGHT EVENTS: This is a 89 F with PMH of HTN, LE edema who was brought in by her daughter b/c of increased confusion this morning associated with nausea and weakness. She also complained of SOB. She denied fevers, chills, dysuria, cough and CP. Patient was in the ER on 7/3, diagnosed with UTI and sent home on po antibiotics. Pt returned as was a/w AMS likely 2/2 hyponatremia and, metabolic encephalopathy, and accelerated HTN.    OVERNIGHT EVENTS: No acute events overnight.  Pt is AAOx3. Denies SOB, CP, palpitations, HA, weakness, dizziness, and fatigue. Denies abdominal pain, suprapubic pain, dysuria, hematuria, and blood in the stool. Pt had a stool yesterday that she felt was incomplete. Also c/o b/l knee pain and now b/l shoulder pain (now resolved) that she feels is from laying in bed.     MEDICATIONS  (STANDING):  valsartan 320 milliGRAM(s) Oral daily  doxazosin 4 milliGRAM(s) Oral at bedtime  enoxaparin Injectable 30 milliGRAM(s) SubCutaneous daily  hydrALAZINE Injectable 10 milliGRAM(s) IV Push once  hydrALAZINE 50 milliGRAM(s) Oral three times a day  lidocaine   Patch 1 Patch Transdermal daily  carvedilol 12.5 milliGRAM(s) Oral every 12 hours  sodium chloride 2 Gram(s) Oral two times a day  docusate sodium 100 milliGRAM(s) Oral two times a day  senna 2 Tablet(s) Oral at bedtime  cinacalcet 30 milliGRAM(s) Oral at bedtime    MEDICATIONS  (PRN):  ondansetron Injectable 4 milliGRAM(s) IV Push every 6 hours PRN Nausea and/or Vomiting  acetaminophen   Tablet 650 milliGRAM(s) Oral every 6 hours PRN For Temp greater than 38 C (100.4 F)  acetaminophen   Tablet. 650 milliGRAM(s) Oral every 6 hours PRN Mild Pain (1 - 3)  melatonin 3 milliGRAM(s) Oral at bedtime PRN Insomnia      Allergies    Vasotec (Unknown)    Intolerances        REVIEW OF SYSTEMS:  CONSTITUTIONAL: No fever, No chills,No fatigue,No myalgia,No Body ache  EYES: No eye pain, visual disturbances, or discharge  ENMT:  No ear pain, No nose bleed, No vertigo; No sinus or throat pain  NECK: No pain, No stiffness  RESPIRATORY: No cough, wheezing, No  hemoptysis; No shortness of breath  CARDIOVASCULAR: No chest pain, palpitations, leg swelling  GASTROINTESTINAL: No abdominal or epigastric pain. No nausea, No vomiting; No diarrhea or constipation. [ ] BM  GENITOURINARY: No dysuria, No frequency, No urgency, No hematuria, or incontinence  NEUROLOGICAL: alert and oriented x 3,  No headaches, No dizziness, No numbness,  SKIN:   No itching, burning, rashes, or lesions   MUSCULOSKELETAL: + b/l shoulder pain, +b/l knee pain  PSYCHIATRIC: No depression, anxiety, mood swings, or difficulty sleeping  ROS  [ ] Unable to obtain   REST OF REVIEW Of SYSTEM - [x ] Normal     Height (cm): 152.4 (07-06 @ 21:16)  Weight (kg): 49.9 (07-06 @ 19:45)  BMI (kg/m2): 21.5 (07-06 @ 21:16)  BSA (m2): 1.45 (07-06 @ 21:16)  Vital Signs Last 24 Hrs  T(C): 36.7 (12 Jul 2017 11:15), Max: 36.8 (11 Jul 2017 16:32)  T(F): 98 (12 Jul 2017 11:15), Max: 98.2 (11 Jul 2017 16:32)  HR: 52 (12 Jul 2017 11:15) (52 - 72)  BP: 129/62 (12 Jul 2017 11:15) (107/62 - 163/66)  BP(mean): --  RR: 15 (12 Jul 2017 08:08) (15 - 18)  SpO2: 97% (12 Jul 2017 11:15) (95% - 98%)  [ ] room air   [ ] 02    PHYSICAL EXAM:  GENERAL:  No acute distresss, seated bedside eating breakfast   HEAD:  normal  ENMT: normal  NECK:  normal    NERVOUS SYSTEM:  Alert & Oriented X3, no focal deficits [ ]Confusion  [ ] Encephalopathic [ ] Sedated [ ] Other  CHEST/LUNG: Clear to auscultation bilaterally,  [ x] decreased breath sounds at bases  [ ] wheezing   [ ] rhonchi  [ ] crackles  HEART:  Regular rate and rhythm, No murmurs, rubs, or gallops,  [ ] irregular   ABDOMEN:  soft, nontender, nondistended, positive bowel sounds   [ ] obese  EXTREMITIES: No clubbing, cyanosis or edema, + TTP medial aspect of knee b/l likely 2/2 OA with deconditioning, decreased ROM b/l shoulders with flexion and adduction likely 2/2 OA with deconditioning  SKIN: [ ] venous stasis skin changes  LABS:      Ca    9.9        11 Jul 2017 06:58        CAPILLARY BLOOD GLUCOSE        Cultures          RADIOLOGY & ADDITIONAL TESTS:      Care Discussed with [X] Consultants  [x ] Patient  [ x] Family  [X]   /   [ ] Other; RN  DVT prophylaxis [x ] lovenox   [ ] subq heparin  [ ] coumadin  [ ] venodynes [ ] ambulating frequently at how risk for vte and no pharm         or  mechanical prophylaxis required    [ ] other   Advanced directive:    [ ]pt has hcp     [ ] pt declined to assign hcp  Discussed with pt @ bedside Patient is a 89y old  Female who presented with a chief complaint of weakness, nausea, confusion (07 Jul 2017 13:18)      INTERVAL HPI/OVERNIGHT EVENTS: This is a 89 F with PMH of HTN, LE edema who was brought in by her daughter b/c of increased confusion this morning associated with nausea and weakness. She also complained of SOB. She denied fevers, chills, dysuria, cough and CP. Patient was in the ER on 7/3, diagnosed with UTI and sent home on po antibiotics. Pt returned as was a/w AMS likely 2/2 hyponatremia and, metabolic encephalopathy, and accelerated HTN.    OVERNIGHT EVENTS: No acute events overnight.  Pt is AAOx3. Denies SOB, CP, palpitations, HA, weakness, dizziness, and fatigue. Denies abdominal pain, suprapubic pain, dysuria, hematuria, and blood in the stool. Pt had a stool yesterday that she felt was incomplete. Also c/o b/l knee pain and now b/l shoulder pain (now resolved) that she feels is from laying in bed.     MEDICATIONS  (STANDING):  valsartan 320 milliGRAM(s) Oral daily  doxazosin 4 milliGRAM(s) Oral at bedtime  enoxaparin Injectable 30 milliGRAM(s) SubCutaneous daily  hydrALAZINE Injectable 10 milliGRAM(s) IV Push once  hydrALAZINE 50 milliGRAM(s) Oral three times a day  lidocaine   Patch 1 Patch Transdermal daily  carvedilol 12.5 milliGRAM(s) Oral every 12 hours  sodium chloride 2 Gram(s) Oral two times a day  docusate sodium 100 milliGRAM(s) Oral two times a day  senna 2 Tablet(s) Oral at bedtime  cinacalcet 30 milliGRAM(s) Oral at bedtime    MEDICATIONS  (PRN):  ondansetron Injectable 4 milliGRAM(s) IV Push every 6 hours PRN Nausea and/or Vomiting  acetaminophen   Tablet 650 milliGRAM(s) Oral every 6 hours PRN For Temp greater than 38 C (100.4 F)  acetaminophen   Tablet. 650 milliGRAM(s) Oral every 6 hours PRN Mild Pain (1 - 3)  melatonin 3 milliGRAM(s) Oral at bedtime PRN Insomnia      Allergies    Vasotec (Unknown)    Intolerances        REVIEW OF SYSTEMS:  CONSTITUTIONAL: No fever, No chills,No fatigue,No myalgia,No Body ache  EYES: No eye pain, visual disturbances, or discharge  ENMT:  No ear pain, No nose bleed, No vertigo; No sinus or throat pain  NECK: No pain, No stiffness  RESPIRATORY: No cough, wheezing, No  hemoptysis; No shortness of breath  CARDIOVASCULAR: No chest pain, palpitations, leg swelling  GASTROINTESTINAL: No abdominal or epigastric pain. No nausea, No vomiting; No diarrhea or constipation. [ ] BM  GENITOURINARY: No dysuria, No frequency, No urgency, No hematuria, or incontinence  NEUROLOGICAL: alert and oriented x 3,  No headaches, No dizziness, No numbness,  SKIN:   No itching, burning, rashes, or lesions   MUSCULOSKELETAL: + b/l shoulder pain, +b/l knee pain  PSYCHIATRIC: No depression, anxiety, mood swings, or difficulty sleeping  ROS  [ ] Unable to obtain   REST OF REVIEW Of SYSTEM - [x ] Normal     Height (cm): 152.4 (07-06 @ 21:16)  Weight (kg): 49.9 (07-06 @ 19:45)  BMI (kg/m2): 21.5 (07-06 @ 21:16)  BSA (m2): 1.45 (07-06 @ 21:16)  Vital Signs Last 24 Hrs  T(C): 36.7 (12 Jul 2017 11:15), Max: 36.8 (11 Jul 2017 16:32)  T(F): 98 (12 Jul 2017 11:15), Max: 98.2 (11 Jul 2017 16:32)  HR: 52 (12 Jul 2017 11:15) (52 - 72)  BP: 129/62 (12 Jul 2017 11:15) (107/62 - 163/66)  BP(mean): --  RR: 15 (12 Jul 2017 08:08) (15 - 18)  SpO2: 97% (12 Jul 2017 11:15) (95% - 98%)  [ ] room air   [ ] 02    PHYSICAL EXAM:  GENERAL:  No acute distresss, seated bedside eating breakfast   HEAD:  normal  ENMT: normal  NECK:  normal    NERVOUS SYSTEM:  Alert & Oriented X3, no focal deficits [ ]Confusion  [ ] Encephalopathic [ ] Sedated [ ] Other  CHEST/LUNG: Clear to auscultation bilaterally,  [ x] decreased breath sounds at bases  [ ] wheezing   [ ] rhonchi  [ ] crackles  HEART:  Regular rate and rhythm, No murmurs, rubs, or gallops,  [ ] irregular   ABDOMEN:  soft, nontender, nondistended, positive bowel sounds   [ ] obese  EXTREMITIES: No clubbing, cyanosis or edema, + TTP medial aspect of knee b/l likely 2/2 OA with deconditioning, decreased ROM b/l shoulders with flexion and adduction   SKIN: [ ] venous stasis skin changes  LABS:      Ca    9.9        11 Jul 2017 06:58        CAPILLARY BLOOD GLUCOSE        Cultures          RADIOLOGY & ADDITIONAL TESTS:      Care Discussed with [X] Consultants  [x ] Patient  [ x] Family  [X]   /   [ ] Other; RN  DVT prophylaxis [x ] lovenox   [ ] subq heparin  [ ] coumadin  [ ] venodynes [ ] ambulating frequently at how risk for vte and no pharm         or  mechanical prophylaxis required    [ ] other   Advanced directive:    [ ]pt has hcp     [ ] pt declined to assign hcp  Discussed with pt @ bedside Patient is a 89y old  Female who presented with a chief complaint of weakness, nausea, confusion (07 Jul 2017 13:18)      INTERVAL HPI/OVERNIGHT EVENTS: This is a 89 F with PMH of HTN, LE edema who was brought in by her daughter b/c of increased confusion this morning associated with nausea and weakness. She also complained of SOB. She denied fevers, chills, dysuria, cough and CP. Patient was in the ER on 7/3, diagnosed with UTI and sent home on po antibiotics. Pt returned as was a/w AMS likely 2/2 hyponatremia and, metabolic encephalopathy, and accelerated HTN.    OVERNIGHT EVENTS: No acute events overnight. Pt had a stool yesterday that she felt was incomplete. Also c/o b/l knee pain and now b/l shoulder pain (now resolved) that she feels is from laying in bed. Pt otherwise has no complaints.    MEDICATIONS  (STANDING):  valsartan 320 milliGRAM(s) Oral daily  doxazosin 4 milliGRAM(s) Oral at bedtime  enoxaparin Injectable 30 milliGRAM(s) SubCutaneous daily  hydrALAZINE Injectable 10 milliGRAM(s) IV Push once  hydrALAZINE 50 milliGRAM(s) Oral three times a day  lidocaine   Patch 1 Patch Transdermal daily  carvedilol 12.5 milliGRAM(s) Oral every 12 hours  sodium chloride 2 Gram(s) Oral two times a day  docusate sodium 100 milliGRAM(s) Oral two times a day  senna 2 Tablet(s) Oral at bedtime  cinacalcet 30 milliGRAM(s) Oral at bedtime    MEDICATIONS  (PRN):  ondansetron Injectable 4 milliGRAM(s) IV Push every 6 hours PRN Nausea and/or Vomiting  acetaminophen   Tablet 650 milliGRAM(s) Oral every 6 hours PRN For Temp greater than 38 C (100.4 F)  acetaminophen   Tablet. 650 milliGRAM(s) Oral every 6 hours PRN Mild Pain (1 - 3)  melatonin 3 milliGRAM(s) Oral at bedtime PRN Insomnia      Allergies    Vasotec (Unknown)    Intolerances        REVIEW OF SYSTEMS:  CONSTITUTIONAL: No fever, No chills, No fatigue, No myalgia, No Body ache  EYES: No eye pain, visual disturbances, or discharge  ENMT:  No ear pain, No nose bleed, No vertigo; No sinus or throat pain  NECK: No pain, No stiffness  RESPIRATORY: No cough, wheezing, No  hemoptysis; No shortness of breath  CARDIOVASCULAR: No chest pain, palpitations, leg swelling  GASTROINTESTINAL: No abdominal or epigastric pain. No nausea, No vomiting; No diarrhea or constipation. [ ] BM  GENITOURINARY: No dysuria, No frequency, No urgency, No hematuria, or incontinence  NEUROLOGICAL: alert and oriented x 3,  No headaches, No dizziness, No numbness,  SKIN: No itching, burning, rashes, or lesions   MUSCULOSKELETAL: + b/l shoulder pain, +b/l knee pain  PSYCHIATRIC: No depression, anxiety, mood swings, or difficulty sleeping  ROS  [ ] Unable to obtain   REST OF REVIEW Of SYSTEM - [x ] Normal     Height (cm): 152.4 (07-06 @ 21:16)  Weight (kg): 49.9 (07-06 @ 19:45)  BMI (kg/m2): 21.5 (07-06 @ 21:16)  BSA (m2): 1.45 (07-06 @ 21:16)  Vital Signs Last 24 Hrs  T(C): 36.7 (12 Jul 2017 11:15), Max: 36.8 (11 Jul 2017 16:32)  T(F): 98 (12 Jul 2017 11:15), Max: 98.2 (11 Jul 2017 16:32)  HR: 52 (12 Jul 2017 11:15) (52 - 72)  BP: 129/62 (12 Jul 2017 11:15) (107/62 - 163/66)  BP(mean): --  RR: 15 (12 Jul 2017 08:08) (15 - 18)  SpO2: 97% (12 Jul 2017 11:15) (95% - 98%)  [ ] room air   [ ] 02    PHYSICAL EXAM:  GENERAL:  No acute distress, seated bedside eating breakfast   HEAD:  normal  ENMT: normal  NECK:  normal    NERVOUS SYSTEM:  Alert & Oriented X3, no focal deficits [ ]Confusion  [ ] Encephalopathic [ ] Sedated [ ] Other  CHEST/LUNG: Clear to auscultation bilaterally,  [ x] decreased breath sounds at bases  [ ] wheezing   [ ] rhonchi  [ ] crackles  HEART:  Regular rate and rhythm, No murmurs, rubs, or gallops,  [ ] irregular   ABDOMEN:  soft, nontender, nondistended, positive bowel sounds   [ ] obese  EXTREMITIES: No clubbing, cyanosis or edema, decreased ROM b/l shoulders with flexion and abduction   SKIN: [ ] venous stasis skin changes  LABS:      Ca    9.9        11 Jul 2017 06:58        CAPILLARY BLOOD GLUCOSE        Cultures          RADIOLOGY & ADDITIONAL TESTS:      Care Discussed with [X] Consultants  [x ] Patient  [ x] Family  [X]   /   [ ] Other; RN  DVT prophylaxis [x ] lovenox   [ ] subq heparin  [ ] coumadin  [ ] venodynes [ ] ambulating frequently at how risk for vte and no pharm         or  mechanical prophylaxis required    [ ] other   Advanced directive:    [ ]pt has hcp     [ ] pt declined to assign hcp  Discussed with pt @ bedside Patient is a 89y old  Female who presented with a chief complaint of weakness, nausea, confusion (07 Jul 2017 13:18)      INTERVAL HPI/OVERNIGHT EVENTS: This is a 89 F with PMH of HTN, LE edema who was brought in by her daughter b/c of increased confusion this morning associated with nausea and weakness. She also complained of SOB. She denied fevers, chills, dysuria, cough and CP. Patient was in the ER on 7/3, diagnosed with UTI and sent home on po antibiotics. Pt returned as was a/w AMS likely 2/2 hyponatremia and, metabolic encephalopathy, and accelerated HTN.    OVERNIGHT EVENTS: No acute events overnight. Pt had a stool yesterday that she felt was incomplete. Also c/o b/l knee pain and now b/l shoulder pain (now resolved) that she feels is from laying in bed. Pt otherwise has no complaints.    MEDICATIONS  (STANDING):  valsartan 320 milliGRAM(s) Oral daily  doxazosin 4 milliGRAM(s) Oral at bedtime  enoxaparin Injectable 30 milliGRAM(s) SubCutaneous daily  hydrALAZINE Injectable 10 milliGRAM(s) IV Push once  hydrALAZINE 50 milliGRAM(s) Oral three times a day  lidocaine   Patch 1 Patch Transdermal daily  carvedilol 12.5 milliGRAM(s) Oral every 12 hours  sodium chloride 2 Gram(s) Oral two times a day  docusate sodium 100 milliGRAM(s) Oral two times a day  senna 2 Tablet(s) Oral at bedtime  cinacalcet 30 milliGRAM(s) Oral at bedtime    MEDICATIONS  (PRN):  ondansetron Injectable 4 milliGRAM(s) IV Push every 6 hours PRN Nausea and/or Vomiting  acetaminophen   Tablet 650 milliGRAM(s) Oral every 6 hours PRN For Temp greater than 38 C (100.4 F)  acetaminophen   Tablet. 650 milliGRAM(s) Oral every 6 hours PRN Mild Pain (1 - 3)  melatonin 3 milliGRAM(s) Oral at bedtime PRN Insomnia      Allergies    Vasotec (Unknown)    Intolerances        REVIEW OF SYSTEMS:  CONSTITUTIONAL: No fever, No chills, No fatigue, No myalgia, No Body ache  EYES: No eye pain, visual disturbances, or discharge  ENMT:  No ear pain, No nose bleed, No vertigo; No sinus or throat pain  NECK: No pain, No stiffness  RESPIRATORY: No cough, wheezing, No  hemoptysis; No shortness of breath  CARDIOVASCULAR: No chest pain, palpitations, leg swelling  GASTROINTESTINAL: No abdominal or epigastric pain. No nausea, No vomiting; No diarrhea or constipation. [ ] BM  GENITOURINARY: No dysuria, No frequency, No urgency, No hematuria, or incontinence  NEUROLOGICAL: alert and oriented x 3,  No headaches, No dizziness, No numbness,  SKIN: No itching, burning, rashes, or lesions   MUSCULOSKELETAL: + b/l shoulder pain, +b/l knee pain  PSYCHIATRIC: No depression, anxiety, mood swings, or difficulty sleeping  ROS  [ ] Unable to obtain   REST OF REVIEW Of SYSTEM - [x ] Normal     Height (cm): 152.4 (07-06 @ 21:16)  Weight (kg): 49.9 (07-06 @ 19:45)  BMI (kg/m2): 21.5 (07-06 @ 21:16)  BSA (m2): 1.45 (07-06 @ 21:16)  Vital Signs Last 24 Hrs  T(C): 36.7 (12 Jul 2017 11:15), Max: 36.8 (11 Jul 2017 16:32)  T(F): 98 (12 Jul 2017 11:15), Max: 98.2 (11 Jul 2017 16:32)  HR: 52 (12 Jul 2017 11:15) (52 - 72)  BP: 129/62 (12 Jul 2017 11:15) (107/62 - 163/66)  BP(mean): --  RR: 15 (12 Jul 2017 08:08) (15 - 18)  SpO2: 97% (12 Jul 2017 11:15) (95% - 98%)  [ x] room air   [ ] 02    PHYSICAL EXAM:  GENERAL:  No acute distress, seated bedside eating breakfast   HEAD:  normal  ENMT: normal  NECK:  normal    NERVOUS SYSTEM:  Alert & Oriented X3, no focal deficits [ ]Confusion  [ ] Encephalopathic [ ] Sedated [ ] Other  CHEST/LUNG: Clear to auscultation bilaterally,  [ x] decreased breath sounds at bases  [ ] wheezing   [ ] rhonchi  [ ] crackles  HEART:  Regular rate and rhythm, No murmurs, rubs, or gallops,  [ ] irregular   ABDOMEN:  soft, nontender, nondistended, positive bowel sounds   [ ] obese  EXTREMITIES: No clubbing, cyanosis, trace edema, decreased ROM b/l shoulders with flexion and abduction   SKIN: [ ] venous stasis skin changes  LABS:      Ca    9.9        11 Jul 2017 06:58        CAPILLARY BLOOD GLUCOSE        Cultures          RADIOLOGY & ADDITIONAL TESTS:      Care Discussed with [X] Consultants  [x ] Patient  [ x] Family  [X]   /   [ ] Other; RN  DVT prophylaxis [x ] lovenox   [ ] subq heparin  [ ] coumadin  [ ] venodynes [ ] ambulating frequently at how risk for vte and no pharm         or  mechanical prophylaxis required    [ ] other   Advanced directive:    [ ]pt has hcp     [ ] pt declined to assign hcp  Discussed with pt @ bedside

## 2017-07-12 NOTE — PROGRESS NOTE ADULT - PROBLEM SELECTOR PLAN 9
Last BM yesterday  -Continue Brianna Barros Last BM yesterday  -Continue Colace, Senna, Miralax added 2x daily Last BM yesterday moderate in size  -Continue Colace, Senna, Miralax added 2x daily

## 2017-07-12 NOTE — PROGRESS NOTE ADULT - SUBJECTIVE AND OBJECTIVE BOX
[facility] - Christofer Isaacs, Sorin, Marisa, Hieu Norris  Office Number:  [phone]    Patient sitting comfortably in chair.  Laying flat with no respiratory distress.  No complaints of chest pain, dyspnea, palpitations, PND, or orthopnea.      MEDICATIONS  (STANDING):  valsartan 320 milliGRAM(s) Oral daily  doxazosin 4 milliGRAM(s) Oral at bedtime  enoxaparin Injectable 30 milliGRAM(s) SubCutaneous daily  hydrALAZINE Injectable 10 milliGRAM(s) IV Push once  hydrALAZINE 50 milliGRAM(s) Oral three times a day  lidocaine   Patch 1 Patch Transdermal daily  carvedilol 12.5 milliGRAM(s) Oral every 12 hours  sodium chloride 2 Gram(s) Oral two times a day  docusate sodium 100 milliGRAM(s) Oral two times a day  senna 2 Tablet(s) Oral at bedtime    MEDICATIONS  (PRN):  ondansetron Injectable 4 milliGRAM(s) IV Push every 6 hours PRN Nausea and/or Vomiting  acetaminophen   Tablet 650 milliGRAM(s) Oral every 6 hours PRN For Temp greater than 38 C (100.4 F)  acetaminophen   Tablet. 650 milliGRAM(s) Oral every 6 hours PRN Mild Pain (1 - 3)  melatonin 3 milliGRAM(s) Oral at bedtime PRN Insomnia  sodium biphosphate Rectal Enema 1 Enema Rectal once PRN no bm by tonight      Allergies    Vasotec (Unknown)        Vital Signs Last 24 Hrs  T(C): 36.7 (12 Jul 2017 08:08), Max: 36.9 (11 Jul 2017 11:27)  T(F): 98 (12 Jul 2017 08:08), Max: 98.4 (11 Jul 2017 11:27)  HR: 52 (12 Jul 2017 08:08) (52 - 72)  BP: 129/62 (12 Jul 2017 08:08) (107/62 - 170/65)  BP(mean): --  RR: 15 (12 Jul 2017 08:08) (15 - 18)  SpO2: 96% (12 Jul 2017 08:08) (95% - 98%)    I&O's Summary    11 Jul 2017 07:01  -  12 Jul 2017 07:00  --------------------------------------------------------  IN: 300 mL / OUT: 2 mL / NET: 298 mL        ON EXAM:    Constitutional: NAD, awake and alert, well-developed  HEENT: Moist Mucous Membranes, Anicteric  Pulmonary: Non-labored, breath sounds are clear bilaterally but decreased  Cardiovascular: Regular, S1 and S2, distant  Gastrointestinal: Bowel Sounds present, soft, nontender.   Lymph: No peripheral edema. No lymphadenopathy.  Skin: No visible rashes or ulcers.  Psych:  Mood & affect appropriate    LABS: All Labs Reviewed:    11 Jul 2017 06:58    129    |  96     |  22     ----------------------------<  103    4.4     |  28     |  0.62     Ca    9.9        11 Jul 2017 06:58            Blood Culture:

## 2017-07-12 NOTE — PROGRESS NOTE ADULT - ASSESSMENT
89 year old woman with hypertension, a/w AMS, improved, HTN now controlled    - Continue current antihypertensive therapy.  - Monitor BP in the hospital one more day.  If stable, plan to d/c tomorrow.  - Keep off clonidine patch  - Ambulate  - To follow

## 2017-07-12 NOTE — PROGRESS NOTE ADULT - PROBLEM SELECTOR PLAN 5
-Chronic no active chf   -Monitoring off lasix -Chronic no active chf   -Monitoring off lasix as per renal.

## 2017-07-12 NOTE — PROGRESS NOTE ADULT - SUBJECTIVE AND OBJECTIVE BOX
Neurology follow up note    SAIRA [id number]      Interval History:    Patient with less knee pain seen with son     MEDICATIONS    valsartan 320 milliGRAM(s) Oral daily  doxazosin 4 milliGRAM(s) Oral at bedtime  ondansetron Injectable 4 milliGRAM(s) IV Push every 6 hours PRN  acetaminophen   Tablet 650 milliGRAM(s) Oral every 6 hours PRN  acetaminophen   Tablet. 650 milliGRAM(s) Oral every 6 hours PRN  enoxaparin Injectable 30 milliGRAM(s) SubCutaneous daily  hydrALAZINE Injectable 10 milliGRAM(s) IV Push once  melatonin 3 milliGRAM(s) Oral at bedtime PRN  hydrALAZINE 50 milliGRAM(s) Oral three times a day  lidocaine   Patch 1 Patch Transdermal daily  carvedilol 12.5 milliGRAM(s) Oral every 12 hours  sodium chloride 2 Gram(s) Oral two times a day  docusate sodium 100 milliGRAM(s) Oral two times a day  senna 2 Tablet(s) Oral at bedtime  cinacalcet 30 milliGRAM(s) Oral at bedtime      Allergies    Vasotec (Unknown)    Intolerances            Vital Signs Last 24 Hrs  T(C): 36.7 (12 Jul 2017 08:08), Max: 36.8 (11 Jul 2017 16:32)  T(F): 98 (12 Jul 2017 08:08), Max: 98.2 (11 Jul 2017 16:32)  HR: 52 (12 Jul 2017 11:15) (52 - 72)  BP: 129/62 (12 Jul 2017 11:15) (107/62 - 163/66)  BP(mean): --  RR: 15 (12 Jul 2017 08:08) (15 - 18)  SpO2: 97% (12 Jul 2017 11:15) (95% - 98%)    REVIEW OF SYSTEMS:     Constitutional: No fever, chills, fatigue, weakness  Eyes: no eye pain, visual disturbances, or discharge  ENT:  No difficulty hearing, tinnitus, vertigo; No sinus or throat pain  Neck: No pain or stiffness  Respiratory: No cough, dyspnea, wheezing   Cardiovascular: No chest pain, palpitations,   Gastrointestinal: No abdominal or epigastric pain. No nausea, vomiting  No diarrhea or constipation.   Genitourinary: No dysuria, frequency, hematuria or incontinence  Neurological: No headaches, lightheadedness, vertigo, numbness or tremors  Psychiatric: No depression, anxiety, mood swings or difficulty sleeping  Musculoskeletal: No joint pain or swelling; + extremity pain  Skin: No itching, burning, rashes or lesions   Lymph Nodes: No enlarged glands  Endocrine: No heat or cold intolerance; No hair loss   Allergy and Immunologic: No hives or eczema    On Neurological Examination:    Mental Status - Patient is alert, awake,   forgetfulness     Follow simple commands    Speech -   Fluent                   Cranial Nerves - Pupils 3 mm equal and reactive to light,   extraocular eye movements intact.   smile symmetric  intact bilateral NLF    Motor Exam -   Right upper 4/5  Left upper 4/5  Right lower3/5  Left lower 3/5    Muscle tone - is normal all over.  No asymmetry is seen.      Sensory    Bilateral intact to light touch    GENERAL Exam: Nontoxic , No Acute Distress   	  HEENT:  normocephalic, atraumatic  		  LUNGS: Clear bilaterally    	  HEART: Normal S1S2   No murmur RRR        	  GI/ ABDOMEN:  Soft  Non tender    EXTREMITIES:   No Edema  No Clubbing  No Cyanosis No Edema    MUSCULOSKELETAL: decreased  bilateral knees Range of Motion  	   SKIN: Normal  No Ecchymosis                    LABS:      07-11    129<L>  |  96  |  22  ----------------------------<  103<H>  4.4   |  28  |  0.62    Ca    9.9      11 Jul 2017 06:58      Hemoglobin A1C:       Vitamin B12         RADIOLOGY        ANALYSIS AND PLAN:  This is a 89-year-old with episodes of changes in mental status.    1.	Changes in mental status, possibly metabolic encephalopathy, questionable secondary to underlying urinary tract infection along with hyponatremia, also suspect the patient has subtle mild cognitive impairment that may become worse in the hospital setting.  2.	S/P antibiotics   3.	tremors are better   4.	Recommend to monitor electrolytes and sodium as needed.  5.	For mild cognitive impairment versus subtle dementia secondary to the patient's age, would recommend supportive therapy.     6 knee pain will try lidoderm patches   7 monitor SBP    Spoke with daughter, Eual, at [phone] in past. spoke to son today at bedside 7/12/7     neurology wise cleared for discharge planning

## 2017-07-12 NOTE — PROGRESS NOTE ADULT - PROBLEM SELECTOR PLAN 4
-Off Clonidine patch  -Continue Coreg, Hydralazine, Valsartan, Doxazosin -Off Clonidine patch  -Continue Coreg, Hydralazine, Valsartan, Doxazosin  bp is stable averaging in the  160 systolic range.   will continue to monitor bp

## 2017-07-12 NOTE — PROGRESS NOTE ADULT - PROBLEM SELECTOR PLAN 8
-DVT PPX - LMWH  -Fall risk, OOB with assistance, ambulate with assistance  -Diet DASH/ TLC fluid rest 1000mL  -Sensipar, monitor calcium

## 2017-07-12 NOTE — PROGRESS NOTE ADULT - ASSESSMENT
·	Hyponatremia  ·	Hypertension    Off clonidine patch. BP trend acceptable. D/w family at bedside. Maintain PO fluid restriction. Avoid hypotonic fluids. Monitor BP closely. Check serial sodium levels. D/c planning.

## 2017-07-13 LAB
ANION GAP SERPL CALC-SCNC: 4 MMOL/L — LOW (ref 5–17)
BUN SERPL-MCNC: 35 MG/DL — HIGH (ref 7–23)
CALCIUM SERPL-MCNC: 10.5 MG/DL — HIGH (ref 8.5–10.1)
CHLORIDE SERPL-SCNC: 98 MMOL/L — SIGNIFICANT CHANGE UP (ref 96–108)
CO2 SERPL-SCNC: 27 MMOL/L — SIGNIFICANT CHANGE UP (ref 22–31)
CREAT SERPL-MCNC: 0.79 MG/DL — SIGNIFICANT CHANGE UP (ref 0.5–1.3)
GLUCOSE SERPL-MCNC: 127 MG/DL — HIGH (ref 70–99)
POTASSIUM SERPL-MCNC: 4.7 MMOL/L — SIGNIFICANT CHANGE UP (ref 3.5–5.3)
POTASSIUM SERPL-SCNC: 4.7 MMOL/L — SIGNIFICANT CHANGE UP (ref 3.5–5.3)
SODIUM SERPL-SCNC: 129 MMOL/L — LOW (ref 135–145)

## 2017-07-13 PROCEDURE — 99232 SBSQ HOSP IP/OBS MODERATE 35: CPT

## 2017-07-13 RX ORDER — CARVEDILOL PHOSPHATE 80 MG/1
1 CAPSULE, EXTENDED RELEASE ORAL
Qty: 0 | Refills: 0 | COMMUNITY
Start: 2017-07-13

## 2017-07-13 RX ORDER — ENOXAPARIN SODIUM 100 MG/ML
30 INJECTION SUBCUTANEOUS
Qty: 0 | Refills: 0 | COMMUNITY
Start: 2017-07-13

## 2017-07-13 RX ORDER — POTASSIUM CHLORIDE 20 MEQ
20 PACKET (EA) ORAL
Qty: 0 | Refills: 0 | COMMUNITY

## 2017-07-13 RX ORDER — SENNA PLUS 8.6 MG/1
2 TABLET ORAL
Qty: 0 | Refills: 0 | COMMUNITY
Start: 2017-07-13

## 2017-07-13 RX ORDER — POLYETHYLENE GLYCOL 3350 17 G/17G
17 POWDER, FOR SOLUTION ORAL
Qty: 0 | Refills: 0 | COMMUNITY
Start: 2017-07-13

## 2017-07-13 RX ORDER — HYDRALAZINE HCL 50 MG
1 TABLET ORAL
Qty: 0 | Refills: 0 | COMMUNITY
Start: 2017-07-13

## 2017-07-13 RX ORDER — DOCUSATE SODIUM 100 MG
1 CAPSULE ORAL
Qty: 0 | Refills: 0 | COMMUNITY
Start: 2017-07-13

## 2017-07-13 RX ORDER — ACETAMINOPHEN 500 MG
2 TABLET ORAL
Qty: 0 | Refills: 0 | COMMUNITY
Start: 2017-07-13

## 2017-07-13 RX ORDER — HYDRALAZINE HCL 50 MG
40 TABLET ORAL
Qty: 0 | Refills: 0 | COMMUNITY

## 2017-07-13 RX ORDER — CARVEDILOL PHOSPHATE 80 MG/1
1 CAPSULE, EXTENDED RELEASE ORAL
Qty: 0 | Refills: 0 | COMMUNITY

## 2017-07-13 RX ORDER — LIDOCAINE 4 G/100G
1 CREAM TOPICAL
Qty: 0 | Refills: 0 | COMMUNITY
Start: 2017-07-13

## 2017-07-13 RX ADMIN — POLYETHYLENE GLYCOL 3350 17 GRAM(S): 17 POWDER, FOR SOLUTION ORAL at 05:14

## 2017-07-13 RX ADMIN — CARVEDILOL PHOSPHATE 12.5 MILLIGRAM(S): 80 CAPSULE, EXTENDED RELEASE ORAL at 17:02

## 2017-07-13 RX ADMIN — Medication 50 MILLIGRAM(S): at 14:17

## 2017-07-13 RX ADMIN — POLYETHYLENE GLYCOL 3350 17 GRAM(S): 17 POWDER, FOR SOLUTION ORAL at 17:02

## 2017-07-13 RX ADMIN — CARVEDILOL PHOSPHATE 12.5 MILLIGRAM(S): 80 CAPSULE, EXTENDED RELEASE ORAL at 05:14

## 2017-07-13 RX ADMIN — LIDOCAINE 1 PATCH: 4 CREAM TOPICAL at 14:17

## 2017-07-13 RX ADMIN — ENOXAPARIN SODIUM 30 MILLIGRAM(S): 100 INJECTION SUBCUTANEOUS at 14:17

## 2017-07-13 RX ADMIN — Medication 100 MILLIGRAM(S): at 05:14

## 2017-07-13 RX ADMIN — Medication 50 MILLIGRAM(S): at 21:24

## 2017-07-13 RX ADMIN — Medication 4 MILLIGRAM(S): at 21:24

## 2017-07-13 RX ADMIN — Medication 50 MILLIGRAM(S): at 05:14

## 2017-07-13 RX ADMIN — CINACALCET 30 MILLIGRAM(S): 30 TABLET, FILM COATED ORAL at 21:24

## 2017-07-13 RX ADMIN — Medication 100 MILLIGRAM(S): at 17:02

## 2017-07-13 RX ADMIN — VALSARTAN 320 MILLIGRAM(S): 80 TABLET ORAL at 05:14

## 2017-07-13 RX ADMIN — SENNA PLUS 2 TABLET(S): 8.6 TABLET ORAL at 21:24

## 2017-07-13 NOTE — PROGRESS NOTE ADULT - SUBJECTIVE AND OBJECTIVE BOX
HPI:  This is a 89 F with PMH of HTN, LE edema who was brought in by her daughter b/c of increased confusion this morning associated with nausea and weakness. She also complained of SOB. She denies fevers, chills, dysuria, cough and CP. Patient was in the ER on 7/3, diagnosed with UTI and sent home on po antibiotics. (2017 18:33)      SUBJECTIVE:  Patient is a 89y old  Female who presents with a chief complaint of weakness, nausea, confusion today (2017 13:18)  Sitting up in chair eating breakfast.  Alert and oriented.  Denies CP, palpitation, or chest discomfort.  States she slept the whole night until the had to get her up this morning.  Son at bedside expressed concern about patient's episode of confusion yesterday afternoon and hypotensive episode following clonidine patch.  Primary at bedside as well.    OBJECTIVE:  Review Of Systems:  Constitutional: [ ] Fever [ ] Chills [ ] Fatigue [ ] Weight change   HEENT: [ ] Blurred vision [ ] Eye Pain [ ] Headache [ ] Runny nose [ ] Sore Throat   Respiratory: [ ] Cough [ ] Wheezing [ ] Shortness of breath  Cardiovascular: [ ] Chest Pain [ ] Palpitations [ ] PEPE [ ] PND [ ] Orthopnea  Gastrointestinal: [ ] Abdominal Pain [ ] Diarrhea [ ] Constipation [ ] Hemorrhoids [ ] Nausea [ ] Vomiting  Genitourinary: [ ] Nocturia [ ] Dysuria [ ] Incontinence  Extremities: [ ] Swelling [ ] Joint Pain  Neurologic: [ ] Focal deficit [ ] Paresthesias [ ] Syncope  Lymphatic: [ ] Swelling [ ] Lymphadenopathy   Skin: [ ] Rash [ ] Ecchymoses [ ] Wounds [ ] Lesions  Psychiatry: [ ] Depression [ ] Suicidal/Homicidal Ideation [ ] Anxiety [ ] Sleep Disturbances  [x ] 10 point review of systems is otherwise negative except as mentioned above            [ ]Unable to obtain    Allergy:  Allergies    Vasotec (Unknown)    Intolerances    Medications:  MEDICATIONS  (STANDING):  valsartan 320 milliGRAM(s) Oral daily  doxazosin 4 milliGRAM(s) Oral at bedtime  enoxaparin Injectable 30 milliGRAM(s) SubCutaneous daily  hydrALAZINE Injectable 10 milliGRAM(s) IV Push once  hydrALAZINE 50 milliGRAM(s) Oral three times a day  lidocaine   Patch 1 Patch Transdermal daily  carvedilol 12.5 milliGRAM(s) Oral every 12 hours  docusate sodium 100 milliGRAM(s) Oral two times a day  senna 2 Tablet(s) Oral at bedtime  cinacalcet 30 milliGRAM(s) Oral at bedtime  polyethylene glycol 3350 17 Gram(s) Oral two times a day    MEDICATIONS  (PRN):  ondansetron Injectable 4 milliGRAM(s) IV Push every 6 hours PRN Nausea and/or Vomiting  acetaminophen   Tablet 650 milliGRAM(s) Oral every 6 hours PRN For Temp greater than 38 C (100.4 F)  acetaminophen   Tablet. 650 milliGRAM(s) Oral every 6 hours PRN Mild Pain (1 - 3)  melatonin 3 milliGRAM(s) Oral at bedtime PRN Insomnia      PMH/PSH/FH/SH: [ ] Unchanged    Vitals:  T(C): 36.6 (17 @ 07:44), Max: 36.8 (17 @ 16:08)  HR: 55 (17 @ 07:44) (52 - 67)  BP: 145/66 (17 @ 07:44) (122/48 - 176/55)  BP(mean): --  RR: 17 (17 @ 07:44) (16 - 18)  SpO2: 96% (17 @ 07:44) (95% - 97%)  Wt(kg): --  Daily     Daily Weight in k.3 (2017 04:41)  I&O's Summary    2017 07:01  -  2017 07:00  --------------------------------------------------------  IN: 600 mL / OUT: 0 mL / NET: 600 mL    Labs:        130<L>  |  97  |  35<H>  ----------------------------<  114<H>  4.9   |  27  |  0.78    Ca    10.3<H>      2017 14:52    ECG:  < from: 12 Lead ECG (07.04.17 @ 00:02) >    Ventricular Rate 59 BPM    Atrial Rate 59 BPM    P-R Interval 172 ms    QRS Duration 86 ms    Q-T Interval 406 ms    QTC Calculation(Bezet) 401 ms    P Axis 60 degrees    R Axis 7 degrees    T Axis 39 degrees    Diagnosis Line Sinus bradycardia  Possible Left atrial enlargement  Nonspecific ST and T wave abnormality    Confirmed by Vance Stephenson MD ([id number]) on 2017 1:15:49 PM    < end of copied text >    Echo:  < from: TTE Echo Doppler w/o Cont (17 @ 08:32) >     EXAM:  ECHO TTE W/O CON COMP W/DOPPLR         PROCEDURE DATE:  2017        INTERPRETATION:  Ordering Physician: Unknown Doctor    Indication: Hypertension    Study Quality: Technically fair  A complete echocardiographic study was performed utilizing standard   protocol including spectral and color Doppler in all echocardiographic   windows.    Height: 152 cm  Weight: 49 kg  BSA: 1.4 sq m  Blood Pressure: 189/63    MEASUREMENTS  IVS: 1.0cm  PWT: 1.0cm  LA: 4.2cm  AO: 2.7cm  LVIDd: 4.5cm  LVIDs: 3.3cm    LVEF: 65%  RVSP: 54mmHg    FINDINGS  Left Ventricle:  Normal left ventricular systolic function.  Aortic Valve: Calcified trileaflet aortic valve. Mild aortic   insufficiency.  Mitral Valve: Mitral annular calcification and calcified mitral valve   leaflets with normal diastolic opening. Mild mitral insufficiency.  Tricuspid Valve: Normal tricuspid valve. Mild tricuspid insufficiency.  Pulmonic Valve: Normal pulmonic valve. Mild pulmonic insufficiency.  Left Atrium: Mildly enlarged.  Right Ventricle: Normal right ventricular size and systolic function.  Right Atrium: Normal  Diastolic Function: Grade 1 diastolic dysfunction.  Pericardium/Pleura: Normal pericardium with no pericardial effusion.      MORENA ADORNO M.D., ATTENDING CARDIOLOGIST  This document has been electronically signed. 2017 10:13AM      < end of copied text >    Stress Testing:     Cath:    Imaging:    Interpretation of Telemetry:  Not on tele    Physical Exam:  Appearance: [x ] Normal  [ ] abnormal [x ] NAD   Eyes: [x ] PERRL [ ] EOMI  HENT: [ x] Normal [ ] Abnormal oral muscosa [ ]NC/AT  Cardiovascular: [x ] S1 [x ] S2 [x ] RRR [ ] m/r/g [ ]edema [ ] JVP  Procedural Access Site: [ ]  hematoma [ ] tender to palpation [ ] 2+ pulse [ ] bruit [ ] Ecchymosis  Respiratory: [x ] Clear to auscultation bilaterally  Gastrointestinal: [x ] Soft [ ] tenderness[ ] distension [x ] BS+  Musculoskeletal: [ ] clubbing [ ] joint deformity   Neurologic: [ ] Non-focal  Lymphatic: [ ] lymphadenopathy  Psychiatry: [ ] AAOx3  [ ] confused [ ] disoriented [ ] Mood & affect appropriate  Skin: [ ]  rashes [ ] ecchymoses [ ] cyanosis

## 2017-07-13 NOTE — PROGRESS NOTE ADULT - PROBLEM SELECTOR PLAN 4
-Off Clonidine patch  -Continue Coreg, Hydralazine, Valsartan, Doxazosin  bp is stable averaging in the  160 systolic range.   will continue to monitor bp

## 2017-07-13 NOTE — PROGRESS NOTE ADULT - ASSESSMENT
·	Hyponatremia  ·	Hypertension    Awaiting repeat labs. BP trend acceptable. D/w family at bedside. Maintain PO fluid restriction. Avoid hypotonic fluids. Monitor BP closely. Check serial sodium levels. D/c planning.

## 2017-07-13 NOTE — PROGRESS NOTE ADULT - ASSESSMENT
89 year old woman with hypertension, a/w AMS, improved, HTN now controlled    - Continue current antihypertensive therapy.  - Monitor BP in the hospital one more day.  BP more stable with current anti-HTN regimen  - Keep off clonidine patch  - Ambulate with assistance  - If cleared by Renal and Primary, okay for discharge to rehab from cardiac point of view  - To follow 89 year old woman with hypertension, a/w AMS, improved, HTN now controlled    - Continue current antihypertensive therapy.  - Monitor BP in the hospital one more day.  BP more stable with current anti-HTN regimen  - Keep off clonidine patch  - Ambulate with assistance  - Check serum sodium  - If cleared by Renal and Primary, okay for discharge to rehab from cardiac point of view  - To follow

## 2017-07-13 NOTE — DIETITIAN INITIAL EVALUATION ADULT. - OTHER INFO
Pt admitted with weakness, nausea, confusion.  Pt tolerating meals with 100% po intake on Regular diet, 1 l/day fluid restriction.

## 2017-07-13 NOTE — PROVIDER CONTACT NOTE (OTHER) - ASSESSMENT
Patient excited to leave, sitting in chair asymptomatic
Repeat bladder scan done and results were 177cc.

## 2017-07-13 NOTE — PROGRESS NOTE ADULT - SUBJECTIVE AND OBJECTIVE BOX
Patient is a 89y old  Female who presents with a chief complaint of weakness, nausea, confusion today (07 Jul 2017 13:18)      INTERVAL HPI: his is a 89 F with PMH of HTN, LE edema who was brought in by her daughter b/c of increased confusion this morning associated with nausea and weakness. She also complained of SOB. She denied fevers, chills, dysuria, cough and CP. Patient was in the ER on 7/3, diagnosed with UTI and sent home on po antibiotics. Pt returned as was a/w AMS likely 2/2 hyponatremia and, metabolic encephalopathy, and accelerated HTN.    Pt had a stool but she felt was incomplete. Also c/o b/l knee pain and now b/l shoulder pain (now resolved) that she feels is from laying in bed. Pt otherwise had no complaints.    OVERNIGHT EVENTS:  No acute events overnight. Pt has no complaints today. Denies CP, SOB, abdominal pain, dysuria, hematuria, blood in stool. Constipation resolved, pt had stool this am confirmed by rn. Son at bedside has asked to repeat UA to ensure pts infection has resolved.    MEDICATIONS  (STANDING):  valsartan 320 milliGRAM(s) Oral daily  doxazosin 4 milliGRAM(s) Oral at bedtime  enoxaparin Injectable 30 milliGRAM(s) SubCutaneous daily  hydrALAZINE Injectable 10 milliGRAM(s) IV Push once  hydrALAZINE 50 milliGRAM(s) Oral three times a day  lidocaine   Patch 1 Patch Transdermal daily  carvedilol 12.5 milliGRAM(s) Oral every 12 hours  docusate sodium 100 milliGRAM(s) Oral two times a day  senna 2 Tablet(s) Oral at bedtime  cinacalcet 30 milliGRAM(s) Oral at bedtime  polyethylene glycol 3350 17 Gram(s) Oral two times a day    MEDICATIONS  (PRN):  ondansetron Injectable 4 milliGRAM(s) IV Push every 6 hours PRN Nausea and/or Vomiting  acetaminophen   Tablet 650 milliGRAM(s) Oral every 6 hours PRN For Temp greater than 38 C (100.4 F)  acetaminophen   Tablet. 650 milliGRAM(s) Oral every 6 hours PRN Mild Pain (1 - 3)  melatonin 3 milliGRAM(s) Oral at bedtime PRN Insomnia      Allergies    Vasotec (Unknown)    Intolerances        REVIEW OF SYSTEMS:  CONSTITUTIONAL: No fever, No chills,No fatigue,No myalgia,No Body ache  EYES: No eye pain, visual disturbances, or discharge  ENMT:  No ear pain, No nose bleed, No vertigo; No sinus or throat pain  NECK: No pain, No stiffness  RESPIRATORY: No cough, wheezing, No  hemoptysis; No shortness of breath  CARDIOVASCULAR: No chest pain, palpitations, leg swelling  GASTROINTESTINAL: No abdominal or epigastric pain. No nausea, No vomiting; No diarrhea or constipation. [ ] BM  GENITOURINARY: No dysuria, No frequency, No urgency, No hematuria, or incontinence  NEUROLOGICAL: alert and oriented x 3,  No headaches, No dizziness, No numbness,  SKIN:   No itching, burning, rashes, or lesions   MUSCULOSKELETAL: + b/l knee pain when standing  PSYCHIATRIC: No depression, anxiety, mood swings, or difficulty sleeping  ROS  [ ] Unable to obtain   REST OF REVIEW Of SYSTEM - [x ] Normal     Height (cm): 152.4 (07-06 @ 21:16)  Weight (kg): 49.9 (07-06 @ 19:45)  BMI (kg/m2): 21.5 (07-06 @ 21:16)  BSA (m2): 1.45 (07-06 @ 21:16)  Vital Signs Last 24 Hrs  T(C): 36.6 (13 Jul 2017 07:44), Max: 36.8 (12 Jul 2017 16:08)  T(F): 97.8 (13 Jul 2017 07:44), Max: 98.2 (12 Jul 2017 16:08)  HR: 55 (13 Jul 2017 07:44) (52 - 67)  BP: 145/66 (13 Jul 2017 07:44) (122/48 - 176/55)  BP(mean): --  RR: 17 (13 Jul 2017 07:44) (16 - 18)  SpO2: 96% (13 Jul 2017 07:44) (95% - 97%)  [ ] room air   [ ] 02    PHYSICAL EXAM:  GENERAL:  No acute distresss,  [ ] Agitated, [ ] Lethargy, [ ] confused   HEAD:  normal  ENMT: normal  NECK:  normal    NERVOUS SYSTEM:  Alert & Oriented X3, no focal deficits [ ]Confusion  [ ] Encephalopathic [ ] Sedated [ ] Other  CHEST/LUNG: Clear to auscultation bilaterally,  [ ] decreased breath sounds at bases  [ ] wheezing   [ ] rhonchi  [ ] crackles  HEART:  Regular rate and rhythm, No murmurs, rubs, or gallops,  [ ] irregular   ABDOMEN:  soft, nontender, nondistended, positive bowel sounds   [ ] obese  EXTREMITIES: No clubbing, cyanosis or edema  SKIN: [ ] venous stasis skin changes    LABS:    12 Jul 2017 14:52    130    |  97     |  35     ----------------------------<  114    4.9     |  27     |  0.78     Ca    10.3       12 Jul 2017 14:52            CAPILLARY BLOOD GLUCOSE        Cultures    Patient is a 89y old  Female who presents with a chief complaint of weakness, nausea, confusion today (07 Jul 2017 13:18)      INTERVAL HPI/OVERNIGHT EVENTS:    MEDICATIONS  (STANDING):  valsartan 320 milliGRAM(s) Oral daily  doxazosin 4 milliGRAM(s) Oral at bedtime  enoxaparin Injectable 30 milliGRAM(s) SubCutaneous daily  hydrALAZINE Injectable 10 milliGRAM(s) IV Push once  hydrALAZINE 50 milliGRAM(s) Oral three times a day  lidocaine   Patch 1 Patch Transdermal daily  carvedilol 12.5 milliGRAM(s) Oral every 12 hours  docusate sodium 100 milliGRAM(s) Oral two times a day  senna 2 Tablet(s) Oral at bedtime  cinacalcet 30 milliGRAM(s) Oral at bedtime  polyethylene glycol 3350 17 Gram(s) Oral two times a day    MEDICATIONS  (PRN):  ondansetron Injectable 4 milliGRAM(s) IV Push every 6 hours PRN Nausea and/or Vomiting  acetaminophen   Tablet 650 milliGRAM(s) Oral every 6 hours PRN For Temp greater than 38 C (100.4 F)  acetaminophen   Tablet. 650 milliGRAM(s) Oral every 6 hours PRN Mild Pain (1 - 3)  melatonin 3 milliGRAM(s) Oral at bedtime PRN Insomnia      Allergies    Vasotec (Unknown)    Intolerances        REVIEW OF SYSTEMS:  CONSTITUTIONAL: No fever, No chills,No fatigue,No myalgia,No Body ache  EYES: No eye pain, visual disturbances, or discharge  ENMT:  No ear pain, No nose bleed, No vertigo; No sinus or throat pain  NECK: No pain, No stiffness  RESPIRATORY: No cough, wheezing, No  hemoptysis; No shortness of breath  CARDIOVASCULAR: No chest pain, palpitations, leg swelling  GASTROINTESTINAL: No abdominal or epigastric pain. No nausea, No vomiting; No diarrhea or constipation. [ ] BM  GENITOURINARY: No dysuria, No frequency, No urgency, No hematuria, or incontinence  NEUROLOGICAL: alert and oriented x 3,  No headaches, No dizziness, No numbness,  SKIN:   No itching, burning, rashes, or lesions   MUSCULOSKELETAL: No joint pain or swelling; No muscle pain, No back pain, No extremity pain  PSYCHIATRIC: No depression, anxiety, mood swings, or difficulty sleeping  ROS  [ ] Unable to obtain   REST OF REVIEW Of SYSTEM - [ ] Normal     Height (cm): 152.4 (07-06 @ 21:16)  Weight (kg): 49.9 (07-06 @ 19:45)  BMI (kg/m2): 21.5 (07-06 @ 21:16)  BSA (m2): 1.45 (07-06 @ 21:16)  Vital Signs Last 24 Hrs  T(C): 36.6 (13 Jul 2017 07:44), Max: 36.8 (12 Jul 2017 16:08)  T(F): 97.8 (13 Jul 2017 07:44), Max: 98.2 (12 Jul 2017 16:08)  HR: 55 (13 Jul 2017 07:44) (52 - 67)  BP: 145/66 (13 Jul 2017 07:44) (122/48 - 176/55)  BP(mean): --  RR: 17 (13 Jul 2017 07:44) (16 - 18)  SpO2: 96% (13 Jul 2017 07:44) (95% - 97%)  [ ] room air   [ ] 02    PHYSICAL EXAM:  GENERAL:  No acute distresss,  [ ] Agitated, [ ] Lethargy, [ ] confused   HEAD:  normal  ENMT: normal  NECK:  normal    NERVOUS SYSTEM:  Alert & Oriented X3, no focal deficits [ ]Confusion  [ ] Encephalopathic [ ] Sedated [ ] Other  CHEST/LUNG: Clear to auscultation bilaterally,  [ ] decreased breath sounds at bases  [ ] wheezing   [ ] rhonchi  [ ] crackles  HEART:  Regular rate and rhythm, No murmurs, rubs, or gallops,  [ ] irregular   ABDOMEN:  soft, nontender, nondistended, positive bowel sounds   [ ] obese  EXTREMITIES: No clubbing, cyanosis or edema  SKIN: [ ] venous stasis skin changes    LABS:    12 Jul 2017 14:52    130    |  97     |  35     ----------------------------<  114    4.9     |  27     |  0.78     Ca    10.3       12 Jul 2017 14:52            CAPILLARY BLOOD GLUCOSE        Cultures          RADIOLOGY & ADDITIONAL TESTS:      Care Discussed with [] Consultants  [ ] Patient  [ ] Family  [X]   /   [ ] Other; RN  DVT prophylaxis [ ] lovenox   [ ] subq heparin  [ ] coumadin  [ ] venodynes [ ] ambulating frequently at how risk for vte and no pharm         or  mechanical prophylaxis required    [ ] other   Advanced directive:    [ ]pt has hcp     [ ] pt declined to assign hcp  Discussed with pt @ bedside

## 2017-07-13 NOTE — PROVIDER CONTACT NOTE (OTHER) - SITUATION
EMS arrived to  patient to rehab facility but patients BP manual 180/62 did not change after rechecking 10 min

## 2017-07-13 NOTE — PROGRESS NOTE ADULT - SUBJECTIVE AND OBJECTIVE BOX
Neurology follow up note    SAIRA [id number]      Interval History:    Patient seen son doing well    MEDICATIONS    valsartan 320 milliGRAM(s) Oral daily  doxazosin 4 milliGRAM(s) Oral at bedtime  ondansetron Injectable 4 milliGRAM(s) IV Push every 6 hours PRN  acetaminophen   Tablet 650 milliGRAM(s) Oral every 6 hours PRN  acetaminophen   Tablet. 650 milliGRAM(s) Oral every 6 hours PRN  enoxaparin Injectable 30 milliGRAM(s) SubCutaneous daily  hydrALAZINE Injectable 10 milliGRAM(s) IV Push once  melatonin 3 milliGRAM(s) Oral at bedtime PRN  hydrALAZINE 50 milliGRAM(s) Oral three times a day  lidocaine   Patch 1 Patch Transdermal daily  carvedilol 12.5 milliGRAM(s) Oral every 12 hours  docusate sodium 100 milliGRAM(s) Oral two times a day  senna 2 Tablet(s) Oral at bedtime  cinacalcet 30 milliGRAM(s) Oral at bedtime  polyethylene glycol 3350 17 Gram(s) Oral two times a day      Allergies    Vasotec (Unknown)    Intolerances            Vital Signs Last 24 Hrs  T(C): 36.5 (13 Jul 2017 11:48), Max: 36.8 (12 Jul 2017 16:08)  T(F): 97.7 (13 Jul 2017 11:48), Max: 98.2 (12 Jul 2017 16:08)  HR: 55 (13 Jul 2017 11:48) (55 - 67)  BP: 144/55 (13 Jul 2017 11:48) (122/48 - 176/55)  BP(mean): --  RR: 17 (13 Jul 2017 11:48) (16 - 18)  SpO2: 97% (13 Jul 2017 11:48) (95% - 97%)    REVIEW OF SYSTEMS:     Constitutional: No fever, chills, fatigue, weakness  Eyes: no eye pain, visual disturbances, or discharge  ENT:  No difficulty hearing, tinnitus, vertigo; No sinus or throat pain  Neck: No pain or stiffness  Respiratory: No cough, dyspnea, wheezing   Cardiovascular: No chest pain, palpitations,   Gastrointestinal: No abdominal or epigastric pain. No nausea, vomiting  No diarrhea or constipation.   Genitourinary: No dysuria, frequency, hematuria or incontinence  Neurological: No headaches, lightheadedness, vertigo, numbness or tremors  Psychiatric: No depression, anxiety, mood swings or difficulty sleeping  Musculoskeletal: No joint pain or swelling; + extremity pain  Skin: No itching, burning, rashes or lesions   Lymph Nodes: No enlarged glands  Endocrine: No heat or cold intolerance; No hair loss   Allergy and Immunologic: No hives or eczema    On Neurological Examination:    Mental Status - Patient is alert, awake,   forgetfulness     Follow simple commands    Speech -   Fluent                   Cranial Nerves - Pupils 3 mm equal and reactive to light,   extraocular eye movements intact.   smile symmetric  intact bilateral NLF    Motor Exam -   Right upper 4/5  Left upper 4/5  Right lower 3/5  Left lower 3/5    Muscle tone - is normal all over.  No asymmetry is seen.      Sensory    Bilateral intact to light touch    GENERAL Exam: Nontoxic , No Acute Distress   	  HEENT:  normocephalic, atraumatic  		  LUNGS: Clear bilaterally    	  HEART: Normal S1S2   No murmur RRR        	  GI/ ABDOMEN:  Soft  Non tender    EXTREMITIES:   No Edema  No Clubbing  No Cyanosis No Edema    MUSCULOSKELETAL: decreased  bilateral knees Range of Motion  	   SKIN: Normal  No Ecchymosis             LABS:      07-13    129<L>  |  98  |  35<H>  ----------------------------<  127<H>  4.7   |  27  |  0.79    Ca    10.5<H>      13 Jul 2017 10:55      Hemoglobin A1C:       Vitamin B12         RADIOLOGY      ANALYSIS AND PLAN:  This is a 89-year-old with episodes of changes in mental status.    1.	Changes in mental status, possibly metabolic encephalopathy, questionable secondary to underlying urinary tract infection along with hyponatremia, also suspect the patient has subtle mild cognitive impairment that may become worse in the hospital setting.  2.	S/P antibiotics   3.	tremors are better   4.	Recommend to monitor electrolytes and sodium as needed.  5.	For mild cognitive impairment versus subtle dementia secondary to the patient's age, would recommend supportive therapy.    6    knee pain will try lidoderm patches   7    monitor SBP   Overall as per son doing better     Spoke with daughter, Eula, at [phone] in past. Spoke to son today at bedside 7/13/7     neurology wise cleared for discharge planning

## 2017-07-13 NOTE — PROGRESS NOTE ADULT - ATTENDING COMMENTS
Discussed case with NP and agree with findings, assessment, and recommendations.
pt seen and examined  agree with plan as edited above.  discussed with daughter 30 minutes last night. will call with update again today as well.
pt seen and examined   stable for and awaiting discharge   discharge cleared w cardio and neruology  dw daughter at great length
pt currently alert and responds appropiatly to all questions   pt alert and oriented to person and place  conmt I v abs  all consults appreciateaaliyah carver

## 2017-07-13 NOTE — PROGRESS NOTE ADULT - SUBJECTIVE AND OBJECTIVE BOX
Patient is a 89y old  Female who presents with a chief complaint of weakness, nausea, confusion today (07 Jul 2017 13:18)      Patient seen in follow up for hyponatremia. Improved sodium levels. BP labile but better controlled overall.     MEDICATIONS  (STANDING):  valsartan 320 milliGRAM(s) Oral daily  doxazosin 4 milliGRAM(s) Oral at bedtime  enoxaparin Injectable 30 milliGRAM(s) SubCutaneous daily  hydrALAZINE Injectable 10 milliGRAM(s) IV Push once  hydrALAZINE 50 milliGRAM(s) Oral three times a day  lidocaine   Patch 1 Patch Transdermal daily  carvedilol 12.5 milliGRAM(s) Oral every 12 hours  docusate sodium 100 milliGRAM(s) Oral two times a day  senna 2 Tablet(s) Oral at bedtime  cinacalcet 30 milliGRAM(s) Oral at bedtime  polyethylene glycol 3350 17 Gram(s) Oral two times a day    MEDICATIONS  (PRN):  ondansetron Injectable 4 milliGRAM(s) IV Push every 6 hours PRN Nausea and/or Vomiting  acetaminophen   Tablet 650 milliGRAM(s) Oral every 6 hours PRN For Temp greater than 38 C (100.4 F)  acetaminophen   Tablet. 650 milliGRAM(s) Oral every 6 hours PRN Mild Pain (1 - 3)  melatonin 3 milliGRAM(s) Oral at bedtime PRN Insomnia    T(C): 36.6 (07-13-17 @ 07:44), Max: 36.9 (07-11-17 @ 11:27)  HR: 55 (07-13-17 @ 07:44) (52 - 72)  BP: 145/66 (07-13-17 @ 07:44) (107/62 - 176/55)  RR: 17 (07-13-17 @ 07:44)  SpO2: 96% (07-13-17 @ 07:44)  Wt(kg): --  I&O's Detail    12 Jul 2017 07:01  -  13 Jul 2017 07:00  --------------------------------------------------------  IN:    Oral Fluid: 600 mL  Total IN: 600 mL    OUT:  Total OUT: 0 mL    Total NET: 600 mL            PHYSICAL EXAM:  General: NAD  Respiratory: b/l air entry  Cardiovascular: S1 S2  Gastrointestinal: soft  Extremities:  no edema            LABORATORY:    07-12    130<L>  |  97  |  35<H>  ----------------------------<  114<H>  4.9   |  27  |  0.78    Ca    10.3<H>      12 Jul 2017 14:52      Sodium, Serum: 130 mmol/L (07-12 @ 14:52)    Potassium, Serum: 4.9 mmol/L (07-12 @ 14:52)      Creatinine, Serum 0.78 (07-12 @ 14:52)  Creatinine, Serum 0.62 (07-11 @ 06:58)

## 2017-07-13 NOTE — PROGRESS NOTE ADULT - PROBLEM SELECTOR PLAN 3
likely 2/2 lasix - hold lasix as per nephrology  -Sodium tabs dc'd  -PO fluid restriction  -Avoid hypotonic fluids  as per renal stable for dc

## 2017-07-14 VITALS
TEMPERATURE: 98 F | SYSTOLIC BLOOD PRESSURE: 150 MMHG | HEART RATE: 58 BPM | OXYGEN SATURATION: 96 % | RESPIRATION RATE: 18 BRPM | DIASTOLIC BLOOD PRESSURE: 50 MMHG

## 2017-07-14 PROCEDURE — 97161 PT EVAL LOW COMPLEX 20 MIN: CPT

## 2017-07-14 PROCEDURE — 96361 HYDRATE IV INFUSION ADD-ON: CPT

## 2017-07-14 PROCEDURE — 82746 ASSAY OF FOLIC ACID SERUM: CPT

## 2017-07-14 PROCEDURE — 82553 CREATINE MB FRACTION: CPT

## 2017-07-14 PROCEDURE — 97116 GAIT TRAINING THERAPY: CPT

## 2017-07-14 PROCEDURE — 87040 BLOOD CULTURE FOR BACTERIA: CPT

## 2017-07-14 PROCEDURE — 80048 BASIC METABOLIC PNL TOTAL CA: CPT

## 2017-07-14 PROCEDURE — 83970 ASSAY OF PARATHORMONE: CPT

## 2017-07-14 PROCEDURE — 96365 THER/PROPH/DIAG IV INF INIT: CPT

## 2017-07-14 PROCEDURE — 97110 THERAPEUTIC EXERCISES: CPT

## 2017-07-14 PROCEDURE — 85027 COMPLETE CBC AUTOMATED: CPT

## 2017-07-14 PROCEDURE — 82550 ASSAY OF CK (CPK): CPT

## 2017-07-14 PROCEDURE — 93005 ELECTROCARDIOGRAM TRACING: CPT

## 2017-07-14 PROCEDURE — 96375 TX/PRO/DX INJ NEW DRUG ADDON: CPT

## 2017-07-14 PROCEDURE — 85730 THROMBOPLASTIN TIME PARTIAL: CPT

## 2017-07-14 PROCEDURE — 71045 X-RAY EXAM CHEST 1 VIEW: CPT

## 2017-07-14 PROCEDURE — 87086 URINE CULTURE/COLONY COUNT: CPT

## 2017-07-14 PROCEDURE — 74177 CT ABD & PELVIS W/CONTRAST: CPT

## 2017-07-14 PROCEDURE — 87186 SC STD MICRODIL/AGAR DIL: CPT

## 2017-07-14 PROCEDURE — 84443 ASSAY THYROID STIM HORMONE: CPT

## 2017-07-14 PROCEDURE — 82310 ASSAY OF CALCIUM: CPT

## 2017-07-14 PROCEDURE — 83880 ASSAY OF NATRIURETIC PEPTIDE: CPT

## 2017-07-14 PROCEDURE — 81001 URINALYSIS AUTO W/SCOPE: CPT

## 2017-07-14 PROCEDURE — 70450 CT HEAD/BRAIN W/O DYE: CPT

## 2017-07-14 PROCEDURE — 99284 EMERGENCY DEPT VISIT MOD MDM: CPT | Mod: 25

## 2017-07-14 PROCEDURE — 85610 PROTHROMBIN TIME: CPT

## 2017-07-14 PROCEDURE — 99232 SBSQ HOSP IP/OBS MODERATE 35: CPT

## 2017-07-14 PROCEDURE — 85018 HEMOGLOBIN: CPT

## 2017-07-14 PROCEDURE — 84550 ASSAY OF BLOOD/URIC ACID: CPT

## 2017-07-14 PROCEDURE — 80053 COMPREHEN METABOLIC PANEL: CPT

## 2017-07-14 PROCEDURE — 82607 VITAMIN B-12: CPT

## 2017-07-14 PROCEDURE — 97530 THERAPEUTIC ACTIVITIES: CPT

## 2017-07-14 PROCEDURE — 83605 ASSAY OF LACTIC ACID: CPT

## 2017-07-14 PROCEDURE — 84484 ASSAY OF TROPONIN QUANT: CPT

## 2017-07-14 PROCEDURE — 93971 EXTREMITY STUDY: CPT

## 2017-07-14 PROCEDURE — 83735 ASSAY OF MAGNESIUM: CPT

## 2017-07-14 RX ADMIN — Medication 50 MILLIGRAM(S): at 05:27

## 2017-07-14 RX ADMIN — Medication 100 MILLIGRAM(S): at 05:27

## 2017-07-14 RX ADMIN — LIDOCAINE 1 PATCH: 4 CREAM TOPICAL at 11:08

## 2017-07-14 RX ADMIN — ENOXAPARIN SODIUM 30 MILLIGRAM(S): 100 INJECTION SUBCUTANEOUS at 11:08

## 2017-07-14 RX ADMIN — POLYETHYLENE GLYCOL 3350 17 GRAM(S): 17 POWDER, FOR SOLUTION ORAL at 09:27

## 2017-07-14 RX ADMIN — VALSARTAN 320 MILLIGRAM(S): 80 TABLET ORAL at 05:27

## 2017-07-14 RX ADMIN — LIDOCAINE 1 PATCH: 4 CREAM TOPICAL at 02:38

## 2017-07-14 RX ADMIN — CARVEDILOL PHOSPHATE 12.5 MILLIGRAM(S): 80 CAPSULE, EXTENDED RELEASE ORAL at 05:27

## 2017-07-14 NOTE — PROGRESS NOTE ADULT - SUBJECTIVE AND OBJECTIVE BOX
[facility]    Christofer Isaacs, Sorin, Marisa, Jr, Hieu      [phone]    CHIEF COMPLAINT: Patient is a 89y old  Female who presents with a chief complaint of weakness, nausea, confusion today (07 Jul 2017 13:18)      Follow Up: accel htn, altered ms    Interim history: dc cancelled yesterday for high bp. no new sxs of cp, sob    MEDICATIONS  (STANDING):  valsartan 320 milliGRAM(s) Oral daily  doxazosin 4 milliGRAM(s) Oral at bedtime  enoxaparin Injectable 30 milliGRAM(s) SubCutaneous daily  hydrALAZINE Injectable 10 milliGRAM(s) IV Push once  hydrALAZINE 50 milliGRAM(s) Oral three times a day  lidocaine   Patch 1 Patch Transdermal daily  carvedilol 12.5 milliGRAM(s) Oral every 12 hours  docusate sodium 100 milliGRAM(s) Oral two times a day  senna 2 Tablet(s) Oral at bedtime  cinacalcet 30 milliGRAM(s) Oral at bedtime  polyethylene glycol 3350 17 Gram(s) Oral two times a day    MEDICATIONS  (PRN):  ondansetron Injectable 4 milliGRAM(s) IV Push every 6 hours PRN Nausea and/or Vomiting  acetaminophen   Tablet 650 milliGRAM(s) Oral every 6 hours PRN For Temp greater than 38 C (100.4 F)  acetaminophen   Tablet. 650 milliGRAM(s) Oral every 6 hours PRN Mild Pain (1 - 3)  melatonin 3 milliGRAM(s) Oral at bedtime PRN Insomnia      REVIEW OF SYSTEMS:  eye, ent, GI, , allergic, dermatologic, musculoskeletal and neurologic are negative except as described above    Vital Signs Last 24 Hrs  T(C): 36.6 (14 Jul 2017 11:13), Max: 36.8 (13 Jul 2017 23:21)  T(F): 97.8 (14 Jul 2017 11:13), Max: 98.2 (13 Jul 2017 23:21)  HR: 58 (14 Jul 2017 11:13) (56 - 66)  BP: 150/50 (14 Jul 2017 11:13) (140/52 - 180/62)  BP(mean): --  RR: 18 (14 Jul 2017 11:13) (16 - 18)  SpO2: 96% (14 Jul 2017 11:13) (94% - 96%)    I&O's Summary    13 Jul 2017 07:01  -  14 Jul 2017 07:00  --------------------------------------------------------  IN: 480 mL / OUT: 0 mL / NET: 480 mL        Telemetry past 24h: off    PHYSICAL EXAM:    Constitutional: well-nourished, well-developed, NAD   HEENT:  MMM, sclerae anicteric, conjunctivae clear, no oral cyanosis.  Pulmonary: Non-labored, breath sounds are clear bilaterally, No wheezing, rales or rhonchi  Cardiovascular: Regular, S1 and S2.  2/6 sys murmur.  No rubs, gallops or clicks  Gastrointestinal: Bowel Sounds present, soft, nontender.   Lymph: No peripheral edema.   Neurological: Alert, no focal deficits  Skin: No rashes.  Psych:  Mood & affect appropriate    LABS: All Labs Reviewed:    13 Jul 2017 10:55    129    |  98     |  35     ----------------------------<  127    4.7     |  27     |  0.79   12 Jul 2017 14:52    130    |  97     |  35     ----------------------------<  114    4.9     |  27     |  0.78     Ca    10.5       13 Jul 2017 10:55  Ca    10.3       12 Jul 2017 14:52            Blood Culture:         RADIOLOGY:    EKG:    Echo:

## 2017-07-14 NOTE — PROGRESS NOTE ADULT - PROVIDER SPECIALTY LIST ADULT
Cardiology
Hospitalist
Infectious Disease
Internal Medicine
Nephrology
Neurology
Internal Medicine
Hospitalist

## 2017-07-14 NOTE — PROGRESS NOTE ADULT - PROBLEM SELECTOR PLAN 7
Repleted K   F/U BMP   MG WNL
likely 2/2 OA with deconditioning  -Tylenol for pain  -PT
Repleted K   F/U BMP   MG WNL
likely 2/2 OA with deconditioning  -Tylenol for pain  -PT

## 2017-07-14 NOTE — PROGRESS NOTE ADULT - PROBLEM SELECTOR PROBLEM 7
Hypokalemia
Knee pain, bilateral
Hypokalemia
Hypokalemia

## 2017-07-14 NOTE — PROGRESS NOTE ADULT - SUBJECTIVE AND OBJECTIVE BOX
Neurology follow up note    SAIRA [id number]      Interval History:    Patient feels ok no new complaints. seen with son     MEDICATIONS    valsartan 320 milliGRAM(s) Oral daily  doxazosin 4 milliGRAM(s) Oral at bedtime  ondansetron Injectable 4 milliGRAM(s) IV Push every 6 hours PRN  acetaminophen   Tablet 650 milliGRAM(s) Oral every 6 hours PRN  acetaminophen   Tablet. 650 milliGRAM(s) Oral every 6 hours PRN  enoxaparin Injectable 30 milliGRAM(s) SubCutaneous daily  hydrALAZINE Injectable 10 milliGRAM(s) IV Push once  melatonin 3 milliGRAM(s) Oral at bedtime PRN  hydrALAZINE 50 milliGRAM(s) Oral three times a day  lidocaine   Patch 1 Patch Transdermal daily  carvedilol 12.5 milliGRAM(s) Oral every 12 hours  docusate sodium 100 milliGRAM(s) Oral two times a day  senna 2 Tablet(s) Oral at bedtime  cinacalcet 30 milliGRAM(s) Oral at bedtime  polyethylene glycol 3350 17 Gram(s) Oral two times a day      Allergies    Vasotec (Unknown)    Intolerances            Vital Signs Last 24 Hrs  T(C): 36.6 (14 Jul 2017 11:13), Max: 36.8 (13 Jul 2017 23:21)  T(F): 97.8 (14 Jul 2017 11:13), Max: 98.2 (13 Jul 2017 23:21)  HR: 58 (14 Jul 2017 11:13) (56 - 66)  BP: 150/50 (14 Jul 2017 11:13) (140/52 - 180/62)  BP(mean): --  RR: 18 (14 Jul 2017 11:13) (16 - 18)  SpO2: 96% (14 Jul 2017 11:13) (94% - 96%)      REVIEW OF SYSTEMS:     Constitutional: No fever, chills, fatigue, weakness  Eyes: no eye pain, visual disturbances, or discharge  ENT:  No difficulty hearing, tinnitus, vertigo; No sinus or throat pain  Neck: No pain or stiffness  Respiratory: No cough, dyspnea, wheezing   Cardiovascular: No chest pain, palpitations,   Gastrointestinal: No abdominal or epigastric pain. No nausea, vomiting  No diarrhea or constipation.   Genitourinary: No dysuria, frequency, hematuria or incontinence  Neurological: No headaches, lightheadedness, vertigo, numbness or tremors  Psychiatric: No depression, anxiety, mood swings or difficulty sleeping  Musculoskeletal: No joint pain or swelling; + extremity pain  Skin: No itching, burning, rashes or lesions   Lymph Nodes: No enlarged glands  Endocrine: No heat or cold intolerance; No hair loss   Allergy and Immunologic: No hives or eczema    On Neurological Examination:    Mental Status - Patient is alert, awake,   forgetfulness     Follow simple commands    Speech -   Fluent                   Cranial Nerves - Pupils 3 mm equal and reactive to light,   extraocular eye movements intact.   smile symmetric  intact bilateral NLF    Motor Exam -   Right upper 4/5  Left upper 4/5  Right lower 3/5  Left lower 3/5    Muscle tone - is normal all over.  No asymmetry is seen.      Sensory    Bilateral intact to light touch    GENERAL Exam: Nontoxic , No Acute Distress   	  HEENT:  normocephalic, atraumatic  		  LUNGS: Clear bilaterally    	  HEART: Normal S1S2   No murmur RRR        	  GI/ ABDOMEN:  Soft  Non tender    EXTREMITIES:   No Edema  No Clubbing  No Cyanosis No Edema    MUSCULOSKELETAL: decreased  bilateral knees Range of Motion  	   SKIN: Normal  No Ecchymosis                LABS:      07-13    129<L>  |  98  |  35<H>  ----------------------------<  127<H>  4.7   |  27  |  0.79    Ca    10.5<H>      13 Jul 2017 10:55      Hemoglobin A1C:       Vitamin B12         RADIOLOGY    ANALYSIS AND PLAN:  This is a 89-year-old with episodes of changes in mental status.    1.	Changes in mental status, possibly metabolic encephalopathy, questionable secondary to underlying urinary tract infection along with hyponatremia, also suspect the patient has subtle mild cognitive impairment that may become worse in the hospital setting.  2.	S/P antibiotics   3.	tremors are better   4.	Recommend to monitor electrolytes and sodium as needed.  5.	For mild cognitive impairment versus subtle dementia secondary to the patient's age, would recommend supportive therapy.    6    knee pain will try lidoderm patches   7    monitor SBP   Overall as per son doing better -- spoke to son at bedside     Spoke with daughter, Eula, at [phone] in past. Spoke to son today at bedside 7/13/7     neurology wise cleared for discharge planning

## 2017-07-14 NOTE — PROGRESS NOTE ADULT - ASSESSMENT
89 year old woman with hypertension, a/w AMS, improved, HTN now controlled    - Continue current antihypertensive therapy.  - bp not ideally controlled, but is reasonable enough given her age, and noting that when she is not under stress her bp is 150s to 160s  - Keep off clonidine patch  - Ambulate with assistance  - dc planning to neftaly for today

## 2017-07-14 NOTE — PROGRESS NOTE ADULT - SUBJECTIVE AND OBJECTIVE BOX
Patient is a 89y old  Female who presents with a chief complaint of weakness, nausea, confusion today (07 Jul 2017 13:18)      INTERVAL HPI/OVERNIGHT EVENTS: Pt had no acute events overnight. Pt seen and examined at bedside. Son is at bedside. Family refused discharge last night for elevated BP prior to pm meds. Pt asymptomatics. Denies CP, SOB, N/V/D/C, abdominal pain, dysuria, hematuria. Son reports that R. foot is more swollen than the other foot, but that this is a chronic problem with the patient due to her CHF. Pt denies any pain.    MEDICATIONS  (STANDING):  valsartan 320 milliGRAM(s) Oral daily  doxazosin 4 milliGRAM(s) Oral at bedtime  enoxaparin Injectable 30 milliGRAM(s) SubCutaneous daily  hydrALAZINE Injectable 10 milliGRAM(s) IV Push once  hydrALAZINE 50 milliGRAM(s) Oral three times a day  lidocaine   Patch 1 Patch Transdermal daily  carvedilol 12.5 milliGRAM(s) Oral every 12 hours  docusate sodium 100 milliGRAM(s) Oral two times a day  senna 2 Tablet(s) Oral at bedtime  cinacalcet 30 milliGRAM(s) Oral at bedtime  polyethylene glycol 3350 17 Gram(s) Oral two times a day    MEDICATIONS  (PRN):  ondansetron Injectable 4 milliGRAM(s) IV Push every 6 hours PRN Nausea and/or Vomiting  acetaminophen   Tablet 650 milliGRAM(s) Oral every 6 hours PRN For Temp greater than 38 C (100.4 F)  acetaminophen   Tablet. 650 milliGRAM(s) Oral every 6 hours PRN Mild Pain (1 - 3)  melatonin 3 milliGRAM(s) Oral at bedtime PRN Insomnia      Allergies    Vasotec (Unknown)    Intolerances        REVIEW OF SYSTEMS:  CONSTITUTIONAL: No fever, No chills,No fatigue,No myalgia,No Body ache  EYES: No eye pain, visual disturbances, or discharge  ENMT:  No ear pain, No nose bleed, No vertigo; No sinus or throat pain  NECK: No pain, No stiffness  RESPIRATORY: No cough, wheezing, No  hemoptysis; No shortness of breath  CARDIOVASCULAR: No chest pain, palpitations, leg swelling  GASTROINTESTINAL: No abdominal or epigastric pain. No nausea, No vomiting; No diarrhea or constipation. [ ] BM  GENITOURINARY: No dysuria, No frequency, No urgency, No hematuria, or incontinence  NEUROLOGICAL: alert and oriented x 3,  No headaches, No dizziness, No numbness,  SKIN:   No itching, burning, rashes, or lesions   MUSCULOSKELETAL: No joint pain or swelling; No muscle pain, No back pain, No extremity pain  PSYCHIATRIC: No depression, anxiety, mood swings, or difficulty sleeping  ROS  [ ] Unable to obtain   REST OF REVIEW Of SYSTEM - [ ] Normal     Height (cm): 152.4 (07-06 @ 21:16)  Weight (kg): 49.9 (07-06 @ 19:45)  BMI (kg/m2): 21.5 (07-06 @ 21:16)  BSA (m2): 1.45 (07-06 @ 21:16)  Vital Signs Last 24 Hrs  T(C): 36.6 (14 Jul 2017 11:13), Max: 36.8 (13 Jul 2017 23:21)  T(F): 97.8 (14 Jul 2017 11:13), Max: 98.2 (13 Jul 2017 23:21)  HR: 58 (14 Jul 2017 11:13) (55 - 66)  BP: 150/50 (14 Jul 2017 11:13) (140/52 - 180/62)  BP(mean): --  RR: 18 (14 Jul 2017 11:13) (16 - 18)  SpO2: 96% (14 Jul 2017 11:13) (94% - 97%)  [ ] room air   [ ] 02    PHYSICAL EXAM:  GENERAL:  No acute distresss,  [ ] Agitated, [ ] Lethargy, [ ] confused   HEAD:  normal  ENMT: normal  NECK:  normal    NERVOUS SYSTEM:  Alert & Oriented X3, no focal deficits [ ]Confusion  [ ] Encephalopathic [ ] Sedated [ ] Other  CHEST/LUNG: Clear to auscultation bilaterally,  [ ] decreased breath sounds at bases  [ ] wheezing   [ ] rhonchi  [ ] crackles  HEART:  Regular rate and rhythm, No murmurs, rubs, or gallops,  [ ] irregular   ABDOMEN:  soft, nontender, nondistended, positive bowel sounds   [ ] obese  EXTREMITIES: No clubbing, cyanosis, 1+ pitting edema b/l LE, slightly worse on R., non-TTP, no redness, no warmth.  SKIN: [ ] venous stasis skin changes    LABS:      Ca    10.5       13 Jul 2017 10:55            CAPILLARY BLOOD GLUCOSE        Cultures          RADIOLOGY & ADDITIONAL TESTS:      Care Discussed with [X] Consultants  [ ] Patient  [ ] Family  [X]   /   [ ] Other; RN  DVT prophylaxis [ ] lovenox   [ ] subq heparin  [ ] coumadin  [ ] venodynes [ ] ambulating frequently at how risk for vte and no pharm         or  mechanical prophylaxis required    [ ] other   Advanced directive:    [ ]pt has hcp     [ ] pt declined to assign hcp  Discussed with pt @ bedside

## 2017-07-14 NOTE — PROGRESS NOTE ADULT - PROBLEM SELECTOR PLAN 4
-Off Clonidine patch  -Continue Coreg, Hydralazine, Valsartan, Doxazosin  bp is stable in 150 systolic range  -as per cardio stable for dc

## 2017-07-14 NOTE — PROGRESS NOTE ADULT - PROBLEM SELECTOR PLAN 6
Stable - Monitor H/H
-Stable - Monitor H/H
Stable - Monitor H/H
Received antibiotics for underlying infection, monitor WBC and check for fevers  Avoid sedatives and hypnotics

## 2017-07-14 NOTE — PROGRESS NOTE ADULT - PROBLEM SELECTOR PROBLEM 8
Prophylactic measure
Need for prophylactic measure

## 2017-07-18 DIAGNOSIS — R06.02 SHORTNESS OF BREATH: ICD-10-CM

## 2017-07-18 DIAGNOSIS — G93.41 METABOLIC ENCEPHALOPATHY: ICD-10-CM

## 2017-07-18 DIAGNOSIS — R06.00 DYSPNEA, UNSPECIFIED: ICD-10-CM

## 2017-07-18 DIAGNOSIS — R31.9 HEMATURIA, UNSPECIFIED: ICD-10-CM

## 2017-07-18 DIAGNOSIS — E20.9 HYPOPARATHYROIDISM, UNSPECIFIED: ICD-10-CM

## 2017-07-18 DIAGNOSIS — N39.0 URINARY TRACT INFECTION, SITE NOT SPECIFIED: ICD-10-CM

## 2017-07-18 DIAGNOSIS — E87.1 HYPO-OSMOLALITY AND HYPONATREMIA: ICD-10-CM

## 2017-07-18 DIAGNOSIS — I11.0 HYPERTENSIVE HEART DISEASE WITH HEART FAILURE: ICD-10-CM

## 2017-07-18 DIAGNOSIS — I50.32 CHRONIC DIASTOLIC (CONGESTIVE) HEART FAILURE: ICD-10-CM

## 2017-07-18 DIAGNOSIS — D64.9 ANEMIA, UNSPECIFIED: ICD-10-CM

## 2017-07-18 DIAGNOSIS — E83.52 HYPERCALCEMIA: ICD-10-CM

## 2017-07-19 DIAGNOSIS — E87.6 HYPOKALEMIA: ICD-10-CM

## 2017-07-19 DIAGNOSIS — T50.1X5A ADVERSE EFFECT OF LOOP [HIGH-CEILING] DIURETICS, INITIAL ENCOUNTER: ICD-10-CM

## 2017-07-26 ENCOUNTER — APPOINTMENT (OUTPATIENT)
Dept: CARDIOLOGY | Facility: CLINIC | Age: 82
End: 2017-07-26

## 2017-08-24 ENCOUNTER — INPATIENT (INPATIENT)
Facility: HOSPITAL | Age: 82
LOS: 8 days | Discharge: NURSING, REHAB OR LONG-TERM CARE | DRG: 177 | End: 2017-09-02
Attending: INTERNAL MEDICINE | Admitting: INTERNAL MEDICINE
Payer: MEDICARE

## 2017-08-24 VITALS
TEMPERATURE: 97 F | HEIGHT: 64 IN | WEIGHT: 143.3 LBS | RESPIRATION RATE: 18 BRPM | DIASTOLIC BLOOD PRESSURE: 75 MMHG | SYSTOLIC BLOOD PRESSURE: 134 MMHG | HEART RATE: 63 BPM | OXYGEN SATURATION: 97 %

## 2017-08-24 DIAGNOSIS — Z29.9 ENCOUNTER FOR PROPHYLACTIC MEASURES, UNSPECIFIED: ICD-10-CM

## 2017-08-24 DIAGNOSIS — E89.2 POSTPROCEDURAL HYPOPARATHYROIDISM: Chronic | ICD-10-CM

## 2017-08-24 DIAGNOSIS — E87.1 HYPO-OSMOLALITY AND HYPONATREMIA: ICD-10-CM

## 2017-08-24 DIAGNOSIS — I50.9 HEART FAILURE, UNSPECIFIED: ICD-10-CM

## 2017-08-24 DIAGNOSIS — D64.9 ANEMIA, UNSPECIFIED: ICD-10-CM

## 2017-08-24 DIAGNOSIS — J18.9 PNEUMONIA, UNSPECIFIED ORGANISM: ICD-10-CM

## 2017-08-24 DIAGNOSIS — Z90.49 ACQUIRED ABSENCE OF OTHER SPECIFIED PARTS OF DIGESTIVE TRACT: Chronic | ICD-10-CM

## 2017-08-24 DIAGNOSIS — I10 ESSENTIAL (PRIMARY) HYPERTENSION: ICD-10-CM

## 2017-08-24 DIAGNOSIS — Z90.89 ACQUIRED ABSENCE OF OTHER ORGANS: Chronic | ICD-10-CM

## 2017-08-24 LAB
ALBUMIN SERPL ELPH-MCNC: 2.5 G/DL — LOW (ref 3.3–5)
ALP SERPL-CCNC: 88 U/L — SIGNIFICANT CHANGE UP (ref 40–120)
ALT FLD-CCNC: 54 U/L — SIGNIFICANT CHANGE UP (ref 12–78)
ANION GAP SERPL CALC-SCNC: 10 MMOL/L — SIGNIFICANT CHANGE UP (ref 5–17)
APPEARANCE UR: ABNORMAL
APTT BLD: 27.3 SEC — LOW (ref 27.5–37.4)
AST SERPL-CCNC: 37 U/L — SIGNIFICANT CHANGE UP (ref 15–37)
BACTERIA # UR AUTO: ABNORMAL
BASOPHILS # BLD AUTO: 0.1 K/UL — SIGNIFICANT CHANGE UP (ref 0–0.2)
BASOPHILS NFR BLD AUTO: 0.4 % — SIGNIFICANT CHANGE UP (ref 0–2)
BILIRUB SERPL-MCNC: 0.4 MG/DL — SIGNIFICANT CHANGE UP (ref 0.2–1.2)
BILIRUB UR-MCNC: NEGATIVE — SIGNIFICANT CHANGE UP
BUN SERPL-MCNC: 39 MG/DL — HIGH (ref 7–23)
CALCIUM SERPL-MCNC: 8.4 MG/DL — LOW (ref 8.5–10.1)
CHLORIDE SERPL-SCNC: 94 MMOL/L — LOW (ref 96–108)
CK MB BLD-MCNC: 5.3 % — HIGH (ref 0–3.5)
CK MB CFR SERPL CALC: 2.3 NG/ML — SIGNIFICANT CHANGE UP (ref 0–3.6)
CK SERPL-CCNC: 43 U/L — SIGNIFICANT CHANGE UP (ref 26–192)
CO2 SERPL-SCNC: 22 MMOL/L — SIGNIFICANT CHANGE UP (ref 22–31)
COLOR SPEC: YELLOW — SIGNIFICANT CHANGE UP
CREAT SERPL-MCNC: 1.3 MG/DL — SIGNIFICANT CHANGE UP (ref 0.5–1.3)
DIFF PNL FLD: NEGATIVE — SIGNIFICANT CHANGE UP
EOSINOPHIL # BLD AUTO: 0.1 K/UL — SIGNIFICANT CHANGE UP (ref 0–0.5)
EOSINOPHIL NFR BLD AUTO: 0.6 % — SIGNIFICANT CHANGE UP (ref 0–6)
EPI CELLS # UR: SIGNIFICANT CHANGE UP
GLUCOSE SERPL-MCNC: 124 MG/DL — HIGH (ref 70–99)
GLUCOSE UR QL: NEGATIVE — SIGNIFICANT CHANGE UP
HCT VFR BLD CALC: 26.4 % — LOW (ref 34.5–45)
HGB BLD-MCNC: 9.1 G/DL — LOW (ref 11.5–15.5)
INR BLD: 1.48 RATIO — HIGH (ref 0.88–1.16)
KETONES UR-MCNC: NEGATIVE — SIGNIFICANT CHANGE UP
LACTATE SERPL-SCNC: 0.6 MMOL/L — LOW (ref 0.7–2)
LEUKOCYTE ESTERASE UR-ACNC: ABNORMAL
LYMPHOCYTES # BLD AUTO: 1.1 K/UL — SIGNIFICANT CHANGE UP (ref 1–3.3)
LYMPHOCYTES # BLD AUTO: 5.4 % — LOW (ref 13–44)
MCHC RBC-ENTMCNC: 29.8 PG — SIGNIFICANT CHANGE UP (ref 27–34)
MCHC RBC-ENTMCNC: 34.3 GM/DL — SIGNIFICANT CHANGE UP (ref 32–36)
MCV RBC AUTO: 86.8 FL — SIGNIFICANT CHANGE UP (ref 80–100)
MONOCYTES # BLD AUTO: 1.5 K/UL — HIGH (ref 0–0.9)
MONOCYTES NFR BLD AUTO: 7.4 % — SIGNIFICANT CHANGE UP (ref 1–9)
NEUTROPHILS # BLD AUTO: 17.3 K/UL — HIGH (ref 1.8–7.4)
NEUTROPHILS NFR BLD AUTO: 86.2 % — HIGH (ref 43–77)
NITRITE UR-MCNC: NEGATIVE — SIGNIFICANT CHANGE UP
NT-PROBNP SERPL-SCNC: 9221 PG/ML — HIGH (ref 0–450)
PH UR: 5 — SIGNIFICANT CHANGE UP (ref 5–8)
PLATELET # BLD AUTO: 266 K/UL — SIGNIFICANT CHANGE UP (ref 150–400)
POTASSIUM SERPL-MCNC: 4.6 MMOL/L — SIGNIFICANT CHANGE UP (ref 3.5–5.3)
POTASSIUM SERPL-SCNC: 4.6 MMOL/L — SIGNIFICANT CHANGE UP (ref 3.5–5.3)
PROT SERPL-MCNC: 6.1 G/DL — SIGNIFICANT CHANGE UP (ref 6–8.3)
PROT UR-MCNC: 25 MG/DL
PROTHROM AB SERPL-ACNC: 16.3 SEC — HIGH (ref 9.8–12.7)
RBC # BLD: 3.05 M/UL — LOW (ref 3.8–5.2)
RBC # FLD: 12.9 % — SIGNIFICANT CHANGE UP (ref 10.3–14.5)
RBC CASTS # UR COMP ASSIST: ABNORMAL /HPF (ref 0–4)
SODIUM SERPL-SCNC: 126 MMOL/L — LOW (ref 135–145)
SP GR SPEC: 1 — LOW (ref 1.01–1.02)
TROPONIN I SERPL-MCNC: 0.02 NG/ML — SIGNIFICANT CHANGE UP (ref 0.01–0.04)
UROBILINOGEN FLD QL: NEGATIVE — SIGNIFICANT CHANGE UP
WBC # BLD: 20.1 K/UL — HIGH (ref 3.8–10.5)
WBC # FLD AUTO: 20.1 K/UL — HIGH (ref 3.8–10.5)
WBC UR QL: ABNORMAL

## 2017-08-24 PROCEDURE — 99285 EMERGENCY DEPT VISIT HI MDM: CPT

## 2017-08-24 PROCEDURE — 71010: CPT | Mod: 26

## 2017-08-24 PROCEDURE — 93010 ELECTROCARDIOGRAM REPORT: CPT

## 2017-08-24 RX ORDER — HYDRALAZINE HCL 50 MG
50 TABLET ORAL THREE TIMES A DAY
Qty: 0 | Refills: 0 | Status: DISCONTINUED | OUTPATIENT
Start: 2017-08-24 | End: 2017-08-31

## 2017-08-24 RX ORDER — PIPERACILLIN AND TAZOBACTAM 4; .5 G/20ML; G/20ML
3.38 INJECTION, POWDER, LYOPHILIZED, FOR SOLUTION INTRAVENOUS ONCE
Qty: 0 | Refills: 0 | Status: COMPLETED | OUTPATIENT
Start: 2017-08-24 | End: 2017-08-24

## 2017-08-24 RX ORDER — VALSARTAN 80 MG/1
320 TABLET ORAL DAILY
Qty: 0 | Refills: 0 | Status: DISCONTINUED | OUTPATIENT
Start: 2017-08-24 | End: 2017-08-31

## 2017-08-24 RX ORDER — DOXAZOSIN MESYLATE 4 MG
4 TABLET ORAL AT BEDTIME
Qty: 0 | Refills: 0 | Status: DISCONTINUED | OUTPATIENT
Start: 2017-08-24 | End: 2017-09-02

## 2017-08-24 RX ORDER — CARVEDILOL PHOSPHATE 80 MG/1
12.5 CAPSULE, EXTENDED RELEASE ORAL EVERY 12 HOURS
Qty: 0 | Refills: 0 | Status: DISCONTINUED | OUTPATIENT
Start: 2017-08-24 | End: 2017-09-02

## 2017-08-24 RX ORDER — CINACALCET 30 MG/1
30 TABLET, FILM COATED ORAL ONCE
Qty: 0 | Refills: 0 | Status: COMPLETED | OUTPATIENT
Start: 2017-08-24 | End: 2017-08-24

## 2017-08-24 RX ORDER — DOXAZOSIN MESYLATE 4 MG
4 TABLET ORAL AT BEDTIME
Qty: 0 | Refills: 0 | Status: DISCONTINUED | OUTPATIENT
Start: 2017-08-24 | End: 2017-08-25

## 2017-08-24 RX ORDER — HYDRALAZINE HCL 50 MG
50 TABLET ORAL ONCE
Qty: 0 | Refills: 0 | Status: COMPLETED | OUTPATIENT
Start: 2017-08-24 | End: 2017-08-24

## 2017-08-24 RX ORDER — SODIUM CHLORIDE 9 MG/ML
3 INJECTION INTRAMUSCULAR; INTRAVENOUS; SUBCUTANEOUS ONCE
Qty: 0 | Refills: 0 | Status: COMPLETED | OUTPATIENT
Start: 2017-08-24 | End: 2017-08-24

## 2017-08-24 RX ORDER — HEPARIN SODIUM 5000 [USP'U]/ML
5000 INJECTION INTRAVENOUS; SUBCUTANEOUS EVERY 12 HOURS
Qty: 0 | Refills: 0 | Status: DISCONTINUED | OUTPATIENT
Start: 2017-08-24 | End: 2017-09-02

## 2017-08-24 RX ORDER — CINACALCET 30 MG/1
30 TABLET, FILM COATED ORAL DAILY
Qty: 0 | Refills: 0 | Status: DISCONTINUED | OUTPATIENT
Start: 2017-08-24 | End: 2017-09-02

## 2017-08-24 RX ORDER — VANCOMYCIN HCL 1 G
1000 VIAL (EA) INTRAVENOUS ONCE
Qty: 0 | Refills: 0 | Status: COMPLETED | OUTPATIENT
Start: 2017-08-24 | End: 2017-08-24

## 2017-08-24 RX ORDER — FUROSEMIDE 40 MG
40 TABLET ORAL DAILY
Qty: 0 | Refills: 0 | Status: DISCONTINUED | OUTPATIENT
Start: 2017-08-24 | End: 2017-09-02

## 2017-08-24 RX ADMIN — SODIUM CHLORIDE 3 MILLILITER(S): 9 INJECTION INTRAMUSCULAR; INTRAVENOUS; SUBCUTANEOUS at 20:43

## 2017-08-24 RX ADMIN — Medication 250 MILLIGRAM(S): at 21:25

## 2017-08-24 RX ADMIN — PIPERACILLIN AND TAZOBACTAM 200 GRAM(S): 4; .5 INJECTION, POWDER, LYOPHILIZED, FOR SOLUTION INTRAVENOUS at 20:42

## 2017-08-24 NOTE — H&P ADULT - PROBLEM SELECTOR PLAN 3
Pt. admitted with Na 126, likely secondary to dehydration and lasix  - cont. to trend BMP daily  - monitor BUN/ Cr (elevated since prior admissions, likely secondary to dehydration)  - hold fluids in setting of CHF, cont. lasix but monitor BMP

## 2017-08-24 NOTE — H&P ADULT - NSHPSOCIALHISTORY_GEN_ALL_CORE
non-smoker  no drinking  nursing home  walks with walker  Eula (daughter) health care proxy, bedside

## 2017-08-24 NOTE — H&P ADULT - ATTENDING COMMENTS
above appreciated pt with chf possible underlying pna also .  pt is currently on abx and and lasix  keep in negative balance  card consult pending  puilm consult pending

## 2017-08-24 NOTE — ED PROVIDER NOTE - ENMT, MLM
Airway patent, Nasal mucosa clear. Mouth with normal mucosa. Throat has no vesicles, no oropharyngeal exudates and uvula is midline. MM Moist. Non-toxic, appearing.

## 2017-08-24 NOTE — H&P ADULT - PROBLEM SELECTOR PLAN 2
new onset cough (?productive), mild sob. elevated WBC (no lactate)  - prelim cxr shows b/l opacities, likely superimposed pna  - vanc and zosyn for HCAP  - pulm consult AM (Aruna)  - admit to tele, monitor labs and vitals per routine

## 2017-08-24 NOTE — ED PROVIDER NOTE - CARE PLAN
Principal Discharge DX:	Pneumonia due to infectious organism, unspecified laterality, unspecified part of lung  Secondary Diagnosis:	Leukocytosis, unspecified type  Secondary Diagnosis:	Hyponatremia

## 2017-08-24 NOTE — ED PROVIDER NOTE - OBJECTIVE STATEMENT
89 yo F p/w cough, congestion x past ~ 2 days. Mild assoc dyspnea. No chest pain. no abd pian. No n/v/d. no neck./ back pain. No numb/ting/focal weak. no recent trauma. No abd pain. Pt currently resident at [facility]. Pt had outpt xr showing CONCEPCION pna and WBC 20.5. Na was also 126. Pt sent for admission. Pt was on levaquin , started yest. No other co.

## 2017-08-24 NOTE — H&P ADULT - HISTORY OF PRESENT ILLNESS
89 yo F pmhx diastolic chf (EF 65%, echo July 2017), htn, anemia, p/w cough (?productive), congestion x past ~ 2 days, mild SOB. Daughter visited patient on Sunday and pt. was asymptomatic. Monday night pt. starting coughing. Hx from daughter as patient was sleeping. No chest pain, no abd pain, sob. No n/v/d. no neck./ back pain. No numb/ting/focal weak. no recent trauma. No abd pain. Daughter noticed decrease PO intake. Pt currently resident at [facility]. Pt had outpt cxr showing CONCEPCION pna and WBC 20.5. Na was also 126. Pt sent for admission. Pt was on levaquin, started yest at nursing home. No other complaints. Of note, daughter states pt. usually alert and oriented and speaks on the phone daily.     In the ED, VSS T 97.3, HR 63, /75, RR 18, SpO2 97% RA. Pt. had WBC of 20.1, Hemoglobin of 9.1 (was 8.4 this AM at nursing home, currently being trended by Dr. Tapia, [facility]), Na 126, BUN/ Cr (elevated at 39/1.3, Cr from previous admissions 0.79), neg. lactate, neg trop, elevated proBNP (9221). EKG sinus rhythm, borderline LVH, HR 63. CXR unofficial read shows CHF, haziness and b/l congestion likely signifies superimposed PNA, rt. pleural effusion.

## 2017-08-24 NOTE — H&P ADULT - PROBLEM SELECTOR PLAN 1
Pt. had developed recent cough and mild SOB. Recent echo shows EF 65%.   - prelim CXR read shows rt. pleural effusion, hazy and b/l opacities, congestion.  - proBNP elevated (9221)  - follow up official CXR read  - hold fluids in setting of CHF  - change home dose of lasix to 40 IV daily  - cardio consult to see pt. in AM (Shital), pulm consult AM (Aruna)  - admit to tele, monitor labs and vitals per routine

## 2017-08-24 NOTE — H&P ADULT - PROBLEM SELECTOR PLAN 6
regular diet, change once sodium corrects  ambulate with assistance  out of bed with assistance  fall precautions  lovenox dvt prophy regular diet, change once sodium corrects  ambulate with assistance  out of bed with assistance  fall precautions  aspiration precautions  heparin dvt prophy

## 2017-08-24 NOTE — H&P ADULT - ASSESSMENT
89 yo F pmhx diastolic chf (EF 65%, echo July 2017), htn, anemia, p/w cough, sob, congestion x past ~ 2 days. Pt. admitted to tele for CHF exacerbation, superimposed pna.

## 2017-08-25 ENCOUNTER — TRANSCRIPTION ENCOUNTER (OUTPATIENT)
Age: 82
End: 2017-08-25

## 2017-08-25 DIAGNOSIS — R54 AGE-RELATED PHYSICAL DEBILITY: ICD-10-CM

## 2017-08-25 DIAGNOSIS — J98.4 OTHER DISORDERS OF LUNG: ICD-10-CM

## 2017-08-25 DIAGNOSIS — J98.11 ATELECTASIS: ICD-10-CM

## 2017-08-25 LAB
ALBUMIN SERPL ELPH-MCNC: 2.4 G/DL — LOW (ref 3.3–5)
ALP SERPL-CCNC: 79 U/L — SIGNIFICANT CHANGE UP (ref 40–120)
ALT FLD-CCNC: 52 U/L — SIGNIFICANT CHANGE UP (ref 12–78)
ANION GAP SERPL CALC-SCNC: 8 MMOL/L — SIGNIFICANT CHANGE UP (ref 5–17)
AST SERPL-CCNC: 29 U/L — SIGNIFICANT CHANGE UP (ref 15–37)
BASOPHILS # BLD AUTO: 0.1 K/UL — SIGNIFICANT CHANGE UP (ref 0–0.2)
BASOPHILS NFR BLD AUTO: 0.6 % — SIGNIFICANT CHANGE UP (ref 0–2)
BILIRUB SERPL-MCNC: 0.3 MG/DL — SIGNIFICANT CHANGE UP (ref 0.2–1.2)
BUN SERPL-MCNC: 36 MG/DL — HIGH (ref 7–23)
CALCIUM SERPL-MCNC: 8.8 MG/DL — SIGNIFICANT CHANGE UP (ref 8.5–10.1)
CHLORIDE SERPL-SCNC: 95 MMOL/L — LOW (ref 96–108)
CO2 SERPL-SCNC: 25 MMOL/L — SIGNIFICANT CHANGE UP (ref 22–31)
CREAT SERPL-MCNC: 1 MG/DL — SIGNIFICANT CHANGE UP (ref 0.5–1.3)
CRP SERPL-MCNC: 11.3 MG/DL — HIGH (ref 0–0.4)
EOSINOPHIL # BLD AUTO: 0.2 K/UL — SIGNIFICANT CHANGE UP (ref 0–0.5)
EOSINOPHIL NFR BLD AUTO: 1.3 % — SIGNIFICANT CHANGE UP (ref 0–6)
GLUCOSE SERPL-MCNC: 104 MG/DL — HIGH (ref 70–99)
HCT VFR BLD CALC: 26.2 % — LOW (ref 34.5–45)
HGB BLD-MCNC: 8.8 G/DL — LOW (ref 11.5–15.5)
LYMPHOCYTES # BLD AUTO: 1.2 K/UL — SIGNIFICANT CHANGE UP (ref 1–3.3)
LYMPHOCYTES # BLD AUTO: 7.3 % — LOW (ref 13–44)
MCHC RBC-ENTMCNC: 29.2 PG — SIGNIFICANT CHANGE UP (ref 27–34)
MCHC RBC-ENTMCNC: 33.5 GM/DL — SIGNIFICANT CHANGE UP (ref 32–36)
MCV RBC AUTO: 87 FL — SIGNIFICANT CHANGE UP (ref 80–100)
MONOCYTES # BLD AUTO: 1.2 K/UL — HIGH (ref 0–0.9)
MONOCYTES NFR BLD AUTO: 7 % — SIGNIFICANT CHANGE UP (ref 1–9)
NEUTROPHILS # BLD AUTO: 13.8 K/UL — HIGH (ref 1.8–7.4)
NEUTROPHILS NFR BLD AUTO: 83.7 % — HIGH (ref 43–77)
PLATELET # BLD AUTO: 271 K/UL — SIGNIFICANT CHANGE UP (ref 150–400)
POTASSIUM SERPL-MCNC: 4.1 MMOL/L — SIGNIFICANT CHANGE UP (ref 3.5–5.3)
POTASSIUM SERPL-SCNC: 4.1 MMOL/L — SIGNIFICANT CHANGE UP (ref 3.5–5.3)
PROT SERPL-MCNC: 5.9 G/DL — LOW (ref 6–8.3)
RBC # BLD: 3.02 M/UL — LOW (ref 3.8–5.2)
RBC # FLD: 12.8 % — SIGNIFICANT CHANGE UP (ref 10.3–14.5)
SODIUM SERPL-SCNC: 128 MMOL/L — LOW (ref 135–145)
WBC # BLD: 16.4 K/UL — HIGH (ref 3.8–10.5)
WBC # FLD AUTO: 16.4 K/UL — HIGH (ref 3.8–10.5)

## 2017-08-25 PROCEDURE — 99223 1ST HOSP IP/OBS HIGH 75: CPT

## 2017-08-25 PROCEDURE — 93010 ELECTROCARDIOGRAM REPORT: CPT

## 2017-08-25 PROCEDURE — 71250 CT THORAX DX C-: CPT | Mod: 26

## 2017-08-25 RX ORDER — ALBUTEROL 90 UG/1
2.5 AEROSOL, METERED ORAL EVERY 8 HOURS
Qty: 0 | Refills: 0 | Status: DISCONTINUED | OUTPATIENT
Start: 2017-08-25 | End: 2017-08-31

## 2017-08-25 RX ORDER — ACETAMINOPHEN 500 MG
650 TABLET ORAL ONCE
Qty: 0 | Refills: 0 | Status: COMPLETED | OUTPATIENT
Start: 2017-08-25 | End: 2017-08-25

## 2017-08-25 RX ORDER — LABETALOL HCL 100 MG
20 TABLET ORAL ONCE
Qty: 0 | Refills: 0 | Status: DISCONTINUED | OUTPATIENT
Start: 2017-08-25 | End: 2017-08-25

## 2017-08-25 RX ORDER — PIPERACILLIN AND TAZOBACTAM 4; .5 G/20ML; G/20ML
3.38 INJECTION, POWDER, LYOPHILIZED, FOR SOLUTION INTRAVENOUS EVERY 8 HOURS
Qty: 0 | Refills: 0 | Status: DISCONTINUED | OUTPATIENT
Start: 2017-08-25 | End: 2017-09-01

## 2017-08-25 RX ADMIN — CINACALCET 30 MILLIGRAM(S): 30 TABLET, FILM COATED ORAL at 20:11

## 2017-08-25 RX ADMIN — PIPERACILLIN AND TAZOBACTAM 25 GRAM(S): 4; .5 INJECTION, POWDER, LYOPHILIZED, FOR SOLUTION INTRAVENOUS at 14:19

## 2017-08-25 RX ADMIN — Medication 650 MILLIGRAM(S): at 15:23

## 2017-08-25 RX ADMIN — Medication 40 MILLIGRAM(S): at 05:39

## 2017-08-25 RX ADMIN — CARVEDILOL PHOSPHATE 12.5 MILLIGRAM(S): 80 CAPSULE, EXTENDED RELEASE ORAL at 18:08

## 2017-08-25 RX ADMIN — Medication 50 MILLIGRAM(S): at 21:36

## 2017-08-25 RX ADMIN — CARVEDILOL PHOSPHATE 12.5 MILLIGRAM(S): 80 CAPSULE, EXTENDED RELEASE ORAL at 05:39

## 2017-08-25 RX ADMIN — Medication 50 MILLIGRAM(S): at 00:18

## 2017-08-25 RX ADMIN — HEPARIN SODIUM 5000 UNIT(S): 5000 INJECTION INTRAVENOUS; SUBCUTANEOUS at 18:08

## 2017-08-25 RX ADMIN — HEPARIN SODIUM 5000 UNIT(S): 5000 INJECTION INTRAVENOUS; SUBCUTANEOUS at 05:39

## 2017-08-25 RX ADMIN — Medication 50 MILLIGRAM(S): at 05:39

## 2017-08-25 RX ADMIN — Medication 650 MILLIGRAM(S): at 03:30

## 2017-08-25 RX ADMIN — Medication 650 MILLIGRAM(S): at 16:23

## 2017-08-25 RX ADMIN — VALSARTAN 320 MILLIGRAM(S): 80 TABLET ORAL at 05:39

## 2017-08-25 RX ADMIN — PIPERACILLIN AND TAZOBACTAM 25 GRAM(S): 4; .5 INJECTION, POWDER, LYOPHILIZED, FOR SOLUTION INTRAVENOUS at 21:37

## 2017-08-25 RX ADMIN — Medication 50 MILLIGRAM(S): at 14:19

## 2017-08-25 RX ADMIN — Medication 4 MILLIGRAM(S): at 21:36

## 2017-08-25 RX ADMIN — Medication 650 MILLIGRAM(S): at 02:54

## 2017-08-25 RX ADMIN — CINACALCET 30 MILLIGRAM(S): 30 TABLET, FILM COATED ORAL at 02:45

## 2017-08-25 NOTE — DISCHARGE NOTE ADULT - MEDICATION SUMMARY - MEDICATIONS TO CHANGE
I will SWITCH the dose or number of times a day I take the medications listed below when I get home from the hospital:  None I will SWITCH the dose or number of times a day I take the medications listed below when I get home from the hospital:    furosemide 40 mg oral tablet  -- 1 tab(s) by mouth once a day

## 2017-08-25 NOTE — GOALS OF CARE CONVERSATION - PERSONAL ADVANCE DIRECTIVE - CONVERSATION DETAILS
met pt w daughter, Eula, verified hcp & living will, further long discussion of pt directives/ molst form, each area of form, reviewed cpr process & risks of broken ribs,puncture lung, need for intubation tube during cpr process, reason for each process. questions answered. pt who is A&O x3,declines CPR, wants a natural death. pt does not want tube in her windpipe to breath, wants oxygen & meds for distress, no feeding tube, wants to be treated for infections & flds. Daughter supports pt decisions, dnr dni no TF, limited medical, wants rehospitalization, IV flds, antibiotics.(as stated in pt living will to support decision)  contact # & booklet: hard choices for loving people given to daughter. support given to pt. PC will follow.

## 2017-08-25 NOTE — DISCHARGE NOTE ADULT - CARE PROVIDERS DIRECT ADDRESSES
[email or web address]

## 2017-08-25 NOTE — DISCHARGE NOTE ADULT - NS AS ACTIVITY OBS
Bathing allowed/Showering allowed/Walking-Indoors allowed/No Heavy lifting/straining No Heavy lifting/straining/Do not drive or operate machinery/Bathing allowed/Walking-Indoors allowed/w assist/Do not make important decisions

## 2017-08-25 NOTE — GOALS OF CARE CONVERSATION - PERSONAL ADVANCE DIRECTIVE - NS PRO AD PATIENT TYPE ON CHART
Medical Orders for Life-Sustaining Treatment (MOLST)/Health Care Proxy (HCP)/Do Not Resuscitate (DNR)/Living Will

## 2017-08-25 NOTE — GOALS OF CARE CONVERSATION - PERSONAL ADVANCE DIRECTIVE - TREATMENT GUIDELINE COMMENT
continue present medical treatment plan, return to snf when medically stable. pt limited medical & will return to hospital as needed.

## 2017-08-25 NOTE — CHART NOTE - NSCHARTNOTEFT_GEN_A_CORE
Called by RN, Pt c/o right sided 6/10 throbbing headache that began an hour ago.  Pt denies fever or chills, diplopia, numbness or tingling, chest pain, SOB or palpitations.  Pt has no neurologic deficits.  Manual BP was repeated and showed a 180/70 BP.      REVIEW OF SYSTEMS:  CONSTITUTIONAL: No fever, No chills,No fatigue,No myalgia, R sided headache 6/10  EYES: No eye pain, visual disturbances, or discharge  NECK: No pain, No stiffness  RESPIRATORY: No cough, wheezing, No  hemoptysis; No shortness of breath  CARDIOVASCULAR: No chest pain, palpitations, leg swelling  GASTROINTESTINAL: No abdominal or epigastric pain. No nausea, No vomiting; No diarrhea or constipation. [ ] BM  NEUROLOGICAL: alert and oriented x 3,  No headaches, No dizziness, No numbness,  ROS  [ ] Unable to obtain   REST OF REVIEW Of SYSTEM - [ ] Normal     Height (cm): 162.56 (08-24 @ 19:39)  Weight (kg): 65 (08-24 @ 19:39)  BMI (kg/m2): 24.6 (08-24 @ 19:39)  BSA (m2): 1.7 (08-24 @ 19:39)  Vital Signs Last 24 Hrs  T(C): 36.3 (25 Aug 2017 14:35), Max: 36.5 (25 Aug 2017 07:21)  T(F): 97.4 (25 Aug 2017 14:35), Max: 97.7 (25 Aug 2017 07:21)  HR: 78 (25 Aug 2017 14:35) (59 - 80)  BP: 180/70 (25 Aug 2017 14:35) (134/75 - 183/68)  BP(mean): --  RR: 18 (25 Aug 2017 14:35) (17 - 22)  SpO2: 94% (25 Aug 2017 14:35) (91% - 97%)  [ ] room air   [ ] 02    PHYSICAL EXAM:  GENERAL:  No acute distresss, well appearing, [ ] Agitated, [ ] Lethargy, [ ] confused   HEAD:  normal  ENMT: normal  NECK:  normal    NERVOUS SYSTEM:  Alert & Oriented X3, no focal deficits  CHEST/LUNG: Clear to auscultation bilaterally,  [ ] decreased breath sounds at bases  [ ] wheezing   [ ] rhonchi  [ ] crackles  HEART:  Regular rate and rhythm, No murmurs, rubs, or gallops,  [ ] irregular   ABDOMEN:  soft, nontender, nondistended, positive bowel sounds   [ ] obese    A/P  Headache  -Likely secondary to hypertensive urgency, no neurologic deficits  -Tylenol 650mg PRN for pain  -Labetalol 20mg IVP   -Will repeat manual BP in one hour

## 2017-08-25 NOTE — CONSULT NOTE ADULT - PROBLEM SELECTOR RECOMMENDATION 5
supportive care  asst with ADL  fall prec  oral care  skin care  pall care eval for GOC discussion  prognosis guarded to poor  will follow

## 2017-08-25 NOTE — DISCHARGE NOTE ADULT - INSTRUCTIONS
low salt diet low salt diet 1 L fluid restrict low salt diet 1 L fluid restrict  get raised toilet seat for patient n/a low salt diet 1 L fluid restrict  get raised toilet seat for patient  bmp cbc twice a week

## 2017-08-25 NOTE — DISCHARGE NOTE ADULT - CARE PROVIDER_API CALL
Weil, Peter A (MD), Critical Care Medicine; Internal Medicine; Pulmonary Disease  [address]  [address]  [place] [address]  Phone: [phone]  Fax: [phone]    Frankie Rowell), Cardiovascular Disease  [address] [place] [address]  Phone: [phone]  Fax: [phone]    Cali Temple), Hematology; Internal Medicine; Medical Oncology  [address]  [address]  [place] [address]  Phone: [phone]  Fax: [phone]

## 2017-08-25 NOTE — DISCHARGE NOTE ADULT - MEDICATION SUMMARY - MEDICATIONS TO TAKE
I will START or STAY ON the medications listed below when I get home from the hospital:    valsartan 320 mg oral tablet  -- 1 tab(s) by mouth once a day  -- Indication: For Prophylactic measure    doxazosin 4 mg oral tablet  -- 1 tab(s) by mouth once a day (at bedtime)  -- Indication: For CHF exacerbation    carvedilol 12.5 mg oral tablet  -- 1 tab(s) by mouth every 12 hours  -- Indication: For CHF exacerbation    Sensipar 30 mg oral tablet  -- 1 tab(s) by mouth once a day  -- Indication: For Prophylactic measure    lidocaine 5% topical film  -- Apply on skin to affected area once a day  -- Indication: For Prophylactic measure    furosemide 40 mg oral tablet  -- 1 tab(s) by mouth once a day  -- Indication: For Acute on chronic diastolic congestive heart failure    senna oral tablet  -- 2 tab(s) by mouth once a day (at bedtime)  -- Indication: For Prophylactic measure    docusate sodium 100 mg oral capsule  -- 1 cap(s) by mouth 2 times a day  -- Indication: For Prophylactic measure    polyethylene glycol 3350 oral powder for reconstitution  -- 17 gram(s) by mouth 2 times a day  -- Indication: For Prophylactic measure    hydrALAZINE 25 mg oral tablet  -- 1 tab(s) by mouth every 8 hours  -- Indication: For Hypertension I will START or STAY ON the medications listed below when I get home from the hospital:    valsartan 320 mg oral tablet  -- 1 tab(s) by mouth once a day  -- Indication: For Htn    doxazosin 4 mg oral tablet  -- 1 tab(s) by mouth once a day (at bedtime)  -- Indication: For Htn    heparin  -- 5000 unit(s) subcutaneous 2 times a day  -- Indication: For Prophylactic measure    carvedilol 12.5 mg oral tablet  -- 1 tab(s) by mouth every 12 hours  -- Indication: For Htn    albuterol 2.5 mg/3 mL (0.083%) inhalation solution  -- 1 unit(s) inhaled 3 times a day, As Needed  -- Indication: For Pneumonia    Sensipar 30 mg oral tablet  -- 1 tab(s) by mouth once a day  -- Indication: For Hypercalcemia    lidocaine 5% topical film  -- Apply on skin to affected area once a day  -- Indication: For Pain    furosemide 40 mg oral tablet  -- 1 tab(s) by mouth once a day  -- Indication: For Acute on chronic diastolic congestive heart failure    senna oral tablet  -- 2 tab(s) by mouth once a day (at bedtime)  -- Indication: For Prophylactic measure    docusate sodium 100 mg oral capsule  -- 1 cap(s) by mouth 2 times a day  -- Indication: For Prophylactic measure    polyethylene glycol 3350 oral powder for reconstitution  -- 17 gram(s) by mouth 2 times a day  -- Indication: For Prophylactic measure    hydrALAZINE 50 mg oral tablet  -- 1 tab(s) by mouth every 8 hours  hold sbp less than 120   -- Indication: For Htn I will START or STAY ON the medications listed below when I get home from the hospital:    acetaminophen 325 mg oral tablet  -- 2 tab(s) by mouth every 6 hours, As Needed  -- Indication: For Pain    doxazosin 4 mg oral tablet  -- 1 tab(s) by mouth once a day (at bedtime)  -- Indication: For Htn    heparin  -- 5000 unit(s) subcutaneous 2 times a day  -- Indication: For Prophylactic measure    carvedilol 12.5 mg oral tablet  -- 1 tab(s) by mouth every 12 hours    hold sbp less than 115 hr less than 55  -- Indication: For CHF exacerbation    albuterol 2.5 mg/3 mL (0.083%) inhalation solution  -- 1 unit(s) inhaled 3 times a day, As Needed  -- Indication: For Pneumonia    Sensipar 30 mg oral tablet  -- 1 tab(s) by mouth once a day  -- Indication: For Hypercalcemia    furosemide 20 mg oral tablet  -- 3 tab(s) by mouth once a day  -- Indication: For CHF exacerbation    senna oral tablet  -- 2 tab(s) by mouth once a day (at bedtime)  -- Indication: For Prophylactic measure    docusate sodium 100 mg oral capsule  -- 1 cap(s) by mouth 2 times a day  -- Indication: For Prophylactic measure    polyethylene glycol 3350 oral powder for reconstitution  -- 17 gram(s) by mouth 2 times a day  -- Indication: For Prophylactic measure    potassium chloride 10 mEq oral capsule, extended release  -- 1 cap(s) by mouth once a day  -- Indication: For CHF exacerbation    hydrALAZINE 50 mg oral tablet  -- 1 tab(s) by mouth every 8 hours  hold sbp less than 120   -- Indication: For Htn

## 2017-08-25 NOTE — DISCHARGE NOTE ADULT - MEDICATION SUMMARY - MEDICATIONS TO STOP TAKING
I will STOP taking the medications listed below when I get home from the hospital:    acetaminophen 325 mg oral tablet  -- 2 tab(s) by mouth every 6 hours, As needed, Mild Pain (1 - 3)    enoxaparin  -- 30 milligram(s) subcutaneously once a day    hydrALAZINE 50 mg oral tablet  -- 1 tab(s) by mouth 3 times a day I will STOP taking the medications listed below when I get home from the hospital:  None I will STOP taking the medications listed below when I get home from the hospital:    valsartan 320 mg oral tablet  -- 1 tab(s) by mouth once a day    enoxaparin  -- 30 milligram(s) subcutaneously once a day    lidocaine 5% topical film  -- Apply on skin to affected area once a day

## 2017-08-25 NOTE — DISCHARGE NOTE ADULT - NS AS DC HF EDUCATION INSTRUCTIONS
Activities as tolerated/Monitor Weight Daily/Low salt diet/Report weight gain of 2 or more pounds in one day or 3 or more pounds in one week, worsening shortness of breath, fatigue, weakness, increased swelling of hands and feet to primary care provider/Call Primary Care Provider for follow-up after discharge

## 2017-08-25 NOTE — PROGRESS NOTE ADULT - PROBLEM SELECTOR PLAN 2
new onset cough (?productive), mild sob. elevated WBC (no lactate)  - CXR reads B/l interstitial opacities, likely superimposed pna  - Continue Zosyn for HCAP  - Pulm - Dr. Valdovinos - agrees with plan and will add Vanco based on cx  - admit to tele, monitor labs and vitals per routine

## 2017-08-25 NOTE — DISCHARGE NOTE ADULT - CARE PLAN
Principal Discharge DX:	Acute on chronic diastolic congestive heart failure  Goal:	continue improvement  Instructions for follow-up, activity and diet:	c/w carvedilol 12.5 mg every 12 hours  Lasix 40 mg oral daily  follow up with outpatient cardiologist, Dr. Isaacs's group  Secondary Diagnosis:	Pneumonia due to infectious organism, unspecified laterality, unspecified part of lung  Instructions for follow-up, activity and diet:	completed 7 day course Zosyn on 8/31  follow up with PCP Principal Discharge DX:	Acute on chronic diastolic congestive heart failure  Goal:	continue improvement  Instructions for follow-up, activity and diet:	c/w carvedilol 12.5 mg every 12 hours  Lasix 40 mg oral daily  follow up with outpatient cardiologist, Dr. Isaacs's group  Secondary Diagnosis:	Pneumonia due to infectious organism, unspecified laterality, unspecified part of lung  Instructions for follow-up, activity and diet:	completed 7 day course Zosyn on 8/31  follow up with Dr. Weil when discharged from [facility]  monitor O2 sat at rehab facility  Secondary Diagnosis:	Anemia, unspecified type  Instructions for follow-up, activity and diet:	patient at baseline H/H   follow up with hematologist Dr. Temple  Secondary Diagnosis:	Hypertension  Instructions for follow-up, activity and diet:	c/w hydralazine 25 mg q 8  c/w coreg  follow up with PCP  Secondary Diagnosis:	Hypercalcemia  Instructions for follow-up, activity and diet:	c/w cinacalcet  follow up with Dr. Weil Principal Discharge DX:	Acute on chronic diastolic congestive heart failure  Goal:	continue improvement  Instructions for follow-up, activity and diet:	c/w carvedilol 12.5 mg every 12 hours  Lasix 40 mg oral daily  follow up with outpatient cardiologist, Dr. Isaacs's group  Secondary Diagnosis:	Pneumonia due to infectious organism, unspecified laterality, unspecified part of lung  Instructions for follow-up, activity and diet:	completed 7 day course Zosyn on 8/31  follow up with Dr. Weil when discharged from [facility]  monitor O2 sat at rehab facility  Secondary Diagnosis:	Anemia, unspecified type  Instructions for follow-up, activity and diet:	patient at baseline H/H   follow up with hematologist Dr. Temple  Secondary Diagnosis:	Hypertension  Instructions for follow-up, activity and diet:	c/w hydralazine 25 mg q 8  c/w coreg  follow up with PCP  Secondary Diagnosis:	Hypercalcemia  Instructions for follow-up, activity and diet:	c/w cinacalcet  follow up with Dr. Weil Principal Discharge DX:	Acute on chronic diastolic congestive heart failure  Goal:	continue improvement  Instructions for follow-up, activity and diet:	c/w carvedilol 12.5 mg every 12 hours  Lasix 40 mg oral daily  follow up with outpatient cardiologist, Dr. Isaacs's group  Secondary Diagnosis:	Pneumonia due to infectious organism, unspecified laterality, unspecified part of lung  Instructions for follow-up, activity and diet:	completed 7 day course Zosyn on 8/31  follow up with Dr. Weil when discharged from [facility]  monitor O2 sat at rehab facility  Secondary Diagnosis:	Anemia, unspecified type  Instructions for follow-up, activity and diet:	patient at baseline H/H   follow up with hematologist Dr. Temple  Secondary Diagnosis:	Hypertension  Instructions for follow-up, activity and diet:	c/w hydralazine 25 mg q 8  c/w coreg  follow up with PCP  Secondary Diagnosis:	Hypercalcemia  Instructions for follow-up, activity and diet:	c/w cinacalcet  follow up with Dr. Weil Principal Discharge DX:	Acute on chronic diastolic congestive heart failure  Goal:	continue improvement  Instructions for follow-up, activity and diet:	c/w carvedilol 12.5 mg every 12 hours  Lasix 40 mg oral daily  follow up with outpatient cardiologist, Dr. Isaacs's group  Secondary Diagnosis:	Pneumonia due to infectious organism, unspecified laterality, unspecified part of lung  Instructions for follow-up, activity and diet:	completed 7 day course Zosyn on 8/31  follow up with Dr. Weil when discharged from [facility]  monitor O2 sat at rehab facility  Secondary Diagnosis:	Anemia, unspecified type  Instructions for follow-up, activity and diet:	patient at baseline H/H   follow up with hematologist Dr. Temple  Secondary Diagnosis:	Hypertension  Instructions for follow-up, activity and diet:	c/w hydralazine 25 mg q 8  c/w coreg  follow up with PCP  Secondary Diagnosis:	Hypercalcemia  Instructions for follow-up, activity and diet:	c/w cinacalcet  follow up with Dr. Weil Principal Discharge DX:	Acute on chronic diastolic congestive heart failure  Goal:	continue improvement  Instructions for follow-up, activity and diet:	diet and meds as above fluid restrict  follow up with outpatient cardiologist, Dr. Isaacs's group  Secondary Diagnosis:	Pneumonia due to infectious organism, unspecified laterality, unspecified part of lung  Instructions for follow-up, activity and diet:	completed 7 day course Zosyn on 8/31  follow up with Dr. Weil when discharged from [facility]  monitor O2 sat at rehab facility  Secondary Diagnosis:	Anemia, unspecified type  Instructions for follow-up, activity and diet:	patient at baseline H/H   follow up with hematologist Dr. Temple  Secondary Diagnosis:	Hypertension  Instructions for follow-up, activity and diet:	meds diet as above  Secondary Diagnosis:	Hypercalcemia  Instructions for follow-up, activity and diet:	c/w cinacalcet  follow up with Dr. Weil Principal Discharge DX:	Acute on chronic diastolic congestive heart failure  Goal:	continue improvement  Instructions for follow-up, activity and diet:	diet and meds as above fluid restrict  follow up with outpatient cardiologist, Dr. Isaacs's group  Secondary Diagnosis:	Pneumonia due to infectious organism, unspecified laterality, unspecified part of lung  Instructions for follow-up, activity and diet:	completed 7 day course Zosyn on 8/31  follow up with Dr. Weil when discharged from [facility]  monitor O2 sat at rehab facility  Secondary Diagnosis:	Anemia, unspecified type  Instructions for follow-up, activity and diet:	patient at baseline H/H   follow up with hematologist Dr. Temple  Secondary Diagnosis:	Hypertension  Instructions for follow-up, activity and diet:	meds diet as above  Secondary Diagnosis:	Hypercalcemia  Instructions for follow-up, activity and diet:	hyperparathyroidsm  c/w cinacalcet  follow up with Dr. Weil Principal Discharge DX:	Acute on chronic diastolic congestive heart failure  Goal:	continue improvement  Instructions for follow-up, activity and diet:	diet and meds as above fluid restrict, oxygen at night 2 liters  follow up with outpatient cardiologist, Dr. Isaacs's group  Secondary Diagnosis:	Pneumonia due to infectious organism, unspecified laterality, unspecified part of lung  Instructions for follow-up, activity and diet:	completed 7 day course Zosyn on 8/31  follow up with Dr. Weil when discharged from [facility]  monitor O2 sat at rehab facility  Secondary Diagnosis:	Anemia, unspecified type  Instructions for follow-up, activity and diet:	patient at baseline H/H   follow up with hematologist Dr. Temple  Secondary Diagnosis:	Hypertension  Instructions for follow-up, activity and diet:	meds diet as above  Secondary Diagnosis:	Hypercalcemia  Instructions for follow-up, activity and diet:	hyperparathyroidsm  c/w cinacalcet  follow up with Dr. Weil Principal Discharge DX:	Acute on chronic diastolic congestive heart failure  Goal:	continue improvement  Instructions for follow-up, activity and diet:	diet and meds as above fluid restrict, oxygen at night 2 liters  follow up with outpatient cardiologist, Dr. Isaacs's group  Secondary Diagnosis:	Pneumonia due to infectious organism, unspecified laterality, unspecified part of lung  Instructions for follow-up, activity and diet:	completed 7 day course Zosyn on 8/31  follow up with Dr. Weil when discharged from [facility]  monitor O2 sat at rehab facility  Secondary Diagnosis:	Anemia, unspecified type  Instructions for follow-up, activity and diet:	patient at baseline H/H   follow up with hematologist Dr. Temple  Secondary Diagnosis:	Hypertension  Instructions for follow-up, activity and diet:	meds diet as above  Secondary Diagnosis:	Hypercalcemia  Instructions for follow-up, activity and diet:	hyperparathyroidsm  c/w cinacalcet  follow up with Dr. Weil Principal Discharge DX:	Acute on chronic diastolic congestive heart failure  Goal:	continue improvement  Instructions for follow-up, activity and diet:	diet and meds as above fluid restrict, oxygen at night 2 liters  follow up with outpatient cardiologist, Dr. Isaacs's group  Secondary Diagnosis:	Pneumonia due to infectious organism, unspecified laterality, unspecified part of lung  Instructions for follow-up, activity and diet:	completed 7 day course Zosyn on 8/31  follow up with Dr. Weil when discharged from [facility]  monitor O2 sat at rehab facility  Secondary Diagnosis:	Anemia, unspecified type  Instructions for follow-up, activity and diet:	patient at baseline H/H   follow up with hematologist Dr. Temple  Secondary Diagnosis:	Hypertension  Instructions for follow-up, activity and diet:	meds diet as above  Secondary Diagnosis:	Hypercalcemia  Instructions for follow-up, activity and diet:	hyperparathyroidsm  c/w cinacalcet  follow up with Dr. Weil Principal Discharge DX:	Acute on chronic diastolic congestive heart failure  Goal:	continue improvement  Instructions for follow-up, activity and diet:	diet and meds as above fluid restrict, oxygen at night 2 liters  follow up with outpatient cardiologist, Dr. Isaacs's group  Secondary Diagnosis:	Pneumonia due to infectious organism, unspecified laterality, unspecified part of lung  Instructions for follow-up, activity and diet:	completed 7 day course Zosyn on 8/31  follow up with Dr. Weil when discharged from [facility]  monitor O2 sat at rehab facility  Secondary Diagnosis:	Anemia, unspecified type  Instructions for follow-up, activity and diet:	patient at baseline H/H   follow up with hematologist Dr. Temple  Secondary Diagnosis:	Hypertension  Instructions for follow-up, activity and diet:	meds diet as above  Secondary Diagnosis:	Hypercalcemia  Instructions for follow-up, activity and diet:	hyperparathyroidsm  c/w cinacalcet  follow up with Dr. Weil

## 2017-08-25 NOTE — CONSULT NOTE ADULT - PROBLEM SELECTOR RECOMMENDATION 2
lasix  old echo reviewed  known to [facility]  I and O  tele monitoring  pall care eval for MOLST and Goals of Care  am labs pending

## 2017-08-25 NOTE — DISCHARGE NOTE ADULT - PLAN OF CARE
continue improvement c/w carvedilol 12.5 mg every 12 hours  Lasix 40 mg oral daily  follow up with outpatient cardiologist, Dr. Isaacs's group completed 7 day course Zosyn on 8/31  follow up with PCP patient at baseline H/H   follow up with hematologist Dr. Temple c/w cinacalcet  follow up with Dr. Weil c/w hydralazine 25 mg q 8  c/w coreg  follow up with PCP completed 7 day course Zosyn on 8/31  follow up with Dr. Weil when discharged from [facility]  monitor O2 sat at rehab facility diet and meds as above fluid restrict  follow up with outpatient cardiologist, Dr. Isaacs's group meds diet as above hyperparathyroidsm  c/w cinacalcet  follow up with Dr. Weil diet and meds as above fluid restrict, oxygen at night 2 liters  follow up with outpatient cardiologist, Dr. Isaacs's group

## 2017-08-25 NOTE — DISCHARGE NOTE ADULT - HOSPITAL COURSE
89 yo F pmhx diastolic chf (EF 65%, echo July 2017), htn, anemia, p/w to [facility] ED for cough (?productive), congestion for 2 days, mild SOB. Daughter visited patient the Sunday before admission day and pt. was asymptomatic. On Monday night pt. start coughing. Hx from daughter as patient was sleeping. No chest pain, no abd pain, sob. No n/v/d. no neck./ back pain. No numb/ting/focal weak. no recent trauma. No abd pain. Daughter noticed decrease PO intake. Pt currently resident at [facility]. Pt had outpt cxr showing CONCEPCION pna and WBC 20.5. Na was also 126. Pt sent for admission. Pt was on levaquin, started yest at nursing home. No other complaints. Of note, daughter states pt. usually alert and oriented and speaks on the phone daily. In the ED, VSS T 97.3, HR 63, /75, RR 18, SpO2 97% RA. Pt. had WBC of 20.1, Hemoglobin of 9.1 (was 8.4 this AM at nursing home, currently being trended by Dr. Tapia, [facility]), Na 126, BUN/ Cr (elevated at 39/1.3, Cr from previous admissions 0.79), neg. lactate, neg trop, elevated proBNP (9221). EKG sinus rhythm, borderline LVH, HR 63. CXR unofficial read shows CHF, haziness and b/l congestion likely signifies superimposed PNA, rt. pleural effusion. 89 yo F pmhx diastolic chf (EF 65%, echo July 2017), htn, anemia, p/w to \[facility]\"" ED for cough (?productive), congestion for 2 days, mild SOB. Daughter visited patient the Sunday before admission day and pt. was asymptomatic. On Monday night pt. start coughing. Hx from daughter as patient was sleeping. No chest pain, no abd pain, sob. No n/v/d. no neck./ back pain. No numb/ting/focal weak. no recent trauma. No abd pain. Daughter noticed decrease PO intake. Pt currently resident at [facility]. Pt had outpt cxr showing CONCEPCION pna and WBC 20.5. Na was also 126. Pt sent for admission. Pt was on levaquin, started yest at nursing home. No other complaints. Of note, daughter states pt. usually alert and oriented and speaks on the phone daily. In the ED, VSS T 97.3, HR 63, /75, RR 18, SpO2 97% RA. Pt. had WBC of 20.1, Hemoglobin of 9.1 (was 8.4 this AM at nursing home, currently being trended by Dr. Tapia, [facility]), Na 126, BUN/ Cr (elevated at 39/1.3, Cr from previous admissions 0.79), neg. lactate, neg trop, elevated proBNP (9221). EKG sinus rhythm, borderline LVH, HR 63. CXR unofficial read shows CHF, haziness and b/l congestion likely signifies superimposed PNA, rt. pleural effusion. Pt was later admitted to \[facility]\"" tele for 89 yo F pmhx diastolic chf (EF 65%, echo July 2017), htn, anemia, p/w to [facility] ED for cough (?productive), congestion for 2 days, mild SOB. Daughter visited patient the Sunday before admission day and pt. was asymptomatic. On Monday night pt. start coughing. Hx from daughter as patient was sleeping. No chest pain, no abd pain, sob. No n/v/d. no neck./ back pain. No numb/ting/focal weak. no recent trauma. No abd pain. Daughter noticed decrease PO intake. Pt currently resident at [facility]. Pt had outpt cxr showing CONCEPCION pna and WBC 20.5. Na was also 126. Pt sent for admission. Pt was on levaquin, started yest at nursing home. No other complaints. Of note, daughter states pt. usually alert and oriented and speaks on the phone daily. In the ED, VSS T 97.3, HR 63, /75, RR 18, SpO2 97% RA. Pt. had WBC of 20.1, Hemoglobin of 9.1 (was 8.4 this AM at nursing home, currently being trended by Dr. Tapia, [facility]), Na 126, BUN/ Cr (elevated at 39/1.3, Cr from previous admissions 0.79), neg. lactate, neg trop, elevated proBNP (9221). EKG sinus rhythm, borderline LVH, HR 63. CXR unofficial read shows CHF, haziness and b/l congestion likely signifies superimposed PNA, rt. pleural effusion. Pt was later admitted to [facility] tele for CHF exacerbation 89 yo F pmhx diastolic chf (EF 65%, echo July 2017), htn, anemia, p/w to [facility] ED for cough (?productive), congestion for 2 days, mild SOB. Daughter visited patient the Sunday before admission day and pt. was asymptomatic. On Monday night pt. start coughing. Hx from daughter as patient was sleeping. No chest pain, no abd pain, sob. No n/v/d. no neck./ back pain. No numb/ting/focal weak. no recent trauma. No abd pain. Daughter noticed decrease PO intake. Pt currently resident at [facility]. Pt had outpt cxr showing CONCEPCION pna and WBC 20.5. Na was also 126. Pt sent for admission. Pt was on levaquin, started yest at nursing home. No other complaints. Of note, daughter states pt. usually alert and oriented and speaks on the phone daily. In the ED, VSS T 97.3, HR 63, /75, RR 18, SpO2 97% RA. Pt. had WBC of 20.1, Hemoglobin of 9.1 (was 8.4 this AM at nursing [place], currently being trended by Dr. Tapia, [facility]), Na 126, BUN/ Cr (elevated at 39/1.3, Cr from previous admissions 0.79), neg. lactate, neg trop, elevated proBNP (9221). EKG sinus rhythm, borderline LVH, HR 63. CXR unofficial read shows CHF, haziness and b/l congestion likely signifies superimposed PNA, rt. pleural effusion. Pt was later admitted to [facility] tele for CHF exacerbation with superimposed PNA    Patient treated with Zosyn 3.375 g Q8 IV, until 8/31. Developed dizziness when sitting up, orthostatics negative, hydralazine decreased to 25 mg q 8. Continued with coreg for diastolic CHF 89 yo F pmhx diastolic chf (EF 65%, echo July 2017), htn, anemia, p/w to [facility] ED for cough, sob  and  congestion for 2 days. Daughter visited patient the Sunday before admission day and pt. was asymptomatic. On Monday night pt. start coughing. Hx from daughter as patient was sleeping. No chest pain, no abd pain, sob. No n/v/d. no neck./ back pain. No numb/ting/focal weak. no recent trauma. No abd pain. Daughter noticed decrease PO intake.  Pt had outpt cxr showing CONCEPCION pna and WBC 20.5. Na was also 126. Pt sent for admission. Pt was on levaquin, started at [facility]. No other.  In the ED, VSS T 97.3, HR 63, /75, RR 18, SpO2 97% RA. WBC of 20.1, Hemoglobin of 9.1 followed by  Dr. Temple, [facility]), Na 126, BUN/ Cr (elevated at 39/1.3, Cr from previous admissions 0.79), neg. lactate, neg trop, elevated proBNP (9221). EKG sinus rhythm, borderline LVH, HR 63. CXR unofficial read shows CHF, haziness and b/l congestion likely signifies superimposed PNA, rt. pleural effusion. Pt was admitted to [facility] tele for hypoatremia acute on chronic diastolic CHF exacerbation with superimposed PNA.    Patient treated with Zosyn 3.375 g Q8 IV, until 8/31. Developed dizziness when sitting up, orthostatics negative,    after cleared for dc by consultants pt dc. advised to follow up w all doctors as above.   chf acute on chronic diastolic 89 yo F pmhx diastolic chf (EF 65%, echo July 2017), htn, anemia, p/w to [facility] ED for cough, sob  and  congestion for 2 days. Daughter visited patient the Sunday before admission day and pt. was asymptomatic. On Monday night pt. start coughing. Hx from daughter as patient was sleeping. No chest pain, no abd pain, sob. No n/v/d. no neck./ back pain. No numb/ting/focal weak. no recent trauma. No abd pain. Daughter noticed decrease PO intake.  Pt had outpt cxr showing CONCEPCION pna and WBC 20.5. Na was also 126. Pt sent for admission. Pt was on levaquin, started at [facility]. No other.  In the ED, VSS T 97.3, HR 63, /75, RR 18, SpO2 97% RA. WBC of 20.1, Hemoglobin of 9.1 followed by  Dr. Temple, [facility]), Na 126, BUN/ Cr (elevated at 39/1.3, Cr from previous admissions 0.79), neg. lactate, neg trop, elevated proBNP (9221). EKG sinus rhythm, borderline LVH, HR 63. CXR unofficial read shows CHF, haziness and b/l congestion likely signifies superimposed PNA, rt. pleural effusion. Pt was admitted to [facility] tele for hypoatremia (possibly secondary to diuretics) acute on chronic diastolic CHF exacerbation with superimposed PNA.    Patient treated with Zosyn 3.375 g Q8 IV, until 8/31. Developed brief episode dizziness when sitting up, orthostatics negative,    after cleared for dc by consultants pt dc. advised to follow up w all doctors as above.   chf acute on chronic diastolic 89 yo F pmhx diastolic chf (EF 65%, echo July 2017), htn, anemia, p/w to [facility] ED for cough, sob  and  congestion for 2 days. Daughter visited patient the Sunday before admission day and pt. was asymptomatic. On Monday night pt. start coughing. Hx from daughter as patient was sleeping. No chest pain, no abd pain, sob. No n/v/d. no neck./ back pain. No numb/ting/focal weak. no recent trauma. No abd pain. Daughter noticed decrease PO intake.  Pt had outpt cxr showing CONCEPCION pna and WBC 20.5. Na was also 126. Pt sent for admission. Pt was on levaquin, started at [facility]. No other.  In the ED, VSS T 97.3, HR 63, /75, RR 18, SpO2 97% RA. WBC of 20.1, Hemoglobin of 9.1 followed by  Dr. Temple, [facility]), Na 126, BUN/ Cr (elevated at 39/1.3, Cr from previous admissions 0.79), neg. lactate, neg trop, elevated proBNP (9221). EKG sinus rhythm, borderline LVH, HR 63. CXR unofficial read shows CHF, haziness and b/l congestion likely signifies superimposed PNA, rt. pleural effusion. Pt was admitted to [facility] tele for hypoatremia (possibly secondary to diuretics) acute on chronic diastolic CHF exacerbation with superimposed PNA.    Patient treated with Zosyn 3.375 g Q8 IV, until 8/31. Developed brief episode dizziness when sitting up, orthostatics negative,    after cleared for dc by consultants pt dc. advised to follow up w all doctors as above.   chf acute on chronic diastolic     time spent today on follow up discharge discussing with staff consultants arranging discharge 75 mins in addition to time  discussed with daughter, at length prior to dc. all questions answered to best of my ability

## 2017-08-25 NOTE — PROGRESS NOTE ADULT - SUBJECTIVE AND OBJECTIVE BOX
Patient is a 89y old  Female who presents with a chief complaint of cough, mild SOB (24 Aug 2017 22:47)      INTERVAL HPI/OVERNIGHT EVENTS: Pt seen and examined.  Pt reports no SOB or cough at current time.  Denies chest pain, palpitations, fever or chills.  Pt had rapid AFib event but normalized to NSR.  Stat EKG showed NSR.  No further action at this time per Cardiology - Dr. Rowell.    MEDICATIONS  (STANDING):  heparin  Injectable 5000 Unit(s) SubCutaneous every 12 hours  valsartan 320 milliGRAM(s) Oral daily  carvedilol 12.5 milliGRAM(s) Oral every 12 hours  cinacalcet 30 milliGRAM(s) Oral daily  furosemide   Injectable 40 milliGRAM(s) IV Push daily  hydrALAZINE 50 milliGRAM(s) Oral three times a day  doxazosin 4 milliGRAM(s) Oral at bedtime  piperacillin/tazobactam IVPB. 3.375 Gram(s) IV Intermittent every 8 hours    MEDICATIONS  (PRN):  ALBUTerol    0.083% 2.5 milliGRAM(s) Nebulizer every 8 hours PRN Shortness of Breath and/or Wheezing      Allergies    Vasotec (Unknown)    Intolerances        REVIEW OF SYSTEMS:  CONSTITUTIONAL: No fever, No chills,No fatigue,No myalgia,No Body ache  EYES: No eye pain, visual disturbances, or discharge  ENMT:  No ear pain, No nose bleed, No vertigo; No sinus or throat pain  NECK: No pain, No stiffness  RESPIRATORY: No cough, wheezing, No  hemoptysis; No shortness of breath  CARDIOVASCULAR: No chest pain, palpitations, leg swelling  GASTROINTESTINAL: No abdominal or epigastric pain. No nausea, No vomiting; No diarrhea or constipation. [ ] BM  GENITOURINARY: No dysuria, No frequency, No urgency, No hematuria, or incontinence  NEUROLOGICAL: alert and oriented x 3,  No headaches, No dizziness, No numbness,  SKIN:   No itching, burning, rashes, or lesions   MUSCULOSKELETAL: No joint pain or swelling; No muscle pain, No back pain, No extremity pain  PSYCHIATRIC: No depression, anxiety, mood swings, or difficulty sleeping  ROS  [ ] Unable to obtain   REST OF REVIEW Of SYSTEM - [ ] Normal     Height (cm): 162.56 ( @ 19:39)  Weight (kg): 65 ( @ 19:39)  BMI (kg/m2): 24.6 ( @ 19:39)  BSA (m2): 1.7 ( @ 19:39)  Vital Signs Last 24 Hrs  T(C): 36.3 (25 Aug 2017 12:18), Max: 36.5 (25 Aug 2017 07:21)  T(F): 97.4 (25 Aug 2017 12:18), Max: 97.7 (25 Aug 2017 07:21)  HR: 80 (25 Aug 2017 12:53) (59 - 80)  BP: 160/60 (25 Aug 2017 12:53) (134/75 - 183/68)  BP(mean): --  RR: 21 (25 Aug 2017 12:18) (17 - 22)  SpO2: 94% (25 Aug 2017 12:18) (91% - 97%)  [ ] room air   [ ] 02    PHYSICAL EXAM:  GENERAL:  No acute distresss,  [ ] Agitated, [ ] Lethargy, [ ] confused   HEAD:  normal  ENMT: normal  NECK:  normal    NERVOUS SYSTEM:  Alert & Oriented X3, no focal deficits [ ]Confusion  [ ] Encephalopathic [ ] Sedated [ ] Other  CHEST/LUNG: Clear to auscultation bilaterally,  [ ] decreased breath sounds at bases  [ ] wheezing   [ ] rhonchi  [ ] crackles  HEART:  Regular rate and rhythm, No murmurs, rubs, or gallops,  [ ] irregular   ABDOMEN:  soft, nontender, nondistended, positive bowel sounds   [ ] obese  EXTREMITIES: No clubbing, cyanosis or edema  SKIN: [ ] venous stasis skin changes    LABS:                        8.8    16.4  )-----------( 271      ( 25 Aug 2017 06:25 )             26.2     25 Aug 2017 06:25    128    |  95     |  36     ----------------------------<  104    4.1     |  25     |  1.00     Ca    8.8        25 Aug 2017 06:25    TPro  5.9    /  Alb  2.4    /  TBili  0.3    /  DBili  x      /  AST  29     /  ALT  52     /  AlkPhos  79     25 Aug 2017 06:25    PT/INR - ( 24 Aug 2017 20:38 )   PT: 16.3 sec;   INR: 1.48 ratio         PTT - ( 24 Aug 2017 20:38 )  PTT:27.3 sec    Urinalysis Basic - ( 24 Aug 2017 23:22 )    Color: Yellow / Appearance: x / S.005 / pH: x  Gluc: x / Ketone: Negative  / Bili: Negative / Urobili: Negative   Blood: x / Protein: 25 mg/dL / Nitrite: Negative   Leuk Esterase: Moderate / RBC: 3-5 /HPF / WBC 11-25   Sq Epi: x / Non Sq Epi: Few / Bacteria: Moderate      CAPILLARY BLOOD GLUCOSE        Cultures          RADIOLOGY & ADDITIONAL TESTS:      Care Discussed with [X] Consultants  [ ] Patient  [ ] Family  [X]   /   [ ] Other; RN  DVT prophylaxis [ ] lovenox   [ ] subq heparin  [ ] coumadin  [ ] venodynes [ ] ambulating frequently at how risk for vte and no pharm         or  mechanical prophylaxis required    [ ] other   Advanced directive:    [ ]pt has hcp     [ ] pt declined to assign hcp  Discussed with pt @ bedside

## 2017-08-25 NOTE — DISCHARGE NOTE ADULT - OTHER SIGNIFICANT FINDINGS
patient seen and examined prior to dc    REVIEW OF SYSTEMS:  CONSTITUTIONAL: No fever, No chills,No fatigue,No myalgia,No Body ache  EYES: No eye pain, visual disturbances, or discharge  ENMT:  No ear pain, No nose bleed, No vertigo; No sinus or throat pain  NECK: No pain, No stiffness  RESPIRATORY: No cough, wheezing or sob  CARDIOVASCULAR: No chest pain, palpitations, trace edema  GASTROINTESTINAL: No abdominal or epigastric pain. No nausea, No vomiting; No diarrhea or constipation. [ ] BM  GENITOURINARY: No dysuria, No frequency, No urgency, No hematuria, or incontinence  NEUROLOGICAL: alert and oriented x 3,  No headaches, No dizziness, No numbness,  SKIN:   No itching, burning, rashes, or lesions   MUSCULOSKELETAL: No joint pain or swelling; No muscle pain, No back pain, No extremity pain  PSYCHIATRIC: No depression, anxiety, mood swings, or difficulty sleeping  ROS  [ ] Unable to obtain   REST OF REVIEW Of SYSTEM - [ ] Normal     Height (cm): 162.56 (08-24 @ 19:39)  Weight (kg): 65 (08-24 @ 19:39)  BMI (kg/m2): 24.6 (08-24 @ 19:39)  BSA (m2): 1.7 (08-24 @ 19:39)  Vital Signs Last 24 Hrs  T(C): 36.7 (02 Sep 2017 07:59), Max: 36.9 (01 Sep 2017 20:39)  T(F): 98.1 (02 Sep 2017 07:59), Max: 98.5 (01 Sep 2017 20:39)  HR: 59 (02 Sep 2017 08:05) (59 - 90)  BP: 128/71 (02 Sep 2017 07:59) (128/71 - 178/66)  BP(mean): --  RR: 18 (02 Sep 2017 07:59) (16 - 18)  SpO2: 98% (02 Sep 2017 08:05) (94% - 98%) room air 95      PHYSICAL EXAM:  GENERAL:  No acute distresss, [ ] Agitated, [ ] Lethargy, [ ] confused   HEAD:  normal  ENMT: normal  NECK:  normal    NERVOUS SYSTEM:  Alert & Oriented X3, no focal deficits [ ]Confusion  [ ] Encephalopathic [ ] Sedated [ ] Other  CHEST/LUNG: decreased breath sounds at bases  [ ] wheezing   [ ] rhonchi  [ ] crackles  HEART:  Regular rate and rhythm,  1/6  systolic murmurs, rubs, or gallops,  [ ] irregular   ABDOMEN:  soft, nontender, nondistended, positive bowel sounds   [ ] obese  EXTREMITIES: No clubbing, cyanosis trace edema  SKIN: [ ] venous stasis skin changes    met with daughter all questions answered to best of my ability            CAPILLARY BLOOD GLUCOSE        Cultures

## 2017-08-25 NOTE — DISCHARGE NOTE ADULT - SECONDARY DIAGNOSIS.
Pneumonia due to infectious organism, unspecified laterality, unspecified part of lung Anemia, unspecified type Hypertension Hypercalcemia

## 2017-08-26 DIAGNOSIS — I27.2 OTHER SECONDARY PULMONARY HYPERTENSION: ICD-10-CM

## 2017-08-26 LAB
ANION GAP SERPL CALC-SCNC: 12 MMOL/L — SIGNIFICANT CHANGE UP (ref 5–17)
BASOPHILS # BLD AUTO: 0.1 K/UL — SIGNIFICANT CHANGE UP (ref 0–0.2)
BASOPHILS NFR BLD AUTO: 0.9 % — SIGNIFICANT CHANGE UP (ref 0–2)
BUN SERPL-MCNC: 33 MG/DL — HIGH (ref 7–23)
CALCIUM SERPL-MCNC: 8.4 MG/DL — LOW (ref 8.5–10.1)
CHLORIDE SERPL-SCNC: 96 MMOL/L — SIGNIFICANT CHANGE UP (ref 96–108)
CO2 SERPL-SCNC: 23 MMOL/L — SIGNIFICANT CHANGE UP (ref 22–31)
CREAT SERPL-MCNC: 0.95 MG/DL — SIGNIFICANT CHANGE UP (ref 0.5–1.3)
EOSINOPHIL # BLD AUTO: 0.3 K/UL — SIGNIFICANT CHANGE UP (ref 0–0.5)
EOSINOPHIL NFR BLD AUTO: 2.6 % — SIGNIFICANT CHANGE UP (ref 0–6)
GLUCOSE SERPL-MCNC: 103 MG/DL — HIGH (ref 70–99)
HCT VFR BLD CALC: 28.1 % — LOW (ref 34.5–45)
HGB BLD-MCNC: 9.4 G/DL — LOW (ref 11.5–15.5)
LYMPHOCYTES # BLD AUTO: 1.5 K/UL — SIGNIFICANT CHANGE UP (ref 1–3.3)
LYMPHOCYTES # BLD AUTO: 11 % — LOW (ref 13–44)
MCHC RBC-ENTMCNC: 29.1 PG — SIGNIFICANT CHANGE UP (ref 27–34)
MCHC RBC-ENTMCNC: 33.5 GM/DL — SIGNIFICANT CHANGE UP (ref 32–36)
MCV RBC AUTO: 86.9 FL — SIGNIFICANT CHANGE UP (ref 80–100)
MONOCYTES # BLD AUTO: 1 K/UL — HIGH (ref 0–0.9)
MONOCYTES NFR BLD AUTO: 7.2 % — SIGNIFICANT CHANGE UP (ref 1–9)
NEUTROPHILS # BLD AUTO: 10.7 K/UL — HIGH (ref 1.8–7.4)
NEUTROPHILS NFR BLD AUTO: 78.3 % — HIGH (ref 43–77)
NT-PROBNP SERPL-SCNC: 3506 PG/ML — HIGH (ref 0–450)
PLATELET # BLD AUTO: 334 K/UL — SIGNIFICANT CHANGE UP (ref 150–400)
POTASSIUM SERPL-MCNC: 3.8 MMOL/L — SIGNIFICANT CHANGE UP (ref 3.5–5.3)
POTASSIUM SERPL-SCNC: 3.8 MMOL/L — SIGNIFICANT CHANGE UP (ref 3.5–5.3)
RBC # BLD: 3.24 M/UL — LOW (ref 3.8–5.2)
RBC # FLD: 13.1 % — SIGNIFICANT CHANGE UP (ref 10.3–14.5)
SODIUM SERPL-SCNC: 131 MMOL/L — LOW (ref 135–145)
WBC # BLD: 13.6 K/UL — HIGH (ref 3.8–10.5)
WBC # FLD AUTO: 13.6 K/UL — HIGH (ref 3.8–10.5)

## 2017-08-26 PROCEDURE — 99233 SBSQ HOSP IP/OBS HIGH 50: CPT

## 2017-08-26 RX ADMIN — VALSARTAN 320 MILLIGRAM(S): 80 TABLET ORAL at 07:36

## 2017-08-26 RX ADMIN — Medication 50 MILLIGRAM(S): at 13:55

## 2017-08-26 RX ADMIN — CARVEDILOL PHOSPHATE 12.5 MILLIGRAM(S): 80 CAPSULE, EXTENDED RELEASE ORAL at 06:46

## 2017-08-26 RX ADMIN — CARVEDILOL PHOSPHATE 12.5 MILLIGRAM(S): 80 CAPSULE, EXTENDED RELEASE ORAL at 17:43

## 2017-08-26 RX ADMIN — PIPERACILLIN AND TAZOBACTAM 25 GRAM(S): 4; .5 INJECTION, POWDER, LYOPHILIZED, FOR SOLUTION INTRAVENOUS at 06:46

## 2017-08-26 RX ADMIN — Medication 40 MILLIGRAM(S): at 06:46

## 2017-08-26 RX ADMIN — PIPERACILLIN AND TAZOBACTAM 25 GRAM(S): 4; .5 INJECTION, POWDER, LYOPHILIZED, FOR SOLUTION INTRAVENOUS at 21:45

## 2017-08-26 RX ADMIN — CINACALCET 30 MILLIGRAM(S): 30 TABLET, FILM COATED ORAL at 20:18

## 2017-08-26 RX ADMIN — HEPARIN SODIUM 5000 UNIT(S): 5000 INJECTION INTRAVENOUS; SUBCUTANEOUS at 06:46

## 2017-08-26 RX ADMIN — Medication 50 MILLIGRAM(S): at 21:45

## 2017-08-26 RX ADMIN — PIPERACILLIN AND TAZOBACTAM 25 GRAM(S): 4; .5 INJECTION, POWDER, LYOPHILIZED, FOR SOLUTION INTRAVENOUS at 13:55

## 2017-08-26 RX ADMIN — HEPARIN SODIUM 5000 UNIT(S): 5000 INJECTION INTRAVENOUS; SUBCUTANEOUS at 17:44

## 2017-08-26 RX ADMIN — Medication 4 MILLIGRAM(S): at 21:45

## 2017-08-26 RX ADMIN — Medication 50 MILLIGRAM(S): at 06:46

## 2017-08-26 NOTE — PROGRESS NOTE ADULT - PROBLEM SELECTOR PLAN 2
new onset cough (?productive), mild sob. elevated WBC (no lactate)  - CXR reads B/l interstitial opacities, likely superimposed pna  - Continue Zosyn for HCAP  - Pulm - Dr. Valdovinos - agrees with plan and will add Vanco based on cx  -monitor labs and vitals per routine

## 2017-08-26 NOTE — PROGRESS NOTE ADULT - SUBJECTIVE AND OBJECTIVE BOX
Date/Time Patient Seen:  		  Referring MD:   Data Reviewed	       Patient is a 89y old  Female who presents with a chief complaint of cough, mild SOB (25 Aug 2017 15:51)  in bed  seen and examined  on oxygen  frail and weak        Subjective/HPI     PAST MEDICAL & SURGICAL HISTORY:  Anemia, unspecified type  Edema of lower extremity  Heart murmur  Hypertension  Hypercalcemia  History of appendectomy  S/P tonsillectomy  H/O parathyroidectomy        Medication list         MEDICATIONS  (STANDING):  heparin  Injectable 5000 Unit(s) SubCutaneous every 12 hours  valsartan 320 milliGRAM(s) Oral daily  carvedilol 12.5 milliGRAM(s) Oral every 12 hours  cinacalcet 30 milliGRAM(s) Oral daily  furosemide   Injectable 40 milliGRAM(s) IV Push daily  hydrALAZINE 50 milliGRAM(s) Oral three times a day  doxazosin 4 milliGRAM(s) Oral at bedtime  piperacillin/tazobactam IVPB. 3.375 Gram(s) IV Intermittent every 8 hours    MEDICATIONS  (PRN):  ALBUTerol    0.083% 2.5 milliGRAM(s) Nebulizer every 8 hours PRN Shortness of Breath and/or Wheezing         Vitals log        ICU Vital Signs Last 24 Hrs  T(C): 36.5 (26 Aug 2017 04:52), Max: 36.8 (25 Aug 2017 21:25)  T(F): 97.7 (26 Aug 2017 04:52), Max: 98.2 (25 Aug 2017 21:25)  HR: 77 (26 Aug 2017 04:52) (60 - 80)  BP: 137/55 (26 Aug 2017 04:52) (120/62 - 183/68)  BP(mean): --  ABP: --  ABP(mean): --  RR: 17 (26 Aug 2017 04:52) (17 - 22)  SpO2: 95% (26 Aug 2017 04:52) (90% - 97%)           Input and Output:  I&O's Detail    25 Aug 2017 07:01  -  26 Aug 2017 06:07  --------------------------------------------------------  IN:    Oral Fluid: 240 mL  Total IN: 240 mL    OUT:  Total OUT: 0 mL    Total NET: 240 mL          Lab Data                        8.8    16.4  )-----------( 271      ( 25 Aug 2017 06:25 )             26.2     08-25    128<L>  |  95<L>  |  36<H>  ----------------------------<  104<H>  4.1   |  25  |  1.00    Ca    8.8      25 Aug 2017 06:25    TPro  5.9<L>  /  Alb  2.4<L>  /  TBili  0.3  /  DBili  x   /  AST  29  /  ALT  52  /  AlkPhos  79  08-25      CARDIAC MARKERS ( 24 Aug 2017 20:38 )  .018 ng/mL / x     / 43 U/L / x     / 2.3 ng/mL        Review of Systems	      Objective     Physical Examination  head at  heart - s1s2  lungs - dec BS  abd - soft  kyphosis          Pertinent Lab findings & Imaging      Richard:  NO   Adequate UO     I&O's Detail    25 Aug 2017 07:01  -  26 Aug 2017 06:07  --------------------------------------------------------  IN:    Oral Fluid: 240 mL  Total IN: 240 mL    OUT:  Total OUT: 0 mL    Total NET: 240 mL               Discussed with:     Cultures:	        Radiology

## 2017-08-26 NOTE — PROGRESS NOTE ADULT - PROBLEM SELECTOR PLAN 3
on emp ABX  monitor WBC  on Zosyn - broad spectrum ABX  HOB elevation asp prec  CT chest reviewed, mixed picture, Pna plus minus CHF  will cont LASIX and ABX  frail  elderly  weak  prob aspirating  prognosis poor

## 2017-08-26 NOTE — PROGRESS NOTE ADULT - PROBLEM SELECTOR PLAN 1
Pt. had developed recent cough and mild SOB. Recent echo shows EF 65%.   - CXR read shows rt. pleural effusion, hazy and b/l opacities, congestion.  -CT chest: pulm edema vs multifocal PNA  - proBNP elevated, but improved (3506)  - hold fluids in setting of CHF  - change home dose of lasix to 40 IV daily  - cardio consult to see pt. in AM (Shital), pulm consult AM (Aruna)  - monitor labs and vitals per routine

## 2017-08-26 NOTE — PROGRESS NOTE ADULT - PROBLEM SELECTOR PLAN 2
fall prec  ambulates with walker and assist  asst with ADL  oral and skin care  pt is DNR  MOLST in chart

## 2017-08-26 NOTE — PROGRESS NOTE ADULT - PROBLEM SELECTOR PLAN 3
Na 131, improving, likely secondary to dehydration and lasix  - cont. to trend BMP daily  - monitor BUN/ Cr (elevated since prior admissions, likely secondary to dehydration)  - hold fluids in setting of CHF, cont. lasix but monitor BMP

## 2017-08-26 NOTE — PROGRESS NOTE ADULT - SUBJECTIVE AND OBJECTIVE BOX
Follow up: HTN, HFPEF    HPI:  89 yo F pmhx diastolic chf (EF 65%, echo July 2017), htn, anemia, p/w cough (?productive), congestion x past ~ 2 days, mild SOB. Daughter visited patient on Sunday and pt. was asymptomatic. Monday night pt. starting coughing. Hx from daughter as patient was sleeping. No chest pain, no abd pain, sob. No n/v/d. no neck./ back pain. No numb/ting/focal weak. no recent trauma. No abd pain. Daughter noticed decrease PO intake. Pt currently resident at [facility]. Pt had outpt cxr showing CONCEPCION pna and WBC 20.5. Na was also 126. Pt sent for admission. Pt was on levaquin, started yest at nursing home. No other complaints. Of note, daughter states pt. usually alert and oriented and speaks on the phone daily.     she is feeling much better today, awake and alert eating lunch in a chair. She reports that her breathing is better and cough is decreased.  She has no new complaints.      PAST MEDICAL & SURGICAL HISTORY:  Anemia, unspecified type  Edema of lower extremity  Heart murmur  Hypertension  Hypercalcemia  History of appendectomy  S/P tonsillectomy  H/O parathyroidectomy      MEDICATIONS  (STANDING):  heparin  Injectable 5000 Unit(s) SubCutaneous every 12 hours  valsartan 320 milliGRAM(s) Oral daily  carvedilol 12.5 milliGRAM(s) Oral every 12 hours  cinacalcet 30 milliGRAM(s) Oral daily  furosemide   Injectable 40 milliGRAM(s) IV Push daily  hydrALAZINE 50 milliGRAM(s) Oral three times a day  doxazosin 4 milliGRAM(s) Oral at bedtime  piperacillin/tazobactam IVPB. 3.375 Gram(s) IV Intermittent every 8 hours    MEDICATIONS  (PRN):  ALBUTerol    0.083% 2.5 milliGRAM(s) Nebulizer every 8 hours PRN Shortness of Breath and/or Wheezing      Vital Signs Last 24 Hrs  T(C): 36.8 (26 Aug 2017 07:16), Max: 36.8 (25 Aug 2017 21:25)  T(F): 98.3 (26 Aug 2017 07:16), Max: 98.3 (26 Aug 2017 07:16)  HR: 80 (26 Aug 2017 07:16) (60 - 80)  BP: 124/57 (26 Aug 2017 07:16) (120/62 - 180/70)  BP(mean): --  RR: 17 (26 Aug 2017 07:16) (17 - 18)  SpO2: 94% (26 Aug 2017 07:16) (90% - 97%)    I&O's Summary    25 Aug 2017 07:01  -  26 Aug 2017 07:00  --------------------------------------------------------  IN: 480 mL / OUT: 0 mL / NET: 480 mL        PHYSICAL EXAM:    Constitutional: NAD, awake and alert, frail  Eyes:  EOMI,  Pupils round, no lesions  ENMT: no exudate or erythema  Pulmonary: few scattered rhonchi bilat  Cardiovascular: PMI not palpable RRR normal S1 and S2, 2/6 midsystolic murmur at the right upper sternal border consistent with mild to moderate aortic stenosis, no rubs, gallops or clicks  Gastrointestinal: Bowel Sounds present, soft, nontender.   Lymph: No cervical lymphadenopathy.  Neurological: Alert, no focal deficits  Skin: No rashes.  No cyanosis.  Psych:  Mood & affect appropriate   Ext: trc lower ext edema      CARDIAC MARKERS ( 24 Aug 2017 20:38 )  .018 ng/mL / x     / 43 U/L / x     / 2.3 ng/mL                            9.4    13.6  )-----------( 334      ( 26 Aug 2017 06:22 )             28.1     08-26    131<L>  |  96  |  33<H>  ----------------------------<  103<H>  3.8   |  23  |  0.95    Ca    8.4<L>      26 Aug 2017 06:22    TPro  5.9<L>  /  Alb  2.4<L>  /  TBili  0.3  /  DBili  x   /  AST  29  /  ALT  52  /  AlkPhos  79  08-25    < from: 12 Lead ECG (08.25.17 @ 09:14) >    Ventricular Rate 76 BPM    Atrial Rate 76 BPM    P-R Interval 166 ms    QRS Duration 84 ms    Q-T Interval 380 ms    QTC Calculation(Bezet) 427 ms    P Axis 34 degrees    R Axis -6 degrees    T Axis 12 degrees    Diagnosis Line Normal sinus rhythm with sinus arrhythmia  Normal ECG    Confirmed by Samson Worthington MD ([id number]) on 8/25/2017 4:30:09 PM    < end of copied text >  < from: TTE Echo Doppler w/o Cont (07.09.17 @ 08:32) >     EXAM:  ECHO TTE W/O CON COMP W/DOPPLR         PROCEDURE DATE:  07/09/2017        INTERPRETATION:  Ordering Physician: Unknown Doctor    Indication: Hypertension    Study Quality: Technically fair  A complete echocardiographic study was performed utilizing standard   protocol including spectral and color Doppler in all echocardiographic   windows.    Height: 152 cm  Weight: 49 kg  BSA: 1.4 sq m  Blood Pressure: 189/63    MEASUREMENTS  IVS: 1.0cm  PWT: 1.0cm  LA: 4.2cm  AO: 2.7cm  LVIDd: 4.5cm  LVIDs: 3.3cm    LVEF: 65%  RVSP: 54mmHg    FINDINGS  Left Ventricle:  Normal left ventricular systolic function.  Aortic Valve: Calcified trileaflet aortic valve. Mild aortic   insufficiency.  Mitral Valve: Mitral annular calcification and calcified mitral valve   leaflets with normal diastolic opening. Mild mitral insufficiency.  Tricuspid Valve: Normal tricuspid valve. Mild tricuspid insufficiency.  Pulmonic Valve: Normal pulmonic valve. Mild pulmonic insufficiency.  Left Atrium: Mildly enlarged.  Right Ventricle: Normal right ventricular size and systolic function.  Right Atrium: Normal  Diastolic Function: Grade 1 diastolic dysfunction.  Pericardium/Pleura: Normal pericardium with no pericardial effusion.                      MORENA ADORNO M.D., ATTENDING CARDIOLOGIST  This document has been electronically signed. Jul 9 2017 10:13AM                < end of copied text >    < from: CT Chest No Cont (08.25.17 @ 15:42) >    EXAM:  CT CHEST                            PROCEDURE DATE:  08/25/2017          INTERPRETATION:  .    CLINICAL INFORMATION: Evaluate effusion and atelectasis.,    TECHNIQUE: Helical axial images of the chest were obtained from the   thoracic inlet to the adrenal glands without the administration of   intravenous contrast. Sagittal and coronal reformations were obtained   from the source data. Axial MIP images were reconstructed at a dedicated   separate workstation and also reviewed.    COMPARISON: Prior chest CT examination from 4/26/2017 prior chest x-ray   examination from 8/4/2017.     FINDINGS: The right lobe of the thyroid gland is enlarged compared to the   left and contains hypodense nodules. The largest nodule measures up to   1.4 cm and appears unchanged from the prior CT examination.    There is no axillary adenopathy. Multiple subcentimeter sized mediastinal   lymph nodes are noted. There is no hilar lymphadenopathy.    The heart size is mildly enlarged. There is a trace pericardial effusion.   There are atherosclerotic calcifications of the imaged coronary arteries,   aorta, and branch vessels. The imaged portions of the aorta are normal in   caliber.    The central airways are patent. There are small to moderate-sized   bilateral pleural effusions with adjacent atelectasis. Patchy opacities   are notable within the bilateral upper lobes, right middle lobe, and   lingula.    Subcentimeter sized nonobstructing left-sided intrarenal stones are   notable. There is nonspecific bilateral perinephric stranding. There are   mild multilevel degenerative changes of the thoracic spine.    Degenerative changes are notable within the bilateral shoulders.    IMPRESSION: Patchy ground glass opacities throughout both lungs which may   reflect multifocal pneumonia versus pulmonary edema.    Small to moderate-sized pleural effusions with adjacent atelectasis.    Cardiomegaly.    Trace pericardial effusion.    1.4 cm hypodense right-sided thyroid nodule. Recommend further evaluation   with ultrasound examination.                LAVELL CALVILLO M.D., ATTENDING RADIOLOGIST  This document has been electronically signed. Aug 25 2017  3:58PM                < end of copied text >

## 2017-08-26 NOTE — PROGRESS NOTE ADULT - SUBJECTIVE AND OBJECTIVE BOX
Patient is a 89y old  Female who presents with a chief complaint of cough, mild SOB (25 Aug 2017 15:51)      INTERVAL HPI/OVERNIGHT EVENTS: Patient seen and examined. NAD. No complaints.      Vital Signs Last 24 Hrs  T(C): 36.8 (26 Aug 2017 07:16), Max: 36.8 (25 Aug 2017 21:25)  T(F): 98.3 (26 Aug 2017 07:16), Max: 98.3 (26 Aug 2017 07:16)  HR: 80 (26 Aug 2017 07:16) (60 - 80)  BP: 124/57 (26 Aug 2017 07:16) (120/62 - 183/68)  BP(mean): --  RR: 17 (26 Aug 2017 07:16) (17 - 21)  SpO2: 94% (26 Aug 2017 07:16) (90% - 97%)I&O's Summary    25 Aug 2017 07:01  -  26 Aug 2017 07:00  --------------------------------------------------------  IN: 480 mL / OUT: 0 mL / NET: 480 mL        LABS:                        9.4    13.6  )-----------( 334      ( 26 Aug 2017 06:22 )             28.1     08-26    131<L>  |  96  |  33<H>  ----------------------------<  103<H>  3.8   |  23  |  0.95    Ca    8.4<L>      26 Aug 2017 06:22    TPro  5.9<L>  /  Alb  2.4<L>  /  TBili  0.3  /  DBili  x   /  AST  29  /  ALT  52  /  AlkPhos  79  08-25    PT/INR - ( 24 Aug 2017 20:38 )   PT: 16.3 sec;   INR: 1.48 ratio         PTT - ( 24 Aug 2017 20:38 )  PTT:27.3 sec  Urinalysis Basic - ( 24 Aug 2017 23:22 )    Color: Yellow / Appearance: x / S.005 / pH: x  Gluc: x / Ketone: Negative  / Bili: Negative / Urobili: Negative   Blood: x / Protein: 25 mg/dL / Nitrite: Negative   Leuk Esterase: Moderate / RBC: 3-5 /HPF / WBC 11-25   Sq Epi: x / Non Sq Epi: Few / Bacteria: Moderate      CAPILLARY BLOOD GLUCOSE  4 (26 Aug 2017 07:16)              heparin  Injectable 5000 Unit(s) SubCutaneous every 12 hours  valsartan 320 milliGRAM(s) Oral daily  carvedilol 12.5 milliGRAM(s) Oral every 12 hours  cinacalcet 30 milliGRAM(s) Oral daily  furosemide   Injectable 40 milliGRAM(s) IV Push daily  hydrALAZINE 50 milliGRAM(s) Oral three times a day  doxazosin 4 milliGRAM(s) Oral at bedtime  piperacillin/tazobactam IVPB. 3.375 Gram(s) IV Intermittent every 8 hours  ALBUTerol    0.083% 2.5 milliGRAM(s) Nebulizer every 8 hours PRN      REVIEW OF SYSTEMS:  CONSTITUTIONAL: No fever, weight loss, or fatigue  NECK: No pain or stiffness  RESPIRATORY: No cough, wheezing, chills or hemoptysis; No shortness of breath  CARDIOVASCULAR: No chest pain, palpitations, dizziness, or leg swelling  GASTROINTESTINAL: No abdominal or epigastric pain. No nausea, vomiting, or hematemesis; No diarrhea or constipation. No melena or hematochezia.  GENITOURINARY: No dysuria, frequency, hematuria, or incontinence  NEUROLOGICAL: No headaches, loss of strength, numbness, or tremors  SKIN: No itching, burning  MUSCULOSKELETAL: No joint pain or swelling; No muscle, back, or extremity pain  PSYCHIATRIC: No depression, mood swings, HEME/LYMPH: No easy bruising, or bleeding gums  ALLERY AND IMMUNOLOGIC: No hives       Consultant(s) Notes Reviewed:  [ x] YES  [ ] NO    PHYSICAL EXAM:  GENERAL: NAD, well-groomed, well-developed  HEAD:  Atraumatic, Normocephalic  EYES: EOMI, PERRLA, conjunctiva and sclera clear  ENMT: No tonsillar erythema, exudates, or enlargement; Moist mucous membranes  NECK: Supple, No JVD  NERVOUS SYSTEM:  Awake & alert  CHEST/LUNG: decreased BS b/l  HEART: Regular rate and rhythm  ABDOMEN: Soft, Nontender, Nondistended; Bowel sounds present  EXTREMITIES:  No clubbing, cyanosis, or edema  LYMPH: No lymphadenopathy noted  SKIN: No rashes      Advanced care planning discussed with patient/family [ x] YES   [ ] NO

## 2017-08-26 NOTE — PROGRESS NOTE ADULT - ASSESSMENT
Myla appears to have an acute infection with elevated white count and cough, possibly pneumonia. She has known normal left ventricular function likely a component of heart failure with preserved ejection fraction. Her BNP level is elevated but improved; WBC also better. She has no evidence for a primary acute coronary syndrome cardiac enzymes were unremarkable. Review of her telemetry reveals occasional episodes of supraventricular tachycardia at a rate of approximately 150 beats per minute. This is possibly AV node reentrant tachycardia with noted P wave morphology at the end of the QRS. The possibility of atrial flutter cannot be completely ruled out.    Recommendation    Continue telemetry  Continue antibiotics per medicine  Deep venous thrombosis prophylaxis  Continue carvedilol and hydralazine  Continue valsartan for now  We'll continue diuretic therapy intravenously for now  Pulmonary followup  Further management can be depending on her clinical course we will follow her with you

## 2017-08-27 DIAGNOSIS — Z71.89 OTHER SPECIFIED COUNSELING: ICD-10-CM

## 2017-08-27 LAB
ANION GAP SERPL CALC-SCNC: 9 MMOL/L — SIGNIFICANT CHANGE UP (ref 5–17)
BUN SERPL-MCNC: 34 MG/DL — HIGH (ref 7–23)
CALCIUM SERPL-MCNC: 9 MG/DL — SIGNIFICANT CHANGE UP (ref 8.5–10.1)
CHLORIDE SERPL-SCNC: 97 MMOL/L — SIGNIFICANT CHANGE UP (ref 96–108)
CO2 SERPL-SCNC: 28 MMOL/L — SIGNIFICANT CHANGE UP (ref 22–31)
CREAT SERPL-MCNC: 0.94 MG/DL — SIGNIFICANT CHANGE UP (ref 0.5–1.3)
CULTURE RESULTS: SIGNIFICANT CHANGE UP
GLUCOSE SERPL-MCNC: 103 MG/DL — HIGH (ref 70–99)
HCT VFR BLD CALC: 28.2 % — LOW (ref 34.5–45)
HGB BLD-MCNC: 9.4 G/DL — LOW (ref 11.5–15.5)
MAGNESIUM SERPL-MCNC: 1.7 MG/DL — SIGNIFICANT CHANGE UP (ref 1.6–2.6)
MCHC RBC-ENTMCNC: 28.8 PG — SIGNIFICANT CHANGE UP (ref 27–34)
MCHC RBC-ENTMCNC: 33.3 GM/DL — SIGNIFICANT CHANGE UP (ref 32–36)
MCV RBC AUTO: 86.4 FL — SIGNIFICANT CHANGE UP (ref 80–100)
PLATELET # BLD AUTO: 363 K/UL — SIGNIFICANT CHANGE UP (ref 150–400)
POTASSIUM SERPL-MCNC: 3.4 MMOL/L — LOW (ref 3.5–5.3)
POTASSIUM SERPL-SCNC: 3.4 MMOL/L — LOW (ref 3.5–5.3)
RBC # BLD: 3.26 M/UL — LOW (ref 3.8–5.2)
RBC # FLD: 13 % — SIGNIFICANT CHANGE UP (ref 10.3–14.5)
SODIUM SERPL-SCNC: 134 MMOL/L — LOW (ref 135–145)
SPECIMEN SOURCE: SIGNIFICANT CHANGE UP
WBC # BLD: 12.5 K/UL — HIGH (ref 3.8–10.5)
WBC # FLD AUTO: 12.5 K/UL — HIGH (ref 3.8–10.5)

## 2017-08-27 PROCEDURE — 99233 SBSQ HOSP IP/OBS HIGH 50: CPT

## 2017-08-27 RX ADMIN — CINACALCET 30 MILLIGRAM(S): 30 TABLET, FILM COATED ORAL at 20:17

## 2017-08-27 RX ADMIN — VALSARTAN 320 MILLIGRAM(S): 80 TABLET ORAL at 05:40

## 2017-08-27 RX ADMIN — PIPERACILLIN AND TAZOBACTAM 25 GRAM(S): 4; .5 INJECTION, POWDER, LYOPHILIZED, FOR SOLUTION INTRAVENOUS at 13:37

## 2017-08-27 RX ADMIN — Medication 50 MILLIGRAM(S): at 05:40

## 2017-08-27 RX ADMIN — Medication 50 MILLIGRAM(S): at 13:37

## 2017-08-27 RX ADMIN — PIPERACILLIN AND TAZOBACTAM 25 GRAM(S): 4; .5 INJECTION, POWDER, LYOPHILIZED, FOR SOLUTION INTRAVENOUS at 05:40

## 2017-08-27 RX ADMIN — Medication 50 MILLIGRAM(S): at 21:41

## 2017-08-27 RX ADMIN — HEPARIN SODIUM 5000 UNIT(S): 5000 INJECTION INTRAVENOUS; SUBCUTANEOUS at 05:40

## 2017-08-27 RX ADMIN — PIPERACILLIN AND TAZOBACTAM 25 GRAM(S): 4; .5 INJECTION, POWDER, LYOPHILIZED, FOR SOLUTION INTRAVENOUS at 21:41

## 2017-08-27 RX ADMIN — Medication 40 MILLIGRAM(S): at 05:40

## 2017-08-27 RX ADMIN — CARVEDILOL PHOSPHATE 12.5 MILLIGRAM(S): 80 CAPSULE, EXTENDED RELEASE ORAL at 20:17

## 2017-08-27 RX ADMIN — CARVEDILOL PHOSPHATE 12.5 MILLIGRAM(S): 80 CAPSULE, EXTENDED RELEASE ORAL at 05:40

## 2017-08-27 RX ADMIN — HEPARIN SODIUM 5000 UNIT(S): 5000 INJECTION INTRAVENOUS; SUBCUTANEOUS at 20:17

## 2017-08-27 RX ADMIN — Medication 4 MILLIGRAM(S): at 21:41

## 2017-08-27 NOTE — PROGRESS NOTE ADULT - ASSESSMENT
Myla appears to have an acute infection with elevated white count and cough, possibly pneumonia. She has known normal left ventricular function likely a component of heart failure with preserved ejection fraction. Her BNP level is elevated but improved; WBC also better. She has no evidence for a primary acute coronary syndrome cardiac enzymes were unremarkable. Review of her telemetry reveals occasional episodes of supraventricular tachycardia at a rate of approximately 150 beats per minute. This is possibly AV node reentrant tachycardia with noted P wave morphology at the end of the QRS. The possibility of atrial flutter cannot be completely ruled out.    Recommendation    Home care planning as daughter works  Physical therapy  Continue telemetry  Continue antibiotics per medicine  Deep venous thrombosis prophylaxis  Continue carvedilol and hydralazine  Continue valsartan for now  We'll continue diuretic therapy intravenously for now  Pulmonary followup  Further management can be depending on her clinical course we will follow her with you

## 2017-08-27 NOTE — PROGRESS NOTE ADULT - SUBJECTIVE AND OBJECTIVE BOX
Follow up: HFPEF, PSVT, HTN    HPI:  Myla is awake and alert, sitting in chair. She is feeling much better with no significant dyspnea or other complaints. Her leg edema is improved    PAST MEDICAL & SURGICAL HISTORY:  Anemia, unspecified type  Edema of lower extremity  Heart murmur  Hypertension  Hypercalcemia  History of appendectomy  S/P tonsillectomy  H/O parathyroidectomy      MEDICATIONS  (STANDING):  heparin  Injectable 5000 Unit(s) SubCutaneous every 12 hours  valsartan 320 milliGRAM(s) Oral daily  carvedilol 12.5 milliGRAM(s) Oral every 12 hours  cinacalcet 30 milliGRAM(s) Oral daily  furosemide   Injectable 40 milliGRAM(s) IV Push daily  hydrALAZINE 50 milliGRAM(s) Oral three times a day  doxazosin 4 milliGRAM(s) Oral at bedtime  piperacillin/tazobactam IVPB. 3.375 Gram(s) IV Intermittent every 8 hours    MEDICATIONS  (PRN):  ALBUTerol    0.083% 2.5 milliGRAM(s) Nebulizer every 8 hours PRN Shortness of Breath and/or Wheezing    Vital Signs Last 24 Hrs  T(C): 36.5 (27 Aug 2017 07:15), Max: 37 (26 Aug 2017 21:33)  T(F): 97.7 (27 Aug 2017 07:15), Max: 98.6 (26 Aug 2017 21:33)  HR: 63 (27 Aug 2017 07:15) (59 - 77)  BP: 132/66 (27 Aug 2017 07:15) (113/62 - 163/63)  BP(mean): --  RR: 17 (27 Aug 2017 07:15) (16 - 17)  SpO2: 96% (27 Aug 2017 07:15) (93% - 98%)    I&O's Summary      PHYSICAL EXAM:    Constitutional: NAD, awake and alert, well-developed  Eyes:  EOMI,  Pupils round, no lesions  ENMT: no exudate or erythema  Pulmonary: Non-labored, breath sounds are clear bilaterally, No wheezing, rales or rhonchi  Cardiovascular: PMI not palpable RRR normal S1 and S2, no murmurs, rubs, gallops or clicks  Gastrointestinal: Bowel Sounds present, soft, nontender.   Lymph: No cervical lymphadenopathy.  Neurological: Alert, no focal deficits  Skin: No rashes.  No cyanosis.  Psych:  Mood & affect appropriate   Ext: No lower ext edema                                9.4    12.5  )-----------( 363      ( 27 Aug 2017 06:36 )             28.2     08-27    134<L>  |  97  |  34<H>  ----------------------------<  103<H>  3.4<L>   |  28  |  0.94    Ca    9.0      27 Aug 2017 06:36  Mg     1.7     08-27    < from: 12 Lead ECG (08.25.17 @ 09:14) >    Ventricular Rate 76 BPM    Atrial Rate 76 BPM    P-R Interval 166 ms    QRS Duration 84 ms    Q-T Interval 380 ms    QTC Calculation(Bezet) 427 ms    P Axis 34 degrees    R Axis -6 degrees    T Axis 12 degrees    Diagnosis Line Normal sinus rhythm with sinus arrhythmia  Normal ECG    Confirmed by Samson Worthington MD ([id number]) on 8/25/2017 4:30:09 PM    < end of copied text >  < from: TTE Echo Doppler w/o Cont (07.09.17 @ 08:32) >     EXAM:  ECHO TTE W/O CON COMP W/DOPPLR         PROCEDURE DATE:  07/09/2017        INTERPRETATION:  Ordering Physician: Unknown Doctor    Indication: Hypertension    Study Quality: Technically fair  A complete echocardiographic study was performed utilizing standard   protocol including spectral and color Doppler in all echocardiographic   windows.    Height: 152 cm  Weight: 49 kg  BSA: 1.4 sq m  Blood Pressure: 189/63    MEASUREMENTS  IVS: 1.0cm  PWT: 1.0cm  LA: 4.2cm  AO: 2.7cm  LVIDd: 4.5cm  LVIDs: 3.3cm    LVEF: 65%  RVSP: 54mmHg    FINDINGS  Left Ventricle:  Normal left ventricular systolic function.  Aortic Valve: Calcified trileaflet aortic valve. Mild aortic   insufficiency.  Mitral Valve: Mitral annular calcification and calcified mitral valve   leaflets with normal diastolic opening. Mild mitral insufficiency.  Tricuspid Valve: Normal tricuspid valve. Mild tricuspid insufficiency.  Pulmonic Valve: Normal pulmonic valve. Mild pulmonic insufficiency.  Left Atrium: Mildly enlarged.  Right Ventricle: Normal right ventricular size and systolic function.  Right Atrium: Normal  Diastolic Function: Grade 1 diastolic dysfunction.  Pericardium/Pleura: Normal pericardium with no pericardial effusion.                      MORENA ADORNO M.D., ATTENDING CARDIOLOGIST  This document has been electronically signed. Jul 9 2017 10:13AM                < end of copied text >      < from: CT Chest No Cont (08.25.17 @ 15:42) >    EXAM:  CT CHEST                            PROCEDURE DATE:  08/25/2017          INTERPRETATION:  .    CLINICAL INFORMATION: Evaluate effusion and atelectasis.,    TECHNIQUE: Helical axial images of the chest were obtained from the   thoracic inlet to the adrenal glands without the administration of   intravenous contrast. Sagittal and coronal reformations were obtained   from the source data. Axial MIP images were reconstructed at a dedicated   separate workstation and also reviewed.    COMPARISON: Prior chest CT examination from 4/26/2017 prior chest x-ray   examination from 8/4/2017.     FINDINGS: The right lobe of the thyroid gland is enlarged compared to the   left and contains hypodense nodules. The largest nodule measures up to   1.4 cm and appears unchanged from the prior CT examination.    There is no axillary adenopathy. Multiple subcentimeter sized mediastinal   lymph nodes are noted. There is no hilar lymphadenopathy.    The heart size is mildly enlarged. There is a trace pericardial effusion.   There are atherosclerotic calcifications of the imaged coronary arteries,   aorta, and branch vessels. The imaged portions of the aorta are normal in   caliber.    The central airways are patent. There are small to moderate-sized   bilateral pleural effusions with adjacent atelectasis. Patchy opacities   are notable within the bilateral upper lobes, right middle lobe, and   lingula.    Subcentimeter sized nonobstructing left-sided intrarenal stones are   notable. There is nonspecific bilateral perinephric stranding. There are   mild multilevel degenerative changes of the thoracic spine.    Degenerative changes are notable within the bilateral shoulders.    IMPRESSION: Patchy ground glass opacities throughout both lungs which may   reflect multifocal pneumonia versus pulmonary edema.    Small to moderate-sized pleural effusions with adjacent atelectasis.    Cardiomegaly.    Trace pericardial effusion.    1.4 cm hypodense right-sided thyroid nodule. Recommend further evaluation   with ultrasound examination.                LAVELL CALVILLO M.D., ATTENDING RADIOLOGIST  This document has been electronically signed. Aug 25 2017  3:58PM                < end of copied text >

## 2017-08-27 NOTE — PROGRESS NOTE ADULT - SUBJECTIVE AND OBJECTIVE BOX
Date/Time Patient Seen:  		  Referring MD:   Data Reviewed	       Patient is a 89y old  Female who presents with a chief complaint of cough, mild SOB (25 Aug 2017 15:51)  in bed  on oxygen  on ABX  on LASIX IV        Subjective/HPI     PAST MEDICAL & SURGICAL HISTORY:  Anemia, unspecified type  Edema of lower extremity  Heart murmur  Hypertension  Hypercalcemia  History of appendectomy  S/P tonsillectomy  H/O parathyroidectomy        Medication list         MEDICATIONS  (STANDING):  heparin  Injectable 5000 Unit(s) SubCutaneous every 12 hours  valsartan 320 milliGRAM(s) Oral daily  carvedilol 12.5 milliGRAM(s) Oral every 12 hours  cinacalcet 30 milliGRAM(s) Oral daily  furosemide   Injectable 40 milliGRAM(s) IV Push daily  hydrALAZINE 50 milliGRAM(s) Oral three times a day  doxazosin 4 milliGRAM(s) Oral at bedtime  piperacillin/tazobactam IVPB. 3.375 Gram(s) IV Intermittent every 8 hours    MEDICATIONS  (PRN):  ALBUTerol    0.083% 2.5 milliGRAM(s) Nebulizer every 8 hours PRN Shortness of Breath and/or Wheezing         Vitals log        ICU Vital Signs Last 24 Hrs  T(C): 36.8 (27 Aug 2017 04:38), Max: 37 (26 Aug 2017 21:33)  T(F): 98.2 (27 Aug 2017 04:38), Max: 98.6 (26 Aug 2017 21:33)  HR: 71 (27 Aug 2017 04:38) (59 - 80)  BP: 128/61 (27 Aug 2017 04:38) (113/62 - 163/63)  BP(mean): --  ABP: --  ABP(mean): --  RR: 17 (27 Aug 2017 04:38) (16 - 17)  SpO2: 93% (27 Aug 2017 04:38) (93% - 98%)           Input and Output:  I&O's Detail    25 Aug 2017 07:01  -  26 Aug 2017 07:00  --------------------------------------------------------  IN:    Oral Fluid: 480 mL  Total IN: 480 mL    OUT:  Total OUT: 0 mL    Total NET: 480 mL          Lab Data                        9.4    13.6  )-----------( 334      ( 26 Aug 2017 06:22 )             28.1     08-26    131<L>  |  96  |  33<H>  ----------------------------<  103<H>  3.8   |  23  |  0.95    Ca    8.4<L>      26 Aug 2017 06:22    TPro  5.9<L>  /  Alb  2.4<L>  /  TBili  0.3  /  DBili  x   /  AST  29  /  ALT  52  /  AlkPhos  79  08-25            Review of Systems	      Objective     Physical Examination    frail  weak  head at  heart - s1s2  lungs - dec BS  abd - soft      Pertinent Lab findings & Imaging      Richard:  NO   Adequate UO     I&O's Detail    25 Aug 2017 07:01  -  26 Aug 2017 07:00  --------------------------------------------------------  IN:    Oral Fluid: 480 mL  Total IN: 480 mL    OUT:  Total OUT: 0 mL    Total NET: 480 mL               Discussed with:     Cultures:	        Radiology

## 2017-08-27 NOTE — PROGRESS NOTE ADULT - SUBJECTIVE AND OBJECTIVE BOX
Patient is a 89y old  Female who presents with a chief complaint of cough, mild SOB (25 Aug 2017 15:51)      INTERVAL HPI/OVERNIGHT EVENTS: breathing much improved  T(C): 36.6 (08-27-17 @ 20:10), Max: 37 (08-26-17 @ 21:33)  HR: 74 (08-27-17 @ 20:10) (60 - 77)  BP: 171/72 (08-27-17 @ 20:10) (113/62 - 180/68)  RR: 16 (08-27-17 @ 20:10) (16 - 17)  SpO2: 94% (08-27-17 @ 20:10) (91% - 97%)  Wt(kg): --  I&O's Summary      LABS:                        9.4    12.5  )-----------( 363      ( 27 Aug 2017 06:36 )             28.2     08-27    134<L>  |  97  |  34<H>  ----------------------------<  103<H>  3.4<L>   |  28  |  0.94    Ca    9.0      27 Aug 2017 06:36  Mg     1.7     08-27        MEDICATIONS  (STANDING):  heparin  Injectable 5000 Unit(s) SubCutaneous every 12 hours  valsartan 320 milliGRAM(s) Oral daily  carvedilol 12.5 milliGRAM(s) Oral every 12 hours  cinacalcet 30 milliGRAM(s) Oral daily  furosemide   Injectable 40 milliGRAM(s) IV Push daily  hydrALAZINE 50 milliGRAM(s) Oral three times a day  doxazosin 4 milliGRAM(s) Oral at bedtime  piperacillin/tazobactam IVPB. 3.375 Gram(s) IV Intermittent every 8 hours    MEDICATIONS  (PRN):  ALBUTerol    0.083% 2.5 milliGRAM(s) Nebulizer every 8 hours PRN Shortness of Breath and/or Wheezing      REVIEW OF SYSTEMS:  CONSTITUTIONAL: No fever,   EYES: No eye pain, visual disturbances, or discharge  ENMT:  No difficulty hearing, tinnitus, vertigo; No sinus or throat pain  NECK: No pain or stiffness  RESPIRATORY: dyspnea and cough improving  CARDIOVASCULAR: No chest pain, palpitations, dizziness, or leg swelling  GASTROINTESTINAL: No abdominal or epigastric pain. No nausea, vomiting, or hematemesis; No diarrhea or constipation. No melena or hematochezia.  GENITOURINARY: No dysuria, frequency, hematuria, or incontinence  NEUROLOGICAL: No headaches, memory loss, loss of strength, numbness, or tremors  SKIN: No itching, burning, rashes, or lesions   LYMPH NODES: No enlarged glands  ENDOCRINE: No heat or cold intolerance; No hair loss  MUSCULOSKELETAL: No joint pain or swelling; No muscle, back, or extremity pain  PSYCHIATRIC: No depression, anxiety, mood swings, or difficulty sleeping  HEME/LYMPH: No easy bruising, or bleeding gums  ALLERY AND IMMUNOLOGIC: No hives or eczema    RADIOLOGY & ADDITIONAL TESTS:    Imaging Personally Reviewed:  [ x] YES  [ ] NO    Consultant(s) Notes Reviewed:  [x ] YES  [ ] NO    PHYSICAL EXAM:  GENERAL: NAD  HEAD:  Atraumatic, Normocephalic  EYES: EOMI, PERRLA, conjunctiva and sclera clear  ENMT: No tonsillar erythema, exudates, or enlargement; Moist mucous membranes, Good dentition, No lesions  NECK: Supple, No JVD, Normal thyroid  NERVOUS SYSTEM:  Alert & Oriented X3, Good concentration; Motor Strength 5/5 B/L upper and lower extremities; DTRs 2+ intact and symmetric  CHEST/LUNG: Clear to percussion bilaterally; No rales, rhonchi, wheezing, or rubs  HEART: Regular rate and rhythm; No murmurs, rubs, or gallops  ABDOMEN: Soft, Nontender, Nondistended; Bowel sounds present  EXTREMITIES:  2+ Peripheral Pulses, No clubbing, cyanosis, or edema  LYMPH: No lymphadenopathy noted  SKIN: No rashes or lesions    Care Discussed with Consultants/Other Providers [ ] YES  [ ] NO

## 2017-08-28 PROCEDURE — 99233 SBSQ HOSP IP/OBS HIGH 50: CPT

## 2017-08-28 RX ADMIN — Medication 50 MILLIGRAM(S): at 05:30

## 2017-08-28 RX ADMIN — CARVEDILOL PHOSPHATE 12.5 MILLIGRAM(S): 80 CAPSULE, EXTENDED RELEASE ORAL at 05:30

## 2017-08-28 RX ADMIN — CINACALCET 30 MILLIGRAM(S): 30 TABLET, FILM COATED ORAL at 20:10

## 2017-08-28 RX ADMIN — HEPARIN SODIUM 5000 UNIT(S): 5000 INJECTION INTRAVENOUS; SUBCUTANEOUS at 17:07

## 2017-08-28 RX ADMIN — VALSARTAN 320 MILLIGRAM(S): 80 TABLET ORAL at 05:30

## 2017-08-28 RX ADMIN — Medication 50 MILLIGRAM(S): at 22:55

## 2017-08-28 RX ADMIN — Medication 50 MILLIGRAM(S): at 13:45

## 2017-08-28 RX ADMIN — Medication 4 MILLIGRAM(S): at 22:55

## 2017-08-28 RX ADMIN — Medication 40 MILLIGRAM(S): at 05:30

## 2017-08-28 RX ADMIN — HEPARIN SODIUM 5000 UNIT(S): 5000 INJECTION INTRAVENOUS; SUBCUTANEOUS at 05:31

## 2017-08-28 RX ADMIN — PIPERACILLIN AND TAZOBACTAM 25 GRAM(S): 4; .5 INJECTION, POWDER, LYOPHILIZED, FOR SOLUTION INTRAVENOUS at 05:30

## 2017-08-28 RX ADMIN — PIPERACILLIN AND TAZOBACTAM 25 GRAM(S): 4; .5 INJECTION, POWDER, LYOPHILIZED, FOR SOLUTION INTRAVENOUS at 13:44

## 2017-08-28 RX ADMIN — PIPERACILLIN AND TAZOBACTAM 25 GRAM(S): 4; .5 INJECTION, POWDER, LYOPHILIZED, FOR SOLUTION INTRAVENOUS at 22:56

## 2017-08-28 RX ADMIN — CARVEDILOL PHOSPHATE 12.5 MILLIGRAM(S): 80 CAPSULE, EXTENDED RELEASE ORAL at 17:07

## 2017-08-28 NOTE — PROGRESS NOTE ADULT - SUBJECTIVE AND OBJECTIVE BOX
Date/Time Patient Seen:  		  Referring MD:   Data Reviewed	       Patient is a 89y old  Female who presents with a chief complaint of cough, mild SOB (25 Aug 2017 15:51)  in bed  seen and examined  vs and meds reviewed  on oxygen  am labs pending  on ABX IV        Subjective/HPI     PAST MEDICAL & SURGICAL HISTORY:  Anemia, unspecified type  Edema of lower extremity  Heart murmur  Hypertension  Hypercalcemia  History of appendectomy  S/P tonsillectomy  H/O parathyroidectomy        Medication list         MEDICATIONS  (STANDING):  heparin  Injectable 5000 Unit(s) SubCutaneous every 12 hours  valsartan 320 milliGRAM(s) Oral daily  carvedilol 12.5 milliGRAM(s) Oral every 12 hours  cinacalcet 30 milliGRAM(s) Oral daily  furosemide   Injectable 40 milliGRAM(s) IV Push daily  hydrALAZINE 50 milliGRAM(s) Oral three times a day  doxazosin 4 milliGRAM(s) Oral at bedtime  piperacillin/tazobactam IVPB. 3.375 Gram(s) IV Intermittent every 8 hours    MEDICATIONS  (PRN):  ALBUTerol    0.083% 2.5 milliGRAM(s) Nebulizer every 8 hours PRN Shortness of Breath and/or Wheezing         Vitals log        ICU Vital Signs Last 24 Hrs  T(C): 36.4 (28 Aug 2017 04:30), Max: 36.7 (27 Aug 2017 23:26)  T(F): 97.6 (28 Aug 2017 04:30), Max: 98 (27 Aug 2017 23:26)  HR: 65 (28 Aug 2017 04:30) (60 - 76)  BP: 156/59 (28 Aug 2017 04:30) (114/54 - 180/68)  BP(mean): --  ABP: --  ABP(mean): --  RR: 17 (28 Aug 2017 04:30) (16 - 17)  SpO2: 95% (28 Aug 2017 04:30) (91% - 98%)           Input and Output:  I&O's Detail    27 Aug 2017 07:01  -  28 Aug 2017 06:06  --------------------------------------------------------  IN:    Solution: 100 mL  Total IN: 100 mL    OUT:  Total OUT: 0 mL    Total NET: 100 mL          Lab Data                        9.4    12.5  )-----------( 363      ( 27 Aug 2017 06:36 )             28.2     08-27    134<L>  |  97  |  34<H>  ----------------------------<  103<H>  3.4<L>   |  28  |  0.94    Ca    9.0      27 Aug 2017 06:36  Mg     1.7     08-27              Review of Systems	      Objective     Physical Examination  head at  heart - s1s2  lungs - dec BS  abd - soft  cn grossly int  kyphosis        Pertinent Lab findings & Imaging      Richard:  NO   Adequate UO     I&O's Detail    27 Aug 2017 07:01  -  28 Aug 2017 06:06  --------------------------------------------------------  IN:    Solution: 100 mL  Total IN: 100 mL    OUT:  Total OUT: 0 mL    Total NET: 100 mL               Discussed with:     Cultures:	        Radiology

## 2017-08-28 NOTE — PROGRESS NOTE ADULT - SUBJECTIVE AND OBJECTIVE BOX
Patient is a 89y old  Female who presents with a chief complaint of cough, mild SOB (25 Aug 2017 15:51)      INTERVAL HPI/OVERNIGHT EVENTS:  T(C): 36.4 (08-28-17 @ 04:30), Max: 36.7 (08-27-17 @ 23:26)  HR: 65 (08-28-17 @ 04:30) (60 - 76)  BP: 156/59 (08-28-17 @ 04:30) (114/54 - 180/68)  RR: 17 (08-28-17 @ 04:30) (16 - 17)  SpO2: 95% (08-28-17 @ 04:30) (91% - 98%)  Wt(kg): --  I&O's Summary    27 Aug 2017 07:01  -  28 Aug 2017 07:00  --------------------------------------------------------  IN: 100 mL / OUT: 0 mL / NET: 100 mL        LABS:                        9.4    12.5  )-----------( 363      ( 27 Aug 2017 06:36 )             28.2     08-27    134<L>  |  97  |  34<H>  ----------------------------<  103<H>  3.4<L>   |  28  |  0.94    Ca    9.0      27 Aug 2017 06:36  Mg     1.7     08-27          CAPILLARY BLOOD GLUCOSE                MEDICATIONS  (STANDING):  heparin  Injectable 5000 Unit(s) SubCutaneous every 12 hours  valsartan 320 milliGRAM(s) Oral daily  carvedilol 12.5 milliGRAM(s) Oral every 12 hours  cinacalcet 30 milliGRAM(s) Oral daily  furosemide   Injectable 40 milliGRAM(s) IV Push daily  hydrALAZINE 50 milliGRAM(s) Oral three times a day  doxazosin 4 milliGRAM(s) Oral at bedtime  piperacillin/tazobactam IVPB. 3.375 Gram(s) IV Intermittent every 8 hours    MEDICATIONS  (PRN):  ALBUTerol    0.083% 2.5 milliGRAM(s) Nebulizer every 8 hours PRN Shortness of Breath and/or Wheezing      REVIEW OF SYSTEMS:  CONSTITUTIONAL: No fever, weight loss, or fatigue  EYES: No eye pain, visual disturbances, or discharge  ENMT:  No difficulty hearing, tinnitus, vertigo; No sinus or throat pain  NECK: No pain or stiffness  RESPIRATORY: No cough, wheezing, chills or hemoptysis; No shortness of breath  CARDIOVASCULAR: No chest pain, palpitations, dizziness, or leg swelling  GASTROINTESTINAL: No abdominal or epigastric pain. No nausea, vomiting, or hematemesis; No diarrhea or constipation. No melena or hematochezia.  GENITOURINARY: No dysuria, frequency, hematuria, or incontinence  NEUROLOGICAL: No headaches, memory loss, loss of strength, numbness, or tremors  SKIN: No itching, burning, rashes, or lesions   LYMPH NODES: No enlarged glands  ENDOCRINE: No heat or cold intolerance; No hair loss  MUSCULOSKELETAL: No joint pain or swelling; No muscle, back, or extremity pain  PSYCHIATRIC: No depression, anxiety, mood swings, or difficulty sleeping  HEME/LYMPH: No easy bruising, or bleeding gums  ALLERY AND IMMUNOLOGIC: No hives or eczema    RADIOLOGY & ADDITIONAL TESTS:    Imaging Personally Reviewed:  [ ] YES  [ ] NO    Consultant(s) Notes Reviewed:  [ ] YES  [ ] NO    PHYSICAL EXAM:  GENERAL: NAD, well-groomed, well-developed  HEAD:  Atraumatic, Normocephalic  EYES: EOMI, PERRLA, conjunctiva and sclera clear  ENMT: No tonsillar erythema, exudates, or enlargement; Moist mucous membranes, Good dentition, No lesions  NECK: Supple, No JVD, Normal thyroid  NERVOUS SYSTEM:  Alert & Oriented X3, Good concentration; Motor Strength 5/5 B/L upper and lower extremities; DTRs 2+ intact and symmetric  CHEST/LUNG: Clear to percussion bilaterally; No rales, rhonchi, wheezing, or rubs  HEART: Regular rate and rhythm; No murmurs, rubs, or gallops  ABDOMEN: Soft, Nontender, Nondistended; Bowel sounds present  EXTREMITIES:  2+ Peripheral Pulses, No clubbing, cyanosis, or edema  LYMPH: No lymphadenopathy noted  SKIN: No rashes or lesions    Care Discussed with Consultants/Other Providers [ ] YES  [ ] NO Patient is a 89y old  Female who presents with a chief complaint of cough, mild SOB (25 Aug 2017 15:51)      INTERVAL HPI/OVERNIGHT EVENTS:  No overnight events    T(C): 36.4 (08-28-17 @ 04:30), Max: 36.7 (08-27-17 @ 23:26)  HR: 65 (08-28-17 @ 04:30) (60 - 76)  BP: 156/59 (08-28-17 @ 04:30) (114/54 - 180/68)  RR: 17 (08-28-17 @ 04:30) (16 - 17)  SpO2: 95% (08-28-17 @ 04:30) (91% - 98%)  Wt(kg): --  I&O's Summary    27 Aug 2017 07:01  -  28 Aug 2017 07:00  --------------------------------------------------------  IN: 100 mL / OUT: 0 mL / NET: 100 mL        LABS:                        9.4    12.5  )-----------( 363      ( 27 Aug 2017 06:36 )             28.2     08-27    134<L>  |  97  |  34<H>  ----------------------------<  103<H>  3.4<L>   |  28  |  0.94    Ca    9.0      27 Aug 2017 06:36  Mg     1.7     08-27                    MEDICATIONS  (STANDING):  heparin  Injectable 5000 Unit(s) SubCutaneous every 12 hours  valsartan 320 milliGRAM(s) Oral daily  carvedilol 12.5 milliGRAM(s) Oral every 12 hours  cinacalcet 30 milliGRAM(s) Oral daily  furosemide   Injectable 40 milliGRAM(s) IV Push daily  hydrALAZINE 50 milliGRAM(s) Oral three times a day  doxazosin 4 milliGRAM(s) Oral at bedtime  piperacillin/tazobactam IVPB. 3.375 Gram(s) IV Intermittent every 8 hours    MEDICATIONS  (PRN):  ALBUTerol    0.083% 2.5 milliGRAM(s) Nebulizer every 8 hours PRN Shortness of Breath and/or Wheezing      REVIEW OF SYSTEMS:  Constitutional: No fever, fatigue  RESPIRATORY: No cough, wheezing, chills or hemoptysis; No shortness of breath  CARDIOVASCULAR: No chest pain, palpitations, dizziness, or leg swelling  GASTROINTESTINAL: No abdominal or epigastric pain. No nausea, vomiting, or hematemesis; No diarrhea or constipation. No melena or hematochezia.  GENITOURINARY: No dysuria, frequency, hematuria  NEUROLOGICAL: No headaches, dizziness  MUSCULOSKELETAL: No joint pain or swelling; No muscle, back, or extremity pain    RADIOLOGY & ADDITIONAL TESTS:  CT Chest No contrast:   IMPRESSION: Patchy ground glass opacities throughout both lungs which may   reflect multifocal pneumonia versus pulmonary edema.    Small to moderate-sized pleural effusions with adjacent atelectasis.    Cardiomegaly.    Trace pericardial effusion.    1.4 cm hypodense right-sided thyroid nodule. Recommend further evaluation   with ultrasound examination.      Imaging Personally Reviewed:  [x ] YES  [ ] NO    Consultant(s) Notes Reviewed:  [ x] YES  [ ] NO    PHYSICAL EXAM:  GENERAL: NAD, well-developed, on 4L NC  HEAD:  Atraumatic, Normocephalic  NERVOUS SYSTEM:  Alert & Oriented X2 (person and place)  CHEST/LUNG: Crackles in bilateral lower lung fields, no wheezes or rhonchi  HEART: Regular rate and rhythm; No murmurs, rubs, or gallops  ABDOMEN: Soft, Nontender, Nondistended; Bowel sounds present  EXTREMITIES:  2+ Peripheral Pulses, No clubbing, cyanosis, or edema  LYMPH: No lymphadenopathy noted  SKIN: No rashes or lesions    Care Discussed with Consultants/Other Providers [x] YES  [ ] NO

## 2017-08-28 NOTE — PROGRESS NOTE ADULT - PROBLEM SELECTOR PLAN 2
new onset cough (?productive), mild sob. elevated WBC (no lactate)  - CXR reads B/l interstitial opacities, likely superimposed pna  - Continue Zosyn for HCAP  - Pulm - Dr. Valdovinos - agrees with plan and will add Vanco based on cx  -monitor labs and vitals per routine new onset cough (?productive), mild sob. elevated WBC (no lactate)  - CXR reads B/l interstitial opacities, likely superimposed pna  - Continue Zosyn for HCAP, complete 7 day course on 8/31  - Pulm - Dr. Valdovinos - agrees with plan and will add Vanco based on cx  -monitor labs and vitals per routine

## 2017-08-28 NOTE — PROGRESS NOTE ADULT - ASSESSMENT
89f diastolic chf (EF 65%, echo July 2017), htn, anemia, p/w cough (?productive), congestion x past ~ 2 days, mild SOB. Found with pna, pleural effusions.    -did have svt, possibly, rapid af/flutter in the setting of pna, now in sr  -continue tele monitoring  -if develops recurrent atrial arrhythmias, may need additional measures, possibly antiarrhythmic, and possibly ac  -cont abx for pna   -cont lasix iv for now  -bp control with present meds  -dvt ppx  -will follow

## 2017-08-28 NOTE — PHYSICAL THERAPY INITIAL EVALUATION ADULT - PERTINENT HX OF CURRENT PROBLEM, REHAB EVAL
89 yo F pmhx diastolic chf (EF 65%, echo July 2017), htn, anemia, p/w cough  congestion x past ~ 2 days, mild SOB.

## 2017-08-28 NOTE — PROGRESS NOTE ADULT - SUBJECTIVE AND OBJECTIVE BOX
[facility]    Christofer Isaacs, Sorin, Marisa, Jr, Hieu, Sandro      [phone]    CHIEF COMPLAINT: Patient is a 89y old  Female who presents with a chief complaint of cough, mild SOB (25 Aug 2017 15:51)      Follow Up: hfpef, af rvr, now sr    Interim history: The patient reports no new symptoms.  Denies chest discomfort.  Improved shortness of breath.  No abdominal pain.  No new neurologic symptoms.      MEDICATIONS  (STANDING):  heparin  Injectable 5000 Unit(s) SubCutaneous every 12 hours  valsartan 320 milliGRAM(s) Oral daily  carvedilol 12.5 milliGRAM(s) Oral every 12 hours  cinacalcet 30 milliGRAM(s) Oral daily  furosemide   Injectable 40 milliGRAM(s) IV Push daily  hydrALAZINE 50 milliGRAM(s) Oral three times a day  doxazosin 4 milliGRAM(s) Oral at bedtime  piperacillin/tazobactam IVPB. 3.375 Gram(s) IV Intermittent every 8 hours    MEDICATIONS  (PRN):  ALBUTerol    0.083% 2.5 milliGRAM(s) Nebulizer every 8 hours PRN Shortness of Breath and/or Wheezing      REVIEW OF SYSTEMS:  eye, ent, GI, , allergic, dermatologic, musculoskeletal and neurologic are negative except as described above    Vital Signs Last 24 Hrs  T(C): 36.4 (28 Aug 2017 04:30), Max: 36.7 (27 Aug 2017 23:26)  T(F): 97.6 (28 Aug 2017 04:30), Max: 98 (27 Aug 2017 23:26)  HR: 65 (28 Aug 2017 04:30) (60 - 76)  BP: 156/59 (28 Aug 2017 04:30) (114/54 - 180/68)  BP(mean): --  RR: 17 (28 Aug 2017 04:30) (16 - 17)  SpO2: 95% (28 Aug 2017 04:30) (91% - 98%)    I&O's Summary    27 Aug 2017 07:01  -  28 Aug 2017 07:00  --------------------------------------------------------  IN: 100 mL / OUT: 0 mL / NET: 100 mL         Telemetry past 24h: sb/sr    PHYSICAL EXAM:    Constitutional: well-nourished, well-developed, NAD   HEENT:  MMM, sclerae anicteric, conjunctivae clear, no oral cyanosis.  Pulmonary: Non-labored, rhonchi right with coarse marla bs  Cardiovascular: Regular, S1 and S2.  2/6 ant syst murmur.  No rubs, gallops or clicks  Gastrointestinal: Bowel Sounds present, soft, nontender.   Lymph: No peripheral edema.   Neurological: Alert, no focal deficits  Skin: No rashes.  Psych:  Mood & affect appropriate    LABS: All Labs Reviewed:                        9.4    12.5  )-----------( 363      ( 27 Aug 2017 06:36 )             28.2                         9.4    13.6  )-----------( 334      ( 26 Aug 2017 06:22 )             28.1     27 Aug 2017 06:36    134    |  97     |  34     ----------------------------<  103    3.4     |  28     |  0.94   26 Aug 2017 06:22    131    |  96     |  33     ----------------------------<  103    3.8     |  23     |  0.95     Ca    9.0        27 Aug 2017 06:36  Ca    8.4        26 Aug 2017 06:22  Mg     1.7       27 Aug 2017 06:36            Blood Culture:   08-26 @ 06:22  Pro Bnp 3506        RADIOLOGY:    < from: CT Chest No Cont (08.25.17 @ 15:42) >    EXAM:  CT CHEST                            PROCEDURE DATE:  08/25/2017          INTERPRETATION:  .    CLINICAL INFORMATION: Evaluate effusion and atelectasis.,    TECHNIQUE: Helical axial images of the chest were obtained from the   thoracic inlet to the adrenal glands without the administration of   intravenous contrast. Sagittal and coronal reformations were obtained   from the source data. Axial MIP images were reconstructed at a dedicated   separate workstation and also reviewed.    COMPARISON: Prior chest CT examination from 4/26/2017 prior chest x-ray   examination from 8/4/2017.     FINDINGS: The right lobe of the thyroid gland is enlarged compared to the   left and contains hypodense nodules. The largest nodule measures up to   1.4 cm and appears unchanged from the prior CT examination.    There is no axillary adenopathy. Multiple subcentimeter sized mediastinal   lymph nodes are noted. There is no hilar lymphadenopathy.    The heart size is mildly enlarged. There is a trace pericardial effusion.   There are atherosclerotic calcifications of the imaged coronary arteries,   aorta, and branch vessels. The imaged portions of the aorta are normal in   caliber.    The central airways are patent. There are small to moderate-sized   bilateral pleural effusions with adjacent atelectasis. Patchy opacities   are notable within the bilateral upper lobes, right middle lobe, and   lingula.    Subcentimeter sized nonobstructing left-sided intrarenal stones are   notable. There is nonspecific bilateral perinephric stranding. There are   mild multilevel degenerative changes of the thoracic spine.    Degenerative changes are notable within the bilateral shoulders.    IMPRESSION: Patchy ground glass opacities throughout both lungs which may   reflect multifocal pneumonia versus pulmonary edema.    Small to moderate-sized pleural effusions with adjacent atelectasis.    Cardiomegaly.    Trace pericardial effusion.    1.4 cm hypodense right-sided thyroid nodule. Recommend further evaluation   with ultrasound examination.                LAVELL CALVILLO M.D., ATTENDING RADIOLOGIST  This document has been electronically signed. Aug 25 2017  3:58PM                < end of copied text >    EKG:    Echo:    < from: TTE Echo Doppler w/o Cont (07.09.17 @ 08:32) >     EXAM:  ECHO TTE W/O CON COMP W/DOPPLR         PROCEDURE DATE:  07/09/2017        INTERPRETATION:  Ordering Physician: Unknown Doctor    Indication: Hypertension    Study Quality: Technically fair  A complete echocardiographic study was performed utilizing standard   protocol including spectral and color Doppler in all echocardiographic   windows.    Height: 152 cm  Weight: 49 kg  BSA: 1.4 sq m  Blood Pressure: 189/63    MEASUREMENTS  IVS: 1.0cm  PWT: 1.0cm  LA: 4.2cm  AO: 2.7cm  LVIDd: 4.5cm  LVIDs: 3.3cm    LVEF: 65%  RVSP: 54mmHg    FINDINGS  Left Ventricle:  Normal left ventricular systolic function.  Aortic Valve: Calcified trileaflet aortic valve. Mild aortic   insufficiency.  Mitral Valve: Mitral annular calcification and calcified mitral valve   leaflets with normal diastolic opening. Mild mitral insufficiency.  Tricuspid Valve: Normal tricuspid valve. Mild tricuspid insufficiency.  Pulmonic Valve: Normal pulmonic valve. Mild pulmonic insufficiency.  Left Atrium: Mildly enlarged.  Right Ventricle: Normal right ventricular size and systolic function.  Right Atrium: Normal  Diastolic Function: Grade 1 diastolic dysfunction.  Pericardium/Pleura: Normal pericardium with no pericardial effusion.                      MORENA ADORNO M.D., ATTENDING CARDIOLOGIST  This document has been electronically signed. Jul 9 2017 10:13AM                < end of copied text >

## 2017-08-29 LAB
ANION GAP SERPL CALC-SCNC: 10 MMOL/L — SIGNIFICANT CHANGE UP (ref 5–17)
BASOPHILS # BLD AUTO: 0.1 K/UL — SIGNIFICANT CHANGE UP (ref 0–0.2)
BASOPHILS NFR BLD AUTO: 0.9 % — SIGNIFICANT CHANGE UP (ref 0–2)
BUN SERPL-MCNC: 28 MG/DL — HIGH (ref 7–23)
CALCIUM SERPL-MCNC: 9.1 MG/DL — SIGNIFICANT CHANGE UP (ref 8.5–10.1)
CHLORIDE SERPL-SCNC: 97 MMOL/L — SIGNIFICANT CHANGE UP (ref 96–108)
CO2 SERPL-SCNC: 29 MMOL/L — SIGNIFICANT CHANGE UP (ref 22–31)
CREAT SERPL-MCNC: 0.76 MG/DL — SIGNIFICANT CHANGE UP (ref 0.5–1.3)
EOSINOPHIL # BLD AUTO: 0.2 K/UL — SIGNIFICANT CHANGE UP (ref 0–0.5)
EOSINOPHIL NFR BLD AUTO: 1.3 % — SIGNIFICANT CHANGE UP (ref 0–6)
GLUCOSE SERPL-MCNC: 110 MG/DL — HIGH (ref 70–99)
HCT VFR BLD CALC: 29 % — LOW (ref 34.5–45)
HGB BLD-MCNC: 9.5 G/DL — LOW (ref 11.5–15.5)
LYMPHOCYTES # BLD AUTO: 1.7 K/UL — SIGNIFICANT CHANGE UP (ref 1–3.3)
LYMPHOCYTES # BLD AUTO: 10.3 % — LOW (ref 13–44)
MCHC RBC-ENTMCNC: 28.5 PG — SIGNIFICANT CHANGE UP (ref 27–34)
MCHC RBC-ENTMCNC: 32.7 GM/DL — SIGNIFICANT CHANGE UP (ref 32–36)
MCV RBC AUTO: 87.3 FL — SIGNIFICANT CHANGE UP (ref 80–100)
MONOCYTES # BLD AUTO: 1.2 K/UL — HIGH (ref 0–0.9)
MONOCYTES NFR BLD AUTO: 7.3 % — SIGNIFICANT CHANGE UP (ref 1–9)
NEUTROPHILS # BLD AUTO: 13 K/UL — HIGH (ref 1.8–7.4)
NEUTROPHILS NFR BLD AUTO: 80.2 % — HIGH (ref 43–77)
PLATELET # BLD AUTO: 352 K/UL — SIGNIFICANT CHANGE UP (ref 150–400)
POTASSIUM SERPL-MCNC: 3.4 MMOL/L — LOW (ref 3.5–5.3)
POTASSIUM SERPL-SCNC: 3.4 MMOL/L — LOW (ref 3.5–5.3)
RBC # BLD: 3.32 M/UL — LOW (ref 3.8–5.2)
RBC # FLD: 13.2 % — SIGNIFICANT CHANGE UP (ref 10.3–14.5)
SODIUM SERPL-SCNC: 136 MMOL/L — SIGNIFICANT CHANGE UP (ref 135–145)
WBC # BLD: 16.2 K/UL — HIGH (ref 3.8–10.5)
WBC # FLD AUTO: 16.2 K/UL — HIGH (ref 3.8–10.5)

## 2017-08-29 PROCEDURE — 99233 SBSQ HOSP IP/OBS HIGH 50: CPT

## 2017-08-29 RX ORDER — POTASSIUM CHLORIDE 20 MEQ
40 PACKET (EA) ORAL ONCE
Qty: 0 | Refills: 0 | Status: COMPLETED | OUTPATIENT
Start: 2017-08-29 | End: 2017-08-29

## 2017-08-29 RX ADMIN — PIPERACILLIN AND TAZOBACTAM 25 GRAM(S): 4; .5 INJECTION, POWDER, LYOPHILIZED, FOR SOLUTION INTRAVENOUS at 21:46

## 2017-08-29 RX ADMIN — Medication 4 MILLIGRAM(S): at 21:47

## 2017-08-29 RX ADMIN — CARVEDILOL PHOSPHATE 12.5 MILLIGRAM(S): 80 CAPSULE, EXTENDED RELEASE ORAL at 06:04

## 2017-08-29 RX ADMIN — PIPERACILLIN AND TAZOBACTAM 25 GRAM(S): 4; .5 INJECTION, POWDER, LYOPHILIZED, FOR SOLUTION INTRAVENOUS at 06:05

## 2017-08-29 RX ADMIN — PIPERACILLIN AND TAZOBACTAM 25 GRAM(S): 4; .5 INJECTION, POWDER, LYOPHILIZED, FOR SOLUTION INTRAVENOUS at 13:07

## 2017-08-29 RX ADMIN — HEPARIN SODIUM 5000 UNIT(S): 5000 INJECTION INTRAVENOUS; SUBCUTANEOUS at 17:13

## 2017-08-29 RX ADMIN — HEPARIN SODIUM 5000 UNIT(S): 5000 INJECTION INTRAVENOUS; SUBCUTANEOUS at 06:04

## 2017-08-29 RX ADMIN — Medication 50 MILLIGRAM(S): at 21:46

## 2017-08-29 RX ADMIN — Medication 40 MILLIEQUIVALENT(S): at 17:13

## 2017-08-29 RX ADMIN — Medication 50 MILLIGRAM(S): at 06:04

## 2017-08-29 RX ADMIN — CINACALCET 30 MILLIGRAM(S): 30 TABLET, FILM COATED ORAL at 19:48

## 2017-08-29 RX ADMIN — CARVEDILOL PHOSPHATE 12.5 MILLIGRAM(S): 80 CAPSULE, EXTENDED RELEASE ORAL at 17:14

## 2017-08-29 RX ADMIN — Medication 40 MILLIGRAM(S): at 06:04

## 2017-08-29 RX ADMIN — Medication 50 MILLIGRAM(S): at 13:07

## 2017-08-29 RX ADMIN — VALSARTAN 320 MILLIGRAM(S): 80 TABLET ORAL at 06:04

## 2017-08-29 NOTE — PROGRESS NOTE ADULT - PROBLEM SELECTOR PLAN 1
Pt. had developed recent cough and mild SOB. Recent echo shows EF 65%.   - CXR read shows rt. pleural effusion, hazy and b/l opacities, congestion.  -CT chest: pulm edema vs multifocal PNA  - proBNP elevated, but improved (3506)  - hold fluids in setting of CHF  - change home dose of lasix to 40 IV daily  - cardio consult to see pt. in AM (Shital), pulm consult AM (Aruna)  - monitor labs and vitals per routine Pt. had developed recent cough and mild SOB. Recent echo shows EF 65%.   - CXR read shows rt. pleural effusion, hazy and b/l opacities, congestion.  -CT chest: pulm edema vs multifocal PNA  - proBNP elevated, but improved (3506)  - hold fluids in setting of CHF  - change home dose of lasix to 40 IV daily  - cardio f/u (Shital), pulm f/u (Aruna)  - monitor labs and vitals per routine

## 2017-08-29 NOTE — PROGRESS NOTE ADULT - SUBJECTIVE AND OBJECTIVE BOX
Patient is a 89y old  Female who presents with a chief complaint of cough, mild SOB (25 Aug 2017 15:51)      INTERVAL HPI/OVERNIGHT EVENTS:  T(C): 37.1 (08-29-17 @ 07:37), Max: 37.1 (08-29-17 @ 07:37)  HR: 65 (08-29-17 @ 07:37) (63 - 76)  BP: 134/58 (08-29-17 @ 07:37) (134/58 - 156/68)  RR: 18 (08-29-17 @ 07:37) (16 - 18)  SpO2: 94% (08-29-17 @ 07:37) (94% - 98%)  Wt(kg): --  I&O's Summary    28 Aug 2017 07:01  -  29 Aug 2017 07:00  --------------------------------------------------------  IN: 370 mL / OUT: 0 mL / NET: 370 mL        LABS:    08-29    136  |  97  |  28<H>  ----------------------------<  110<H>  3.4<L>   |  29  |  0.76    Ca    9.1      29 Aug 2017 06:49                    MEDICATIONS  (STANDING):  heparin  Injectable 5000 Unit(s) SubCutaneous every 12 hours  valsartan 320 milliGRAM(s) Oral daily  carvedilol 12.5 milliGRAM(s) Oral every 12 hours  cinacalcet 30 milliGRAM(s) Oral daily  furosemide   Injectable 40 milliGRAM(s) IV Push daily  hydrALAZINE 50 milliGRAM(s) Oral three times a day  doxazosin 4 milliGRAM(s) Oral at bedtime  piperacillin/tazobactam IVPB. 3.375 Gram(s) IV Intermittent every 8 hours    MEDICATIONS  (PRN):  ALBUTerol    0.083% 2.5 milliGRAM(s) Nebulizer every 8 hours PRN Shortness of Breath and/or Wheezing      REVIEW OF SYSTEMS:  CONSTITUTIONAL: No fever, weight loss, or fatigue  EYES: No eye pain, visual disturbances, or discharge  ENMT:  No difficulty hearing, tinnitus, vertigo; No sinus or throat pain  NECK: No pain or stiffness  RESPIRATORY: No cough, wheezing, chills or hemoptysis; No shortness of breath  CARDIOVASCULAR: No chest pain, palpitations, dizziness, or leg swelling  GASTROINTESTINAL: No abdominal or epigastric pain. No nausea, vomiting, or hematemesis; No diarrhea or constipation. No melena or hematochezia.  GENITOURINARY: No dysuria, frequency, hematuria, or incontinence  NEUROLOGICAL: No headaches, memory loss, loss of strength, numbness, or tremors  SKIN: No itching, burning, rashes, or lesions   LYMPH NODES: No enlarged glands  ENDOCRINE: No heat or cold intolerance; No hair loss  MUSCULOSKELETAL: No joint pain or swelling; No muscle, back, or extremity pain  PSYCHIATRIC: No depression, anxiety, mood swings, or difficulty sleeping  HEME/LYMPH: No easy bruising, or bleeding gums  ALLERY AND IMMUNOLOGIC: No hives or eczema    RADIOLOGY & ADDITIONAL TESTS:    Imaging Personally Reviewed:  [ ] YES  [ ] NO    Consultant(s) Notes Reviewed:  [ ] YES  [ ] NO    PHYSICAL EXAM:  GENERAL: NAD, well-groomed, well-developed  HEAD:  Atraumatic, Normocephalic  EYES: EOMI, PERRLA, conjunctiva and sclera clear  ENMT: No tonsillar erythema, exudates, or enlargement; Moist mucous membranes, Good dentition, No lesions  NECK: Supple, No JVD, Normal thyroid  NERVOUS SYSTEM:  Alert & Oriented X3, Good concentration; Motor Strength 5/5 B/L upper and lower extremities; DTRs 2+ intact and symmetric  CHEST/LUNG: Clear to percussion bilaterally; No rales, rhonchi, wheezing, or rubs  HEART: Regular rate and rhythm; No murmurs, rubs, or gallops  ABDOMEN: Soft, Nontender, Nondistended; Bowel sounds present  EXTREMITIES:  2+ Peripheral Pulses, No clubbing, cyanosis, or edema  LYMPH: No lymphadenopathy noted  SKIN: No rashes or lesions    Care Discussed with Consultants/Other Providers [ ] YES  [ ] NO

## 2017-08-29 NOTE — PROGRESS NOTE ADULT - PROBLEM SELECTOR PLAN 4
on ABX IV  WBC trending down  complete 7 day course  asp prec  oral care  HOB elevation  am labs pending  optimize cvs regimen  prognosis guarded - advanced age and multiple medical issues

## 2017-08-29 NOTE — PROGRESS NOTE ADULT - ASSESSMENT
89f diastolic chf (EF 65%, echo July 2017), htn, anemia, p/w cough (?productive), congestion x past ~ 2 days, mild SOB. Found with pna, pleural effusions. Seems more comfortable but still with desaturations when walking    -did have svt, possibly, rapid af/flutter in the setting of pna, now in sr  -continue tele monitoring  -if develops recurrent atrial arrhythmias, may need additional measures, possibly antiarrhythmic, and possibly ac  -cont abx for pna   -cont lasix iv 40 for now. Elevated BNP noted. Watch creatinine and electrolytes  -bp control with present meds  -dvt ppx  -will follow

## 2017-08-29 NOTE — PROGRESS NOTE ADULT - PROBLEM SELECTOR PLAN 4
Pt. had hemoglobin of 9.1 on admission, 9.4 morning, Asymptomatic  - cont. to monitor daily. Pt. had hemoglobin of 9.1 on admission, 9.5 this morning, Asymptomatic  - cont. to monitor daily.

## 2017-08-29 NOTE — PROGRESS NOTE ADULT - PROBLEM SELECTOR PLAN 2
severe kyphosis  keep HOB elevated  monitor sat  may need chronic o2 support  keep sat > 90 pct  attempt PT as tolerated

## 2017-08-29 NOTE — PROGRESS NOTE ADULT - PROBLEM SELECTOR PLAN 3
Na 131, improving, likely secondary to dehydration and lasix  - cont. to trend BMP daily  - monitor BUN/ Cr (elevated since prior admissions, likely secondary to dehydration)  - hold fluids in setting of CHF, cont. lasix but monitor BMP Na 136, improved  - cont. to trend BMP daily  - monitor BUN/ Cr   - hold fluids in setting of CHF, cont. lasix but monitor BMP

## 2017-08-29 NOTE — PROGRESS NOTE ADULT - PROBLEM SELECTOR PLAN 2
new onset cough (?productive), mild sob. elevated WBC (no lactate)  - CXR reads B/l interstitial opacities, likely superimposed pna  - Continue Zosyn for HCAP, complete 7 day course on 8/31  - Pulm - Dr. Valdovinos - agrees with plan and will add Vanco based on cx  -monitor labs and vitals per routine new onset cough (?productive), mild sob. elevated WBC (no lactate)  - CXR reads B/l interstitial opacities, likely superimposed pna  - Continue Zosyn for HCAP, complete 7 day course on 8/31  - Pulm f/u  -monitor labs and vitals per routine

## 2017-08-29 NOTE — PROGRESS NOTE ADULT - SUBJECTIVE AND OBJECTIVE BOX
[facility] - Christofer Isaacs, Sorin, Marisa, Jr, Hieu, Deepakmike  Office Number:  [phone]    Interim history: The patient reports no new symptoms.  Denies chest discomfort.  Improved shortness of breath.  No abdominal pain.  No new neurologic symptoms.    ROS: negative unless otherwise mentioned.    Telemetry:  SR 70's, one run of PAT    MEDICATIONS  (STANDING):  heparin  Injectable 5000 Unit(s) SubCutaneous every 12 hours  valsartan 320 milliGRAM(s) Oral daily  carvedilol 12.5 milliGRAM(s) Oral every 12 hours  cinacalcet 30 milliGRAM(s) Oral daily  furosemide   Injectable 40 milliGRAM(s) IV Push daily  hydrALAZINE 50 milliGRAM(s) Oral three times a day  doxazosin 4 milliGRAM(s) Oral at bedtime  piperacillin/tazobactam IVPB. 3.375 Gram(s) IV Intermittent every 8 hours    MEDICATIONS  (PRN):  ALBUTerol    0.083% 2.5 milliGRAM(s) Nebulizer every 8 hours PRN Shortness of Breath and/or Wheezing      Allergies    Vasotec (Unknown)    Intolerances        Vital Signs Last 24 Hrs  T(C): 37.1 (29 Aug 2017 07:37), Max: 37.1 (29 Aug 2017 07:37)  T(F): 98.7 (29 Aug 2017 07:37), Max: 98.7 (29 Aug 2017 07:37)  HR: 65 (29 Aug 2017 07:37) (63 - 76)  BP: 134/58 (29 Aug 2017 07:37) (134/58 - 156/68)  BP(mean): --  RR: 18 (29 Aug 2017 07:37) (16 - 18)  SpO2: 94% (29 Aug 2017 07:37) (94% - 98%)    I&O's Summary    28 Aug 2017 07:01  -  29 Aug 2017 07:00  --------------------------------------------------------  IN: 370 mL / OUT: 0 mL / NET: 370 mL        ON EXAM:    Constitutional: well-nourished, well-developed, NAD   HEENT:  MMM, sclerae anicteric, conjunctivae clear, no oral cyanosis.  Pulmonary: Non-labored, rhonchi right with coarse marla bs  Cardiovascular: Regular, S1 and S2.  2/6 ant syst murmur.  No rubs, gallops or clicks  Gastrointestinal: Bowel Sounds present, soft, nontender.   Lymph: No peripheral edema.   Neurological: Alert, no focal deficits  Skin: No rashes.  Psych:  Mood & affect appropriate    LABS: All Labs Reviewed:                        9.5    16.2  )-----------( 352      ( 29 Aug 2017 06:49 )             29.0                         9.4    12.5  )-----------( 363      ( 27 Aug 2017 06:36 )             28.2     29 Aug 2017 06:49    136    |  97     |  28     ----------------------------<  110    3.4     |  29     |  0.76   27 Aug 2017 06:36    134    |  97     |  34     ----------------------------<  103    3.4     |  28     |  0.94     Ca    9.1        29 Aug 2017 06:49  Ca    9.0        27 Aug 2017 06:36  Mg     1.7       27 Aug 2017 06:36            Blood Culture:

## 2017-08-29 NOTE — PROGRESS NOTE ADULT - SUBJECTIVE AND OBJECTIVE BOX
Date/Time Patient Seen:  		  Referring MD:   Data Reviewed	       Patient is a 89y old  Female who presents with a chief complaint of cough, mild SOB (25 Aug 2017 15:51)  in bed  seen and examined  vs and meds reviewed  on ABX IV        Subjective/HPI     PAST MEDICAL & SURGICAL HISTORY:  Anemia, unspecified type  Edema of lower extremity  Heart murmur  Hypertension  Hypercalcemia  History of appendectomy  S/P tonsillectomy  H/O parathyroidectomy        Medication list         MEDICATIONS  (STANDING):  heparin  Injectable 5000 Unit(s) SubCutaneous every 12 hours  valsartan 320 milliGRAM(s) Oral daily  carvedilol 12.5 milliGRAM(s) Oral every 12 hours  cinacalcet 30 milliGRAM(s) Oral daily  furosemide   Injectable 40 milliGRAM(s) IV Push daily  hydrALAZINE 50 milliGRAM(s) Oral three times a day  doxazosin 4 milliGRAM(s) Oral at bedtime  piperacillin/tazobactam IVPB. 3.375 Gram(s) IV Intermittent every 8 hours    MEDICATIONS  (PRN):  ALBUTerol    0.083% 2.5 milliGRAM(s) Nebulizer every 8 hours PRN Shortness of Breath and/or Wheezing         Vitals log        ICU Vital Signs Last 24 Hrs  T(C): 36.3 (29 Aug 2017 04:25), Max: 36.7 (28 Aug 2017 23:24)  T(F): 97.4 (29 Aug 2017 04:25), Max: 98 (28 Aug 2017 23:24)  HR: 76 (29 Aug 2017 04:25) (63 - 76)  BP: 156/68 (29 Aug 2017 04:25) (136/54 - 156/68)  BP(mean): --  ABP: --  ABP(mean): --  RR: 18 (29 Aug 2017 04:25) (16 - 18)  SpO2: 95% (29 Aug 2017 04:25) (95% - 98%)           Input and Output:  I&O's Detail    27 Aug 2017 07:01  -  28 Aug 2017 07:00  --------------------------------------------------------  IN:    Solution: 100 mL  Total IN: 100 mL    OUT:  Total OUT: 0 mL    Total NET: 100 mL      28 Aug 2017 07:01  -  29 Aug 2017 06:46  --------------------------------------------------------  IN:    Oral Fluid: 270 mL    Solution: 100 mL  Total IN: 370 mL    OUT:  Total OUT: 0 mL    Total NET: 370 mL          Lab Data                  Review of Systems	      Objective     Physical Examination  head at  heart - s1s2  lungs - dec BS  abd - soft  cn grossly int  kyphosis  frail and weak          Pertinent Lab findings & Imaging      Richard:  NO   Adequate UO     I&O's Detail    27 Aug 2017 07:01  -  28 Aug 2017 07:00  --------------------------------------------------------  IN:    Solution: 100 mL  Total IN: 100 mL    OUT:  Total OUT: 0 mL    Total NET: 100 mL      28 Aug 2017 07:01  -  29 Aug 2017 06:46  --------------------------------------------------------  IN:    Oral Fluid: 270 mL    Solution: 100 mL  Total IN: 370 mL    OUT:  Total OUT: 0 mL    Total NET: 370 mL               Discussed with:     Cultures:	        Radiology

## 2017-08-30 LAB
ANION GAP SERPL CALC-SCNC: 10 MMOL/L — SIGNIFICANT CHANGE UP (ref 5–17)
BUN SERPL-MCNC: 25 MG/DL — HIGH (ref 7–23)
CALCIUM SERPL-MCNC: 9.3 MG/DL — SIGNIFICANT CHANGE UP (ref 8.5–10.1)
CHLORIDE SERPL-SCNC: 98 MMOL/L — SIGNIFICANT CHANGE UP (ref 96–108)
CO2 SERPL-SCNC: 28 MMOL/L — SIGNIFICANT CHANGE UP (ref 22–31)
CREAT SERPL-MCNC: 0.76 MG/DL — SIGNIFICANT CHANGE UP (ref 0.5–1.3)
CULTURE RESULTS: SIGNIFICANT CHANGE UP
CULTURE RESULTS: SIGNIFICANT CHANGE UP
GLUCOSE SERPL-MCNC: 90 MG/DL — SIGNIFICANT CHANGE UP (ref 70–99)
HCT VFR BLD CALC: 26.9 % — LOW (ref 34.5–45)
HGB BLD-MCNC: 9.1 G/DL — LOW (ref 11.5–15.5)
MAGNESIUM SERPL-MCNC: 1.8 MG/DL — SIGNIFICANT CHANGE UP (ref 1.6–2.6)
MCHC RBC-ENTMCNC: 29.3 PG — SIGNIFICANT CHANGE UP (ref 27–34)
MCHC RBC-ENTMCNC: 33.7 GM/DL — SIGNIFICANT CHANGE UP (ref 32–36)
MCV RBC AUTO: 86.9 FL — SIGNIFICANT CHANGE UP (ref 80–100)
PLATELET # BLD AUTO: 324 K/UL — SIGNIFICANT CHANGE UP (ref 150–400)
POTASSIUM SERPL-MCNC: 3.7 MMOL/L — SIGNIFICANT CHANGE UP (ref 3.5–5.3)
POTASSIUM SERPL-SCNC: 3.7 MMOL/L — SIGNIFICANT CHANGE UP (ref 3.5–5.3)
RBC # BLD: 3.1 M/UL — LOW (ref 3.8–5.2)
RBC # FLD: 13.3 % — SIGNIFICANT CHANGE UP (ref 10.3–14.5)
SODIUM SERPL-SCNC: 136 MMOL/L — SIGNIFICANT CHANGE UP (ref 135–145)
SPECIMEN SOURCE: SIGNIFICANT CHANGE UP
SPECIMEN SOURCE: SIGNIFICANT CHANGE UP
WBC # BLD: 15.2 K/UL — HIGH (ref 3.8–10.5)
WBC # FLD AUTO: 15.2 K/UL — HIGH (ref 3.8–10.5)

## 2017-08-30 PROCEDURE — 99232 SBSQ HOSP IP/OBS MODERATE 35: CPT

## 2017-08-30 RX ADMIN — CINACALCET 30 MILLIGRAM(S): 30 TABLET, FILM COATED ORAL at 20:07

## 2017-08-30 RX ADMIN — HEPARIN SODIUM 5000 UNIT(S): 5000 INJECTION INTRAVENOUS; SUBCUTANEOUS at 06:38

## 2017-08-30 RX ADMIN — PIPERACILLIN AND TAZOBACTAM 25 GRAM(S): 4; .5 INJECTION, POWDER, LYOPHILIZED, FOR SOLUTION INTRAVENOUS at 21:46

## 2017-08-30 RX ADMIN — Medication 50 MILLIGRAM(S): at 06:38

## 2017-08-30 RX ADMIN — Medication 50 MILLIGRAM(S): at 13:51

## 2017-08-30 RX ADMIN — VALSARTAN 320 MILLIGRAM(S): 80 TABLET ORAL at 06:38

## 2017-08-30 RX ADMIN — PIPERACILLIN AND TAZOBACTAM 25 GRAM(S): 4; .5 INJECTION, POWDER, LYOPHILIZED, FOR SOLUTION INTRAVENOUS at 06:39

## 2017-08-30 RX ADMIN — PIPERACILLIN AND TAZOBACTAM 25 GRAM(S): 4; .5 INJECTION, POWDER, LYOPHILIZED, FOR SOLUTION INTRAVENOUS at 13:51

## 2017-08-30 RX ADMIN — Medication 4 MILLIGRAM(S): at 21:46

## 2017-08-30 RX ADMIN — CARVEDILOL PHOSPHATE 12.5 MILLIGRAM(S): 80 CAPSULE, EXTENDED RELEASE ORAL at 18:04

## 2017-08-30 RX ADMIN — Medication 40 MILLIGRAM(S): at 06:38

## 2017-08-30 RX ADMIN — HEPARIN SODIUM 5000 UNIT(S): 5000 INJECTION INTRAVENOUS; SUBCUTANEOUS at 18:04

## 2017-08-30 RX ADMIN — CARVEDILOL PHOSPHATE 12.5 MILLIGRAM(S): 80 CAPSULE, EXTENDED RELEASE ORAL at 06:38

## 2017-08-30 RX ADMIN — Medication 50 MILLIGRAM(S): at 21:46

## 2017-08-30 NOTE — DIETITIAN INITIAL EVALUATION ADULT. - OTHER INFO
Pt seen for length of stay. Pt reports fair appetite consuming ~50% at meals. Pt denies GI distress, stating she is eating less due to varied appetite. Pt amenable to receiving Ensure pudding to increase energy and protein intake. Pt denies difficulty chewing/swallowing. States usual body weight of 120 pounds. Reports NKFA.

## 2017-08-30 NOTE — PROGRESS NOTE ADULT - ASSESSMENT
89 yo F pmhx diastolic chf (EF 65%, echo July 2017), htn, anemia, p/w cough, sob, congestion x past ~ 2 days. Pt. admitted to tele for CHF exacerbation, superimposed pna.

## 2017-08-30 NOTE — PROGRESS NOTE ADULT - SUBJECTIVE AND OBJECTIVE BOX
Follow up: SOB, HFPEF    HPI:  patient is awake and alert sitting in a chair. Her breathing is better and her leg edema is improved.    PAST MEDICAL & SURGICAL HISTORY:  Anemia, unspecified type  Edema of lower extremity  Heart murmur  Hypertension  Hypercalcemia  History of appendectomy  S/P tonsillectomy  H/O parathyroidectomy      MEDICATIONS  (STANDING):  heparin  Injectable 5000 Unit(s) SubCutaneous every 12 hours  valsartan 320 milliGRAM(s) Oral daily  carvedilol 12.5 milliGRAM(s) Oral every 12 hours  cinacalcet 30 milliGRAM(s) Oral daily  furosemide   Injectable 40 milliGRAM(s) IV Push daily  hydrALAZINE 50 milliGRAM(s) Oral three times a day  doxazosin 4 milliGRAM(s) Oral at bedtime  piperacillin/tazobactam IVPB. 3.375 Gram(s) IV Intermittent every 8 hours    MEDICATIONS  (PRN):  ALBUTerol    0.083% 2.5 milliGRAM(s) Nebulizer every 8 hours PRN Shortness of Breath and/or Wheezing      Vital Signs Last 24 Hrs  T(C): 37 (30 Aug 2017 15:50), Max: 37.3 (29 Aug 2017 23:48)  T(F): 98.6 (30 Aug 2017 15:50), Max: 99.1 (29 Aug 2017 23:48)  HR: 63 (30 Aug 2017 15:50) (62 - 75)  BP: 150/68 (30 Aug 2017 15:50) (126/65 - 156/63)  BP(mean): --  RR: 18 (30 Aug 2017 15:50) (18 - 18)  SpO2: 99% (30 Aug 2017 15:50) (95% - 100%)    I&O's Summary    29 Aug 2017 07:01  -  30 Aug 2017 07:00  --------------------------------------------------------  IN: 770 mL / OUT: 0 mL / NET: 770 mL    30 Aug 2017 07:01  -  30 Aug 2017 16:05  --------------------------------------------------------  IN: 240 mL / OUT: 0 mL / NET: 240 mL        PHYSICAL EXAM:    Constitutional: NAD, awake and alert,  Eyes:  EOMI,  Pupils round, no lesions  ENMT: no exudate or erythema  Pulmonary: Non-labored, breath sounds are clear bilaterally, No wheezing, rales or rhonchi  Cardiovascular: PMI not palpable RRR normal S1 and S2, no murmurs, rubs, gallops or clicks  Gastrointestinal: Bowel Sounds present, soft, nontender.   Lymph: No cervical lymphadenopathy.  Neurological: Alert, no focal deficits  Skin: No rashes.  No cyanosis.  Psych:  Mood & affect appropriate   Ext: No lower ext edema                                9.1    15.2  )-----------( 324      ( 30 Aug 2017 06:57 )             26.9     08-30    136  |  98  |  25<H>  ----------------------------<  90  3.7   |  28  |  0.76    Ca    9.3      30 Aug 2017 06:57  Mg     1.8     08-30    < from: 12 Lead ECG (08.25.17 @ 09:14) >    Ventricular Rate 76 BPM    Atrial Rate 76 BPM    P-R Interval 166 ms    QRS Duration 84 ms    Q-T Interval 380 ms    QTC Calculation(Bezet) 427 ms    P Axis 34 degrees    R Axis -6 degrees    T Axis 12 degrees    Diagnosis Line Normal sinus rhythm with sinus arrhythmia  Normal ECG    Confirmed by Elin LOMAX, Samson ([id number]) on 8/25/2017 4:30:09 PM    < end of copied text >  < from: TTE Echo Doppler w/o Cont (07.09.17 @ 08:32) >     EXAM:  ECHO TTE W/O CON COMP W/DOPPLR         PROCEDURE DATE:  07/09/2017        INTERPRETATION:  Ordering Physician: Unknown Doctor    Indication: Hypertension    Study Quality: Technically fair  A complete echocardiographic study was performed utilizing standard   protocol including spectral and color Doppler in all echocardiographic   windows.    Height: 152 cm  Weight: 49 kg  BSA: 1.4 sq m  Blood Pressure: 189/63    MEASUREMENTS  IVS: 1.0cm  PWT: 1.0cm  LA: 4.2cm  AO: 2.7cm  LVIDd: 4.5cm  LVIDs: 3.3cm    LVEF: 65%  RVSP: 54mmHg    FINDINGS  Left Ventricle:  Normal left ventricular systolic function.  Aortic Valve: Calcified trileaflet aortic valve. Mild aortic   insufficiency.  Mitral Valve: Mitral annular calcification and calcified mitral valve   leaflets with normal diastolic opening. Mild mitral insufficiency.  Tricuspid Valve: Normal tricuspid valve. Mild tricuspid insufficiency.  Pulmonic Valve: Normal pulmonic valve. Mild pulmonic insufficiency.  Left Atrium: Mildly enlarged.  Right Ventricle: Normal right ventricular size and systolic function.  Right Atrium: Normal  Diastolic Function: Grade 1 diastolic dysfunction.  Pericardium/Pleura: Normal pericardium with no pericardial effusion.                      MORENA ADORNO M.D., ATTENDING CARDIOLOGIST  This document has been electronically signed. Jul 9 2017 10:13AM                < end of copied text >      < from: CT Chest No Cont (08.25.17 @ 15:42) >    EXAM:  CT CHEST                            PROCEDURE DATE:  08/25/2017          INTERPRETATION:  .    CLINICAL INFORMATION: Evaluate effusion and atelectasis.,    TECHNIQUE: Helical axial images of the chest were obtained from the   thoracic inlet to the adrenal glands without the administration of   intravenous contrast. Sagittal and coronal reformations were obtained   from the source data. Axial MIP images were reconstructed at a dedicated   separate workstation and also reviewed.    COMPARISON: Prior chest CT examination from 4/26/2017 prior chest x-ray   examination from 8/4/2017.     FINDINGS: The right lobe of the thyroid gland is enlarged compared to the   left and contains hypodense nodules. The largest nodule measures up to   1.4 cm and appears unchanged from the prior CT examination.    There is no axillary adenopathy. Multiple subcentimeter sized mediastinal   lymph nodes are noted. There is no hilar lymphadenopathy.    The heart size is mildly enlarged. There is a trace pericardial effusion.   There are atherosclerotic calcifications of the imaged coronary arteries,   aorta, and branch vessels. The imaged portions of the aorta are normal in   caliber.    The central airways are patent. There are small to moderate-sized   bilateral pleural effusions with adjacent atelectasis. Patchy opacities   are notable within the bilateral upper lobes, right middle lobe, and   lingula.    Subcentimeter sized nonobstructing left-sided intrarenal stones are   notable. There is nonspecific bilateral perinephric stranding. There are   mild multilevel degenerative changes of the thoracic spine.    Degenerative changes are notable within the bilateral shoulders.    IMPRESSION: Patchy ground glass opacities throughout both lungs which may   reflect multifocal pneumonia versus pulmonary edema.    Small to moderate-sized pleural effusions with adjacent atelectasis.    Cardiomegaly.    Trace pericardial effusion.    1.4 cm hypodense right-sided thyroid nodule. Recommend further evaluation   with ultrasound examination.                LAVELL CALVILLO M.D., ATTENDING RADIOLOGIST  This document has been electronically signed. Aug 25 2017  3:58PM                < end of copied text >

## 2017-08-30 NOTE — PROGRESS NOTE ADULT - PROBLEM SELECTOR PLAN 3
Na 136, improved  - cont. to trend BMP daily  - monitor BUN/ Cr   - hold fluids in setting of CHF, cont. lasix but monitor BMP

## 2017-08-30 NOTE — DIETITIAN INITIAL EVALUATION ADULT. - NS AS NUTRI INTERV ED CONTENT
Provided verbal and written heart failure nutrition therapy including importance of sodium restriction, relationship between sodium and fluids, foods high in sodium, ways to reduce these foods in the diet, the importance of cholesterol restriction and foods high in cholesterol to consume in moderation, potential importance for fluid restriction and which foods count as fluids, and weight gain parameters for CHF.

## 2017-08-30 NOTE — PROGRESS NOTE ADULT - PROBLEM SELECTOR PLAN 2
on LASIX  cardio following  HFpEF and Pulm HTN  I and O  tele monitoring  prognosis guarded  pt is DNR  am labs pending

## 2017-08-30 NOTE — PROGRESS NOTE ADULT - PROBLEM SELECTOR PLAN 2
new onset cough (?productive), mild sob. elevated WBC (no lactate)  - CXR reads B/l interstitial opacities, likely superimposed pna  - Continue Zosyn for HCAP, complete 7 day course on 8/31  - Pulm f/u  -monitor labs and vitals per routine

## 2017-08-30 NOTE — PROGRESS NOTE ADULT - PROBLEM SELECTOR PLAN 1
Pt. had developed recent cough and mild SOB. Recent echo shows EF 65%.   - CXR read shows rt. pleural effusion, hazy and b/l opacities, congestion.  -CT chest: pulm edema vs multifocal PNA  - proBNP elevated, but improved (3506)  - hold fluids in setting of CHF  - change home dose of lasix to 40 IV daily  - cardio f/u (Shital), pulm f/u (Aruna)  - monitor labs and vitals per routine

## 2017-08-30 NOTE — PROGRESS NOTE ADULT - SUBJECTIVE AND OBJECTIVE BOX
Date/Time Patient Seen:  		  Referring MD:   Data Reviewed	       Patient is a 89y old  Female who presents with a chief complaint of cough, mild SOB (25 Aug 2017 15:51)  in bed  seen and examined  on oxygen  vs and meds reviewed  on IV ABX  am labs pending      Subjective/HPI     PAST MEDICAL & SURGICAL HISTORY:  Anemia, unspecified type  Edema of lower extremity  Heart murmur  Hypertension  Hypercalcemia  History of appendectomy  S/P tonsillectomy  H/O parathyroidectomy        Medication list         MEDICATIONS  (STANDING):  heparin  Injectable 5000 Unit(s) SubCutaneous every 12 hours  valsartan 320 milliGRAM(s) Oral daily  carvedilol 12.5 milliGRAM(s) Oral every 12 hours  cinacalcet 30 milliGRAM(s) Oral daily  furosemide   Injectable 40 milliGRAM(s) IV Push daily  hydrALAZINE 50 milliGRAM(s) Oral three times a day  doxazosin 4 milliGRAM(s) Oral at bedtime  piperacillin/tazobactam IVPB. 3.375 Gram(s) IV Intermittent every 8 hours    MEDICATIONS  (PRN):  ALBUTerol    0.083% 2.5 milliGRAM(s) Nebulizer every 8 hours PRN Shortness of Breath and/or Wheezing         Vitals log        ICU Vital Signs Last 24 Hrs  T(C): 36.9 (30 Aug 2017 05:10), Max: 37.3 (29 Aug 2017 23:48)  T(F): 98.4 (30 Aug 2017 05:10), Max: 99.1 (29 Aug 2017 23:48)  HR: 67 (30 Aug 2017 05:10) (60 - 75)  BP: 146/55 (30 Aug 2017 05:10) (126/65 - 168/73)  BP(mean): --  ABP: --  ABP(mean): --  RR: 18 (30 Aug 2017 05:10) (18 - 18)  SpO2: 95% (30 Aug 2017 05:10) (94% - 99%)           Input and Output:  I&O's Detail    28 Aug 2017 07:01  -  29 Aug 2017 07:00  --------------------------------------------------------  IN:    Oral Fluid: 270 mL    Solution: 100 mL  Total IN: 370 mL    OUT:  Total OUT: 0 mL    Total NET: 370 mL      29 Aug 2017 07:01  -  30 Aug 2017 06:44  --------------------------------------------------------  IN:    Oral Fluid: 670 mL    Solution: 100 mL  Total IN: 770 mL    OUT:  Total OUT: 0 mL    Total NET: 770 mL          Lab Data                        9.5    16.2  )-----------( 352      ( 29 Aug 2017 06:49 )             29.0     08-29    136  |  97  |  28<H>  ----------------------------<  110<H>  3.4<L>   |  29  |  0.76    Ca    9.1      29 Aug 2017 06:49              Review of Systems	      Objective     Physical Examination  frail  weak  head at  heart - s1s2  lungs - dec BS  abd - soft        Pertinent Lab findings & Imaging      Richard:  NO   Adequate UO     I&O's Detail    28 Aug 2017 07:01  -  29 Aug 2017 07:00  --------------------------------------------------------  IN:    Oral Fluid: 270 mL    Solution: 100 mL  Total IN: 370 mL    OUT:  Total OUT: 0 mL    Total NET: 370 mL      29 Aug 2017 07:01  -  30 Aug 2017 06:44  --------------------------------------------------------  IN:    Oral Fluid: 670 mL    Solution: 100 mL  Total IN: 770 mL    OUT:  Total OUT: 0 mL    Total NET: 770 mL               Discussed with:     Cultures:	        Radiology

## 2017-08-30 NOTE — PROGRESS NOTE ADULT - PROBLEM SELECTOR PLAN 4
on Zosyn  am WBC pending  would not correlate WBC with improvement, would  more on clinical appearance and PE  pt to complete 7 days of ABX for HCAP  overall appears to be better  will follow  keep HOB elevated  oral and skin care  asp prec  NEBS prn  02 as needed  keep sat > 90 pct  mobilize as tolerated

## 2017-08-30 NOTE — DIETITIAN INITIAL EVALUATION ADULT. - ADHERENCE
Pt states she avoids foods high in fat and does not add salt when eating. Per transfer records from SNF, pt on Low Sodium die PTA. Per transfer records, pt was taking iron supplements and lactobacillus acidophilus supplementation PTA./good

## 2017-08-30 NOTE — DIETITIAN INITIAL EVALUATION ADULT. - SOURCE
Comprehensive medical record review; Transfer records from [facility] SNF/patient

## 2017-08-30 NOTE — PROGRESS NOTE ADULT - SUBJECTIVE AND OBJECTIVE BOX
Patient is a 89y old  Female who presents with a chief complaint of cough, mild SOB (25 Aug 2017 15:51)      INTERVAL HPI/OVERNIGHT EVENTS: Patient seen and examined. NAD. No complaints.      Vital Signs Last 24 Hrs  T(C): 36.9 (30 Aug 2017 07:36), Max: 37.3 (29 Aug 2017 23:48)  T(F): 98.4 (30 Aug 2017 07:36), Max: 99.1 (29 Aug 2017 23:48)  HR: 70 (30 Aug 2017 07:36) (66 - 75)  BP: 136/70 (30 Aug 2017 07:36) (126/65 - 156/63)  BP(mean): --  RR: 18 (30 Aug 2017 07:36) (18 - 18)  SpO2: 97% (30 Aug 2017 07:36) (95% - 99%)I&O's Summary    29 Aug 2017 07:01  -  30 Aug 2017 07:00  --------------------------------------------------------  IN: 770 mL / OUT: 0 mL / NET: 770 mL        LABS:                        9.1    15.2  )-----------( 324      ( 30 Aug 2017 06:57 )             26.9     08-30    136  |  98  |  25<H>  ----------------------------<  90  3.7   |  28  |  0.76    Ca    9.3      30 Aug 2017 06:57  Mg     1.8     08-30          CAPILLARY BLOOD GLUCOSE              heparin  Injectable 5000 Unit(s) SubCutaneous every 12 hours  valsartan 320 milliGRAM(s) Oral daily  carvedilol 12.5 milliGRAM(s) Oral every 12 hours  cinacalcet 30 milliGRAM(s) Oral daily  furosemide   Injectable 40 milliGRAM(s) IV Push daily  hydrALAZINE 50 milliGRAM(s) Oral three times a day  doxazosin 4 milliGRAM(s) Oral at bedtime  piperacillin/tazobactam IVPB. 3.375 Gram(s) IV Intermittent every 8 hours  ALBUTerol    0.083% 2.5 milliGRAM(s) Nebulizer every 8 hours PRN      REVIEW OF SYSTEMS:  CONSTITUTIONAL: No fever, weight loss, or fatigue  NECK: No pain or stiffness  RESPIRATORY: No cough, wheezing, chills or hemoptysis; No shortness of breath  CARDIOVASCULAR: No chest pain, palpitations, dizziness, or leg swelling  GASTROINTESTINAL: No abdominal or epigastric pain. No nausea, vomiting, or hematemesis; No diarrhea or constipation. No melena or hematochezia.  GENITOURINARY: No dysuria, frequency, hematuria, or incontinence  NEUROLOGICAL: No headaches, loss of strength, numbness, or tremors  SKIN: No itching, burning  MUSCULOSKELETAL: No joint pain or swelling; No muscle, back, or extremity pain  PSYCHIATRIC: No depression, mood swings, HEME/LYMPH: No easy bruising, or bleeding gums  ALLERY AND IMMUNOLOGIC: No hives       Consultant(s) Notes Reviewed:  [ x] YES  [ ] NO    PHYSICAL EXAM:  GENERAL: NAD, well-groomed, well-developed  HEAD:  Atraumatic, Normocephalic  EYES: EOMI, PERRLA, conjunctiva and sclera clear  ENMT: No tonsillar erythema, exudates, or enlargement; Moist mucous membranes  NECK: Supple, No JVD  NERVOUS SYSTEM:  Awake & alert  CHEST/LUNG: Clear to auscultation bilaterally; No rales, rhonchi, wheezing,  HEART: Regular rate and rhythm  ABDOMEN: Soft, Nontender, Nondistended; Bowel sounds present  EXTREMITIES:  No clubbing, cyanosis, or edema  LYMPH: No lymphadenopathy noted  SKIN: No rashes      Advanced care planning discussed with patient/family [x ] YES   [ ] NO

## 2017-08-30 NOTE — DIETITIAN INITIAL EVALUATION ADULT. - PROBLEM SELECTOR PLAN 6
regular diet, change once sodium corrects  ambulate with assistance  out of bed with assistance  fall precautions  aspiration precautions  heparin dvt prophy

## 2017-08-31 LAB
HCT VFR BLD CALC: 28.8 % — LOW (ref 34.5–45)
HGB BLD-MCNC: 9.6 G/DL — LOW (ref 11.5–15.5)
MCHC RBC-ENTMCNC: 28.8 PG — SIGNIFICANT CHANGE UP (ref 27–34)
MCHC RBC-ENTMCNC: 33.2 GM/DL — SIGNIFICANT CHANGE UP (ref 32–36)
MCV RBC AUTO: 86.9 FL — SIGNIFICANT CHANGE UP (ref 80–100)
NT-PROBNP SERPL-SCNC: 2063 PG/ML — HIGH (ref 0–450)
PLATELET # BLD AUTO: 333 K/UL — SIGNIFICANT CHANGE UP (ref 150–400)
RBC # BLD: 3.32 M/UL — LOW (ref 3.8–5.2)
RBC # FLD: 13.2 % — SIGNIFICANT CHANGE UP (ref 10.3–14.5)
WBC # BLD: 14 K/UL — HIGH (ref 3.8–10.5)
WBC # FLD AUTO: 14 K/UL — HIGH (ref 3.8–10.5)

## 2017-08-31 PROCEDURE — 71010: CPT | Mod: 26

## 2017-08-31 PROCEDURE — 99233 SBSQ HOSP IP/OBS HIGH 50: CPT

## 2017-08-31 RX ORDER — ALBUTEROL 90 UG/1
2.5 AEROSOL, METERED ORAL EVERY 8 HOURS
Qty: 0 | Refills: 0 | Status: DISCONTINUED | OUTPATIENT
Start: 2017-08-31 | End: 2017-09-02

## 2017-08-31 RX ORDER — HYDRALAZINE HCL 50 MG
25 TABLET ORAL EVERY 8 HOURS
Qty: 0 | Refills: 0 | Status: DISCONTINUED | OUTPATIENT
Start: 2017-08-31 | End: 2017-09-01

## 2017-08-31 RX ADMIN — Medication 25 MILLIGRAM(S): at 15:27

## 2017-08-31 RX ADMIN — PIPERACILLIN AND TAZOBACTAM 25 GRAM(S): 4; .5 INJECTION, POWDER, LYOPHILIZED, FOR SOLUTION INTRAVENOUS at 15:27

## 2017-08-31 RX ADMIN — HEPARIN SODIUM 5000 UNIT(S): 5000 INJECTION INTRAVENOUS; SUBCUTANEOUS at 06:06

## 2017-08-31 RX ADMIN — PIPERACILLIN AND TAZOBACTAM 25 GRAM(S): 4; .5 INJECTION, POWDER, LYOPHILIZED, FOR SOLUTION INTRAVENOUS at 06:06

## 2017-08-31 RX ADMIN — CARVEDILOL PHOSPHATE 12.5 MILLIGRAM(S): 80 CAPSULE, EXTENDED RELEASE ORAL at 18:00

## 2017-08-31 RX ADMIN — Medication 25 MILLIGRAM(S): at 21:21

## 2017-08-31 RX ADMIN — ALBUTEROL 2.5 MILLIGRAM(S): 90 AEROSOL, METERED ORAL at 23:07

## 2017-08-31 RX ADMIN — PIPERACILLIN AND TAZOBACTAM 25 GRAM(S): 4; .5 INJECTION, POWDER, LYOPHILIZED, FOR SOLUTION INTRAVENOUS at 21:21

## 2017-08-31 RX ADMIN — Medication 50 MILLIGRAM(S): at 06:05

## 2017-08-31 RX ADMIN — CINACALCET 30 MILLIGRAM(S): 30 TABLET, FILM COATED ORAL at 21:21

## 2017-08-31 RX ADMIN — CARVEDILOL PHOSPHATE 12.5 MILLIGRAM(S): 80 CAPSULE, EXTENDED RELEASE ORAL at 06:05

## 2017-08-31 RX ADMIN — HEPARIN SODIUM 5000 UNIT(S): 5000 INJECTION INTRAVENOUS; SUBCUTANEOUS at 18:00

## 2017-08-31 RX ADMIN — Medication 4 MILLIGRAM(S): at 21:21

## 2017-08-31 RX ADMIN — VALSARTAN 320 MILLIGRAM(S): 80 TABLET ORAL at 06:05

## 2017-08-31 RX ADMIN — Medication 40 MILLIGRAM(S): at 06:05

## 2017-08-31 NOTE — PROGRESS NOTE ADULT - PROBLEM SELECTOR PROBLEM 6
Prophylactic measure
CHF exacerbation
CHF exacerbation

## 2017-08-31 NOTE — PROGRESS NOTE ADULT - PROBLEM SELECTOR PROBLEM 5
Hypertension
Pneumonia due to infectious organism, unspecified laterality, unspecified part of lung
Pulmonary HTN

## 2017-08-31 NOTE — PROGRESS NOTE ADULT - SUBJECTIVE AND OBJECTIVE BOX
Patient is a 89y old  Female who presents with a chief complaint of cough, mild SOB (25 Aug 2017 15:51)      INTERVAL HPI/OVERNIGHT EVENTS:  Patient complained of dizziness in seated position this AM. RN took BP SBP in 90s, when lying down 112  orthostatics ordered: lying down /50, hr 64, sitting /48 HR 60, standing /42 HR 72    T(C): 36.6 (08-31-17 @ 07:30), Max: 37 (08-30-17 @ 15:50)  HR: 62 (08-31-17 @ 11:11) (55 - 74)  BP: 122/60 (08-31-17 @ 11:11) (90/54 - 158/63)  RR: 18 (08-31-17 @ 11:11) (17 - 18)  SpO2: 96% (08-31-17 @ 11:11) (96% - 100%)  Wt(kg): --  I&O's Summary    30 Aug 2017 07:01  -  31 Aug 2017 07:00  --------------------------------------------------------  IN: 690 mL / OUT: 0 mL / NET: 690 mL        LABS:                        9.6    14.0  )-----------( 333      ( 31 Aug 2017 07:28 )             28.8     08-30    136  |  98  |  25<H>  ----------------------------<  90  3.7   |  28  |  0.76    Ca    9.3      30 Aug 2017 06:57  Mg     1.8     08-30          CAPILLARY BLOOD GLUCOSE    Not available            MEDICATIONS  (STANDING):  heparin  Injectable 5000 Unit(s) SubCutaneous every 12 hours  carvedilol 12.5 milliGRAM(s) Oral every 12 hours  cinacalcet 30 milliGRAM(s) Oral daily  furosemide   Injectable 40 milliGRAM(s) IV Push daily  doxazosin 4 milliGRAM(s) Oral at bedtime  piperacillin/tazobactam IVPB. 3.375 Gram(s) IV Intermittent every 8 hours  ALBUTerol    0.083% 2.5 milliGRAM(s) Nebulizer every 8 hours  hydrALAZINE 25 milliGRAM(s) Oral every 8 hours    MEDICATIONS  (PRN):      REVIEW OF SYSTEMS:  CONSTITUTIONAL: Admits fatigue, No fever,    RESPIRATORY: No cough, wheezing, chills or hemoptysis;   CARDIOVASCULAR: No chest pain, palpitations, dizziness, or leg swelling  GASTROINTESTINAL: No abdominal or epigastric pain. No nausea, vomiting, constipation, diarrhea  NEUROLOGICAL: Admits loss of strength No headaches, memory loss, numbness, or tremors  MUSCULOSKELETAL: No joint pain or swelling; No muscle, back, or extremity pain      RADIOLOGY & ADDITIONAL TESTS:    None recent    Imaging Personally Reviewed:  [x ] YES  [ ] NO    Consultant(s) Notes Reviewed:  [x ] YES  [ ] NO    PHYSICAL EXAM:  GENERAL: NAD, frail elderly  HEAD:  Atraumatic, Normocephalic  EYES:  conjunctiva and sclera cloudythyroid  NERVOUS SYSTEM:  Alert & Oriented X2  CHEST/LUNG: Clear to percussion bilaterally; No rales, rhonchi, wheezing, or rubs  HEART: Regular rate and rhythm; No murmurs, rubs, or gallops  ABDOMEN: Soft, Nontender, Nondistended; Bowel sounds present  EXTREMITIES:  2+ Peripheral Pulses, No clubbing, cyanosis, or edema  LYMPH: No lymphadenopathy noted  SKIN: No rashes or lesions    Care Discussed with Consultants/Other Providers [x ] YES  [ ] NO

## 2017-08-31 NOTE — PROVIDER CONTACT NOTE (OTHER) - ASSESSMENT
pt placed back in bed   BP lying in bed was 118/50.  Pt denied any c/o lightheadedness or dizzyness or chest pain or SOB.Dr. Raygoza notified and in to see pt.
regular bp medication is scheduled
asymptomatic  BP checked 137/67 with p 68 bpm

## 2017-08-31 NOTE — PROVIDER CONTACT NOTE (OTHER) - SITUATION
Pt was sittting in chair at bedside, skin warm and dry  c/o "not feeling well"  BP 90/54 HR 55 o2 at 3 L nasal cannula with o2 sat 97%

## 2017-08-31 NOTE — PROGRESS NOTE ADULT - PROBLEM SELECTOR PLAN 6
regular diet, change once sodium corrects  ambulate with assistance  out of bed with assistance  fall precautions  aspiration precautions  heparin DVT ppx
regular diet, change once sodium corrects  ambulate with assistance  out of bed with assistance  fall precautions  aspiration precautions  heparin DVT ppx
on LASIX  BP control  cardio following  am labs pending  overall better
on LASIX  cardio following  HFpEF
regular diet, change once sodium corrects  ambulate with assistance  out of bed with assistance  fall precautions  aspiration precautions  heparin DVT ppx

## 2017-08-31 NOTE — PROGRESS NOTE ADULT - SUBJECTIVE AND OBJECTIVE BOX
Date/Time Patient Seen:  		  Referring MD:   Data Reviewed	       Patient is a 89y old  Female who presents with a chief complaint of cough, mild SOB (25 Aug 2017 15:51)  in bed  seen and examined  vs and meds reviewed  on ABX  on LASIX IV  on venti mask this am  awake  verbal        Subjective/HPI     PAST MEDICAL & SURGICAL HISTORY:  Anemia, unspecified type  Edema of lower extremity  Heart murmur  Hypertension  Hypercalcemia  History of appendectomy  S/P tonsillectomy  H/O parathyroidectomy        Medication list         MEDICATIONS  (STANDING):  heparin  Injectable 5000 Unit(s) SubCutaneous every 12 hours  valsartan 320 milliGRAM(s) Oral daily  carvedilol 12.5 milliGRAM(s) Oral every 12 hours  cinacalcet 30 milliGRAM(s) Oral daily  furosemide   Injectable 40 milliGRAM(s) IV Push daily  hydrALAZINE 50 milliGRAM(s) Oral three times a day  doxazosin 4 milliGRAM(s) Oral at bedtime  piperacillin/tazobactam IVPB. 3.375 Gram(s) IV Intermittent every 8 hours  ALBUTerol    0.083% 2.5 milliGRAM(s) Nebulizer every 8 hours    MEDICATIONS  (PRN):         Vitals log        ICU Vital Signs Last 24 Hrs  T(C): 36.5 (31 Aug 2017 04:46), Max: 37 (30 Aug 2017 15:50)  T(F): 97.7 (31 Aug 2017 04:46), Max: 98.6 (30 Aug 2017 15:50)  HR: 66 (31 Aug 2017 04:46) (62 - 74)  BP: 156/72 (31 Aug 2017 04:46) (136/70 - 158/63)  BP(mean): --  ABP: --  ABP(mean): --  RR: 17 (31 Aug 2017 04:46) (17 - 18)  SpO2: 97% (31 Aug 2017 04:46) (97% - 100%)           Input and Output:  I&O's Detail    29 Aug 2017 07:01  -  30 Aug 2017 07:00  --------------------------------------------------------  IN:    Oral Fluid: 670 mL    Solution: 100 mL  Total IN: 770 mL    OUT:  Total OUT: 0 mL    Total NET: 770 mL      30 Aug 2017 07:01  -  31 Aug 2017 06:29  --------------------------------------------------------  IN:    Oral Fluid: 490 mL    Solution: 200 mL  Total IN: 690 mL    OUT:  Total OUT: 0 mL    Total NET: 690 mL          Lab Data                        9.1    15.2  )-----------( 324      ( 30 Aug 2017 06:57 )             26.9     08-30    136  |  98  |  25<H>  ----------------------------<  90  3.7   |  28  |  0.76    Ca    9.3      30 Aug 2017 06:57  Mg     1.8     08-30              Review of Systems	      Objective     Physical Examination  head at  heart - s1s2  lungs - dec BS  abd - soft        Pertinent Lab findings & Imaging      Richard:  NO   Adequate UO     I&O's Detail    29 Aug 2017 07:01  -  30 Aug 2017 07:00  --------------------------------------------------------  IN:    Oral Fluid: 670 mL    Solution: 100 mL  Total IN: 770 mL    OUT:  Total OUT: 0 mL    Total NET: 770 mL      30 Aug 2017 07:01  -  31 Aug 2017 06:29  --------------------------------------------------------  IN:    Oral Fluid: 490 mL    Solution: 200 mL  Total IN: 690 mL    OUT:  Total OUT: 0 mL    Total NET: 690 mL               Discussed with:     Cultures:	        Radiology

## 2017-08-31 NOTE — PROGRESS NOTE ADULT - PROBLEM SELECTOR PLAN 7
pt dnr.  health care proxy placed in chart. family understands pt's overall very poor prognosis

## 2017-08-31 NOTE — PROVIDER CONTACT NOTE (OTHER) - ACTION/TREATMENT ORDERED:
Will f/u with cardiologist
Discontinue the labetalol and maintain regular medication schedule
Pt resting quietly in bed at this time

## 2017-08-31 NOTE — PROGRESS NOTE ADULT - ASSESSMENT
89f diastolic chf (EF 65%, echo July 2017), htn, anemia, p/w cough (?productive), congestion x past ~ 2 days, mild SOB. Found with pna, pleural effusions. Seems more comfortable but still with desaturations when walking    - SVT vs rapid af/flutter in the setting of pna.  Successfully converted to NSR with some episodes of bradycardia down to 40's during sleep  - Continue Coreg at 12.5 mg bid.  Will not increase for now  - Continue Hydralazine and Valsartan for BP control   - If develops recurrent atrial arrhythmias, may need additional measures, possibly antiarrhythmic, and possibly ac  - Continue tele monitoring  - Cont abx for pna   - No significant signs of fluid overload, cont lasix iv 40 for now and reassess in am.  Elevated BNP noted. Watch creatinine and electrolytes  - Cannot accurately track I & O's due to incontinence  - DVTt ppx  - Will continue to follow    Tiffany Sim NP  Cardiology 89f diastolic chf (EF 65%, echo July 2017), htn, anemia, p/w cough (?productive), congestion x past ~ 2 days, mild SOB. Found with pna, pleural effusions. Seems more comfortable but still with desaturations when walking    - SVT vs rapid af/flutter in the setting of pna.  Successfully converted to NSR with some episodes of bradycardia down to 40's during sleep  - Continue Coreg at 12.5 mg bid.  Will not increase for now  - Had an episode of hypotension while sitting on the chiar (SBP 90).  Per RN, patient denied dizziness or lightheadedness but she did not look right.   - Decrease Hydralazine to 25 mg and discontinue Valsartan for now.  Slowly reintroduce if BP more stable.  - If develops recurrent atrial arrhythmias, may need additional measures, possibly antiarrhythmic, and possibly ac  - Continue tele monitoring  - Cont abx for pna   - No significant signs of fluid overload, cont lasix iv 40 for now and reassess in am.  Elevated BNP noted. Watch creatinine and electrolytes  - Cannot accurately track I & O's due to incontinence  - DVTt ppx  - Will continue to follow    Tiffany Sim NP  Cardiology

## 2017-08-31 NOTE — PROGRESS NOTE ADULT - SUBJECTIVE AND OBJECTIVE BOX
HPI:  89 yo F pmhx diastolic chf (EF 65%, echo 2017), htn, anemia, p/w cough (?productive), congestion x past ~ 2 days, mild SOB. Daughter visited patient on  and pt. was asymptomatic. Monday night pt. starting coughing. Hx from daughter as patient was sleeping. No chest pain, no abd pain, sob. No n/v/d. no neck./ back pain. No numb/ting/focal weak. no recent trauma. No abd pain. Daughter noticed decrease PO intake. Pt currently resident at [facility]. Pt had outpt cxr showing CONCEPCION pna and WBC 20.5. Na was also 126. Pt sent for admission. Pt was on levaquin, started yest at nursing home. No other complaints. Of note, daughter states pt. usually alert and oriented and speaks on the phone daily.     In the ED, VSS T 97.3, HR 63, /75, RR 18, SpO2 97% RA. Pt. had WBC of 20.1, Hemoglobin of 9.1 (was 8.4 this AM at nursing [place], currently being trended by Dr. Tapia, [facility]), Na 126, BUN/ Cr (elevated at 39/1.3, Cr from previous admissions 0.79), neg. lactate, neg trop, elevated proBNP (9221). EKG sinus rhythm, borderline LVH, HR 63. CXR unofficial read shows CHF, haziness and b/l congestion likely signifies superimposed PNA, rt. pleural effusion. (24 Aug 2017 22:47)      SUBJECTIVE:  Patient is a 89y old  Female who presents with a chief complaint of cough, mild SOB (25 Aug 2017 15:51)    Sleeping but easily awakened.  States she was sitting on the chair but was put back to bed due to low O2 Sat.  However, denies SOB.  Comfortable with nasal cannula in supine position.  Denies CP or palpitation.      OBJECTIVE:  Review Of Systems:  Constitutional: [ ] Fever [ ] Chills [ ] Fatigue [ ] Weight change   HEENT: [ ] Blurred vision [ ] Eye Pain [ ] Headache [ ] Runny nose [ ] Sore Throat   Respiratory: [ ] Cough [ ] Wheezing [ ] Shortness of breath  Cardiovascular: [ ] Chest Pain [ ] Palpitations [ ] PEPE [ ] PND [ ] Orthopnea  Gastrointestinal: [ ] Abdominal Pain [ ] Diarrhea [ ] Constipation [ ] Hemorrhoids [ ] Nausea [ ] Vomiting  Genitourinary: [ ] Nocturia [ ] Dysuria [ ] Incontinence  Extremities: [ ] Swelling [ ] Joint Pain  Neurologic: [ ] Focal deficit [ ] Paresthesias [ ] Syncope  Lymphatic: [ ] Swelling [ ] Lymphadenopathy   Skin: [ ] Rash [ ] Ecchymoses [ ] Wounds [ ] Lesions  Psychiatry: [ ] Depression [ ] Suicidal/Homicidal Ideation [ ] Anxiety [ ] Sleep Disturbances  [ ] 10 point review of systems is otherwise negative except as mentioned above            [ ]Unable to obtain    Allergy:  Allergies    Vasotec (Unknown)    Intolerances        Medications:  MEDICATIONS  (STANDING):  heparin  Injectable 5000 Unit(s) SubCutaneous every 12 hours  valsartan 320 milliGRAM(s) Oral daily  carvedilol 12.5 milliGRAM(s) Oral every 12 hours  cinacalcet 30 milliGRAM(s) Oral daily  furosemide   Injectable 40 milliGRAM(s) IV Push daily  hydrALAZINE 50 milliGRAM(s) Oral three times a day  doxazosin 4 milliGRAM(s) Oral at bedtime  piperacillin/tazobactam IVPB. 3.375 Gram(s) IV Intermittent every 8 hours  ALBUTerol    0.083% 2.5 milliGRAM(s) Nebulizer every 8 hours    MEDICATIONS  (PRN):      PMH/PSH/FH/SH: [ ] Unchanged    Vitals:  T(C): 36.6 (17 @ 07:30), Max: 37 (17 @ 15:50)  HR: 62 (17 @ 11:11) (55 - 74)  BP: 122/60 (17 @ 11:11) (90/54 - 158/63)  BP(mean): --  RR: 18 (17 @ 11:11) (17 - 18)  SpO2: 96% (17 @ 11:11) (96% - 100%)  Wt(kg): --  Daily     Daily Weight in k.3 (31 Aug 2017 04:46)  I&O's Summary    30 Aug 2017 07:01  -  31 Aug 2017 07:00  --------------------------------------------------------  IN: 690 mL / OUT: 0 mL / NET: 690 mL        Labs:                        9.6    14.0  )-----------( 333      ( 31 Aug 2017 07:28 )             28.8         136  |  98  |  25<H>  ----------------------------<  90  3.7   |  28  |  0.76    Ca    9.3      30 Aug 2017 06:57  Mg     1.8         Serum Pro-Brain Natriuretic Peptide: 2063 pg/mL ( @ 07:28)    ECG:    Echo:    Stress Testing:     Cath:    Imaging:    Interpretation of Telemetry: SR-SB at high 50's with an episode of 40's while asleep overnnight      Physical Exam:  Appearance: [ ] Normal  [ ] abnormal [ ] NAD   Eyes: [ ] PERRL [ ] EOMI  HENT: [ ] Normal [ ] Abnormal oral muscosa [ ]NC/AT  Cardiovascular: [ ] S1 [ ] S2 [ ] RRR [ ] m/r/g [ ]edema [ ] JVP  Procedural Access Site: [ ]  hematoma [ ] tender to palpation [ ] 2+ pulse [ ] bruit [ ] Ecchymosis  Respiratory: [ ] Clear to auscultation bilaterally  Gastrointestinal: [ ] Soft [ ] tenderness[ ] distension [ ] BS  Musculoskeletal: [ ] clubbing [ ] joint deformity   Neurologic: [ ] Non-focal  Lymphatic: [ ] lymphadenopathy  Psychiatry: [ ] AAOx3  [ ] confused [ ] disoriented [ ] Mood & affect appropriate  Skin: [ ]  rashes [ ] ecchymoses [ ] cyanosis HPI:  89 yo F pmhx diastolic chf (EF 65%, echo 2017), htn, anemia, p/w cough (?productive), congestion x past ~ 2 days, mild SOB. Daughter visited patient on  and pt. was asymptomatic. Monday night pt. starting coughing. Hx from daughter as patient was sleeping. No chest pain, no abd pain, sob. No n/v/d. no neck./ back pain. No numb/ting/focal weak. no recent trauma. No abd pain. Daughter noticed decrease PO intake. Pt currently resident at [facility]. Pt had outpt cxr showing CONCEPCION pna and WBC 20.5. Na was also 126. Pt sent for admission. Pt was on levaquin, started yest at nursing home. No other complaints. Of note, daughter states pt. usually alert and oriented and speaks on the phone daily.     In the ED, VSS T 97.3, HR 63, /75, RR 18, SpO2 97% RA. Pt. had WBC of 20.1, Hemoglobin of 9.1 (was 8.4 this AM at nursing [place], currently being trended by Dr. Tapia, [facility]), Na 126, BUN/ Cr (elevated at 39/1.3, Cr from previous admissions 0.79), neg. lactate, neg trop, elevated proBNP (9221). EKG sinus rhythm, borderline LVH, HR 63. CXR unofficial read shows CHF, haziness and b/l congestion likely signifies superimposed PNA, rt. pleural effusion. (24 Aug 2017 22:47)      SUBJECTIVE:  Patient is a 89y old  Female who presents with a chief complaint of cough, mild SOB (25 Aug 2017 15:51)    Sleeping but easily awakened.  States she was sitting on the chair but was put back to bed due to low O2 Sat.  However, denies SOB.  Comfortable with nasal cannula in supine position.  Denies CP or palpitation.  Denies coughing.      OBJECTIVE:  Review Of Systems:  Constitutional: [ ] Fever [ ] Chills [ ] Fatigue [ ] Weight change   HEENT: [ ] Blurred vision [ ] Eye Pain [ ] Headache [ ] Runny nose [ ] Sore Throat   Respiratory: [ ] Cough [ ] Wheezing [ ] Shortness of breath  Cardiovascular: [ ] Chest Pain [ ] Palpitations [ ] PEPE [ ] PND [ ] Orthopnea  Gastrointestinal: [ ] Abdominal Pain [ ] Diarrhea [ ] Constipation [ ] Hemorrhoids [ ] Nausea [ ] Vomiting  Genitourinary: [ ] Nocturia [ ] Dysuria [ ] Incontinence  Extremities: [ ] Swelling [ ] Joint Pain  Neurologic: [ ] Focal deficit [ ] Paresthesias [ ] Syncope  Lymphatic: [ ] Swelling [ ] Lymphadenopathy   Skin: [ ] Rash [ ] Ecchymoses [ ] Wounds [ ] Lesions  Psychiatry: [ ] Depression [ ] Suicidal/Homicidal Ideation [ ] Anxiety [ ] Sleep Disturbances  [ ] 10 point review of systems is otherwise negative except as mentioned above            [ ]Unable to obtain    Allergy:  Allergies    Vasotec (Unknown)    Intolerances        Medications:  MEDICATIONS  (STANDING):  heparin  Injectable 5000 Unit(s) SubCutaneous every 12 hours  valsartan 320 milliGRAM(s) Oral daily  carvedilol 12.5 milliGRAM(s) Oral every 12 hours  cinacalcet 30 milliGRAM(s) Oral daily  furosemide   Injectable 40 milliGRAM(s) IV Push daily  hydrALAZINE 50 milliGRAM(s) Oral three times a day  doxazosin 4 milliGRAM(s) Oral at bedtime  piperacillin/tazobactam IVPB. 3.375 Gram(s) IV Intermittent every 8 hours  ALBUTerol    0.083% 2.5 milliGRAM(s) Nebulizer every 8 hours    MEDICATIONS  (PRN):    PMH/PSH/FH/SH: [ ] Unchanged    Vitals:  T(C): 36.6 (17 @ 07:30), Max: 37 (17 @ 15:50)  HR: 62 (17 @ 11:11) (55 - 74)  BP: 122/60 (17 @ 11:11) (90/54 - 158/63)  BP(mean): --  RR: 18 (17 @ 11:11) (17 - 18)  SpO2: 96% (17 @ 11:11) (96% - 100%)  Wt(kg): --  Daily     Daily Weight in k.3 (31 Aug 2017 04:46)  I&O's Summary    30 Aug 2017 07:01  -  31 Aug 2017 07:00  --------------------------------------------------------  IN: 690 mL / OUT: 0 mL / NET: 690 mL    Labs:                        9.6    14.0  )-----------( 333      ( 31 Aug 2017 07:28 )             28.8     08-30    136  |  98  |  25<H>  ----------------------------<  90  3.7   |  28  |  0.76    Ca    9.3      30 Aug 2017 06:57  Mg     1.8         Serum Pro-Brain Natriuretic Peptide: 2063 pg/mL ( @ 07:28)    ECG:  < from: 12 Lead ECG (17 @ 09:14) >    Ventricular Rate 76 BPM    Atrial Rate 76 BPM    P-R Interval 166 ms    QRS Duration 84 ms    Q-T Interval 380 ms    QTC Calculation(Bezet) 427 ms    P Axis 34 degrees    R Axis -6 degrees    T Axis 12 degrees    Diagnosis Line Normal sinus rhythm with sinus arrhythmia  Normal ECG    Confirmed by Samson Worthington MD ([id number]) on 2017 4:30:09 PM    < end of copied text >    Echo:  < from: TTE Echo Doppler w/o Cont (17 @ 08:32) >     EXAM:  ECHO TTE W/O CON COMP W/DOPPLR         PROCEDURE DATE:  2017        INTERPRETATION:  Ordering Physician: Unknown Doctor    Indication: Hypertension    Study Quality: Technically fair  A complete echocardiographic study was performed utilizing standard   protocol including spectral and color Doppler in all echocardiographic   windows.    Height: 152 cm  Weight: 49 kg  BSA: 1.4 sq m  Blood Pressure: 189/63    MEASUREMENTS  IVS: 1.0cm  PWT: 1.0cm  LA: 4.2cm  AO: 2.7cm  LVIDd: 4.5cm  LVIDs: 3.3cm    LVEF: 65%  RVSP: 54mmHg    FINDINGS  Left Ventricle:  Normal left ventricular systolic function.  Aortic Valve: Calcified trileaflet aortic valve. Mild aortic   insufficiency.  Mitral Valve: Mitral annular calcification and calcified mitral valve   leaflets with normal diastolic opening. Mild mitral insufficiency.  Tricuspid Valve: Normal tricuspid valve. Mild tricuspid insufficiency.  Pulmonic Valve: Normal pulmonic valve. Mild pulmonic insufficiency.  Left Atrium: Mildly enlarged.  Right Ventricle: Normal right ventricular size and systolic function.  Right Atrium: Normal  Diastolic Function: Grade 1 diastolic dysfunction.  Pericardium/Pleura: Normal pericardium with no pericardial effusion.    MORENA ADORNO M.D., ATTENDING CARDIOLOGIST  This document has been electronically signed. 2017 10:13AM      < end of copied text >    Stress Testing:     Cath:    Imaging:    Interpretation of Telemetry: SR-SB at high 50's with an episode of 40's while asleep overnnight    Physical Exam:  Appearance: [x ] Normal  [ ] abnormal [x ] NAD   Eyes: [x ] PERRL [ ] EOMI  HENT: [x ] Normal [ ] Abnormal oral muscosa [ ]NC/AT  Cardiovascular: [x ] S1 [ ] S2 [ x] RRR [ ] m/r/g [ ]edema [ ] JVP  Procedural Access Site: [ ]  hematoma [ ] tender to palpation [ ] 2+ pulse [ ] bruit [ ] Ecchymosis  Respiratory: [x ] Clear to auscultation bilaterally  Gastrointestinal: [x ] Soft [ ] tenderness[ ] distension x[ ] BS  Musculoskeletal: [ ] clubbing [ ] joint deformity   Neurologic: [x ] Non-focal  Lymphatic: [ ] lymphadenopathy  Psychiatry: [x ] AAOx3  [ ] confused [ ] disoriented [x ] Mood & affect appropriate  Skin: [ ]  rashes [ ] ecchymoses [ ] cyanosis

## 2017-08-31 NOTE — PROGRESS NOTE ADULT - PROBLEM SELECTOR PLAN 2
new onset cough (?productive), mild sob. elevated WBC (no lactate)  - CXR reads B/l interstitial opacities, likely superimposed pna  - Continue Zosyn for HCAP, complete 7 day course on 8/31  - Pulm f/u  -monitor labs and vitals per routine  monitor O2 off NC

## 2017-08-31 NOTE — PROGRESS NOTE ADULT - PROBLEM SELECTOR PLAN 5
Pt. has hx of htn, bp stable on admission.   - cont. home meds with hold parameters.
Pt. has hx of htn, bp stable on admission.   - cont. home meds with hold parameters.
Pt. has hx of htn, bp stable on admission.   lightheaded, hypotensive this morning  orthostatics negative   decrease dose of hydralazine to 25 mg q8  c/w coreg
Pt. has hx of htn, bp stable on admission.   - cont. home meds with hold parameters.
old echo in EMR  right heart disease plus HFpEF  cont diuresis  o2 support  I and O  am labs pending  prognosis poor  age and comorbidities
on emp ABX  cont Zosyn  at least 7 days of ABX  am labs pending  monitor vs and Sat and HDs  overall prognosis poor  advanced age, aspiration, multiple comorbidities  declining functional status
on emp ABX  wbc trending down  cont ABX for total of 7 days  asp prec  oral hygiene  skin care  HOB elevation  may transition to PO Augmentin for Home Discharge if pt leaves before 7 day completed in the hospital
will check cxr this am  HOB elevation  keep sat > 90 pct  oral and skin care  complete Zosyn today  am labs pending  pt remains high risk for aspiration   advanced age and comorbidities  poor prognosis

## 2017-09-01 LAB
ANION GAP SERPL CALC-SCNC: 9 MMOL/L — SIGNIFICANT CHANGE UP (ref 5–17)
BUN SERPL-MCNC: 30 MG/DL — HIGH (ref 7–23)
CALCIUM SERPL-MCNC: 9.5 MG/DL — SIGNIFICANT CHANGE UP (ref 8.5–10.1)
CHLORIDE SERPL-SCNC: 97 MMOL/L — SIGNIFICANT CHANGE UP (ref 96–108)
CO2 SERPL-SCNC: 31 MMOL/L — SIGNIFICANT CHANGE UP (ref 22–31)
CREAT SERPL-MCNC: 0.92 MG/DL — SIGNIFICANT CHANGE UP (ref 0.5–1.3)
CRP SERPL-MCNC: 2.8 MG/DL — HIGH (ref 0–0.4)
ERYTHROCYTE [SEDIMENTATION RATE] IN BLOOD: 57 MM/HR — HIGH (ref 0–20)
FERRITIN SERPL-MCNC: 123 NG/ML — SIGNIFICANT CHANGE UP (ref 15–150)
GLUCOSE SERPL-MCNC: 98 MG/DL — SIGNIFICANT CHANGE UP (ref 70–99)
HCT VFR BLD CALC: 28.6 % — LOW (ref 34.5–45)
HGB BLD-MCNC: 9.5 G/DL — LOW (ref 11.5–15.5)
IRON SATN MFR SERPL: 22 % — SIGNIFICANT CHANGE UP (ref 14–50)
IRON SATN MFR SERPL: 63 UG/DL — SIGNIFICANT CHANGE UP (ref 30–160)
MCHC RBC-ENTMCNC: 28.8 PG — SIGNIFICANT CHANGE UP (ref 27–34)
MCHC RBC-ENTMCNC: 33.1 GM/DL — SIGNIFICANT CHANGE UP (ref 32–36)
MCV RBC AUTO: 87 FL — SIGNIFICANT CHANGE UP (ref 80–100)
PLATELET # BLD AUTO: 318 K/UL — SIGNIFICANT CHANGE UP (ref 150–400)
POTASSIUM SERPL-MCNC: 3.5 MMOL/L — SIGNIFICANT CHANGE UP (ref 3.5–5.3)
POTASSIUM SERPL-SCNC: 3.5 MMOL/L — SIGNIFICANT CHANGE UP (ref 3.5–5.3)
RBC # BLD: 3.29 M/UL — LOW (ref 3.8–5.2)
RBC # BLD: 3.46 M/UL — LOW (ref 3.8–5.2)
RBC # FLD: 13.3 % — SIGNIFICANT CHANGE UP (ref 10.3–14.5)
RETICS #: 55.4 K/UL — SIGNIFICANT CHANGE UP (ref 25–125)
RETICS/RBC NFR: 1.6 % — SIGNIFICANT CHANGE UP (ref 0.5–2.5)
SODIUM SERPL-SCNC: 137 MMOL/L — SIGNIFICANT CHANGE UP (ref 135–145)
TIBC SERPL-MCNC: 291 UG/DL — SIGNIFICANT CHANGE UP (ref 220–430)
UIBC SERPL-MCNC: 228 UG/DL — SIGNIFICANT CHANGE UP (ref 110–370)
WBC # BLD: 13.1 K/UL — HIGH (ref 3.8–10.5)
WBC # FLD AUTO: 13.1 K/UL — HIGH (ref 3.8–10.5)

## 2017-09-01 RX ORDER — HEPARIN SODIUM 5000 [USP'U]/ML
5000 INJECTION INTRAVENOUS; SUBCUTANEOUS
Qty: 0 | Refills: 0 | COMMUNITY
Start: 2017-09-01

## 2017-09-01 RX ORDER — HYDRALAZINE HCL 50 MG
1 TABLET ORAL
Qty: 0 | Refills: 0 | COMMUNITY
Start: 2017-09-01

## 2017-09-01 RX ORDER — HYDRALAZINE HCL 50 MG
25 TABLET ORAL ONCE
Qty: 0 | Refills: 0 | Status: COMPLETED | OUTPATIENT
Start: 2017-09-01 | End: 2017-09-01

## 2017-09-01 RX ORDER — ALBUTEROL 90 UG/1
1 AEROSOL, METERED ORAL
Qty: 0 | Refills: 0 | COMMUNITY
Start: 2017-09-01

## 2017-09-01 RX ORDER — HYDRALAZINE HCL 50 MG
50 TABLET ORAL THREE TIMES A DAY
Qty: 0 | Refills: 0 | Status: DISCONTINUED | OUTPATIENT
Start: 2017-09-01 | End: 2017-09-02

## 2017-09-01 RX ORDER — PREGABALIN 225 MG/1
1000 CAPSULE ORAL DAILY
Qty: 0 | Refills: 0 | Status: DISCONTINUED | OUTPATIENT
Start: 2017-09-01 | End: 2017-09-01

## 2017-09-01 RX ADMIN — ALBUTEROL 2.5 MILLIGRAM(S): 90 AEROSOL, METERED ORAL at 07:44

## 2017-09-01 RX ADMIN — ALBUTEROL 2.5 MILLIGRAM(S): 90 AEROSOL, METERED ORAL at 14:18

## 2017-09-01 RX ADMIN — Medication 25 MILLIGRAM(S): at 05:38

## 2017-09-01 RX ADMIN — Medication 25 MILLIGRAM(S): at 13:29

## 2017-09-01 RX ADMIN — Medication 40 MILLIGRAM(S): at 05:38

## 2017-09-01 RX ADMIN — HEPARIN SODIUM 5000 UNIT(S): 5000 INJECTION INTRAVENOUS; SUBCUTANEOUS at 17:31

## 2017-09-01 RX ADMIN — Medication 4 MILLIGRAM(S): at 21:03

## 2017-09-01 RX ADMIN — CARVEDILOL PHOSPHATE 12.5 MILLIGRAM(S): 80 CAPSULE, EXTENDED RELEASE ORAL at 17:31

## 2017-09-01 RX ADMIN — Medication 25 MILLIGRAM(S): at 19:43

## 2017-09-01 RX ADMIN — ALBUTEROL 2.5 MILLIGRAM(S): 90 AEROSOL, METERED ORAL at 23:07

## 2017-09-01 RX ADMIN — CARVEDILOL PHOSPHATE 12.5 MILLIGRAM(S): 80 CAPSULE, EXTENDED RELEASE ORAL at 05:38

## 2017-09-01 RX ADMIN — CINACALCET 30 MILLIGRAM(S): 30 TABLET, FILM COATED ORAL at 21:03

## 2017-09-01 RX ADMIN — ALBUTEROL 2.5 MILLIGRAM(S): 90 AEROSOL, METERED ORAL at 14:20

## 2017-09-01 RX ADMIN — HEPARIN SODIUM 5000 UNIT(S): 5000 INJECTION INTRAVENOUS; SUBCUTANEOUS at 05:38

## 2017-09-01 RX ADMIN — PIPERACILLIN AND TAZOBACTAM 25 GRAM(S): 4; .5 INJECTION, POWDER, LYOPHILIZED, FOR SOLUTION INTRAVENOUS at 05:38

## 2017-09-01 RX ADMIN — Medication 50 MILLIGRAM(S): at 22:51

## 2017-09-01 NOTE — PROGRESS NOTE ADULT - ATTENDING COMMENTS
observe patioent off of oxygen for the duration of the nioght and trasfer her tomorrow.
Pt seen and examined. Agree with above  89f diastolic chf (EF 65%, echo July 2017), htn, anemia, p/w cough (?productive), congestion x past ~ 2 days, mild SOB. Found with pna, pleural effusions. Seems more comfortable but still with desaturations when walking    - SVT vs rapid af/flutter in the setting of pna.  Successfully converted to NSR with some episodes of bradycardia down to 40's during sleep  - Continue Coreg at 12.5 mg bid.  Will not increase for now  - Had an episode of hypotension while sitting on the chiar (SBP 90).  Per RN, patient denied dizziness or lightheadedness but she did not look right.   - Decrease Hydralazine to 25 mg and discontinue Valsartan for now.  Slowly reintroduce if BP more stable.  - If develops recurrent atrial arrhythmias, may need additional measures, possibly antiarrhythmic, and possibly ac  - Continue tele monitoring  - Cont abx for pna   - No significant signs of fluid overload, cont lasix iv 40 for now and reassess in am.  Elevated BNP noted. Watch creatinine and electrolytes  - Cannot accurately track I & O's due to incontinence  - DVTt ppx  - Will continue to follow
Patient seen and examined. Agree with above.
Patient seen and examined. Agree with above.
pt seen w housestaff and agree w above. cont iv abx

## 2017-09-01 NOTE — PROGRESS NOTE ADULT - SUBJECTIVE AND OBJECTIVE BOX
[facility]    Christofer Isaacs, Sorin, Marisa, Jr, Hieu, Sandro      [phone]    CHIEF COMPLAINT: Patient is a 89y old  Female who presents with a chief complaint of cough, mild SOB (25 Aug 2017 15:51)      Follow Up: pna, hfpef, paf    Interim history: feels better, less sob    MEDICATIONS  (STANDING):  heparin  Injectable 5000 Unit(s) SubCutaneous every 12 hours  carvedilol 12.5 milliGRAM(s) Oral every 12 hours  cinacalcet 30 milliGRAM(s) Oral daily  furosemide   Injectable 40 milliGRAM(s) IV Push daily  doxazosin 4 milliGRAM(s) Oral at bedtime  ALBUTerol    0.083% 2.5 milliGRAM(s) Nebulizer every 8 hours  hydrALAZINE 25 milliGRAM(s) Oral every 8 hours    MEDICATIONS  (PRN):      REVIEW OF SYSTEMS:  eye, ent, GI, , allergic, dermatologic, musculoskeletal and neurologic are negative except as described above    Vital Signs Last 24 Hrs  T(C): 36.6 (01 Sep 2017 07:36), Max: 36.9 (31 Aug 2017 15:51)  T(F): 97.9 (01 Sep 2017 07:36), Max: 98.4 (31 Aug 2017 15:51)  HR: 60 (01 Sep 2017 07:45) (56 - 96)  BP: 144/68 (01 Sep 2017 07:36) (122/60 - 160/70)  BP(mean): --  RR: 18 (01 Sep 2017 07:36) (16 - 18)  SpO2: 95% (01 Sep 2017 07:45) (89% - 98%)    I&O's Summary    31 Aug 2017 07:01  -  01 Sep 2017 07:00  --------------------------------------------------------  IN: 350 mL / OUT: 0 mL / NET: 350 mL        Telemetry past 24h: sr w apcs    PHYSICAL EXAM:    Constitutional: well-nourished, well-developed, NAD   HEENT:  MMM, sclerae anicteric, conjunctivae clear, no oral cyanosis.  Pulmonary: scattered rhonchi  Cardiovascular: Regular, S1 and S2.  2/6 syst murmur.  No rubs, gallops or clicks  Gastrointestinal: Bowel Sounds present, soft, nontender.   Lymph: No peripheral edema.   Neurological: Alert, no focal deficits  Skin: No rashes.  Psych:  Mood & affect appropriate    LABS: All Labs Reviewed:                        9.5    13.1  )-----------( 318      ( 01 Sep 2017 06:38 )             28.6                         9.6    14.0  )-----------( 333      ( 31 Aug 2017 07:28 )             28.8                         9.1    15.2  )-----------( 324      ( 30 Aug 2017 06:57 )             26.9     01 Sep 2017 06:38    137    |  97     |  30     ----------------------------<  98     3.5     |  31     |  0.92   30 Aug 2017 06:57    136    |  98     |  25     ----------------------------<  90     3.7     |  28     |  0.76     Ca    9.5        01 Sep 2017 06:38  Ca    9.3        30 Aug 2017 06:57  Mg     1.8       30 Aug 2017 06:57            Blood Culture:   08-31 @ 07:28  Pro Bnp 2063        RADIOLOGY:    EKG:    Echo:    < from: TTE Echo Doppler w/o Cont (07.09.17 @ 08:32) >     EXAM:  ECHO TTE W/O CON COMP W/DOPPLR         PROCEDURE DATE:  07/09/2017        INTERPRETATION:  Ordering Physician: Unknown Doctor    Indication: Hypertension    Study Quality: Technically fair  A complete echocardiographic study was performed utilizing standard   protocol including spectral and color Doppler in all echocardiographic   windows.    Height: 152 cm  Weight: 49 kg  BSA: 1.4 sq m  Blood Pressure: 189/63    MEASUREMENTS  IVS: 1.0cm  PWT: 1.0cm  LA: 4.2cm  AO: 2.7cm  LVIDd: 4.5cm  LVIDs: 3.3cm    LVEF: 65%  RVSP: 54mmHg    FINDINGS  Left Ventricle:  Normal left ventricular systolic function.  Aortic Valve: Calcified trileaflet aortic valve. Mild aortic   insufficiency.  Mitral Valve: Mitral annular calcification and calcified mitral valve   leaflets with normal diastolic opening. Mild mitral insufficiency.  Tricuspid Valve: Normal tricuspid valve. Mild tricuspid insufficiency.  Pulmonic Valve: Normal pulmonic valve. Mild pulmonic insufficiency.  Left Atrium: Mildly enlarged.  Right Ventricle: Normal right ventricular size and systolic function.  Right Atrium: Normal  Diastolic Function: Grade 1 diastolic dysfunction.  Pericardium/Pleura: Normal pericardium with no pericardial effusion.                      MORENA ADORNO M.D., ATTENDING CARDIOLOGIST  This document has been electronically signed. Jul 9 2017 10:13AM                < end of copied text >

## 2017-09-01 NOTE — PROGRESS NOTE ADULT - SUBJECTIVE AND OBJECTIVE BOX
Patient is a 89y old  Female who presents with a chief complaint of cough, mild SOB (25 Aug 2017 15:51)      INTERVAL HPI/OVERNIGHT EVENTS:  T(C): 36.7 (09-01-17 @ 12:11), Max: 36.9 (09-01-17 @ 04:36)  HR: 65 (09-01-17 @ 14:22) (60 - 96)  BP: 156/67 (09-01-17 @ 13:29) (141/50 - 160/62)  RR: 18 (09-01-17 @ 12:11) (17 - 18)  SpO2: 96% (09-01-17 @ 12:11) (94% - 98%)  Wt(kg): --  I&O's Summary    31 Aug 2017 07:01  -  01 Sep 2017 07:00  --------------------------------------------------------  IN: 350 mL / OUT: 0 mL / NET: 350 mL        LABS:                        9.5    13.1  )-----------( 318      ( 01 Sep 2017 06:38 )             28.6     09-01    137  |  97  |  30<H>  ----------------------------<  98  3.5   |  31  |  0.92    Ca    9.5      01 Sep 2017 06:38          CAPILLARY BLOOD GLUCOSE                MEDICATIONS  (STANDING):  heparin  Injectable 5000 Unit(s) SubCutaneous every 12 hours  carvedilol 12.5 milliGRAM(s) Oral every 12 hours  cinacalcet 30 milliGRAM(s) Oral daily  furosemide   Injectable 40 milliGRAM(s) IV Push daily  doxazosin 4 milliGRAM(s) Oral at bedtime  ALBUTerol    0.083% 2.5 milliGRAM(s) Nebulizer every 8 hours  hydrALAZINE 25 milliGRAM(s) Oral every 8 hours    MEDICATIONS  (PRN):      REVIEW OF SYSTEMS:  CONSTITUTIONAL: No fever, weight loss, or fatigue  EYES: No eye pain, visual disturbances, or discharge  ENMT:  No difficulty hearing, tinnitus, vertigo; No sinus or throat pain  NECK: No pain or stiffness  RESPIRATORY: symmetrical on inspiratin and expiration  CARDIOVASCULAR: No chest pain, palpitations, dizziness, or leg swelling  GASTROINTESTINAL: No abdominal or epigastric pain. No nausea, vomiting, or hematemesis; No diarrhea or constipation. No melena or hematochezia.  GENITOURINARY: No dysuria, frequency, hematuria, or incontinence  NEUROLOGICAL: No headaches, memory loss, loss of strength, numbness, or tremors      RADIOLOGY & ADDITIONAL TESTS:    Imaging Personally Reviewed:  [x ] YES  [ ] NO    Consultant(s) Notes Reviewed:  [x ] YES  [ ] NO    PHYSICAL EXAM:  GENERAL: NAD, well-groomed, well-developed  HEAD:  Atraumatic, Normocephalic  NECK: Supple, No JVD, Normal thyroid  NERVOUS SYSTEM:  Alert & Oriented X3, Good concentration; Motor Strength 5/5 B/L upper and lower extremities; DTRs 2+ intact and symmetric  CHEST/LUNG: Clear to percussion bilaterally; No rales, rhonchi, wheezing, or rubs symmetrical on insopiration and expiration  HEART: Regular rate and rhythm; No murmurs, rubs, or gallops  ABDOMEN: Soft, Nontender, Nondistended; Bowel sounds present  EXTREMITIES:  2+ Peripheral Pulses, No clubbing, cyanosis, or edema  Care Discussed with Consultants/Other Providers [ ] YES  x[x ] NO

## 2017-09-01 NOTE — CONSULT NOTE ADULT - SUBJECTIVE AND OBJECTIVE BOX
Date/Time Patient Seen:  		  Referring MD:   Data Reviewed	       Patient is a 89y old  Female who presents with a chief complaint of cough, mild SOB (24 Aug 2017 22:47)      Subjective/HPI  pt known to me from prior admissions  in bed  with cough, weakness, and leukocytosis    89 yo F pmhx diastolic chf (EF 65%, echo July 2017), htn, anemia, p/w cough (?productive), congestion x past ~ 2 days, mild SOB. Daughter visited patient on Sunday and pt. was asymptomatic. Monday night pt. starting coughing. Hx from daughter as patient was sleeping. No chest pain, no abd pain, sob. No n/v/d. no neck./ back pain. No numb/ting/focal weak. no recent trauma. No abd pain. Daughter noticed decrease PO intake. Pt currently resident at [facility]. Pt had outpt cxr showing CONCEPCION pna and WBC 20.5. Na was also 126. Pt sent for admission. Pt was on levaquin, started yest at nursing home. No other complaints. Of note, daughter states pt. usually alert and oriented and speaks on the phone daily.        PAST MEDICAL & SURGICAL HISTORY:  Anemia, unspecified type  Edema of lower extremity  Heart murmur  Hypertension  Hypercalcemia  History of appendectomy  S/P tonsillectomy  H/O parathyroidectomy        Medication list         MEDICATIONS  (STANDING):  heparin  Injectable 5000 Unit(s) SubCutaneous every 12 hours  valsartan 320 milliGRAM(s) Oral daily  carvedilol 12.5 milliGRAM(s) Oral every 12 hours  cinacalcet 30 milliGRAM(s) Oral daily  furosemide   Injectable 40 milliGRAM(s) IV Push daily  hydrALAZINE 50 milliGRAM(s) Oral three times a day  doxazosin 4 milliGRAM(s) Oral at bedtime  piperacillin/tazobactam IVPB. 3.375 Gram(s) IV Intermittent every 8 hours    MEDICATIONS  (PRN):  ALBUTerol    0.083% 2.5 milliGRAM(s) Nebulizer every 8 hours PRN Shortness of Breath and/or Wheezing         Vitals log        ICU Vital Signs Last 24 Hrs  T(C): 36.4 (25 Aug 2017 04:31), Max: 36.4 (25 Aug 2017 01:01)  T(F): 97.6 (25 Aug 2017 04:31), Max: 97.6 (25 Aug 2017 04:31)  HR: 59 (25 Aug 2017 04:31) (59 - 63)  BP: 147/74 (25 Aug 2017 04:31) (134/75 - 168/70)  BP(mean): --  ABP: --  ABP(mean): --  RR: 17 (25 Aug 2017 04:31) (17 - 18)  SpO2: 93% (25 Aug 2017 04:31) (93% - 97%)           Input and Output:  I&O's Detail      Lab Data                        9.1    20.1  )-----------( 266      ( 24 Aug 2017 20:38 )             26.4     08-24    126<L>  |  94<L>  |  39<H>  ----------------------------<  124<H>  4.6   |  22  |  1.30    Ca    8.4<L>      24 Aug 2017 20:38    TPro  6.1  /  Alb  2.5<L>  /  TBili  0.4  /  DBili  x   /  AST  37  /  ALT  54  /  AlkPhos  88  08-24      CARDIAC MARKERS ( 24 Aug 2017 20:38 )  .018 ng/mL / x     / 43 U/L / x     / 2.3 ng/mL    Family History:  No pertinent family history in first degree relatives.    Social History:  Social History (marital status, living situation, occupation, tobacco use, alcohol and drug use, and sexual history): non-smoker  no drinking  nursing home  walks with mely Forde (daughter) health care proxy, bedside    Tobacco Screening:  · Core Measure Site	Yes  · Has the patient used tobacco in the past 30 days?	No      Review of Systems	  cough, weakness,     Objective     Physical Examination    head at  heart - s1s2  kyphosis  abd - soft  lungs - dec BS      Pertinent Lab findings & Imaging      Ramos:  NO   Adequate UO     I&O's Detail           Discussed with:     Cultures:	        Radiology      EXAM:  ECHO TTE W/O CON COMP W/DOPPLR         PROCEDURE DATE:  07/09/2017        INTERPRETATION:  Ordering Physician: Unknown Doctor    Indication: Hypertension    Study Quality: Technically fair  A complete echocardiographic study was performed utilizing standard   protocol including spectral and color Doppler in all echocardiographic   windows.    Height: 152 cm  Weight: 49 kg  BSA: 1.4 sq m  Blood Pressure: 189/63    MEASUREMENTS  IVS: 1.0cm  PWT: 1.0cm  LA: 4.2cm  AO: 2.7cm  LVIDd: 4.5cm  LVIDs: 3.3cm    LVEF: 65%  RVSP: 54mmHg    FINDINGS  Left Ventricle:  Normal left ventricular systolic function.  Aortic Valve: Calcified trileaflet aortic valve. Mild aortic   insufficiency.  Mitral Valve: Mitral annular calcification and calcified mitral valve   leaflets with normal diastolic opening. Mild mitral insufficiency.  Tricuspid Valve: Normal tricuspid valve. Mild tricuspid insufficiency.  Pulmonic Valve: Normal pulmonic valve. Mild pulmonic insufficiency.  Left Atrium: Mildly enlarged.  Right Ventricle: Normal right ventricular size and systolic function.  Right Atrium: Normal  Diastolic Function: Grade 1 diastolic dysfunction.  Pericardium/Pleura: Normal pericardium with no pericardial effusion.                      MORENA ADORNO M.D., ATTENDING CARDIOLOGIST  This document has been electronically signed. Jul 9 2017 10:13AM
Patient is a 89y old  Female who presents with a chief complaint of cough, mild SOB (25 Aug 2017 15:51)        HPI:  89 yo F pmhx diastolic chf (EF 65%, echo July 2017), htn, anemia, p/w cough (?productive), congestion x past ~ 2 days, mild SOB. Daughter visited patient on Sunday and pt. was asymptomatic. Monday night pt. starting coughing. Hx from daughter as patient was sleeping. No chest pain, no abd pain, sob. No n/v/d. no neck./ back pain. No numb/ting/focal weak. no recent trauma. No abd pain. Daughter noticed decrease PO intake. Pt currently resident at [facility]. Pt had outpt cxr showing CONCEPCION pna and WBC 20.5. Na was also 126. Pt sent for admission. Pt was on levaquin, started yest at nursing home. No other complaints. Of note, daughter states pt. usually alert and oriented and speaks on the phone daily.     In the ED, VSS T 97.3, HR 63, /75, RR 18, SpO2 97% RA. Pt. had WBC of 20.1, Hemoglobin of 9.1 (was 8.4 this AM at nursing home, currently being trended by Dr. Tapia, [facility]), Na 126, BUN/ Cr (elevated at 39/1.3, Cr from previous admissions 0.79), neg. lactate, neg trop, elevated proBNP (9221). EKG sinus rhythm, borderline LVH, HR 63. CXR unofficial read shows CHF, haziness and b/l congestion likely signifies superimposed PNA, rt. pleural effusion. (24 Aug 2017 22:47)    [facility] asked to eval pt for anemia.   Pt with hx fe def anemia, responed to tx with venofer. Given venofer 11/2013 - 06/2014. Hgb best 11.9 (04/2015). Last 04/2017 with Hgb ~11, then more recently with Hgb 9.9, ferritin decreased to 52.        Patient admitted to hospital on 10/22/2013 after being seen by Dr. Sarah and noted hgb 5.8 and ferritin of 5 and was sent to hospital. Received 3 units of PRBC and 3 days of venofer. Had CT of abd and pelvis with thickened gastric wall. Had EGD and colonoscopy ?Dr. Swann no source of bleeding found but pathology suggestive of chronic gastritis. Went to [facility] for rehab    PQRS: advanced directives discussed and daughter Eula is health care proxy      PAST MEDICAL & SURGICAL HISTORY:  Anemia, unspecified type  Edema of lower extremity  Heart murmur  Hypertension  Hypercalcemia  History of appendectomy  S/P tonsillectomy  H/O parathyroidectomy      HEALTH ISSUES - PROBLEM Dx:  Advance care planning: Advance care planning  Pulmonary HTN: Pulmonary HTN  Frail elderly: Frail elderly  Restrictive airway disease: Restrictive airway disease  Atelectasis: Atelectasis  Prophylactic measure: Prophylactic measure  Hypertension: Hypertension  Anemia, unspecified type: Anemia, unspecified type  Hyponatremia: Hyponatremia  Pneumonia due to infectious organism, unspecified laterality, unspecified part of lung: Pneumonia due to infectious organism, unspecified laterality, unspecified part of lung  CHF exacerbation: CHF exacerbation          FAMILY HISTORY:  No pertinent family history in first degree relatives        SOCIAL HISTORY:   smoking: denies  EtOH: denies  illicit drugs: denies  occupation: retired  marital status:   has dtr      ALLERGIES/INTOLERANCES:  Allergies     Vasotec (Unknown)  Intolerances          MEDICATIONS  (STANDING):  heparin  Injectable 5000 Unit(s) SubCutaneous every 12 hours  carvedilol 12.5 milliGRAM(s) Oral every 12 hours  cinacalcet 30 milliGRAM(s) Oral daily  furosemide   Injectable 40 milliGRAM(s) IV Push daily  doxazosin 4 milliGRAM(s) Oral at bedtime  ALBUTerol    0.083% 2.5 milliGRAM(s) Nebulizer every 8 hours  hydrALAZINE 25 milliGRAM(s) Oral every 8 hours    MEDICATIONS  (PRN):        REVIEW OF SYSTEMS:     CONSTITUTIONAL: weakness  EYES: No eye pain, visual disturbances, or discharge  ENMT:  no discharge  NECK: No pain or stiffness  BREASTS: No pain, masses, or nipple discharge  RESPIRATORY: +cough. No wheezing, chills or hemoptysis; + shortness of breath  CARDIOVASCULAR: No chest pain, palpitations, dizziness, or leg swelling  GASTROINTESTINAL: No abdominal or epigastric pain. No nausea, vomiting, or hematemesis; No diarrhea or constipation. No melena or hematochezia.  GENITOURINARY: No dysuria, frequency, hematuria, or incontinence  NEUROLOGICAL: No headaches, memory loss, loss of strength, numbness, or tremors  SKIN: No itching, burning, rashes, or lesions   LYMPH NODES: No enlarged glands  ENDOCRINE: No heat or cold intolerance; No hair loss  MUSCULOSKELETAL: No joint pain or swelling; No muscle, back, or extremity pain  PSYCHIATRIC: No depression, anxiety, mood swings, or difficulty sleeping  HEME/LYMPH: No easy bruising, or bleeding gums      Vital Signs Last 24 Hrs  T(C): 36.7 (01 Sep 2017 12:11), Max: 36.9 (31 Aug 2017 15:51)  T(F): 98 (01 Sep 2017 12:11), Max: 98.4 (31 Aug 2017 15:51)  HR: 67 (01 Sep 2017 13:29) (60 - 96)  BP: 156/67 (01 Sep 2017 13:29) (141/50 - 160/62)  BP(mean): --  RR: 18 (01 Sep 2017 12:11) (17 - 18)  SpO2: 96% (01 Sep 2017 12:11) (89% - 98%)  Last Menstrual Period    I&O's Summary  31 Aug 2017 07:01  -  01 Sep 2017 07:00  --------------------------------------------------------  IN: 350 mL / OUT: 0 mL / NET: 350 mL      PHYSICAL EXAM    General: WN, WD, elderly adult in NAD  HEENT: clear oropharynx, anicteric sclera, pink conjunctivae  Neck: supple, no thryoid mass  CV: normal S1S2 RRR, no murmur, no rubs, no gallops  Lungs: BL rales  Abdomen: soft non-tender non-distended, no hepatomegaly, no splenomegaly  Ext: no clubbing, no cyanosis, no edema  Skin: no rashes, no petechiae  Neuro: alert and oriented X2, no focal deficits      LABS:                        9.5    13.1  )-----------( 318      ( 01 Sep 2017 06:38 )             28.6     CBC Full  -  ( 01 Sep 2017 06:38 )  WBC Count : 13.1 K/uL  Hemoglobin : 9.5 g/dL  Hematocrit : 28.6 %  Platelet Count - Automated : 318 K/uL  Mean Cell Volume : 87.0 fl  Mean Cell Hemoglobin : 28.8 pg  Mean Cell Hemoglobin Concentration : 33.1 gm/dL  Auto Neutrophil # : x  Auto Lymphocyte # : x  Auto Monocyte # : x  Auto Eosinophil # : x  Auto Basophil # : x  Auto Neutrophil % : x  Auto Lymphocyte % : x  Auto Monocyte % : x  Auto Eosinophil % : x  Auto Basophil % : x    09-01  137  |  97  |  30<H>  ----------------------------<  98  3.5   |  31  |  0.92  Ca    9.5      01 Sep 2017 06:38    Vitamin B12, Serum in AM (07.08.17 @ 13:47)    Vitamin B12, Serum: 425 pg/mL    Folate, Serum in AM (07.08.17 @ 13:47)    Folate, Serum: 11.5 ng/mL            LAB TRENDS      WBC  TREND (5 Days)  WBC Count: 13.1 K/uL (09-01 @ 06:38)  WBC Count: 14.0 K/uL (08-31 @ 07:28)  WBC Count: 15.2 K/uL (08-30 @ 06:57)    HGB  TREND (5 Days)  Hemoglobin: 9.5 g/dL (09-01 @ 06:38)  Hemoglobin: 9.6 g/dL (08-31 @ 07:28)  Hemoglobin: 9.1 g/dL (08-30 @ 06:57)    HCT  TREND (5 Days)  Hematocrit: 28.6 % (09-01 @ 06:38)  Hematocrit: 28.8 % (08-31 @ 07:28)  Hematocrit: 26.9 % (08-30 @ 06:57)    PLT  TREND (5 Days)  Platelet Count - Automated: 318 K/uL (09-01 @ 06:38)  Platelet Count - Automated: 333 K/uL (08-31 @ 07:28)  Platelet Count - Automated: 324 K/uL (08-30 @ 06:57)          RADIOLOGY & ADDITIONAL STUDIES:    < from: Xray Chest 1 View AP -PORTABLE-Routine (08.31.17 @ 08:25) >  EXAM:  PORTABLE CHEST                        PROCEDURE DATE:  08/31/2017    INTERPRETATION:  AP erect chest on August 31 at 8:09 AM. Patient is short of breath and hypoxic.    Heart is magnified by technique.    Mild basilar effusions are noted possibly somewhat improved from August 24.    Clips to the right of the trachea again seen.    On August 24 there were diffuse infiltrates in the mid upper lung fields and these also show significant improvement of the present study with mild residual.    Bilateral significant shoulder degeneration again seen.    IMPRESSION: There are improved effusions and infiltrates consistent with improved CHF.    MINI STEEL M.D., ATTENDING RADIOLOGIST  This document has been electronically signed. Aug 31 2017 10:11AM  < end of copied text >        < from: CT Chest No Cont (08.25.17 @ 15:42) >  EXAM:  CT CHEST                        PROCEDURE DATE:  08/25/2017    INTERPRETATION:  .    CLINICAL INFORMATION: Evaluate effusion and atelectasis.,    TECHNIQUE: Helical axial images of the chest were obtained from the   thoracic inlet to the adrenal glands without the administration of   intravenous contrast. Sagittal and coronal reformations were obtained   from the source data. Axial MIP images were reconstructed at a dedicated   separate workstation and also reviewed.    COMPARISON: Prior chest CT examination from 4/26/2017 prior chest x-ray   examination from 8/4/2017.     FINDINGS: The right lobe of the thyroid gland is enlarged compared to the   left and contains hypodense nodules. The largest nodule measures up to   1.4 cm and appears unchanged from the prior CT examination.    There is no axillary adenopathy. Multiple subcentimeter sized mediastinal   lymph nodes are noted. There is no hilar lymphadenopathy.    The heart size is mildly enlarged. There is a trace pericardial effusion.   There are atherosclerotic calcifications of the imaged coronary arteries,   aorta, and branch vessels. The imaged portions of the aorta are normal in   caliber.    The central airways are patent. There are small to moderate-sized   bilateral pleural effusions with adjacent atelectasis. Patchy opacities   are notable within the bilateral upper lobes, right middle lobe, and   lingula.    Subcentimeter sized nonobstructing left-sided intrarenal stones are   notable. There is nonspecific bilateral perinephric stranding. There are   mild multilevel degenerative changes of the thoracic spine.    Degenerative changes are notable within the bilateral shoulders.    IMPRESSION: Patchy ground glass opacities throughout both lungs which may   reflect multifocal pneumonia versus pulmonary edema.    Small to moderate-sized pleural effusions with adjacent atelectasis.    Cardiomegaly.    Trace pericardial effusion.    1.4 cm hypodense right-sided thyroid nodule. Recommend further evaluation   with ultrasound examination.    LAVELL CALVILLO M.D., ATTENDING RADIOLOGIST  This document has been electronically signed. Aug 25 2017  3:58PM  < end of copied text >        < from: CT Abdomen and Pelvis w/ Oral Cont and w/ IV Cont (07.06.17 @ 15:55) >  EXAM:  CT ABDOMEN AND PELVIS OC IC                        PROCEDURE DATE:  07/06/2017    INTERPRETATION:  History: Nausea vomiting abdominal pain.    Double contrast CT abdomen and pelvis. Prior 7/7/2016.  100 cc Omnipaque 350 injected intravenously.  Global cardiomegaly with coronary artery calcification. Hypoventilatory   changes at the lung bases.  No calcified gallstones or biliary dilatation. Liver spleen unremarkable.   Mild prominence main pancreatic duct likely age-related. Stomach not   adequately distended.  Multiple punctate nonobstructive left renal calculi. Mild fullness of the   left renal collecting system and ureter probably related to markedly   distended urinary bladder. No obstructing urinary tract calculi.  No bowel obstruction or inflammation. Moderate retained colonic stool.  Trace pelvic ascites. No extraluminal gas.  Atrophic postmenopausal uterus. Stable right adnexal cystic lesion.  Advanced spinal degenerative change. Severe degenerative change bilateral   hips.    Impression:    No specific acute inflammatory or obstructive pathology.  Nonobstructive left nephrolithiasis.  Markedly distended urinary bladder.  Trace pelvic ascites.  Additional findings as discussed    JULIETTE PATRICIA M.D., ATTENDING RADIOLOGIST  This document has been electronically signed. Jul 6 2017  4:05PM  < end of copied text >
[facility] Consultation    CHIEF COMPLAINT: Patient is a 89y old  Female who presents with a chief complaint of cough, mild SOB (24 Aug 2017 22:47)      HPI:  89 yo F pmhx diastolic chf (EF 65%, echo July 2017), htn, anemia, p/w cough (?productive), congestion x past ~ 2 days, mild SOB. Daughter visited patient on Sunday and pt. was asymptomatic. Monday night pt. starting coughing. Hx from daughter as patient was sleeping. No chest pain, no abd pain, sob. No n/v/d. no neck./ back pain. No numb/ting/focal weak. no recent trauma. No abd pain. Daughter noticed decrease PO intake. Pt currently resident at [facility]. Pt had outpt cxr showing CONCEPCION pna and WBC 20.5. Na was also 126. Pt sent for admission. Pt was on levaquin, started yest at nursing home. No other complaints. Of note, daughter states pt. usually alert and oriented and speaks on the phone daily.     In the ED, VSS T 97.3, HR 63, /75, RR 18, SpO2 97% RA. Pt. had WBC of 20.1, Hemoglobin of 9.1 (was 8.4 this AM at nursing home, currently being trended by Dr. Tapia, [facility]), Na 126, BUN/ Cr (elevated at 39/1.3, Cr from previous admissions 0.79), neg. lactate, neg trop, elevated proBNP (9221). EKG sinus rhythm, borderline LVH, HR 63. CXR unofficial read shows CHF, haziness and b/l congestion likely signifies superimposed PNA, rt. pleural effusion. (24 Aug 2017 22:47)    she is currently awake and alert in chair, eating her lunch without difficulty. She reports that her breathing is somewhat better but she is still coughing. She reports no chest pain or palpitations.      PAST MEDICAL & SURGICAL HISTORY: HFPEF, Mod AS  Anemia, unspecified type  Edema of lower extremity  Heart murmur  Hypertension  Hypercalcemia  History of appendectomy  S/P tonsillectomy  H/O parathyroidectomy      SOCIAL HISTORY:no tob/etoh    FAMILY HISTORY:  No pertinent family history in first degree relatives      MEDICATIONS  (STANDING):  heparin  Injectable 5000 Unit(s) SubCutaneous every 12 hours  valsartan 320 milliGRAM(s) Oral daily  carvedilol 12.5 milliGRAM(s) Oral every 12 hours  cinacalcet 30 milliGRAM(s) Oral daily  furosemide   Injectable 40 milliGRAM(s) IV Push daily  hydrALAZINE 50 milliGRAM(s) Oral three times a day  doxazosin 4 milliGRAM(s) Oral at bedtime  piperacillin/tazobactam IVPB. 3.375 Gram(s) IV Intermittent every 8 hours    MEDICATIONS  (PRN):  ALBUTerol    0.083% 2.5 milliGRAM(s) Nebulizer every 8 hours PRN Shortness of Breath and/or Wheezing      Allergies    Vasotec (Unknown)    REVIEW OF SYSTEMS:    CONSTITUTIONAL: pos weakness, no fevers or chills  EYES: No visual changes, No diplopia  ENMT: No throat pain , No exudate  NECK: No pain or stiffness  RESPIRATORY: pos cough, no wheezing, hemoptysis; Some shortness of breath  CARDIOVASCULAR: No chest pain or palpitations  GASTROINTESTINAL: No abdominal pain. No nausea, vomiting, or hematemesis; No diarrhea or constipation. No melena or hematochezia.  GENITOURINARY: No dysuria, frequency or hematuria  NEUROLOGICAL: No numbness or weakness  SKIN: No itching or rash  All other review of systems is negative unless indicated above    VITAL SIGNS:   Vital Signs Last 24 Hrs  T(C): 36.3 (25 Aug 2017 12:18), Max: 36.5 (25 Aug 2017 07:21)  T(F): 97.4 (25 Aug 2017 12:18), Max: 97.7 (25 Aug 2017 07:21)  HR: 80 (25 Aug 2017 12:53) (59 - 80)  BP: 160/60 (25 Aug 2017 12:53) (134/75 - 183/68)  BP(mean): --  RR: 21 (25 Aug 2017 12:18) (17 - 22)  SpO2: 94% (25 Aug 2017 12:18) (91% - 97%)        PHYSICAL EXAM:    Constitutional: NAD, awake and alert, frail  Eyes:   Pupils round, no lesions  ENMT: no exudate or erythema  Pulmonary: scattered rhonchi bilaterally  Cardiovascular: PMI not palpable Regular S1 and S2, 2/6 midsystolic murmur at the right upper sternal border consistent with mild to moderate aortic stenosis, no rubs, gallops or clicks  Gastrointestinal: Bowel Sounds present, soft, nontender.   Lymph: trc peripheral edema. No cervical lymphadenopathy.  Neurological: Alert, no focal deficits  Skin: No rashes. Changes of chronic venous stasis. No cyanosis.  Psych:  Mood & affect appropriate    LABS: All Labs Reviewed:                        8.8    16.4  )-----------( 271      ( 25 Aug 2017 06:25 )             26.2                         9.1    20.1  )-----------( 266      ( 24 Aug 2017 20:38 )             26.4     25 Aug 2017 06:25    128    |  95     |  36     ----------------------------<  104    4.1     |  25     |  1.00   24 Aug 2017 20:38    126    |  94     |  39     ----------------------------<  124    4.6     |  22     |  1.30     Ca    8.8        25 Aug 2017 06:25  Ca    8.4        24 Aug 2017 20:38    TPro  5.9    /  Alb  2.4    /  TBili  0.3    /  DBili  x      /  AST  29     /  ALT  52     /  AlkPhos  79     25 Aug 2017 06:25  TPro  6.1    /  Alb  2.5    /  TBili  0.4    /  DBili  x      /  AST  37     /  ALT  54     /  AlkPhos  88     24 Aug 2017 20:38    PT/INR - ( 24 Aug 2017 20:38 )   PT: 16.3 sec;   INR: 1.48 ratio         PTT - ( 24 Aug 2017 20:38 )  PTT:27.3 sec  CARDIAC MARKERS ( 24 Aug 2017 20:38 )  .018 ng/mL / x     / 43 U/L / x     / 2.3 ng/mL        08-24 @ 20:38  Pro Bnp 9221        RADIOLOGY/EKG:  < from: 12 Lead ECG (08.24.17 @ 20:30) >    Ventricular Rate 63 BPM    Atrial Rate 63 BPM    P-R Interval 150 ms    QRS Duration 84 ms    Q-T Interval 394 ms    QTC Calculation(Bezet) 403 ms    P Axis 28 degrees    R Axis -3 degrees    T Axis 13 degrees    Diagnosis Line Sinus rhythm with marked sinus arrhythmia  Moderate voltage criteria for LVH, may be normal variant  Borderline ECG    Confirmed by Samson Worthington MD ([id number]) on 8/25/2017 8:04:23 AM    < end of copied text >  < from: TTE Echo Doppler w/o Cont (07.09.17 @ 08:32) >     EXAM:  ECHO TTE W/O CON COMP W/DOPPLR         PROCEDURE DATE:  07/09/2017        INTERPRETATION:  Ordering Physician: Unknown Doctor    Indication: Hypertension    Study Quality: Technically fair  A complete echocardiographic study was performed utilizing standard   protocol including spectral and color Doppler in all echocardiographic   windows.    Height: 152 cm  Weight: 49 kg  BSA: 1.4 sq m  Blood Pressure: 189/63    MEASUREMENTS  IVS: 1.0cm  PWT: 1.0cm  LA: 4.2cm  AO: 2.7cm  LVIDd: 4.5cm  LVIDs: 3.3cm    LVEF: 65%  RVSP: 54mmHg    FINDINGS  Left Ventricle:  Normal left ventricular systolic function.  Aortic Valve: Calcified trileaflet aortic valve. Mild aortic   insufficiency.  Mitral Valve: Mitral annular calcification and calcified mitral valve   leaflets with normal diastolic opening. Mild mitral insufficiency.  Tricuspid Valve: Normal tricuspid valve. Mild tricuspid insufficiency.  Pulmonic Valve: Normal pulmonic valve. Mild pulmonic insufficiency.  Left Atrium: Mildly enlarged.  Right Ventricle: Normal right ventricular size and systolic function.  Right Atrium: Normal  Diastolic Function: Grade 1 diastolic dysfunction.  Pericardium/Pleura: Normal pericardium with no pericardial effusion.                      MORENA ADORNO M.D., ATTENDING CARDIOLOGIST  This document has been electronically signed. Jul 9 2017 10:13AM                < end of copied text >    < from: Xray Chest 1 View AP/PA (08.24.17 @ 20:58) >    EXAM:  CHEST 1 VIEW                            PROCEDURE DATE:  08/24/2017          INTERPRETATION:  Chest portable.    Clinical History: Cough, leukocytosis.    Comparison: 7/6/2017.    Single AP view submitted.    The evaluation of the cardiomediastinal silhouette is limited on portable   technique.  Surgical clip is noted right paratracheal region.    Low lung volumes are present. There are diffuse bilateral interstitial   and airspace opacities with most confluent airspace consolidation in the   right perihilar lower lung zone.  There is a possible small right-sided pleural effusion.    Impression:    Findings as discussed above.                      UMAIR LIM M.D., ATTENDING RADIOLOGIST  This document has been electronically signed. Aug 25 2017  7:48AM                < end of copied text >

## 2017-09-01 NOTE — PROGRESS NOTE ADULT - PROBLEM SELECTOR PLAN 2
supportive care  fall prec  severe Kyphosis  out of bed  increase activity  monitor sat on exertion  overall prognosis poor  spoke with Dtr  pt is DNR

## 2017-09-01 NOTE — PROGRESS NOTE ADULT - ASSESSMENT
pt 89 year old femalwe came from [facility] for PNA  treated with IV abx and is ready fr discharge   will be transferred tomorrow

## 2017-09-01 NOTE — PROGRESS NOTE ADULT - PROBLEM SELECTOR PLAN 4
completed 8 days of Zosyn  am labs pending  WBC fluctuating and is not correlating to clinical picture  will follow  at risk for asp events  Cxr shows improvement

## 2017-09-01 NOTE — CONSULT NOTE ADULT - PROBLEM SELECTOR PROBLEM 1
Pneumonia due to infectious organism, unspecified laterality, unspecified part of lung
Anemia, unspecified type

## 2017-09-01 NOTE — PROGRESS NOTE ADULT - SUBJECTIVE AND OBJECTIVE BOX
Date/Time Patient Seen:  		  Referring MD:   Data Reviewed	       Patient is a 89y old  Female who presents with a chief complaint of cough, mild SOB (25 Aug 2017 15:51)  in bed  seen and examined  vs and meds reviewed          Subjective/HPI     PAST MEDICAL & SURGICAL HISTORY:  Anemia, unspecified type  Edema of lower extremity  Heart murmur  Hypertension  Hypercalcemia  History of appendectomy  S/P tonsillectomy  H/O parathyroidectomy        Medication list         MEDICATIONS  (STANDING):  heparin  Injectable 5000 Unit(s) SubCutaneous every 12 hours  carvedilol 12.5 milliGRAM(s) Oral every 12 hours  cinacalcet 30 milliGRAM(s) Oral daily  furosemide   Injectable 40 milliGRAM(s) IV Push daily  doxazosin 4 milliGRAM(s) Oral at bedtime  ALBUTerol    0.083% 2.5 milliGRAM(s) Nebulizer every 8 hours  hydrALAZINE 25 milliGRAM(s) Oral every 8 hours    MEDICATIONS  (PRN):         Vitals log        ICU Vital Signs Last 24 Hrs  T(C): 36.9 (01 Sep 2017 04:36), Max: 36.9 (31 Aug 2017 15:51)  T(F): 98.4 (01 Sep 2017 04:36), Max: 98.4 (31 Aug 2017 15:51)  HR: 65 (01 Sep 2017 04:36) (55 - 96)  BP: 148/66 (01 Sep 2017 04:36) (90/54 - 160/70)  BP(mean): --  ABP: --  ABP(mean): --  RR: 18 (01 Sep 2017 04:36) (16 - 18)  SpO2: 96% (01 Sep 2017 04:36) (89% - 98%)           Input and Output:  I&O's Detail    30 Aug 2017 07:01  -  31 Aug 2017 07:00  --------------------------------------------------------  IN:    Oral Fluid: 490 mL    Solution: 200 mL  Total IN: 690 mL    OUT:  Total OUT: 0 mL    Total NET: 690 mL      31 Aug 2017 07:01  -  01 Sep 2017 06:35  --------------------------------------------------------  IN:    Oral Fluid: 250 mL    Solution: 100 mL  Total IN: 350 mL    OUT:  Total OUT: 0 mL    Total NET: 350 mL          Lab Data                        9.6    14.0  )-----------( 333      ( 31 Aug 2017 07:28 )             28.8     08-30    136  |  98  |  25<H>  ----------------------------<  90  3.7   |  28  |  0.76    Ca    9.3      30 Aug 2017 06:57  Mg     1.8     08-30              Review of Systems	      Objective     Physical Examination    head at  heart - s1s2  lungs - dec BS  abd - soft      Pertinent Lab findings & Imaging      iRchard:  NO   Adequate UO     I&O's Detail    30 Aug 2017 07:01  -  31 Aug 2017 07:00  --------------------------------------------------------  IN:    Oral Fluid: 490 mL    Solution: 200 mL  Total IN: 690 mL    OUT:  Total OUT: 0 mL    Total NET: 690 mL      31 Aug 2017 07:01  -  01 Sep 2017 06:35  --------------------------------------------------------  IN:    Oral Fluid: 250 mL    Solution: 100 mL  Total IN: 350 mL    OUT:  Total OUT: 0 mL    Total NET: 350 mL               Discussed with:     Cultures:	        Radiology

## 2017-09-01 NOTE — PROGRESS NOTE ADULT - ASSESSMENT
89f diastolic chf (EF 65%, echo July 2017), htn, anemia, p/w cough (?productive), congestion x past ~ 2 days, mild SOB. Found with pna, pleural effusions. diuresed and improved    - SVT vs rapid af/flutter in the setting of pna.  Successfully converted to NSR with some episodes of bradycardia down to 40's during sleep  - Continue Coreg at 12.5 mg bid.  Will not increase for now  -bp is a bit higher with meds having been adjusted for episode of hypotension.  Can reintroduce over time if BP more stable.  - If develops recurrent atrial arrhythmias, may need additional measures, possibly antiarrhythmic, and possibly ac  - Continue tele monitoring  - Cont abx for pna   - No significant signs of fluid overload, bnp better.  Can change iv lasix to po, 40mg po daily.    - Watch creatinine and electrolytes  - Cannot accurately track I & O's due to incontinence  - DVTt ppx  - Will  follow.  - dc planning per primary team

## 2017-09-01 NOTE — CONSULT NOTE ADULT - ASSESSMENT
Myla appears to have an acute infection with elevated white count and cough, possibly pneumonia. She has known normal left ventricular function likely a component of heart failure with preserved ejection fraction. Her BNP level is elevated. She has no evidence for a primary acute coronary syndrome cardiac enzymes were unremarkable. Review of her telemetry reveals occasional episodes of supraventricular tachycardia at a rate of approximately 150 beats per minute. This is possibly AV node reentrant tachycardia. In noted possible P wave morphology at the end of the QRS. The possibility of atrial flutter cannot be completely ruled out.    Recommendation    Continue telemetry  Continue antibiotics per medicine  Deep venous thrombosis prophylaxis  Continue carvedilol and hydralazine  Continue valsartan for now  We'll continue diuretic therapy intravenously for now  Repeat pro BNP tomorrow  Pulmonary followup  Further management can be depending on her clinical course we will follow her with you
Elderly F with hx of probable GIB in past with unrevealing workup in 2013, Fe def anemia, prior on IV iron with improvement. Now with worsening anemia, and decreased in ferritin.     Likely iron deficient again. Check iron levels, if ferritin decreased my benefit from resumption of tx outpt.     While inpt, if symptomatic from anemia, transfuse PRBC.   If Hgb <7.0 transfuse.     Scattered CT scan abnormalities. Pt with advance age, and would be concerned for risks from any invasive procedures.

## 2017-09-02 VITALS — OXYGEN SATURATION: 98 %

## 2017-09-02 DIAGNOSIS — I50.33 ACUTE ON CHRONIC DIASTOLIC (CONGESTIVE) HEART FAILURE: ICD-10-CM

## 2017-09-02 RX ORDER — FUROSEMIDE 40 MG
1 TABLET ORAL
Qty: 0 | Refills: 0 | COMMUNITY

## 2017-09-02 RX ORDER — FUROSEMIDE 40 MG
3 TABLET ORAL
Qty: 0 | Refills: 0 | COMMUNITY
Start: 2017-09-02

## 2017-09-02 RX ORDER — ACETAMINOPHEN 500 MG
2 TABLET ORAL
Qty: 0 | Refills: 0 | COMMUNITY
Start: 2017-09-02

## 2017-09-02 RX ORDER — VALSARTAN 80 MG/1
1 TABLET ORAL
Qty: 0 | Refills: 0 | COMMUNITY

## 2017-09-02 RX ORDER — FUROSEMIDE 40 MG
60 TABLET ORAL DAILY
Qty: 0 | Refills: 0 | Status: DISCONTINUED | OUTPATIENT
Start: 2017-09-03 | End: 2017-09-02

## 2017-09-02 RX ADMIN — Medication 50 MILLIGRAM(S): at 13:19

## 2017-09-02 RX ADMIN — Medication 40 MILLIGRAM(S): at 06:04

## 2017-09-02 RX ADMIN — HEPARIN SODIUM 5000 UNIT(S): 5000 INJECTION INTRAVENOUS; SUBCUTANEOUS at 06:04

## 2017-09-02 RX ADMIN — ALBUTEROL 2.5 MILLIGRAM(S): 90 AEROSOL, METERED ORAL at 08:08

## 2017-09-02 RX ADMIN — CARVEDILOL PHOSPHATE 12.5 MILLIGRAM(S): 80 CAPSULE, EXTENDED RELEASE ORAL at 06:04

## 2017-09-02 RX ADMIN — Medication 50 MILLIGRAM(S): at 06:04

## 2017-09-02 NOTE — PROGRESS NOTE ADULT - PROBLEM SELECTOR PLAN 3
pt is DNR  frail  elderly  weak  overall prognosis is very poor, advanced age and multiple medical issues  expressed my objective findings to the dtr

## 2017-09-02 NOTE — PROGRESS NOTE ADULT - PROBLEM SELECTOR PLAN 2
cvs regimen  diuresis  pulm HTN and HFpEF  frail and weak  monitor labs  cardio following  am labs pending

## 2017-09-02 NOTE — PROGRESS NOTE ADULT - ASSESSMENT
89f diastolic chf (EF 65%, echo July 2017), htn, anemia, p/w cough (?productive), congestion x past ~ 2 days, mild SOB. Found with pna, pleural effusions. diuresed and improved    - SVT vs rapid af/flutter in the setting of pna.  Has been maintaining sr  - Continue Coreg at 12.5 mg bid.  Will not increase for now  -bp was high, but has been labile.  will not adjust meds further.  may need to have valsartan resumed  - If develops recurrent atrial arrhythmias, may need additional measures, possibly antiarrhythmic, and possibly ac  - Continue tele monitoring  - Cont abx for pna   - No significant signs of fluid overload, bnp better.  Will change iv lasix to po, 60mg po daily.    - Watch creatinine and electrolytes  - Cannot accurately track I & O's due to incontinence  - DVTt ppx  - Will  follow.  - dc planning per primary team

## 2017-09-02 NOTE — PROGRESS NOTE ADULT - PROBLEM SELECTOR PROBLEM 2
Pneumonia due to infectious organism, unspecified laterality, unspecified part of lung
Acute on chronic diastolic congestive heart failure
Frail elderly
Frail elderly
Pulmonary HTN
Restrictive airway disease
Restrictive airway disease

## 2017-09-02 NOTE — PROGRESS NOTE ADULT - SUBJECTIVE AND OBJECTIVE BOX
Date/Time Patient Seen:  		  Referring MD:   Data Reviewed	       Patient is a 89y old  Female who presents with a chief complaint of cough, mild SOB (25 Aug 2017 15:51)  in bed  seen and examined  vs and meds reviewed  completed ABX  am labs pending      Subjective/HPI     PAST MEDICAL & SURGICAL HISTORY:  Anemia, unspecified type  Edema of lower extremity  Heart murmur  Hypertension  Hypercalcemia  History of appendectomy  S/P tonsillectomy  H/O parathyroidectomy        Medication list         MEDICATIONS  (STANDING):  heparin  Injectable 5000 Unit(s) SubCutaneous every 12 hours  carvedilol 12.5 milliGRAM(s) Oral every 12 hours  cinacalcet 30 milliGRAM(s) Oral daily  furosemide   Injectable 40 milliGRAM(s) IV Push daily  doxazosin 4 milliGRAM(s) Oral at bedtime  ALBUTerol    0.083% 2.5 milliGRAM(s) Nebulizer every 8 hours  hydrALAZINE 50 milliGRAM(s) Oral three times a day    MEDICATIONS  (PRN):         Vitals log        ICU Vital Signs Last 24 Hrs  T(C): 36.6 (02 Sep 2017 04:51), Max: 36.9 (01 Sep 2017 20:39)  T(F): 97.9 (02 Sep 2017 04:51), Max: 98.5 (01 Sep 2017 20:39)  HR: 60 (02 Sep 2017 04:51) (60 - 90)  BP: 151/64 (02 Sep 2017 04:51) (144/68 - 178/66)  BP(mean): --  ABP: --  ABP(mean): --  RR: 18 (02 Sep 2017 04:51) (16 - 18)  SpO2: 94% (02 Sep 2017 04:51) (94% - 96%)           Input and Output:  I&O's Detail    31 Aug 2017 07:01  -  01 Sep 2017 07:00  --------------------------------------------------------  IN:    Oral Fluid: 250 mL    Solution: 100 mL  Total IN: 350 mL    OUT:  Total OUT: 0 mL    Total NET: 350 mL          Lab Data                        9.5    13.1  )-----------( 318      ( 01 Sep 2017 06:38 )             28.6     09-01    137  |  97  |  30<H>  ----------------------------<  98  3.5   |  31  |  0.92    Ca    9.5      01 Sep 2017 06:38              Review of Systems	      Objective     Physical Examination    head at  heart - s1s2  lungs - dec BS  abd - soft      Pertinent Lab findings & Imaging      Richard:  NO   Adequate UO     I&O's Detail    31 Aug 2017 07:01  -  01 Sep 2017 07:00  --------------------------------------------------------  IN:    Oral Fluid: 250 mL    Solution: 100 mL  Total IN: 350 mL    OUT:  Total OUT: 0 mL    Total NET: 350 mL               Discussed with:     Cultures:	        Radiology

## 2017-09-02 NOTE — PROGRESS NOTE ADULT - PROBLEM SELECTOR PLAN 1
completed ABX  ready for dc from pulm point  HOB elevation   asp prec  oral hygiene  skin hygiene  monitor sat  may need o2 support going forward, pt has kyphosis, restrictive lung disease and is overall very frail and weak

## 2017-09-02 NOTE — PROGRESS NOTE ADULT - PROBLEM SELECTOR PROBLEM 4
Anemia, unspecified type
CHF exacerbation
Frail elderly
Pneumonia due to infectious organism, unspecified laterality, unspecified part of lung
Restrictive airway disease

## 2017-09-02 NOTE — PROGRESS NOTE ADULT - PROBLEM SELECTOR PROBLEM 1
CHF exacerbation
Advance care planning
Atelectasis
Pneumonia due to infectious organism, unspecified laterality, unspecified part of lung
Pulmonary HTN

## 2017-09-02 NOTE — PROGRESS NOTE ADULT - PROVIDER SPECIALTY LIST ADULT
Cardiology
Hospitalist
Hospitalist
Internal Medicine
Pulmonology
Hospitalist

## 2017-09-02 NOTE — PROGRESS NOTE ADULT - SUBJECTIVE AND OBJECTIVE BOX
[facility]    Christofer Isaacs, Sorin, Marisa, Jr, Hieu, Sandro      [phone]    CHIEF COMPLAINT: Patient is a 89y old  Female who presents with a chief complaint of cough, mild SOB (25 Aug 2017 15:51)      Follow Up: sob, hypertension, paf    Interim history: The patient reports no new symptoms.  Denies chest discomfort and shortness of breath.  No abdominal pain.  No new neurologic symptoms.  Was hypertensive yesterday evening, hydralazine increased      MEDICATIONS  (STANDING):  heparin  Injectable 5000 Unit(s) SubCutaneous every 12 hours  carvedilol 12.5 milliGRAM(s) Oral every 12 hours  cinacalcet 30 milliGRAM(s) Oral daily  furosemide   Injectable 40 milliGRAM(s) IV Push daily  doxazosin 4 milliGRAM(s) Oral at bedtime  ALBUTerol    0.083% 2.5 milliGRAM(s) Nebulizer every 8 hours  hydrALAZINE 50 milliGRAM(s) Oral three times a day    MEDICATIONS  (PRN):      REVIEW OF SYSTEMS:  eye, ent, GI, , allergic, dermatologic, musculoskeletal and neurologic are negative except as described above    Vital Signs Last 24 Hrs  T(C): 36.7 (02 Sep 2017 07:59), Max: 36.9 (01 Sep 2017 20:39)  T(F): 98.1 (02 Sep 2017 07:59), Max: 98.5 (01 Sep 2017 20:39)  HR: 59 (02 Sep 2017 08:05) (59 - 90)  BP: 128/71 (02 Sep 2017 07:59) (128/71 - 178/66)  BP(mean): --  RR: 18 (02 Sep 2017 07:59) (16 - 18)  SpO2: 98% (02 Sep 2017 08:05) (94% - 98%)    I&O's Summary      Telemetry past 24h: sr    PHYSICAL EXAM:    Constitutional: well-nourished, well-developed, NAD   HEENT:  MMM, sclerae anicteric, conjunctivae clear, no oral cyanosis.  Pulmonary: Non-labored, crackles marla with decr bs  Cardiovascular: Regular, S1 and S2.  2/6 syst murmur.  No rubs, gallops or clicks  Gastrointestinal: Bowel Sounds present, soft, nontender.   Lymph: minimal peripheral edema.   Neurological: Alert, no focal deficits  Skin: No rashes.  Psych:  Mood & affect appropriate    LABS: All Labs Reviewed:                        9.5    13.1  )-----------( 318      ( 01 Sep 2017 06:38 )             28.6                         9.6    14.0  )-----------( 333      ( 31 Aug 2017 07:28 )             28.8     01 Sep 2017 06:38    137    |  97     |  30     ----------------------------<  98     3.5     |  31     |  0.92     Ca    9.5        01 Sep 2017 06:38            Blood Culture:   08-31 @ 07:28  Pro Bnp [id number]        RADIOLOGY:    EKG:    Echo:  < from: TTE Echo Doppler w/o Cont (07.09.17 @ 08:32) >     EXAM:  ECHO TTE W/O CON COMP W/DOPPLR         PROCEDURE DATE:  07/09/2017        INTERPRETATION:  Ordering Physician: Unknown Doctor    Indication: Hypertension    Study Quality: Technically fair  A complete echocardiographic study was performed utilizing standard   protocol including spectral and color Doppler in all echocardiographic   windows.    Height: 152 cm  Weight: 49 kg  BSA: 1.4 sq m  Blood Pressure: 189/63    MEASUREMENTS  IVS: 1.0cm  PWT: 1.0cm  LA: 4.2cm  AO: 2.7cm  LVIDd: 4.5cm  LVIDs: 3.3cm    LVEF: 65%  RVSP: 54mmHg    FINDINGS  Left Ventricle:  Normal left ventricular systolic function.  Aortic Valve: Calcified trileaflet aortic valve. Mild aortic   insufficiency.  Mitral Valve: Mitral annular calcification and calcified mitral valve   leaflets with normal diastolic opening. Mild mitral insufficiency.  Tricuspid Valve: Normal tricuspid valve. Mild tricuspid insufficiency.  Pulmonic Valve: Normal pulmonic valve. Mild pulmonic insufficiency.  Left Atrium: Mildly enlarged.  Right Ventricle: Normal right ventricular size and systolic function.  Right Atrium: Normal  Diastolic Function: Grade 1 diastolic dysfunction.  Pericardium/Pleura: Normal pericardium with no pericardial effusion.                      MORENA ADORNO M.D., ATTENDING CARDIOLOGIST  This document has been electronically signed. Jul 9 2017 10:13AM                < end of copied text >

## 2017-09-06 LAB
HOMOCYSTEINE LEVEL: 17.9 UMOL/L — HIGH
METHYLMALONIC ACID LEVEL: 309 NMOL/L — SIGNIFICANT CHANGE UP (ref 87–318)

## 2017-09-14 DIAGNOSIS — J18.9 PNEUMONIA, UNSPECIFIED ORGANISM: ICD-10-CM

## 2017-09-14 DIAGNOSIS — E87.1 HYPO-OSMOLALITY AND HYPONATREMIA: ICD-10-CM

## 2017-09-14 DIAGNOSIS — R01.1 CARDIAC MURMUR, UNSPECIFIED: ICD-10-CM

## 2017-09-14 DIAGNOSIS — I48.92 UNSPECIFIED ATRIAL FLUTTER: ICD-10-CM

## 2017-09-14 DIAGNOSIS — Z88.2 ALLERGY STATUS TO SULFONAMIDES: ICD-10-CM

## 2017-09-14 DIAGNOSIS — E86.0 DEHYDRATION: ICD-10-CM

## 2017-09-14 DIAGNOSIS — I48.0 PAROXYSMAL ATRIAL FIBRILLATION: ICD-10-CM

## 2017-09-14 DIAGNOSIS — Z79.891 LONG TERM (CURRENT) USE OF OPIATE ANALGESIC: ICD-10-CM

## 2017-09-14 DIAGNOSIS — I50.33 ACUTE ON CHRONIC DIASTOLIC (CONGESTIVE) HEART FAILURE: ICD-10-CM

## 2017-09-14 DIAGNOSIS — R00.1 BRADYCARDIA, UNSPECIFIED: ICD-10-CM

## 2017-09-14 DIAGNOSIS — Z66 DO NOT RESUSCITATE: ICD-10-CM

## 2017-09-14 DIAGNOSIS — I27.2 OTHER SECONDARY PULMONARY HYPERTENSION: ICD-10-CM

## 2017-09-14 DIAGNOSIS — R54 AGE-RELATED PHYSICAL DEBILITY: ICD-10-CM

## 2017-09-14 DIAGNOSIS — I50.30 UNSPECIFIED DIASTOLIC (CONGESTIVE) HEART FAILURE: ICD-10-CM

## 2017-09-14 DIAGNOSIS — M40.209 UNSPECIFIED KYPHOSIS, SITE UNSPECIFIED: ICD-10-CM

## 2017-09-14 DIAGNOSIS — M81.0 AGE-RELATED OSTEOPOROSIS WITHOUT CURRENT PATHOLOGICAL FRACTURE: ICD-10-CM

## 2017-09-14 DIAGNOSIS — I11.0 HYPERTENSIVE HEART DISEASE WITH HEART FAILURE: ICD-10-CM

## 2017-09-14 DIAGNOSIS — J45.909 UNSPECIFIED ASTHMA, UNCOMPLICATED: ICD-10-CM

## 2017-09-14 DIAGNOSIS — J69.0 PNEUMONITIS DUE TO INHALATION OF FOOD AND VOMIT: ICD-10-CM

## 2017-09-14 DIAGNOSIS — D64.9 ANEMIA, UNSPECIFIED: ICD-10-CM

## 2017-09-14 DIAGNOSIS — E83.52 HYPERCALCEMIA: ICD-10-CM

## 2017-09-14 DIAGNOSIS — I38 ENDOCARDITIS, VALVE UNSPECIFIED: ICD-10-CM

## 2017-09-14 DIAGNOSIS — J98.11 ATELECTASIS: ICD-10-CM

## 2017-09-14 DIAGNOSIS — I47.1 SUPRAVENTRICULAR TACHYCARDIA: ICD-10-CM

## 2017-10-03 NOTE — PROGRESS NOTE ADULT - PROBLEM SELECTOR PROBLEM 2
After Visit Summary   10/3/2017    Yusra Rivas    MRN: [id number]           Patient Information     Date Of Birth          1955        Visit Information        Provider Department      10/3/2017 3:00 PM Antoine Ibrahim MD [facility] Instructions    Assessment:   1.  Chronic pain syndrome  2.  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  Diffuse myofascial pain (muscle pain)  4.  Muscle spasm/dystonia  5.  History of closed head injury  6.  Lumbar radiculopathy (radiating pain)  7.  Depression/anxiety  8.  Right foot drop  9.  Proximal lower extremity weakness  10.  Chronic headache  11.  History of breast cancer, lung cancer      Plan:  1. Physical Therapy:  Deferred - patient has undergone multiple physical therapy sessions with increased pain.  She has also undergone Chiropractic treatments with increased pain.  Continue home self directed exercise as tolerated  2. Clinical Health Psychologist to address issues of relaxation, behavioral change, coping style, and other factors important to improvement.  Deferred - coping well at this time  3. Diagnostic Studies:  Not indicated at this time  4. Medication Management:    1. Continue Gabapentin 900 mg TID  2. Continue Norco 5/325 - maximum 3 per day on bad days  3. Continue Flexeril as needed for muscle spasm and sleep  4. Continue Robaxin 750 mg TID prn spasm  5. Continue Zonegran for headache  5. Further procedures recommended:   1. Proceed with SCS trial once approved - Psychological eval is completed and she was found to be a good candidate  2. May repeat Trigger point injections for muscles spasm when needed  3. May repeat lumbar facet joint and SI joint injection if needed  6. Recommendations to PCP: continue current treatment  7. Follow up: for procedure and in 10-12 weeks      ----------------------------------------------------------------  Nurse Triage line:  [phone]   Call this number  with any questions or concerns. You may leave a detailed message anytime. Calls are typically returned Monday through Friday between 8 AM and 4:30 PM. We usually get back to you within 2 business days depending on the issue/request.       Medication refills:    For non-narcotic medications, call your pharmacy directly to request a refill. The pharmacy will contact the [facility] for authorization. Please allow 3-4 days for these refills to be processed.     For narcotic refills, call the nurse triage line or send a [employer] message. Please contact us 7-10 days before your refill is due. The message MUST include the name of the specific medication(s) requested and how you would like to receive the prescription(s). The options are as follows:    Pain Clinic staff can mail the prescription to your pharmacy. Please tell us the name of the pharmacy.    You may pick the prescription up at the Pain Clinic (tell us the location) or during a clinic visit with your pain provider    Pain Clinic staff can deliver the prescription to the [place] pharmacy in the clinic building. Please tell us the location.      Scheduling number: [phone].  Call this number to schedule or change appointments.    We believe regular attendance is key to your success in our program.    Any time you are unable to keep your appointment we ask that you call us at least 24 hours in advance to let us know. This will allow us to offer the appointment time to another patient.               Follow-ups after your visit        Your next 10 appointments already scheduled     Feb 27, 2018  1:30 PM CST   Return Visit with Sandra [facility]o MD   [facility]    [address] [place] [id number] [phone]              Who to contact     If you have questions or need follow up information about today's clinic visit or your schedule please contact [facility] directly at  "[phone].  Normal or non-critical lab and imaging results will be communicated to you by MyChart, letter or phone within 4 business days after the clinic has received the results. If you do not hear from us within 7 days, please contact the clinic through [employer] or phone. If you have a critical or abnormal lab result, we will notify you by phone as soon as possible.  Submit refill requests through [employer] or call your pharmacy and they will forward the refill request to us. Please allow 3 business days for your refill to be completed.          Additional Information About Your Visit        [employer] Information     [employer] lets you send messages to your doctor, view your test results, renew your prescriptions, schedule appointments and more. To sign up, go to [email or web address] . Click on \"Log in\" on the left side of the screen, which will take you to the Welcome page. Then click on \"Sign up Now\" on the right side of the page.     You will be asked to enter the access code listed below, as well as some personal information. Please follow the directions to create your username and password.     Your access code is: [email or web address]  Expires: 2017  1:29 PM     Your access code will  in 90 days. If you need help or a new code, please call your [facility] clinic or [phone].        Care EveryWhere ID     This is your Care EveryWhere ID. This could be used by other organizations to access your [facility] medical records  [id number]        Your Vitals Were     Pulse Height BMI (Body Mass Index)             73 1.702 m (5' 7\") 24.9 kg/m2          Blood Pressure from Last 3 Encounters:   10/03/17 123/79   17 109/69   17 111/66    Weight from Last 3 Encounters:   10/03/17 72.1 kg (159 lb)   17 70.2 kg (154 lb 12.8 oz)   17 69.9 kg (154 lb)              Today, you had the following     No orders found for display       Primary Care Provider Office Phone # Fax #    Munir Kamaljit " DO Shane [phone]       [address] [place] [address]        Equal Access to Services     ROSEMARY EDUARDO : Hadii jeff serrano jarrod Soguy, watorida luqadaha, qaybta kaalmada cruzito, trisha wilson. So [facility] [phone].    ATENCIÓN: Si habla español, tiene a ruiz disposición servicios gratuitos de asistencia lingüística. Llame al [phone].    We comply with applicable federal civil rights laws and [place] laws. We do not discriminate on the basis of race, color, national origin, age, disability, sex, sexual orientation, or gender identity.            Thank you!     Thank you for choosing [facility]  for your care. Our goal is always to provide you with excellent care. Hearing back from our patients is one way we can continue to improve our services. Please take a few minutes to complete the written survey that you may receive in the mail after your visit with us. Thank you!             Your Updated Medication List - Protect others around you: Learn how to safely use, store and throw away your medicines at www.disposemymeds.org.          This list is accurate as of: 10/3/17  4:07 PM.  Always use your most recent med list.                   Brand Name Dispense Instructions for use Diagnosis    amoxicillin-clavulanate 125-31.25 MG/5ML suspension    AUGMENTIN     Take 45 mg/kg/day by mouth 2 times daily Reported on 4/28/2017        aspirin 81 MG tablet     30 tablet    Take 1 tablet (81 mg) by mouth daily    Routine general medical examination at a health care facility, Breast cancer, right (H), Rectal prolapse       cholecalciferol 1000 UNIT tablet    vitamin D    200 tablet    TAKE TWO TABLETS BY MOUTH EVERY DAY    Routine general medical examination at a health care facility       * cyclobenzaprine 10 MG tablet    FLEXERIL    30 tablet    Take 1 tablet (10 mg) by mouth At Bedtime    Chronic pain syndrome       * cyclobenzaprine 10 MG tablet     FLEXERIL    30 tablet    TAKE ONE TABLET BY MOUTH AT BEDTIME    Cervical dystonia       * docusate sodium 100 MG tablet    COLACE    60 tablet    Take 100 mg by mouth daily    Pelvic prolapse       *  MG capsule   Generic drug:  docusate sodium     100 capsule    TAKE ONE CAPSULE BY MOUTH EVERY DAY    Female genital prolapse, unspecified type       gabapentin 300 MG capsule    NEURONTIN    270 capsule    Increase the Gabapentin to 900 mg three time a day.    Encounter for other specified aftercare       HYDROcodone-acetaminophen 5-325 MG per tablet    NORCO    75 tablet    Take 1 tablet by mouth every 6 hours as needed for pain Max of 3 tablets per day on bad days. This is a 30 day supply.  Dispense on/after 9/29/17. To start on 10/1/17    Spasm, Neck pain       methocarbamol 500 MG tablet    ROBAXIN    90 tablet    Take 1 tablet (500 mg) by mouth 3 times daily as needed for muscle spasms    Myofascial pain       metoprolol 50 MG tablet    LOPRESSOR    180 tablet    TAKE ONE TABLET BY MOUTH TWICE A DAY    Hypertension goal BP (blood pressure) < 130/80       multivitamin Tabs per tablet     60 tablet    TAKE ONE TABLET BY MOUTH EVERY DAY    Routine history and physical examination of adult       omeprazole 20 MG CR capsule    priLOSEC    90 capsule    TAKE ONE CAPSULE BY MOUTH EVERY DAY    Gastroesophageal reflux disease with esophagitis       polyethylene glycol powder    MIRALAX/GLYCOLAX    527 g    STIR 17 GM OF POWDER (SEE DEXTER INSIDE CAP) IN 8-OZ OF LIQUID UNTIL COMPLETELY DISSOLVED. DRINK THE SOLUTION DAILY OR AS DIRECTED.    Slow transit constipation       zonisamide 25 MG capsule    Zonegran          * Notice:  This list has 4 medication(s) that are the same as other medications prescribed for you. Read the directions carefully, and ask your doctor or other care provider to review them with you.       Acute systolic congestive heart failure

## 2017-10-19 PROCEDURE — 85730 THROMBOPLASTIN TIME PARTIAL: CPT

## 2017-10-19 PROCEDURE — 94760 N-INVAS EAR/PLS OXIMETRY 1: CPT

## 2017-10-19 PROCEDURE — 86140 C-REACTIVE PROTEIN: CPT

## 2017-10-19 PROCEDURE — 83921 ORGANIC ACID SINGLE QUANT: CPT

## 2017-10-19 PROCEDURE — 71045 X-RAY EXAM CHEST 1 VIEW: CPT

## 2017-10-19 PROCEDURE — 82553 CREATINE MB FRACTION: CPT

## 2017-10-19 PROCEDURE — 96376 TX/PRO/DX INJ SAME DRUG ADON: CPT

## 2017-10-19 PROCEDURE — 97110 THERAPEUTIC EXERCISES: CPT

## 2017-10-19 PROCEDURE — 71250 CT THORAX DX C-: CPT

## 2017-10-19 PROCEDURE — 83090 ASSAY OF HOMOCYSTEINE: CPT

## 2017-10-19 PROCEDURE — 87086 URINE CULTURE/COLONY COUNT: CPT

## 2017-10-19 PROCEDURE — 85045 AUTOMATED RETICULOCYTE COUNT: CPT

## 2017-10-19 PROCEDURE — 80053 COMPREHEN METABOLIC PANEL: CPT

## 2017-10-19 PROCEDURE — 93005 ELECTROCARDIOGRAM TRACING: CPT

## 2017-10-19 PROCEDURE — 83880 ASSAY OF NATRIURETIC PEPTIDE: CPT

## 2017-10-19 PROCEDURE — 97116 GAIT TRAINING THERAPY: CPT

## 2017-10-19 PROCEDURE — 96375 TX/PRO/DX INJ NEW DRUG ADDON: CPT

## 2017-10-19 PROCEDURE — 84484 ASSAY OF TROPONIN QUANT: CPT

## 2017-10-19 PROCEDURE — 96367 TX/PROPH/DG ADDL SEQ IV INF: CPT

## 2017-10-19 PROCEDURE — 99285 EMERGENCY DEPT VISIT HI MDM: CPT | Mod: 25

## 2017-10-19 PROCEDURE — 82550 ASSAY OF CK (CPK): CPT

## 2017-10-19 PROCEDURE — 36415 COLL VENOUS BLD VENIPUNCTURE: CPT

## 2017-10-19 PROCEDURE — 83735 ASSAY OF MAGNESIUM: CPT

## 2017-10-19 PROCEDURE — 82728 ASSAY OF FERRITIN: CPT

## 2017-10-19 PROCEDURE — 96365 THER/PROPH/DIAG IV INF INIT: CPT

## 2017-10-19 PROCEDURE — 96372 THER/PROPH/DIAG INJ SC/IM: CPT | Mod: 59

## 2017-10-19 PROCEDURE — 85652 RBC SED RATE AUTOMATED: CPT

## 2017-10-19 PROCEDURE — 87040 BLOOD CULTURE FOR BACTERIA: CPT

## 2017-10-19 PROCEDURE — 85027 COMPLETE CBC AUTOMATED: CPT

## 2017-10-19 PROCEDURE — 80048 BASIC METABOLIC PNL TOTAL CA: CPT

## 2017-10-19 PROCEDURE — 81001 URINALYSIS AUTO W/SCOPE: CPT

## 2017-10-19 PROCEDURE — 85610 PROTHROMBIN TIME: CPT

## 2017-10-19 PROCEDURE — 97161 PT EVAL LOW COMPLEX 20 MIN: CPT

## 2017-10-19 PROCEDURE — 83605 ASSAY OF LACTIC ACID: CPT

## 2017-10-19 PROCEDURE — 83550 IRON BINDING TEST: CPT

## 2017-10-19 PROCEDURE — 94640 AIRWAY INHALATION TREATMENT: CPT

## 2017-10-24 ENCOUNTER — APPOINTMENT (OUTPATIENT)
Dept: CARDIOLOGY | Facility: CLINIC | Age: 82
End: 2017-10-24
Payer: MEDICARE

## 2017-10-24 ENCOUNTER — NON-APPOINTMENT (OUTPATIENT)
Age: 82
End: 2017-10-24

## 2017-10-24 VITALS
DIASTOLIC BLOOD PRESSURE: 75 MMHG | BODY MASS INDEX: 22.39 KG/M2 | HEIGHT: 60 IN | HEART RATE: 50 BPM | WEIGHT: 114.03 LBS | OXYGEN SATURATION: 95 % | SYSTOLIC BLOOD PRESSURE: 214 MMHG

## 2017-10-24 DIAGNOSIS — R60.9 EDEMA, UNSPECIFIED: ICD-10-CM

## 2017-10-24 PROCEDURE — 99214 OFFICE O/P EST MOD 30 MIN: CPT

## 2017-10-24 PROCEDURE — 93000 ELECTROCARDIOGRAM COMPLETE: CPT

## 2017-10-24 RX ORDER — CARVEDILOL 12.5 MG/1
12.5 TABLET, FILM COATED ORAL TWICE DAILY
Qty: 60 | Refills: 5 | Status: ACTIVE | COMMUNITY

## 2017-12-15 ENCOUNTER — APPOINTMENT (OUTPATIENT)
Dept: CARDIOLOGY | Facility: CLINIC | Age: 82
End: 2017-12-15
Payer: MEDICARE

## 2017-12-15 ENCOUNTER — NON-APPOINTMENT (OUTPATIENT)
Age: 82
End: 2017-12-15

## 2017-12-15 VITALS — OXYGEN SATURATION: 97 % | WEIGHT: 116 LBS | BODY MASS INDEX: 22.78 KG/M2 | HEART RATE: 51 BPM | HEIGHT: 60 IN

## 2017-12-15 VITALS — SYSTOLIC BLOOD PRESSURE: 160 MMHG | DIASTOLIC BLOOD PRESSURE: 80 MMHG

## 2017-12-15 PROCEDURE — 99214 OFFICE O/P EST MOD 30 MIN: CPT

## 2017-12-15 PROCEDURE — 93000 ELECTROCARDIOGRAM COMPLETE: CPT

## 2017-12-15 RX ORDER — POTASSIUM CHLORIDE 20 MEQ/15ML
20 MEQ/15ML SOLUTION ORAL
Refills: 0 | Status: ACTIVE | COMMUNITY
Start: 2017-12-15

## 2017-12-15 RX ORDER — VALSARTAN 320 MG/1
320 TABLET, COATED ORAL
Qty: 90 | Refills: 3 | Status: DISCONTINUED | COMMUNITY
Start: 2017-07-05 | End: 2017-12-15

## 2018-01-02 ENCOUNTER — INPATIENT (INPATIENT)
Facility: HOSPITAL | Age: 83
LOS: 10 days | Discharge: HOME HEALTH | DRG: 194 | End: 2018-01-13
Attending: INTERNAL MEDICINE | Admitting: INTERNAL MEDICINE
Payer: MEDICARE

## 2018-01-02 VITALS
OXYGEN SATURATION: 95 % | WEIGHT: 115.08 LBS | RESPIRATION RATE: 17 BRPM | HEIGHT: 62 IN | TEMPERATURE: 98 F | HEART RATE: 61 BPM | DIASTOLIC BLOOD PRESSURE: 74 MMHG | SYSTOLIC BLOOD PRESSURE: 160 MMHG

## 2018-01-02 DIAGNOSIS — E89.2 POSTPROCEDURAL HYPOPARATHYROIDISM: Chronic | ICD-10-CM

## 2018-01-02 DIAGNOSIS — Z90.89 ACQUIRED ABSENCE OF OTHER ORGANS: Chronic | ICD-10-CM

## 2018-01-02 DIAGNOSIS — I10 ESSENTIAL (PRIMARY) HYPERTENSION: ICD-10-CM

## 2018-01-02 DIAGNOSIS — E83.52 HYPERCALCEMIA: ICD-10-CM

## 2018-01-02 DIAGNOSIS — Z71.89 OTHER SPECIFIED COUNSELING: ICD-10-CM

## 2018-01-02 DIAGNOSIS — J06.9 ACUTE UPPER RESPIRATORY INFECTION, UNSPECIFIED: ICD-10-CM

## 2018-01-02 DIAGNOSIS — Z29.9 ENCOUNTER FOR PROPHYLACTIC MEASURES, UNSPECIFIED: ICD-10-CM

## 2018-01-02 DIAGNOSIS — Z90.49 ACQUIRED ABSENCE OF OTHER SPECIFIED PARTS OF DIGESTIVE TRACT: Chronic | ICD-10-CM

## 2018-01-02 DIAGNOSIS — I50.30 UNSPECIFIED DIASTOLIC (CONGESTIVE) HEART FAILURE: ICD-10-CM

## 2018-01-02 DIAGNOSIS — D64.9 ANEMIA, UNSPECIFIED: ICD-10-CM

## 2018-01-02 DIAGNOSIS — J18.1 LOBAR PNEUMONIA, UNSPECIFIED ORGANISM: ICD-10-CM

## 2018-01-02 DIAGNOSIS — R74.8 ABNORMAL LEVELS OF OTHER SERUM ENZYMES: ICD-10-CM

## 2018-01-02 DIAGNOSIS — D72.829 ELEVATED WHITE BLOOD CELL COUNT, UNSPECIFIED: ICD-10-CM

## 2018-01-02 LAB
ALBUMIN SERPL ELPH-MCNC: 2.9 G/DL — LOW (ref 3.3–5)
ALP SERPL-CCNC: 99 U/L — SIGNIFICANT CHANGE UP (ref 40–120)
ALT FLD-CCNC: 40 U/L — SIGNIFICANT CHANGE UP (ref 12–78)
ANION GAP SERPL CALC-SCNC: 8 MMOL/L — SIGNIFICANT CHANGE UP (ref 5–17)
APTT BLD: 27.3 SEC — LOW (ref 27.5–37.4)
AST SERPL-CCNC: 39 U/L — HIGH (ref 15–37)
BILIRUB SERPL-MCNC: 0.4 MG/DL — SIGNIFICANT CHANGE UP (ref 0.2–1.2)
BUN SERPL-MCNC: 47 MG/DL — HIGH (ref 7–23)
CALCIUM SERPL-MCNC: 9.2 MG/DL — SIGNIFICANT CHANGE UP (ref 8.5–10.1)
CHLORIDE SERPL-SCNC: 98 MMOL/L — SIGNIFICANT CHANGE UP (ref 96–108)
CK MB BLD-MCNC: 1.4 % — SIGNIFICANT CHANGE UP (ref 0–3.5)
CK MB BLD-MCNC: 3.1 % — SIGNIFICANT CHANGE UP (ref 0–3.5)
CK MB CFR SERPL CALC: 1.4 NG/ML — SIGNIFICANT CHANGE UP (ref 0–3.6)
CK MB CFR SERPL CALC: 1.7 NG/ML — SIGNIFICANT CHANGE UP (ref 0–3.6)
CK SERPL-CCNC: 103 U/L — SIGNIFICANT CHANGE UP (ref 26–192)
CK SERPL-CCNC: 54 U/L — SIGNIFICANT CHANGE UP (ref 26–192)
CO2 SERPL-SCNC: 26 MMOL/L — SIGNIFICANT CHANGE UP (ref 22–31)
CREAT SERPL-MCNC: 1 MG/DL — SIGNIFICANT CHANGE UP (ref 0.5–1.3)
CRP SERPL-MCNC: 10.8 MG/DL — HIGH (ref 0–0.4)
EOSINOPHIL NFR BLD AUTO: 6 % — SIGNIFICANT CHANGE UP (ref 0–6)
GLUCOSE SERPL-MCNC: 108 MG/DL — HIGH (ref 70–99)
HCOV HKU1 RNA SPEC QL NAA+PROBE: DETECTED
HCT VFR BLD CALC: 29.7 % — LOW (ref 34.5–45)
HGB BLD-MCNC: 9.9 G/DL — LOW (ref 11.5–15.5)
HYPOCHROMIA BLD QL: SLIGHT — SIGNIFICANT CHANGE UP
INR BLD: 1.21 RATIO — HIGH (ref 0.88–1.16)
LACTATE SERPL-SCNC: 0.7 MMOL/L — SIGNIFICANT CHANGE UP (ref 0.7–2)
LYMPHOCYTES # BLD AUTO: 15 % — SIGNIFICANT CHANGE UP (ref 13–44)
MCHC RBC-ENTMCNC: 29.2 PG — SIGNIFICANT CHANGE UP (ref 27–34)
MCHC RBC-ENTMCNC: 33.3 GM/DL — SIGNIFICANT CHANGE UP (ref 32–36)
MCV RBC AUTO: 87.5 FL — SIGNIFICANT CHANGE UP (ref 80–100)
MONOCYTES NFR BLD AUTO: 11 % — HIGH (ref 1–9)
NEUTROPHILS NFR BLD AUTO: 67 % — SIGNIFICANT CHANGE UP (ref 43–77)
NEUTS BAND # BLD: 1 % — SIGNIFICANT CHANGE UP (ref 0–8)
NT-PROBNP SERPL-SCNC: 7832 PG/ML — HIGH (ref 0–450)
PLAT MORPH BLD: NORMAL — SIGNIFICANT CHANGE UP
PLATELET # BLD AUTO: 250 K/UL — SIGNIFICANT CHANGE UP (ref 150–400)
POLYCHROMASIA BLD QL SMEAR: SLIGHT — SIGNIFICANT CHANGE UP
POTASSIUM SERPL-MCNC: 4.4 MMOL/L — SIGNIFICANT CHANGE UP (ref 3.5–5.3)
POTASSIUM SERPL-SCNC: 4.4 MMOL/L — SIGNIFICANT CHANGE UP (ref 3.5–5.3)
PROT SERPL-MCNC: 7.2 G/DL — SIGNIFICANT CHANGE UP (ref 6–8.3)
PROTHROM AB SERPL-ACNC: 13.2 SEC — HIGH (ref 9.8–12.7)
RAPID RVP RESULT: DETECTED
RBC # BLD: 3.39 M/UL — LOW (ref 3.8–5.2)
RBC # FLD: 13.4 % — SIGNIFICANT CHANGE UP (ref 10.3–14.5)
RBC BLD AUTO: SIGNIFICANT CHANGE UP
SODIUM SERPL-SCNC: 132 MMOL/L — LOW (ref 135–145)
TROPONIN I SERPL-MCNC: 0.03 NG/ML — SIGNIFICANT CHANGE UP (ref 0.01–0.04)
TROPONIN I SERPL-MCNC: 0.05 NG/ML — HIGH (ref 0.01–0.04)
WBC # BLD: 11.8 K/UL — HIGH (ref 3.8–10.5)
WBC # FLD AUTO: 11.8 K/UL — HIGH (ref 3.8–10.5)

## 2018-01-02 PROCEDURE — 93010 ELECTROCARDIOGRAM REPORT: CPT

## 2018-01-02 PROCEDURE — 99223 1ST HOSP IP/OBS HIGH 75: CPT

## 2018-01-02 PROCEDURE — 71045 X-RAY EXAM CHEST 1 VIEW: CPT | Mod: 26

## 2018-01-02 PROCEDURE — 99285 EMERGENCY DEPT VISIT HI MDM: CPT

## 2018-01-02 RX ORDER — ACETAMINOPHEN 500 MG
650 TABLET ORAL EVERY 6 HOURS
Qty: 0 | Refills: 0 | Status: DISCONTINUED | OUTPATIENT
Start: 2018-01-02 | End: 2018-01-13

## 2018-01-02 RX ORDER — POTASSIUM CHLORIDE 20 MEQ
10 PACKET (EA) ORAL DAILY
Qty: 0 | Refills: 0 | Status: DISCONTINUED | OUTPATIENT
Start: 2018-01-02 | End: 2018-01-05

## 2018-01-02 RX ORDER — FUROSEMIDE 40 MG
60 TABLET ORAL DAILY
Qty: 0 | Refills: 0 | Status: DISCONTINUED | OUTPATIENT
Start: 2018-01-02 | End: 2018-01-07

## 2018-01-02 RX ORDER — LACTOBACILLUS ACIDOPHILUS 100MM CELL
1 CAPSULE ORAL
Qty: 0 | Refills: 0 | Status: DISCONTINUED | OUTPATIENT
Start: 2018-01-02 | End: 2018-01-13

## 2018-01-02 RX ORDER — HEPARIN SODIUM 5000 [USP'U]/ML
5000 INJECTION INTRAVENOUS; SUBCUTANEOUS EVERY 12 HOURS
Qty: 0 | Refills: 0 | Status: DISCONTINUED | OUTPATIENT
Start: 2018-01-02 | End: 2018-01-13

## 2018-01-02 RX ORDER — AZITHROMYCIN 500 MG/1
500 TABLET, FILM COATED ORAL EVERY 24 HOURS
Qty: 0 | Refills: 0 | Status: DISCONTINUED | OUTPATIENT
Start: 2018-01-02 | End: 2018-01-04

## 2018-01-02 RX ORDER — FERROUS SULFATE 325(65) MG
325 TABLET ORAL DAILY
Qty: 0 | Refills: 0 | Status: DISCONTINUED | OUTPATIENT
Start: 2018-01-02 | End: 2018-01-13

## 2018-01-02 RX ORDER — AZITHROMYCIN 500 MG/1
500 TABLET, FILM COATED ORAL ONCE
Qty: 0 | Refills: 0 | Status: COMPLETED | OUTPATIENT
Start: 2018-01-02 | End: 2018-01-02

## 2018-01-02 RX ORDER — CINACALCET 30 MG/1
30 TABLET, FILM COATED ORAL DAILY
Qty: 0 | Refills: 0 | Status: DISCONTINUED | OUTPATIENT
Start: 2018-01-02 | End: 2018-01-13

## 2018-01-02 RX ORDER — SODIUM CHLORIDE 9 MG/ML
3 INJECTION INTRAMUSCULAR; INTRAVENOUS; SUBCUTANEOUS ONCE
Qty: 0 | Refills: 0 | Status: COMPLETED | OUTPATIENT
Start: 2018-01-02 | End: 2018-01-02

## 2018-01-02 RX ORDER — CARVEDILOL PHOSPHATE 80 MG/1
12.5 CAPSULE, EXTENDED RELEASE ORAL
Qty: 0 | Refills: 0 | Status: DISCONTINUED | OUTPATIENT
Start: 2018-01-02 | End: 2018-01-06

## 2018-01-02 RX ORDER — DOXAZOSIN MESYLATE 4 MG
4 TABLET ORAL AT BEDTIME
Qty: 0 | Refills: 0 | Status: DISCONTINUED | OUTPATIENT
Start: 2018-01-02 | End: 2018-01-13

## 2018-01-02 RX ORDER — HYDRALAZINE HCL 50 MG
50 TABLET ORAL THREE TIMES A DAY
Qty: 0 | Refills: 0 | Status: DISCONTINUED | OUTPATIENT
Start: 2018-01-02 | End: 2018-01-03

## 2018-01-02 RX ORDER — CEFTRIAXONE 500 MG/1
1 INJECTION, POWDER, FOR SOLUTION INTRAMUSCULAR; INTRAVENOUS EVERY 24 HOURS
Qty: 0 | Refills: 0 | Status: COMPLETED | OUTPATIENT
Start: 2018-01-02 | End: 2018-01-08

## 2018-01-02 RX ORDER — CEFTRIAXONE 500 MG/1
1 INJECTION, POWDER, FOR SOLUTION INTRAMUSCULAR; INTRAVENOUS ONCE
Qty: 0 | Refills: 0 | Status: COMPLETED | OUTPATIENT
Start: 2018-01-02 | End: 2018-01-02

## 2018-01-02 RX ORDER — IPRATROPIUM/ALBUTEROL SULFATE 18-103MCG
3 AEROSOL WITH ADAPTER (GRAM) INHALATION EVERY 6 HOURS
Qty: 0 | Refills: 0 | Status: DISCONTINUED | OUTPATIENT
Start: 2018-01-02 | End: 2018-01-03

## 2018-01-02 RX ORDER — IPRATROPIUM/ALBUTEROL SULFATE 18-103MCG
3 AEROSOL WITH ADAPTER (GRAM) INHALATION ONCE
Qty: 0 | Refills: 0 | Status: COMPLETED | OUTPATIENT
Start: 2018-01-02 | End: 2018-01-02

## 2018-01-02 RX ADMIN — AZITHROMYCIN 255 MILLIGRAM(S): 500 TABLET, FILM COATED ORAL at 10:30

## 2018-01-02 RX ADMIN — CARVEDILOL PHOSPHATE 12.5 MILLIGRAM(S): 80 CAPSULE, EXTENDED RELEASE ORAL at 17:48

## 2018-01-02 RX ADMIN — Medication 125 MILLIGRAM(S): at 09:03

## 2018-01-02 RX ADMIN — Medication 10 MILLIEQUIVALENT(S): at 17:48

## 2018-01-02 RX ADMIN — SODIUM CHLORIDE 3 MILLILITER(S): 9 INJECTION INTRAMUSCULAR; INTRAVENOUS; SUBCUTANEOUS at 09:03

## 2018-01-02 RX ADMIN — HEPARIN SODIUM 5000 UNIT(S): 5000 INJECTION INTRAVENOUS; SUBCUTANEOUS at 17:48

## 2018-01-02 RX ADMIN — Medication 4 MILLIGRAM(S): at 21:37

## 2018-01-02 RX ADMIN — Medication 60 MILLIGRAM(S): at 12:37

## 2018-01-02 RX ADMIN — Medication 3 MILLILITER(S): at 09:03

## 2018-01-02 RX ADMIN — CEFTRIAXONE 100 GRAM(S): 500 INJECTION, POWDER, FOR SOLUTION INTRAMUSCULAR; INTRAVENOUS at 09:50

## 2018-01-02 RX ADMIN — CINACALCET 30 MILLIGRAM(S): 30 TABLET, FILM COATED ORAL at 21:37

## 2018-01-02 RX ADMIN — Medication 50 MILLIGRAM(S): at 21:37

## 2018-01-02 RX ADMIN — Medication 50 MILLIGRAM(S): at 14:15

## 2018-01-02 RX ADMIN — Medication 1 TABLET(S): at 17:48

## 2018-01-02 NOTE — H&P ADULT - ASSESSMENT
89F PMHx diastolic chf (EF 65%, echo July 2017), HTN, anemia presenting with cough and congestion x 1 week.   Admitted for PNA 89F PMHx diastolic chf (EF 65%, echo July 2017), HTN, anemia presenting with cough and congestion x 1 week.   Admitted for viral PNA

## 2018-01-02 NOTE — CONSULT NOTE ADULT - SUBJECTIVE AND OBJECTIVE BOX
Date/Time Patient Seen:  		  Referring MD:   Data Reviewed	       Patient is a 89y old  Female who presents with a chief complaint of cough, congestion (02 Jan 2018 11:38)      Subjective/HPI  cough, congestion, poor historian, dtr at BS, hx obtained from family and records, on ABX as per ID for poss CAP, on lasix for HFpEF, pt seen and examined    89F PMHx chronic diastolic chf (EF 65%, echo July 2017), HTN, anemia presenting with cough and congestion severe sob x 1 week.  Pt reports cough started last Tuesday with clear mucus production.  No pleuritic chest pain with sob.  Reports this week cough became more severe now with yellowish sputum.  Currently complains of cough and chest congestion. Denies pleuritic chest pain, fever, palpitations, abdominal pain, dizziness, n/v/d/c, changes in vision     PAST MEDICAL & SURGICAL HISTORY:  Anemia, unspecified type  Edema of lower extremity  Heart murmur  Hypertension  Hypercalcemia  History of appendectomy  S/P tonsillectomy  H/O parathyroidectomy        Medication list         MEDICATIONS  (STANDING):  azithromycin  IVPB 500 milliGRAM(s) IV Intermittent every 24 hours  carvedilol 12.5 milliGRAM(s) Oral two times a day  cefTRIAXone   IVPB 1 Gram(s) IV Intermittent every 24 hours  cinacalcet 30 milliGRAM(s) Oral daily  doxazosin 4 milliGRAM(s) Oral at bedtime  ferrous    sulfate 325 milliGRAM(s) Oral daily  furosemide    Tablet 60 milliGRAM(s) Oral daily  heparin  Injectable 5000 Unit(s) SubCutaneous every 12 hours  hydrALAZINE 50 milliGRAM(s) Oral three times a day  lactobacillus acidophilus 1 Tablet(s) Oral two times a day with meals  potassium chloride    Tablet ER 10 milliEquivalent(s) Oral daily    MEDICATIONS  (PRN):  acetaminophen   Tablet 650 milliGRAM(s) Oral every 6 hours PRN For Temp greater than 38 C (100.4 F)  ALBUTerol/ipratropium for Nebulization 3 milliLiter(s) Nebulizer every 6 hours PRN Shortness of Breath and/or Wheezing         Vitals log        ICU Vital Signs Last 24 Hrs  T(C): 37 (02 Jan 2018 16:21), Max: 37.2 (02 Jan 2018 11:28)  T(F): 98.6 (02 Jan 2018 16:21), Max: 98.9 (02 Jan 2018 11:28)  HR: 60 (02 Jan 2018 16:21) (60 - 64)  BP: 166/73 (02 Jan 2018 16:21) (160/74 - 166/73)  BP(mean): --  ABP: --  ABP(mean): --  RR: 18 (02 Jan 2018 16:21) (17 - 18)  SpO2: 92% (02 Jan 2018 16:21) (92% - 95%)           Input and Output:  I&O's Detail      Lab Data                        9.9    11.8  )-----------( 250      ( 02 Jan 2018 09:02 )             29.7     01-02    132<L>  |  98  |  47<H>  ----------------------------<  108<H>  4.4   |  26  |  1.00    Ca    9.2      02 Jan 2018 09:02    TPro  7.2  /  Alb  2.9<L>  /  TBili  0.4  /  DBili  x   /  AST  39<H>  /  ALT  40  /  AlkPhos  99  01-02      CARDIAC MARKERS ( 02 Jan 2018 15:54 )  .034 ng/mL / x     / 54 U/L / x     / 1.7 ng/mL  CARDIAC MARKERS ( 02 Jan 2018 09:02 )  .046 ng/mL / x     / 103 U/L / x     / 1.4 ng/mL    non smoker  non drinker  lives at home  sees Dr. Weil as outpatient    Review of Systems	  cough, congestion, cain    Objective     Physical Examination    frail, extr no edema, head at, heart s1s2, lungs dec BS, occ crackles      Pertinent Lab findings & Imaging      Ramos:  NO   Adequate UO     I&O's Detail           Discussed with:     Cultures:	        Radiology      cxr pvc, poss right pna,

## 2018-01-02 NOTE — CONSULT NOTE ADULT - PROBLEM SELECTOR RECOMMENDATION 3
URI  supportive care  abx for cap  mucinex  nasal saline  nebs  chest pt
per medicine.    Thank you for consulting us and involving us in the management of this most interesting and challenging case.     We will follow along in the care of this patient.

## 2018-01-02 NOTE — H&P ADULT - NSHPREVIEWOFSYSTEMS_GEN_ALL_CORE
Constitutional: denies fever, chills, diaphoresis   HEENT: denies blurry vision, difficulty hearing  Respiratory: reports cough, congestion and sputum production, denies SOB, PEPE, wheezing, hemoptysis  Cardiovascular: denies CP, palpitations, edema  Gastrointestinal: denies nausea, vomiting, diarrhea, constipation, abdominal pain, melena, hematochezia   Genitourinary: denies dysuria, frequency, urgency, hematuria   Skin/Breast: denies rash, itching  Musculoskeletal: denies myalgias, joint swelling, muscle weakness  Neurologic: denies headache, weakness, dizziness, paresthesias, numbness/tingling  ROS negative except as noted above

## 2018-01-02 NOTE — ED ADULT NURSE NOTE - OBJECTIVE STATEMENT
pt with complaints of cough and congestion since Tuesday. pt coughing up clear phlegm. pt denies fevers.

## 2018-01-02 NOTE — H&P ADULT - HISTORY OF PRESENT ILLNESS
89F PMHx diastolic chf (EF 65%, echo July 2017), HTN, anemia presenting with     In the ED 89F PMHx diastolic chf (EF 65%, echo July 2017), HTN, anemia presenting with     In the ED   O2 95% on RA, RR 17 89F PMHx diastolic chf (EF 65%, echo July 2017), HTN, anemia presenting with cough and congestion x 1 week.     In the ED /74, HR 61, T 97.9F, O2 95% on RA, RR 17. WBC 11.8, Hg 9.9, Na 132, lactate 0.7, Trop 0.046, proBNP 7832, CXR showing mild vascular congestion, small left pleural effusion, questionable focal infiltrate of right base. Given Rocephin, Zithromax, Duonebs, RVP pending. Blood cultures collected. EKG showing..... Cardiology (Dr. Hagen) consulted. 89F PMHx diastolic chf (EF 65%, echo July 2017), HTN, anemia presenting with cough and congestion x 1 week.  Pt reports cough started last Tuesday with clear mucus production.  No pleuritic chest pain or shortness of breath over this time.  Reports this week cough became more severe now with yellowish sputum.  Currently complains of cough and congestion. Denies pleuritic chest pain, sob, fever, palpitations, abdominal pain, dizziness, n/v/d/c, changes in vision    In the ED /74, HR 61, T 97.9F, O2 95% on RA, RR 17. WBC 11.8, Hg 9.9, Na 132, lactate 0.7, Trop 0.046, proBNP 7832, CXR showing mild vascular congestion, small left pleural effusion, questionable focal infiltrate of right base. Given Rocephin, Zithromax, Duonebs, RVP + for coronavirus. Blood cultures collected. EKG showing SR. Cardiology (Dr. Hagen) consulted. 89F PMHx chronic diastolic chf (EF 65%, echo July 2017), HTN, anemia presenting with cough and congestion severe sob x 1 week.  Pt reports cough started last Tuesday with clear mucus production.  No pleuritic chest pain with sob.  Reports this week cough became more severe now with yellowish sputum.  Currently complains of cough and congestion. Denies pleuritic chest pain, fever, palpitations, abdominal pain, dizziness, n/v/d/c, changes in vision    In the ED /74, HR 61, T 97.9F, O2 95% on RA, RR 17. WBC 11.8, Hg 9.9, Na 132, lactate 0.7, Trop 0.046, proBNP 7832, CXR showing mild vascular congestion, small left pleural effusion, questionable focal infiltrate of right base. Given Rocephin, Zithromax, Duonebs, RVP + for coronavirus with RLL infiltrate consistent with likely viral pneumonia.  Blood cultures collected. EKG showing SR. Cardiology (Dr. Hagen) and ID (dr. rivera) 89F PMHx chronic diastolic chf (EF 65%, echo July 2017), HTN, anemia presenting with cough and congestion severe sob x 1 week.  Pt reports cough started last Tuesday with clear mucus production.  No pleuritic chest pain with sob.  Reports this week cough became more severe now with yellowish sputum.  Currently complains of cough and chest congestion. Denies pleuritic chest pain, fever, palpitations, abdominal pain, dizziness, n/v/d/c, changes in vision    In the ED /74, HR 61, T 97.9F, O2 95% on RA, RR 17. WBC 11.8, Hg 9.9, Na 132, lactate 0.7, Trop 0.046, proBNP 7832, CXR showing mild vascular congestion, small left pleural effusion, questionable focal infiltrate of right base. Given Rocephin, Zithromax, Duonebs, RVP + for coronavirus with RLL infiltrate consistent with likely viral pneumonia.  Blood cultures collected. EKG showing SR. Cardiology (Dr. Hagen) and ID (dr. rivera)

## 2018-01-02 NOTE — CONSULT NOTE ADULT - SUBJECTIVE AND OBJECTIVE BOX
[facility] Consultation    CHIEF COMPLAINT: Patient is a 89y old  Female who presents with a chief complaint of cough    HPI: 89 yo F p/w cough, congestion x past ~ 1 week. No chest pain. Some dyspnea. Now with inc gen weakness. No abd pain. No n/v/d. NO neck / back pain. NO recent trauma. no recent agg/allev factors. No recent travel / immob. no numb/ting/focal weak. no other inj or co. Daughter is at bedside. Pt lives with daughter and ambulates on own. States that she recently had appointment with Dr. Rowell in beginning of December.       PAST MEDICAL & SURGICAL HISTORY:  Anemia, unspecified type  Edema of lower extremity  Heart murmur  Hypertension  Hypercalcemia  History of appendectomy  S/P tonsillectomy  H/O parathyroidectomy      SOCIAL HISTORY: No history of smoking, alcohol or illicit drugs    FAMILY HISTORY: FAMILY HISTORY:  No pertinent family history in first degree relatives      MEDICATIONS  (STANDING):  azithromycin  IVPB 500 milliGRAM(s) IV Intermittent Once    MEDICATIONS  (PRN):      Allergies    Vasotec (Unknown)    Intolerances        REVIEW OF SYSTEMS:    CONSTITUTIONAL: No weakness, fevers or chills  EYES: No visual changes, No diplopia  ENMT: admits to runny nose. No throat pain , No exudate  NECK: No pain or stiffness  RESPIRATORY: admits to cough with yellow sputum denies wheezing, hemoptysis; No shortness of breath  CARDIOVASCULAR: No chest pain or palpitations  GASTROINTESTINAL: No abdominal pain. No nausea, vomiting, or hematemesis; No diarrhea or constipation. No melena or hematochezia.  GENITOURINARY: No dysuria, frequency or hematuria  NEUROLOGICAL: No numbness or weakness  SKIN: No itching or rash  All other review of systems is negative unless indicated above    VITAL SIGNS:   Vital Signs Last 24 Hrs  T(C): 36.6 (02 Jan 2018 08:17), Max: 36.6 (02 Jan 2018 08:17)  T(F): 97.9 (02 Jan 2018 08:17), Max: 97.9 (02 Jan 2018 08:17)  HR: 61 (02 Jan 2018 08:17) (61 - 61)  BP: 160/74 (02 Jan 2018 08:17) (160/74 - 160/74)  BP(mean): --  RR: 17 (02 Jan 2018 08:17) (17 - 17)  SpO2: 95% (02 Jan 2018 08:17) (95% - 95%)    I&O's Summary      PHYSICAL EXAM:    Constitutional: NAD, awake and alert, well-developed  Eyes:  EOMI,  Pupils round, no lesions  ENMT: no exudate or erythema  Pulmonary: decreased breath sounds in b/l bases of lung fields, no crackles, rales, rhonchi  Cardiovascular: PMI not palpable non-displaced Regular S1 and S2, no murmurs, rubs, gallops or clicks  Gastrointestinal: Bowel Sounds present, soft, nontender.   Lymph: No peripheral edema. No cervical lymphadenopathy.  Neurological: Alert, no focal deficits  Skin: No rashes. No changes of chronic venous stasis. No cyanosis.  Psych:  Mood & affect appropriate    LABS: All Labs Reviewed:                        9.9    11.8  )-----------( 250      ( 02 Jan 2018 09:02 )             29.7     02 Jan 2018 09:02    132    |  98     |  47     ----------------------------<  108    4.4     |  26     |  1.00     Ca    9.2        02 Jan 2018 09:02    TPro  7.2    /  Alb  2.9    /  TBili  0.4    /  DBili  x      /  AST  39     /  ALT  40     /  AlkPhos  99     02 Jan 2018 09:02    PT/INR - ( 02 Jan 2018 09:25 )   PT: 13.2 sec;   INR: 1.21 ratio         PTT - ( 02 Jan 2018 09:25 )  PTT:27.3 sec  CARDIAC MARKERS ( 02 Jan 2018 09:02 )  x     / x     / 103 U/L / x     / 1.4 ng/mL      Blood Culture:   01-02 @ 09:02  Pro Bnp [id number]        RADIOLOGY/EKG: [facility] Consultation    CHIEF COMPLAINT: Patient is a 89y old  Female who presents with a chief complaint of cough    HPI: 89 yo F p/w cough, congestion x past ~ 1 week. No chest pain. Some dyspnea. Now with inc gen weakness. No abd pain. No n/v/d. NO neck / back pain. NO recent trauma. no recent agg/allev factors. No recent travel / immob. no numb/ting/focal weak. no other inj or co. Daughter is at bedside. Pt lives with daughter and ambulates on own. States that she recently had appointment with Dr. Rowell in beginning of December.       PAST MEDICAL & SURGICAL HISTORY:  Anemia, unspecified type  Edema of lower extremity  Heart murmur  Hypertension  Hypercalcemia  History of appendectomy  S/P tonsillectomy  H/O parathyroidectomy      SOCIAL HISTORY: No history of smoking, alcohol or illicit drugs    FAMILY HISTORY: FAMILY HISTORY:  No pertinent family history in first degree relatives      MEDICATIONS  (STANDING):  azithromycin  IVPB 500 milliGRAM(s) IV Intermittent Once    MEDICATIONS  (PRN):      Allergies    Vasotec (Unknown)    Intolerances        REVIEW OF SYSTEMS:    CONSTITUTIONAL: No weakness, fevers or chills  EYES: No visual changes, No diplopia  ENMT: admits to runny nose. No throat pain , No exudate  NECK: No pain or stiffness  RESPIRATORY: admits to cough with yellow sputum denies wheezing, hemoptysis; No shortness of breath  CARDIOVASCULAR: No chest pain or palpitations  GASTROINTESTINAL: No abdominal pain. No nausea, vomiting, or hematemesis; No diarrhea or constipation. No melena or hematochezia.  GENITOURINARY: No dysuria, frequency or hematuria  NEUROLOGICAL: No numbness or weakness  SKIN: No itching or rash  All other review of systems is negative unless indicated above    VITAL SIGNS:   Vital Signs Last 24 Hrs  T(C): 36.6 (02 Jan 2018 08:17), Max: 36.6 (02 Jan 2018 08:17)  T(F): 97.9 (02 Jan 2018 08:17), Max: 97.9 (02 Jan 2018 08:17)  HR: 61 (02 Jan 2018 08:17) (61 - 61)  BP: 160/74 (02 Jan 2018 08:17) (160/74 - 160/74)  BP(mean): --  RR: 17 (02 Jan 2018 08:17) (17 - 17)  SpO2: 95% (02 Jan 2018 08:17) (95% - 95%)    I&O's Summary      PHYSICAL EXAM:    Constitutional: NAD, awake and alert, well-developed  Eyes:  EOMI,  Pupils round, no lesions  ENMT: no exudate or erythema  Pulmonary: decreased breath sounds in b/l bases of lung fields, slight crackles in b/l lung, no rales, rhonchi  Cardiovascular: Regular S1 and S2, no murmurs, rubs, gallops or clicks  Gastrointestinal: Bowel Sounds present, soft, nontender.   Lymph: No peripheral edema. No cervical lymphadenopathy.  Neurological: Alert, no focal deficits  Skin: No rashes. No changes of chronic venous stasis. No cyanosis.  Psych:  Mood & affect appropriate    LABS: All Labs Reviewed:                        9.9    11.8  )-----------( 250      ( 02 Jan 2018 09:02 )             29.7     02 Jan 2018 09:02    132    |  98     |  47     ----------------------------<  108    4.4     |  26     |  1.00     Ca    9.2        02 Jan 2018 09:02    TPro  7.2    /  Alb  2.9    /  TBili  0.4    /  DBili  x      /  AST  39     /  ALT  40     /  AlkPhos  99     02 Jan 2018 09:02    PT/INR - ( 02 Jan 2018 09:25 )   PT: 13.2 sec;   INR: 1.21 ratio         PTT - ( 02 Jan 2018 09:25 )  PTT:27.3 sec  CARDIAC MARKERS ( 02 Jan 2018 09:02 )  x     / x     / 103 U/L / x     / 1.4 ng/mL      Blood Culture:   01-02 @ 09:02  Pro Bnp [id number]        RADIOLOGY/EKG: [facility] Consultation    CHIEF COMPLAINT: Patient is a 89y old  Female who presents with a chief complaint of cough    HPI: 89 yo F p/w cough, congestion x past ~ 1 week. No chest pain. Some dyspnea. Now with inc gen weakness. No abd pain. No n/v/d. NO neck / back pain. NO recent trauma. no recent agg/allev factors. No recent travel / immob. no numb/ting/focal weak. no other inj or co. Daughter is at bedside. Pt lives with daughter and ambulates on own. States that she recently had appointment with Dr. Rowell in beginning of December. her lasix was increased and her lower extremity edema resolved.       PAST MEDICAL & SURGICAL HISTORY:  Anemia, unspecified type  Edema of lower extremity  Heart murmur  Hypertension  Hypercalcemia  History of appendectomy  S/P tonsillectomy  H/O parathyroidectomy      SOCIAL HISTORY: No history of smoking, alcohol or illicit drugs    FAMILY HISTORY: FAMILY HISTORY:  No pertinent family history in first degree relatives      MEDICATIONS  (STANDING):  azithromycin  IVPB 500 milliGRAM(s) IV Intermittent Once    MEDICATIONS  (PRN):      Allergies    Vasotec (Unknown)    Intolerances        REVIEW OF SYSTEMS:    CONSTITUTIONAL: No weakness, fevers or chills  EYES: No visual changes, No diplopia  ENMT: admits to runny nose. No throat pain , No exudate  NECK: No pain or stiffness  RESPIRATORY: admits to cough with yellow sputum denies wheezing, hemoptysis; No shortness of breath  CARDIOVASCULAR: No chest pain or palpitations  GASTROINTESTINAL: No abdominal pain. No nausea, vomiting, or hematemesis; No diarrhea or constipation. No melena or hematochezia.  GENITOURINARY: No dysuria, frequency or hematuria  NEUROLOGICAL: No numbness or weakness  SKIN: No itching or rash  All other review of systems is negative unless indicated above    VITAL SIGNS:   Vital Signs Last 24 Hrs  T(C): 36.6 (02 Jan 2018 08:17), Max: 36.6 (02 Jan 2018 08:17)  T(F): 97.9 (02 Jan 2018 08:17), Max: 97.9 (02 Jan 2018 08:17)  HR: 61 (02 Jan 2018 08:17) (61 - 61)  BP: 160/74 (02 Jan 2018 08:17) (160/74 - 160/74)  BP(mean): --  RR: 17 (02 Jan 2018 08:17) (17 - 17)  SpO2: 95% (02 Jan 2018 08:17) (95% - 95%)    I&O's Summary      PHYSICAL EXAM:    Constitutional: NAD, awake and alert, well-developed  Eyes:  EOMI,  Pupils round, no lesions  ENMT: no exudate or erythema  Pulmonary: decreased breath sounds in b/l bases of lung fields, slight crackles in b/l lung, no rales, rhonchi  Cardiovascular: Regular S1 and S2, no murmurs, rubs, gallops or clicks  Gastrointestinal: Bowel Sounds present, soft, nontender.   Lymph: No peripheral edema. No cervical lymphadenopathy.  Neurological: Alert, no focal deficits  Skin: No rashes. No changes of chronic venous stasis. No cyanosis.  Psych:  Mood & affect appropriate    LABS: All Labs Reviewed:                        9.9    11.8  )-----------( 250      ( 02 Jan 2018 09:02 )             29.7     02 Jan 2018 09:02    132    |  98     |  47     ----------------------------<  108    4.4     |  26     |  1.00     Ca    9.2        02 Jan 2018 09:02    TPro  7.2    /  Alb  2.9    /  TBili  0.4    /  DBili  x      /  AST  39     /  ALT  40     /  AlkPhos  99     02 Jan 2018 09:02    PT/INR - ( 02 Jan 2018 09:25 )   PT: 13.2 sec;   INR: 1.21 ratio         PTT - ( 02 Jan 2018 09:25 )  PTT:27.3 sec  CARDIAC MARKERS ( 02 Jan 2018 09:02 )  x     / x     / 103 U/L / x     / 1.4 ng/mL      Blood Culture:   01-02 @ 09:02  Pro Bnp [id number]        RADIOLOGY/EKG:  < from: Xray Chest 1 View AP/PA (01.02.18 @ 08:45) >    EXAM:  XR CHEST AP OR PA 1V                            PROCEDURE DATE:  01/02/2018          INTERPRETATION:  Cough, congestion.    AP chest. Prior 8/31/2017.    Chin obscures small portion left apex. No change heart mediastinum. Mild   vascular congestion similar to prior. Small left pleural effusion.   Questionable focal infiltrate right base. Recommend clinical correlation   close follow-up.    Impression: As above                JULIETTE PATRICIA M.D., ATTENDING RADIOLOGIST  This document has been electronically signed. Jan 2 2018  8:50AM                < end of copied text >    < from: 12 Lead ECG (01.02.18 @ 08:36) >    Ventricular Rate 58 BPM    Atrial Rate 58 BPM    P-R Interval 160 ms    QRS Duration 78 ms    Q-T Interval 398 ms    QTC Calculation(Bezet) 390 ms    P Axis 20 degrees    R Axis 63 degrees    T Axis 48 degrees    Diagnosis Line Sinus bradycardia  Otherwise normal ECG  When compared with ECG of 25-AUG-2017 09:14,  Questionable change in QRS axis  T wave inversion no longer evident in Inferior leads  Confirmed by NIDHI VARELA ([id number]) on 1/2/2018 8:52:44 PM    < end of copied text >

## 2018-01-02 NOTE — H&P ADULT - PROBLEM SELECTOR PLAN 9
IMPROVE VTE Individual Risk Assessment        RISK                                                          Points  [  ] Previous VTE                                                3    [  ] Thrombophilia                                             2  [  ] Lower limb paralysis                                   2        (unable to hold up >15 seconds)    [  ] Current Cancer                                             2         (within 6 months)    [  ] Immobilization > 24 hrs                              1  [  ] ICU/CCU stay > 24 hours                             1  [  x] Age > 60                                                         1    IMPROVE VTE Score: 1  DVT ppx: heparin hcp in place

## 2018-01-02 NOTE — CONSULT NOTE ADULT - SUBJECTIVE AND OBJECTIVE BOX
HPI:  89F PMHx chronic diastolic chf (EF 65%, echo July 2017), HTN, anemia presenting with cough and congestion severe sob x 1 week.  Pt reports cough started last Tuesday with clear mucus production.  No pleuritic chest pain with sob.  Reports this week cough became more severe now with yellowish sputum.  Currently complains of cough and chest congestion. Denies pleuritic chest pain, fever, palpitations, abdominal pain, dizziness, n/v/d/c, changes in vision    In the ED /74, HR 61, T 97.9F, O2 95% on RA, RR 17. WBC 11.8, Hg 9.9, Na 132, lactate 0.7, Trop 0.046, proBNP 7832, CXR showing mild vascular congestion, small left pleural effusion, questionable focal infiltrate of right base. Given Rocephin, Zithromax, Duonebs, RVP + for coronavirus with RLL infiltrate consistent with pneumonia.  Blood cultures collected. EKG showing SR. Cardiology (Dr. Hagen) and ID (dr. rivera) (02 Jan 2018 11:38)      PAST MEDICAL & SURGICAL HISTORY:  Anemia, unspecified type  Edema of lower extremity  Heart murmur  Hypertension  Hypercalcemia  History of appendectomy  S/P tonsillectomy  H/O parathyroidectomy      Antimicrobials  azithromycin  IVPB 500 milliGRAM(s) IV Intermittent every 24 hours  cefTRIAXone   IVPB 1 Gram(s) IV Intermittent every 24 hours      Immunological      Other  acetaminophen   Tablet 650 milliGRAM(s) Oral every 6 hours PRN  ALBUTerol/ipratropium for Nebulization 3 milliLiter(s) Nebulizer every 6 hours PRN  carvedilol 12.5 milliGRAM(s) Oral two times a day  cinacalcet 30 milliGRAM(s) Oral daily  doxazosin 4 milliGRAM(s) Oral at bedtime  ferrous    sulfate 325 milliGRAM(s) Oral daily  furosemide    Tablet 60 milliGRAM(s) Oral daily  heparin  Injectable 5000 Unit(s) SubCutaneous every 12 hours  hydrALAZINE 50 milliGRAM(s) Oral three times a day  lactobacillus acidophilus 1 Tablet(s) Oral two times a day with meals  potassium chloride    Tablet ER 10 milliEquivalent(s) Oral daily      Allergies    Vasotec (Unknown)    Intolerances        SOCIAL HISTORY: no current tobacco use    FAMILY HISTORY:  No pertinent family history in first degree relatives      ROS:    EYES:  Negative  blurry vision or double vision  GASTROINTESTINAL:  Negative for nausea, vomiting, diarrhea  -otherwise negative except for subjective    Vital Signs Last 24 Hrs  T(C): 36.5 (02 Jan 2018 15:36), Max: 37.2 (02 Jan 2018 11:28)  T(F): 97.7 (02 Jan 2018 15:36), Max: 98.9 (02 Jan 2018 11:28)  HR: 64 (02 Jan 2018 15:36) (61 - 64)  BP: 160/79 (02 Jan 2018 15:36) (160/74 - 160/79)  BP(mean): --  RR: 18 (02 Jan 2018 15:36) (17 - 18)  SpO2: 95% (02 Jan 2018 15:36) (95% - 95%)    PE:  WDWN in no distress  HEENT:  NC, PERRL, sclerae anicteric, conjunctivae clear, EOMI.  Sinuses nontender, no nasal exudate.  No buccal or pharyngeal lesions, erythema or exudate  Neck:  Supple, no adenopathy  Lungs:  No accessory muscle use, decreased BS in right base, some asymmtery in exa, coughing during exam  Cor:  RRR, S1, S2, no murmur appreciated  Abd:  Symmetric, normoactive BS.  Soft, nontender, no masses, guarding or rebound.  Liver and spleen not enlarged  Extrem:  No cyanosis or edema  Skin:  No rashes.  Neuro: grossly intact  Musc: moving all limbs freely, no focal deficits    LABS:                        9.9    11.8  )-----------( 250      ( 02 Jan 2018 09:02 )             29.7     01-02    132<L>  |  98  |  47<H>  ----------------------------<  108<H>  4.4   |  26  |  1.00    Ca    9.2      02 Jan 2018 09:02    TPro  7.2  /  Alb  2.9<L>  /  TBili  0.4  /  DBili  x   /  AST  39<H>  /  ALT  40  /  AlkPhos  99  01-02        MICROBIOLOGY:    Rapid Respiratory Viral Panel (01.02.18 @ 08:32)    Rapid RVP Result: Detected: The [employer] RVP Rapid uses polymerase chain reaction (PCR) and melt  curve analysis to screen for adenovirus; coronavirus HKU1, NL63, 229E,  OC43; human metapneumovirus (hMPV); human enterovirus/rhinovirus  (Entero/RV); influenza A; influenza A/H1;influenza A/H3; influenza  A/H1-2009; influenza B; parainfluenza viruses 1, 2, 3, 4; respiratory  syncytial virus; Bordetella pertussis; Mycoplasma pneumoniae; and  Chlamydophila pneumoniae.    HKU1 Coronavirus (RapRVP): Detected        RADIOLOGY & ADDITIONAL STUDIES:    --< from: Xray Chest 1 View AP/PA (01.02.18 @ 08:45) >  EXAM:  XR CHEST AP OR PA 1V                            PROCEDURE DATE:  01/02/2018          INTERPRETATION:  Cough, congestion.    AP chest. Prior 8/31/2017.    Chin obscures small portion left apex. No change heart mediastinum. Mild   vascular congestion similar to prior. Small left pleural effusion.   Questionable focal infiltrate right base. Recommend clinical correlation   close follow-up.

## 2018-01-02 NOTE — H&P ADULT - PROBLEM SELECTOR PLAN 2
RVP+ coronavirus, likely fits with clinical presentation, will f/u blood cultures and r/o pna  - rest of plan as per above Likely 2/2 acute infectious etiology, doubt true pna  - will continue to monitor cbc  - rest of plan as per 1 Likely 2/2 acute infectious etiology,  - will continue to monitor cbc  - rest of plan as per 1

## 2018-01-02 NOTE — H&P ADULT - NSHPPHYSICALEXAM_GEN_ALL_CORE
Vital Signs:  T(F): 98.9 (01-02-18 @ 11:28), Max: 98.9 (01-02-18 @ 11:28)  HR: 63 (01-02-18 @ 11:28) (61 - 63)  BP: 160/75 (01-02-18 @ 11:28) (160/74 - 160/75)  RR: 18 (01-02-18 @ 11:28) (17 - 18)  SpO2: 95% (01-02-18 @ 11:28) (95% - 95%) Vital Signs:  T(F): 98.9 (01-02-18 @ 11:28), Max: 98.9 (01-02-18 @ 11:28)  HR: 63 (01-02-18 @ 11:28) (61 - 63)  BP: 160/75 (01-02-18 @ 11:28) (160/74 - 160/75)  RR: 18 (01-02-18 @ 11:28) (17 - 18)  SpO2: 95% (01-02-18 @ 11:28) (95% - 95%)    Physical Exam:  General: NAD, frail  HEENT: NCAT, PERRLA, EOMI bl, moist mucous membranes   Neck: Supple, nontender, no mass  Neurology: A&Ox3, nonfocal, sensation intact, no gait abnormalities   Respiratory: grossly CTA B/L, No W/R/R  CV: RRR, +S1/S2, no murmurs, rubs or gallops  Abdominal: Soft, NT, ND +BS  Extremities: No C/C/E, + 2 peripheral pulses  MSK: no joint erythema or warmth, no joint swelling   Skin: warm, dry, normal color Vital Signs:  T(F): 98.9 (01-02-18 @ 11:28), Max: 98.9 (01-02-18 @ 11:28)  HR: 63 (01-02-18 @ 11:28) (61 - 63)  BP: 160/75 (01-02-18 @ 11:28) (160/74 - 160/75)  RR: 18 (01-02-18 @ 11:28) (17 - 18)  SpO2: 95% (01-02-18 @ 11:28) (95% - 95%)    Physical Exam:  General: NAD, frail  HEENT: NCAT, PERRLA, EOMI bl, moist mucous membranes   Neck: Supple, nontender, no mass  Neurology: A&Ox3, nonfocal, sensation intact, no gait abnormalities   Respiratory: grossly Coarse bs  CV: RRR, +S1/S2, no murmurs, rubs or gallops  Abdominal: Soft, NT, ND +BS  Extremities: No C/C/E, + 2 peripheral pulses  MSK: no joint erythema or warmth, no joint swelling   Skin: warm, dry, normal color Vital Signs:  T(F): 98.9 (01-02-18 @ 11:28), Max: 98.9 (01-02-18 @ 11:28)  HR: 63 (01-02-18 @ 11:28) (61 - 63)  BP: 160/75 (01-02-18 @ 11:28) (160/74 - 160/75)  RR: 18 (01-02-18 @ 11:28) (17 - 18)  SpO2: 95% (01-02-18 @ 11:28) (95% - 95%)    Physical Exam:  General: NAD, frail  HEENT: NCAT, PERRLA, EOMI bl, moist mucous membranes   Neck: Supple, nontender, no mass  Neurology: A&Ox3, nonfocal, sensation intact, no gait abnormalities   Respiratory: decreased breath sounds at bases  CV: RRR, +S1/S2, no murmurs, rubs or gallops  Abdominal: Soft, NT, ND +BS  Extremities: No C/C/E, + 2 peripheral pulses  MSK: no joint erythema or warmth, no joint swelling   Skin: warm, dry, normal color

## 2018-01-02 NOTE — H&P ADULT - ATTENDING COMMENTS
pt seen examined and admitted  agree with plan as  edited. pt seen examined and admitted on 1-2-18 with Dr. Quintanilla.   agree with plan as  edited above.   message left to notify pmd dr weil of admission  early ambulation encouraged.

## 2018-01-02 NOTE — ED PROVIDER NOTE - OBJECTIVE STATEMENT
89 yo F p/w cough, congestion x past ~ 1 week. No chest pain. Min dyspnea. No abd pain. No n/v/d. NO neck / back pain. NO recent trauma. no recent agg/allev factors. No recent travel / immob. no numb/ting/focal weak. no other inj or co. 89 yo F p/w cough, congestion x past ~ 1 week. No chest pain. Some dyspnea. Now with inc gen weakness. No abd pain. No n/v/d. NO neck / back pain. NO recent trauma. no recent agg/allev factors. No recent travel / immob. no numb/ting/focal weak. no other inj or co.

## 2018-01-02 NOTE — CONSULT NOTE ADULT - ASSESSMENT
89 yo female admitted with cough productive of yellow sputum, +RVP for  HKU1 Coronavirus, decreased BS in right base and increased opac right base on CXR

## 2018-01-02 NOTE — H&P ADULT - PROBLEM SELECTOR PLAN 6
Chronic, unclear etiology, possibly anemia of chronic disease, h&h stable unchanged from past base on chart review  - will continue to monitor cbc Controlled - continue with carvedilol and hydralazine with hold parameters

## 2018-01-02 NOTE — ED PROVIDER NOTE - CARE PLAN
Principal Discharge DX:	Upper respiratory tract infection, unspecified type  Secondary Diagnosis:	Shortness of breath Principal Discharge DX:	Upper respiratory tract infection, unspecified type  Secondary Diagnosis:	Shortness of breath  Secondary Diagnosis:	Weakness

## 2018-01-02 NOTE — CONSULT NOTE ADULT - PROBLEM SELECTOR RECOMMENDATION 2
elevation consistent with PNA
pall care discussion  molst updated  pt is DNR DNI  discussed with dtr

## 2018-01-02 NOTE — H&P ADULT - PROBLEM SELECTOR PLAN 4
ProBNP elevated on admission, Likely 2/2 demand, no evidence of acute ischemic changes, EKG sr, preserved EF on last echo (July) no systolic dysfunction  - will f/u 2nd set chronic diastolic chf   ProBNP elevated  no evidence of fluid overload on exam. Cardio ([facility]) consulted  - continue home dose lasix   - will closely monitor for fluid overload

## 2018-01-02 NOTE — CONSULT NOTE ADULT - PROBLEM SELECTOR PROBLEM 1
Pneumonia of right lower lobe due to infectious organism
Pneumonia of right lower lobe due to infectious organism

## 2018-01-02 NOTE — H&P ADULT - PROBLEM SELECTOR PLAN 3
Likely 2/2 acute infectious etiology, doubt true pna  - will continue to monitor cbc  - rest of plan as per 1 Likely 2/2 demand, no evidence of acute ischemic changes, EKG sr, preserved EF on last echo (July) no systolic dysfunction  - will f/u 2nd set

## 2018-01-02 NOTE — H&P ADULT - NSHPSOCIALHISTORY_GEN_ALL_CORE
Social History:    Marital Status:   Occupation:   Lives with:     Substance Use :  Tobacco Usage: Never smoked  Alcohol Usage: No EtOH use      Health Management     For female:   Last Mammo:   Last Pap:     Immunization Hx:   (  ) flu shot                               (     ) date   (  ) pneumonia shot               (     ) date  (  ) tetanus                               (     ) date     (     ) Advanced Directives: (     ) declined  health care proxy, Daughter Eula non-smoker  no drinking  nursing home  walks with walker  Eula (daughter) health care proxy, bedside non-smoker (never)   no alcohol lives in assisted living and   walks with walker  Eula (daughter) health care proxy, bedside

## 2018-01-02 NOTE — H&P ADULT - PROBLEM SELECTOR PLAN 8
h/o of hypercalcemia - calcium wnl now   - continue home dose Sensipar IMPROVE VTE Individual Risk Assessment        RISK                                                          Points  [  ] Previous VTE                                                3    [  ] Thrombophilia                                             2  [  ] Lower limb paralysis                                   2        (unable to hold up >15 seconds)    [  ] Current Cancer                                             2         (within 6 months)    [  ] Immobilization > 24 hrs                              1  [  ] ICU/CCU stay > 24 hours                             1  [  x] Age > 60                                                         1    IMPROVE VTE Score: 1  DVT ppx: heparin IMPROVE VTE Individual Risk Assessment        RISK                                                          Points  [  ] Previous VTE                                                3    [  ] Thrombophilia                                             2  [x  ] Lower limb paralysis                                   2        (unable to hold up >15 seconds)    [  ] Current Cancer                                             2         (within 6 months)    [ x ] Immobilization > 24 hrs                              1  [  ] ICU/CCU stay > 24 hours                             1  [  x] Age > 60                                                         1    IMPROVE VTE Score: 4  DVT ppx: heparin sub q w cr cl 25.

## 2018-01-02 NOTE — H&P ADULT - PROBLEM SELECTOR PLAN 1
Admit to [facility] - no sepsis, doubt pna, ?focal infiltrate on cxr, mild leukocytosis, afebrile, no other evidence of system infection at this juncture, RVP + coronavirus  - continue rocephin and zithromax pending cultures  - Tylenol prn for fever   - continuous pulse ox, keep 02 sat >90  - supportive treatment  - f/u blood cultures Admit to tele- no sepsis, doubt pari pna, ?focal infiltrate on cxr, mild leukocytosis, afebrile, no other evidence of system infection at this juncture, RVP + coronavirus  - continue rocephin and zithromax pending cultures  - Tylenol prn for fever   - continuous pulse ox, keep 02 sat >90  - supportive treatment  - on ivf at this time, pt tolerating po well, encourage po intake  - f/u blood cultures with mild hypoxia and sob Admit to tele- no sepsis,  mild leukocytosis, afebrile, no other evidence of system infection at this juncture, RVP + coronavirus  - continue rocephin and zithromax pending cultures  - Tylenol prn for fever   - continuous pulse ox, keep 02 sat >90  - supportive treatment  - on ivf at this time, pt tolerating po well, encourage po intake  - f/u blood cultures with mild hypoxia and sob Admit to tele- no sepsis,  mild leukocytosis, afebrile, no other evidence of system infection at this juncture, RVP + coronavirus c/w viral pneumonia  - continue rocephin and zithromax pending cultures and id eval  - Tylenol prn for fever   - continuous pulse ox, keep 02 sat >90  - supportive treatment  - on ivf at this time, pt tolerating po well, encourage po intake  - f/u blood cultures

## 2018-01-02 NOTE — H&P ADULT - PROBLEM SELECTOR PLAN 7
Controlled - continue with carvedilol and hydralazine with hold parameters h/o of hypercalcemia - calcium wnl now   - continue home dose Sensipar

## 2018-01-02 NOTE — CONSULT NOTE ADULT - PROBLEM SELECTOR RECOMMENDATION 4
HFpEF  diuresis  I and O  cardio follow up  monitor labs  replete lytes  supportive care and regimen  pt is DNR DNI

## 2018-01-02 NOTE — ED PROVIDER NOTE - PROGRESS NOTE DETAILS
Dw Dr NED Issa, will see pt. pt doing ok, no acute dyspnea at rest. Dw Dr NED Issa, will see pt to admit.

## 2018-01-02 NOTE — H&P ADULT - PROBLEM SELECTOR PLAN 5
ProBNP elevated on admission tho no clinical signs of chf, doubt pari chf exacerbation, no evidence of fluid overload on exam. Cardio ([facility]) following.   - continue home dose lasix   - will closely monitor for fluid overload chronic diastolic chf   ProBNP elevated  no evidence of fluid overload on exam. Cardio ([facility]) consulted  - continue home dose lasix   - will closely monitor for fluid overload Chronic, unclear etiology, possibly anemia of chronic disease, h&h stable unchanged from past base on chart review  - will continue to monitor cbc Chronic, unclear etiology, likely  anemia of chronic disease, h&h stable unchanged from past base on chart review  - will continue to monitor cbc

## 2018-01-02 NOTE — CONSULT NOTE ADULT - ASSESSMENT
89 yo F pmhx diastolic chf (EF 65%, echo July 2017), htn, anemia, p/w cough productive with yellow sputum and runny nose for 1 week.    ECHO July 2017: EF 65%, nomial left ventricular systolic function, grade 1 diastolic 89 yo F pmhx diastolic chf (EF 65%, echo July 2017), htn, anemia, p/w cough productive with yellow sputum and runny nose for 1 week.    - No clear evidence of acute ischemia, CE slightly elevated. Likely 2/2 to demand. Will repeat one more set  - Continue antibiotics.  - Probnp elevated, but pt does not appear fluid overloaded. Will continue home dose lasix.   - No acute changes on EKG compared to previous  - Previous ECHO July 2017: EF 65%, nomial left ventricular systolic function, grade 1 diastolic dysfunction  - BP well controlled, continue home BP meds, monitor routine hemodynamics  - Monitor and replete lytes, keep K>4, Mg>2  - Other cardiovascular workup will depend on clinical course.  - All other workup per primary team  - Will follow

## 2018-01-03 LAB
ANION GAP SERPL CALC-SCNC: 10 MMOL/L — SIGNIFICANT CHANGE UP (ref 5–17)
BASOPHILS # BLD AUTO: 0.1 K/UL — SIGNIFICANT CHANGE UP (ref 0–0.2)
BASOPHILS NFR BLD AUTO: 0.6 % — SIGNIFICANT CHANGE UP (ref 0–2)
BUN SERPL-MCNC: 47 MG/DL — HIGH (ref 7–23)
CALCIUM SERPL-MCNC: 9.1 MG/DL — SIGNIFICANT CHANGE UP (ref 8.5–10.1)
CHLORIDE SERPL-SCNC: 99 MMOL/L — SIGNIFICANT CHANGE UP (ref 96–108)
CO2 SERPL-SCNC: 26 MMOL/L — SIGNIFICANT CHANGE UP (ref 22–31)
CREAT SERPL-MCNC: 0.97 MG/DL — SIGNIFICANT CHANGE UP (ref 0.5–1.3)
EOSINOPHIL # BLD AUTO: 0 K/UL — SIGNIFICANT CHANGE UP (ref 0–0.5)
EOSINOPHIL NFR BLD AUTO: 0 % — SIGNIFICANT CHANGE UP (ref 0–6)
GLUCOSE SERPL-MCNC: 142 MG/DL — HIGH (ref 70–99)
HCT VFR BLD CALC: 27.8 % — LOW (ref 34.5–45)
HGB BLD-MCNC: 9.3 G/DL — LOW (ref 11.5–15.5)
LEGIONELLA AG UR QL: NEGATIVE — SIGNIFICANT CHANGE UP
LYMPHOCYTES # BLD AUTO: 1.2 K/UL — SIGNIFICANT CHANGE UP (ref 1–3.3)
LYMPHOCYTES # BLD AUTO: 8.8 % — LOW (ref 13–44)
MCHC RBC-ENTMCNC: 29.2 PG — SIGNIFICANT CHANGE UP (ref 27–34)
MCHC RBC-ENTMCNC: 33.6 GM/DL — SIGNIFICANT CHANGE UP (ref 32–36)
MCV RBC AUTO: 86.7 FL — SIGNIFICANT CHANGE UP (ref 80–100)
MONOCYTES # BLD AUTO: 0.6 K/UL — SIGNIFICANT CHANGE UP (ref 0–0.9)
MONOCYTES NFR BLD AUTO: 4.6 % — SIGNIFICANT CHANGE UP (ref 1–9)
MRSA PCR RESULT.: SIGNIFICANT CHANGE UP
NEUTROPHILS # BLD AUTO: 11.4 K/UL — HIGH (ref 1.8–7.4)
NEUTROPHILS NFR BLD AUTO: 86 % — HIGH (ref 43–77)
PLATELET # BLD AUTO: 258 K/UL — SIGNIFICANT CHANGE UP (ref 150–400)
POTASSIUM SERPL-MCNC: 3.9 MMOL/L — SIGNIFICANT CHANGE UP (ref 3.5–5.3)
POTASSIUM SERPL-SCNC: 3.9 MMOL/L — SIGNIFICANT CHANGE UP (ref 3.5–5.3)
RBC # BLD: 3.2 M/UL — LOW (ref 3.8–5.2)
RBC # FLD: 12.8 % — SIGNIFICANT CHANGE UP (ref 10.3–14.5)
S AUREUS DNA NOSE QL NAA+PROBE: SIGNIFICANT CHANGE UP
SODIUM SERPL-SCNC: 135 MMOL/L — SIGNIFICANT CHANGE UP (ref 135–145)
WBC # BLD: 13.2 K/UL — HIGH (ref 3.8–10.5)
WBC # FLD AUTO: 13.2 K/UL — HIGH (ref 3.8–10.5)

## 2018-01-03 PROCEDURE — 99232 SBSQ HOSP IP/OBS MODERATE 35: CPT

## 2018-01-03 RX ORDER — HYDRALAZINE HCL 50 MG
75 TABLET ORAL THREE TIMES A DAY
Qty: 0 | Refills: 0 | Status: DISCONTINUED | OUTPATIENT
Start: 2018-01-03 | End: 2018-01-03

## 2018-01-03 RX ORDER — IPRATROPIUM/ALBUTEROL SULFATE 18-103MCG
3 AEROSOL WITH ADAPTER (GRAM) INHALATION EVERY 8 HOURS
Qty: 0 | Refills: 0 | Status: DISCONTINUED | OUTPATIENT
Start: 2018-01-03 | End: 2018-01-13

## 2018-01-03 RX ORDER — HYDRALAZINE HCL 50 MG
75 TABLET ORAL THREE TIMES A DAY
Qty: 0 | Refills: 0 | Status: DISCONTINUED | OUTPATIENT
Start: 2018-01-03 | End: 2018-01-06

## 2018-01-03 RX ADMIN — CEFTRIAXONE 100 GRAM(S): 500 INJECTION, POWDER, FOR SOLUTION INTRAMUSCULAR; INTRAVENOUS at 10:36

## 2018-01-03 RX ADMIN — CINACALCET 30 MILLIGRAM(S): 30 TABLET, FILM COATED ORAL at 21:20

## 2018-01-03 RX ADMIN — Medication 10 MILLIEQUIVALENT(S): at 11:25

## 2018-01-03 RX ADMIN — Medication 75 MILLIGRAM(S): at 13:50

## 2018-01-03 RX ADMIN — Medication 325 MILLIGRAM(S): at 11:16

## 2018-01-03 RX ADMIN — CARVEDILOL PHOSPHATE 12.5 MILLIGRAM(S): 80 CAPSULE, EXTENDED RELEASE ORAL at 17:37

## 2018-01-03 RX ADMIN — Medication 1 TABLET(S): at 09:05

## 2018-01-03 RX ADMIN — Medication 1 TABLET(S): at 17:37

## 2018-01-03 RX ADMIN — Medication 75 MILLIGRAM(S): at 21:20

## 2018-01-03 RX ADMIN — Medication 3 MILLILITER(S): at 15:30

## 2018-01-03 RX ADMIN — Medication 3 MILLILITER(S): at 21:29

## 2018-01-03 RX ADMIN — Medication 60 MILLIGRAM(S): at 05:21

## 2018-01-03 RX ADMIN — HEPARIN SODIUM 5000 UNIT(S): 5000 INJECTION INTRAVENOUS; SUBCUTANEOUS at 05:21

## 2018-01-03 RX ADMIN — Medication 4 MILLIGRAM(S): at 21:20

## 2018-01-03 RX ADMIN — AZITHROMYCIN 255 MILLIGRAM(S): 500 TABLET, FILM COATED ORAL at 11:16

## 2018-01-03 RX ADMIN — Medication 50 MILLIGRAM(S): at 05:21

## 2018-01-03 RX ADMIN — Medication 3 MILLILITER(S): at 09:10

## 2018-01-03 RX ADMIN — HEPARIN SODIUM 5000 UNIT(S): 5000 INJECTION INTRAVENOUS; SUBCUTANEOUS at 17:37

## 2018-01-03 RX ADMIN — CARVEDILOL PHOSPHATE 12.5 MILLIGRAM(S): 80 CAPSULE, EXTENDED RELEASE ORAL at 05:21

## 2018-01-03 NOTE — PROGRESS NOTE ADULT - SUBJECTIVE AND OBJECTIVE BOX
infectious diseases progress note:    SAIRA SANTANA is a 89y y. o. Female patient    Patient reports: feeling better    ROS:    EYES:  Negative  blurry vision or double vision  GASTROINTESTINAL:  Negative for nausea, vomiting, diarrhea  -otherwise negative except for subjective    Allergies    Vasotec (Unknown)    Intolerances        ANTIBIOTICS/RELEVANT:  antimicrobials  azithromycin  IVPB 500 milliGRAM(s) IV Intermittent every 24 hours  cefTRIAXone   IVPB 1 Gram(s) IV Intermittent every 24 hours    immunologic:    OTHER:  acetaminophen   Tablet 650 milliGRAM(s) Oral every 6 hours PRN  ALBUTerol/ipratropium for Nebulization 3 milliLiter(s) Nebulizer every 6 hours PRN  carvedilol 12.5 milliGRAM(s) Oral two times a day  cinacalcet 30 milliGRAM(s) Oral daily  doxazosin 4 milliGRAM(s) Oral at bedtime  ferrous    sulfate 325 milliGRAM(s) Oral daily  furosemide    Tablet 60 milliGRAM(s) Oral daily  heparin  Injectable 5000 Unit(s) SubCutaneous every 12 hours  hydrALAZINE 75 milliGRAM(s) Oral three times a day  lactobacillus acidophilus 1 Tablet(s) Oral two times a day with meals  potassium chloride    Tablet ER 10 milliEquivalent(s) Oral daily      Objective:  Last 24-Vital Signs Last 24 Hrs  T(C): 36.3 (03 Jan 2018 08:26), Max: 37.2 (02 Jan 2018 11:28)  T(F): 97.4 (03 Jan 2018 08:26), Max: 98.9 (02 Jan 2018 11:28)  HR: 90 (03 Jan 2018 09:12) (60 - 96)  BP: 176/62 (03 Jan 2018 08:26) (160/75 - 176/62)  BP(mean): --  RR: 16 (03 Jan 2018 08:26) (16 - 18)  SpO2: 97% (03 Jan 2018 09:58) (92% - 97%)    T(C): 36.3 (01-03-18 @ 08:26), Max: 37.2 (01-02-18 @ 11:28)  T(F): 97.4 (01-03-18 @ 08:26), Max: 98.9 (01-02-18 @ 11:28)  T(C): 36.3 (01-03-18 @ 08:26), Max: 37.2 (01-02-18 @ 11:28)  T(F): 97.4 (01-03-18 @ 08:26), Max: 98.9 (01-02-18 @ 11:28)  T(C): 36.3 (01-03-18 @ 08:26), Max: 37.2 (01-02-18 @ 11:28)  T(F): 97.4 (01-03-18 @ 08:26), Max: 98.9 (01-02-18 @ 11:28)    PHYSICAL EXAM:  Constitutional:Well-developed, well nourished  Eyes:PERRLA, EOMI  Ear/Nose/Throat: oropharynx normal	  Neck:no JVD, no lymphadenopathy, supple  Respiratory: no accessory muscle use, lung sounds decreased in right base  Cardiovascular:RRR, normal S1, S2 no m/r/g  Gastrointestinal:soft, NT, no HSM, BS-normal  Extremities:no clubbing, no cyanosis, edema absent  Neuro-patient alert, oriented and appropriate  Skin-no sig lesions      LABS:                        9.3    13.2  )-----------( 258      ( 03 Jan 2018 07:06 )             27.8       WBC 13.2  01-03 @ 07:06  WBC 11.8  01-02 @ 09:02      01-03    135  |  99  |  47<H>  ----------------------------<  142<H>  3.9   |  26  |  0.97    Ca    9.1      03 Jan 2018 07:06    TPro  7.2  /  Alb  2.9<L>  /  TBili  0.4  /  DBili  x   /  AST  39<H>  /  ALT  40  /  AlkPhos  99  01-02    PT/INR - ( 02 Jan 2018 09:25 )   PT: 13.2 sec;   INR: 1.21 ratio         PTT - ( 02 Jan 2018 09:25 )  PTT:27.3 sec        MICROBIOLOGY:        RADIOLOGY & ADDITIONAL STUDIES:

## 2018-01-03 NOTE — PHYSICAL THERAPY INITIAL EVALUATION ADULT - GAIT TRAINING, PT EVAL
Pt will be supervision level of assistance for ambulation with appropriate assistive device for 100 feet in 2-3 sessions

## 2018-01-03 NOTE — PROGRESS NOTE ADULT - PROBLEM SELECTOR PLAN 5
Chronic, unclear etiology, likely  anemia of chronic disease, h&h stable unchanged from past base on chart review  - will continue to monitor cbc

## 2018-01-03 NOTE — PROGRESS NOTE ADULT - SUBJECTIVE AND OBJECTIVE BOX
Date/Time Patient Seen:  		  Referring MD:   Data Reviewed	       Patient is a 89y old  Female who presents with a chief complaint of coughing (02 Jan 2018 21:02)    in bed  seen and examined  vs and meds reviewed  on ABX        Subjective/HPI     PAST MEDICAL & SURGICAL HISTORY:  Anemia, unspecified type  Edema of lower extremity  Heart murmur  Hypertension  Hypercalcemia  History of appendectomy  S/P tonsillectomy  H/O parathyroidectomy        Medication list         MEDICATIONS  (STANDING):  azithromycin  IVPB 500 milliGRAM(s) IV Intermittent every 24 hours  carvedilol 12.5 milliGRAM(s) Oral two times a day  cefTRIAXone   IVPB 1 Gram(s) IV Intermittent every 24 hours  cinacalcet 30 milliGRAM(s) Oral daily  doxazosin 4 milliGRAM(s) Oral at bedtime  ferrous    sulfate 325 milliGRAM(s) Oral daily  furosemide    Tablet 60 milliGRAM(s) Oral daily  heparin  Injectable 5000 Unit(s) SubCutaneous every 12 hours  hydrALAZINE 50 milliGRAM(s) Oral three times a day  lactobacillus acidophilus 1 Tablet(s) Oral two times a day with meals  potassium chloride    Tablet ER 10 milliEquivalent(s) Oral daily    MEDICATIONS  (PRN):  acetaminophen   Tablet 650 milliGRAM(s) Oral every 6 hours PRN For Temp greater than 38 C (100.4 F)  ALBUTerol/ipratropium for Nebulization 3 milliLiter(s) Nebulizer every 6 hours PRN Shortness of Breath and/or Wheezing         Vitals log        ICU Vital Signs Last 24 Hrs  T(C): 36.4 (03 Jan 2018 05:14), Max: 37.2 (02 Jan 2018 11:28)  T(F): 97.5 (03 Jan 2018 05:14), Max: 98.9 (02 Jan 2018 11:28)  HR: 65 (03 Jan 2018 05:14) (60 - 65)  BP: 166/63 (03 Jan 2018 05:14) (160/74 - 167/61)  BP(mean): --  ABP: --  ABP(mean): --  RR: 18 (03 Jan 2018 05:14) (17 - 18)  SpO2: 95% (03 Jan 2018 05:14) (92% - 95%)           Input and Output:  I&O's Detail    02 Jan 2018 07:01  -  03 Jan 2018 07:00  --------------------------------------------------------  IN:    Oral Fluid: 500 mL  Total IN: 500 mL    OUT:    Voided: 900 mL  Total OUT: 900 mL    Total NET: -400 mL          Lab Data                        9.9    11.8  )-----------( 250      ( 02 Jan 2018 09:02 )             29.7     01-02    132<L>  |  98  |  47<H>  ----------------------------<  108<H>  4.4   |  26  |  1.00    Ca    9.2      02 Jan 2018 09:02    TPro  7.2  /  Alb  2.9<L>  /  TBili  0.4  /  DBili  x   /  AST  39<H>  /  ALT  40  /  AlkPhos  99  01-02      CARDIAC MARKERS ( 02 Jan 2018 15:54 )  .034 ng/mL / x     / 54 U/L / x     / 1.7 ng/mL  CARDIAC MARKERS ( 02 Jan 2018 09:02 )  .046 ng/mL / x     / 103 U/L / x     / 1.4 ng/mL        Review of Systems	      Objective     Physical Examination    head at  heart s1s2  lungs dec BS  abd soft  frail      Pertinent Lab findings & Imaging      Richard:  NO   Adequate UO     I&O's Detail    02 Jan 2018 07:01  -  03 Jan 2018 07:00  --------------------------------------------------------  IN:    Oral Fluid: 500 mL  Total IN: 500 mL    OUT:    Voided: 900 mL  Total OUT: 900 mL    Total NET: -400 mL               Discussed with:     Cultures:	        Radiology

## 2018-01-03 NOTE — PROGRESS NOTE ADULT - PROBLEM SELECTOR PLAN 3
Likely 2/2 demand, no evidence of acute ischemic changes, EKG sr, preserved EF on last echo (July) no systolic dysfunction  -  2nd set negative

## 2018-01-03 NOTE — PHYSICAL THERAPY INITIAL EVALUATION ADULT - PERTINENT HX OF CURRENT PROBLEM, REHAB EVAL
Per H&P: 89F PMHx chronic diastolic chf (EF 65%, echo July 2017), HTN, anemia presenting with cough and congestion severe sob x 1 week.  Pt reports cough started last Tuesday with clear mucus production.  No pleuritic chest pain with sob.  Reports this week cough became more severe now with yellowish sputum.

## 2018-01-03 NOTE — PHYSICAL THERAPY INITIAL EVALUATION ADULT - ADDITIONAL COMMENTS
Per daughter, patient ambulates with RW independently at home. 12 hr aide 5 days a week, 4 hrs on  Saturday. Family provides all other assistance.

## 2018-01-03 NOTE — PROGRESS NOTE ADULT - PROBLEM SELECTOR PLAN 3
URI  RVP noted  supportive care  mucinex  ABX for PNA  oral hygiene  keep sat > 88 pct  chest pt as needed  NEBS  nutrition

## 2018-01-03 NOTE — PROGRESS NOTE ADULT - SUBJECTIVE AND OBJECTIVE BOX
Patient is a 89y old  Female who presents with a chief complaint of coughing (02 Jan 2018 21:02)      INTERVAL HPI/OVERNIGHT EVENTS: Patient seen and examined. NAD. No complaints.    Vital Signs Last 24 Hrs  T(C): 36.3 (03 Jan 2018 08:26), Max: 37 (02 Jan 2018 16:21)  T(F): 97.4 (03 Jan 2018 08:26), Max: 98.6 (02 Jan 2018 16:21)  HR: 90 (03 Jan 2018 09:12) (60 - 96)  BP: 176/62 (03 Jan 2018 08:26) (160/79 - 176/62)  BP(mean): --  RR: 16 (03 Jan 2018 08:26) (16 - 18)  SpO2: 97% (03 Jan 2018 09:58) (92% - 97%)    01-03    135  |  99  |  47<H>  ----------------------------<  142<H>  3.9   |  26  |  0.97    Ca    9.1      03 Jan 2018 07:06    TPro  7.2  /  Alb  2.9<L>  /  TBili  0.4  /  DBili  x   /  AST  39<H>  /  ALT  40  /  AlkPhos  99  01-02                          9.3    13.2  )-----------( 258      ( 03 Jan 2018 07:06 )             27.8     PT/INR - ( 02 Jan 2018 09:25 )   PT: 13.2 sec;   INR: 1.21 ratio         PTT - ( 02 Jan 2018 09:25 )  PTT:27.3 sec  CAPILLARY BLOOD GLUCOSE                  acetaminophen   Tablet 650 milliGRAM(s) Oral every 6 hours PRN  ALBUTerol/ipratropium for Nebulization 3 milliLiter(s) Nebulizer every 8 hours  azithromycin  IVPB 500 milliGRAM(s) IV Intermittent every 24 hours  carvedilol 12.5 milliGRAM(s) Oral two times a day  cefTRIAXone   IVPB 1 Gram(s) IV Intermittent every 24 hours  cinacalcet 30 milliGRAM(s) Oral daily  doxazosin 4 milliGRAM(s) Oral at bedtime  ferrous    sulfate 325 milliGRAM(s) Oral daily  furosemide    Tablet 60 milliGRAM(s) Oral daily  heparin  Injectable 5000 Unit(s) SubCutaneous every 12 hours  hydrALAZINE 75 milliGRAM(s) Oral three times a day  lactobacillus acidophilus 1 Tablet(s) Oral two times a day with meals  potassium chloride    Tablet ER 10 milliEquivalent(s) Oral daily           acetaminophen   Tablet 650 milliGRAM(s) Oral every 6 hours PRN  ALBUTerol/ipratropium for Nebulization 3 milliLiter(s) Nebulizer every 8 hours  azithromycin  IVPB 500 milliGRAM(s) IV Intermittent every 24 hours  carvedilol 12.5 milliGRAM(s) Oral two times a day  cefTRIAXone   IVPB 1 Gram(s) IV Intermittent every 24 hours  cinacalcet 30 milliGRAM(s) Oral daily  doxazosin 4 milliGRAM(s) Oral at bedtime  ferrous    sulfate 325 milliGRAM(s) Oral daily  furosemide    Tablet 60 milliGRAM(s) Oral daily  heparin  Injectable 5000 Unit(s) SubCutaneous every 12 hours  hydrALAZINE 75 milliGRAM(s) Oral three times a day  lactobacillus acidophilus 1 Tablet(s) Oral two times a day with meals  potassium chloride    Tablet ER 10 milliEquivalent(s) Oral daily      REVIEW OF SYSTEMS:  CONSTITUTIONAL: No fever, no weight loss, or no fatigue  NECK: No pain, no stiffness  RESPIRATORY: No cough, no wheezing, no chills, no hemoptysis, No shortness of breath  CARDIOVASCULAR: No chest pain, no palpitations, no dizziness, no leg swelling  GASTROINTESTINAL: No abdominal pain. No nausea, no vomiting, no hematemesis; No diarrhea, no constipation. No melena, no hematochezia.  GENITOURINARY: No dysuria, no frequency, no hematuria, no incontinence  NEUROLOGICAL: No headaches, no loss of strength, no numbness, no tremors  SKIN: No itching, no burning  MUSCULOSKELETAL: No joint pain, no swelling; No muscle, no back, no extremity pain  PSYCHIATRIC: No depression, no mood swings,   HEME/LYMPH: No easy bruising, no bleeding gums  ALLERY AND IMMUNOLOGIC: No hives       Consultant(s) Notes Reviewed:  [ x] YES  [ ] NO    PHYSICAL EXAM:  GENERAL: NAD  HEAD:  Atraumatic, Normocephalic  EYES: EOMI, PERRLA, conjunctiva and sclera clear  ENMT: No tonsillar erythema, exudates, or enlargement; Moist mucous membranes  NECK: Supple, No JVD  NERVOUS SYSTEM:  Awake & alert  CHEST/LUNG: Clear to auscultation bilaterally; No rales, rhonchi, wheezing, decreased BS bases  HEART: Regular rate and rhythm  ABDOMEN: Soft, Nontender, Nondistended; Bowel sounds present  EXTREMITIES:  No clubbing, cyanosis, or edema  LYMPH: No lymphadenopathy noted  SKIN: No rashes      Advanced care planning discussed with patient/family [ x] YES   [ ] NO    Advanced care planning discussed with patient/family. Advanced care planning forms reviewed/discussed/completed. 20 minutes spent.

## 2018-01-03 NOTE — PHYSICAL THERAPY INITIAL EVALUATION ADULT - CRITERIA FOR SKILLED THERAPEUTIC INTERVENTIONS
therapy frequency/impairments found/functional limitations in following categories/risk reduction/prevention/anticipated discharge recommendation/rehab potential

## 2018-01-03 NOTE — PROGRESS NOTE ADULT - SUBJECTIVE AND OBJECTIVE BOX
[facility]    Christofer Isaacs, Sorin, Marisa, Jr, Hieu, Sandro      [phone]    CHIEF COMPLAINT: Patient is a 89y old  Female who presents with a chief complaint of coughing (02 Jan 2018 21:02)      Follow Up: diastolic CHF    Interim history:    MEDICATIONS  (STANDING):  azithromycin  IVPB 500 milliGRAM(s) IV Intermittent every 24 hours  carvedilol 12.5 milliGRAM(s) Oral two times a day  cefTRIAXone   IVPB 1 Gram(s) IV Intermittent every 24 hours  cinacalcet 30 milliGRAM(s) Oral daily  doxazosin 4 milliGRAM(s) Oral at bedtime  ferrous    sulfate 325 milliGRAM(s) Oral daily  furosemide    Tablet 60 milliGRAM(s) Oral daily  heparin  Injectable 5000 Unit(s) SubCutaneous every 12 hours  hydrALAZINE 50 milliGRAM(s) Oral three times a day  lactobacillus acidophilus 1 Tablet(s) Oral two times a day with meals  potassium chloride    Tablet ER 10 milliEquivalent(s) Oral daily    MEDICATIONS  (PRN):  acetaminophen   Tablet 650 milliGRAM(s) Oral every 6 hours PRN For Temp greater than 38 C (100.4 F)  ALBUTerol/ipratropium for Nebulization 3 milliLiter(s) Nebulizer every 6 hours PRN Shortness of Breath and/or Wheezing      REVIEW OF SYSTEMS:  eye, ent, GI, , allergic, dermatologic, musculoskeletal and neurologic are negative except as described above    Vital Signs Last 24 Hrs  T(C): 36.4 (03 Jan 2018 05:14), Max: 37.2 (02 Jan 2018 11:28)  T(F): 97.5 (03 Jan 2018 05:14), Max: 98.9 (02 Jan 2018 11:28)  HR: 65 (03 Jan 2018 05:14) (60 - 65)  BP: 166/63 (03 Jan 2018 05:14) (160/74 - 167/61)  BP(mean): --  RR: 18 (03 Jan 2018 05:14) (17 - 18)  SpO2: 95% (03 Jan 2018 05:14) (92% - 95%)    I&O's Summary    02 Jan 2018 07:01  -  03 Jan 2018 07:00  --------------------------------------------------------  IN: 500 mL / OUT: 900 mL / NET: -400 mL        Telemetry past 24h:    PHYSICAL EXAM:    Constitutional: well-nourished, well-developed, NAD   HEENT:  MMM, sclerae anicteric, conjunctivae clear, no oral cyanosis.  Pulmonary: Non-labored, breath sounds are clear bilaterally, No wheezing, rales or rhonchi  Cardiovascular: Regular, S1 and S2.  No murmur.  No rubs, gallops or clicks  Gastrointestinal: Bowel Sounds present, soft, nontender.   Lymph: No peripheral edema.   Neurological: Alert, no focal deficits  Skin: No rashes.  Psych:  Mood & affect appropriate    LABS: All Labs Reviewed:                        9.9    11.8  )-----------( 250      ( 02 Jan 2018 09:02 )             29.7     03 Jan 2018 07:06    135    |  99     |  47     ----------------------------<  142    3.9     |  26     |  0.97   02 Jan 2018 09:02    132    |  98     |  47     ----------------------------<  108    4.4     |  26     |  1.00     Ca    9.1        03 Jan 2018 07:06  Ca    9.2        02 Jan 2018 09:02    TPro  7.2    /  Alb  2.9    /  TBili  0.4    /  DBili  x      /  AST  39     /  ALT  40     /  AlkPhos  99     02 Jan 2018 09:02    PT/INR - ( 02 Jan 2018 09:25 )   PT: 13.2 sec;   INR: 1.21 ratio         PTT - ( 02 Jan 2018 09:25 )  PTT:27.3 sec  CARDIAC MARKERS ( 02 Jan 2018 15:54 )  .034 ng/mL / x     / 54 U/L / x     / 1.7 ng/mL  CARDIAC MARKERS ( 02 Jan 2018 09:02 )  .046 ng/mL / x     / 103 U/L / x     / 1.4 ng/mL      Blood Culture:   01-02 @ 09:02  Pro Bnp [id number]        RADIOLOGY:    EKG:    Echo: [facility]    Christofer Isaacs, Sorin, Marisa, Jr, Hieu, Sandro      [phone]    CHIEF COMPLAINT: Patient is a 89y old  Female who presents with a chief complaint of coughing (02 Jan 2018 21:02)      Follow Up: diastolic CHF    Interim history: Pt seen and examined at bedside this am. Pt is eating breakfast comfortably. States that her cough has significantly improved. Reports better breathing. Denies CP, palpitations, SOB, abdominal pain, numbness/tingling.     MEDICATIONS  (STANDING):  azithromycin  IVPB 500 milliGRAM(s) IV Intermittent every 24 hours  carvedilol 12.5 milliGRAM(s) Oral two times a day  cefTRIAXone   IVPB 1 Gram(s) IV Intermittent every 24 hours  cinacalcet 30 milliGRAM(s) Oral daily  doxazosin 4 milliGRAM(s) Oral at bedtime  ferrous    sulfate 325 milliGRAM(s) Oral daily  furosemide    Tablet 60 milliGRAM(s) Oral daily  heparin  Injectable 5000 Unit(s) SubCutaneous every 12 hours  hydrALAZINE 50 milliGRAM(s) Oral three times a day  lactobacillus acidophilus 1 Tablet(s) Oral two times a day with meals  potassium chloride    Tablet ER 10 milliEquivalent(s) Oral daily    MEDICATIONS  (PRN):  acetaminophen   Tablet 650 milliGRAM(s) Oral every 6 hours PRN For Temp greater than 38 C (100.4 F)  ALBUTerol/ipratropium for Nebulization 3 milliLiter(s) Nebulizer every 6 hours PRN Shortness of Breath and/or Wheezing      REVIEW OF SYSTEMS:  eye, ent, GI, , allergic, dermatologic, musculoskeletal and neurologic are negative except as described above    Vital Signs Last 24 Hrs  T(C): 36.4 (03 Jan 2018 05:14), Max: 37.2 (02 Jan 2018 11:28)  T(F): 97.5 (03 Jan 2018 05:14), Max: 98.9 (02 Jan 2018 11:28)  HR: 65 (03 Jan 2018 05:14) (60 - 65)  BP: 166/63 (03 Jan 2018 05:14) (160/74 - 167/61)  BP(mean): --  RR: 18 (03 Jan 2018 05:14) (17 - 18)  SpO2: 95% (03 Jan 2018 05:14) (92% - 95%)    I&O's Summary    02 Jan 2018 07:01  -  03 Jan 2018 07:00  --------------------------------------------------------  IN: 500 mL / OUT: 900 mL / NET: -400 mL        Telemetry past 24h:    PHYSICAL EXAM:    Constitutional: well-nourished, well-developed, NAD   HEENT:  MMM, sclerae anicteric, conjunctivae clear, no oral cyanosis.  Pulmonary: decreased breath sounds b/l, slight crackles at upper lung fields, No wheezing, rales or rhonchi  Cardiovascular: Regular, S1 and S2.  No murmur.  No rubs, gallops or clicks  Gastrointestinal: Bowel Sounds present, soft, nontender.   Lymph: No peripheral edema.   Neurological: Alert, no focal deficits  Skin: No rashes.  Psych:  Mood & affect appropriate    LABS: All Labs Reviewed:                        9.9    11.8  )-----------( 250      ( 02 Jan 2018 09:02 )             29.7     03 Jan 2018 07:06    135    |  99     |  47     ----------------------------<  142    3.9     |  26     |  0.97   02 Jan 2018 09:02    132    |  98     |  47     ----------------------------<  108    4.4     |  26     |  1.00     Ca    9.1        03 Jan 2018 07:06  Ca    9.2        02 Jan 2018 09:02    TPro  7.2    /  Alb  2.9    /  TBili  0.4    /  DBili  x      /  AST  39     /  ALT  40     /  AlkPhos  99     02 Jan 2018 09:02    PT/INR - ( 02 Jan 2018 09:25 )   PT: 13.2 sec;   INR: 1.21 ratio         PTT - ( 02 Jan 2018 09:25 )  PTT:27.3 sec  CARDIAC MARKERS ( 02 Jan 2018 15:54 )  .034 ng/mL / x     / 54 U/L / x     / 1.7 ng/mL  CARDIAC MARKERS ( 02 Jan 2018 09:02 )  .046 ng/mL / x     / 103 U/L / x     / 1.4 ng/mL      Blood Culture:   01-02 @ 09:02  Pro Bnp [id number]        RADIOLOGY:    EKG:    Echo:

## 2018-01-03 NOTE — PROGRESS NOTE ADULT - PROBLEM SELECTOR PLAN 1
on emp ABX  CAP regimen  ID following  biomarkers and cx pending  am labs pending  supportive care  keep sat > 88 pct  monitor resp status

## 2018-01-03 NOTE — PROGRESS NOTE ADULT - ASSESSMENT
89F PMHx diastolic chf (EF 65%, echo July 2017), HTN, anemia presenting with cough and congestion x 1 week.   Admitted for viral PNA

## 2018-01-03 NOTE — PROGRESS NOTE ADULT - ASSESSMENT
89 yo F pmhx diastolic chf (EF 65%, echo July 2017), htn, anemia, p/w cough productive with yellow sputum and runny nose for 1 week.    - No clear evidence of acute ischemia, CE slightly elevated. Likely 2/2 to demand. 2nd set negative.  - Continue antibiotics.  - Probnp elevated yday, but pt does not appear fluid overloaded. Will continue home dose lasix.   - No acute changes on EKG compared to previous  - Previous ECHO July 2017: EF 65%, nomial left ventricular systolic function, grade 1 diastolic dysfunction  - BP well controlled, continue home BP meds, monitor routine hemodynamics  - Monitor and replete lytes, keep K>4, Mg>2  - Other cardiovascular workup will depend on clinical course.  - All other workup per primary team  - Will follow 89 yo F pmhx diastolic chf (EF 65%, echo July 2017), htn, anemia, p/w cough productive with yellow sputum and runny nose for 1 week.    - Pt reports resolving cough and runny nose.  - Probnp elevated yday, but pt does not appear fluid overloaded. Will continue home dose lasix.   - No clear evidence of acute ischemia, CE slightly elevated. Likely 2/2 to demand. 2nd set negative.  - Continue antibiotics.  - No acute changes on EKG compared to previous  - Previous ECHO July 2017: EF 65%, nomial left ventricular systolic function, grade 1 diastolic dysfunction  - BP well controlled, continue home BP meds, monitor routine hemodynamics  - Monitor and replete lytes, keep K>4, Mg>2  - Other cardiovascular workup will depend on clinical course.  - All other workup per primary team  - Will follow 89 yo F pmhx diastolic chf (EF 65%, echo July 2017), htn, anemia, p/w cough productive with yellow sputum and runny nose for 1 week.    - Pt reports resolving cough and runny nose.  - Probnp elevated yday, but pt does not appear fluid overloaded. Will continue home dose lasix.   - No clear evidence of acute ischemia, CE slightly elevated. Likely 2/2 to demand. 2nd set negative.  - Continue antibiotics.  - No acute changes on EKG compared to previous  - Previous ECHO July 2017: EF 65%, nomial left ventricular systolic function, grade 1 diastolic dysfunction  - BP uncontrolled.  Uptitrate hydralazine today to 75 TID>    - Monitor and replete lytes, keep K>4, Mg>2  - Other cardiovascular workup will depend on clinical course.  - All other workup per primary team  - Will follow

## 2018-01-03 NOTE — PROGRESS NOTE ADULT - PROBLEM SELECTOR PLAN 1
with mild hypoxia and sob -- no sepsis,  mild leukocytosis, afebrile, no other evidence of system infection at this juncture, RVP + coronavirus c/w viral pneumonia  - continue rocephin and zithromax pending cultures  - Tylenol prn for fever   - continuous pulse ox, keep 02 sat >90  - supportive treatment  - pt tolerating po well, encourage po intake  - f/u blood cultures

## 2018-01-04 LAB
ANION GAP SERPL CALC-SCNC: 10 MMOL/L — SIGNIFICANT CHANGE UP (ref 5–17)
BUN SERPL-MCNC: 48 MG/DL — HIGH (ref 7–23)
CALCIUM SERPL-MCNC: 9.6 MG/DL — SIGNIFICANT CHANGE UP (ref 8.5–10.1)
CHLORIDE SERPL-SCNC: 101 MMOL/L — SIGNIFICANT CHANGE UP (ref 96–108)
CO2 SERPL-SCNC: 27 MMOL/L — SIGNIFICANT CHANGE UP (ref 22–31)
CREAT SERPL-MCNC: 0.91 MG/DL — SIGNIFICANT CHANGE UP (ref 0.5–1.3)
GLUCOSE SERPL-MCNC: 87 MG/DL — SIGNIFICANT CHANGE UP (ref 70–99)
HCT VFR BLD CALC: 29.2 % — LOW (ref 34.5–45)
HGB BLD-MCNC: 9.7 G/DL — LOW (ref 11.5–15.5)
MCHC RBC-ENTMCNC: 28.7 PG — SIGNIFICANT CHANGE UP (ref 27–34)
MCHC RBC-ENTMCNC: 33.4 GM/DL — SIGNIFICANT CHANGE UP (ref 32–36)
MCV RBC AUTO: 86 FL — SIGNIFICANT CHANGE UP (ref 80–100)
PLATELET # BLD AUTO: 314 K/UL — SIGNIFICANT CHANGE UP (ref 150–400)
POTASSIUM SERPL-MCNC: 3.8 MMOL/L — SIGNIFICANT CHANGE UP (ref 3.5–5.3)
POTASSIUM SERPL-SCNC: 3.8 MMOL/L — SIGNIFICANT CHANGE UP (ref 3.5–5.3)
RBC # BLD: 3.39 M/UL — LOW (ref 3.8–5.2)
RBC # FLD: 12.8 % — SIGNIFICANT CHANGE UP (ref 10.3–14.5)
SODIUM SERPL-SCNC: 138 MMOL/L — SIGNIFICANT CHANGE UP (ref 135–145)
WBC # BLD: 15.7 K/UL — HIGH (ref 3.8–10.5)
WBC # FLD AUTO: 15.7 K/UL — HIGH (ref 3.8–10.5)

## 2018-01-04 PROCEDURE — 99233 SBSQ HOSP IP/OBS HIGH 50: CPT

## 2018-01-04 RX ORDER — SODIUM CHLORIDE 9 MG/ML
250 INJECTION INTRAMUSCULAR; INTRAVENOUS; SUBCUTANEOUS ONCE
Qty: 0 | Refills: 0 | Status: COMPLETED | OUTPATIENT
Start: 2018-01-04 | End: 2018-01-05

## 2018-01-04 RX ADMIN — Medication 10 MILLIEQUIVALENT(S): at 12:43

## 2018-01-04 RX ADMIN — CEFTRIAXONE 100 GRAM(S): 500 INJECTION, POWDER, FOR SOLUTION INTRAMUSCULAR; INTRAVENOUS at 12:43

## 2018-01-04 RX ADMIN — Medication 1 TABLET(S): at 12:38

## 2018-01-04 RX ADMIN — Medication 3 MILLILITER(S): at 15:57

## 2018-01-04 RX ADMIN — Medication 3 MILLILITER(S): at 21:02

## 2018-01-04 RX ADMIN — Medication 1 TABLET(S): at 17:44

## 2018-01-04 RX ADMIN — Medication 75 MILLIGRAM(S): at 13:26

## 2018-01-04 RX ADMIN — CARVEDILOL PHOSPHATE 12.5 MILLIGRAM(S): 80 CAPSULE, EXTENDED RELEASE ORAL at 17:44

## 2018-01-04 RX ADMIN — Medication 4 MILLIGRAM(S): at 21:02

## 2018-01-04 RX ADMIN — Medication 325 MILLIGRAM(S): at 12:43

## 2018-01-04 RX ADMIN — HEPARIN SODIUM 5000 UNIT(S): 5000 INJECTION INTRAVENOUS; SUBCUTANEOUS at 06:34

## 2018-01-04 RX ADMIN — Medication 75 MILLIGRAM(S): at 20:56

## 2018-01-04 RX ADMIN — Medication 75 MILLIGRAM(S): at 06:34

## 2018-01-04 RX ADMIN — CARVEDILOL PHOSPHATE 12.5 MILLIGRAM(S): 80 CAPSULE, EXTENDED RELEASE ORAL at 06:34

## 2018-01-04 RX ADMIN — HEPARIN SODIUM 5000 UNIT(S): 5000 INJECTION INTRAVENOUS; SUBCUTANEOUS at 17:45

## 2018-01-04 RX ADMIN — CINACALCET 30 MILLIGRAM(S): 30 TABLET, FILM COATED ORAL at 21:02

## 2018-01-04 RX ADMIN — Medication 60 MILLIGRAM(S): at 06:34

## 2018-01-04 RX ADMIN — Medication 3 MILLILITER(S): at 08:03

## 2018-01-04 NOTE — PROGRESS NOTE ADULT - PROBLEM SELECTOR PLAN 6
continue with carvedilol and hydralazine with hold parameters continue with carvedilol and hydralazine with hold parameters  hydralazine increased to 75mg tid

## 2018-01-04 NOTE — PROGRESS NOTE ADULT - SUBJECTIVE AND OBJECTIVE BOX
infectious diseases progress note:    SAIRA SANTANA is a 89y y. o. Female patient    Patient reports: no new issues    ROS:    EYES:  Negative  blurry vision or double vision  GASTROINTESTINAL:  Negative for nausea, vomiting, diarrhea  -otherwise negative except for subjective    Allergies    Vasotec (Unknown)    Intolerances        ANTIBIOTICS/RELEVANT:  antimicrobials  azithromycin  IVPB 500 milliGRAM(s) IV Intermittent every 24 hours  cefTRIAXone   IVPB 1 Gram(s) IV Intermittent every 24 hours    immunologic:    OTHER:  acetaminophen   Tablet 650 milliGRAM(s) Oral every 6 hours PRN  ALBUTerol/ipratropium for Nebulization 3 milliLiter(s) Nebulizer every 8 hours  carvedilol 12.5 milliGRAM(s) Oral two times a day  cinacalcet 30 milliGRAM(s) Oral daily  doxazosin 4 milliGRAM(s) Oral at bedtime  ferrous    sulfate 325 milliGRAM(s) Oral daily  furosemide    Tablet 60 milliGRAM(s) Oral daily  heparin  Injectable 5000 Unit(s) SubCutaneous every 12 hours  hydrALAZINE 75 milliGRAM(s) Oral three times a day  lactobacillus acidophilus 1 Tablet(s) Oral two times a day with meals  potassium chloride    Tablet ER 10 milliEquivalent(s) Oral daily      Objective:  Vital Signs Last 24 Hrs  T(C): 36.2 (04 Jan 2018 08:36), Max: 36.7 (03 Jan 2018 15:36)  T(F): 97.2 (04 Jan 2018 08:36), Max: 98.1 (03 Jan 2018 15:36)  HR: 75 (04 Jan 2018 08:36) (57 - 75)  BP: 150/65 (04 Jan 2018 08:36) (136/48 - 181/68)  BP(mean): --  RR: 17 (04 Jan 2018 04:30) (16 - 17)  SpO2: 94% (04 Jan 2018 08:36) (92% - 99%)    T(C): 36.2 (01-04-18 @ 08:36), Max: 37.2 (01-02-18 @ 11:28)  T(C): 36.2 (01-04-18 @ 08:36), Max: 37.2 (01-02-18 @ 11:28)  T(C): 36.2 (01-04-18 @ 08:36), Max: 37.2 (01-02-18 @ 11:28)    PHYSICAL EXAM:  Constitutional:Well-developed, well nourished  Eyes:PERRLA, EOMI  Ear/Nose/Throat: oropharynx normal	  Neck:no JVD, no lymphadenopathy, supple  Respiratory: no accessory muscle use  Cardiovascular:RRR,   Gastrointestinal:soft, NT  Extremities:no clubbing, no cyanosis, edema absent      LABS:                        9.7    15.7  )-----------( 314      ( 04 Jan 2018 07:52 )             29.2       15.7 01-04 @ 07:52  13.2 01-03 @ 07:06  11.8 01-02 @ 09:02      01-04    138  |  101  |  48<H>  ----------------------------<  87  3.8   |  27  |  0.91    Ca    9.6      04 Jan 2018 07:52              MICROBIOLOGY:      MRSA/MSSA PCR (01.03.18 @ 14:57)    MRSA PCR Result.: NotDetec:     MSSA PCR Result.: NotDetec    Legionella pneumophila Antigen, Urine (01.03.18 @ 14:54)    Legionella Antigen, Urine: Negative        RADIOLOGY & ADDITIONAL STUDIES:

## 2018-01-04 NOTE — PROGRESS NOTE ADULT - PROBLEM SELECTOR PLAN 2
on emp ABX  ID following  monitor cx and labs serially  monitor vs and sat  keep sat > 88 pct  NEBS to promote mucociliary clearance  increase activity as tolerated

## 2018-01-04 NOTE — PROGRESS NOTE ADULT - SUBJECTIVE AND OBJECTIVE BOX
Date/Time Patient Seen:  		  Referring MD:   Data Reviewed	       Patient is a 89y old  Female who presents with a chief complaint of coughing (02 Jan 2018 21:02)  in bed  seen and examined  vs and meds reviewed  on ABX  ID follow up noted        Subjective/HPI     PAST MEDICAL & SURGICAL HISTORY:  Anemia, unspecified type  Edema of lower extremity  Heart murmur  Hypertension  Hypercalcemia  History of appendectomy  S/P tonsillectomy  H/O parathyroidectomy        Medication list         MEDICATIONS  (STANDING):  ALBUTerol/ipratropium for Nebulization 3 milliLiter(s) Nebulizer every 8 hours  azithromycin  IVPB 500 milliGRAM(s) IV Intermittent every 24 hours  carvedilol 12.5 milliGRAM(s) Oral two times a day  cefTRIAXone   IVPB 1 Gram(s) IV Intermittent every 24 hours  cinacalcet 30 milliGRAM(s) Oral daily  doxazosin 4 milliGRAM(s) Oral at bedtime  ferrous    sulfate 325 milliGRAM(s) Oral daily  furosemide    Tablet 60 milliGRAM(s) Oral daily  heparin  Injectable 5000 Unit(s) SubCutaneous every 12 hours  hydrALAZINE 75 milliGRAM(s) Oral three times a day  lactobacillus acidophilus 1 Tablet(s) Oral two times a day with meals  potassium chloride    Tablet ER 10 milliEquivalent(s) Oral daily    MEDICATIONS  (PRN):  acetaminophen   Tablet 650 milliGRAM(s) Oral every 6 hours PRN For Temp greater than 38 C (100.4 F)         Vitals log        ICU Vital Signs Last 24 Hrs  T(C): 36.6 (04 Jan 2018 04:30), Max: 36.7 (03 Jan 2018 15:36)  T(F): 97.8 (04 Jan 2018 04:30), Max: 98.1 (03 Jan 2018 15:36)  HR: 66 (04 Jan 2018 06:30) (57 - 96)  BP: 173/73 (04 Jan 2018 06:30) (136/48 - 181/68)  BP(mean): --  ABP: --  ABP(mean): --  RR: 17 (04 Jan 2018 04:30) (16 - 17)  SpO2: 93% (04 Jan 2018 04:30) (92% - 99%)           Input and Output:  I&O's Detail    03 Jan 2018 07:01  -  04 Jan 2018 07:00  --------------------------------------------------------  IN:    Oral Fluid: 220 mL    Solution: 250 mL    Solution: 50 mL  Total IN: 520 mL    OUT:  Total OUT: 0 mL    Total NET: 520 mL          Lab Data                        9.3    13.2  )-----------( 258      ( 03 Jan 2018 07:06 )             27.8     01-03    135  |  99  |  47<H>  ----------------------------<  142<H>  3.9   |  26  |  0.97    Ca    9.1      03 Jan 2018 07:06    TPro  7.2  /  Alb  2.9<L>  /  TBili  0.4  /  DBili  x   /  AST  39<H>  /  ALT  40  /  AlkPhos  99  01-02      CARDIAC MARKERS ( 02 Jan 2018 15:54 )  .034 ng/mL / x     / 54 U/L / x     / 1.7 ng/mL  CARDIAC MARKERS ( 02 Jan 2018 09:02 )  .046 ng/mL / x     / 103 U/L / x     / 1.4 ng/mL        Review of Systems	      Objective     Physical Examination  head at  heart s1s2  lungs dec BS, abd soft, frail and weak        Pertinent Lab findings & Imaging      Richard:  NO   Adequate UO     I&O's Detail    03 Jan 2018 07:01  -  04 Jan 2018 07:00  --------------------------------------------------------  IN:    Oral Fluid: 220 mL    Solution: 250 mL    Solution: 50 mL  Total IN: 520 mL    OUT:  Total OUT: 0 mL    Total NET: 520 mL               Discussed with:     Cultures:	        Radiology

## 2018-01-04 NOTE — PROGRESS NOTE ADULT - PROBLEM SELECTOR PLAN 1
RVP + coronavirus c/w viral pneumonia  -White count trending up  - discontinue zithromax as Legionella antigens are negative, continue Rocephin for a total of 7 days(until 1/9), followed by cefuroxime 500 BID  - Tylenol prn for fever   - continuous pulse ox, keep 02 sat >90  - supportive treatment  - pt tolerating po well, encourage po intake RVP + coronavirus c/w viral pneumonia  -White count trending up  - discontinue zithromax as Legionella antigens are negative, continue abx for for a total of 7 days (until 1/9), change to po cefuroxime 500 BID pending clinical course  - Tylenol prn for fever   - continuous pulse ox, keep 02 sat >90  - supportive treatment  - pt tolerating po well, encourage po intake

## 2018-01-04 NOTE — PROGRESS NOTE ADULT - SUBJECTIVE AND OBJECTIVE BOX
Patient is a 89y old  Female who presents with a chief complaint of coughing (02 Jan 2018 21:02)      INTERVAL HPI:  89F PMHx chronic diastolic chf (EF 65%, echo July 2017), HTN, anemia presenting with cough and congestion severe sob x 1 week.  Pt reports cough started last Tuesday with clear mucus production.  No pleuritic chest pain with sob.  Reports this week cough became more severe now with yellowish sputum.  Currently complains of cough and chest congestion. Denies pleuritic chest pain, fever, palpitations, abdominal pain, dizziness, n/v/d/c, changes in vision    In the ED /74, HR 61, T 97.9F, O2 95% on RA, RR 17. WBC 11.8, Hg 9.9, Na 132, lactate 0.7, Trop 0.046, proBNP 7832, CXR showing mild vascular congestion, small left pleural effusion, questionable focal infiltrate of right base. Given Rocephin, Zithromax, Duonebs, RVP + for coronavirus with RLL infiltrate consistent with likely viral pneumonia.  Blood cultures collected. EKG showing SR. Cardiology (Dr. Hagen) and ID (dr. rivera)      OVERNIGHT EVENTS:n Patient seen and examined at bedside. Patient says she is feeling better but still feels SOB when she exerts herself.    MEDICATIONS  (STANDING):  ALBUTerol/ipratropium for Nebulization 3 milliLiter(s) Nebulizer every 8 hours  carvedilol 12.5 milliGRAM(s) Oral two times a day  cefTRIAXone   IVPB 1 Gram(s) IV Intermittent every 24 hours  cinacalcet 30 milliGRAM(s) Oral daily  doxazosin 4 milliGRAM(s) Oral at bedtime  ferrous    sulfate 325 milliGRAM(s) Oral daily  furosemide    Tablet 60 milliGRAM(s) Oral daily  heparin  Injectable 5000 Unit(s) SubCutaneous every 12 hours  hydrALAZINE 75 milliGRAM(s) Oral three times a day  lactobacillus acidophilus 1 Tablet(s) Oral two times a day with meals  potassium chloride    Tablet ER 10 milliEquivalent(s) Oral daily    MEDICATIONS  (PRN):  acetaminophen   Tablet 650 milliGRAM(s) Oral every 6 hours PRN For Temp greater than 38 C (100.4 F)      Allergies    Vasotec (Unknown)    Intolerances    REVIEW OF SYSTEMS:  CONSTITUTIONAL: No fever, No chills, No fatigue, No myalgia, No Body ache  EYES: No eye pain, visual disturbances, or discharge  ENMT:  No ear pain, No nose bleed, No vertigo; No sinus or throat pain, No Congestion  NECK: No pain, No stiffness  RESPIRATORY: No cough, wheezing, No  hemoptysis, No shortness of breath  CARDIOVASCULAR: No chest pain, palpitations  GASTROINTESTINAL: No abdominal or epigastric pain. No nausea, No vomiting; No diarrhea or constipation. [  ] BM  GENITOURINARY: No dysuria, No frequency, No urgency, No hematuria, or incontinence  NEUROLOGICAL: No headaches, No dizziness, No numbness, No tingling, No tremors, No weakness  EXT: No Swelling, No Pain, No Edema  SKIN:  [  ] No itching, burning, rashes, or lesions   MUSCULOSKELETAL: No joint pain or swelling; No muscle pain, No back pain, No extremity pain  PSYCHIATRIC: No depression, anxiety, mood swings or difficulty sleeping at night  PAIN SCALE: [  ] None  [  ] Other-  ROS Unable to obtain due to - [  ] Dementia  [  ] Lethargy  [  ] Sedated   REST OF REVIEW Of SYSTEM - [  ] Normal     Vital Signs Last 24 Hrs  T(C): 36.3 (04 Jan 2018 15:38), Max: 36.6 (04 Jan 2018 04:30)  T(F): 97.3 (04 Jan 2018 15:38), Max: 97.8 (04 Jan 2018 04:30)  HR: 63 (04 Jan 2018 15:38) (57 - 75)  BP: 166/71 (04 Jan 2018 15:38) (136/48 - 173/73)  RR: 17 (04 Jan 2018 15:38) (16 - 17)  SpO2: 98% (04 Jan 2018 15:38) (93% - 98%)    01-03 @ 07:01  -  01-04 @ 07:00  --------------------------------------------------------  IN: 520 mL / OUT: 0 mL / NET: 520 mL    01-04 @ 07:01 - 01-04 @ 17:18  --------------------------------------------------------  IN: 240 mL / OUT: 0 mL / NET: 240 mL        PHYSICAL EXAM:  GENERAL: NAD ,frail, elderly cachectic woman  HEAD:   Normal  EYES:  EOMI, PERRLA  ENMT:  normal hearing, Moist mucous membranes  NECK:  Supple, No JVD  NERVOUS SYSTEM:  Alert & Oriented X3, Nonfocal  CHEST/LUNG:  Crackles at the base of the lungs bilaterally  HEART: Regular rate and rhythm, No murmurs  ABDOMEN:  Soft, Nontender, Nondistended  EXTREMITIES: No clubbing, cyanosis, no edema,   LYMPH: No lymphadenopathy noted  SKIN:  No rashes or lesions, [  ] Pressure Ulcers, [  ] echymosis, [  ] Other    DIET: DASH/TLC    LABS:                        9.7    15.7  )-----------( 314      ( 04 Jan 2018 07:52 )             29.2     04 Jan 2018 07:52    138    |  101    |  48     ----------------------------<  87     3.8     |  27     |  0.91     Ca    9.6        04 Jan 2018 07:52    Culture Results:   No growth to date. (01-02 @ 12:02)  Culture Results:   No growth to date. (01-02 @ 12:02)    HEALTH ISSUES - PROBLEM Dx:  Pneumonia of right lower lobe due to infectious organism: Pneumonia of right lower lobe due to infectious organism  Advanced care planning/counseling discussion: Advanced care planning/counseling discussion  Elevated troponin: Elevated troponin  Prophylactic measure: Prophylactic measure  Hypercalcemia: Hypercalcemia  Hypertension: Hypertension  Anemia, unspecified type: Anemia, unspecified type  Diastolic CHF: Diastolic CHF  Leukocytosis: Leukocytosis  Upper respiratory tract infection, unspecified type: Upper respiratory tract infection, unspecified type    Consultant(s) Notes Reviewed:  [X  ] YES     Care Discussed with [X] Consultants  [  ] Patient  [  ] Family  [  ]   [  ] Social Service  [  ] RN, [  ] Physical Therapy  DVT PPX: [X] Lovenox, [  ] S C Heparin, [  ] Coumadin, [  ] Xarelto, [  ] Eliquis, [  ] SCD   Advanced directive: [  ] None, [ X ] DNR/DNI

## 2018-01-04 NOTE — CHART NOTE - NSCHARTNOTEFT_GEN_A_CORE
Called by RN for Pt tachycardic    Pt seen and examined at bedside, in NAD. Denies CP, tightness, SOB, palpitation, N/V/D, blurred vision, abdominal pain, headache. Note pt poor historian according to attending Dr RADHA Swann        T(C): 37 (01-04-18 @ 22:44), Max: 37 (01-04-18 @ 22:44)  HR: 72 (01-04-18 @ 22:01) (60 - 75)  BP: 142/70 (01-04-18 @ 22:01) (136/48 - 178/76)  RR: 17 (01-04-18 @ 20:40) (17 - 17)  SpO2: 97% (01-04-18 @ 20:40) (93% - 98%)  Wt(kg): --    Physical :  Gen- NAD, ncat  Cardio - sinus tach s+1,s+2, no murmur  Lung - decreased BS b/l, no wheeze, no rhonchi, no rales   Abdomen- +BS, NT/ND, no guarding, no rebound, no masses  Ext- RLE swelling, 2+ pulses b/l  Neuro- CN grossly intact, strength 5/5 b/l extrem    LABS:                        9.7    15.7  )-----------( 314      ( 04 Jan 2018 07:52 )             29.2     01-04    138  |  101  |  48<H>  ----------------------------<  87  3.8   |  27  |  0.91    Ca    9.6      04 Jan 2018 07:52                  Assessment/Plan  89yFemale admitted for viral PNA now presents with sinus tachycardia 130s-140s  1. EKG stat ordered which showed changes in v2-v6 compared to previous EKG  2. Cardiac enzymes ordered stat to r/u ischemia  3. CBC, BMP ordered stat  4. LLE doppler ordered stat to r/o DVT  5. Attending Dr RADHA Swann notified, agrees with above plan. Call placed to Cardio Dr Rowell  6. Will follow, RN to call if any changes    Dr Lagunas  PGY1  [phone] Called by RN for Pt tachycardic    Pt seen and examined at bedside, in NAD. Denies CP, tightness, SOB, palpitation, N/V/D, blurred vision, abdominal pain, headache. Note pt poor historian according to attending Dr RADHA Swann        T(C): 37 (01-04-18 @ 22:44), Max: 37 (01-04-18 @ 22:44)  HR: 72 (01-04-18 @ 22:01) (60 - 75)  BP: 142/70 (01-04-18 @ 22:01) (136/48 - 178/76)  RR: 17 (01-04-18 @ 20:40) (17 - 17)  SpO2: 97% (01-04-18 @ 20:40) (93% - 98%)  Wt(kg): --    Physical :  Gen- NAD, ncat  Cardio - sinus tach s+1,s+2, no murmur  Lung - decreased BS b/l, no wheeze, no rhonchi, no rales   Abdomen- +BS, NT/ND, no guarding, no rebound, no masses  Ext- RLE swelling, 2+ pulses b/l  Neuro- CN grossly intact, strength 5/5 b/l extrem    LABS:                        9.7    15.7  )-----------( 314      ( 04 Jan 2018 07:52 )             29.2     01-04    138  |  101  |  48<H>  ----------------------------<  87  3.8   |  27  |  0.91    Ca    9.6      04 Jan 2018 07:52                  Assessment/Plan  89yFemale admitted for viral PNA now presents with sinus tachycardia 130s-140s  1. EKG stat ordered which showed changes in v2-v6 compared to previous EKG  2. Cardiac enzymes ordered stat to r/u ischemia  3. CBC, BMP ordered stat  4. LLE doppler ordered stat to r/o DVT  5. Attending Dr RADHA Swann notified, agrees with above plan. Call placed to Cardio Dr Rowell  6. Will follow, RN to call if any changes    Addendum 23:54  Pt hypotensive, 94/50. Seen and examined, in NAD. Denies lightheadedness, dizziness, CP, SOB, chest tightness. However pt is poor historian.  Spoke to Attending Dr RADHA Swann. Will give 250cc bolus stat. Will order CTA to r/o PE.    Dr Lagunas  PGY1  [phone] Called by RN for Pt tachycardic    Pt seen and examined at bedside, in NAD. Denies CP, tightness, SOB, palpitation, N/V/D, blurred vision, abdominal pain, headache. Note pt poor historian according to attending Dr RADHA Swann        T(C): 37 (01-04-18 @ 22:44), Max: 37 (01-04-18 @ 22:44)  HR: 72 (01-04-18 @ 22:01) (60 - 75)  BP: 142/70 (01-04-18 @ 22:01) (136/48 - 178/76)  RR: 17 (01-04-18 @ 20:40) (17 - 17)  SpO2: 97% (01-04-18 @ 20:40) (93% - 98%)  Wt(kg): --    Physical :  Gen- NAD, ncat  Cardio - sinus tach s+1,s+2, no murmur  Lung - decreased BS b/l, no wheeze, no rhonchi, no rales   Abdomen- +BS, NT/ND, no guarding, no rebound, no masses  Ext- RLE swelling, 2+ pulses b/l  Neuro- CN grossly intact, strength 5/5 b/l extrem    LABS:                        9.7    15.7  )-----------( 314      ( 04 Jan 2018 07:52 )             29.2     01-04    138  |  101  |  48<H>  ----------------------------<  87  3.8   |  27  |  0.91    Ca    9.6      04 Jan 2018 07:52                  Assessment/Plan  89yFemale admitted for viral PNA now presents with sinus tachycardia 130s-140s  1. EKG stat ordered which showed changes in v2-v6 compared to previous EKG  2. Cardiac enzymes ordered stat to r/u ischemia  3. CBC, BMP ordered stat  4. LLE doppler ordered stat to r/o DVT  5. Attending Dr RADHA Swann notified, agrees with above plan. Call placed to Cardio Dr Rowell  6. Will follow, RN to call if any changes    Addendum 23:54  Pt hypotensive, 94/50. Seen and examined, in NAD. Denies lightheadedness, dizziness, CP, SOB, chest tightness. However pt is poor historian.  Spoke to Attending Dr RADHA Swann. Will give 250cc bolus stat. Will order CTA to r/o PE.    Addendum 00:41  Spoke to Dr Rowell Cardio, trops negative as of now. Repeat trops at 3:30am. Plan discussed with Dr Sorin Lagunas  PGY1  [phone] Called by RN for Pt tachycardic    Pt seen and examined at bedside, in NAD. Denies CP, tightness, SOB, palpitation, N/V/D, blurred vision, abdominal pain, headache. Note pt poor historian according to attending Dr RADHA Swann        T(C): 37 (01-04-18 @ 22:44), Max: 37 (01-04-18 @ 22:44)  HR: 72 (01-04-18 @ 22:01) (60 - 75)  BP: 142/70 (01-04-18 @ 22:01) (136/48 - 178/76)  RR: 17 (01-04-18 @ 20:40) (17 - 17)  SpO2: 97% (01-04-18 @ 20:40) (93% - 98%)  Wt(kg): --    Physical :  Gen- NAD, ncat  Cardio - sinus tach s+1,s+2, no murmur  Lung - decreased BS b/l, no wheeze, no rhonchi, no rales   Abdomen- +BS, NT/ND, no guarding, no rebound, no masses  Ext- RLE swelling, 2+ pulses b/l  Neuro- CN grossly intact, strength 5/5 b/l extrem    LABS:                        9.7    15.7  )-----------( 314      ( 04 Jan 2018 07:52 )             29.2     01-04    138  |  101  |  48<H>  ----------------------------<  87  3.8   |  27  |  0.91    Ca    9.6      04 Jan 2018 07:52                  Assessment/Plan  89yFemale admitted for viral PNA now presents with sinus tachycardia 130s-140s  1. EKG stat ordered which showed changes in v2-v6 compared to previous EKG  2. Cardiac enzymes ordered stat to r/u ischemia  3. CBC, BMP ordered stat  4. LLE doppler ordered stat to r/o DVT  5. Attending Dr RADHA Swann notified, agrees with above plan. Call placed to Cardio Dr Rowell  6. Will follow, RN to call if any changes    Addendum 23:54  Pt hypotensive, 94/50. Seen and examined, in NAD. Denies lightheadedness, dizziness, CP, SOB, chest tightness. However pt is poor historian.  Spoke to Attending Dr RADHA Swann. Will give 250cc bolus stat. Will order CTA to r/o PE.    Addendum 00:41  Spoke to Dr Rowell Cardio, EKG changes discussed. Trops negative as of now. Repeat trops at 3:30am. Plan discussed with Dr Rowell Cardio.    Dr Lagunas  PGY1  [phone]

## 2018-01-04 NOTE — PROGRESS NOTE ADULT - PROBLEM SELECTOR PLAN 1
continue ceftriaxone  urine legionella and MRSA screen results back so will stop azithro  -plan for 7 day course so until 1/9 and will follow for when pt can be converted to PO Cefuroxime 500mg PO BID to complete course

## 2018-01-04 NOTE — PROGRESS NOTE ADULT - ASSESSMENT
89 yo F pmhx diastolic chf (EF 65%, echo July 2017), htn, anemia, p/w cough productive with yellow sputum and runny nose for 1 week. She is felt to have probable viral pneumonia but remains on antibiotic therapy. She is no evidence for volume overload on examination. Her blood pressure is improved today with increasing dose of hydralazine   No clear evidence of acute ischemia, CE slightly elevated. Likely 2/2 to demand. 2nd set negative.    - cont ambulation with PT  - Continue antibiotics. per ID  - No acute changes on EKG compared to previous  - Previous ECHO July 2017: EF 65%, nomial left ventricular systolic function, grade 1 diastolic dysfunction  - BP uncontrolled.  Uptitrated hydralazine  to 75 TID  - cont carvedilol, doxasosin  - cont furosemide, keep I=O  - Monitor and replete lytes, keep K>4, Mg>2  - Other cardiovascular workup will depend on clinical course.

## 2018-01-04 NOTE — PROGRESS NOTE ADULT - PROBLEM SELECTOR PLAN 2
Likely 2/2 acute infectious etiology,  - will continue to monitor cbc  - rest of plan as per 1 Likely 2/2 acute infectious etiology,  - will continue to monitor cbc  - pt did receive a dose of iv solumedrol  - rest of plan as per 1

## 2018-01-04 NOTE — PROGRESS NOTE ADULT - SUBJECTIVE AND OBJECTIVE BOX
Follow up: HFPEF, SOB, SVT    HPI:  89F PMHx chronic diastolic chf (EF 65%, echo July 2017), HTN, anemia presenting with cough and congestion severe sob x 1 week.  Pt reports cough started last Tuesday with clear mucus production.  No pleuritic chest pain with sob.  Reports this week cough became more severe now with yellowish sputum.  Currently complains of cough and chest congestion. Denies pleuritic chest pain, fever, palpitations, abdominal pain, dizziness, n/v/d/c, changes in vision    In the ED /74, HR 61, T 97.9F, O2 95% on RA, RR 17. WBC 11.8, Hg 9.9, Na 132, lactate 0.7, Trop 0.046, proBNP 7832, CXR showing mild vascular congestion, small left pleural effusion, questionable focal infiltrate of right base. Given Rocephin, Zithromax, Duonebs, RVP + for coronavirus with RLL infiltrate consistent with likely viral pneumonia.  Blood cultures collected. EKG showing SR. Cardiology (Dr. Hagen) and ID (dr. rivera) (02 Jan 2018 11:38)    She is feeling better, up and walking with physical therapy. She reports no chest pain, dyspnea, or palpitations      PAST MEDICAL & SURGICAL HISTORY:  Anemia, unspecified type  Edema of lower extremity  Heart murmur  Hypertension  Hypercalcemia  History of appendectomy  S/P tonsillectomy  H/O parathyroidectomy      MEDICATIONS  (STANDING):  ALBUTerol/ipratropium for Nebulization 3 milliLiter(s) Nebulizer every 8 hours  carvedilol 12.5 milliGRAM(s) Oral two times a day  cefTRIAXone   IVPB 1 Gram(s) IV Intermittent every 24 hours  cinacalcet 30 milliGRAM(s) Oral daily  doxazosin 4 milliGRAM(s) Oral at bedtime  ferrous    sulfate 325 milliGRAM(s) Oral daily  furosemide    Tablet 60 milliGRAM(s) Oral daily  heparin  Injectable 5000 Unit(s) SubCutaneous every 12 hours  hydrALAZINE 75 milliGRAM(s) Oral three times a day  lactobacillus acidophilus 1 Tablet(s) Oral two times a day with meals  potassium chloride    Tablet ER 10 milliEquivalent(s) Oral daily    Vital Signs Last 24 Hrs  T(C): 36.3 (04 Jan 2018 13:27), Max: 36.7 (03 Jan 2018 15:36)  T(F): 97.3 (04 Jan 2018 13:27), Max: 98.1 (03 Jan 2018 15:36)  HR: 66 (04 Jan 2018 13:27) (57 - 75)  BP: 159/70 (04 Jan 2018 13:27) (136/48 - 173/73)  BP(mean): --  RR: 17 (04 Jan 2018 04:30) (16 - 17)  SpO2: 98% (04 Jan 2018 13:27) (92% - 98%)    I&O's Summary    03 Jan 2018 07:01  -  04 Jan 2018 07:00  --------------------------------------------------------  IN: 520 mL / OUT: 0 mL / NET: 520 mL        PHYSICAL EXAM:    Constitutional: NAD, awake and alert, frail  Eyes:  EOMI,  Pupils round, no lesions  ENMT: no exudate or erythema  Pulmonary: Non-labored, breath sounds are clear bilaterally, No wheezing, rales or rhonchi  Cardiovascular: PMI not palpable RRR normal S1 and S2, no murmurs, rubs, gallops or clicks  Gastrointestinal: Bowel Sounds present, soft, nontender.   Lymph: No cervical lymphadenopathy.  Neurological: Alert, no focal deficits  Skin: No rashes.  No cyanosis.  Psych:  Mood & affect appropriate   Ext: No lower ext edema      CARDIAC MARKERS ( 02 Jan 2018 15:54 )  .034 ng/mL / x     / 54 U/L / x     / 1.7 ng/mL                            9.7    15.7  )-----------( 314      ( 04 Jan 2018 07:52 )             29.2     01-04    138  |  101  |  48<H>  ----------------------------<  87  3.8   |  27  |  0.91    Ca    9.6      04 Jan 2018 07:52      < from: 12 Lead ECG (01.02.18 @ 08:36) >    Ventricular Rate 58 BPM    Atrial Rate 58 BPM    P-R Interval 160 ms    QRS Duration 78 ms    Q-T Interval 398 ms    QTC Calculation(Bezet) 390 ms    P Axis 20 degrees    R Axis 63 degrees    T Axis 48 degrees    Diagnosis Line Sinus bradycardia  Otherwise normal ECG  When compared with ECG of 25-AUG-2017 09:14,  Questionable change in QRS axis  T wave inversion no longer evident in Inferior leads  Confirmed by NIDHI HAGEN ([id number]) on 1/2/2018 8:52:44 PM    < end of copied text >  < from: Xray Chest 1 View AP/PA (01.02.18 @ 08:45) >    EXAM:  XR CHEST AP OR PA 1V                            PROCEDURE DATE:  01/02/2018          INTERPRETATION:  Cough, congestion.    AP chest. Prior 8/31/2017.    Chin obscures small portion left apex. No change heart mediastinum. Mild   vascular congestion similar to prior. Small left pleural effusion.   Questionable focal infiltrate right base. Recommend clinical correlation   close follow-up.    Impression: As above                JULIETTE PATRICIA M.D., ATTENDING RADIOLOGIST  This document has been electronically signed. Jan 2 2018  8:50AM                < end of copied text >  < from: TTE Echo Doppler w/o Cont (07.09.17 @ 08:32) >     EXAM:  ECHO TTE W/O CON COMP W/DOPPLR         PROCEDURE DATE:  07/09/2017        INTERPRETATION:  Ordering Physician: Unknown Doctor    Indication: Hypertension    Study Quality: Technically fair  A complete echocardiographic study was performed utilizing standard   protocol including spectral and color Doppler in all echocardiographic   windows.    Height: 152 cm  Weight: 49 kg  BSA: 1.4 sq m  Blood Pressure: 189/63    MEASUREMENTS  IVS: 1.0cm  PWT: 1.0cm  LA: 4.2cm  AO: 2.7cm  LVIDd: 4.5cm  LVIDs: 3.3cm    LVEF: 65%  RVSP: 54mmHg    FINDINGS  Left Ventricle:  Normal left ventricular systolic function.  Aortic Valve: Calcified trileaflet aortic valve. Mild aortic   insufficiency.  Mitral Valve: Mitral annular calcification and calcified mitral valve   leaflets with normal diastolic opening. Mild mitral insufficiency.  Tricuspid Valve: Normal tricuspid valve. Mild tricuspid insufficiency.  Pulmonic Valve: Normal pulmonic valve. Mild pulmonic insufficiency.  Left Atrium: Mildly enlarged.  Right Ventricle: Normal right ventricular size and systolic function.  Right Atrium: Normal  Diastolic Function: Grade 1 diastolic dysfunction.  Pericardium/Pleura: Normal pericardium with no pericardial effusion.                      MORENA ADORNO M.D., ATTENDING CARDIOLOGIST  This document has been electronically signed. Jul 9 2017 10:13AM                < end of copied text >

## 2018-01-04 NOTE — PROGRESS NOTE ADULT - PROBLEM SELECTOR PLAN 3
HFpEF  diuresis  on tele floor  monitor labs, cr and lytes  replete as needed  prognosis guarded  pt is DNR DNI

## 2018-01-05 ENCOUNTER — TRANSCRIPTION ENCOUNTER (OUTPATIENT)
Age: 83
End: 2018-01-05

## 2018-01-05 LAB
ANION GAP SERPL CALC-SCNC: 10 MMOL/L — SIGNIFICANT CHANGE UP (ref 5–17)
ANION GAP SERPL CALC-SCNC: 9 MMOL/L — SIGNIFICANT CHANGE UP (ref 5–17)
BUN SERPL-MCNC: 45 MG/DL — HIGH (ref 7–23)
BUN SERPL-MCNC: 52 MG/DL — HIGH (ref 7–23)
CALCIUM SERPL-MCNC: 9.4 MG/DL — SIGNIFICANT CHANGE UP (ref 8.5–10.1)
CALCIUM SERPL-MCNC: 9.5 MG/DL — SIGNIFICANT CHANGE UP (ref 8.5–10.1)
CHLORIDE SERPL-SCNC: 98 MMOL/L — SIGNIFICANT CHANGE UP (ref 96–108)
CHLORIDE SERPL-SCNC: 98 MMOL/L — SIGNIFICANT CHANGE UP (ref 96–108)
CK MB BLD-MCNC: 5.9 % — HIGH (ref 0–3.5)
CK MB BLD-MCNC: 6.1 % — HIGH (ref 0–3.5)
CK MB BLD-MCNC: 6.4 % — HIGH (ref 0–3.5)
CK MB CFR SERPL CALC: 2 NG/ML — SIGNIFICANT CHANGE UP (ref 0–3.6)
CK MB CFR SERPL CALC: 2.3 NG/ML — SIGNIFICANT CHANGE UP (ref 0–3.6)
CK MB CFR SERPL CALC: 2.5 NG/ML — SIGNIFICANT CHANGE UP (ref 0–3.6)
CK SERPL-CCNC: 34 U/L — SIGNIFICANT CHANGE UP (ref 26–192)
CK SERPL-CCNC: 36 U/L — SIGNIFICANT CHANGE UP (ref 26–192)
CK SERPL-CCNC: 41 U/L — SIGNIFICANT CHANGE UP (ref 26–192)
CO2 SERPL-SCNC: 27 MMOL/L — SIGNIFICANT CHANGE UP (ref 22–31)
CO2 SERPL-SCNC: 29 MMOL/L — SIGNIFICANT CHANGE UP (ref 22–31)
CREAT SERPL-MCNC: 1.1 MG/DL — SIGNIFICANT CHANGE UP (ref 0.5–1.3)
CREAT SERPL-MCNC: 1.2 MG/DL — SIGNIFICANT CHANGE UP (ref 0.5–1.3)
GLUCOSE SERPL-MCNC: 91 MG/DL — SIGNIFICANT CHANGE UP (ref 70–99)
GLUCOSE SERPL-MCNC: 97 MG/DL — SIGNIFICANT CHANGE UP (ref 70–99)
HCT VFR BLD CALC: 30.8 % — LOW (ref 34.5–45)
HCT VFR BLD CALC: 31.4 % — LOW (ref 34.5–45)
HGB BLD-MCNC: 10.3 G/DL — LOW (ref 11.5–15.5)
HGB BLD-MCNC: 10.4 G/DL — LOW (ref 11.5–15.5)
MAGNESIUM SERPL-MCNC: 1.7 MG/DL — SIGNIFICANT CHANGE UP (ref 1.6–2.6)
MCHC RBC-ENTMCNC: 28.8 PG — SIGNIFICANT CHANGE UP (ref 27–34)
MCHC RBC-ENTMCNC: 29 PG — SIGNIFICANT CHANGE UP (ref 27–34)
MCHC RBC-ENTMCNC: 33 GM/DL — SIGNIFICANT CHANGE UP (ref 32–36)
MCHC RBC-ENTMCNC: 33.4 GM/DL — SIGNIFICANT CHANGE UP (ref 32–36)
MCV RBC AUTO: 86.9 FL — SIGNIFICANT CHANGE UP (ref 80–100)
MCV RBC AUTO: 87.1 FL — SIGNIFICANT CHANGE UP (ref 80–100)
PHOSPHATE SERPL-MCNC: 3.3 MG/DL — SIGNIFICANT CHANGE UP (ref 2.5–4.5)
PLATELET # BLD AUTO: 296 K/UL — SIGNIFICANT CHANGE UP (ref 150–400)
PLATELET # BLD AUTO: 349 K/UL — SIGNIFICANT CHANGE UP (ref 150–400)
POTASSIUM SERPL-MCNC: 3.9 MMOL/L — SIGNIFICANT CHANGE UP (ref 3.5–5.3)
POTASSIUM SERPL-MCNC: 3.9 MMOL/L — SIGNIFICANT CHANGE UP (ref 3.5–5.3)
POTASSIUM SERPL-SCNC: 3.9 MMOL/L — SIGNIFICANT CHANGE UP (ref 3.5–5.3)
POTASSIUM SERPL-SCNC: 3.9 MMOL/L — SIGNIFICANT CHANGE UP (ref 3.5–5.3)
RBC # BLD: 3.55 M/UL — LOW (ref 3.8–5.2)
RBC # BLD: 3.61 M/UL — LOW (ref 3.8–5.2)
RBC # FLD: 13.1 % — SIGNIFICANT CHANGE UP (ref 10.3–14.5)
RBC # FLD: 13.3 % — SIGNIFICANT CHANGE UP (ref 10.3–14.5)
SODIUM SERPL-SCNC: 135 MMOL/L — SIGNIFICANT CHANGE UP (ref 135–145)
SODIUM SERPL-SCNC: 136 MMOL/L — SIGNIFICANT CHANGE UP (ref 135–145)
TROPONIN I SERPL-MCNC: 0.03 NG/ML — SIGNIFICANT CHANGE UP (ref 0.01–0.04)
TROPONIN I SERPL-MCNC: 0.03 NG/ML — SIGNIFICANT CHANGE UP (ref 0.01–0.04)
TROPONIN I SERPL-MCNC: 0.04 NG/ML — SIGNIFICANT CHANGE UP (ref 0.01–0.04)
WBC # BLD: 12.8 K/UL — HIGH (ref 3.8–10.5)
WBC # BLD: 14.2 K/UL — HIGH (ref 3.8–10.5)
WBC # FLD AUTO: 12.8 K/UL — HIGH (ref 3.8–10.5)
WBC # FLD AUTO: 14.2 K/UL — HIGH (ref 3.8–10.5)

## 2018-01-05 PROCEDURE — 71275 CT ANGIOGRAPHY CHEST: CPT | Mod: 26

## 2018-01-05 PROCEDURE — 93971 EXTREMITY STUDY: CPT | Mod: 26,LT

## 2018-01-05 PROCEDURE — 99232 SBSQ HOSP IP/OBS MODERATE 35: CPT

## 2018-01-05 RX ORDER — POTASSIUM CHLORIDE 20 MEQ
40 PACKET (EA) ORAL ONCE
Qty: 0 | Refills: 0 | Status: COMPLETED | OUTPATIENT
Start: 2018-01-05 | End: 2018-01-05

## 2018-01-05 RX ADMIN — Medication 40 MILLIEQUIVALENT(S): at 12:32

## 2018-01-05 RX ADMIN — CARVEDILOL PHOSPHATE 12.5 MILLIGRAM(S): 80 CAPSULE, EXTENDED RELEASE ORAL at 06:04

## 2018-01-05 RX ADMIN — Medication 325 MILLIGRAM(S): at 11:01

## 2018-01-05 RX ADMIN — CARVEDILOL PHOSPHATE 12.5 MILLIGRAM(S): 80 CAPSULE, EXTENDED RELEASE ORAL at 17:14

## 2018-01-05 RX ADMIN — CEFTRIAXONE 100 GRAM(S): 500 INJECTION, POWDER, FOR SOLUTION INTRAMUSCULAR; INTRAVENOUS at 11:01

## 2018-01-05 RX ADMIN — Medication 1 TABLET(S): at 17:14

## 2018-01-05 RX ADMIN — Medication 3 MILLILITER(S): at 07:44

## 2018-01-05 RX ADMIN — CINACALCET 30 MILLIGRAM(S): 30 TABLET, FILM COATED ORAL at 22:01

## 2018-01-05 RX ADMIN — Medication 75 MILLIGRAM(S): at 06:04

## 2018-01-05 RX ADMIN — SODIUM CHLORIDE 1500 MILLILITER(S): 9 INJECTION INTRAMUSCULAR; INTRAVENOUS; SUBCUTANEOUS at 00:02

## 2018-01-05 RX ADMIN — Medication 60 MILLIGRAM(S): at 06:04

## 2018-01-05 RX ADMIN — Medication 75 MILLIGRAM(S): at 13:27

## 2018-01-05 RX ADMIN — HEPARIN SODIUM 5000 UNIT(S): 5000 INJECTION INTRAVENOUS; SUBCUTANEOUS at 06:04

## 2018-01-05 RX ADMIN — Medication 3 MILLILITER(S): at 23:50

## 2018-01-05 RX ADMIN — Medication 3 MILLILITER(S): at 15:19

## 2018-01-05 RX ADMIN — Medication 4 MILLIGRAM(S): at 22:01

## 2018-01-05 RX ADMIN — Medication 75 MILLIGRAM(S): at 22:01

## 2018-01-05 RX ADMIN — HEPARIN SODIUM 5000 UNIT(S): 5000 INJECTION INTRAVENOUS; SUBCUTANEOUS at 17:14

## 2018-01-05 NOTE — PROGRESS NOTE ADULT - ASSESSMENT
89 yo female admitted with cough productive of yellow sputum, +RVP for  HKU1 Coronavirus, decreased BS in right base and increased opac right base on CXR

## 2018-01-05 NOTE — PROGRESS NOTE ADULT - PROBLEM SELECTOR PLAN 1
continue ceftriaxone  -plan for 7 day course but with slow improvement, patient concerns and continued need for oxygen recommend patient  complete course IV abx for 7 days so last dose on Monday 1/8

## 2018-01-05 NOTE — PROGRESS NOTE ADULT - ASSESSMENT
89 yo F pmhx diastolic chf (EF 65%, echo July 2017), htn, anemia, p/w cough productive with yellow sputum and runny nose for 1 week. She is felt to have probable viral pneumonia but remains on antibiotic therapy. She is no evidence for volume overload on examination. Her blood pressure is improved today with increasing dose of hydralazine.  No clear evidence of acute ischemia, CE slightly elevated. Likely 2/2 to demand. 2nd set negative.    -there is no evidence of acute ischemia.  -there is no evidence of significant arrhythmia.  -there is no evidence for meaningful  volume overload.  - Continue antibiotics for rll pna  - Previous ECHO July 2017: EF 65%, nomal left ventricular systolic function, grade 1 diastolic dysfunction  - BP remains high at times.  Uptitrated hydralazine  to 75 TID  - cont carvedilol, doxasosin  - cont furosemide po  - Monitor and replete lytes, keep K>4, Mg>2  - Other cardiovascular workup will depend on clinical course.

## 2018-01-05 NOTE — DISCHARGE NOTE ADULT - NS AS ACTIVITY OBS
As tolerated Do not make important decisions/No Heavy lifting/straining/Do not drive or operate machinery/As tolerated and with assist/Bathing allowed/Walking-Indoors allowed

## 2018-01-05 NOTE — DISCHARGE NOTE ADULT - PATIENT PORTAL LINK FT
“You can access the [employer] Patient Portal, offered by [facility], by registering with the following website: [email or web address]”

## 2018-01-05 NOTE — DISCHARGE NOTE ADULT - PLAN OF CARE
Resolution of Symtoms Follow up with primary care doctor Continue cardiac medications  Follow up with Cardiology Continue medications as before diet and meds as above note changes in doses and new blood pressure meds added as above. meds as above Resolution of Symptoms Resolved Continue current medications  Follow-up with your primary care doctor within 1 week.

## 2018-01-05 NOTE — PROGRESS NOTE ADULT - PROBLEM SELECTOR PLAN 1
nebs  chest pt  cta chest - neg for PE, possible lower resp tract infection, shows Pulm Edema  on Rocephin, may need another and broader spectrum abx  prognosis guarded  monitor labs, vs and sat

## 2018-01-05 NOTE — PROGRESS NOTE ADULT - PROBLEM SELECTOR PLAN 1
RVP + coronavirus c/w viral pneumonia  -White count trending up  -tachycardic overnight, new left upper lobe opacities  -Continue Rocephin for 7 days (until 1/8), change to po cefuroxime 500 BID pending clinical course  -Dr. Valdovinos recommends adding broad coverage antibiotic  -Dr. Whitehead (ID) consulted  - Tylenol prn for fever   - continuous pulse ox, keep 02 sat >90  - supportive treatment  - pt tolerating po well, encourage po intake

## 2018-01-05 NOTE — PROGRESS NOTE ADULT - PROBLEM SELECTOR PLAN 3
lasix   I and O  BP control  supportive care and regimen  pt is DNR DNI  ct chest supports pulm edema

## 2018-01-05 NOTE — PROGRESS NOTE ADULT - SUBJECTIVE AND OBJECTIVE BOX
infectious diseases progress note:    SAIRA SANTANA is a 89y y. o. Female patient    Patient reports: coughing up long mucous strings    ROS:    EYES:  Negative  blurry vision or double vision  GASTROINTESTINAL:  Negative for nausea, vomiting, diarrhea  -otherwise negative except for subjective    Allergies    Vasotec (Unknown)    Intolerances        ANTIBIOTICS/RELEVANT:  antimicrobials  cefTRIAXone   IVPB 1 Gram(s) IV Intermittent every 24 hours    immunologic:    OTHER:  acetaminophen   Tablet 650 milliGRAM(s) Oral every 6 hours PRN  ALBUTerol/ipratropium for Nebulization 3 milliLiter(s) Nebulizer every 8 hours  carvedilol 12.5 milliGRAM(s) Oral two times a day  cinacalcet 30 milliGRAM(s) Oral daily  doxazosin 4 milliGRAM(s) Oral at bedtime  ferrous    sulfate 325 milliGRAM(s) Oral daily  furosemide    Tablet 60 milliGRAM(s) Oral daily  heparin  Injectable 5000 Unit(s) SubCutaneous every 12 hours  hydrALAZINE 75 milliGRAM(s) Oral three times a day  lactobacillus acidophilus 1 Tablet(s) Oral two times a day with meals  potassium chloride    Tablet ER 10 milliEquivalent(s) Oral daily      Objective:  Last 24-Vital Signs Last 24 Hrs  T(C): 36.9 (05 Jan 2018 07:41), Max: 37 (04 Jan 2018 22:44)  T(F): 98.4 (05 Jan 2018 07:41), Max: 98.6 (04 Jan 2018 22:44)  HR: 60 (05 Jan 2018 07:46) (60 - 132)  BP: 129/64 (05 Jan 2018 07:41) (94/48 - 178/76)  BP(mean): --  RR: 19 (05 Jan 2018 07:41) (17 - 19)  SpO2: 95% (05 Jan 2018 07:41) (94% - 98%)    T(C): 36.9 (01-05-18 @ 07:41), Max: 37 (01-04-18 @ 22:44)  T(F): 98.4 (01-05-18 @ 07:41), Max: 98.6 (01-04-18 @ 22:44)  T(C): 36.9 (01-05-18 @ 07:41), Max: 37.2 (01-02-18 @ 11:28)  T(F): 98.4 (01-05-18 @ 07:41), Max: 98.9 (01-02-18 @ 11:28)  T(C): 36.9 (01-05-18 @ 07:41), Max: 37.2 (01-02-18 @ 11:28)  T(F): 98.4 (01-05-18 @ 07:41), Max: 98.9 (01-02-18 @ 11:28)    PHYSICAL EXAM:  Constitutional:Well-developed, well nourished  Eyes:PERRLA, EOMI  Ear/Nose/Throat: oropharynx normal	  Neck:no JVD, no lymphadenopathy, supple  Respiratory: no accessory muscle use, lung fields bilaterally clear  Cardiovascular:RRR, normal S1, S2 no m/r/g  Gastrointestinal:soft, NT, no HSM, BS-normal  Extremities:no clubbing, no cyanosis, edema absent  Neuro-patient alert, oriented and appropriate  Skin-no sig lesions      LABS:                        10.4   14.2  )-----------( 349      ( 05 Jan 2018 06:47 )             31.4       WBC 14.2  01-05 @ 06:47  WBC 12.8  01-04 @ 23:50  WBC 15.7  01-04 @ 07:52  WBC 13.2  01-03 @ 07:06  WBC 11.8  01-02 @ 09:02      01-05    136  |  98  |  45<H>  ----------------------------<  91  3.9   |  29  |  1.10    Ca    9.4      05 Jan 2018 06:47  Phos  3.3     01-05  Mg     1.7     01-05              MICROBIOLOGY:        RADIOLOGY & ADDITIONAL STUDIES:    < from: CT Angio Chest w/ IV Cont (01.05.18 @ 01:36) >    EXAM:  CT ANGIO CHEST (W)AW IC                            PROCEDURE DATE:  01/05/2018          INTERPRETATION:  CT ANGIO CHEST (W)AW IC     INDICATION: Hypotension, tachycardia. Evaluate for PE.    TECHNIQUE: Contrast enhanced CT imaging of the thorax. Timing optimized   for the pulmonary arteries. Postprocessed MIP reformatted images were   created and reviewed.    75 mL of Omnipaque 350 contrast material was injected IV.    COMPARISON: None.    FINDINGS:      Pulmonary arteries: No filling defects to suggest pulmonary emboli.   Pulmonary artery is enlarged measuring 3.4 cm.  Aorta: No aneurysm.    Lines and tubes: None.  Airways: Tracheobronchial tree is patent. Retained secretions in the   proximal bronchi. Diffuse bronchial wall thickening.  Lungs and Pleura: There are small bilateral pleural effusions. There are   dependent as well as mild perihilar opacities. Patchy ground glass   opacities seen in the left upper lobe. Atelectasis is seen in both lower   lobes and the right middlelobe. Diffuse interlobular bilateral septal   thickening.     Mediastinum and lymph nodes: No mass or hemorrhage. No worrisome   mediastinal adenopathy. No worrisome hilar or axillary adenopathy.  Heart: Cardiomegaly. No pericardial effusion.    Upper Abdomen: No suspicious abnormalities of the visualized portions of   the liver, spleen, adrenal glands, or kidneys.    Bones and soft tissues: No suspicious lesions. Degenerative changes of   the spine as well as the bilateral shoulders.    IMPRESSION:    Negative for pulmonary emboli.    Small bilateral pleural effusions. Patchy ground glass opacities in the   left upper lobe are likely infective in nature. Superimposed on this is   evidence of mild to moderate pulmonary edema. Bilateral bronchial wall   thickening compatible with infectious/inflammatory bronchitis.    Enlargement of the pulmonary artery, may reflect pulmonary arterial   hypertension.

## 2018-01-05 NOTE — DISCHARGE NOTE ADULT - ADDITIONAL INSTRUCTIONS
Continue taking medications as before, follow up with PCP dr weil 2-3 days review all meds as above with him and bring all these papers with him when you see him

## 2018-01-05 NOTE — PROGRESS NOTE ADULT - ATTENDING COMMENTS
pt seen and [id number] daughter at the bedside/  wbc 14   continue with iv abx as per ID  will follow

## 2018-01-05 NOTE — PROGRESS NOTE ADULT - PROBLEM SELECTOR PLAN 2
Likely 2/2 acute infectious etiology,  - will continue to monitor cbc  - pt did receive a dose of iv solumedrol  - rest of plan as per 1

## 2018-01-05 NOTE — PROGRESS NOTE ADULT - SUBJECTIVE AND OBJECTIVE BOX
Patient is a 89y old  Female who presents with a chief complaint of coughing (02 Jan 2018 21:02)      INTERVAL HPI/OVERNIGHT EVENTS: Patient seen and examined at bedside. Patient denies any new complaints.       MEDICATIONS  (STANDING):  ALBUTerol/ipratropium for Nebulization 3 milliLiter(s) Nebulizer every 8 hours  carvedilol 12.5 milliGRAM(s) Oral two times a day  cefTRIAXone   IVPB 1 Gram(s) IV Intermittent every 24 hours  cinacalcet 30 milliGRAM(s) Oral daily  doxazosin 4 milliGRAM(s) Oral at bedtime  ferrous    sulfate 325 milliGRAM(s) Oral daily  furosemide    Tablet 60 milliGRAM(s) Oral daily  heparin  Injectable 5000 Unit(s) SubCutaneous every 12 hours  hydrALAZINE 75 milliGRAM(s) Oral three times a day  lactobacillus acidophilus 1 Tablet(s) Oral two times a day with meals    MEDICATIONS  (PRN):  acetaminophen   Tablet 650 milliGRAM(s) Oral every 6 hours PRN For Temp greater than 38 C (100.4 F)      Allergies    Vasotec (Unknown)    Intolerances    REVIEW OF SYSTEMS:  CONSTITUTIONAL: No fever, No chills,No fatigue,No myalgia,No Body ache  EYES: No eye pain, visual disturbances, or discharge  ENMT:  No ear pain, No nose bleed, No vertigo; No sinus or throat pain  NECK: No pain, No stiffness  RESPIRATORY: No cough, wheezing, No  hemoptysis; No shortness of breath  CARDIOVASCULAR: No chest pain, palpitations, leg swelling  GASTROINTESTINAL: No abdominal or epigastric pain. No nausea, No vomiting; No diarrhea or constipation. [ ] BM  GENITOURINARY: No dysuria, No frequency, No urgency, No hematuria, or incontinence  NEUROLOGICAL: alert and oriented x 3,  No headaches, No dizziness, No numbness,  SKIN:   No itching, burning, rashes, or lesions   MUSCULOSKELETAL: No joint pain or swelling; No muscle pain, No back pain, No extremity pain  PSYCHIATRIC: No depression, anxiety, mood swings, or difficulty sleeping  ROS  [ ] Unable to obtain   REST OF REVIEW Of SYSTEM - [ ] Normal     Height (cm): 157.48 (01-02 @ 08:17)  Weight (kg): 52.2 (01-02 @ 08:17)  BMI (kg/m2): 21 (01-02 @ 08:17)  BSA (m2): 1.51 (01-02 @ 08:17)  Vital Signs Last 24 Hrs  T(C): 36.4 (05 Jan 2018 13:24), Max: 37 (04 Jan 2018 22:44)  T(F): 97.5 (05 Jan 2018 13:24), Max: 98.6 (04 Jan 2018 22:44)  HR: 65 (05 Jan 2018 13:24) (60 - 132)  BP: 165/73 (05 Jan 2018 13:24) (94/48 - 178/76)  BP(mean): --  RR: 18 (05 Jan 2018 13:24) (17 - 19)  SpO2: 96% (05 Jan 2018 13:24) (94% - 98%)  [ ] room air   [ ] 02    PHYSICAL EXAM:  GENERAL:  No acute distresss,  [ ] Agitated, [ ] Lethargy, [ ] confused   HEAD:  normal  ENMT: normal  NECK:  normal    NERVOUS SYSTEM:  Alert & Oriented X3, no focal deficits [ ]Confusion  [ ] Encephalopathic [ ] Sedated [ ] Other  CHEST/LUNG: Clear to auscultation bilaterally,  [ ] decreased breath sounds at bases  [ ] wheezing   [ ] rhonchi  [ ] crackles  HEART:  Regular rate and rhythm, No murmurs, rubs, or gallops,  [ ] irregular   ABDOMEN:  soft, nontender, nondistended, positive bowel sounds   [ ] obese  EXTREMITIES: No clubbing, cyanosis or edema  SKIN: [ ] venous stasis skin changes    LABS:                        10.4   14.2  )-----------( 349      ( 05 Jan 2018 06:47 )             31.4     05 Jan 2018 06:47    136    |  98     |  45     ----------------------------<  91     3.9     |  29     |  1.10     Ca    9.4        05 Jan 2018 06:47  Phos  3.3       05 Jan 2018 00:37  Mg     1.7       05 Jan 2018 00:37            CAPILLARY BLOOD GLUCOSE        Cultures  Culture Results:   No growth to date. (01-02 @ 12:02)  Culture Results:   No growth to date. (01-02 @ 12:02)          RADIOLOGY & ADDITIONAL TESTS:      Care Discussed with [X] Consultants  [ ] Patient  [ ] Family  [X]   /   [ ] Other; RN  DVT prophylaxis [ ] lovenox   [ ] subq heparin  [ ] coumadin  [ ] venodynes [ ] ambulating frequently at how risk for vte and no pharm         or  mechanical prophylaxis required    [ ] other   Advanced directive:    [ ]pt has hcp     [ ] pt declined to assign hcp  Discussed with pt @ bedside Patient is a 89y old  Female who presents with a chief complaint of coughing (02 Jan 2018 21:02)      INTERVAL HPI/OVERNIGHT EVENTS: Patient seen and examined at bedside. Patient became tachycardic overnight. CTA of chest revealed small bilateral pleural effusions, patchy ground glass opacities in the left upper lobe.     MEDICATIONS  (STANDING):  ALBUTerol/ipratropium for Nebulization 3 milliLiter(s) Nebulizer every 8 hours  carvedilol 12.5 milliGRAM(s) Oral two times a day  cefTRIAXone   IVPB 1 Gram(s) IV Intermittent every 24 hours  cinacalcet 30 milliGRAM(s) Oral daily  doxazosin 4 milliGRAM(s) Oral at bedtime  ferrous    sulfate 325 milliGRAM(s) Oral daily  furosemide    Tablet 60 milliGRAM(s) Oral daily  heparin  Injectable 5000 Unit(s) SubCutaneous every 12 hours  hydrALAZINE 75 milliGRAM(s) Oral three times a day  lactobacillus acidophilus 1 Tablet(s) Oral two times a day with meals    MEDICATIONS  (PRN):  acetaminophen   Tablet 650 milliGRAM(s) Oral every 6 hours PRN For Temp greater than 38 C (100.4 F)      Allergies    Vasotec (Unknown)    Intolerances    REVIEW OF SYSTEMS:  CONSTITUTIONAL: No fever, No chills,No fatigue,No myalgia,No Body ache  EYES: No eye pain, visual disturbances, or discharge  ENMT:  No ear pain, No nose bleed, No vertigo; No sinus or throat pain  NECK: No pain, No stiffness  RESPIRATORY: No cough, wheezing, No  hemoptysis; No shortness of breath  CARDIOVASCULAR: No chest pain, palpitations, leg swelling  GASTROINTESTINAL: No abdominal or epigastric pain. No nausea, No vomiting; No diarrhea or constipation. [ ] BM  GENITOURINARY: No dysuria, No frequency, No urgency, No hematuria, or incontinence  NEUROLOGICAL: alert and oriented x 3,  No headaches, No dizziness, No numbness,  SKIN:   No itching, burning, rashes, or lesions   MUSCULOSKELETAL: No joint pain or swelling; No muscle pain, No back pain, No extremity pain    Height (cm): 157.48 (01-02 @ 08:17)  Weight (kg): 52.2 (01-02 @ 08:17)  BMI (kg/m2): 21 (01-02 @ 08:17)  BSA (m2): 1.51 (01-02 @ 08:17)  Vital Signs Last 24 Hrs  T(C): 36.4 (05 Jan 2018 13:24), Max: 37 (04 Jan 2018 22:44)  T(F): 97.5 (05 Jan 2018 13:24), Max: 98.6 (04 Jan 2018 22:44)  HR: 65 (05 Jan 2018 13:24) (60 - 132)  BP: 165/73 (05 Jan 2018 13:24) (94/48 - 178/76)  RR: 18 (05 Jan 2018 13:24) (17 - 19)  SpO2: 96% (05 Jan 2018 13:24) (94% - 98%)      PHYSICAL EXAM:  GENERAL: NAD ,frail, elderly cachectic woman  HEAD:   Normal  EYES:  EOMI, PERRLA  ENMT:  normal hearing, Moist mucous membranes  NECK:  Supple, No JVD  NERVOUS SYSTEM:  Alert & Oriented X3, Nonfocal  CHEST/LUNG:  Crackles at the base of the lungs bilaterally  HEART: Regular rate and rhythm, No murmurs  ABDOMEN:  Soft, Nontender, Nondistended  EXTREMITIES: No clubbing, cyanosis, no edema,   LYMPH: No lymphadenopathy noted  SKIN:  No rashes or lesions, [  ] Pressure Ulcers, [  ] echymosis, [  ] Other    DIET: DASH/TLC    LABS:                        10.4   14.2  )-----------( 349      ( 05 Jan 2018 06:47 )             31.4     05 Jan 2018 06:47    136    |  98     |  45     ----------------------------<  91     3.9     |  29     |  1.10     Ca    9.4        05 Jan 2018 06:47  Phos  3.3       05 Jan 2018 00:37  Mg     1.7       05 Jan 2018 00:37      CAPILLARY BLOOD GLUCOSE    Cultures  Culture Results:   No growth to date. (01-02 @ 12:02)  Culture Results:   No growth to date. (01-02 @ 12:02)    Care Discussed with [X] Consultants  [ ] Patient  [ ] Family  [X]   /   [ ] Other; RN  DVT prophylaxis [ ] lovenox   [x ] subq heparin  [ ] coumadin  [ ] venodynes [ ] ambulating frequently at how risk for vte and no pharm         or  mechanical prophylaxis required    [ ] other   Advanced directive:   DNR/DNI

## 2018-01-05 NOTE — DISCHARGE NOTE ADULT - MEDICATION SUMMARY - MEDICATIONS TO CHANGE
I will SWITCH the dose or number of times a day I take the medications listed below when I get home from the hospital:    carvedilol 12.5 mg oral tablet  -- 1 tab(s) by mouth every 12 hours    hold sbp less than 115 hr less than 55 I will SWITCH the dose or number of times a day I take the medications listed below when I get home from the hospital:  None

## 2018-01-05 NOTE — DISCHARGE NOTE ADULT - HOSPITAL COURSE
89 year old female with PMH of chronic diastolic CHF (EF 65%, echo July 2017), HTN, anemia presented with cough and congestion severe sob x 1 week. Patient reported cough started one week prior to admission with clear mucus production. Patient denied pleuritic chest pain with sob. Cough became more, producing yellowish sputum. Denied pleuritic chest pain, fever, palpitations, abdominal pain, dizziness, n/v/d/c, changes in vision.    In the ED /74, HR 61, T 97.9F, O2 95% on RA, RR 17. WBC was 11.8, Hg 9.9, Na 132, lactate 0.7, Trop 0.046, proBNP 7832, CXR showed mild vascular congestion, small left pleural effusion, questionable focal infiltrate of right base. Given Rocephin, Zithromax, Duonebs, RVP + for coronavirus with RLL infiltrate consistent with likely viral pneumonia.  Blood cultures collected. EKG showing SR. Cardiology (Dr. Hagen) and ID (dr. rivera)  .    Patient was admitted to general medical floor. Blood cultures showed no growth. Zithromax was discontinued.     Evaluated by physical therapy and they recommended no PT needs. 89 year old female with PMH of chronic diastolic CHF (EF 65%, echo July 2017), HTN, anemia presented with cough and congestion severe sob x 1 week. Patient reported cough started one week prior to admission initially  with clear mucus production that subsequently became more productive with yellow sputum. Denied pleuritic chest pain, fever, palpitations, abdominal pain, dizziness, n/v/d/c, changes in vision.     In the ED /74, HR 61, T 97.9F, O2 95% on RA, RR 17. WBC was 11.8, Hg 9.9, Na 132, lactate 0.7, Trop 0.046, proBNP 7832, CXR showed mild vascular congestion, small left pleural effusion, possible focal infiltrate of right base. , RVP + for coronavirus with RLL infiltrate consistent with likely  viral pneumonia   Blood cultures collected. EKG showing SR. Cardiology (Dr. Hagen) and ID (dr. rivera). Given iv rocephin and zmax in er.     Patient was admitted to general medical floor and continued on iv rocephin. pulm consult requested pt seen by dr pinon. Blood cultures showed no growth. id consult also requested pt seen by dr rivera advised continuing antibiotics as above.   hospital course complicated on 12-4 with tachycardia and hypotension. pt without complaints cp sob at time. cta chest done, no pe, cardiomegaly  Retained secretions noted  in the proximal bronchi with Diffuse bronchial wall thickening consistent with infection/inflammatory bonchitis.  small  bilateral pleural effusions, dependent as well as mild perihilar opacities. Patchy ground glass  opacities seen in the left upper lobe likely infective, superimposed evidence mild to moderate pulmonary edema.   Atelectasis is seen in both lower  lobes and the right middle lobe. Diffuse interlobular bilateral septal thickening also noted.  incidentally noted to have  Degenerative changes of  the spine as well as the bilateral shoulders. Also noted Enlargement of the pulmonary artery, may reflect pulmonary arterial   hypertension.    cardio eval requested pt seen by dr SAÚL Cunningham, not felt to have acute chf advised continuing oral lasix.     Evaluated by physical therapy and they recommended no PT needs. 89 year old female with PMH of chronic diastolic CHF (EF 65%, echo July 2017), HTN, anemia presented with cough and congestion severe sob x 1 week. Patient reported cough started one week prior to admission initially  with clear mucus production that subsequently became more productive with yellow sputum. Denied pleuritic chest pain, fever, palpitations, abdominal pain, dizziness, n/v/d/c, changes in vision.     In the ED /74, HR 61, T 97.9F, O2 95% on RA, RR 17. WBC was 11.8, Hg 9.9, Na 132, lactate 0.7, Trop 0.046, proBNP 7832, CXR showed mild vascular congestion, small left pleural effusion, possible focal infiltrate of right base. , RVP + for coronavirus with RLL infiltrate consistent with likely  viral pneumonia   Blood cultures collected. EKG showing SR. Cardiology (Dr. Hagen) and ID (dr. rivera). Given iv rocephin and zmax in er.     Patient was admitted to general medical floor and continued on iv rocephin. Pulm consult requested pt seen by dr pinno. Blood cultures showed no growth. Id consult also requested pt seen by dr rivera advised continuing antibiotics as above.     Hospital course complicated on 12-4 with tachycardia and hypotension. pt without complaints cp sob at time. cta chest done, no pe, cardiomegaly  Retained secretions noted  in the proximal bronchi with Diffuse bronchial wall thickening consistent with infection/inflammatory bonchitis.  small  bilateral pleural effusions, dependent as well as mild perihilar opacities. Patchy ground glass  opacities seen in the left upper lobe likely infective, superimposed evidence mild to moderate pulmonary edema.   Atelectasis is seen in both lower  lobes and the right middle lobe. Diffuse interlobular bilateral septal thickening also noted. incidentally noted to have  Degenerative changes of  the spine as well as the bilateral shoulders. Also noted Enlargement of the pulmonary artery, may reflect pulmonary arterial hypertension.    cardio eval requested pt seen by dr SAÚL Cunningham, not felt to have acute chf advised continuing oral lasix.     Evaluated by physical therapy and they recommended no PT needs. 89 year old female with PMH of chronic diastolic CHF (EF 65%, echo July 2017), HTN, anemia presented with cough and congestion severe sob x 1 week. Patient reported cough started one week prior to admission initially  with clear mucus production that subsequently became more productive with yellow sputum. Denied pleuritic chest pain, fever, palpitations, abdominal pain, dizziness, n/v/d/c, changes in vision.     In the ED /74, HR 61, T 97.9F, O2 95% on RA, RR 17. WBC was 11.8, Hg 9.9, Na 132, lactate 0.7, Trop 0.046, proBNP 7832, CXR showed mild vascular congestion, small left pleural effusion, possible focal infiltrate of right base. , RVP + for coronavirus with RLL infiltrate consistent with likely  viral pneumonia   Blood cultures collected. EKG showing SR. Cardiology (Dr. Hagen) and ID (dr. rivera). Given iv rocephin and zmax in er.     Patient was admitted to general medical floor and continued on iv rocephin. Pulm consult requested pt seen by dr pinon. Blood cultures showed no growth. Id consult also requested pt seen by dr rivera advised continuing antibiotics as above.     Hospital course complicated on 12-4 with tachycardia and hypotension. pt without complaints cp sob at time. cta chest done, no pe, cardiomegaly  Retained secretions noted  in the proximal bronchi with Diffuse bronchial wall thickening consistent with infection/inflammatory bonchitis.  small  bilateral pleural effusions, dependent as well as mild perihilar opacities. Patchy ground glass  opacities seen in the left upper lobe likely infective, superimposed evidence mild to moderate pulmonary edema.   Atelectasis is seen in both lower  lobes and the right middle lobe. Diffuse interlobular bilateral septal thickening also noted. incidentally noted to have  Degenerative changes of  the spine as well as the bilateral shoulders. Also noted Enlargement of the pulmonary artery, may reflect pulmonary arterial hypertension.    cardio eval requested pt seen by dr SAÚL Cunningham, not felt to have acute chf advised continuing oral lasix. pt was noted to have leukocytosis on cbc without fever, id dr rivera advised continuing antibiotics as above. follow up cbc advised as out pt with dr weil.     Evaluated by physical therapy and they recommended no PT needs. after improvement in condition pt was discharged home. advised to follow up out pt with doctors as above. 89 year old female with PMH of chronic diastolic CHF (EF 65%, echo July 2017), HTN, anemia presented with cough and congestion severe sob x 1 week. Patient reported cough started one week prior to admission initially  with clear mucus production that subsequently became more productive with yellow sputum. Denied pleuritic chest pain, fever, palpitations, abdominal pain, dizziness, n/v/d/c, changes in vision.     In the ED /74, HR 61, T 97.9F, O2 95% on RA, RR 17. WBC was 11.8, Hg 9.9, Na 132, lactate 0.7, Trop 0.046, proBNP 7832, CXR showed mild vascular congestion, small left pleural effusion, possible focal infiltrate of right base. , RVP + for coronavirus with RLL infiltrate consistent with likely  viral pneumonia   Blood cultures collected. EKG showing SR. Cardiology (Dr. Hagen) and ID (dr. rivera). Given iv rocephin and zmax in er.     Patient was admitted to general medical floor and continued on iv rocephin. Pulm consult requested pt seen by dr pinon. Blood cultures showed no growth. Id consult also requested pt seen by dr rivera advised continuing antibiotics as above.     Hospital course complicated with tachycardia and hypotension. Pt without complaints cp sob at time. CTA chest done, no pe, cardiomegaly.  Retained secretions noted  in the proximal bronchi with diffuse bronchial wall thickening consistent with infection/inflammatory bronchitis, small  bilateral pleural effusions, dependent as well as mild perihilar opacities. Patchy ground glass  opacities seen in the left upper lobe likely infective, superimposed evidence mild to moderate pulmonary edema.   Atelectasis is seen in both lower  lobes and the right middle lobe. Diffuse interlobular bilateral septal thickening also noted. Incidentally noted to have  Degenerative changes of  the spine as well as the bilateral shoulders. Also noted Enlargement of the pulmonary artery, may reflect pulmonary arterial hypertension.    Cardio eval requested, pt seen by dr SAÚL Cunningham, not felt to have acute chf advised continuing oral lasix. pt was noted to have leukocytosis on cbc without fever, id dr rivera advised continuing antibiotics as above. follow up cbc advised as out pt with dr weil.     Evaluated by physical therapy and they recommended no PT needs. After improvement in condition pt was discharged home. Advised to follow up out pt with doctors as above. 89 year old female with PMH of chronic diastolic CHF (EF 65%, echo July 2017), HTN, anemia presented with cough and congestion severe sob x 1 week. Patient reported cough started one week prior to admission initially  with clear mucus production that subsequently became more productive with yellow sputum. Denied pleuritic chest pain, fever, palpitations, abdominal pain, dizziness, n/v/d/c, changes in vision.     In the ED /74, HR 61, T 97.9F, O2 95% on RA, RR 17. WBC was 11.8, Hg 9.9, Na 132, lactate 0.7, Trop 0.046, proBNP 7832, CXR showed mild vascular congestion, small left pleural effusion, possible focal infiltrate of right base. , RVP + for coronavirus with RLL infiltrate consistent with likely  viral pneumonia   Blood cultures collected. EKG showing SR. Cardiology (Dr. Hagen) and ID (dr. rivera). Given iv rocephin and zmax in er.     Patient was admitted to general medical floor and continued on iv rocephin. Pulm consult requested pt seen by dr pinon. Blood cultures showed no growth. Id consult also requested pt seen by dr rivera advised continuing antibiotics as above.     Hospital course complicated with tachycardia and hypotension. Pt without complaints cp sob at time. CTA chest done, no pe, cardiomegaly.  Retained secretions noted  in the proximal bronchi with diffuse bronchial wall thickening consistent with infection/inflammatory bronchitis, small  bilateral pleural effusions, dependent as well as mild perihilar opacities. Patchy ground glass  opacities seen in the left upper lobe likely infective, superimposed evidence mild to moderate pulmonary edema.   Atelectasis is seen in both lower  lobes and the right middle lobe. Diffuse interlobular bilateral septal thickening also noted. Incidentally noted to have  Degenerative changes of  the spine as well as the bilateral shoulders. Also noted Enlargement of the pulmonary artery, may reflect pulmonary arterial hypertension.    Cardio eval requested, pt seen by dr SAÚL Cunningham, not felt to have acute chf advised continuing oral lasix. pt was noted to have leukocytosis on cbc without fever, id dr rivera advised stopping abx b/c WBC was reactive due to solumedrol patient received in ER. Clinically patient has improved.    Evaluated by physical therapy and they recommended no PT needs. After improvement in condition pt was discharged home. Advised to follow up out pt with doctors as above. 89 year old female with PMH of chronic diastolic CHF (EF 65%, echo July 2017), HTN, anemia presented with cough and congestion severe sob x 1 week. Patient reported cough started one week prior to admission initially  with clear mucus production that subsequently became more productive with yellow sputum. Denied pleuritic chest pain, fever, palpitations, abdominal pain, dizziness, n/v/d/c, changes in vision.     In the ED /74, HR 61, T 97.9F, O2 95% on RA, RR 17. WBC was 11.8, Hg 9.9, Na 132, lactate 0.7, Trop 0.046, proBNP 7832, CXR showed mild vascular congestion, small left pleural effusion, possible focal infiltrate of right base. , RVP + for coronavirus with RLL infiltrate consistent with likely  viral pneumonia   Blood cultures collected. EKG showing SR. Cardiology (Dr. Hagen) and ID (dr. rivera). Given iv rocephin and zmax in er.     Patient was admitted to general medical floor and continued on iv rocephin. Pulm consult requested pt seen by dr pinon. Blood cultures showed no growth. Id consult also requested pt seen by dr rivera advised continuing antibiotics as above.     Patient was officially discharged on 1/11/18 but daughter refused to take patient home until 1/13/18.    Hospital course complicated with tachycardia and hypotension. Pt without complaints cp sob at time. CTA chest done, no pe, cardiomegaly.  Retained secretions noted  in the proximal bronchi with diffuse bronchial wall thickening consistent with infection/inflammatory bronchitis, small  bilateral pleural effusions, dependent as well as mild perihilar opacities. Patchy ground glass  opacities seen in the left upper lobe likely infective, superimposed evidence mild to moderate pulmonary edema.   Atelectasis is seen in both lower  lobes and the right middle lobe. Diffuse interlobular bilateral septal thickening also noted. Incidentally noted to have  Degenerative changes of  the spine as well as the bilateral shoulders. Also noted Enlargement of the pulmonary artery, may reflect pulmonary arterial hypertension.    Cardio eval requested, pt seen by dr SAÚL Cunningham, not felt to have acute chf advised continuing oral lasix. pt was noted to have leukocytosis on cbc without fever, id dr rivera advised stopping abx b/c WBC was reactive due to solumedrol patient received in ER. Clinically patient has improved.    Evaluated by physical therapy and they recommended no PT needs. After improvement in condition pt was discharged home. Advised to follow up out pt with doctors as above.

## 2018-01-05 NOTE — DISCHARGE NOTE ADULT - OTHER SIGNIFICANT FINDINGS
Patient seen and examined at bedside    REVIEW OF SYSTEMS:  CONSTITUTIONAL: No fever, No chills,No fatigue,No myalgia,No Body ache  EYES: No eye pain, visual disturbances, or discharge  ENMT:  No ear pain, No nose bleed, No vertigo; No sinus or throat pain  NECK: No pain, No stiffness  RESPIRATORY: No cough, wheezing, No  hemoptysis; No shortness of breath  CARDIOVASCULAR: No chest pain, palpitations, leg swelling  GASTROINTESTINAL: No abdominal or epigastric pain. No nausea, No vomiting; No diarrhea or constipation. [ ] BM  GENITOURINARY: No dysuria, No frequency, No urgency, No hematuria, or incontinence  NEUROLOGICAL: alert and oriented x 3,  No headaches, No dizziness, No numbness,  SKIN:   No itching, burning, rashes, or lesions   MUSCULOSKELETAL: No joint pain or swelling; No muscle pain, No back pain, No extremity pain  PSYCHIATRIC: No depression, anxiety, mood swings, or difficulty sleeping  ROS  [ ] Unable to obtain   REST OF REVIEW Of SYSTEM - [ ] Normal     Height (cm): 157.48 (01-02 @ 08:17)  Weight (kg): 52.2 (01-02 @ 08:17)  BMI (kg/m2): 21 (01-02 @ 08:17)  BSA (m2): 1.51 (01-02 @ 08:17)  Vital Signs Last 24 Hrs  T(C): 36.9 (08 Jan 2018 08:15), Max: 37 (08 Jan 2018 04:57)  T(F): 98.5 (08 Jan 2018 08:15), Max: 98.6 (08 Jan 2018 04:57)  HR: 60 (08 Jan 2018 08:15) (60 - 90)  BP: 135/56 (08 Jan 2018 08:15) (135/56 - 174/74)  RR: 18 (08 Jan 2018 08:15) (16 - 19)  SpO2: 96% (08 Jan 2018 08:15) (95% - 99%)    PHYSICAL EXAM:  GENERAL:  No acute distresss, elderly female sitting up in chair  HEAD:  normal  ENMT: moist mucous membranes, clear conjuntiva  NECK:  normal  NERVOUS SYSTEM:  Alert & Oriented X3, no focal deficits, sensation intact  CHEST/LUNG: Clear to auscultation bilaterally, Decreased breath sounds in left base, RRR, no murmurs  ABDOMEN:  soft, nontender, nondistended  EXTREMITIES: No clubbing, cyanosis or edema  SKIN: warm and dry    LABS:  Ca    9.4        07 Jan 2018 08:13    Cultures  Culture Results:   No growth at 5 days. (01-02 @ 12:02)  Culture Results:   No growth at 5 days. (01-02 @ 12:02) Patient seen and examined at bedside. Patient has no new complaints.    REVIEW OF SYSTEMS:  CONSTITUTIONAL: No fever, No chills,No fatigue,No myalgia,No Body ache  EYES: No eye pain, visual disturbances, or discharge  ENMT:  No ear pain, No nose bleed, No vertigo; No sinus or throat pain  NECK: No pain, No stiffness  RESPIRATORY: No cough, wheezing, No  hemoptysis; No shortness of breath  CARDIOVASCULAR: No chest pain, palpitations, leg swelling  GASTROINTESTINAL: No abdominal or epigastric pain. No nausea, No vomiting; No diarrhea or constipation. [ ] BM  GENITOURINARY: No dysuria, No frequency, No urgency, No hematuria, or incontinence  NEUROLOGICAL: alert and oriented x 3,  No headaches, No dizziness, No numbness,  SKIN:   No itching, burning, rashes, or lesions   MUSCULOSKELETAL: No joint pain or swelling; No muscle pain, No back pain, No extremity pain    ICU Vital Signs Last 24 Hrs  T(C): 36.8 (09 Jan 2018 04:01), Max: 36.9 (08 Jan 2018 08:15)  T(F): 98.2 (09 Jan 2018 04:01), Max: 98.5 (08 Jan 2018 08:15)  HR: 67 (09 Jan 2018 04:01) (60 - 89)  BP: 158/69 (09 Jan 2018 04:01) (135/56 - 180/63)  RR: 17 (09 Jan 2018 04:01) (16 - 20)  SpO2: 95% (09 Jan 2018 05:21) (88% - 96%)    PHYSICAL EXAM:  GENERAL:  No acute distresss, elderly female sitting up in chair  HEAD:  normal  ENMT: moist mucous membranes, clear conjunctiva  NECK:  normal  NERVOUS SYSTEM:  Alert & Oriented X3, no focal deficits, sensation intact  CHEST/LUNG: Clear to auscultation bilaterally, Decreased breath sounds in left base, RRR, no murmurs  ABDOMEN:  soft, nontender, nondistended  EXTREMITIES: No clubbing, cyanosis or edema  SKIN: warm and dry    LABS:  Ca    9.4        07 Jan 2018 08:13    Cultures  Culture Results:   No growth at 5 days. (01-02 @ 12:02)  Culture Results:   No growth at 5 days. (01-02 @ 12:02)

## 2018-01-05 NOTE — PROGRESS NOTE ADULT - SUBJECTIVE AND OBJECTIVE BOX
[facility]    Christofer Isaacs, Sorin, Marisa, Jr, Hieu, Sandro      [phone]    CHIEF COMPLAINT: Patient is a 89y old  Female who presents with a chief complaint of coughing (02 Jan 2018 21:02)      Follow Up: The patient reports no new symptoms.  Denies chest discomfort and shortness of breath.  No abdominal pain.  No new neurologic symptoms.      Interim history:    MEDICATIONS  (STANDING):  ALBUTerol/ipratropium for Nebulization 3 milliLiter(s) Nebulizer every 8 hours  carvedilol 12.5 milliGRAM(s) Oral two times a day  cefTRIAXone   IVPB 1 Gram(s) IV Intermittent every 24 hours  cinacalcet 30 milliGRAM(s) Oral daily  doxazosin 4 milliGRAM(s) Oral at bedtime  ferrous    sulfate 325 milliGRAM(s) Oral daily  furosemide    Tablet 60 milliGRAM(s) Oral daily  heparin  Injectable 5000 Unit(s) SubCutaneous every 12 hours  hydrALAZINE 75 milliGRAM(s) Oral three times a day  lactobacillus acidophilus 1 Tablet(s) Oral two times a day with meals    MEDICATIONS  (PRN):  acetaminophen   Tablet 650 milliGRAM(s) Oral every 6 hours PRN For Temp greater than 38 C (100.4 F)      REVIEW OF SYSTEMS:  eye, ent, GI, , allergic, dermatologic, musculoskeletal and neurologic are negative except as described above    Vital Signs Last 24 Hrs  T(C): 36.4 (05 Jan 2018 13:24), Max: 37 (04 Jan 2018 22:44)  T(F): 97.5 (05 Jan 2018 13:24), Max: 98.6 (04 Jan 2018 22:44)  HR: 65 (05 Jan 2018 13:24) (60 - 132)  BP: 165/73 (05 Jan 2018 13:24) (94/48 - 178/76)  BP(mean): --  RR: 18 (05 Jan 2018 13:24) (17 - 19)  SpO2: 96% (05 Jan 2018 13:24) (94% - 98%)    I&O's Summary    04 Jan 2018 07:01  -  05 Jan 2018 07:00  --------------------------------------------------------  IN: 490 mL / OUT: 0 mL / NET: 490 mL    05 Jan 2018 07:01  -  05 Jan 2018 14:51  --------------------------------------------------------  IN: 300 mL / OUT: 120 mL / NET: 180 mL        Telemetry past 24h:sr, no further svt    PHYSICAL EXAM:    Constitutional: well-nourished, well-developed, NAD   HEENT:  MMM, sclerae anicteric, conjunctivae clear, no oral cyanosis.  Pulmonary: Non-labored, breath sounds are clear bilaterally, No wheezing, rales or rhonchi  Cardiovascular: Regular, S1 and S2.  No murmur.  No rubs, gallops or clicks  Gastrointestinal: Bowel Sounds present, soft, nontender.   Lymph: No peripheral edema.   Neurological: Alert, no focal deficits  Skin: No rashes.  Psych:  Mood & affect appropriate    LABS: All Labs Reviewed:                        10.4   14.2  )-----------( 349      ( 05 Jan 2018 06:47 )             31.4                         10.3   12.8  )-----------( 296      ( 04 Jan 2018 23:50 )             30.8                         9.7    15.7  )-----------( 314      ( 04 Jan 2018 07:52 )             29.2     05 Jan 2018 06:47    136    |  98     |  45     ----------------------------<  91     3.9     |  29     |  1.10   04 Jan 2018 23:50    135    |  98     |  52     ----------------------------<  97     3.9     |  27     |  1.20   04 Jan 2018 07:52    138    |  101    |  48     ----------------------------<  87     3.8     |  27     |  0.91     Ca    9.4        05 Jan 2018 06:47  Ca    9.5        04 Jan 2018 23:50  Ca    9.6        04 Jan 2018 07:52  Phos  3.3       05 Jan 2018 00:37  Mg     1.7       05 Jan 2018 00:37        CARDIAC MARKERS ( 05 Jan 2018 10:34 )  .031 ng/mL / x     / 34 U/L / x     / 2.0 ng/mL  CARDIAC MARKERS ( 05 Jan 2018 03:50 )  .044 ng/mL / x     / 36 U/L / x     / 2.3 ng/mL  CARDIAC MARKERS ( 04 Jan 2018 23:50 )  .029 ng/mL / x     / 41 U/L / x     / 2.5 ng/mL      Blood Culture: Organism --  Gram Stain Blood -- Gram Stain --  Specimen Source .Blood Blood-Peripheral  Culture-Blood --            RADIOLOGY:    EKG:    Echo:

## 2018-01-05 NOTE — DISCHARGE NOTE ADULT - CARE PROVIDERS DIRECT ADDRESSES
[email or web address]

## 2018-01-05 NOTE — DISCHARGE NOTE ADULT - MEDICATION SUMMARY - MEDICATIONS TO TAKE
I will START or STAY ON the medications listed below when I get home from the hospital:    doxazosin 4 mg oral tablet  -- 1 tab(s) by mouth once a day (at bedtime)  -- Indication: For Hypertension    carvedilol 25 mg oral tablet  -- 1 tab(s) by mouth every 12 hours  -- Indication: For Hypertension    Sensipar 30 mg oral tablet  -- 1 tab(s) by mouth once a day  -- Indication: For Hypercalcemia    furosemide 20 mg oral tablet  -- 3 tab(s) by mouth once a day  -- Indication: For Diastolic CHF    potassium chloride 10 mEq oral capsule, extended release  -- 1 cap(s) by mouth once a day  -- Indication: For Supplement    hydrALAZINE 50 mg oral tablet  -- 1 tab(s) by mouth every 8 hours  hold sbp less than 120   -- Indication: For Hypertension I will START or STAY ON the medications listed below when I get home from the hospital:    doxazosin 4 mg oral tablet  -- 1 tab(s) by mouth once a day (at bedtime)  -- Indication: For Hypertension    carvedilol 12.5 mg oral tablet  -- 1 tab(s) by mouth every 12 hours  -- Indication: For Hypertension    Sensipar 30 mg oral tablet  -- 1 tab(s) by mouth once a day  -- Indication: For Hypercalcemia    furosemide 20 mg oral tablet  -- 3 tab(s) by mouth once a day  -- Indication: For Diastolic CHF    potassium chloride 10 mEq oral capsule, extended release  -- 1 cap(s) by mouth once a day  -- Indication: For Supplement    hydrALAZINE 50 mg oral tablet  -- 1 tab(s) by mouth every 8 hours  hold sbp less than 120   -- Indication: For Hypertension I will START or STAY ON the medications listed below when I get home from the hospital:    doxazosin 4 mg oral tablet  -- 1 tab(s) by mouth once a day (at bedtime)  -- Indication: For Hypertension    carvedilol 12.5 mg oral tablet  -- 1 tab(s) by mouth every 12 hours  -- Indication: For Hypertension    Sensipar 30 mg oral tablet  -- 1 tab(s) by mouth once a day  -- Indication: For Hypercalcemia    furosemide 20 mg oral tablet  -- 3 tab(s) by mouth once a day  -- Indication: For Diastolic CHF    potassium chloride 10 mEq oral capsule, extended release  -- 1 cap(s) by mouth once a day  -- Indication: For Supplement    hydrALAZINE 25 mg oral tablet  -- 3 tab(s) by mouth every 8 hours  -- Indication: For Hypertension

## 2018-01-05 NOTE — DISCHARGE NOTE ADULT - CARE PROVIDER_API CALL
Frankie Rowell), Cardiovascular Disease  [address] [place] [address]  Phone: [phone]  Fax: [phone]    Weil, Peter A (MD), Critical Care Medicine; Internal Medicine; Pulmonary Disease  [address] [place] [address]  Phone: [phone]  Fax: [phone]

## 2018-01-05 NOTE — DISCHARGE NOTE ADULT - CARE PLAN
Principal Discharge DX:	Pneumonia of right lower lobe due to infectious organism  Secondary Diagnosis:	Diastolic CHF  Secondary Diagnosis:	Anemia, unspecified type  Secondary Diagnosis:	Hypertension  Secondary Diagnosis:	Hypercalcemia Principal Discharge DX:	Pneumonia of right lower lobe due to infectious organism  Goal:	Resolution of Symtoms  Instructions for follow-up, activity and diet:	Follow up with primary care doctor  Secondary Diagnosis:	Diastolic CHF  Instructions for follow-up, activity and diet:	Continue cardiac medications  Follow up with Cardiology  Secondary Diagnosis:	Anemia, unspecified type  Instructions for follow-up, activity and diet:	Follow up with primary care doctor  Secondary Diagnosis:	Hypertension  Instructions for follow-up, activity and diet:	Continue medications as before  Secondary Diagnosis:	Hypercalcemia  Instructions for follow-up, activity and diet:	Follow up with primary care doctor Principal Discharge DX:	Pneumonia of right lower lobe due to infectious organism  Goal:	Resolution of Symtoms  Instructions for follow-up, activity and diet:	Follow up with primary care doctor  Secondary Diagnosis:	Diastolic CHF  Instructions for follow-up, activity and diet:	diet and meds as above  Secondary Diagnosis:	Anemia, unspecified type  Instructions for follow-up, activity and diet:	Follow up with primary care doctor  Secondary Diagnosis:	Hypertension  Instructions for follow-up, activity and diet:	note changes in doses and new blood pressure meds added as above.  Secondary Diagnosis:	Hypercalcemia  Instructions for follow-up, activity and diet:	meds as above Principal Discharge DX:	Pneumonia of right lower lobe due to infectious organism  Goal:	Resolution of Symptoms  Instructions for follow-up, activity and diet:	Follow up with primary care doctor  Secondary Diagnosis:	Diastolic CHF  Instructions for follow-up, activity and diet:	diet and meds as above  Secondary Diagnosis:	Anemia, unspecified type  Instructions for follow-up, activity and diet:	Follow up with primary care doctor  Secondary Diagnosis:	Hypertension  Instructions for follow-up, activity and diet:	note changes in doses and new blood pressure meds added as above.  Secondary Diagnosis:	Hypercalcemia  Instructions for follow-up, activity and diet:	meds as above Principal Discharge DX:	Pneumonia of right lower lobe due to infectious organism  Goal:	Resolved  Instructions for follow-up, activity and diet:	Follow up with primary care doctor  Secondary Diagnosis:	Diastolic CHF  Instructions for follow-up, activity and diet:	diet and meds as above  Secondary Diagnosis:	Anemia, unspecified type  Instructions for follow-up, activity and diet:	Follow up with primary care doctor  Secondary Diagnosis:	Hypertension  Instructions for follow-up, activity and diet:	Continue current medications  Follow-up with your primary care doctor within 1 week.  Secondary Diagnosis:	Hypercalcemia  Instructions for follow-up, activity and diet:	meds as above

## 2018-01-05 NOTE — PROGRESS NOTE ADULT - SUBJECTIVE AND OBJECTIVE BOX
Date/Time Patient Seen:  		  Referring MD:   Data Reviewed	       Patient is a 89y old  Female who presents with a chief complaint of coughing (02 Jan 2018 21:02)  in bed  seen and examined  vs and meds reviewed  overnight events noted  CTA chest noted    Subjective/HPI     PAST MEDICAL & SURGICAL HISTORY:  Anemia, unspecified type  Edema of lower extremity  Heart murmur  Hypertension  Hypercalcemia  History of appendectomy  S/P tonsillectomy  H/O parathyroidectomy        Medication list         MEDICATIONS  (STANDING):  ALBUTerol/ipratropium for Nebulization 3 milliLiter(s) Nebulizer every 8 hours  carvedilol 12.5 milliGRAM(s) Oral two times a day  cefTRIAXone   IVPB 1 Gram(s) IV Intermittent every 24 hours  cinacalcet 30 milliGRAM(s) Oral daily  doxazosin 4 milliGRAM(s) Oral at bedtime  ferrous    sulfate 325 milliGRAM(s) Oral daily  furosemide    Tablet 60 milliGRAM(s) Oral daily  heparin  Injectable 5000 Unit(s) SubCutaneous every 12 hours  hydrALAZINE 75 milliGRAM(s) Oral three times a day  lactobacillus acidophilus 1 Tablet(s) Oral two times a day with meals  potassium chloride    Tablet ER 10 milliEquivalent(s) Oral daily    MEDICATIONS  (PRN):  acetaminophen   Tablet 650 milliGRAM(s) Oral every 6 hours PRN For Temp greater than 38 C (100.4 F)         Vitals log        ICU Vital Signs Last 24 Hrs  T(C): 36.9 (05 Jan 2018 07:41), Max: 37 (04 Jan 2018 22:44)  T(F): 98.4 (05 Jan 2018 07:41), Max: 98.6 (04 Jan 2018 22:44)  HR: 60 (05 Jan 2018 07:46) (60 - 132)  BP: 129/64 (05 Jan 2018 07:41) (94/48 - 178/76)  BP(mean): --  ABP: --  ABP(mean): --  RR: 19 (05 Jan 2018 07:41) (17 - 19)  SpO2: 95% (05 Jan 2018 07:41) (94% - 98%)           Input and Output:  I&O's Detail    04 Jan 2018 07:01  -  05 Jan 2018 07:00  --------------------------------------------------------  IN:    Oral Fluid: 240 mL    Sodium Chloride 0.9% IV Bolus: 250 mL  Total IN: 490 mL    OUT:  Total OUT: 0 mL    Total NET: 490 mL          Lab Data                        10.4   14.2  )-----------( 349      ( 05 Jan 2018 06:47 )             31.4     01-05    136  |  98  |  45<H>  ----------------------------<  91  3.9   |  29  |  1.10    Ca    9.4      05 Jan 2018 06:47  Phos  3.3     01-05  Mg     1.7     01-05        CARDIAC MARKERS ( 05 Jan 2018 03:50 )  .044 ng/mL / x     / 36 U/L / x     / 2.3 ng/mL  CARDIAC MARKERS ( 04 Jan 2018 23:50 )  .029 ng/mL / x     / 41 U/L / x     / 2.5 ng/mL        Review of Systems	      Objective     Physical Examination    head at  heart s1s2  lungs dec BS  abd soft      Pertinent Lab findings & Imaging      Richard:  NO   Adequate UO     I&O's Detail    04 Jan 2018 07:01  -  05 Jan 2018 07:00  --------------------------------------------------------  IN:    Oral Fluid: 240 mL    Sodium Chloride 0.9% IV Bolus: 250 mL  Total IN: 490 mL    OUT:  Total OUT: 0 mL    Total NET: 490 mL               Discussed with:     Cultures:	        Radiology

## 2018-01-06 LAB
ANION GAP SERPL CALC-SCNC: 9 MMOL/L — SIGNIFICANT CHANGE UP (ref 5–17)
BUN SERPL-MCNC: 40 MG/DL — HIGH (ref 7–23)
CALCIUM SERPL-MCNC: 9.5 MG/DL — SIGNIFICANT CHANGE UP (ref 8.5–10.1)
CHLORIDE SERPL-SCNC: 99 MMOL/L — SIGNIFICANT CHANGE UP (ref 96–108)
CO2 SERPL-SCNC: 29 MMOL/L — SIGNIFICANT CHANGE UP (ref 22–31)
CREAT SERPL-MCNC: 0.91 MG/DL — SIGNIFICANT CHANGE UP (ref 0.5–1.3)
GLUCOSE SERPL-MCNC: 99 MG/DL — SIGNIFICANT CHANGE UP (ref 70–99)
HCT VFR BLD CALC: 32.2 % — LOW (ref 34.5–45)
HGB BLD-MCNC: 10.5 G/DL — LOW (ref 11.5–15.5)
MCHC RBC-ENTMCNC: 28.7 PG — SIGNIFICANT CHANGE UP (ref 27–34)
MCHC RBC-ENTMCNC: 32.7 GM/DL — SIGNIFICANT CHANGE UP (ref 32–36)
MCV RBC AUTO: 87.7 FL — SIGNIFICANT CHANGE UP (ref 80–100)
NT-PROBNP SERPL-SCNC: 3074 PG/ML — HIGH (ref 0–450)
PLATELET # BLD AUTO: 346 K/UL — SIGNIFICANT CHANGE UP (ref 150–400)
POTASSIUM SERPL-MCNC: 4.2 MMOL/L — SIGNIFICANT CHANGE UP (ref 3.5–5.3)
POTASSIUM SERPL-SCNC: 4.2 MMOL/L — SIGNIFICANT CHANGE UP (ref 3.5–5.3)
RBC # BLD: 3.67 M/UL — LOW (ref 3.8–5.2)
RBC # FLD: 13.2 % — SIGNIFICANT CHANGE UP (ref 10.3–14.5)
SODIUM SERPL-SCNC: 137 MMOL/L — SIGNIFICANT CHANGE UP (ref 135–145)
WBC # BLD: 14.7 K/UL — HIGH (ref 3.8–10.5)
WBC # FLD AUTO: 14.7 K/UL — HIGH (ref 3.8–10.5)

## 2018-01-06 PROCEDURE — 99233 SBSQ HOSP IP/OBS HIGH 50: CPT

## 2018-01-06 RX ORDER — CARVEDILOL PHOSPHATE 80 MG/1
25 CAPSULE, EXTENDED RELEASE ORAL EVERY 12 HOURS
Qty: 0 | Refills: 0 | Status: DISCONTINUED | OUTPATIENT
Start: 2018-01-06 | End: 2018-01-12

## 2018-01-06 RX ORDER — HYDRALAZINE HCL 50 MG
50 TABLET ORAL EVERY 8 HOURS
Qty: 0 | Refills: 0 | Status: DISCONTINUED | OUTPATIENT
Start: 2018-01-06 | End: 2018-01-07

## 2018-01-06 RX ADMIN — Medication 4 MILLIGRAM(S): at 21:21

## 2018-01-06 RX ADMIN — Medication 1 TABLET(S): at 17:24

## 2018-01-06 RX ADMIN — Medication 3 MILLILITER(S): at 14:52

## 2018-01-06 RX ADMIN — CARVEDILOL PHOSPHATE 25 MILLIGRAM(S): 80 CAPSULE, EXTENDED RELEASE ORAL at 17:24

## 2018-01-06 RX ADMIN — Medication 50 MILLIGRAM(S): at 21:21

## 2018-01-06 RX ADMIN — HEPARIN SODIUM 5000 UNIT(S): 5000 INJECTION INTRAVENOUS; SUBCUTANEOUS at 06:24

## 2018-01-06 RX ADMIN — Medication 325 MILLIGRAM(S): at 12:08

## 2018-01-06 RX ADMIN — Medication 60 MILLIGRAM(S): at 06:24

## 2018-01-06 RX ADMIN — HEPARIN SODIUM 5000 UNIT(S): 5000 INJECTION INTRAVENOUS; SUBCUTANEOUS at 17:24

## 2018-01-06 RX ADMIN — Medication 50 MILLIGRAM(S): at 14:09

## 2018-01-06 RX ADMIN — CEFTRIAXONE 100 GRAM(S): 500 INJECTION, POWDER, FOR SOLUTION INTRAMUSCULAR; INTRAVENOUS at 10:27

## 2018-01-06 RX ADMIN — CINACALCET 30 MILLIGRAM(S): 30 TABLET, FILM COATED ORAL at 21:21

## 2018-01-06 RX ADMIN — CARVEDILOL PHOSPHATE 12.5 MILLIGRAM(S): 80 CAPSULE, EXTENDED RELEASE ORAL at 06:24

## 2018-01-06 RX ADMIN — Medication 75 MILLIGRAM(S): at 06:24

## 2018-01-06 NOTE — PROGRESS NOTE ADULT - ASSESSMENT
89 yo female admitted with cough productive of yellow sputum, +RVP for  HKU1 Coronavirus, decreased BS in right base and increased opac right base on CXR  CT chest with angio neg for pe

## 2018-01-06 NOTE — PROGRESS NOTE ADULT - PROBLEM SELECTOR PLAN 2
Likely 2/2 demand, no evidence of acute ischemic changes, EKG sr, preserved EF on last echo (July) no systolic dysfunction  -  2nd set negative as per cardio,  2/2 demand, no evidence of acute ischemic changes, EKG sr, preserved EF on last echo (July)   ce neg

## 2018-01-06 NOTE — PROGRESS NOTE ADULT - ASSESSMENT
89 yo F pmhx diastolic chf (EF 65%, echo July 2017), htn, anemia, p/w cough productive with yellow sputum and runny nose for 1 week. She is felt to have probable viral pneumonia but remains on antibiotic therapy. She is no evidence for volume overload on examination. Her blood pressure is improved today with increasing dose of hydralazine.  No clear evidence of acute ischemia.      -there is no evidence of acute ischemia.  -ce without evolution 1/4-1/5  -she has had episodic svt, most recently last night at 1800. will increase carvedilol to 25 bid, and reduce hydralazine to 50 tid in order to address this without provoking hypotension  -there is no evidence for meaningful  volume overload.  -cont po lasix  -creatinine continues to improve  - Continue antibiotics for rll pna  - Previous ECHO July 2017: EF 65%, nomal left ventricular systolic function, grade 1 diastolic dysfunction  - Monitor and replete lytes, keep K>4, Mg>2  - Other cardiovascular workup will depend on clinical course.

## 2018-01-06 NOTE — PROGRESS NOTE ADULT - SUBJECTIVE AND OBJECTIVE BOX
Patient is a 89y old  Female who presents with a chief complaint of coughing (05 Jan 2018 18:28)      INTERVAL HPI/OVERNIGHT EVENTS:    MEDICATIONS  (STANDING):  ALBUTerol/ipratropium for Nebulization 3 milliLiter(s) Nebulizer every 8 hours  carvedilol 25 milliGRAM(s) Oral every 12 hours  cefTRIAXone   IVPB 1 Gram(s) IV Intermittent every 24 hours  cinacalcet 30 milliGRAM(s) Oral daily  doxazosin 4 milliGRAM(s) Oral at bedtime  ferrous    sulfate 325 milliGRAM(s) Oral daily  furosemide    Tablet 60 milliGRAM(s) Oral daily  heparin  Injectable 5000 Unit(s) SubCutaneous every 12 hours  hydrALAZINE 50 milliGRAM(s) Oral every 8 hours  lactobacillus acidophilus 1 Tablet(s) Oral two times a day with meals    MEDICATIONS  (PRN):  acetaminophen   Tablet 650 milliGRAM(s) Oral every 6 hours PRN For Temp greater than 38 C (100.4 F)      Allergies    Vasotec (Unknown)    Intolerances        REVIEW OF SYSTEMS:  CONSTITUTIONAL: No fever, No chills,No fatigue,No myalgia,No Body ache  EYES: No eye pain, visual disturbances, or discharge  ENMT:  No ear pain, No nose bleed, No vertigo; No sinus or throat pain  NECK: No pain, No stiffness  RESPIRATORY: No cough, wheezing, No  hemoptysis; No shortness of breath  CARDIOVASCULAR: No chest pain, palpitations, leg swelling  GASTROINTESTINAL: No abdominal or epigastric pain. No nausea, No vomiting; No diarrhea or constipation. [ ] BM  GENITOURINARY: No dysuria, No frequency, No urgency, No hematuria, or incontinence  NEUROLOGICAL: alert and oriented x 3,  No headaches, No dizziness, No numbness,  SKIN:   No itching, burning, rashes, or lesions   MUSCULOSKELETAL: No joint pain or swelling; No muscle pain, No back pain, No extremity pain  PSYCHIATRIC: No depression, anxiety, mood swings, or difficulty sleeping  ROS  [ ] Unable to obtain   REST OF REVIEW Of SYSTEM - [ ] Normal     Height (cm): 157.48 (01-02 @ 08:17)  Weight (kg): 52.2 (01-02 @ 08:17)  BMI (kg/m2): 21 (01-02 @ 08:17)  BSA (m2): 1.51 (01-02 @ 08:17)  Vital Signs Last 24 Hrs  T(C): 36.9 (06 Jan 2018 15:21), Max: 37.1 (06 Jan 2018 07:43)  T(F): 98.4 (06 Jan 2018 15:21), Max: 98.8 (06 Jan 2018 07:43)  HR: 68 (06 Jan 2018 15:21) (63 - 74)  BP: 162/72 (06 Jan 2018 15:21) (114/66 - 162/72)  BP(mean): --  RR: 18 (06 Jan 2018 15:21) (17 - 18)  SpO2: 95% (06 Jan 2018 15:21) (93% - 99%)  [ ] room air   [x ] 02    PHYSICAL EXAM:  GENERAL:  No acute distresss,  [ ] Agitated, [ ] Lethargy, [ ] confused   HEAD:  normal  ENMT: normal  NECK:  normal    NERVOUS SYSTEM:  Alert & Oriented X3, no focal deficits [ ]Confusion  [ ] Encephalopathic [ ] Sedated [ ] Other  CHEST/LUNG: Clear to auscultation bilaterally,  [ ] decreased breath sounds at bases  [ ] wheezing   [ ] rhonchi  [ ] crackles  HEART:  Regular rate and rhythm, No murmurs, rubs, or gallops,  [ ] irregular   ABDOMEN:  soft, nontender, nondistended, positive bowel sounds   [ ] obese  EXTREMITIES: No clubbing, cyanosis or edema  SKIN: [ ] venous stasis skin changes    LABS:                        10.5   14.7  )-----------( 346      ( 06 Jan 2018 07:29 )             32.2     06 Jan 2018 07:29    137    |  99     |  40     ----------------------------<  99     4.2     |  29     |  0.91     Ca    9.5        06 Jan 2018 07:29            CAPILLARY BLOOD GLUCOSE        Cultures  Culture Results:   No growth to date. (01-02 @ 12:02)  Culture Results:   No growth to date. (01-02 @ 12:02)          RADIOLOGY & ADDITIONAL TESTS:      Care Discussed with [X] Consultants  [ ] Patient  [ ] Family  [X]   /   [ ] Other; RN  DVT prophylaxis [ ] lovenox   [ x] subq heparin  [ ] coumadin  [ ] venodynes [ ] ambulating frequently at how risk for vte and no pharm         or  mechanical prophylaxis required    [ ] other   Advanced directive:    [x ]pt has hcp     [ ] pt declined to assign hcp  Discussed with pt @ bedside Patient is a 89y old  Female who presents with a chief complaint of coughing (05 Jan 2018 18:28)      INTERVAL HPI/OVERNIGHT EVENTS:    MEDICATIONS  (STANDING):  ALBUTerol/ipratropium for Nebulization 3 milliLiter(s) Nebulizer every 8 hours  carvedilol 25 milliGRAM(s) Oral every 12 hours  cefTRIAXone   IVPB 1 Gram(s) IV Intermittent every 24 hours  cinacalcet 30 milliGRAM(s) Oral daily  doxazosin 4 milliGRAM(s) Oral at bedtime  ferrous    sulfate 325 milliGRAM(s) Oral daily  furosemide    Tablet 60 milliGRAM(s) Oral daily  heparin  Injectable 5000 Unit(s) SubCutaneous every 12 hours  hydrALAZINE 50 milliGRAM(s) Oral every 8 hours  lactobacillus acidophilus 1 Tablet(s) Oral two times a day with meals    MEDICATIONS  (PRN):  acetaminophen   Tablet 650 milliGRAM(s) Oral every 6 hours PRN For Temp greater than 38 C (100.4 F)      Allergies    Vasotec (Unknown)    Intolerances        REVIEW OF SYSTEMS:  CONSTITUTIONAL: No fever, No chills,No fatigue,No myalgia,No Body ache  EYES: No eye pain, visual disturbances, or discharge  ENMT:  No ear pain, No nose bleed, No vertigo; No sinus or throat pain  NECK: No pain, No stiffness  RESPIRATORY: No cough, wheezing, No  hemoptysis; No shortness of breath  CARDIOVASCULAR: No chest pain, palpitations, leg swelling  GASTROINTESTINAL: No abdominal or epigastric pain. No nausea, No vomiting; No diarrhea or constipation. [ ] BM  GENITOURINARY: No dysuria, No frequency, No urgency, No hematuria, or incontinence  NEUROLOGICAL: alert and oriented x 3,  No headaches, No dizziness, No numbness,  SKIN:   No itching, burning, rashes, or lesions   MUSCULOSKELETAL: No joint pain or swelling; No muscle pain, No back pain, No extremity pain  PSYCHIATRIC: No depression, anxiety, mood swings, or difficulty sleeping  ROS  [ ] Unable to obtain   REST OF REVIEW Of SYSTEM - [ ] Normal     Height (cm): 157.48 (01-02 @ 08:17)  Weight (kg): 52.2 (01-02 @ 08:17)  BMI (kg/m2): 21 (01-02 @ 08:17)  BSA (m2): 1.51 (01-02 @ 08:17)  Vital Signs Last 24 Hrs  T(C): 36.9 (06 Jan 2018 15:21), Max: 37.1 (06 Jan 2018 07:43)  T(F): 98.4 (06 Jan 2018 15:21), Max: 98.8 (06 Jan 2018 07:43)  HR: 68 (06 Jan 2018 15:21) (63 - 74)  BP: 162/72 (06 Jan 2018 15:21) (114/66 - 162/72)  BP(mean): --  RR: 18 (06 Jan 2018 15:21) (17 - 18)  SpO2: 95% (06 Jan 2018 15:21) (93% - 99%)  [ ] room air   [x ] 02    PHYSICAL EXAM:  GENERAL:  No acute distresss,  [ ] Agitated, [ ] Lethargy, [ ] confused   HEAD:  normal  ENMT: normal  NECK:  normal    NERVOUS SYSTEM:  Alert & Oriented X3, no focal deficits [ ]Confusion  [ ] Encephalopathic [ ] Sedated [ ] Other  CHEST/LUNG: decreased breath sounds   HEART:  Regular rate and rhythm, No murmurs, rubs, or gallops,  [ ] irregular   ABDOMEN:  soft, nontender, nondistended, positive bowel sounds   [ ] obese  EXTREMITIES: No clubbing, cyanosis or edema  SKIN: [ ] venous stasis skin changes    LABS:                        10.5   14.7  )-----------( 346      ( 06 Jan 2018 07:29 )             32.2     06 Jan 2018 07:29    137    |  99     |  40     ----------------------------<  99     4.2     |  29     |  0.91     Ca    9.5        06 Jan 2018 07:29            CAPILLARY BLOOD GLUCOSE        Cultures  Culture Results:   No growth to date. (01-02 @ 12:02)  Culture Results:   No growth to date. (01-02 @ 12:02)          RADIOLOGY & ADDITIONAL TESTS:      Care Discussed with [X] Consultants  [ ] Patient  [ ] Family  [X]   /   [ ] Other; RN  DVT prophylaxis [ ] lovenox   [ x] subq heparin  [ ] coumadin  [ ] venodynes [ ] ambulating frequently at how risk for vte and no pharm         or  mechanical prophylaxis required    [ ] other   Advanced directive:    [x ]pt has hcp     [ ] pt declined to assign hcp  Discussed with pt @ bedside Patient is a 89y old  Female who presents with a chief complaint of coughing (05 Jan 2018 18:28)      INTERVAL HPI/OVERNIGHT EVENTS: pt with mild confusion. denies cp sob or cough.     MEDICATIONS  (STANDING):  ALBUTerol/ipratropium for Nebulization 3 milliLiter(s) Nebulizer every 8 hours  carvedilol 25 milliGRAM(s) Oral every 12 hours  cefTRIAXone   IVPB 1 Gram(s) IV Intermittent every 24 hours  cinacalcet 30 milliGRAM(s) Oral daily  doxazosin 4 milliGRAM(s) Oral at bedtime  ferrous    sulfate 325 milliGRAM(s) Oral daily  furosemide    Tablet 60 milliGRAM(s) Oral daily  heparin  Injectable 5000 Unit(s) SubCutaneous every 12 hours  hydrALAZINE 50 milliGRAM(s) Oral every 8 hours  lactobacillus acidophilus 1 Tablet(s) Oral two times a day with meals    MEDICATIONS  (PRN):  acetaminophen   Tablet 650 milliGRAM(s) Oral every 6 hours PRN For Temp greater than 38 C (100.4 F)      Allergies    Vasotec (Unknown)    Intolerances        REVIEW OF SYSTEMS:  CONSTITUTIONAL: No fever, No chills,No fatigue,No myalgia,No Body ache  EYES: No eye pain, visual disturbances, or discharge  ENMT:  No ear pain, No nose bleed, No vertigo; No sinus or throat pain  NECK: No pain, No stiffness  RESPIRATORY: No cough, wheezing, No  hemoptysis; No shortness of breath  CARDIOVASCULAR: No chest pain, palpitations, leg swelling  GASTROINTESTINAL: No abdominal or epigastric pain. No nausea, No vomiting; No diarrhea or constipation. [ 1] BM  GENITOURINARY: No dysuria, No frequency, No urgency, No hematuria, or incontinence  NEUROLOGICAL: alert and oriented x 2-3 ,  No headaches, No dizziness, No numbness,  SKIN:   No itching, burning, rashes, or lesions   MUSCULOSKELETAL: No joint pain or swelling; No muscle pain, No back pain, No extremity pain  PSYCHIATRIC: No depression, anxiety, mood swings, or difficulty sleeping  ROS  [ ] Unable to obtain   REST OF REVIEW Of SYSTEM - [ ] Normal     Height (cm): 157.48 (01-02 @ 08:17)  Weight (kg): 52.2 (01-02 @ 08:17)  BMI (kg/m2): 21 (01-02 @ 08:17)  BSA (m2): 1.51 (01-02 @ 08:17)  Vital Signs Last 24 Hrs  T(C): 36.9 (06 Jan 2018 15:21), Max: 37.1 (06 Jan 2018 07:43)  T(F): 98.4 (06 Jan 2018 15:21), Max: 98.8 (06 Jan 2018 07:43)  HR: 68 (06 Jan 2018 15:21) (63 - 74)  BP: 162/72 (06 Jan 2018 15:21) (114/66 - 162/72)  BP(mean): --  RR: 18 (06 Jan 2018 15:21) (17 - 18)  SpO2: 95% (06 Jan 2018 15:21) (93% - 99%)  [ ] room air   [x ] 02    PHYSICAL EXAM:  GENERAL:  No acute distresss,  [ ] Agitated, [ ] Lethargy, [ ] confused   HEAD:  normal  ENMT: normal  NECK:  normal    NERVOUS SYSTEM:  Alert & Oriented X3, no focal deficits [ ]Confusion  [ ] Encephalopathic [ ] Sedated [ ] Other  CHEST/LUNG: decreased breath sounds no wheeze or rhonchi  HEART:  Regular rate and rhythm, No murmurs, rubs, or gallops,  [ ] irregular   ABDOMEN:  soft, nontender, nondistended, positive bowel sounds   [ ] obese  EXTREMITIES: No clubbing, cyanosis or edema  SKIN: [ ] venous stasis skin changes    LABS:                        10.5   14.7  )-----------( 346      ( 06 Jan 2018 07:29 )             32.2     06 Jan 2018 07:29    137    |  99     |  40     ----------------------------<  99     4.2     |  29     |  0.91     Ca    9.5        06 Jan 2018 07:29            CAPILLARY BLOOD GLUCOSE        Cultures  Culture Results:   No growth to date. (01-02 @ 12:02)  Culture Results:   No growth to date. (01-02 @ 12:02)          RADIOLOGY & ADDITIONAL TESTS:      Care Discussed with [X] Consultants  [ ] Patient  [ ] Family  [X]   /   [ ] Other; RN  DVT prophylaxis [ ] lovenox   [ x] subq heparin  [ ] coumadin  [ ] venodynes [ ] ambulating frequently at how risk for vte and no pharm         or  mechanical prophylaxis required    [ ] other   Advanced directive:    [x ]pt has hcp     [ ] pt declined to assign hcp  Discussed with pt @ bedside

## 2018-01-06 NOTE — PROGRESS NOTE ADULT - SUBJECTIVE AND OBJECTIVE BOX
[facility], Division of Infectious Diseases  [address]  [phone]  Name: SAIRA SANTANA  Age: 89y  Gender: Female  MRN: [id number]    Interval History--  Notes reviewed  says cough is improved.  Had a good night    Past Medical History--  Anemia, unspecified type  Edema of lower extremity  Heart murmur  Hypertension  Hypercalcemia  History of appendectomy  S/P tonsillectomy  H/O parathyroidectomy      For details regarding the patient's social history, family history, and other miscellaneous elements, please refer the initial infectious diseases consultation and/or the admitting history and physical examination for this admission.    Allergies    Vasotec (Unknown)    Intolerances        Medications--  Antibiotics:  cefTRIAXone   IVPB 1 Gram(s) IV Intermittent every 24 hours    Immunologic:    Other:  acetaminophen   Tablet PRN  ALBUTerol/ipratropium for Nebulization  carvedilol  cinacalcet  doxazosin  ferrous    sulfate  furosemide    Tablet  heparin  Injectable  hydrALAZINE  lactobacillus acidophilus      Review of Systems--  A 10-point review of systems was obtained.     Pertinent positives and negatives--  Constitutional: No fevers. No Chills. No Rigors.   Cardiovascular: No chest pain. No palpitations.  Respiratory: ++ shortness of breath.++cough.  Gastrointestinal: No nausea or vomiting. No diarrhea or constipation.   Psychiatric: Pleasant. Appropriate affect.    Review of systems otherwise negative except as previously noted.    Physical Examination--  Vital Signs: T(F): 98.8 (01-06-18 @ 07:43), Max: 98.8 (01-06-18 @ 07:43)  HR: 68 (01-06-18 @ 07:43)  BP: 147/61 (01-06-18 @ 07:43)  RR: 18 (01-06-18 @ 07:43)  SpO2: 94% (01-06-18 @ 07:43)  Wt(kg): --  General: Nontoxic-appearing Female in no acute distress.  HEENT: AT/NC. Conjunctiva pink and moist. Oropharynx clear. Dentition fair.  Neck: Not rigid. No sense of mass.  Nodes: None palpable.  Lungs: Clear bilaterally without rales, wheezing or rhonchi  Heart: Regular rate and rhythm. No Murmur. No rub. No gallop. No palpable thrill.  Abdomen: Bowel sounds present and normoactive. Soft. Nondistended  Extremities: No cyanosis or clubbing. No edema.   Skin: Warm. Dry. Good turgor. No rash. No vasculitic stigmata.  Psychiatric: Appropriate affect and mood for situation.         Laboratory Studies--  CBC                        10.5   14.7  )-----------( 346      ( 06 Jan 2018 07:29 )             32.2       Chemistries  01-06    137  |  99  |  40<H>  ----------------------------<  99  4.2   |  29  |  0.91    Ca    9.5      06 Jan 2018 07:29  Phos  3.3     01-05  Mg     1.7     01-05        Culture Data      Culture - Blood (01.02.18 @ 12:02)    Specimen Source: .Blood Blood-Peripheral    Culture Results:   No growth to date.        < from: CT Angio Chest w/ IV Cont (01.05.18 @ 01:36) >    EXAM:  CT ANGIO CHEST (W)AW IC                            PROCEDURE DATE:  01/05/2018          INTERPRETATION:  CT ANGIO CHEST (W)AW IC     INDICATION: Hypotension, tachycardia. Evaluate for PE.    TECHNIQUE: Contrast enhanced CT imaging of the thorax. Timing optimized   for the pulmonary arteries. Postprocessed MIP reformatted images were   created and reviewed.    75 mL of Omnipaque 350 contrast material was injected IV.    COMPARISON: None.    FINDINGS:      Pulmonary arteries: No filling defects to suggest pulmonary emboli.   Pulmonary artery is enlarged measuring 3.4 cm.  Aorta: No aneurysm.    Lines and tubes: None.  Airways: Tracheobronchial tree is patent. Retained secretions in the   proximal bronchi. Diffuse bronchial wall thickening.  Lungs and Pleura: There are small bilateral pleural effusions. There are   dependent as well as mild perihilar opacities. Patchy ground glass   opacities seen in the left upper lobe. Atelectasis is seen in both lower   lobes and the right middlelobe. Diffuse interlobular bilateral septal   thickening.     Mediastinum and lymph nodes: No mass or hemorrhage. No worrisome   mediastinal adenopathy. No worrisome hilar or axillary adenopathy.  Heart: Cardiomegaly. No pericardial effusion.    Upper Abdomen: No suspicious abnormalities of the visualized portions of   the liver, spleen, adrenal glands, or kidneys.    Bones and soft tissues: No suspicious lesions. Degenerative changes of   the spine as well as the bilateral shoulders.    IMPRESSION:    Negative for pulmonary emboli.    Small bilateral pleural effusions. Patchy ground glass opacities in the   left upper lobe are likely infective in nature. Superimposed on this is   evidence of mild to moderate pulmonary edema. Bilateral bronchial wall   thickening compatible with infectious/inflammatory bronchitis.    Enlargement of the pulmonary artery, may reflect pulmonary arterial   hypertension.    < end of copied text >

## 2018-01-06 NOTE — PROGRESS NOTE ADULT - PROBLEM SELECTOR PLAN 3
chronic diastolic chf   ProBNP elevated  no evidence of fluid overload on exam. Cardio ([facility]) consulted  - continue home dose lasix   - will closely monitor for fluid overload chronic diastolic chf   cardio follow up noted, advised no active chf clinically and to continue current dose lasix   - will closely monitor for fluid overload  i/o daily wts

## 2018-01-06 NOTE — PROGRESS NOTE ADULT - PROBLEM SELECTOR PLAN 1
ABX for 7 days, ID follow up, ID notes reviewed, HOB elev, oral and skin care, keep sat > 88 pct  COPD NEBS  CHF LASIX  am labs pending  increase activity as tolerated  replete lytes  nutrition  dvt p  will follow  pt is DNR   supportive care with ADL

## 2018-01-06 NOTE — PROGRESS NOTE ADULT - SUBJECTIVE AND OBJECTIVE BOX
[facility]    Christofer Isaacs, Sorin, Marisa, Jr, Hieu, Sandro      [phone]    CHIEF COMPLAINT: Patient is a 89y old  Female who presents with a chief complaint of coughing (05 Jan 2018 18:28)      Follow Up: htn, pna, hfpef    Interim history: The patient reports persistent cough.  Denies chest discomfort and shortness of breath.  No abdominal pain.  No new neurologic symptoms.      MEDICATIONS  (STANDING):  ALBUTerol/ipratropium for Nebulization 3 milliLiter(s) Nebulizer every 8 hours  carvedilol 12.5 milliGRAM(s) Oral two times a day  cefTRIAXone   IVPB 1 Gram(s) IV Intermittent every 24 hours  cinacalcet 30 milliGRAM(s) Oral daily  doxazosin 4 milliGRAM(s) Oral at bedtime  ferrous    sulfate 325 milliGRAM(s) Oral daily  furosemide    Tablet 60 milliGRAM(s) Oral daily  heparin  Injectable 5000 Unit(s) SubCutaneous every 12 hours  hydrALAZINE 75 milliGRAM(s) Oral three times a day  lactobacillus acidophilus 1 Tablet(s) Oral two times a day with meals    MEDICATIONS  (PRN):  acetaminophen   Tablet 650 milliGRAM(s) Oral every 6 hours PRN For Temp greater than 38 C (100.4 F)      REVIEW OF SYSTEMS:  eye, ent, GI, , allergic, dermatologic, musculoskeletal and neurologic are negative except as described above    Vital Signs Last 24 Hrs  T(C): 37.1 (06 Jan 2018 07:43), Max: 37.1 (06 Jan 2018 07:43)  T(F): 98.8 (06 Jan 2018 07:43), Max: 98.8 (06 Jan 2018 07:43)  HR: 68 (06 Jan 2018 07:43) (63 - 74)  BP: 147/61 (06 Jan 2018 07:43) (114/66 - 168/48)  BP(mean): --  RR: 18 (06 Jan 2018 07:43) (17 - 18)  SpO2: 94% (06 Jan 2018 07:43) (93% - 99%)    I&O's Summary    05 Jan 2018 07:01  -  06 Jan 2018 07:00  --------------------------------------------------------  IN: 500 mL / OUT: 120 mL / NET: 380 mL        Telemetry past 24h: sr, 1820 episode of psvt    PHYSICAL EXAM:    Constitutional: well-nourished, well-developed, NAD   HEENT:  MMM, sclerae anicteric, conjunctivae clear, no oral cyanosis.  Pulmonary: Non-labored, breath sounds are clear bilaterally, No wheezing, rales or rhonchi  Cardiovascular: Regular, S1 and S2.  No murmur.  No rubs, gallops or clicks  Gastrointestinal: Bowel Sounds present, soft, nontender.   Lymph: No peripheral edema.   Neurological: Alert, no focal deficits  Skin: No rashes.  Psych:  Mood & affect appropriate    LABS: All Labs Reviewed:                        10.5   14.7  )-----------( 346      ( 06 Jan 2018 07:29 )             32.2                         10.4   14.2  )-----------( 349      ( 05 Jan 2018 06:47 )             31.4                         10.3   12.8  )-----------( 296      ( 04 Jan 2018 23:50 )             30.8     06 Jan 2018 07:29    137    |  99     |  40     ----------------------------<  99     4.2     |  29     |  0.91   05 Jan 2018 06:47    136    |  98     |  45     ----------------------------<  91     3.9     |  29     |  1.10   04 Jan 2018 23:50    135    |  98     |  52     ----------------------------<  97     3.9     |  27     |  1.20     Ca    9.5        06 Jan 2018 07:29  Ca    9.4        05 Jan 2018 06:47  Ca    9.5        04 Jan 2018 23:50  Phos  3.3       05 Jan 2018 00:37  Mg     1.7       05 Jan 2018 00:37        CARDIAC MARKERS ( 05 Jan 2018 10:34 )  .031 ng/mL / x     / 34 U/L / x     / 2.0 ng/mL  CARDIAC MARKERS ( 05 Jan 2018 03:50 )  .044 ng/mL / x     / 36 U/L / x     / 2.3 ng/mL  CARDIAC MARKERS ( 04 Jan 2018 23:50 )  .029 ng/mL / x     / 41 U/L / hfpef, htn, viral pnax     / 2.5 ng/mL      Blood Culture: Organism --  Gram Stain Blood -- Gram Stain --  Specimen Source .Blood Blood-Peripheral  Culture-Blood --      01-06 @ 07:29  Pro Bnp [id number]        RADIOLOGY:    EKG:    Echo:    < from: TTE Echo Doppler w/o Cont (07.09.17 @ 08:32) >     EXAM:  ECHO TTE W/O CON COMP W/DOPPLR         PROCEDURE DATE:  07/09/2017        INTERPRETATION:  Ordering Physician: Unknown Doctor    Indication: Hypertension    Study Quality: Technically fair  A complete echocardiographic study was performed utilizing standard   protocol including spectral and color Doppler in all echocardiographic   windows.    Height: 152 cm  Weight: 49 kg  BSA: 1.4 sq m  Blood Pressure: 189/63    MEASUREMENTS  IVS: 1.0cm  PWT: 1.0cm  LA: 4.2cm  AO: 2.7cm  LVIDd: 4.5cm  LVIDs: 3.3cm    LVEF: 65%  RVSP: 54mmHg    FINDINGS  Left Ventricle:  Normal left ventricular systolic function.  Aortic Valve: Calcified trileaflet aortic valve. Mild aortic   insufficiency.  Mitral Valve: Mitral annular calcification and calcified mitral valve   leaflets with normal diastolic opening. Mild mitral insufficiency.  Tricuspid Valve: Normal tricuspid valve. Mild tricuspid insufficiency.  Pulmonic Valve: Normal pulmonic valve. Mild pulmonic insufficiency.  Left Atrium: Mildly enlarged.  Right Ventricle: Normal right ventricular size and systolic function.  Right Atrium: Normal  Diastolic Function: Grade 1 diastolic dysfunction.  Pericardium/Pleura: Normal pericardium with no pericardial effusion.                      MORENA ADORNO M.D., ATTENDING CARDIOLOGIST  This document has been electronically signed. Jul 9 2017 10:13AM                < end of copied text >

## 2018-01-06 NOTE — PROGRESS NOTE ADULT - PROBLEM SELECTOR PLAN 1
RVP + coronavirus c/w viral pneumonia probable bacterial component as well  -Continue Rocephin for 7 days (until 1/8), change to po cefuroxime 500 BID pending clinical course  -Dr. Valdovinos recommends adding broad coverage antibiotic  -Dr. Whitehead (ID) consulted  - Tylenol prn for fever   - continuous pulse ox, keep 02 sat >90  - supportive treatment  - pt tolerating po well, encourage po intake leukocytosis noted, admitted with uri sob  RVP + coronavirus c/w viral pneumonia possible bacterial component as well  -Continue Rocephin for 7 days (until 1/8),    id follow up.  change to po cefuroxime 500 BID pending clinical course  - continuous pulse ox, keep 02 sat >90  check room air o2 sat

## 2018-01-06 NOTE — PROGRESS NOTE ADULT - ATTENDING COMMENTS
all of above authored by me. message left for daughter.  will follow   d/c planning phys ther eval noted. no pt needs at present.   order written for staff to ambulate pt daily

## 2018-01-06 NOTE — PROGRESS NOTE ADULT - PROBLEM SELECTOR PLAN 5
continue with carvedilol and hydralazine with hold parameters  hydralazine increased to 75mg tid continue with carvedilol and hydralazine with hold parameters  hydralazine dose increase noted. will need new rx on discharge

## 2018-01-06 NOTE — PROGRESS NOTE ADULT - SUBJECTIVE AND OBJECTIVE BOX
Date/Time Patient Seen:  		  Referring MD:   Data Reviewed	       Patient is a 89y old  Female who presents with a chief complaint of coughing (05 Jan 2018 18:28)    in bed  on ABX  vs and meds reviewed    Subjective/HPI     PAST MEDICAL & SURGICAL HISTORY:  Anemia, unspecified type  Edema of lower extremity  Heart murmur  Hypertension  Hypercalcemia  History of appendectomy  S/P tonsillectomy  H/O parathyroidectomy        Medication list         MEDICATIONS  (STANDING):  ALBUTerol/ipratropium for Nebulization 3 milliLiter(s) Nebulizer every 8 hours  carvedilol 12.5 milliGRAM(s) Oral two times a day  cefTRIAXone   IVPB 1 Gram(s) IV Intermittent every 24 hours  cinacalcet 30 milliGRAM(s) Oral daily  doxazosin 4 milliGRAM(s) Oral at bedtime  ferrous    sulfate 325 milliGRAM(s) Oral daily  furosemide    Tablet 60 milliGRAM(s) Oral daily  heparin  Injectable 5000 Unit(s) SubCutaneous every 12 hours  hydrALAZINE 75 milliGRAM(s) Oral three times a day  lactobacillus acidophilus 1 Tablet(s) Oral two times a day with meals    MEDICATIONS  (PRN):  acetaminophen   Tablet 650 milliGRAM(s) Oral every 6 hours PRN For Temp greater than 38 C (100.4 F)         Vitals log        ICU Vital Signs Last 24 Hrs  T(C): 36.9 (06 Jan 2018 05:24), Max: 36.9 (05 Jan 2018 07:41)  T(F): 98.5 (06 Jan 2018 05:24), Max: 98.5 (06 Jan 2018 05:24)  HR: 63 (06 Jan 2018 05:24) (60 - 74)  BP: 155/54 (06 Jan 2018 05:24) (114/66 - 168/48)  BP(mean): --  ABP: --  ABP(mean): --  RR: 17 (06 Jan 2018 05:24) (17 - 19)  SpO2: 99% (06 Jan 2018 05:24) (93% - 99%)           Input and Output:  I&O's Detail    05 Jan 2018 07:01  -  06 Jan 2018 07:00  --------------------------------------------------------  IN:    Oral Fluid: 450 mL    Solution: 50 mL  Total IN: 500 mL    OUT:    Voided: 120 mL  Total OUT: 120 mL    Total NET: 380 mL          Lab Data                        10.4   14.2  )-----------( 349      ( 05 Jan 2018 06:47 )             31.4     01-05    136  |  98  |  45<H>  ----------------------------<  91  3.9   |  29  |  1.10    Ca    9.4      05 Jan 2018 06:47  Phos  3.3     01-05  Mg     1.7     01-05        CARDIAC MARKERS ( 05 Jan 2018 10:34 )  .031 ng/mL / x     / 34 U/L / x     / 2.0 ng/mL  CARDIAC MARKERS ( 05 Jan 2018 03:50 )  .044 ng/mL / x     / 36 U/L / x     / 2.3 ng/mL  CARDIAC MARKERS ( 04 Jan 2018 23:50 )  .029 ng/mL / x     / 41 U/L / x     / 2.5 ng/mL        Review of Systems	      Objective     Physical Examination    head at  heart s1s2  lungs dec BS  frail and weak    Pertinent Lab findings & Imaging      Richard:  NO   Adequate UO     I&O's Detail    05 Jan 2018 07:01  -  06 Jan 2018 07:00  --------------------------------------------------------  IN:    Oral Fluid: 450 mL    Solution: 50 mL  Total IN: 500 mL    OUT:    Voided: 120 mL  Total OUT: 120 mL    Total NET: 380 mL               Discussed with:     Cultures:	        Radiology

## 2018-01-07 LAB
ANION GAP SERPL CALC-SCNC: 9 MMOL/L — SIGNIFICANT CHANGE UP (ref 5–17)
BASOPHILS # BLD AUTO: 0.1 K/UL — SIGNIFICANT CHANGE UP (ref 0–0.2)
BASOPHILS NFR BLD AUTO: 0.9 % — SIGNIFICANT CHANGE UP (ref 0–2)
BUN SERPL-MCNC: 38 MG/DL — HIGH (ref 7–23)
CALCIUM SERPL-MCNC: 9.4 MG/DL — SIGNIFICANT CHANGE UP (ref 8.5–10.1)
CHLORIDE SERPL-SCNC: 96 MMOL/L — SIGNIFICANT CHANGE UP (ref 96–108)
CO2 SERPL-SCNC: 31 MMOL/L — SIGNIFICANT CHANGE UP (ref 22–31)
CREAT SERPL-MCNC: 0.9 MG/DL — SIGNIFICANT CHANGE UP (ref 0.5–1.3)
CULTURE RESULTS: SIGNIFICANT CHANGE UP
CULTURE RESULTS: SIGNIFICANT CHANGE UP
EOSINOPHIL # BLD AUTO: 0.3 K/UL — SIGNIFICANT CHANGE UP (ref 0–0.5)
EOSINOPHIL NFR BLD AUTO: 2.1 % — SIGNIFICANT CHANGE UP (ref 0–6)
GLUCOSE SERPL-MCNC: 107 MG/DL — HIGH (ref 70–99)
HCT VFR BLD CALC: 30.8 % — LOW (ref 34.5–45)
HGB BLD-MCNC: 10.4 G/DL — LOW (ref 11.5–15.5)
LYMPHOCYTES # BLD AUTO: 1.9 K/UL — SIGNIFICANT CHANGE UP (ref 1–3.3)
LYMPHOCYTES # BLD AUTO: 13.1 % — SIGNIFICANT CHANGE UP (ref 13–44)
MCHC RBC-ENTMCNC: 29.5 PG — SIGNIFICANT CHANGE UP (ref 27–34)
MCHC RBC-ENTMCNC: 33.7 GM/DL — SIGNIFICANT CHANGE UP (ref 32–36)
MCV RBC AUTO: 87.7 FL — SIGNIFICANT CHANGE UP (ref 80–100)
MONOCYTES # BLD AUTO: 1 K/UL — HIGH (ref 0–0.9)
MONOCYTES NFR BLD AUTO: 6.8 % — SIGNIFICANT CHANGE UP (ref 1–9)
NEUTROPHILS # BLD AUTO: 11.4 K/UL — HIGH (ref 1.8–7.4)
NEUTROPHILS NFR BLD AUTO: 77.1 % — HIGH (ref 43–77)
PLATELET # BLD AUTO: 318 K/UL — SIGNIFICANT CHANGE UP (ref 150–400)
POTASSIUM SERPL-MCNC: 3.7 MMOL/L — SIGNIFICANT CHANGE UP (ref 3.5–5.3)
POTASSIUM SERPL-SCNC: 3.7 MMOL/L — SIGNIFICANT CHANGE UP (ref 3.5–5.3)
RBC # BLD: 3.51 M/UL — LOW (ref 3.8–5.2)
RBC # FLD: 13.4 % — SIGNIFICANT CHANGE UP (ref 10.3–14.5)
SODIUM SERPL-SCNC: 136 MMOL/L — SIGNIFICANT CHANGE UP (ref 135–145)
SPECIMEN SOURCE: SIGNIFICANT CHANGE UP
SPECIMEN SOURCE: SIGNIFICANT CHANGE UP
WBC # BLD: 14.8 K/UL — HIGH (ref 3.8–10.5)
WBC # FLD AUTO: 14.8 K/UL — HIGH (ref 3.8–10.5)

## 2018-01-07 PROCEDURE — 99233 SBSQ HOSP IP/OBS HIGH 50: CPT

## 2018-01-07 RX ORDER — HYDRALAZINE HCL 50 MG
75 TABLET ORAL EVERY 8 HOURS
Qty: 0 | Refills: 0 | Status: DISCONTINUED | OUTPATIENT
Start: 2018-01-07 | End: 2018-01-13

## 2018-01-07 RX ADMIN — Medication 75 MILLIGRAM(S): at 14:13

## 2018-01-07 RX ADMIN — CARVEDILOL PHOSPHATE 25 MILLIGRAM(S): 80 CAPSULE, EXTENDED RELEASE ORAL at 18:16

## 2018-01-07 RX ADMIN — HEPARIN SODIUM 5000 UNIT(S): 5000 INJECTION INTRAVENOUS; SUBCUTANEOUS at 18:16

## 2018-01-07 RX ADMIN — Medication 4 MILLIGRAM(S): at 22:05

## 2018-01-07 RX ADMIN — HEPARIN SODIUM 5000 UNIT(S): 5000 INJECTION INTRAVENOUS; SUBCUTANEOUS at 05:31

## 2018-01-07 RX ADMIN — Medication 60 MILLIGRAM(S): at 05:31

## 2018-01-07 RX ADMIN — CINACALCET 30 MILLIGRAM(S): 30 TABLET, FILM COATED ORAL at 22:05

## 2018-01-07 RX ADMIN — CARVEDILOL PHOSPHATE 25 MILLIGRAM(S): 80 CAPSULE, EXTENDED RELEASE ORAL at 05:31

## 2018-01-07 RX ADMIN — Medication 75 MILLIGRAM(S): at 22:05

## 2018-01-07 RX ADMIN — Medication 3 MILLILITER(S): at 15:22

## 2018-01-07 RX ADMIN — Medication 1 TABLET(S): at 18:16

## 2018-01-07 RX ADMIN — Medication 1 TABLET(S): at 08:09

## 2018-01-07 RX ADMIN — Medication 50 MILLIGRAM(S): at 05:31

## 2018-01-07 RX ADMIN — CEFTRIAXONE 100 GRAM(S): 500 INJECTION, POWDER, FOR SOLUTION INTRAMUSCULAR; INTRAVENOUS at 12:26

## 2018-01-07 RX ADMIN — Medication 3 MILLILITER(S): at 09:37

## 2018-01-07 RX ADMIN — Medication 325 MILLIGRAM(S): at 12:27

## 2018-01-07 RX ADMIN — Medication 3 MILLILITER(S): at 23:13

## 2018-01-07 NOTE — PROGRESS NOTE ADULT - SUBJECTIVE AND OBJECTIVE BOX
Date/Time Patient Seen:  		  Referring MD:   Data Reviewed	       Patient is a 89y old  Female who presents with a chief complaint of coughing (05 Jan 2018 18:28)    in bed  seen and examined  vs and meds reviewed  on ABX  on LASIX  on NEBS    Subjective/HPI     PAST MEDICAL & SURGICAL HISTORY:  Anemia, unspecified type  Edema of lower extremity  Heart murmur  Hypertension  Hypercalcemia  History of appendectomy  S/P tonsillectomy  H/O parathyroidectomy        Medication list         MEDICATIONS  (STANDING):  ALBUTerol/ipratropium for Nebulization 3 milliLiter(s) Nebulizer every 8 hours  carvedilol 25 milliGRAM(s) Oral every 12 hours  cefTRIAXone   IVPB 1 Gram(s) IV Intermittent every 24 hours  cinacalcet 30 milliGRAM(s) Oral daily  doxazosin 4 milliGRAM(s) Oral at bedtime  ferrous    sulfate 325 milliGRAM(s) Oral daily  furosemide    Tablet 60 milliGRAM(s) Oral daily  heparin  Injectable 5000 Unit(s) SubCutaneous every 12 hours  hydrALAZINE 50 milliGRAM(s) Oral every 8 hours  lactobacillus acidophilus 1 Tablet(s) Oral two times a day with meals    MEDICATIONS  (PRN):  acetaminophen   Tablet 650 milliGRAM(s) Oral every 6 hours PRN For Temp greater than 38 C (100.4 F)         Vitals log        ICU Vital Signs Last 24 Hrs  T(C): 36.9 (07 Jan 2018 04:46), Max: 37.1 (06 Jan 2018 07:43)  T(F): 98.4 (07 Jan 2018 04:46), Max: 98.8 (06 Jan 2018 07:43)  HR: 67 (07 Jan 2018 04:46) (63 - 70)  BP: 167/62 (07 Jan 2018 04:46) (147/61 - 182/60)  BP(mean): --  ABP: --  ABP(mean): --  RR: 18 (07 Jan 2018 04:46) (16 - 19)  SpO2: 87% (07 Jan 2018 06:30) (87% - 98%)           Input and Output:  I&O's Detail      Lab Data                        10.5   14.7  )-----------( 346      ( 06 Jan 2018 07:29 )             32.2     01-06    137  |  99  |  40<H>  ----------------------------<  99  4.2   |  29  |  0.91    Ca    9.5      06 Jan 2018 07:29        CARDIAC MARKERS ( 05 Jan 2018 10:34 )  .031 ng/mL / x     / 34 U/L / x     / 2.0 ng/mL        Review of Systems	      Objective     Physical Examination    head at  heart s1s2  lungs dec BS  abd soft  frail and weak      Pertinent Lab findings & Imaging      Ramos:  NO   Adequate UO     I&O's Detail           Discussed with:     Cultures:	        Radiology

## 2018-01-07 NOTE — PROGRESS NOTE ADULT - SUBJECTIVE AND OBJECTIVE BOX
Patient is a 89y old  Female who presents with a chief complaint of coughing (05 Jan 2018 18:28)  feels weak      INTERVAL HPI/OVERNIGHT EVENTS:  T(C): 36.6 (01-07-18 @ 15:25), Max: 36.9 (01-07-18 @ 04:46)  HR: 63 (01-07-18 @ 15:25) (63 - 90)  BP: 151/58 (01-07-18 @ 15:25) (132/67 - 182/60)  RR: 19 (01-07-18 @ 15:25) (16 - 19)  SpO2: 97% (01-07-18 @ 15:25) (87% - 97%)  Wt(kg): --  I&O's Summary    07 Jan 2018 07:01  -  07 Jan 2018 15:40  --------------------------------------------------------  IN: 180 mL / OUT: 0 mL / NET: 180 mL        LABS:                        10.4   14.8  )-----------( 318      ( 07 Jan 2018 08:13 )             30.8     01-07    136  |  96  |  38<H>  ----------------------------<  107<H>  3.7   |  31  |  0.90    Ca    9.4      07 Jan 2018 08:13          MEDICATIONS  (STANDING):  ALBUTerol/ipratropium for Nebulization 3 milliLiter(s) Nebulizer every 8 hours  carvedilol 25 milliGRAM(s) Oral every 12 hours  cefTRIAXone   IVPB 1 Gram(s) IV Intermittent every 24 hours  cinacalcet 30 milliGRAM(s) Oral daily  doxazosin 4 milliGRAM(s) Oral at bedtime  ferrous    sulfate 325 milliGRAM(s) Oral daily  heparin  Injectable 5000 Unit(s) SubCutaneous every 12 hours  hydrALAZINE 75 milliGRAM(s) Oral every 8 hours  lactobacillus acidophilus 1 Tablet(s) Oral two times a day with meals    MEDICATIONS  (PRN):  acetaminophen   Tablet 650 milliGRAM(s) Oral every 6 hours PRN For Temp greater than 38 C (100.4 F)      REVIEW OF SYSTEMS:  CONSTITUTIONAL: weaknexx  EYES: No eye pain, visual disturbances, or discharge  ENMT:  Nmild difficulty hearing  NECK: No pain or stiffness  RESPIRATORY: cough  CARDIOVASCULAR: No chest pain, palpitations, dizziness, or leg swelling  GASTROINTESTINAL: No abdominal or epigastric pain. No nausea, vomiting, or hematemesis; No diarrhea or constipation. No melena or hematochezia.  GENITOURINARY: No dysuria, frequency, hematuria, or incontinence  NEUROLOGICAL: No headaches, memory loss, loss of strength, numbness, or tremors  SKIN: No itching, burning, rashes, or lesions   LYMPH NODES: No enlarged glands  ENDOCRINE: No heat or cold intolerance; No hair loss  MUSCULOSKELETAL: No joint pain or swelling; No muscle, back, or extremity pain  PSYCHIATRIC: No depression, anxiety, mood swings, or difficulty sleeping  HEME/LYMPH: No easy bruising, or bleeding gums  ALLERY AND IMMUNOLOGIC: No hives or eczema    RADIOLOGY & ADDITIONAL TESTS:    Imaging Personally Reviewed:  [x ] YES  [ ] NO    Consultant(s) Notes Reviewed:  [x ] YES  [ ] NO    PHYSICAL EXAM:  GENERAL: NAD,   HEAD:  Atraumatic, Normocephalic  EYES: EOMI, PERRLA, conjunctiva and sclera clear  ENMT: No tonsillar erythema, exudates, or enlargement; Moist mucous membranes, Good dentition, No lesions, clear rhinorrhea noted  NECK: Supple, No JVD, Normal thyroid  NERVOUS SYSTEM:  Alert & Oriented X3, Good concentration; Motor Strength 4/5 B/L upper and lower extremities; DTRs 2+ intact and symmetric  CHEST/LUNG: Clear to percussion bilaterally; No rales, rhonchi, wheezing, or rubs  HEART: Regular rate and rhythm; No murmurs, rubs, or gallops  ABDOMEN: Soft, Nontender, Nondistended; Bowel sounds present  EXTREMITIES:  2+ Peripheral Pulses, No clubbing, cyanosis, or edema  LYMPH: No lymphadenopathy noted  SKIN: No rashes or lesions    Care Discussed with Consultants/Other Providers [x] YES  [ ] NO

## 2018-01-07 NOTE — PROGRESS NOTE ADULT - ASSESSMENT
89 yo F pmhx diastolic chf (EF 65%, echo July 2017), htn, anemia, p/w cough productive with yellow sputum and runny nose for 1 week. She is felt to have probable viral pneumonia but remains on antibiotic therapy. She is no evidence for volume overload on examination. Her blood pressure is improved today with increasing dose of hydralazine.  No clear evidence of acute ischemia.    - Patient is a DNR  - there is no evidence of acute ischemia.  - ce without evolution 1/4-1/5  - No further tachycardia x 48 hrs.  Tachycardia appears physiologic, although HR had been improved with increased dose of carvedilol to 25 bid.  May discontinue tele.  - Hypertensive with SBP at 150-170 while on 50 mg of Hydralazine q8H.  Will increase to 75 mg  - there is no evidence for meaningful  volume overload.  Pro-BNP improved  - Clinically not fluid overloaded.  Respiratory symptom is more infectious in nature.  - Continue antibiotics for rll pna as per primary  - Previous ECHO July 2017: EF 65%, nomal left ventricular systolic function, grade 1 diastolic dysfunction  - Monitor and replete lytes, keep K>4, Mg>2  - Other cardiovascular workup will depend on clinical course.    Tiffany Sim NP  Cardiology 89 yo F pmhx diastolic chf (EF 65%, echo July 2017), htn, anemia, p/w cough productive with yellow sputum and runny nose for 1 week. She is felt to have probable viral pneumonia but remains on antibiotic therapy. She is no evidence for volume overload on examination. Her blood pressure is improved today with increasing dose of hydralazine.  No clear evidence of acute ischemia.    - Patient is a DNR  - there is no evidence of acute ischemia.  - ce without evolution 1/4-1/5  - No further tachycardia x 48 hrs.  Tachycardia appears physiologic, although HR had been improved with increased dose of carvedilol to 25 bid.  May discontinue tele.  - Hypertensive with SBP at 150-170 while on 50 mg of Hydralazine q8H.  Will increase to 75 mg  - there is no evidence for meaningful  volume overload.  Pro-BNP improved  - Will hold Lasix for at least 2 days and reassess volume status; will resume eventually as infectious process resolves with careful monitoring of signs of fluid overload.  - Clinically not fluid overloaded.  Respiratory symptom is more infectious in nature.  - Continue antibiotics for rll pna as per primary  - Previous ECHO July 2017: EF 65%, nomal left ventricular systolic function, grade 1 diastolic dysfunction  - Monitor and replete lytes, keep K>4, Mg>2  - Other cardiovascular workup will depend on clinical course.    Tiffany Sim NP  Cardiology 89 yo F pmhx diastolic chf (EF 65%, echo July 2017), htn, anemia, p/w cough productive with yellow sputum and runny nose for 1 week. She is felt to have probable viral pneumonia but remains on antibiotic therapy. She is no evidence for volume overload on examination. Her blood pressure is improved today with increasing dose of hydralazine.  No clear evidence of acute ischemia.    - Patient is a DNR  - there is no evidence of acute ischemia.  - ce without evolution 1/4-1/5  - No further svtx 48 hrs. continue coreg at the higher dose and may discontinue tele.  - Hypertensive with SBP at 150-170 while on 50 mg of Hydralazine q8H.  Will increase to 75 mg  - there is no evidence for meaningful  volume overload.  Pro-BNP improved  - Will hold Lasix for at least 2 days and reassess volume status; will resume eventually as infectious process resolves with careful monitoring of signs of fluid overload.  - Clinically not fluid overloaded.  Respiratory symptom is more infectious in nature.  - Continue antibiotics for rll pna as per primary  - Previous ECHO July 2017: EF 65%, nomal left ventricular systolic function, grade 1 diastolic dysfunction  - Monitor and replete lytes, keep K>4, Mg>2  - Other cardiovascular workup will depend on clinical course.    Tiffany Sim NP  Cardiology

## 2018-01-07 NOTE — PROGRESS NOTE ADULT - PROBLEM SELECTOR PLAN 3
chronic diastolic chf   cardio follow up noted, advised no active chf clinically and lasix held per cardio today  - will closely monitor for fluid overload  i/o daily wts

## 2018-01-07 NOTE — PROGRESS NOTE ADULT - SUBJECTIVE AND OBJECTIVE BOX
HPI:  89F PMHx chronic diastolic chf (EF 65%, echo 2017), HTN, anemia presenting with cough and congestion severe sob x 1 week.  Pt reports cough started last Tuesday with clear mucus production.  No pleuritic chest pain with sob.  Reports this week cough became more severe now with yellowish sputum.  Currently complains of cough and chest congestion. Denies pleuritic chest pain, fever, palpitations, abdominal pain, dizziness, n/v/d/c, changes in vision    In the ED /74, HR 61, T 97.9F, O2 95% on RA, RR 17. WBC 11.8, Hg 9.9, Na 132, lactate 0.7, Trop 0.046, proBNP 7832, CXR showing mild vascular congestion, small left pleural effusion, questionable focal infiltrate of right base. Given Rocephin, Zithromax, Duonebs, RVP + for coronavirus with RLL infiltrate consistent with likely viral pneumonia.  Blood cultures collected. EKG showing SR. Cardiology (Dr. Hagen) and ID (dr. rivera) (2018 11:38)    SUBJECTIVE:  Patient is a 89y old  Female who presents with a chief complaint of coughing (2018 18:28)    Seen and examined.  C/o productive cough.  Denies CP or palpitations.    OBJECTIVE:  Review Of Systems:  Constitutional: [ ] Fever [ ] Chills [ ] Fatigue [ ] Weight change   HEENT: [ ] Blurred vision [ ] Eye Pain [ ] Headache [ ] Runny nose [ ] Sore Throat   Respiratory: [ ] Cough [ ] Wheezing [ ] Shortness of breath  Cardiovascular: [ ] Chest Pain [ ] Palpitations [ ] PEPE [ ] PND [ ] Orthopnea  Gastrointestinal: [ ] Abdominal Pain [ ] Diarrhea [ ] Constipation [ ] Hemorrhoids [ ] Nausea [ ] Vomiting  Genitourinary: [ ] Nocturia [ ] Dysuria [ ] Incontinence  Extremities: [ ] Swelling [ ] Joint Pain  Neurologic: [ ] Focal deficit [ ] Paresthesias [ ] Syncope  Lymphatic: [ ] Swelling [ ] Lymphadenopathy   Skin: [ ] Rash [ ] Ecchymoses [ ] Wounds [ ] Lesions  Psychiatry: [ ] Depression [ ] Suicidal/Homicidal Ideation [ ] Anxiety [ ] Sleep Disturbances  [ x] 10 point review of systems is otherwise negative except as mentioned above            [ ]Unable to obtain    Allergy:  Allergies    Vasotec (Unknown)    Intolerances    Medications:  MEDICATIONS  (STANDING):  ALBUTerol/ipratropium for Nebulization 3 milliLiter(s) Nebulizer every 8 hours  carvedilol 25 milliGRAM(s) Oral every 12 hours  cefTRIAXone   IVPB 1 Gram(s) IV Intermittent every 24 hours  cinacalcet 30 milliGRAM(s) Oral daily  doxazosin 4 milliGRAM(s) Oral at bedtime  ferrous    sulfate 325 milliGRAM(s) Oral daily  furosemide    Tablet 60 milliGRAM(s) Oral daily  heparin  Injectable 5000 Unit(s) SubCutaneous every 12 hours  hydrALAZINE 50 milliGRAM(s) Oral every 8 hours  lactobacillus acidophilus 1 Tablet(s) Oral two times a day with meals    MEDICATIONS  (PRN):  acetaminophen   Tablet 650 milliGRAM(s) Oral every 6 hours PRN For Temp greater than 38 C (100.4 F)    PMH/PSH/FH/SH: [ ] Unchanged    Vitals:  T(C): 36.9 (18 @ 04:46), Max: 36.9 (18 @ 15:21)  HR: 67 (18 @ 04:46) (63 - 70)  BP: 167/62 (18 @ 04:46) (148/60 - 182/60)  BP(mean): --  RR: 18 (18 @ 04:46) (16 - 19)  SpO2: 87% (18 @ 06:30) (87% - 98%)  Wt(kg): --  Daily     Daily Weight in k.2 (2018 04:46)  I&O's Summary    Labs:                        10.5   14.7  )-----------( 346      ( 2018 07:29 )             32.2     -    137  |  99  |  40<H>  ----------------------------<  99  4.2   |  29  |  0.91    Ca    9.5      2018 07:29    CARDIAC MARKERS ( 2018 10:34 )  .031 ng/mL / x     / 34 U/L / x     / 2.0 ng/mL    ECG:  < from: 12 Lead ECG (18 @ 08:36) >    Ventricular Rate 58 BPM    Atrial Rate 58 BPM    P-R Interval 160 ms    QRS Duration 78 ms    Q-T Interval 398 ms    QTC Calculation(Bezet) 390 ms    P Axis 20 degrees    R Axis 63 degrees    T Axis 48 degrees    Diagnosis Line Sinus bradycardia  Otherwise normal ECG  When compared with ECG of 25-AUG-2017 09:14,  Questionable change in QRS axis  T wave inversion no longer evident in Inferior leads  Confirmed by NIDHI HAGEN ([id number]) on 2018 8:52:44 PM    < end of copied text >    Echo:  < from: TTE Echo Doppler w/o Cont (17 @ 08:32) >     EXAM:  ECHO TTE W/O CON COMP W/DOPPLR         PROCEDURE DATE:  2017        INTERPRETATION:  Ordering Physician: Unknown Doctor    Indication: Hypertension    Study Quality: Technically fair  A complete echocardiographic study was performed utilizing standard   protocol including spectral and color Doppler in all echocardiographic   windows.    Height: 152 cm  Weight: 49 kg  BSA: 1.4 sq m  Blood Pressure: 189/63    MEASUREMENTS  IVS: 1.0cm  PWT: 1.0cm  LA: 4.2cm  AO: 2.7cm  LVIDd: 4.5cm  LVIDs: 3.3cm    LVEF: 65%  RVSP: 54mmHg    FINDINGS  Left Ventricle:  Normal left ventricular systolic function.  Aortic Valve: Calcified trileaflet aortic valve. Mild aortic   insufficiency.  Mitral Valve: Mitral annular calcification and calcified mitral valve   leaflets with normal diastolic opening. Mild mitral insufficiency.  Tricuspid Valve: Normal tricuspid valve. Mild tricuspid insufficiency.  Pulmonic Valve: Normal pulmonic valve. Mild pulmonic insufficiency.  Left Atrium: Mildly enlarged.  Right Ventricle: Normal right ventricular size and systolic function.  Right Atrium: Normal  Diastolic Function: Grade 1 diastolic dysfunction.  Pericardium/Pleura: Normal pericardium with no pericardial effusion.    MORENA ADORNO M.D., ATTENDING CARDIOLOGIST  This document has been electronically signed. 2017 10:13AM      < end of copied text >    Stress Testing:     Cath:    Imaging:  < from: CT Angio Chest w/ IV Cont (18 @ 01:36) >    EXAM:  CT ANGIO CHEST (W)AW IC                            PROCEDURE DATE:  2018          INTERPRETATION:  CT ANGIO CHEST (W)AW IC     INDICATION: Hypotension, tachycardia. Evaluate for PE.    TECHNIQUE: Contrast enhanced CT imaging of the thorax. Timing optimized   for the pulmonary arteries. Postprocessed MIP reformatted images were   created and reviewed.    75 mL of Omnipaque 350 contrast material was injected IV.    COMPARISON: None.    FINDINGS:      Pulmonary arteries: No filling defects to suggest pulmonary emboli.   Pulmonary artery is enlarged measuring 3.4 cm.  Aorta: No aneurysm.    Lines and tubes: None.  Airways: Tracheobronchial tree is patent. Retained secretions in the   proximal bronchi. Diffuse bronchial wall thickening.  Lungs and Pleura: There are small bilateral pleural effusions. There are   dependent as well as mild perihilar opacities. Patchy ground glass   opacities seen in the left upper lobe. Atelectasis is seen in both lower   lobes and the right middlelobe. Diffuse interlobular bilateral septal   thickening.     Mediastinum and lymph nodes: No mass or hemorrhage. No worrisome   mediastinal adenopathy. No worrisome hilar or axillary adenopathy.  Heart: Cardiomegaly. No pericardial effusion.    Upper Abdomen: No suspicious abnormalities of the visualized portions of   the liver, spleen, adrenal glands, or kidneys.    Bones and soft tissues: No suspicious lesions. Degenerative changes of   the spine as well as the bilateral shoulders.    IMPRESSION:    Negative for pulmonary emboli.    Small bilateral pleural effusions. Patchy ground glass opacities in the   left upper lobe are likely infective in nature. Superimposed on this is   evidence of mild to moderate pulmonary edema. Bilateral bronchial wall   thickening compatible with infectious/inflammatory bronchitis.    Enlargement of the pulmonary artery, may reflect pulmonary arterial   hypertension.    ISABELLA BEEBE M.D., ATTENDING RADIOLOGIST  This document has been electronically signed. 2018  1:15AM      < end of copied text >    Interpretation of Telemetry:  NSR with no events    Physical Exam:  Appearance: [ ] Normal  [ ] abnormal [ x] NAD [x] Cachectic  Eyes: [x ] PERRL [ ] EOMI  HENT: [ x] Normal [ ] Abnormal oral muscosa [ ]NC/AT  Cardiovascular: [x ] S1 [x ] S2 [x ] RRR [x ] m/r/g [ ]edema [ ] JVP  Procedural Access Site: [ ]  hematoma [ ] tender to palpation [ ] 2+ pulse [ ] bruit [ ] Ecchymosis  Respiratory: [ ] Clear to auscultation bilaterally  [x] scattered rhonchi  Gastrointestinal: [x ] Soft [ ] tenderness[ ] distension [ x] BS  Musculoskeletal: [ ] clubbing [ ] joint deformity   Neurologic: [ x] Non-focal  Lymphatic: [ ] lymphadenopathy  Psychiatry: [x ] AAOx3  [ ] confused [ ] disoriented [x ] Mood & affect appropriate  Skin: [ ]  rashes [ ] ecchymoses [ ] cyanosis

## 2018-01-07 NOTE — PROGRESS NOTE ADULT - PROBLEM SELECTOR PLAN 2
as per cardio,  2/2 demand, no evidence of acute ischemic changes, EKG sr, preserved EF on last echo (July)   ce neg

## 2018-01-07 NOTE — PROGRESS NOTE ADULT - PROBLEM SELECTOR PLAN 5
continue with carvedilol and hydralazine with hold parameters  hydralazine dose increase noted. will need new rx on discharge

## 2018-01-07 NOTE — PROGRESS NOTE ADULT - PROBLEM SELECTOR PLAN 1
on ABX  on NEBS  keep HOB elev  oral and skin care  am WBC pending  monitor vs and sat  increase activity with assist  prognosis guarded  supportive care and regimen

## 2018-01-07 NOTE — PROGRESS NOTE ADULT - ATTENDING COMMENTS
D/W daughter that no need to treat a white blood cell count.  Treating pt for clinical improvement. Difficult to interpret wbc with administration of steroid, but she states it was only once and should not be high.  I told her I would not be making any antibx changes based on wbc alone.

## 2018-01-07 NOTE — PROGRESS NOTE ADULT - ASSESSMENT
89 yo female admitted with cough productive of yellow sputum, +RVP for  HKU1 Coronavirus, decreased BS in right base and increased opac right base on CXR  CT chest with angio neg for pe

## 2018-01-08 LAB
HCT VFR BLD CALC: 33.2 % — LOW (ref 34.5–45)
HGB BLD-MCNC: 10.8 G/DL — LOW (ref 11.5–15.5)
MCHC RBC-ENTMCNC: 28.7 PG — SIGNIFICANT CHANGE UP (ref 27–34)
MCHC RBC-ENTMCNC: 32.7 GM/DL — SIGNIFICANT CHANGE UP (ref 32–36)
MCV RBC AUTO: 87.6 FL — SIGNIFICANT CHANGE UP (ref 80–100)
PLATELET # BLD AUTO: 331 K/UL — SIGNIFICANT CHANGE UP (ref 150–400)
RBC # BLD: 3.78 M/UL — LOW (ref 3.8–5.2)
RBC # FLD: 13.2 % — SIGNIFICANT CHANGE UP (ref 10.3–14.5)
WBC # BLD: 18.6 K/UL — HIGH (ref 3.8–10.5)
WBC # FLD AUTO: 18.6 K/UL — HIGH (ref 3.8–10.5)

## 2018-01-08 PROCEDURE — 99232 SBSQ HOSP IP/OBS MODERATE 35: CPT

## 2018-01-08 RX ADMIN — Medication 1 TABLET(S): at 18:20

## 2018-01-08 RX ADMIN — Medication 3 MILLILITER(S): at 08:06

## 2018-01-08 RX ADMIN — Medication 3 MILLILITER(S): at 23:06

## 2018-01-08 RX ADMIN — CARVEDILOL PHOSPHATE 25 MILLIGRAM(S): 80 CAPSULE, EXTENDED RELEASE ORAL at 05:01

## 2018-01-08 RX ADMIN — CARVEDILOL PHOSPHATE 25 MILLIGRAM(S): 80 CAPSULE, EXTENDED RELEASE ORAL at 18:20

## 2018-01-08 RX ADMIN — Medication 1 TABLET(S): at 10:29

## 2018-01-08 RX ADMIN — Medication 75 MILLIGRAM(S): at 21:38

## 2018-01-08 RX ADMIN — Medication 3 MILLILITER(S): at 15:17

## 2018-01-08 RX ADMIN — Medication 4 MILLIGRAM(S): at 21:36

## 2018-01-08 RX ADMIN — Medication 325 MILLIGRAM(S): at 11:09

## 2018-01-08 RX ADMIN — Medication 75 MILLIGRAM(S): at 05:01

## 2018-01-08 RX ADMIN — CEFTRIAXONE 100 GRAM(S): 500 INJECTION, POWDER, FOR SOLUTION INTRAMUSCULAR; INTRAVENOUS at 10:33

## 2018-01-08 RX ADMIN — Medication 75 MILLIGRAM(S): at 13:32

## 2018-01-08 RX ADMIN — HEPARIN SODIUM 5000 UNIT(S): 5000 INJECTION INTRAVENOUS; SUBCUTANEOUS at 05:01

## 2018-01-08 RX ADMIN — CINACALCET 30 MILLIGRAM(S): 30 TABLET, FILM COATED ORAL at 21:36

## 2018-01-08 RX ADMIN — HEPARIN SODIUM 5000 UNIT(S): 5000 INJECTION INTRAVENOUS; SUBCUTANEOUS at 18:20

## 2018-01-08 NOTE — PROGRESS NOTE ADULT - PROBLEM SELECTOR PLAN 2
PNA  ID following  CT chest reviewed  keep sat > 88 pct  complete ABX - 7 day course  am WBC pending  overall prognosis guarded  assess functional capacity prior to discharge  pt is DNR

## 2018-01-08 NOTE — PROGRESS NOTE ADULT - PROBLEM SELECTOR PLAN 3
per medicine    Thank you for consulting us and involving us in the management of this most interesting and challenging case.     Please Call with any further questions

## 2018-01-08 NOTE — PROVIDER CONTACT NOTE (OTHER) - ASSESSMENT
Pt awake and alert. Afebrile.
pt sitting in chair, skin warm and dry, denies any c/o headache, denies any c/o at this time.
pt lying in bed resting quietly, skin warm and dry. Denies any c/o chest pain, SOB. lightheadedness or dizzyness, c/o only of cough.  Left leg noted to be swollen
BP done with manual cuff, pt offers no complaints

## 2018-01-08 NOTE — PROGRESS NOTE ADULT - PROBLEM SELECTOR PLAN 3
chronic diastolic chf   cardio follow up noted, advised no active chf clinically and lasix held per cardio   - will closely monitor for fluid overload  i/o daily wts

## 2018-01-08 NOTE — PROGRESS NOTE ADULT - ATTENDING COMMENTS
pt seen and dw housestaff, and dw ID.  wbc as noted.  no diarrhea, no other symptoms.  monitor for diarrhea, agree w dc of abx.  check cbc in am.  dw daughter in great detail

## 2018-01-08 NOTE — PROGRESS NOTE ADULT - SUBJECTIVE AND OBJECTIVE BOX
Patient is a 89y old  Female who presents with a chief complaint of coughing (05 Jan 2018 18:28)      INTERVAL HPI/OVERNIGHT EVENTS: Patient seen and examined this morning. Says she is no longer SOB, but gets tired when she walks around.    MEDICATIONS  (STANDING):  ALBUTerol/ipratropium for Nebulization 3 milliLiter(s) Nebulizer every 8 hours  carvedilol 25 milliGRAM(s) Oral every 12 hours  cinacalcet 30 milliGRAM(s) Oral daily  doxazosin 4 milliGRAM(s) Oral at bedtime  ferrous    sulfate 325 milliGRAM(s) Oral daily  heparin  Injectable 5000 Unit(s) SubCutaneous every 12 hours  hydrALAZINE 75 milliGRAM(s) Oral every 8 hours  lactobacillus acidophilus 1 Tablet(s) Oral two times a day with meals    MEDICATIONS  (PRN):  acetaminophen   Tablet 650 milliGRAM(s) Oral every 6 hours PRN For Temp greater than 38 C (100.4 F)    Allergies    Vasotec (Unknown)    Intolerances    REVIEW OF SYSTEMS:  CONSTITUTIONAL: No fever, No chills,No fatigue,No myalgia,No Body ache  EYES: No eye pain, visual disturbances, or discharge  ENMT:  No ear pain, No nose bleed, No vertigo; No sinus or throat pain  NECK: No pain, No stiffness  RESPIRATORY: No cough, wheezing, No  hemoptysis; No shortness of breath  CARDIOVASCULAR: No chest pain, palpitations, leg swelling  GASTROINTESTINAL: No abdominal or epigastric pain. No nausea, No vomiting; No diarrhea or constipation. [ ] BM  GENITOURINARY: No dysuria, No frequency, No urgency, No hematuria, or incontinence  NEUROLOGICAL: alert and oriented x 3,  No headaches, No dizziness, No numbness,  SKIN:   No itching, burning, rashes, or lesions   MUSCULOSKELETAL: No joint pain or swelling; No muscle pain, No back pain, No extremity pain      Height (cm): 157.48 (01-02 @ 08:17)  Weight (kg): 52.2 (01-02 @ 08:17)  BMI (kg/m2): 21 (01-02 @ 08:17)  BSA (m2): 1.51 (01-02 @ 08:17)  Vital Signs Last 24 Hrs  T(C): 36.9 (08 Jan 2018 08:15), Max: 37 (08 Jan 2018 04:57)  T(F): 98.5 (08 Jan 2018 08:15), Max: 98.6 (08 Jan 2018 04:57)  HR: 60 (08 Jan 2018 08:15) (60 - 90)  BP: 135/56 (08 Jan 2018 08:15) (135/56 - 174/74)  RR: 18 (08 Jan 2018 08:15) (16 - 19)  SpO2: 96% (08 Jan 2018 08:15) (95% - 99%)    PHYSICAL EXAM:  PHYSICAL EXAM:  GENERAL:  No acute distresss, elderly female sitting up in chair  HEAD:  normal  ENMT: moist mucous membranes, clear conjuntiva  NECK:  normal  NERVOUS SYSTEM:  Alert & Oriented X3, no focal deficits, sensation intact  CHEST/LUNG: Clear to auscultation bilaterally, Decreased breath sounds in left base, RRR, no murmurs  ABDOMEN:  soft, nontender, nondistended  EXTREMITIES: No clubbing, cyanosis or edema  SKIN: warm and dry    LABS:      Ca    9.4        07 Jan 2018 08:13    CAPILLARY BLOOD GLUCOSE    Cultures  Culture Results:   No growth at 5 days. (01-02 @ 12:02)  Culture Results:   No growth at 5 days. (01-02 @ 12:02)      Care Discussed with [X] Consultants  [ ] Patient  [ ] Family  [X]   /   [ ] Other; RN  DVT prophylaxis [ ] lovenox   [ X] subq heparin  [ ] coumadin  [ ] venodynes [ ] ambulating frequently at how risk for vte and no pharm         or  mechanical prophylaxis required    [ ] other

## 2018-01-08 NOTE — PROGRESS NOTE ADULT - SUBJECTIVE AND OBJECTIVE BOX
Date/Time Patient Seen:  		  Referring MD:   Data Reviewed	       Patient is a 89y old  Female who presents with a chief complaint of coughing (05 Jan 2018 18:28)    in bed  seen and examined  vs and meds reviewed  on o2    Subjective/HPI     PAST MEDICAL & SURGICAL HISTORY:  Anemia, unspecified type  Edema of lower extremity  Heart murmur  Hypertension  Hypercalcemia  History of appendectomy  S/P tonsillectomy  H/O parathyroidectomy        Medication list         MEDICATIONS  (STANDING):  ALBUTerol/ipratropium for Nebulization 3 milliLiter(s) Nebulizer every 8 hours  carvedilol 25 milliGRAM(s) Oral every 12 hours  cefTRIAXone   IVPB 1 Gram(s) IV Intermittent every 24 hours  cinacalcet 30 milliGRAM(s) Oral daily  doxazosin 4 milliGRAM(s) Oral at bedtime  ferrous    sulfate 325 milliGRAM(s) Oral daily  heparin  Injectable 5000 Unit(s) SubCutaneous every 12 hours  hydrALAZINE 75 milliGRAM(s) Oral every 8 hours  lactobacillus acidophilus 1 Tablet(s) Oral two times a day with meals    MEDICATIONS  (PRN):  acetaminophen   Tablet 650 milliGRAM(s) Oral every 6 hours PRN For Temp greater than 38 C (100.4 F)         Vitals log        ICU Vital Signs Last 24 Hrs  T(C): 37 (08 Jan 2018 04:57), Max: 37 (08 Jan 2018 04:57)  T(F): 98.6 (08 Jan 2018 04:57), Max: 98.6 (08 Jan 2018 04:57)  HR: 65 (08 Jan 2018 04:57) (62 - 90)  BP: 151/54 (08 Jan 2018 04:57) (132/67 - 174/74)  BP(mean): --  ABP: --  ABP(mean): --  RR: 16 (08 Jan 2018 04:57) (16 - 19)  SpO2: 95% (08 Jan 2018 04:57) (95% - 99%)           Input and Output:  I&O's Detail    07 Jan 2018 07:01  -  08 Jan 2018 07:00  --------------------------------------------------------  IN:    Oral Fluid: 180 mL  Total IN: 180 mL    OUT:  Total OUT: 0 mL    Total NET: 180 mL          Lab Data                        10.4   14.8  )-----------( 318      ( 07 Jan 2018 08:13 )             30.8     01-07    136  |  96  |  38<H>  ----------------------------<  107<H>  3.7   |  31  |  0.90    Ca    9.4      07 Jan 2018 08:13              Review of Systems	      Objective     Physical Examination    frail  weak  verbal  lungs dec BS  occ crackles      Pertinent Lab findings & Imaging      Richard:  NO   Adequate UO     I&O's Detail    07 Jan 2018 07:01  -  08 Jan 2018 07:00  --------------------------------------------------------  IN:    Oral Fluid: 180 mL  Total IN: 180 mL    OUT:  Total OUT: 0 mL    Total NET: 180 mL               Discussed with:     Cultures:	        Radiology

## 2018-01-08 NOTE — PROVIDER CONTACT NOTE (OTHER) - ACTION/TREATMENT ORDERED:
ns bolus
No orders received from ID Doctor
Dr. Lagunas ordered to administer hydralazine early, med given as ordered.
stat labs and u/s ordered, Dr. Lagunas to speak to cardiologist for further orders.  Pt resting quietly in bed, call bell withinreach.

## 2018-01-08 NOTE — PROGRESS NOTE ADULT - SUBJECTIVE AND OBJECTIVE BOX
infectious diseases progress note:    SAIRA SANTANA is a 89y y. o. Female patient    Patient reports: feeling better    ROS:    EYES:  Negative  blurry vision or double vision  GASTROINTESTINAL:  Negative for nausea, vomiting, diarrhea  -otherwise negative except for subjective    Allergies    Vasotec (Unknown)    Intolerances        ANTIBIOTICS/RELEVANT:  antimicrobials  cefTRIAXone   IVPB 1 Gram(s) IV Intermittent every 24 hours    immunologic:    OTHER:  acetaminophen   Tablet 650 milliGRAM(s) Oral every 6 hours PRN  ALBUTerol/ipratropium for Nebulization 3 milliLiter(s) Nebulizer every 8 hours  carvedilol 25 milliGRAM(s) Oral every 12 hours  cinacalcet 30 milliGRAM(s) Oral daily  doxazosin 4 milliGRAM(s) Oral at bedtime  ferrous    sulfate 325 milliGRAM(s) Oral daily  heparin  Injectable 5000 Unit(s) SubCutaneous every 12 hours  hydrALAZINE 75 milliGRAM(s) Oral every 8 hours  lactobacillus acidophilus 1 Tablet(s) Oral two times a day with meals      Objective:  Last 24-Vital Signs Last 24 Hrs  T(C): 36.9 (08 Jan 2018 08:15), Max: 37 (08 Jan 2018 04:57)  T(F): 98.5 (08 Jan 2018 08:15), Max: 98.6 (08 Jan 2018 04:57)  HR: 60 (08 Jan 2018 08:15) (60 - 90)  BP: 135/56 (08 Jan 2018 08:15) (135/56 - 174/74)  BP(mean): --  RR: 18 (08 Jan 2018 08:15) (16 - 19)  SpO2: 96% (08 Jan 2018 08:15) (95% - 99%)    T(C): 36.9 (01-08-18 @ 08:15), Max: 37 (01-08-18 @ 04:57)  T(F): 98.5 (01-08-18 @ 08:15), Max: 98.6 (01-08-18 @ 04:57)  T(C): 36.9 (01-08-18 @ 08:15), Max: 37.1 (01-06-18 @ 07:43)  T(F): 98.5 (01-08-18 @ 08:15), Max: 98.8 (01-06-18 @ 07:43)  T(C): 36.9 (01-08-18 @ 08:15), Max: 37.1 (01-06-18 @ 07:43)  T(F): 98.5 (01-08-18 @ 08:15), Max: 98.8 (01-06-18 @ 07:43)    PHYSICAL EXAM:  Constitutional:Well-developed, well nourished  Eyes:PERRLA, EOMI  Ear/Nose/Throat: oropharynx normal	  Neck:no JVD, no lymphadenopathy, supple  Respiratory: no accessory muscle use, lung fields bilaterally clear except for a decrease in RLL  Cardiovascular:RRR, normal S1, S2 no m/r/g  Gastrointestinal:soft, NT, no HSM, BS-normal  Extremities:no clubbing, no cyanosis, edema absent  Neuro-patient alert, oriented and appropriate  Skin-no sig lesions      LABS:                        10.4   14.8  )-----------( 318      ( 07 Jan 2018 08:13 )             30.8       WBC 14.8  01-07 @ 08:13  WBC 14.7  01-06 @ 07:29  WBC 14.2  01-05 @ 06:47  WBC 12.8  01-04 @ 23:50  WBC 15.7  01-04 @ 07:52  WBC 13.2  01-03 @ 07:06  WBC 11.8  01-02 @ 09:02      01-07    136  |  96  |  38<H>  ----------------------------<  107<H>  3.7   |  31  |  0.90    Ca    9.4      07 Jan 2018 08:13              MICROBIOLOGY:        RADIOLOGY & ADDITIONAL STUDIES:

## 2018-01-08 NOTE — PROGRESS NOTE ADULT - PROBLEM SELECTOR PLAN 1
leukocytosis noted, admitted with uri sob  RVP + coronavirus c/w viral pneumonia possible bacterial component as well  -Completed course of Rocephin  -Id follow up.  - continuous pulse ox, keep 02 sat >90  check room air o2 sat

## 2018-01-08 NOTE — PROGRESS NOTE ADULT - SUBJECTIVE AND OBJECTIVE BOX
[facility] - Christofer Isaacs, Sorin, Marisa, Jr, Sandro Hagen  Office Number:  [phone]    Patient resting comfortably in bed in NAD.  Laying flat with no respiratory distress.  No complaints of chest pain, dyspnea, palpitations, PND, or orthopnea.    MEDICATIONS  (STANDING):  ALBUTerol/ipratropium for Nebulization 3 milliLiter(s) Nebulizer every 8 hours  carvedilol 25 milliGRAM(s) Oral every 12 hours  cefTRIAXone   IVPB 1 Gram(s) IV Intermittent every 24 hours  cinacalcet 30 milliGRAM(s) Oral daily  doxazosin 4 milliGRAM(s) Oral at bedtime  ferrous    sulfate 325 milliGRAM(s) Oral daily  heparin  Injectable 5000 Unit(s) SubCutaneous every 12 hours  hydrALAZINE 75 milliGRAM(s) Oral every 8 hours  lactobacillus acidophilus 1 Tablet(s) Oral two times a day with meals    MEDICATIONS  (PRN):  acetaminophen   Tablet 650 milliGRAM(s) Oral every 6 hours PRN For Temp greater than 38 C (100.4 F)      Allergies    Vasotec (Unknown)        Vital Signs Last 24 Hrs  T(C): 36.9 (08 Jan 2018 08:15), Max: 37 (08 Jan 2018 04:57)  T(F): 98.5 (08 Jan 2018 08:15), Max: 98.6 (08 Jan 2018 04:57)  HR: 60 (08 Jan 2018 08:15) (60 - 90)  BP: 135/56 (08 Jan 2018 08:15) (135/56 - 174/74)  BP(mean): --  RR: 18 (08 Jan 2018 08:15) (16 - 19)  SpO2: 96% (08 Jan 2018 08:15) (95% - 99%)    I&O's Summary    07 Jan 2018 07:01  -  08 Jan 2018 07:00  --------------------------------------------------------  IN: 180 mL / OUT: 0 mL / NET: 180 mL        ON EXAM:    General: NAD, awake and alert, oriented x 3  HEENT: Mucous membranes are moist, anicteric  Lungs: Non-labored, breath sounds are clear bilaterally, No wheezing, rales or rhonchi.  Decreased breath sounds b/l  Cardiovascular: Regular, S1 and S2, 1/6 systolic murmur  Gastrointestinal: Bowel Sounds present, soft, nontender.   Lymph: No peripheral edema. No lymphadenopathy.  Skin: No rashes or ulcers  Psych:  Mood & affect appropriate    LABS: All Labs Reviewed:                        10.4   14.8  )-----------( 318      ( 07 Jan 2018 08:13 )             30.8                         10.5   14.7  )-----------( 346      ( 06 Jan 2018 07:29 )             32.2     07 Jan 2018 08:13    136    |  96     |  38     ----------------------------<  107    3.7     |  31     |  0.90   06 Jan 2018 07:29    137    |  99     |  40     ----------------------------<  99     4.2     |  29     |  0.91     Ca    9.4        07 Jan 2018 08:13  Ca    9.5        06 Jan 2018 07:29        01-06 @ 07:29  Pro Bnp [id number]

## 2018-01-08 NOTE — PROVIDER CONTACT NOTE (OTHER) - SITUATION
as above BP elevated
as above pts heart rate increasing ST and decreasing to rates as above, and this keeps repeating
BP 94/48
Notified Dr Whitehead of WBC 18.6 today

## 2018-01-08 NOTE — PROGRESS NOTE ADULT - ASSESSMENT
89 yo F pmhx diastolic chf (EF 65%, echo July 2017), htn, anemia, p/w cough productive with yellow sputum and runny nose for 1 week. She is felt to have probable viral pneumonia but remains on antibiotic therapy. She has no evidence for volume overload on examination. Her blood pressure is improved.      - Patient is a DNR  - there is no evidence of acute ischemia.  - ce without evolution 1/4-1/5  - No clinical evidence of SVT  - Continue current dose of Coreg  - BP improved on current dose of hydralzine  Continue   - there is no evidence for meaningful  volume overload.  Pro-BNP improved  - Continue antibiotics for rll pna as per primary  - Previous ECHO July 2017: EF 65%, nomal left ventricular systolic function, grade 1 diastolic dysfunction  - Monitor and replete lytes, keep K>4, Mg>2  - Other cardiovascular workup will depend on clinical course.

## 2018-01-09 LAB
ANION GAP SERPL CALC-SCNC: 7 MMOL/L — SIGNIFICANT CHANGE UP (ref 5–17)
BASOPHILS NFR BLD AUTO: 1 % — SIGNIFICANT CHANGE UP (ref 0–2)
BUN SERPL-MCNC: 36 MG/DL — HIGH (ref 7–23)
CALCIUM SERPL-MCNC: 9.4 MG/DL — SIGNIFICANT CHANGE UP (ref 8.5–10.1)
CHLORIDE SERPL-SCNC: 96 MMOL/L — SIGNIFICANT CHANGE UP (ref 96–108)
CO2 SERPL-SCNC: 32 MMOL/L — HIGH (ref 22–31)
CREAT SERPL-MCNC: 0.95 MG/DL — SIGNIFICANT CHANGE UP (ref 0.5–1.3)
EOSINOPHIL NFR BLD AUTO: 1 % — SIGNIFICANT CHANGE UP (ref 0–6)
GLUCOSE SERPL-MCNC: 92 MG/DL — SIGNIFICANT CHANGE UP (ref 70–99)
HCT VFR BLD CALC: 29.9 % — LOW (ref 34.5–45)
HCT VFR BLD CALC: 31.7 % — LOW (ref 34.5–45)
HGB BLD-MCNC: 10 G/DL — LOW (ref 11.5–15.5)
HGB BLD-MCNC: 10.3 G/DL — LOW (ref 11.5–15.5)
LYMPHOCYTES # BLD AUTO: 10 % — LOW (ref 13–44)
MCHC RBC-ENTMCNC: 28.4 PG — SIGNIFICANT CHANGE UP (ref 27–34)
MCHC RBC-ENTMCNC: 28.8 PG — SIGNIFICANT CHANGE UP (ref 27–34)
MCHC RBC-ENTMCNC: 32.5 GM/DL — SIGNIFICANT CHANGE UP (ref 32–36)
MCHC RBC-ENTMCNC: 33.3 GM/DL — SIGNIFICANT CHANGE UP (ref 32–36)
MCV RBC AUTO: 86.7 FL — SIGNIFICANT CHANGE UP (ref 80–100)
MCV RBC AUTO: 87.4 FL — SIGNIFICANT CHANGE UP (ref 80–100)
MONOCYTES NFR BLD AUTO: 6 % — SIGNIFICANT CHANGE UP (ref 1–9)
NEUTROPHILS NFR BLD AUTO: 79 % — HIGH (ref 43–77)
PLATELET # BLD AUTO: 297 K/UL — SIGNIFICANT CHANGE UP (ref 150–400)
PLATELET # BLD AUTO: 308 K/UL — SIGNIFICANT CHANGE UP (ref 150–400)
POTASSIUM SERPL-MCNC: 4.1 MMOL/L — SIGNIFICANT CHANGE UP (ref 3.5–5.3)
POTASSIUM SERPL-SCNC: 4.1 MMOL/L — SIGNIFICANT CHANGE UP (ref 3.5–5.3)
PROCALCITONIN SERPL-MCNC: <0.05 — SIGNIFICANT CHANGE UP (ref 0–0.04)
RBC # BLD: 3.45 M/UL — LOW (ref 3.8–5.2)
RBC # BLD: 3.62 M/UL — LOW (ref 3.8–5.2)
RBC # FLD: 13.2 % — SIGNIFICANT CHANGE UP (ref 10.3–14.5)
RBC # FLD: 13.5 % — SIGNIFICANT CHANGE UP (ref 10.3–14.5)
SODIUM SERPL-SCNC: 135 MMOL/L — SIGNIFICANT CHANGE UP (ref 135–145)
WBC # BLD: 18.6 K/UL — HIGH (ref 3.8–10.5)
WBC # BLD: 20 K/UL — HIGH (ref 3.8–10.5)
WBC # FLD AUTO: 18.6 K/UL — HIGH (ref 3.8–10.5)
WBC # FLD AUTO: 20 K/UL — HIGH (ref 3.8–10.5)

## 2018-01-09 PROCEDURE — 71046 X-RAY EXAM CHEST 2 VIEWS: CPT | Mod: 26

## 2018-01-09 PROCEDURE — 99232 SBSQ HOSP IP/OBS MODERATE 35: CPT

## 2018-01-09 RX ORDER — FUROSEMIDE 40 MG
40 TABLET ORAL DAILY
Qty: 0 | Refills: 0 | Status: DISCONTINUED | OUTPATIENT
Start: 2018-01-09 | End: 2018-01-13

## 2018-01-09 RX ORDER — CARVEDILOL PHOSPHATE 80 MG/1
1 CAPSULE, EXTENDED RELEASE ORAL
Qty: 60 | Refills: 0 | OUTPATIENT
Start: 2018-01-09 | End: 2018-02-07

## 2018-01-09 RX ORDER — POTASSIUM CHLORIDE 20 MEQ
10 PACKET (EA) ORAL DAILY
Qty: 0 | Refills: 0 | Status: DISCONTINUED | OUTPATIENT
Start: 2018-01-10 | End: 2018-01-13

## 2018-01-09 RX ADMIN — CARVEDILOL PHOSPHATE 25 MILLIGRAM(S): 80 CAPSULE, EXTENDED RELEASE ORAL at 18:12

## 2018-01-09 RX ADMIN — Medication 1 TABLET(S): at 09:10

## 2018-01-09 RX ADMIN — Medication 4 MILLIGRAM(S): at 22:36

## 2018-01-09 RX ADMIN — Medication 325 MILLIGRAM(S): at 11:24

## 2018-01-09 RX ADMIN — Medication 75 MILLIGRAM(S): at 22:36

## 2018-01-09 RX ADMIN — Medication 40 MILLIGRAM(S): at 14:51

## 2018-01-09 RX ADMIN — Medication 1 TABLET(S): at 18:12

## 2018-01-09 RX ADMIN — Medication 75 MILLIGRAM(S): at 05:12

## 2018-01-09 RX ADMIN — HEPARIN SODIUM 5000 UNIT(S): 5000 INJECTION INTRAVENOUS; SUBCUTANEOUS at 05:12

## 2018-01-09 RX ADMIN — Medication 3 MILLILITER(S): at 20:55

## 2018-01-09 RX ADMIN — Medication 3 MILLILITER(S): at 07:58

## 2018-01-09 RX ADMIN — CINACALCET 30 MILLIGRAM(S): 30 TABLET, FILM COATED ORAL at 22:36

## 2018-01-09 RX ADMIN — Medication 75 MILLIGRAM(S): at 14:51

## 2018-01-09 RX ADMIN — CARVEDILOL PHOSPHATE 25 MILLIGRAM(S): 80 CAPSULE, EXTENDED RELEASE ORAL at 05:12

## 2018-01-09 RX ADMIN — HEPARIN SODIUM 5000 UNIT(S): 5000 INJECTION INTRAVENOUS; SUBCUTANEOUS at 18:12

## 2018-01-09 NOTE — PROGRESS NOTE ADULT - SUBJECTIVE AND OBJECTIVE BOX
Patient is a 89y old  Female who presents with a chief complaint of coughing (05 Jan 2018 18:28)      INTERVAL HPI/OVERNIGHT EVENTS: Patient seen and examine amado bedside. Patient denies any new complaints.     MEDICATIONS  (STANDING):  ALBUTerol/ipratropium for Nebulization 3 milliLiter(s) Nebulizer every 8 hours  carvedilol 25 milliGRAM(s) Oral every 12 hours  cinacalcet 30 milliGRAM(s) Oral daily  doxazosin 4 milliGRAM(s) Oral at bedtime  ferrous    sulfate 325 milliGRAM(s) Oral daily  heparin  Injectable 5000 Unit(s) SubCutaneous every 12 hours  hydrALAZINE 75 milliGRAM(s) Oral every 8 hours  lactobacillus acidophilus 1 Tablet(s) Oral two times a day with meals    MEDICATIONS  (PRN):  acetaminophen   Tablet 650 milliGRAM(s) Oral every 6 hours PRN For Temp greater than 38 C (100.4 F)    Allergies    Vasotec (Unknown)    Intolerances    REVIEW OF SYSTEMS:  CONSTITUTIONAL: No fever, No chills,No fatigue,No myalgia,No Body ache  EYES: No eye pain, visual disturbances, or discharge  ENMT:  No ear pain, No nose bleed, No vertigo; No sinus or throat pain  NECK: No pain, No stiffness  RESPIRATORY: No cough, wheezing, No  hemoptysis; No shortness of breath  CARDIOVASCULAR: No chest pain, palpitations, leg swelling  GASTROINTESTINAL: No abdominal or epigastric pain. No nausea, No vomiting; No diarrhea or constipation. [ ] BM  GENITOURINARY: No dysuria, No frequency, No urgency, No hematuria, or incontinence  NEUROLOGICAL: alert and oriented x 3,  No headaches, No dizziness, No numbness,  SKIN:   No itching, burning, rashes, or lesions   MUSCULOSKELETAL: No joint pain or swelling; No muscle pain, No back pain, No extremity pain    Height (cm): 157.48 (01-02 @ 08:17)  Weight (kg): 52.2 (01-02 @ 08:17)  BMI (kg/m2): 21 (01-02 @ 08:17)  BSA (m2): 1.51 (01-02 @ 08:17)  Vital Signs Last 24 Hrs  T(C): 36.7 (09 Jan 2018 08:08), Max: 36.8 (08 Jan 2018 16:03)  T(F): 98.1 (09 Jan 2018 08:08), Max: 98.3 (08 Jan 2018 16:03)  HR: 60 (09 Jan 2018 11:22) (56 - 89)  BP: 135/68 (09 Jan 2018 08:08) (135/56 - 180/63)  RR: 18 (09 Jan 2018 08:08) (16 - 20)  SpO2: 93% (09 Jan 2018 11:22) (88% - 98%)    PHYSICAL EXAM:  GENERAL:  No acute distresss, elderly frail female, sitting in chair  HEAD:  normocephalic, atraumatic  ENMT: Clear conjunctiva, moist mucous membranes  NECK:  No JVD  NERVOUS SYSTEM:  Alert & Oriented X3, Follows commands, normal strength  CHEST/LUNG:  Rhonchi at bases bilaterally, no wheeze, rales  HEART:  Regular rate and rhythm, No murmurs  ABDOMEN:  soft, nontender, nondistended  EXTREMITIES: No clubbing, cyanosis or edema  SKIN: warm and dry    LABS:                        10.0   20.0  )-----------( 308      ( 09 Jan 2018 03:58 )             29.9     09 Jan 2018 03:58    135    |  96     |  36     ----------------------------<  92     4.1     |  32     |  0.95     Ca    9.4        09 Jan 2018 03:58      Cultures  Culture Results:   No growth at 5 days. (01-02 @ 12:02)  Culture Results:   No growth at 5 days. (01-02 @ 12:02)    Care Discussed with [X] Consultants  [x ] Patient  [ ] Family  [X]   /   [ ] Other; RN  DVT prophylaxis [ ] lovenox   [ x] subq heparin  [ ] coumadin  [ ] venodynes [ ] ambulating frequently at how risk for vte and no pharm   or  mechanical prophylaxis required    [ ] other   Discussed with pt @ bedside Patient is a 89y old  Female who presents with a chief complaint of coughing (05 Jan 2018 18:28)      INTERVAL HPI/OVERNIGHT EVENTS: Patient seen and examine amado bedside. Patient denies any new complaints.     MEDICATIONS  (STANDING):  ALBUTerol/ipratropium for Nebulization 3 milliLiter(s) Nebulizer every 8 hours  carvedilol 25 milliGRAM(s) Oral every 12 hours  cinacalcet 30 milliGRAM(s) Oral daily  doxazosin 4 milliGRAM(s) Oral at bedtime  ferrous    sulfate 325 milliGRAM(s) Oral daily  heparin  Injectable 5000 Unit(s) SubCutaneous every 12 hours  hydrALAZINE 75 milliGRAM(s) Oral every 8 hours  lactobacillus acidophilus 1 Tablet(s) Oral two times a day with meals    MEDICATIONS  (PRN):  acetaminophen   Tablet 650 milliGRAM(s) Oral every 6 hours PRN For Temp greater than 38 C (100.4 F)    Allergies    Vasotec (Unknown)    Intolerances    REVIEW OF SYSTEMS:  CONSTITUTIONAL: No fever, No chills,No fatigue,No myalgia,No Body ache  EYES: No eye pain, visual disturbances, or discharge  ENMT:  No ear pain, No nose bleed, No vertigo; No sinus or throat pain  NECK: No pain, No stiffness  RESPIRATORY: No cough, wheezing, No  hemoptysis; No shortness of breath  CARDIOVASCULAR: No chest pain, palpitations, leg swelling  GASTROINTESTINAL: No abdominal or epigastric pain. No nausea, No vomiting; No diarrhea or constipation.   GENITOURINARY: No dysuria, No frequency, No urgency, No hematuria, or incontinence  NEUROLOGICAL: alert and oriented x 3,  No headaches, No dizziness, No numbness,  SKIN:   No itching, burning, rashes, or lesions   MUSCULOSKELETAL: No joint pain or swelling; No muscle pain, No back pain, No extremity pain    Height (cm): 157.48 (01-02 @ 08:17)  Weight (kg): 52.2 (01-02 @ 08:17)  BMI (kg/m2): 21 (01-02 @ 08:17)  BSA (m2): 1.51 (01-02 @ 08:17)  Vital Signs Last 24 Hrs  T(C): 36.7 (09 Jan 2018 08:08), Max: 36.8 (08 Jan 2018 16:03)  T(F): 98.1 (09 Jan 2018 08:08), Max: 98.3 (08 Jan 2018 16:03)  HR: 60 (09 Jan 2018 11:22) (56 - 89)  BP: 135/68 (09 Jan 2018 08:08) (135/56 - 180/63)  RR: 18 (09 Jan 2018 08:08) (16 - 20)  SpO2: 93% (09 Jan 2018 11:22) (88% - 98%)    PHYSICAL EXAM:  GENERAL:  No acute distresss, elderly frail female, sitting in chair  HEAD:  normocephalic, atraumatic  ENMT: Clear conjunctiva, moist mucous membranes  NECK:  No JVD  NERVOUS SYSTEM:  Alert & Oriented X3, Follows commands, normal strength  CHEST/LUNG:  Rhonchi at bases bilaterally, no wheeze, rales  HEART:  Regular rate and rhythm, No murmurs  ABDOMEN:  soft, nontender, nondistended  EXTREMITIES: No clubbing, cyanosis or edema  SKIN: warm and dry    LABS:                        10.0   20.0  )-----------( 308      ( 09 Jan 2018 03:58 )             29.9     09 Jan 2018 03:58    135    |  96     |  36     ----------------------------<  92     4.1     |  32     |  0.95     Ca    9.4        09 Jan 2018 03:58      Cultures  Culture Results:   No growth at 5 days. (01-02 @ 12:02)  Culture Results:   No growth at 5 days. (01-02 @ 12:02)    Care Discussed with [X] Consultants  [x ] Patient  [ ] Family  [X]   /   [ ] Other; RN  DVT prophylaxis [ ] lovenox   [ x] subq heparin  [ ] coumadin  [ ] venodynes [ ] ambulating frequently at how risk for vte and no pharm   or  mechanical prophylaxis required    [ ] other   Discussed with pt @ bedside Patient is a 89y old  Female who presents with a chief complaint of coughing (05 Jan 2018 18:28)      INTERVAL HPI/OVERNIGHT EVENTS: Patient seen and examine at bedside. Patient denies any new complaints.     MEDICATIONS  (STANDING):  ALBUTerol/ipratropium for Nebulization 3 milliLiter(s) Nebulizer every 8 hours  carvedilol 25 milliGRAM(s) Oral every 12 hours  cinacalcet 30 milliGRAM(s) Oral daily  doxazosin 4 milliGRAM(s) Oral at bedtime  ferrous    sulfate 325 milliGRAM(s) Oral daily  heparin  Injectable 5000 Unit(s) SubCutaneous every 12 hours  hydrALAZINE 75 milliGRAM(s) Oral every 8 hours  lactobacillus acidophilus 1 Tablet(s) Oral two times a day with meals    MEDICATIONS  (PRN):  acetaminophen   Tablet 650 milliGRAM(s) Oral every 6 hours PRN For Temp greater than 38 C (100.4 F)    Allergies    Vasotec (Unknown)    Intolerances    REVIEW OF SYSTEMS:  CONSTITUTIONAL: No fever, No chills,No fatigue,No myalgia,No Body ache  EYES: No eye pain, visual disturbances, or discharge  ENMT:  No ear pain, No nose bleed, No vertigo; No sinus or throat pain  NECK: No pain, No stiffness  RESPIRATORY: No cough, wheezing, No  hemoptysis; No shortness of breath  CARDIOVASCULAR: No chest pain, palpitations, leg swelling  GASTROINTESTINAL: No abdominal or epigastric pain. No nausea, No vomiting; No diarrhea or constipation.   GENITOURINARY: No dysuria, No frequency, No urgency, No hematuria, or incontinence  NEUROLOGICAL: alert and oriented x 3,  No headaches, No dizziness, No numbness,  SKIN:   No itching, burning, rashes, or lesions   MUSCULOSKELETAL: No joint pain or swelling; No muscle pain, No back pain, No extremity pain    Height (cm): 157.48 (01-02 @ 08:17)  Weight (kg): 52.2 (01-02 @ 08:17)  BMI (kg/m2): 21 (01-02 @ 08:17)  BSA (m2): 1.51 (01-02 @ 08:17)  Vital Signs Last 24 Hrs  T(C): 36.7 (09 Jan 2018 08:08), Max: 36.8 (08 Jan 2018 16:03)  T(F): 98.1 (09 Jan 2018 08:08), Max: 98.3 (08 Jan 2018 16:03)  HR: 60 (09 Jan 2018 11:22) (56 - 89)  BP: 135/68 (09 Jan 2018 08:08) (135/56 - 180/63)  RR: 18 (09 Jan 2018 08:08) (16 - 20)  SpO2: 93% (09 Jan 2018 11:22) (88% - 98%)    PHYSICAL EXAM:  GENERAL:  No acute distresss, elderly frail female, sitting in chair  HEAD:  normocephalic, atraumatic  ENMT: Clear conjunctiva, moist mucous membranes  NECK:  No JVD  NERVOUS SYSTEM:  Alert & Oriented X3, Follows commands, normal strength  CHEST/LUNG:  Rhonchi at bases bilaterally, no wheeze, rales  HEART:  Regular rate and rhythm, No murmurs  ABDOMEN:  soft, nontender, nondistended  EXTREMITIES: No clubbing, cyanosis or edema  SKIN: warm and dry    LABS:                        10.0   20.0  )-----------( 308      ( 09 Jan 2018 03:58 )             29.9     09 Jan 2018 03:58    135    |  96     |  36     ----------------------------<  92     4.1     |  32     |  0.95     Ca    9.4        09 Jan 2018 03:58      Cultures  Culture Results:   No growth at 5 days. (01-02 @ 12:02)  Culture Results:   No growth at 5 days. (01-02 @ 12:02)    Care Discussed with [X] Consultants  [x ] Patient  [ ] Family  [X]   /   [ ] Other; RN  DVT prophylaxis [ ] lovenox   [ x] subq heparin  [ ] coumadin  [ ] venodynes [ ] ambulating frequently at how risk for vte and no pharm   or  mechanical prophylaxis required    [ ] other   Discussed with pt @ bedside Patient is a 89y old  Female who presents with a chief complaint of coughing (05 Jan 2018 18:28)      INTERVAL HPI/OVERNIGHT EVENTS: Patient seen and examine at bedside. Patient denies any new complaints however is still hypoxic 87 on room air with loose cough    MEDICATIONS  (STANDING):  ALBUTerol/ipratropium for Nebulization 3 milliLiter(s) Nebulizer every 8 hours  carvedilol 25 milliGRAM(s) Oral every 12 hours  cinacalcet 30 milliGRAM(s) Oral daily  doxazosin 4 milliGRAM(s) Oral at bedtime  ferrous    sulfate 325 milliGRAM(s) Oral daily  heparin  Injectable 5000 Unit(s) SubCutaneous every 12 hours  hydrALAZINE 75 milliGRAM(s) Oral every 8 hours  lactobacillus acidophilus 1 Tablet(s) Oral two times a day with meals    MEDICATIONS  (PRN):  acetaminophen   Tablet 650 milliGRAM(s) Oral every 6 hours PRN For Temp greater than 38 C (100.4 F)    Allergies    Vasotec (Unknown)    Intolerances    REVIEW OF SYSTEMS:  CONSTITUTIONAL: No fever, No chills,No fatigue,No myalgia,No Body ache  EYES: No eye pain, visual disturbances, or discharge  ENMT:  No ear pain, No nose bleed, No vertigo; No sinus or throat pain  NECK: No pain, No stiffness  RESPIRATORY: pos cough, no wheezing, No  hemoptysis; No shortness of breath  CARDIOVASCULAR: No chest pain, palpitations, leg swelling  GASTROINTESTINAL: No abdominal or epigastric pain. No nausea, No vomiting; No diarrhea or constipation.   GENITOURINARY: No dysuria, No frequency, No urgency, No hematuria, or incontinence  NEUROLOGICAL: alert and oriented x 2-3,  No headaches, No dizziness, No numbness,  SKIN:   No itching, burning, rashes, or lesions   MUSCULOSKELETAL: No joint pain or swelling; No muscle pain, No back pain, No extremity pain    Height (cm): 157.48 (01-02 @ 08:17)  Weight (kg): 52.2 (01-02 @ 08:17)  BMI (kg/m2): 21 (01-02 @ 08:17)  BSA (m2): 1.51 (01-02 @ 08:17)  Vital Signs Last 24 Hrs  T(C): 36.7 (09 Jan 2018 08:08), Max: 36.8 (08 Jan 2018 16:03)  T(F): 98.1 (09 Jan 2018 08:08), Max: 98.3 (08 Jan 2018 16:03)  HR: 60 (09 Jan 2018 11:22) (56 - 89)  BP: 135/68 (09 Jan 2018 08:08) (135/56 - 180/63)  RR: 18 (09 Jan 2018 08:08) (16 - 20)  SpO2: 93% (09 Jan 2018 11:22) (88% - 98%) on room at rest 87 w ambulation    PHYSICAL EXAM:  GENERAL:  No acute distresss, elderly frail female, sitting in chair  HEAD:  normocephalic, atraumatic  ENMT: Clear conjunctiva, moist mucous membranes  NECK:  No JVD  NERVOUS SYSTEM:  Alert & Oriented X2- 3, Follows commands, normal strength  CHEST/LUNG:  Rhonchi at bases bilaterally, no wheeze, rales  HEART:  Regular rate and rhythm, No murmurs  ABDOMEN:  soft, nontender, nondistended  EXTREMITIES: No clubbing, cyanosis or edema  SKIN: warm and dry    LABS:                        10.0   20.0  )-----------( 308      ( 09 Jan 2018 03:58 )             29.9     09 Jan 2018 03:58    135    |  96     |  36     ----------------------------<  92     4.1     |  32     |  0.95     Ca    9.4        09 Jan 2018 03:58      Cultures  Culture Results:   No growth at 5 days. (01-02 @ 12:02)  Culture Results:   No growth at 5 days. (01-02 @ 12:02)    Care Discussed with [X] Consultants  [x ] Patient  [ ] Family  [X]   /   [ ] Other; RN  DVT prophylaxis [ ] lovenox   [ x] subq heparin  [ ] coumadin  [ ] venodynes [ ] ambulating frequently at how risk for vte and no pharm   or  mechanical prophylaxis required    [ ] other   Discussed with pt @ bedside  has hcp

## 2018-01-09 NOTE — PROGRESS NOTE ADULT - PROBLEM SELECTOR PLAN 2
PNA  s/p ABX completed  ID following  leukocytosis, am WBC pending  NEBS for COPD  oral hygiene  skin care  keep sat > 88 pct  out of bed as tolerated  overall very frail and weak

## 2018-01-09 NOTE — CONSULT NOTE ADULT - ASSESSMENT
89 y/o woman w hx diastolic CHF,, HTN, anemia(Hgb ~11range) presenting with cough and congestion severe sob x 1 week.  Workup sig for RVP positive for Cornovirus and CT chest bronchitis.  Pt now much improved, but serial CBC w increasing WBC    -review of michel blood morphology and WBC differential w majority of WBC mature segmented neutrophils, no increased in bands, no immature or abnormal cells. no acute heme process noted. Most c/w w reactive leukocytosis  -no acute heme intervention indicated.    discussed w Dr Issa.    thank you, please call if any questions

## 2018-01-09 NOTE — DIETITIAN INITIAL EVALUATION ADULT. - PROBLEM SELECTOR PLAN 3
Likely 2/2 demand, no evidence of acute ischemic changes, EKG sr, preserved EF on last echo (July) no systolic dysfunction  - will f/u 2nd set

## 2018-01-09 NOTE — PROGRESS NOTE ADULT - SUBJECTIVE AND OBJECTIVE BOX
[facility] -- Christofer Isaacs, Marisa Rowell, Hieu Norris Savella  Office # [phone]      Follow Up:  CHF, dyspnea    Subjective/Observations: Patient seen and examined. Events noted. Resting comfortably in bed. No complaints of chest pain, dyspnea, or palpitations reported. No signs of orthopnea or PND.       REVIEW OF SYSTEMS: All other review of systems is negative unless indicated above    PAST MEDICAL & SURGICAL HISTORY:  Anemia, unspecified type  Edema of lower extremity  Heart murmur  Hypertension  Hypercalcemia  History of appendectomy  S/P tonsillectomy  H/O parathyroidectomy      MEDICATIONS  (STANDING):  ALBUTerol/ipratropium for Nebulization 3 milliLiter(s) Nebulizer every 8 hours  carvedilol 25 milliGRAM(s) Oral every 12 hours  cinacalcet 30 milliGRAM(s) Oral daily  doxazosin 4 milliGRAM(s) Oral at bedtime  ferrous    sulfate 325 milliGRAM(s) Oral daily  furosemide    Tablet 40 milliGRAM(s) Oral daily  heparin  Injectable 5000 Unit(s) SubCutaneous every 12 hours  hydrALAZINE 75 milliGRAM(s) Oral every 8 hours  lactobacillus acidophilus 1 Tablet(s) Oral two times a day with meals    MEDICATIONS  (PRN):  acetaminophen   Tablet 650 milliGRAM(s) Oral every 6 hours PRN For Temp greater than 38 C (100.4 F)      Allergies    Vasotec (Unknown)    Intolerances            Vital Signs Last 24 Hrs  T(C): 36.7 (09 Jan 2018 08:08), Max: 36.8 (08 Jan 2018 16:03)  T(F): 98.1 (09 Jan 2018 08:08), Max: 98.3 (08 Jan 2018 16:03)  HR: 60 (09 Jan 2018 11:22) (56 - 89)  BP: 134/70 (09 Jan 2018 11:22) (134/70 - 180/63)  BP(mean): --  RR: 18 (09 Jan 2018 08:08) (16 - 18)  SpO2: 93% (09 Jan 2018 11:22) (88% - 98%)    I&O's Summary    08 Jan 2018 07:01  -  09 Jan 2018 07:00  --------------------------------------------------------  IN: 410 mL / OUT: 0 mL / NET: 410 mL          PHYSICAL EXAM:  TELE:   Constitutional: NAD, awake and alert, well-developed  HEENT: Moist Mucous Membranes, Anicteric  Pulmonary: Decreased breath sounds b/l. No rales, crackles or wheeze appreciated.   Cardiovascular: Regular, S1 and S2, No murmurs, rubs, gallops or clicks  Gastrointestinal: Bowel Sounds present, soft, nontender.   Lymph: No peripheral edema. No lymphadenopathy.  Skin: No visible rashes or ulcers.  Psych:  Mood & affect appropriate    LABS: All Labs Reviewed:                        10.3   18.6  )-----------( 297      ( 09 Jan 2018 12:44 )             31.7                         10.0   20.0  )-----------( 308      ( 09 Jan 2018 03:58 )             29.9                         10.8   18.6  )-----------( 331      ( 08 Jan 2018 12:40 )             33.2     09 Jan 2018 03:58    135    |  96     |  36     ----------------------------<  92     4.1     |  32     |  0.95   07 Jan 2018 08:13    136    |  96     |  38     ----------------------------<  107    3.7     |  31     |  0.90     Ca    9.4        09 Jan 2018 03:58  Ca    9.4        07 Jan 2018 08:13

## 2018-01-09 NOTE — DIETITIAN INITIAL EVALUATION ADULT. - PROBLEM SELECTOR PLAN 1
with mild hypoxia and sob Admit to tele- no sepsis,  mild leukocytosis, afebrile, no other evidence of system infection at this juncture, RVP + coronavirus c/w viral pneumonia  - continue rocephin and zithromax pending cultures and id eval  - Tylenol prn for fever   - continuous pulse ox, keep 02 sat >90  - supportive treatment  - on ivf at this time, pt tolerating po well, encourage po intake  - f/u blood cultures

## 2018-01-09 NOTE — CONSULT NOTE ADULT - SUBJECTIVE AND OBJECTIVE BOX
All records reviewed.    HPI:  89 y/o woman w hx diastolic CHF,, HTN, anemia presenting with cough and congestion severe sob x 1 week.  Workup sig for RVP positive for Cornovirus and CT chest bronchitis.  Pt now much improved, but serial CBC w increasing WBC    Pt well known to our office, hx iron deficiency anemia, post Venofer last dose 2014, Maintaining stable mild anemia and normal ferritin. Last seen in office 10/13/17, WBC 8.8 Hgb 11.2 Hct 34.9 platelets 241    PAST MEDICAL & SURGICAL HISTORY:  Anemia, unspecified type  Edema of lower extremity  Heart murmur  Hypertension  Hypercalcemia  History of appendectomy  S/P tonsillectomy  H/O parathyroidectomy      Review of System:  reports much improved, coughs sig decreased  denies rash/swelling/pain/diarrhea/SOB/CP/anorexia    MEDICATIONS  (STANDING):  ALBUTerol/ipratropium for Nebulization 3 milliLiter(s) Nebulizer every 8 hours  carvedilol 25 milliGRAM(s) Oral every 12 hours  cinacalcet 30 milliGRAM(s) Oral daily  doxazosin 4 milliGRAM(s) Oral at bedtime  ferrous    sulfate 325 milliGRAM(s) Oral daily  heparin  Injectable 5000 Unit(s) SubCutaneous every 12 hours  hydrALAZINE 75 milliGRAM(s) Oral every 8 hours  lactobacillus acidophilus 1 Tablet(s) Oral two times a day with meals    MEDICATIONS  (PRN):  acetaminophen   Tablet 650 milliGRAM(s) Oral every 6 hours PRN For Temp greater than 38 C (100.4 F)      Allergies    Vasotec (Unknown)    Intolerances        SOCIAL HISTORY:    FAMILY HISTORY:  No pertinent family history in first degree relatives      Vital Signs Last 24 Hrs  T(C): 36.7 (09 Jan 2018 08:08), Max: 36.8 (08 Jan 2018 16:03)  T(F): 98.1 (09 Jan 2018 08:08), Max: 98.3 (08 Jan 2018 16:03)  HR: 60 (09 Jan 2018 11:22) (56 - 89)  BP: 135/68 (09 Jan 2018 08:08) (135/56 - 180/63)  BP(mean): --  RR: 18 (09 Jan 2018 08:08) (16 - 20)  SpO2: 93% (09 Jan 2018 11:22) (88% - 98%)    PHYSICAL EXAM:      General:well developed well nourished, thin, sitting up in chair in no acute distress  Eyes:sclera anicteric, pupils equal and reactive to light  ENMT:buccal mucosa moist, pharynx not injected  Neck:supple, trachea midline  Lungs:clear, no wheeze/rhonchi  Cardiovascular:regular rate and rhythm, S1 S2  Abdomen:soft, nontender, no organomegaly present, bowel sounds normal  Neurological:alert and oriented x3, Cranial Nerves II-XII grossly intact  Skin:no increased ecchymosis/petechiae/purpura  Lymph Nodes:no palpable cervical/supraclavicular lymph nodes enlargements  Extremities:no cyanosis/clubbing/edema        LABS:                        10.0   20.0  )-----------( 308      ( 09 Jan 2018 03:58 )             29.9     01-09 @ 03:58  WBC20.0  RBC3.45 Hgb10.0 Hct29.9  MCV86.7  [id number]  Auto Neutro-- Band-- Auto Lymph-- Atypical Lymph-- Reactive Lymph-- Auto Mono-- Auto Eos-- Auto Baso--        01-08 @ 12:40  WBC18.6  RBC3.78 Hgb10.8 Hct33.2  MCV87.6  [id number]  Auto Neutro-- Band-- Auto Lymph-- Atypical Lymph-- Reactive Lymph-- Auto Mono-- Auto Eos-- Auto Baso--        01-07 @ 08:13  WBC14.8  RBC3.51 Hgb10.4 Hct30.8  MCV87.7  [id number]  Auto Rxqabx58.1 Band-- Auto Lymph13.1 Atypical Lymph-- Reactive Lymph-- Auto Mono6.8 Auto Eos2.1 Auto Baso0.9        01-06 @ 07:29  WBC14.7  RBC3.67 Hgb10.5 Hct32.2  MCV87.7  Hgob121  Auto Neutro-- Band-- Auto Lymph-- Atypical Lymph-- Reactive Lymph-- Auto Mono-- Auto Eos-- Auto Baso--        01-05 @ 06:47  WBC14.2  RBC3.61 Hgb10.4 Hct31.4  MCV87.1  Idsq802  Auto Neutro-- Band-- Auto Lymph-- Atypical Lymph-- Reactive Lymph-- Auto Mono-- Auto Eos-- Auto Baso--        01-04 @ 23:50  WBC12.8  RBC3.55 Hgb10.3 Hct30.8  MCV86.9  Srmf512  Auto Neutro-- Band-- Auto Lymph-- Atypical Lymph-- Reactive Lymph-- Auto Mono-- Auto Eos-- Auto Baso--          01-09    135  |  96  |  36<H>  ----------------------------<  92  4.1   |  32<H>  |  0.95    Ca    9.4      09 Jan 2018 03:58            PERIPHERAL BLOOD SMEAR REVIEW:  RBC-normocytic, normochromic, no significant anisocytosis or poikilocytosis. No rouleaux/schistocytes or increased polychromasia.  WBC- increased in number but majority are mature segmented neutrophils, no sig increase in bands, no immature or abnormal cells seen.  Platelets-adequate on smear, no platelets clumping or giant platelets, but occasional large platelets present        RADIOLOGY & ADDITIONAL STUDIES:  < from: CT Angio Chest w/ IV Cont (01.05.18 @ 01:36) >      PROCEDURE DATE:  01/05/2018          INTERPRETATION:  CT ANGIO CHEST (W)AW IC     INDICATION: Hypotension, tachycardia. Evaluate for PE.    TECHNIQUE: Contrast enhanced CT imaging of the thorax. Timing optimized   for the pulmonary arteries. Postprocessed MIP reformatted images were   created and reviewed.    75 mL of Omnipaque 350 contrast material was injected IV.    COMPARISON: None.    FINDINGS:      Pulmonary arteries: No filling defects to suggest pulmonary emboli.   Pulmonary artery is enlarged measuring 3.4 cm.  Aorta: No aneurysm.    Lines and tubes: None.  Airways: Tracheobronchial tree is patent. Retained secretions in the   proximal bronchi. Diffuse bronchial wall thickening.  Lungs and Pleura: There are small bilateral pleural effusions. There are   dependent as well as mild perihilar opacities. Patchy ground glass   opacities seen in the left upper lobe. Atelectasis is seen in both lower   lobes and the right middlelobe. Diffuse interlobular bilateral septal   thickening.     Mediastinum and lymph nodes: No mass or hemorrhage. No worrisome   mediastinal adenopathy. No worrisome hilar or axillary adenopathy.  Heart: Cardiomegaly. No pericardial effusion.    Upper Abdomen: No suspicious abnormalities of the visualized portions of   the liver, spleen, adrenal glands, or kidneys.    Bones and soft tissues: No suspicious lesions. Degenerative changes of   the spine as well as the bilateral shoulders.    IMPRESSION:    Negative for pulmonary emboli.    Small bilateral pleural effusions. Patchy ground glass opacities in the   left upper lobe are likely infective in nature. Superimposed on this is   evidence of mild to moderate pulmonary edema. Bilateral bronchial wall   thickening compatible with infectious/inflammatory bronchitis.    Enlargement of the pulmonary artery, may reflect pulmonary arterial   hypertension.      < end of copied text >

## 2018-01-09 NOTE — PROGRESS NOTE ADULT - PROBLEM SELECTOR PLAN 2
noted increase but pt received IV solumedrol. With steroids there can be a very variable response in terms of degree of elevation and timing with initial demargination occurring in first 5-24 hours followed by impacts on transcription of adhesion molecules such as L-selectin and adhesins that can lead to a peak as far out as 2 weeks post dose.  With return of eosinophils, this differential, low procalcitonin, improved CXR and improved clinical status suggest that based on this information additional antibiotics more likely associated with harm rather than benefit

## 2018-01-09 NOTE — PROGRESS NOTE ADULT - ASSESSMENT
89F PMHx diastolic chf (EF 65%, echo July 2017), HTN, anemia presenting with cough and congestion x 1 week.   Admitted for viral PNA 89F PMHx diastolic chf (EF 65%, echo July 2017), HTN, anemia presenting with cough and congestion x 1 week.   Admitted for viral PNA possible bacterial as well

## 2018-01-09 NOTE — PROGRESS NOTE ADULT - SUBJECTIVE AND OBJECTIVE BOX
infectious diseases progress note:    SAIRA SANTANA is a 89y y. o. Female patient    Patient reports: I definitely feel better than when I came in. I am still coughing but am so much better than when I came in.    ROS:    EYES:  Negative  blurry vision or double vision  GASTROINTESTINAL:  Negative for nausea, vomiting, diarrhea  -otherwise negative except for subjective    Allergies    Vasotec (Unknown)    Intolerances        ANTIBIOTICS/RELEVANT:  antimicrobials    immunologic:    OTHER:  acetaminophen   Tablet 650 milliGRAM(s) Oral every 6 hours PRN  ALBUTerol/ipratropium for Nebulization 3 milliLiter(s) Nebulizer every 8 hours  carvedilol 25 milliGRAM(s) Oral every 12 hours  cinacalcet 30 milliGRAM(s) Oral daily  doxazosin 4 milliGRAM(s) Oral at bedtime  ferrous    sulfate 325 milliGRAM(s) Oral daily  furosemide    Tablet 40 milliGRAM(s) Oral daily  heparin  Injectable 5000 Unit(s) SubCutaneous every 12 hours  hydrALAZINE 75 milliGRAM(s) Oral every 8 hours  lactobacillus acidophilus 1 Tablet(s) Oral two times a day with meals      Objective:  Last 24-Vital Signs Last 24 Hrs  T(C): 36.7 (09 Jan 2018 08:08), Max: 36.8 (08 Jan 2018 16:03)  T(F): 98.1 (09 Jan 2018 08:08), Max: 98.3 (08 Jan 2018 16:03)  HR: 60 (09 Jan 2018 11:22) (56 - 89)  BP: 134/70 (09 Jan 2018 11:22) (134/70 - 180/63)  BP(mean): --  RR: 18 (09 Jan 2018 08:08) (16 - 18)  SpO2: 93% (09 Jan 2018 11:22) (88% - 98%)    T(C): 36.7 (01-09-18 @ 08:08), Max: 37 (01-08-18 @ 04:57)  T(F): 98.1 (01-09-18 @ 08:08), Max: 98.6 (01-08-18 @ 04:57)  T(C): 36.7 (01-09-18 @ 08:08), Max: 37 (01-08-18 @ 04:57)  T(F): 98.1 (01-09-18 @ 08:08), Max: 98.6 (01-08-18 @ 04:57)  T(C): 36.7 (01-09-18 @ 08:08), Max: 37.1 (01-06-18 @ 07:43)  T(F): 98.1 (01-09-18 @ 08:08), Max: 98.8 (01-06-18 @ 07:43)    PHYSICAL EXAM:  Constitutional:Well-developed, well nourished  Eyes:PERRLA, EOMI  Ear/Nose/Throat: oropharynx normal	  Neck:no JVD, no lymphadenopathy, supple  Respiratory: no accessory muscle use, lung fields much improved with only some residual area of decreased BS in right lower lung  Cardiovascular:RRR, normal S1, S2 no m/r/g  Gastrointestinal:soft, NT, no HSM, BS-normal  Extremities:no clubbing, no cyanosis, edema absent  Neuro-patient alert, oriented and appropriate  Skin-no sig lesions      LABS:                        10.3   18.6  )-----------( 297      ( 09 Jan 2018 12:44 )             31.7     Complete Blood Count + Automated Diff (01.09.18 @ 12:44)    WBC Count: 18.6 K/uL    RBC Count: 3.62 M/uL    Hemoglobin: 10.3 g/dL    Hematocrit: 31.7 %    Mean Cell Volume: 87.4 fl    Mean Cell Hemoglobin: 28.4 pg    Mean Cell Hemoglobin Conc: 32.5 gm/dL    Red Cell Distrib Width: 13.5 %    Platelet Count - Automated: 297 K/uL    Auto Neutrophil %: 79.0:    Auto Lymphocyte %: 10.0 %    Auto Monocyte %: 6.0 %    Auto Eosinophil %: 1.0 %    Auto Basophil %: 1.0 %          WBC 18.6  01-09 @ 12:44  WBC 20.0  01-09 @ 03:58  WBC 18.6  01-08 @ 12:40  WBC 14.8  01-07 @ 08:13  WBC 14.7  01-06 @ 07:29  WBC 14.2  01-05 @ 06:47  WBC 12.8  01-04 @ 23:50  WBC 15.7  01-04 @ 07:52  WBC 13.2  01-03 @ 07:06    Procalcitonin, Serum (01.09.18 @ 12:44)    Procalcitonin, Serum: <0.05:         01-09    135  |  96  |  36<H>  ----------------------------<  92  4.1   |  32<H>  |  0.95    Ca    9.4      09 Jan 2018 03:58              MICROBIOLOGY:        RADIOLOGY & ADDITIONAL STUDIES:    < from: Xray Chest 2 Views PA/Lat (01.09.18 @ 13:26) >  EXAM:  XR CHEST PA LAT 2V                            PROCEDURE DATE:  01/09/2018          INTERPRETATION:  History: Rising white count.    PA lateral. Prior 1/2/2018.    There is improvement in right basilar infiltrate. Minimal patchy streaky   infiltrate persists. There is a new small right pleural effusion.   Otherwise no change. Continued close follow-up is in order.    Impression: As above

## 2018-01-09 NOTE — PROGRESS NOTE ADULT - PROBLEM SELECTOR PLAN 1
Patient 4 months from last hospital discharge, came in from home where she lives with daughter and an aid, now significantly improved clinically  -completed 7 day course but with slow improvement, patient concerns and continued need for oxygen with ambulation recommend continued hospitalization and observation until a safe discharge is possible, which may not be until Saturday morning.

## 2018-01-09 NOTE — DIETITIAN INITIAL EVALUATION ADULT. - OTHER INFO
Pt seen for length of stay. Admitted with viral PNA. Reports good appetite currently, consuming % po intake at meals. Per MD no signs of GI distress. Per prior RD note (8/30/2017) pt noted with current weight of 117 pounds; note weight trending stable 114-117 pounds since admission suggesting no recent weight changes. NKFA.

## 2018-01-09 NOTE — PROGRESS NOTE ADULT - PROBLEM SELECTOR PLAN 2
Continues to increase  Consult heme/onc Continues to increase  Consult heme/onc no acute heme path noted

## 2018-01-09 NOTE — PROGRESS NOTE ADULT - ASSESSMENT
89 yo F pmhx diastolic chf (EF 65%, echo July 2017), htn, anemia, p/w cough productive with yellow sputum and runny nose for 1 week. She is felt to have probable viral pneumonia but remains on antibiotic therapy. She has no evidence for volume overload on examination. Her blood pressure is improved.      - Patient is a DNR  - there is no evidence of acute ischemia.  - ce without evolution 1/4-1/5  - No clinical evidence of SVT  - Continue current dose of Coreg  - BP still labile. I would cont Coreg and Hydralazine as listed.   - I will resume Lasix 40mg Qday.     - Continue antibiotics for rll pna as per primary  - Previous ECHO July 2017: EF 65%, nomal left ventricular systolic function, grade 1 diastolic dysfunction  - Monitor and replete lytes, keep K>4, Mg>2  - Other cardiovascular workup will depend on clinical course.

## 2018-01-09 NOTE — PROGRESS NOTE ADULT - PROBLEM SELECTOR PLAN 1
leukocytosis noted, admitted with uri sob  RVP + coronavirus c/w viral pneumonia possible bacterial component as well  -Completed course of Rocephin  -Id follow up.  - continuous pulse ox, keep 02 sat >90  -02 desaturates to 80s when walking, consider home O2 leukocytosis noted, admitted with uri sob  RVP + coronavirus c/w viral pneumonia possible bacterial component as well  -Completed course of Rocephin  -Id follow up.  - continuous pulse ox, keep 02 sat >90  -02 desaturates to 80s when walking, may need home O2  resume lasix

## 2018-01-09 NOTE — PROGRESS NOTE ADULT - PROBLEM SELECTOR PLAN 5
continue with carvedilol and hydralazine with hold parameters  hydralazine dose increase noted. will need new rx on discharge continue with carvedilol and increased dose and hydralazine with hold parameters  hydralazine dose increase noted. will need new rx on discharge

## 2018-01-09 NOTE — PROGRESS NOTE ADULT - ATTENDING COMMENTS
pt seen examined with dr cook agree with above as edited. will discuss rising white ct with id.   resume lasix. given elevated bun and given lasix only recenty increased to 60 d will start now with 40 mg day of lasix.   will call daughter after id follow up. pt seen examined with dr cook agree with above as edited. will discuss rising white ct with id.   resume lasix. given elevated bun and given lasix only recenty increased to 60 d will start now with 40 mg day of lasix.   reviewed hospital course with dr rivera, pt has not been hospitalized within past 3 months and clincially, lab data (normal procalcitonin, no bandemia in dif,)  has shown much improvement in symptoms. elevated white count likely residual effect of steroids given in emergency room,   will repeat cbc in am and plan for dc once clear wbc trending down.   dw dr rivera and christine.

## 2018-01-09 NOTE — DIETITIAN INITIAL EVALUATION ADULT. - PROBLEM SELECTOR PLAN 4
chronic diastolic chf   ProBNP elevated  no evidence of fluid overload on exam. Cardio ([facility]) consulted  - continue home dose lasix   - will closely monitor for fluid overload

## 2018-01-10 DIAGNOSIS — J44.9 CHRONIC OBSTRUCTIVE PULMONARY DISEASE, UNSPECIFIED: ICD-10-CM

## 2018-01-10 LAB
BASOPHILS # BLD AUTO: 0.1 K/UL — SIGNIFICANT CHANGE UP (ref 0–0.2)
BASOPHILS NFR BLD AUTO: 0.7 % — SIGNIFICANT CHANGE UP (ref 0–2)
EOSINOPHIL # BLD AUTO: 0.2 K/UL — SIGNIFICANT CHANGE UP (ref 0–0.5)
EOSINOPHIL NFR BLD AUTO: 1.4 % — SIGNIFICANT CHANGE UP (ref 0–6)
GRAM STN FLD: SIGNIFICANT CHANGE UP
HCT VFR BLD CALC: 32.6 % — LOW (ref 34.5–45)
HGB BLD-MCNC: 10.8 G/DL — LOW (ref 11.5–15.5)
LYMPHOCYTES # BLD AUTO: 13 % — SIGNIFICANT CHANGE UP (ref 13–44)
LYMPHOCYTES # BLD AUTO: 2 K/UL — SIGNIFICANT CHANGE UP (ref 1–3.3)
MCHC RBC-ENTMCNC: 29 PG — SIGNIFICANT CHANGE UP (ref 27–34)
MCHC RBC-ENTMCNC: 33.1 GM/DL — SIGNIFICANT CHANGE UP (ref 32–36)
MCV RBC AUTO: 87.7 FL — SIGNIFICANT CHANGE UP (ref 80–100)
MONOCYTES # BLD AUTO: 0.9 K/UL — SIGNIFICANT CHANGE UP (ref 0–0.9)
MONOCYTES NFR BLD AUTO: 6.1 % — SIGNIFICANT CHANGE UP (ref 1–9)
NEUTROPHILS # BLD AUTO: 12.2 K/UL — HIGH (ref 1.8–7.4)
NEUTROPHILS NFR BLD AUTO: 78.8 % — HIGH (ref 43–77)
PLATELET # BLD AUTO: 300 K/UL — SIGNIFICANT CHANGE UP (ref 150–400)
PROCALCITONIN SERPL-MCNC: 0.17 NG/ML — HIGH (ref 0–0.04)
RBC # BLD: 3.72 M/UL — LOW (ref 3.8–5.2)
RBC # FLD: 13.6 % — SIGNIFICANT CHANGE UP (ref 10.3–14.5)
SPECIMEN SOURCE: SIGNIFICANT CHANGE UP
WBC # BLD: 15.4 K/UL — HIGH (ref 3.8–10.5)
WBC # FLD AUTO: 15.4 K/UL — HIGH (ref 3.8–10.5)

## 2018-01-10 PROCEDURE — 99233 SBSQ HOSP IP/OBS HIGH 50: CPT

## 2018-01-10 RX ADMIN — Medication 75 MILLIGRAM(S): at 05:27

## 2018-01-10 RX ADMIN — CARVEDILOL PHOSPHATE 25 MILLIGRAM(S): 80 CAPSULE, EXTENDED RELEASE ORAL at 05:27

## 2018-01-10 RX ADMIN — Medication 1 TABLET(S): at 18:36

## 2018-01-10 RX ADMIN — Medication 3 MILLILITER(S): at 20:16

## 2018-01-10 RX ADMIN — Medication 40 MILLIGRAM(S): at 05:27

## 2018-01-10 RX ADMIN — CARVEDILOL PHOSPHATE 25 MILLIGRAM(S): 80 CAPSULE, EXTENDED RELEASE ORAL at 18:36

## 2018-01-10 RX ADMIN — Medication 4 MILLIGRAM(S): at 21:14

## 2018-01-10 RX ADMIN — HEPARIN SODIUM 5000 UNIT(S): 5000 INJECTION INTRAVENOUS; SUBCUTANEOUS at 18:36

## 2018-01-10 RX ADMIN — Medication 3 MILLILITER(S): at 08:00

## 2018-01-10 RX ADMIN — Medication 325 MILLIGRAM(S): at 11:56

## 2018-01-10 RX ADMIN — Medication 1 TABLET(S): at 08:15

## 2018-01-10 RX ADMIN — CINACALCET 30 MILLIGRAM(S): 30 TABLET, FILM COATED ORAL at 21:13

## 2018-01-10 RX ADMIN — Medication 3 MILLILITER(S): at 15:35

## 2018-01-10 RX ADMIN — Medication 10 MILLIEQUIVALENT(S): at 11:55

## 2018-01-10 RX ADMIN — Medication 75 MILLIGRAM(S): at 21:20

## 2018-01-10 RX ADMIN — HEPARIN SODIUM 5000 UNIT(S): 5000 INJECTION INTRAVENOUS; SUBCUTANEOUS at 05:27

## 2018-01-10 RX ADMIN — Medication 75 MILLIGRAM(S): at 14:54

## 2018-01-10 NOTE — PROGRESS NOTE ADULT - ASSESSMENT
89 yo F pmhx diastolic chf (EF 65%, echo July 2017), htn, anemia, p/w cough productive with yellow sputum and runny nose for 1 week. She is felt to have probable viral pneumonia but remains on antibiotic therapy. She has no evidence for volume overload on examination. Her blood pressure is improved.      - D/C planning  - there is no evidence of acute ischemia.  - ce without evolution 1/4-1/5  - No clinical evidence of SVT  - Continue current dose of Coreg  - BP still labile. I would cont Coreg and Hydralazine as listed.   -  Lasix 40mg Qday.     - Continue antibiotics for rll pna as per primary  - Previous ECHO July 2017: EF 65%, nomal left ventricular systolic function, grade 1 diastolic dysfunction  - Monitor and replete lytes, keep K>4, Mg>2  - Other cardiovascular workup will depend on clinical course.

## 2018-01-10 NOTE — PROGRESS NOTE ADULT - PROBLEM SELECTOR PLAN 2
Continues to increase  Consult heme/onc no acute heme path noted Trending down, although continues to be elevated  Consult heme/onc no acute heme path noted  Monitor with AM CBC

## 2018-01-10 NOTE — PROGRESS NOTE ADULT - PROBLEM SELECTOR PLAN 5
continue with carvedilol and increased dose and hydralazine with hold parameters  hydralazine dose increase noted. will need new rx on discharge

## 2018-01-10 NOTE — PROGRESS NOTE ADULT - SUBJECTIVE AND OBJECTIVE BOX
Date/Time Patient Seen:  		  Referring MD:   Data Reviewed	       Patient is a 89y old  Female who presents with a chief complaint of coughing (05 Jan 2018 18:28)  in bed  seen and examined  vs and meds reviewed  on o2 on and off        Subjective/HPI     PAST MEDICAL & SURGICAL HISTORY:  Anemia, unspecified type  Edema of lower extremity  Heart murmur  Hypertension  Hypercalcemia  History of appendectomy  S/P tonsillectomy  H/O parathyroidectomy        Medication list         MEDICATIONS  (STANDING):  ALBUTerol/ipratropium for Nebulization 3 milliLiter(s) Nebulizer every 8 hours  carvedilol 25 milliGRAM(s) Oral every 12 hours  cinacalcet 30 milliGRAM(s) Oral daily  doxazosin 4 milliGRAM(s) Oral at bedtime  ferrous    sulfate 325 milliGRAM(s) Oral daily  furosemide    Tablet 40 milliGRAM(s) Oral daily  heparin  Injectable 5000 Unit(s) SubCutaneous every 12 hours  hydrALAZINE 75 milliGRAM(s) Oral every 8 hours  lactobacillus acidophilus 1 Tablet(s) Oral two times a day with meals  potassium chloride    Tablet ER 10 milliEquivalent(s) Oral daily    MEDICATIONS  (PRN):  acetaminophen   Tablet 650 milliGRAM(s) Oral every 6 hours PRN For Temp greater than 38 C (100.4 F)         Vitals log        ICU Vital Signs Last 24 Hrs  T(C): 36.9 (10 Ulises 2018 04:46), Max: 37 (09 Jan 2018 16:11)  T(F): 98.4 (10 Ulises 2018 04:46), Max: 98.6 (09 Jan 2018 16:11)  HR: 69 (10 Ulises 2018 04:46) (56 - 82)  BP: 179/71 (10 Ulises 2018 04:46) (129/59 - 179/71)  BP(mean): --  ABP: --  ABP(mean): --  RR: 18 (10 Ulises 2018 04:46) (18 - 18)  SpO2: 92% (10 Ulises 2018 04:46) (92% - 98%)           Input and Output:  I&O's Detail    08 Jan 2018 07:01  -  09 Jan 2018 07:00  --------------------------------------------------------  IN:    Oral Fluid: 360 mL    Solution: 50 mL  Total IN: 410 mL    OUT:  Total OUT: 0 mL    Total NET: 410 mL          Lab Data                        10.3   18.6  )-----------( 297      ( 09 Jan 2018 12:44 )             31.7     01-09    135  |  96  |  36<H>  ----------------------------<  92  4.1   |  32<H>  |  0.95    Ca    9.4      09 Jan 2018 03:58              Review of Systems	      Objective     Physical Examination  head at  heart s1s2  lungs dec BS  abd soft        Pertinent Lab findings & Imaging      Richard:  NO   Adequate UO     I&O's Detail    08 Jan 2018 07:01  -  09 Jan 2018 07:00  --------------------------------------------------------  IN:    Oral Fluid: 360 mL    Solution: 50 mL  Total IN: 410 mL    OUT:  Total OUT: 0 mL    Total NET: 410 mL               Discussed with:     Cultures:	        Radiology

## 2018-01-10 NOTE — PROGRESS NOTE ADULT - PROBLEM SELECTOR PLAN 1
leukocytosis noted, admitted with uri sob  RVP + coronavirus c/w viral pneumonia possible bacterial component as well  -Completed course of Rocephin  -Id following.  - continuous pulse ox, keep 02 sat >90  -02 desaturates when walking when walking, may need home O2, will recheck  -resumed lasix leukocytosis noted, admitted with uri sob  RVP + coronavirus c/w viral pneumonia possible bacterial component as well  -Completed course of Rocephin  -Id following.  - continuous pulse ox, keep 02 sat >90  -02 saturation remained above 90% when ambulating  -Continue lasix

## 2018-01-10 NOTE — PROGRESS NOTE ADULT - PROBLEM SELECTOR PLAN 2
noted increase but pt received IV solumedrol. Three appreciated mechanisms for wbc elevation following steroid dosing. With steroids there can be a very variable response in terms of degree of elevation and timing with initial demargination occurring in first 5-24 hours followed by impacts on transcription of adhesion molecules such as L-selectin on leukocytes and adhesion molecules on vascular endothelia cells such as MAC-1 that can lead to a peak as far out as 2 weeks post dose.  Also a poorly understood impact on delayed apoptosis can cause wbc level to drop more slowly than we would see in resolution of bacterial infections. With return of eosinophils, this differential, low procalcitonin, improved CXR and improved clinical status suggest that based on this information additional antibiotics more likely associated with harm rather than benefit.  I am starting to have some concerns regarding risk for development of nosocomial infection in this 89 yo female particularly with high incidence of viral pathogens currently in our facility.  -ordered early afternoon 2pm repeat wbc and will follow.

## 2018-01-10 NOTE — PROGRESS NOTE ADULT - PROBLEM SELECTOR PLAN 3
COPD  COPD regimen  NEBS  keep sat > 88 pct  oral and skin care  increase activity  overall better  CXR showing improvement

## 2018-01-10 NOTE — PROGRESS NOTE ADULT - SUBJECTIVE AND OBJECTIVE BOX
Follow up: HTN, HFPEF    HPI:  89F PMHx chronic diastolic chf (EF 65%, echo July 2017), HTN, anemia presenting with cough and congestion severe sob x 1 week.  Pt reports cough started last Tuesday with clear mucus production.  No pleuritic chest pain with sob.  Reports this week cough became more severe now with yellowish sputum.  Currently complains of cough and chest congestion. Denies pleuritic chest pain, fever, palpitations, abdominal pain, dizziness, n/v/d/c, changes in vision    In the ED /74, HR 61, T 97.9F, O2 95% on RA, RR 17. WBC 11.8, Hg 9.9, Na 132, lactate 0.7, Trop 0.046, proBNP 7832, CXR showing mild vascular congestion, small left pleural effusion, questionable focal infiltrate of right base. Given Rocephin, Zithromax, Duonebs, RVP + for coronavirus with RLL infiltrate consistent with likely viral pneumonia.  Blood cultures collected. EKG showing SR. Cardiology (Dr. Hagen) and ID (dr. rivera) (02 Jan 2018 11:38)    [place] and reports no new symptoms of chest pain, dyspnea, or palpitations    PAST MEDICAL & SURGICAL HISTORY:  Anemia, unspecified type  Edema of lower extremity  Heart murmur  Hypertension  Hypercalcemia  History of appendectomy  S/P tonsillectomy  H/O parathyroidectomy      MEDICATIONS  (STANDING):  ALBUTerol/ipratropium for Nebulization 3 milliLiter(s) Nebulizer every 8 hours  carvedilol 25 milliGRAM(s) Oral every 12 hours  cinacalcet 30 milliGRAM(s) Oral daily  doxazosin 4 milliGRAM(s) Oral at bedtime  ferrous    sulfate 325 milliGRAM(s) Oral daily  furosemide    Tablet 40 milliGRAM(s) Oral daily  heparin  Injectable 5000 Unit(s) SubCutaneous every 12 hours  hydrALAZINE 75 milliGRAM(s) Oral every 8 hours  lactobacillus acidophilus 1 Tablet(s) Oral two times a day with meals  potassium chloride    Tablet ER 10 milliEquivalent(s) Oral daily    MEDICATIONS  (PRN):  acetaminophen   Tablet 650 milliGRAM(s) Oral every 6 hours PRN For Temp greater than 38 C (100.4 F)      REVIEW OF SYSTEMS:    CONSTITUTIONAL: No weakness, fevers or chills  EYES: No visual changes, No diplopia  ENMT: No throat pain , No exudate  NECK: No pain or stiffness  RESPIRATORY: No cough, wheezing, hemoptysis; No shortness of breath  CARDIOVASCULAR: No chest pain or palpitations  GASTROINTESTINAL: No abdominal pain. No nausea, vomiting, or hematemesis; No diarrhea or constipation. No melena or hematochezia.  GENITOURINARY: No dysuria, frequency or hematuria  NEUROLOGICAL: No numbness or weakness  SKIN: No itching or rash  All other review of systems is negative unless indicated above    Vital Signs Last 24 Hrs  T(C): 36.2 (10 Ulises 2018 12:14), Max: 37 (09 Jan 2018 16:11)  T(F): 97.2 (10 Ulises 2018 12:14), Max: 98.6 (09 Jan 2018 16:11)  HR: 54 (10 Ulises 2018 12:14) (54 - 70)  BP: 175/77 (10 Ulises 2018 12:14) (129/59 - 179/71)  BP(mean): --  RR: 18 (10 Ulises 2018 12:14) (16 - 18)  SpO2: 96% (10 Ulises 2018 12:14) (91% - 96%)      PHYSICAL EXAM:    Constitutional: NAD, awake and alert, frail  Eyes:  EOMI,  Pupils round, no lesions  ENMT: no exudate or erythema  Pulmonary: Non-labored, breath sounds are clear bilaterally, No wheezing, rales or rhonchi  Cardiovascular: PMI not palpable RRR normal S1 and S2, no murmurs, rubs, gallops or clicks  Gastrointestinal: Bowel Sounds present, soft, nontender.   Lymph: No cervical lymphadenopathy.  Neurological: Alert, no focal deficits  Skin: No rashes.  No cyanosis.  Psych:  Mood & affect appropriate   Ext: No lower ext edema                                10.3   18.6  )-----------( 297      ( 09 Jan 2018 12:44 )             31.7     01-09    135  |  96  |  36<H>  ----------------------------<  92  4.1   |  32<H>  |  0.95    Ca    9.4      09 Jan 2018 03:58      < from: 12 Lead ECG (01.02.18 @ 08:36) >    Ventricular Rate 58 BPM    Atrial Rate 58 BPM    P-R Interval 160 ms    QRS Duration 78 ms    Q-T Interval 398 ms    QTC Calculation(Bezet) 390 ms    P Axis 20 degrees    R Axis 63 degrees    T Axis 48 degrees    Diagnosis Line Sinus bradycardia  Otherwise normal ECG  When compared with ECG of 25-AUG-2017 09:14,  Questionable change in QRS axis  T wave inversion no longer evident in Inferior leads  Confirmed by NIDHI HAGEN ([id number]) on 1/2/2018 8:52:44 PM    < end of copied text >  < from: Xray Chest 2 Views PA/Lat (01.09.18 @ 13:26) >    EXAM:  XR CHEST PA LAT 2V                            PROCEDURE DATE:  01/09/2018          INTERPRETATION:  History: Rising white count.    PA lateral. Prior 1/2/2018.    There is improvement in right basilar infiltrate. Minimal patchy streaky   infiltrate persists. There is a new small right pleural effusion.   Otherwise no change. Continued close follow-up is in order.    Impression: As above                JULIETTE PATRICIA M.D., ATTENDING RADIOLOGIST  This document has been electronically signed. Jan 9 2018  1:30PM                < end of copied text >  < from: TTE Echo Doppler w/o Cont (07.09.17 @ 08:32) >     EXAM:  ECHO TTE W/O CON COMP W/DOPPLR         PROCEDURE DATE:  07/09/2017        INTERPRETATION:  Ordering Physician: Unknown Doctor    Indication: Hypertension    Study Quality: Technically fair  A complete echocardiographic study was performed utilizing standard   protocol including spectral and color Doppler in all echocardiographic   windows.    Height: 152 cm  Weight: 49 kg  BSA: 1.4 sq m  Blood Pressure: 189/63    MEASUREMENTS  IVS: 1.0cm  PWT: 1.0cm  LA: 4.2cm  AO: 2.7cm  LVIDd: 4.5cm  LVIDs: 3.3cm    LVEF: 65%  RVSP: 54mmHg    FINDINGS  Left Ventricle:  Normal left ventricular systolic function.  Aortic Valve: Calcified trileaflet aortic valve. Mild aortic   insufficiency.  Mitral Valve: Mitral annular calcification and calcified mitral valve   leaflets with normal diastolic opening. Mild mitral insufficiency.  Tricuspid Valve: Normal tricuspid valve. Mild tricuspid insufficiency.  Pulmonic Valve: Normal pulmonic valve. Mild pulmonic insufficiency.  Left Atrium: Mildly enlarged.  Right Ventricle: Normal right ventricular size and systolic function.  Right Atrium: Normal  Diastolic Function: Grade 1 diastolic dysfunction.  Pericardium/Pleura: Normal pericardium with no pericardial effusion.                      MORENA ADORNO M.D., ATTENDING CARDIOLOGIST  This document has been electronically signed. Jul 9 2017 10:13AM                < end of copied text >

## 2018-01-10 NOTE — PROGRESS NOTE ADULT - ASSESSMENT
89F PMHx diastolic chf (EF 65%, echo July 2017), HTN, anemia presenting with cough and congestion x 1 week.   Admitted for viral PNA possible bacterial as well

## 2018-01-10 NOTE — PROGRESS NOTE ADULT - SUBJECTIVE AND OBJECTIVE BOX
Patient is a 89y old  Female who presents with a chief complaint of coughing (05 Jan 2018 18:28)      INTERVAL HPI/OVERNIGHT EVENTS: Patient seen and examined at bedside. Patient says her breathing is better but she feels tired when she walks. She still has a minor loose cough.     MEDICATIONS  (STANDING):  ALBUTerol/ipratropium for Nebulization 3 milliLiter(s) Nebulizer every 8 hours  carvedilol 25 milliGRAM(s) Oral every 12 hours  cinacalcet 30 milliGRAM(s) Oral daily  doxazosin 4 milliGRAM(s) Oral at bedtime  ferrous    sulfate 325 milliGRAM(s) Oral daily  furosemide    Tablet 40 milliGRAM(s) Oral daily  heparin  Injectable 5000 Unit(s) SubCutaneous every 12 hours  hydrALAZINE 75 milliGRAM(s) Oral every 8 hours  lactobacillus acidophilus 1 Tablet(s) Oral two times a day with meals  potassium chloride    Tablet ER 10 milliEquivalent(s) Oral daily    MEDICATIONS  (PRN):  acetaminophen   Tablet 650 milliGRAM(s) Oral every 6 hours PRN For Temp greater than 38 C (100.4 F)    Allergies    Vasotec (Unknown)    Intolerances    REVIEW OF SYSTEMS:  CONSTITUTIONAL: No fever, No chills, No fatigue, No myalgia,No Body ache  EYES: No eye pain, visual disturbances, or discharge  ENMT:  No ear pain, No nose bleed, No vertigo; No sinus or throat pain  NECK: No pain, No stiffness  RESPIRATORY: +cough, wheezing, No  hemoptysis; No shortness of breath  CARDIOVASCULAR: No chest pain, palpitations, leg swelling  GASTROINTESTINAL: No abdominal or epigastric pain. No nausea, No vomiting; No diarrhea or constipation. [ ] BM  GENITOURINARY: No dysuria, No frequency, No urgency, No hematuria, or incontinence  NEUROLOGICAL: alert and oriented x 3,  No headaches, No dizziness, No numbness,  SKIN:  No itching, burning, rashes, or lesions   MUSCULOSKELETAL: No joint pain or swelling; No muscle pain, No back pain, No extremity pain  PSYCHIATRIC: No depression, anxiety, mood swings, or difficulty sleeping  ROS  [ ] Unable to obtain   REST OF REVIEW Of SYSTEM - [ ] Normal     Height (cm): 157.48 (01-02 @ 08:17)  Weight (kg): 52.2 (01-02 @ 08:17)  BMI (kg/m2): 21 (01-02 @ 08:17)  BSA (m2): 1.51 (01-02 @ 08:17)  Vital Signs Last 24 Hrs  T(C): 36.3 (10 Ulises 2018 07:13), Max: 37 (09 Jan 2018 16:11)  T(F): 97.3 (10 Ulises 2018 07:13), Max: 98.6 (09 Jan 2018 16:11)  HR: 68 (10 Ulises 2018 08:02) (56 - 70)  BP: 138/57 (10 Ulises 2018 07:13) (129/59 - 179/71)  RR: 16 (10 Ulises 2018 07:13) (16 - 18)  SpO2: 92% (10 Ulises 2018 08:02) (91% - 93%)  [ x] room air   [ ] 02    PHYSICAL EXAM:  GENERAL:  No acute distress, elderly frail female, sitting in chair  HEAD:  normocephalic, atraumatic  ENMT: Clear conjunctiva, moist mucous membranes  NECK:  No JVD, nontender  NERVOUS SYSTEM:  Alert & Oriented X2- 3, Follows commands, normal strength  CHEST/LUNG:  CTAB, no wheeze, rales  HEART:  Regular rate and rhythm, No murmurs  ABDOMEN:  soft, nontender, nondistended  EXTREMITIES: No clubbing, cyanosis or edema  SKIN: warm and dry    LABS:                        10.3   18.6  )-----------( 297      ( 09 Jan 2018 12:44 )             31.7       Ca    9.4        09 Jan 2018 03:58    Cultures  Culture Results:   No growth at 5 days. (01-02 @ 12:02)  Culture Results:   No growth at 5 days. (01-02 @ 12:02)    culture blood  -- .Sputum Sputum 01-10 @ 01:39    culture urine  --  01-10 @ 01:39      Care Discussed with [X] Consultants  [x ] Patient  [ ] Family  [X]   /   [ ] Other; RN  DVT prophylaxis [ ] lovenox   [ x] subq heparin  [ ] coumadin  [ ] venodynes [ ] ambulating frequently at how risk for vte and no pharm   or  mechanical prophylaxis required    [ ] other   Discussed with pt @ bedside  has hcp Patient is a 89y old  Female who presents with a chief complaint of coughing (05 Jan 2018 18:28)      INTERVAL HPI/OVERNIGHT EVENTS: Patient seen and examined at bedside. Patient says her breathing is better but she feels tired when she walks. She still has a minor loose cough. Patient ambulated with physical therapist and her O2 saturation remained at 95%.    MEDICATIONS  (STANDING):  ALBUTerol/ipratropium for Nebulization 3 milliLiter(s) Nebulizer every 8 hours  carvedilol 25 milliGRAM(s) Oral every 12 hours  cinacalcet 30 milliGRAM(s) Oral daily  doxazosin 4 milliGRAM(s) Oral at bedtime  ferrous    sulfate 325 milliGRAM(s) Oral daily  furosemide    Tablet 40 milliGRAM(s) Oral daily  heparin  Injectable 5000 Unit(s) SubCutaneous every 12 hours  hydrALAZINE 75 milliGRAM(s) Oral every 8 hours  lactobacillus acidophilus 1 Tablet(s) Oral two times a day with meals  potassium chloride    Tablet ER 10 milliEquivalent(s) Oral daily    MEDICATIONS  (PRN):  acetaminophen   Tablet 650 milliGRAM(s) Oral every 6 hours PRN For Temp greater than 38 C (100.4 F)    Allergies    Vasotec (Unknown)    Intolerances    REVIEW OF SYSTEMS:  CONSTITUTIONAL: No fever, No chills, No fatigue, No myalgia,No Body ache  EYES: No eye pain, visual disturbances, or discharge  ENMT:  No ear pain, No nose bleed, No vertigo; No sinus or throat pain  NECK: No pain, No stiffness  RESPIRATORY: +cough, wheezing, No  hemoptysis; No shortness of breath  CARDIOVASCULAR: No chest pain, palpitations, leg swelling  GASTROINTESTINAL: No abdominal or epigastric pain. No nausea, No vomiting; No diarrhea or constipation. [ ] BM  GENITOURINARY: No dysuria, No frequency, No urgency, No hematuria, or incontinence  NEUROLOGICAL: alert and oriented x 3,  No headaches, No dizziness, No numbness,  SKIN:  No itching, burning, rashes, or lesions   MUSCULOSKELETAL: No joint pain or swelling; No muscle pain, No back pain, No extremity pain  PSYCHIATRIC: No depression, anxiety, mood swings, or difficulty sleeping  ROS  [ ] Unable to obtain   REST OF REVIEW Of SYSTEM - [ ] Normal     Height (cm): 157.48 (01-02 @ 08:17)  Weight (kg): 52.2 (01-02 @ 08:17)  BMI (kg/m2): 21 (01-02 @ 08:17)  BSA (m2): 1.51 (01-02 @ 08:17)  Vital Signs Last 24 Hrs  T(C): 36.3 (10 Ulises 2018 07:13), Max: 37 (09 Jan 2018 16:11)  T(F): 97.3 (10 Ulises 2018 07:13), Max: 98.6 (09 Jan 2018 16:11)  HR: 68 (10 Ulises 2018 08:02) (56 - 70)  BP: 138/57 (10 Ulises 2018 07:13) (129/59 - 179/71)  RR: 16 (10 Ulises 2018 07:13) (16 - 18)  SpO2: 92% (10 Ulises 2018 08:02) (91% - 93%)  [ x] room air   [ ] 02    PHYSICAL EXAM:  GENERAL:  No acute distress, elderly frail female, sitting in chair  HEAD:  normocephalic, atraumatic  ENMT: Clear conjunctiva, moist mucous membranes  NECK:  No JVD, nontender  NERVOUS SYSTEM:  Alert & Oriented X2- 3, Follows commands, normal strength  CHEST/LUNG:  CTAB, no wheeze, rales  HEART:  Regular rate and rhythm, No murmurs  ABDOMEN:  soft, nontender, nondistended  EXTREMITIES: No clubbing, cyanosis or edema  SKIN: warm and dry    LABS:                        10.3   18.6  )-----------( 297      ( 09 Jan 2018 12:44 )             31.7       Ca    9.4        09 Jan 2018 03:58    Cultures  Culture Results:   No growth at 5 days. (01-02 @ 12:02)  Culture Results:   No growth at 5 days. (01-02 @ 12:02)    culture blood  -- .Sputum Sputum 01-10 @ 01:39    culture urine  --  01-10 @ 01:39      Care Discussed with [X] Consultants  [x ] Patient  [ ] Family  [X]   /   [ ] Other; RN  DVT prophylaxis [ ] lovenox   [ x] subq heparin  [ ] coumadin  [ ] venodynes [ ] ambulating frequently at how risk for vte and no pharm   or  mechanical prophylaxis required    [ ] other   Discussed with pt @ bedside  has hcp

## 2018-01-10 NOTE — PROGRESS NOTE ADULT - SUBJECTIVE AND OBJECTIVE BOX
infectious diseases progress note:    SAIRA SANTANA is a 89y y. o. Female patient    Patient reports: still coughing    ROS:    EYES:  Negative  blurry vision or double vision  GASTROINTESTINAL:  Negative for nausea, vomiting, diarrhea  -otherwise negative except for subjective    Allergies    Vasotec (Unknown)    Intolerances        ANTIBIOTICS/RELEVANT:  antimicrobials    immunologic:    OTHER:  acetaminophen   Tablet 650 milliGRAM(s) Oral every 6 hours PRN  ALBUTerol/ipratropium for Nebulization 3 milliLiter(s) Nebulizer every 8 hours  carvedilol 25 milliGRAM(s) Oral every 12 hours  cinacalcet 30 milliGRAM(s) Oral daily  doxazosin 4 milliGRAM(s) Oral at bedtime  ferrous    sulfate 325 milliGRAM(s) Oral daily  furosemide    Tablet 40 milliGRAM(s) Oral daily  heparin  Injectable 5000 Unit(s) SubCutaneous every 12 hours  hydrALAZINE 75 milliGRAM(s) Oral every 8 hours  lactobacillus acidophilus 1 Tablet(s) Oral two times a day with meals  potassium chloride    Tablet ER 10 milliEquivalent(s) Oral daily      Objective:  Last 24-Vital Signs Last 24 Hrs  T(C): 36.3 (10 Ulises 2018 07:13), Max: 37 (09 Jan 2018 16:11)  T(F): 97.3 (10 Ulises 2018 07:13), Max: 98.6 (09 Jan 2018 16:11)  HR: 68 (10 Ulises 2018 08:02) (56 - 70)  BP: 138/57 (10 Ulises 2018 07:13) (129/59 - 179/71)  BP(mean): --  RR: 16 (10 Ulises 2018 07:13) (16 - 18)  SpO2: 92% (10 Ulises 2018 08:02) (91% - 93%)    T(C): 36.3 (01-10-18 @ 07:13), Max: 37 (01-09-18 @ 16:11)  T(F): 97.3 (01-10-18 @ 07:13), Max: 98.6 (01-09-18 @ 16:11)  T(C): 36.3 (01-10-18 @ 07:13), Max: 37 (01-08-18 @ 04:57)  T(F): 97.3 (01-10-18 @ 07:13), Max: 98.6 (01-08-18 @ 04:57)  T(C): 36.3 (01-10-18 @ 07:13), Max: 37 (01-08-18 @ 04:57)  T(F): 97.3 (01-10-18 @ 07:13), Max: 98.6 (01-08-18 @ 04:57)    PHYSICAL EXAM:  Constitutional:Well-developed, well nourished  Eyes:PERRLA, EOMI  Ear/Nose/Throat: oropharynx normal	  Neck:no JVD, no lymphadenopathy, supple  Respiratory: no accessory muscle use, lung fields with some decrease in RLL  Cardiovascular:RRR, normal S1, S2 no m/r/g  Gastrointestinal:soft, NT, no HSM, BS-normal  Extremities:no clubbing, no cyanosis, edema absent  Neuro-patient alert, oriented and appropriate  Skin-no sig lesions      LABS:                        10.3   18.6  )-----------( 297      ( 09 Jan 2018 12:44 )             31.7       WBC 18.6  01-09 @ 12:44  WBC 20.0  01-09 @ 03:58  WBC 18.6  01-08 @ 12:40  WBC 14.8  01-07 @ 08:13  WBC 14.7  01-06 @ 07:29  WBC 14.2  01-05 @ 06:47  WBC 12.8  01-04 @ 23:50  WBC 15.7  01-04 @ 07:52      01-09    135  |  96  |  36<H>  ----------------------------<  92  4.1   |  32<H>  |  0.95    Ca    9.4      09 Jan 2018 03:58              MICROBIOLOGY:        RADIOLOGY & ADDITIONAL STUDIES:

## 2018-01-11 LAB
ANION GAP SERPL CALC-SCNC: 7 MMOL/L — SIGNIFICANT CHANGE UP (ref 5–17)
BASOPHILS # BLD AUTO: 0.1 K/UL — SIGNIFICANT CHANGE UP (ref 0–0.2)
BASOPHILS # BLD AUTO: 0.1 K/UL — SIGNIFICANT CHANGE UP (ref 0–0.2)
BASOPHILS NFR BLD AUTO: 0.6 % — SIGNIFICANT CHANGE UP (ref 0–2)
BASOPHILS NFR BLD AUTO: 0.6 % — SIGNIFICANT CHANGE UP (ref 0–2)
BUN SERPL-MCNC: 34 MG/DL — HIGH (ref 7–23)
CALCIUM SERPL-MCNC: 9.1 MG/DL — SIGNIFICANT CHANGE UP (ref 8.5–10.1)
CHLORIDE SERPL-SCNC: 98 MMOL/L — SIGNIFICANT CHANGE UP (ref 96–108)
CO2 SERPL-SCNC: 30 MMOL/L — SIGNIFICANT CHANGE UP (ref 22–31)
CREAT SERPL-MCNC: 0.98 MG/DL — SIGNIFICANT CHANGE UP (ref 0.5–1.3)
CULTURE RESULTS: SIGNIFICANT CHANGE UP
EOSINOPHIL # BLD AUTO: 0.1 K/UL — SIGNIFICANT CHANGE UP (ref 0–0.5)
EOSINOPHIL # BLD AUTO: 0.2 K/UL — SIGNIFICANT CHANGE UP (ref 0–0.5)
EOSINOPHIL NFR BLD AUTO: 1 % — SIGNIFICANT CHANGE UP (ref 0–6)
EOSINOPHIL NFR BLD AUTO: 1.5 % — SIGNIFICANT CHANGE UP (ref 0–6)
GLUCOSE SERPL-MCNC: 98 MG/DL — SIGNIFICANT CHANGE UP (ref 70–99)
HCT VFR BLD CALC: 30.8 % — LOW (ref 34.5–45)
HCT VFR BLD CALC: 32.6 % — LOW (ref 34.5–45)
HGB BLD-MCNC: 10.1 G/DL — LOW (ref 11.5–15.5)
HGB BLD-MCNC: 10.6 G/DL — LOW (ref 11.5–15.5)
LYMPHOCYTES # BLD AUTO: 1.9 K/UL — SIGNIFICANT CHANGE UP (ref 1–3.3)
LYMPHOCYTES # BLD AUTO: 12.6 % — LOW (ref 13–44)
LYMPHOCYTES # BLD AUTO: 13.7 % — SIGNIFICANT CHANGE UP (ref 13–44)
LYMPHOCYTES # BLD AUTO: 2 K/UL — SIGNIFICANT CHANGE UP (ref 1–3.3)
MCHC RBC-ENTMCNC: 28.3 PG — SIGNIFICANT CHANGE UP (ref 27–34)
MCHC RBC-ENTMCNC: 28.5 PG — SIGNIFICANT CHANGE UP (ref 27–34)
MCHC RBC-ENTMCNC: 32.6 GM/DL — SIGNIFICANT CHANGE UP (ref 32–36)
MCHC RBC-ENTMCNC: 32.7 GM/DL — SIGNIFICANT CHANGE UP (ref 32–36)
MCV RBC AUTO: 86.7 FL — SIGNIFICANT CHANGE UP (ref 80–100)
MCV RBC AUTO: 87.4 FL — SIGNIFICANT CHANGE UP (ref 80–100)
MONOCYTES # BLD AUTO: 1 K/UL — HIGH (ref 0–0.9)
MONOCYTES # BLD AUTO: 1.1 K/UL — HIGH (ref 0–0.9)
MONOCYTES NFR BLD AUTO: 6.5 % — SIGNIFICANT CHANGE UP (ref 1–9)
MONOCYTES NFR BLD AUTO: 8.3 % — SIGNIFICANT CHANGE UP (ref 1–9)
NEUTROPHILS # BLD AUTO: 10.4 K/UL — HIGH (ref 1.8–7.4)
NEUTROPHILS # BLD AUTO: 12.6 K/UL — HIGH (ref 1.8–7.4)
NEUTROPHILS NFR BLD AUTO: 76.4 % — SIGNIFICANT CHANGE UP (ref 43–77)
NEUTROPHILS NFR BLD AUTO: 78.8 % — HIGH (ref 43–77)
PLATELET # BLD AUTO: 274 K/UL — SIGNIFICANT CHANGE UP (ref 150–400)
PLATELET # BLD AUTO: 304 K/UL — SIGNIFICANT CHANGE UP (ref 150–400)
POTASSIUM SERPL-MCNC: 3.8 MMOL/L — SIGNIFICANT CHANGE UP (ref 3.5–5.3)
POTASSIUM SERPL-SCNC: 3.8 MMOL/L — SIGNIFICANT CHANGE UP (ref 3.5–5.3)
PROCALCITONIN SERPL-MCNC: <0.05 — SIGNIFICANT CHANGE UP (ref 0–0.04)
RBC # BLD: 3.55 M/UL — LOW (ref 3.8–5.2)
RBC # BLD: 3.73 M/UL — LOW (ref 3.8–5.2)
RBC # FLD: 13.4 % — SIGNIFICANT CHANGE UP (ref 10.3–14.5)
RBC # FLD: 13.5 % — SIGNIFICANT CHANGE UP (ref 10.3–14.5)
SODIUM SERPL-SCNC: 135 MMOL/L — SIGNIFICANT CHANGE UP (ref 135–145)
SPECIMEN SOURCE: SIGNIFICANT CHANGE UP
WBC # BLD: 13.6 K/UL — HIGH (ref 3.8–10.5)
WBC # BLD: 16 K/UL — HIGH (ref 3.8–10.5)
WBC # FLD AUTO: 13.6 K/UL — HIGH (ref 3.8–10.5)
WBC # FLD AUTO: 16 K/UL — HIGH (ref 3.8–10.5)

## 2018-01-11 PROCEDURE — 99232 SBSQ HOSP IP/OBS MODERATE 35: CPT

## 2018-01-11 RX ORDER — CARVEDILOL PHOSPHATE 80 MG/1
1 CAPSULE, EXTENDED RELEASE ORAL
Qty: 60 | Refills: 0 | OUTPATIENT
Start: 2018-01-11 | End: 2018-02-09

## 2018-01-11 RX ADMIN — Medication 325 MILLIGRAM(S): at 12:05

## 2018-01-11 RX ADMIN — Medication 3 MILLILITER(S): at 07:28

## 2018-01-11 RX ADMIN — HEPARIN SODIUM 5000 UNIT(S): 5000 INJECTION INTRAVENOUS; SUBCUTANEOUS at 17:32

## 2018-01-11 RX ADMIN — Medication 75 MILLIGRAM(S): at 21:16

## 2018-01-11 RX ADMIN — CARVEDILOL PHOSPHATE 25 MILLIGRAM(S): 80 CAPSULE, EXTENDED RELEASE ORAL at 17:32

## 2018-01-11 RX ADMIN — Medication 75 MILLIGRAM(S): at 05:01

## 2018-01-11 RX ADMIN — HEPARIN SODIUM 5000 UNIT(S): 5000 INJECTION INTRAVENOUS; SUBCUTANEOUS at 05:01

## 2018-01-11 RX ADMIN — Medication 3 MILLILITER(S): at 20:17

## 2018-01-11 RX ADMIN — Medication 4 MILLIGRAM(S): at 21:16

## 2018-01-11 RX ADMIN — Medication 75 MILLIGRAM(S): at 13:40

## 2018-01-11 RX ADMIN — Medication 10 MILLIEQUIVALENT(S): at 12:05

## 2018-01-11 RX ADMIN — Medication 1 TABLET(S): at 08:23

## 2018-01-11 RX ADMIN — Medication 1 TABLET(S): at 17:32

## 2018-01-11 RX ADMIN — Medication 40 MILLIGRAM(S): at 05:01

## 2018-01-11 RX ADMIN — CINACALCET 30 MILLIGRAM(S): 30 TABLET, FILM COATED ORAL at 21:16

## 2018-01-11 NOTE — PROGRESS NOTE ADULT - SUBJECTIVE AND OBJECTIVE BOX
Date/Time Patient Seen:  		  Referring MD:   Data Reviewed	       Patient is a 89y old  Female who presents with a chief complaint of coughing (05 Jan 2018 18:28)  in bed  seen and examined  vs and meds reviewed        Subjective/HPI     PAST MEDICAL & SURGICAL HISTORY:  Anemia, unspecified type  Edema of lower extremity  Heart murmur  Hypertension  Hypercalcemia  History of appendectomy  S/P tonsillectomy  H/O parathyroidectomy        Medication list         MEDICATIONS  (STANDING):  ALBUTerol/ipratropium for Nebulization 3 milliLiter(s) Nebulizer every 8 hours  carvedilol 25 milliGRAM(s) Oral every 12 hours  cinacalcet 30 milliGRAM(s) Oral daily  doxazosin 4 milliGRAM(s) Oral at bedtime  ferrous    sulfate 325 milliGRAM(s) Oral daily  furosemide    Tablet 40 milliGRAM(s) Oral daily  heparin  Injectable 5000 Unit(s) SubCutaneous every 12 hours  hydrALAZINE 75 milliGRAM(s) Oral every 8 hours  lactobacillus acidophilus 1 Tablet(s) Oral two times a day with meals  potassium chloride    Tablet ER 10 milliEquivalent(s) Oral daily    MEDICATIONS  (PRN):  acetaminophen   Tablet 650 milliGRAM(s) Oral every 6 hours PRN For Temp greater than 38 C (100.4 F)         Vitals log        ICU Vital Signs Last 24 Hrs  T(C): 37.2 (11 Jan 2018 04:51), Max: 37.2 (11 Jan 2018 04:51)  T(F): 99 (11 Jan 2018 04:51), Max: 99 (11 Jan 2018 04:51)  HR: 57 (11 Jan 2018 04:51) (54 - 69)  BP: 149/67 (11 Jan 2018 04:51) (136/59 - 200/65)  BP(mean): --  ABP: --  ABP(mean): --  RR: 16 (11 Jan 2018 04:51) (16 - 18)  SpO2: 95% (11 Jan 2018 04:51) (92% - 96%)           Input and Output:  I&O's Detail      Lab Data                        10.8   15.4  )-----------( 300      ( 10 Ulises 2018 14:15 )             32.6                   Review of Systems	      Objective     Physical Examination    head at  heart s1s2  lungs dec BS  abd soft  frail and weak      Pertinent Lab findings & Imaging      Ramos:  NO   Adequate UO     I&O's Detail           Discussed with:     Cultures:	        Radiology

## 2018-01-11 NOTE — PROGRESS NOTE ADULT - PROBLEM SELECTOR PLAN 2
Trending down, although continues to be elevated  Consult heme/onc no acute heme path noted  Monitor with AM CBC

## 2018-01-11 NOTE — PROGRESS NOTE ADULT - SUBJECTIVE AND OBJECTIVE BOX
Patient is a 89y old  Female who presents with a chief complaint of coughing (05 Jan 2018 18:28)    INTERVAL HPI/OVERNIGHT EVENTS:  No acute events overnight, patient feeling well. WBC trending down, procalcitonin negative.     MEDICATIONS  (STANDING):  ALBUTerol/ipratropium for Nebulization 3 milliLiter(s) Nebulizer every 8 hours  carvedilol 25 milliGRAM(s) Oral every 12 hours  cinacalcet 30 milliGRAM(s) Oral daily  doxazosin 4 milliGRAM(s) Oral at bedtime  ferrous    sulfate 325 milliGRAM(s) Oral daily  furosemide    Tablet 40 milliGRAM(s) Oral daily  heparin  Injectable 5000 Unit(s) SubCutaneous every 12 hours  hydrALAZINE 75 milliGRAM(s) Oral every 8 hours  lactobacillus acidophilus 1 Tablet(s) Oral two times a day with meals  potassium chloride    Tablet ER 10 milliEquivalent(s) Oral daily    MEDICATIONS  (PRN):  acetaminophen   Tablet 650 milliGRAM(s) Oral every 6 hours PRN For Temp greater than 38 C (100.4 F)    Allergies  Vasotec (Unknown)      REVIEW OF SYSTEMS:  CONSTITUTIONAL: No fever, No chills, No fatigue, No myalgia, No Body ache  EYES: No eye pain, visual disturbances, or discharge  ENMT:  No ear pain, No nose bleed, No vertigo; No sinus or throat pain  NECK: No pain, No stiffness  RESPIRATORY: No cough, wheezing, No  hemoptysis; No shortness of breath  CARDIOVASCULAR: No chest pain, palpitations, leg swelling  GASTROINTESTINAL: No abdominal or epigastric pain. No nausea, No vomiting; No diarrhea or constipation. [1] BM  GENITOURINARY: No dysuria, No frequency, No urgency, No hematuria, or incontinence  NEUROLOGICAL: No headaches, No dizziness, No numbness,  SKIN:   No itching, burning, rashes, or lesions   MUSCULOSKELETAL: No joint pain or swelling; No muscle pain, No back pain, No extremity pain  PSYCHIATRIC: No depression, anxiety, mood swings, or difficulty sleeping  REST OF REVIEW Of SYSTEM - [x] Normal     Height (cm): 157.48 (01-02 @ 08:17)  Weight (kg): 52.2 (01-02 @ 08:17)  BMI (kg/m2): 21 (01-02 @ 08:17)  BSA (m2): 1.51 (01-02 @ 08:17)    Vital Signs Last 24 Hrs  T(C): 36.8 (11 Jan 2018 08:04), Max: 37.2 (11 Jan 2018 04:51)  T(F): 98.3 (11 Jan 2018 08:04), Max: 99 (11 Jan 2018 04:51)  HR: 66 (11 Jan 2018 08:04) (57 - 69)  BP: 168/63 (11 Jan 2018 08:04) (136/59 - 200/65)  RR: 16 (11 Jan 2018 08:04) (16 - 18)  SpO2: 97% (11 Jan 2018 08:04) (92% - 97%)  [x] room air   [ ] 02    PHYSICAL EXAM:  GENERAL:  No acute distress  HEAD:  normal  ENMT: normal  NECK:  normal    NERVOUS SYSTEM:  Alert & Oriented X3, no focal deficits  CHEST/LUNG: Clear to auscultation bilaterally,  No wheezing or crackles  HEART:  Regular rate and rhythm, No murmurs, rubs, or gallops  ABDOMEN:  soft, nontender, nondistended, positive bowel sounds    EXTREMITIES: No clubbing, cyanosis or edema  SKIN: Warm and dry    LABS:                        10.1   13.6  )-----------( 274      ( 11 Jan 2018 07:23 )             30.8     11 Jan 2018 07:23    135    |  98     |  34     ----------------------------<  98     3.8     |  30     |  0.98     Ca    9.1        11 Jan 2018 07:23      Cultures  Culture Results:   Normal Respiratory Kim present (01-10 @ 01:39)  Culture Results:   No growth at 5 days. (01-02 @ 12:02)  Culture Results:   No growth at 5 days. (01-02 @ 12:02)    culture blood  -- .Sputum Sputum 01-10 @ 01:39    culture urine  --  01-10 @ 01:39      Care Discussed with [X] Consultants  [ ] Patient  [ ] Family  [X]   /   [ ] Other; RN  DVT prophylaxis [ ] lovenox   [x] subq heparin  [ ] coumadin  [ ] venodynes [ ] ambulating frequently at how risk for vte and no pharm or  mechanical prophylaxis required    [ ] other   Advanced directive:    [x]pt has hcp       Discussed with pt @ bedside

## 2018-01-11 NOTE — PROGRESS NOTE ADULT - SUBJECTIVE AND OBJECTIVE BOX
infectious diseases progress note:    SAIRA SANTANA is a 89y y. o. Female patient    Patient reports: feeling better    ROS:    EYES:  Negative  blurry vision or double vision  GASTROINTESTINAL:  Negative for nausea, vomiting, diarrhea  -otherwise negative except for subjective    Allergies    Vasotec (Unknown)    Intolerances        ANTIBIOTICS/RELEVANT:  antimicrobials    immunologic:    OTHER:  acetaminophen   Tablet 650 milliGRAM(s) Oral every 6 hours PRN  ALBUTerol/ipratropium for Nebulization 3 milliLiter(s) Nebulizer every 8 hours  carvedilol 25 milliGRAM(s) Oral every 12 hours  cinacalcet 30 milliGRAM(s) Oral daily  doxazosin 4 milliGRAM(s) Oral at bedtime  ferrous    sulfate 325 milliGRAM(s) Oral daily  furosemide    Tablet 40 milliGRAM(s) Oral daily  heparin  Injectable 5000 Unit(s) SubCutaneous every 12 hours  hydrALAZINE 75 milliGRAM(s) Oral every 8 hours  lactobacillus acidophilus 1 Tablet(s) Oral two times a day with meals  potassium chloride    Tablet ER 10 milliEquivalent(s) Oral daily      Objective:  Last 24-Vital Signs Last 24 Hrs  T(C): 36.8 (11 Jan 2018 08:04), Max: 37.2 (11 Jan 2018 04:51)  T(F): 98.3 (11 Jan 2018 08:04), Max: 99 (11 Jan 2018 04:51)  HR: 66 (11 Jan 2018 08:04) (54 - 69)  BP: 168/63 (11 Jan 2018 08:04) (136/59 - 200/65)  BP(mean): --  RR: 16 (11 Jan 2018 08:04) (16 - 18)  SpO2: 97% (11 Jan 2018 08:04) (92% - 97%)    T(C): 36.8 (01-11-18 @ 08:04), Max: 37.2 (01-11-18 @ 04:51)  T(F): 98.3 (01-11-18 @ 08:04), Max: 99 (01-11-18 @ 04:51)  T(C): 36.8 (01-11-18 @ 08:04), Max: 37.2 (01-11-18 @ 04:51)  T(F): 98.3 (01-11-18 @ 08:04), Max: 99 (01-11-18 @ 04:51)  T(C): 36.8 (01-11-18 @ 08:04), Max: 37.2 (01-11-18 @ 04:51)  T(F): 98.3 (01-11-18 @ 08:04), Max: 99 (01-11-18 @ 04:51)    PHYSICAL EXAM:  Constitutional:Well-developed, well nourished  Eyes:PERRLA, EOMI  Ear/Nose/Throat: oropharynx normal	  Neck:no JVD, no lymphadenopathy, supple  Respiratory: no accessory muscle use, lung fields bilaterally clear  Cardiovascular:RRR, normal S1, S2 no m/r/g  Gastrointestinal:soft, NT, no HSM, BS-normal  Extremities:no clubbing, no cyanosis, edema absent  Neuro-patient alert, oriented and appropriate  Skin-no sig lesions      LABS:                        10.1   13.6  )-----------( 274      ( 11 Jan 2018 07:23 )             30.8       WBC 13.6  01-11 @ 07:23  WBC 15.4  01-10 @ 14:15  WBC 18.6  01-09 @ 12:44  WBC 20.0  01-09 @ 03:58  WBC 18.6  01-08 @ 12:40  WBC 14.8  01-07 @ 08:13  WBC 14.7  01-06 @ 07:29  WBC 14.2  01-05 @ 06:47  WBC 12.8  01-04 @ 23:50      01-11    135  |  98  |  34<H>  ----------------------------<  98  3.8   |  30  |  0.98    Ca    9.1      11 Jan 2018 07:23              MICROBIOLOGY:        RADIOLOGY & ADDITIONAL STUDIES:

## 2018-01-11 NOTE — PROGRESS NOTE ADULT - SUBJECTIVE AND OBJECTIVE BOX
[facility]    Christofer Isaacs, Sorin, Marisa, Jr, Hieu, Sandro      [phone]    CHIEF COMPLAINT: Patient is a 89y old  Female who presents with a chief complaint of coughing (05 Jan 2018 18:28)      Follow Up: hfpef, viral pna    Interim history: The patient reports no new symptoms.  Denies chest discomfort and shortness of breath.  No abdominal pain.  No new neurologic symptoms.      MEDICATIONS  (STANDING):  ALBUTerol/ipratropium for Nebulization 3 milliLiter(s) Nebulizer every 8 hours  carvedilol 25 milliGRAM(s) Oral every 12 hours  cinacalcet 30 milliGRAM(s) Oral daily  doxazosin 4 milliGRAM(s) Oral at bedtime  ferrous    sulfate 325 milliGRAM(s) Oral daily  furosemide    Tablet 40 milliGRAM(s) Oral daily  heparin  Injectable 5000 Unit(s) SubCutaneous every 12 hours  hydrALAZINE 75 milliGRAM(s) Oral every 8 hours  lactobacillus acidophilus 1 Tablet(s) Oral two times a day with meals  potassium chloride    Tablet ER 10 milliEquivalent(s) Oral daily    MEDICATIONS  (PRN):  acetaminophen   Tablet 650 milliGRAM(s) Oral every 6 hours PRN For Temp greater than 38 C (100.4 F)      REVIEW OF SYSTEMS:  eye, ent, GI, , allergic, dermatologic, musculoskeletal and neurologic are negative except as described above    Vital Signs Last 24 Hrs  T(C): 36.8 (11 Jan 2018 08:04), Max: 37.2 (11 Jan 2018 04:51)  T(F): 98.3 (11 Jan 2018 08:04), Max: 99 (11 Jan 2018 04:51)  HR: 66 (11 Jan 2018 08:04) (57 - 69)  BP: 168/63 (11 Jan 2018 08:04) (136/59 - 200/65)  BP(mean): --  RR: 16 (11 Jan 2018 08:04) (16 - 18)  SpO2: 97% (11 Jan 2018 08:04) (92% - 97%)    I&O's Summary      Telemetry past 24h: off    PHYSICAL EXAM:    Constitutional: well-nourished, well-developed, NAD   HEENT:  MMM, sclerae anicteric, conjunctivae clear, no oral cyanosis.  Pulmonary: Non-labored, breath sounds are clear bilaterally, No wheezing, rales or rhonchi  Cardiovascular: Regular, S1 and S2.  No murmur.  No rubs, gallops or clicks  Gastrointestinal: Bowel Sounds present, soft, nontender.   Lymph: No peripheral edema.   Neurological: Alert, no focal deficits  Skin: No rashes.  Psych:  Mood & affect appropriate    LABS: All Labs Reviewed:                        10.1   13.6  )-----------( 274      ( 11 Jan 2018 07:23 )             30.8                         10.8   15.4  )-----------( 300      ( 10 Ulises 2018 14:15 )             32.6                         10.3   18.6  )-----------( 297      ( 09 Jan 2018 12:44 )             31.7     11 Jan 2018 07:23    135    |  98     |  34     ----------------------------<  98     3.8     |  30     |  0.98   09 Jan 2018 03:58    135    |  96     |  36     ----------------------------<  92     4.1     |  32     |  0.95     Ca    9.1        11 Jan 2018 07:23  Ca    9.4        09 Jan 2018 03:58            Blood Culture: Organism --  Gram Stain Blood -- Gram Stain   No polymorphonuclear leukocytes per low power field  Few Squamous epithelial cells per low power field  Numerous Gram Positive Cocci in Pairs and Chains per oil power field  Numerous Gram Positive Cocci in Clusters per oil power field  Rare Yeast like cells per oil power field  Numerous Gram Variable Rods per oil power field  Specimen Source .Sputum Sputum  Culture-Blood --            RADIOLOGY:    EKG:    Echo:  < from: TTE Echo Doppler w/o Cont (07.09.17 @ 08:32) >     EXAM:  ECHO TTE W/O CON COMP W/DOPPLR         PROCEDURE DATE:  07/09/2017        INTERPRETATION:  Ordering Physician: Unknown Doctor    Indication: Hypertension    Study Quality: Technically fair  A complete echocardiographic study was performed utilizing standard   protocol including spectral and color Doppler in all echocardiographic   windows.    Height: 152 cm  Weight: 49 kg  BSA: 1.4 sq m  Blood Pressure: 189/63    MEASUREMENTS  IVS: 1.0cm  PWT: 1.0cm  LA: 4.2cm  AO: 2.7cm  LVIDd: 4.5cm  LVIDs: 3.3cm    LVEF: 65%  RVSP: 54mmHg    FINDINGS  Left Ventricle:  Normal left ventricular systolic function.  Aortic Valve: Calcified trileaflet aortic valve. Mild aortic   insufficiency.  Mitral Valve: Mitral annular calcification and calcified mitral valve   leaflets with normal diastolic opening. Mild mitral insufficiency.  Tricuspid Valve: Normal tricuspid valve. Mild tricuspid insufficiency.  Pulmonic Valve: Normal pulmonic valve. Mild pulmonic insufficiency.  Left Atrium: Mildly enlarged.  Right Ventricle: Normal right ventricular size and systolic function.  Right Atrium: Normal  Diastolic Function: Grade 1 diastolic dysfunction.  Pericardium/Pleura: Normal pericardium with no pericardial effusion.                      MORENA ADORNO M.D., ATTENDING CARDIOLOGIST  This document has been electronically signed. Jul 9 2017 10:13AM                < end of copied text >

## 2018-01-11 NOTE — PROGRESS NOTE ADULT - ASSESSMENT
89 yo F pmhx diastolic chf (EF 65%, echo July 2017), htn, anemia, p/w cough productive with yellow sputum and runny nose for 1 week. She is felt to have probable viral pneumonia but remains on antibiotic therapy. She has no evidence for volume overload on examination. Her blood pressure is improved.      - there is no evidence of acute ischemia.  - ce without evolution 1/4-1/5  - No clinical evidence of SVT  - Continue current dose of Coreg  - BP remains labile, but reasonably controlled overall  - cont hydralazine   -  Lasix 40mg Qday.     - Continue antibiotics for rll pna as per med  - Previous tte July 2017: EF 65%, normal left ventricular systolic function, grade 1 diastolic dysfunction  - Monitor and replete lytes, keep K>4, Mg>2  - Other cardiovascular workup will depend on clinical course.

## 2018-01-11 NOTE — PROGRESS NOTE ADULT - PROBLEM SELECTOR PLAN 2
PNA  completed ABX  monitoring WBC  ID notes - elaborate current WBC trend and ABX course  am WBC pending

## 2018-01-11 NOTE — PROGRESS NOTE ADULT - PROBLEM SELECTOR PLAN 2
noted increase but pt received IV solumedrol. Three appreciated mechanisms for wbc elevation following steroid dosing. With steroids there can be a very variable response in terms of degree of elevation and timing with initial demargination occurring in first 5-24 hours followed by impacts on transcription of adhesion molecules such as L-selectin on leukocytes and adhesion molecules on vascular endothelia cells such as MAC-1 that can lead to a peak as far out as 2 weeks post dose.  Also a poorly understood impact on delayed apoptosis can cause wbc level to drop more slowly than we would see in resolution of bacterial infections. With return of eosinophils, this differential, low procalcitonin, improved CXR , wbc dropping off abx and improved clinical status suggest that based on this information additional antibiotics more likely associated with harm rather than benefit.  I am starting to have some concerns regarding risk for development of nosocomial infection in this 89 yo female particularly with high incidence of viral pathogens currently in our facility.  -ordered early afternoon 2pm repeat wbc and will follow.

## 2018-01-11 NOTE — PROGRESS NOTE ADULT - PROBLEM SELECTOR PLAN 3
HFpEF  on lasix  cardio following  on tele unit  pt is DNR  am labs pending  replete esperanza mathews planning

## 2018-01-11 NOTE — PROGRESS NOTE ADULT - PROBLEM SELECTOR PLAN 1
leukocytosis improving, admitted with uri sob  RVP + coronavirus c/w viral pneumonia possible bacterial component as well  -Completed course of Rocephin  -Id following (Dr. Whitehead).  - continuous pulse ox, keep 02 sat >90  -02 saturation remained above 90% when ambulating  -Continue PO lasix

## 2018-01-11 NOTE — PROGRESS NOTE ADULT - PROBLEM SELECTOR PLAN 1
Patient has-completed a 7 day course but with slow improvement,  observation until a safe discharge is possible

## 2018-01-11 NOTE — PROGRESS NOTE ADULT - PROBLEM SELECTOR PLAN 5
continue with carvedilol and increased dose and hydralazine with hold parameters  hydralazine dose increase noted. will need new rx on discharge continue with increased carvedilol and hydralazine with hold parameters  carvedilol and hydralazine doses increase noted. will need new rx on discharge

## 2018-01-12 LAB
HCT VFR BLD CALC: 30 % — LOW (ref 34.5–45)
HGB BLD-MCNC: 9.7 G/DL — LOW (ref 11.5–15.5)
MCHC RBC-ENTMCNC: 28.3 PG — SIGNIFICANT CHANGE UP (ref 27–34)
MCHC RBC-ENTMCNC: 32.4 GM/DL — SIGNIFICANT CHANGE UP (ref 32–36)
MCV RBC AUTO: 87.3 FL — SIGNIFICANT CHANGE UP (ref 80–100)
PLATELET # BLD AUTO: 260 K/UL — SIGNIFICANT CHANGE UP (ref 150–400)
RBC # BLD: 3.43 M/UL — LOW (ref 3.8–5.2)
RBC # FLD: 13.5 % — SIGNIFICANT CHANGE UP (ref 10.3–14.5)
WBC # BLD: 12.9 K/UL — HIGH (ref 3.8–10.5)
WBC # FLD AUTO: 12.9 K/UL — HIGH (ref 3.8–10.5)

## 2018-01-12 PROCEDURE — 99232 SBSQ HOSP IP/OBS MODERATE 35: CPT

## 2018-01-12 RX ORDER — CARVEDILOL PHOSPHATE 80 MG/1
12.5 CAPSULE, EXTENDED RELEASE ORAL EVERY 12 HOURS
Qty: 0 | Refills: 0 | Status: DISCONTINUED | OUTPATIENT
Start: 2018-01-12 | End: 2018-01-13

## 2018-01-12 RX ORDER — CARVEDILOL PHOSPHATE 80 MG/1
1 CAPSULE, EXTENDED RELEASE ORAL
Qty: 0 | Refills: 0 | COMMUNITY
Start: 2018-01-12

## 2018-01-12 RX ORDER — HYDRALAZINE HCL 50 MG
3 TABLET ORAL
Qty: 270 | Refills: 0 | OUTPATIENT
Start: 2018-01-12 | End: 2018-02-10

## 2018-01-12 RX ADMIN — HEPARIN SODIUM 5000 UNIT(S): 5000 INJECTION INTRAVENOUS; SUBCUTANEOUS at 18:25

## 2018-01-12 RX ADMIN — Medication 1 TABLET(S): at 08:31

## 2018-01-12 RX ADMIN — Medication 325 MILLIGRAM(S): at 11:40

## 2018-01-12 RX ADMIN — Medication 10 MILLIEQUIVALENT(S): at 11:40

## 2018-01-12 RX ADMIN — Medication 75 MILLIGRAM(S): at 13:19

## 2018-01-12 RX ADMIN — Medication 4 MILLIGRAM(S): at 21:36

## 2018-01-12 RX ADMIN — Medication 40 MILLIGRAM(S): at 05:41

## 2018-01-12 RX ADMIN — CARVEDILOL PHOSPHATE 12.5 MILLIGRAM(S): 80 CAPSULE, EXTENDED RELEASE ORAL at 18:26

## 2018-01-12 RX ADMIN — HEPARIN SODIUM 5000 UNIT(S): 5000 INJECTION INTRAVENOUS; SUBCUTANEOUS at 05:41

## 2018-01-12 RX ADMIN — Medication 75 MILLIGRAM(S): at 21:36

## 2018-01-12 RX ADMIN — Medication 75 MILLIGRAM(S): at 05:41

## 2018-01-12 RX ADMIN — Medication 3 MILLILITER(S): at 07:30

## 2018-01-12 RX ADMIN — Medication 1 TABLET(S): at 18:26

## 2018-01-12 RX ADMIN — CARVEDILOL PHOSPHATE 25 MILLIGRAM(S): 80 CAPSULE, EXTENDED RELEASE ORAL at 05:40

## 2018-01-12 RX ADMIN — CINACALCET 30 MILLIGRAM(S): 30 TABLET, FILM COATED ORAL at 21:36

## 2018-01-12 RX ADMIN — Medication 3 MILLILITER(S): at 22:56

## 2018-01-12 NOTE — PROGRESS NOTE ADULT - PROBLEM SELECTOR PLAN 2
Elevation due mainly to high dose steroids in ER now dropping.  -noted increase but pt received IV solumedrol. Three appreciated mechanisms for wbc elevation following steroid dosing. With steroids there can be a very variable response in terms of degree of elevation and timing with initial demargination occurring in first 5-24 hours followed by impacts on transcription of adhesion molecules such as L-selectin on leukocytes and adhesion molecules on vascular endothelia cells such as MAC-1 that can lead to a peak as far out as 2 weeks post dose.  Also a poorly understood impact on delayed apoptosis can cause wbc level to drop more slowly than we would see in resolution of bacterial infections. With return of eosinophils, this differential, low procalcitonin, improved CXR , wbc dropping off abx and improved clinical status suggest that based on this information additional antibiotics more likely associated with harm rather than benefit.

## 2018-01-12 NOTE — PROGRESS NOTE ADULT - PROBLEM SELECTOR PROBLEM 7
Prophylactic measure
Prophylactic measure
Hypercalcemia
Prophylactic measure
Hypercalcemia
Hypercalcemia

## 2018-01-12 NOTE — PROGRESS NOTE ADULT - ASSESSMENT
89 yo F pmhx diastolic chf (EF 65%, echo July 2017), htn, anemia, p/w cough productive with yellow sputum and runny nose for 1 week. She is felt to have probable viral pneumonia but remains on antibiotic therapy. She has no evidence for volume overload on examination. Her blood pressure is improved.      - there is no evidence of acute ischemia.  - ce without evolution 1/4-1/5  - No clinical evidence of SVT  - Continue current dose of Coreg  - BP remains labile, but reasonably controlled overall  - cont hydralazine   -  Lasix 40mg Qday.     - Continue antibiotics for rll pna as per med  - Previous tte July 2017: EF 65%, normal left ventricular systolic function, grade 1 diastolic dysfunction  - Monitor and replete lytes, keep K>4, Mg>2  - Other cardiovascular workup will depend on clinical course.

## 2018-01-12 NOTE — PROGRESS NOTE ADULT - PROBLEM SELECTOR PROBLEM 2
Advanced care planning/counseling discussion
Advanced care planning/counseling discussion
COPD (chronic obstructive pulmonary disease)
Diastolic CHF
Leukocytosis
Pneumonia of right lower lobe due to infectious organism
Elevated troponin
Leukocytosis

## 2018-01-12 NOTE — PROGRESS NOTE ADULT - PROBLEM SELECTOR PROBLEM 1
Advanced care planning/counseling discussion
COPD (chronic obstructive pulmonary disease)
Diastolic CHF
Pneumonia of right lower lobe due to infectious organism
R/O PNA (pneumonia)
Duration Of Freeze Thaw-Cycle (Seconds): 0
R/O PNA (pneumonia)
Detail Level: Zone
Total Number Of Aks Treated: 6
Render Post-Care Instructions In Note?: no
Detail Level: Simple
Number Of Freeze-Thaw Cycles: 2 freeze-thaw cycles
Medical Necessity Information: It is in your best interest to select a reason for this procedure from the list below. All of these items fulfill various CMS LCD requirements except the new and changing color options.
Pneumonia of right lower lobe due to infectious organism

## 2018-01-12 NOTE — PROGRESS NOTE ADULT - PROBLEM SELECTOR PROBLEM 8
Advanced care planning/counseling discussion
Prophylactic measure

## 2018-01-12 NOTE — PROGRESS NOTE ADULT - PROBLEM SELECTOR PLAN 3
per medicine    -I am starting to have concerns regarding risk for development of nosocomial infection in this 89 yo female particularly with high incidence of viral pathogens currently in our facility and active pulmonary infection in her roommate.  At this point continued inpatient stay represents a significant risk to patient and would attempt to expedite transition to alternative care setting-either rehab center or preferably home with assistance.    Thank you for consulting us and involving us in the management of this most interesting and challenging case.     Please Call with any further questions

## 2018-01-12 NOTE — PROGRESS NOTE ADULT - PROBLEM SELECTOR PROBLEM 4
Anemia, unspecified type
Anemia, unspecified type
Diastolic CHF
Anemia, unspecified type
Diastolic CHF

## 2018-01-12 NOTE — PROGRESS NOTE ADULT - SUBJECTIVE AND OBJECTIVE BOX
infectious diseases progress note:    SAIRA SANTANA is a 89y y. o. Female patient    Patient reports: feeling much better but I am worried because my room mate has a terrible cough    ROS:    EYES:  Negative  blurry vision or double vision  GASTROINTESTINAL:  Negative for nausea, vomiting, diarrhea  -otherwise negative except for subjective    Allergies    Vasotec (Unknown)    Intolerances        ANTIBIOTICS/RELEVANT:  antimicrobials    immunologic:    OTHER:  acetaminophen   Tablet 650 milliGRAM(s) Oral every 6 hours PRN  ALBUTerol/ipratropium for Nebulization 3 milliLiter(s) Nebulizer every 8 hours  carvedilol 25 milliGRAM(s) Oral every 12 hours  cinacalcet 30 milliGRAM(s) Oral daily  doxazosin 4 milliGRAM(s) Oral at bedtime  ferrous    sulfate 325 milliGRAM(s) Oral daily  furosemide    Tablet 40 milliGRAM(s) Oral daily  heparin  Injectable 5000 Unit(s) SubCutaneous every 12 hours  hydrALAZINE 75 milliGRAM(s) Oral every 8 hours  lactobacillus acidophilus 1 Tablet(s) Oral two times a day with meals  potassium chloride    Tablet ER 10 milliEquivalent(s) Oral daily      Objective:  Vital Signs Last 24 Hrs  T(C): 37 (12 Jan 2018 08:14), Max: 37 (12 Jan 2018 08:14)  T(F): 98.6 (12 Jan 2018 08:14), Max: 98.6 (12 Jan 2018 08:14)  HR: 58 (12 Jan 2018 08:14) (58 - 78)  BP: 144/61 (12 Jan 2018 08:14) (135/67 - 196/79)  BP(mean): --  RR: 17 (12 Jan 2018 08:14) (16 - 17)  SpO2: 92% (12 Jan 2018 08:14) (90% - 94%)    T(C): 37 (01-12-18 @ 08:14), Max: 37.2 (01-11-18 @ 04:51)  T(C): 37 (01-12-18 @ 08:14), Max: 37.2 (01-11-18 @ 04:51)  T(C): 37 (01-12-18 @ 08:14), Max: 37.2 (01-11-18 @ 04:51)    PHYSICAL EXAM:  Constitutional:Well-developed, well nourished  Eyes:PERRLA, EOMI  Ear/Nose/Throat: oropharynx normal	  Neck:no JVD, no lymphadenopathy, supple  Respiratory: no accessory muscle use, CTA  Cardiovascular:RRR,   Gastrointestinal:soft, NT  Extremities:no clubbing, no cyanosis, edema absent      LABS:                        9.7    12.9  )-----------( 260      ( 12 Jan 2018 07:23 )             30.0       12.9 01-12 @ 07:23  13.6 01-11 @ 07:23  15.4 01-10 @ 14:15  18.6 01-09 @ 12:44  20.0 01-09 @ 03:58  18.6 01-08 @ 12:40  14.8 01-07 @ 08:13  14.7 01-06 @ 07:29      01-11    135  |  98  |  34<H>  ----------------------------<  98  3.8   |  30  |  0.98    Ca    9.1      11 Jan 2018 07:23              MICROBIOLOGY:          RADIOLOGY & ADDITIONAL STUDIES:

## 2018-01-12 NOTE — PROGRESS NOTE ADULT - PROBLEM SELECTOR PROBLEM 3
Diastolic CHF
COPD (chronic obstructive pulmonary disease)
Diastolic CHF
Upper respiratory tract infection, unspecified type
Diastolic CHF
Elevated troponin

## 2018-01-12 NOTE — PROGRESS NOTE ADULT - PROBLEM SELECTOR PLAN 7
DVT ppx: heparin
h/o of hypercalcemia - calcium wnl now   - continue home dose Sensipar

## 2018-01-12 NOTE — PROGRESS NOTE ADULT - SUBJECTIVE AND OBJECTIVE BOX
Date/Time Patient Seen:  		  Referring MD:   Data Reviewed	       Patient is a 89y old  Female who presents with a chief complaint of coughing (05 Jan 2018 18:28)  in bed  seen and examined  vs and meds reviewed  off ABX  WBC pending this am      Subjective/HPI     PAST MEDICAL & SURGICAL HISTORY:  Anemia, unspecified type  Edema of lower extremity  Heart murmur  Hypertension  Hypercalcemia  History of appendectomy  S/P tonsillectomy  H/O parathyroidectomy        Medication list         MEDICATIONS  (STANDING):  ALBUTerol/ipratropium for Nebulization 3 milliLiter(s) Nebulizer every 8 hours  carvedilol 25 milliGRAM(s) Oral every 12 hours  cinacalcet 30 milliGRAM(s) Oral daily  doxazosin 4 milliGRAM(s) Oral at bedtime  ferrous    sulfate 325 milliGRAM(s) Oral daily  furosemide    Tablet 40 milliGRAM(s) Oral daily  heparin  Injectable 5000 Unit(s) SubCutaneous every 12 hours  hydrALAZINE 75 milliGRAM(s) Oral every 8 hours  lactobacillus acidophilus 1 Tablet(s) Oral two times a day with meals  potassium chloride    Tablet ER 10 milliEquivalent(s) Oral daily    MEDICATIONS  (PRN):  acetaminophen   Tablet 650 milliGRAM(s) Oral every 6 hours PRN For Temp greater than 38 C (100.4 F)         Vitals log        ICU Vital Signs Last 24 Hrs  T(C): 36.5 (12 Jan 2018 05:17), Max: 36.8 (11 Jan 2018 08:04)  T(F): 97.7 (12 Jan 2018 05:17), Max: 98.3 (11 Jan 2018 08:04)  HR: 63 (12 Jan 2018 05:17) (62 - 70)  BP: 153/70 (12 Jan 2018 05:17) (135/67 - 196/79)  BP(mean): --  ABP: --  ABP(mean): --  RR: 16 (12 Jan 2018 05:17) (16 - 16)  SpO2: 90% (12 Jan 2018 05:17) (90% - 97%)           Input and Output:  I&O's Detail      Lab Data                        10.6   16.0  )-----------( 304      ( 11 Jan 2018 14:26 )             32.6     01-11    135  |  98  |  34<H>  ----------------------------<  98  3.8   |  30  |  0.98    Ca    9.1      11 Jan 2018 07:23              Review of Systems	      Objective     Physical Examination  head at  heart s1s2  lung dec BS  abd soft        Pertinent Lab findings & Imaging      Richard:  NO   Adequate UO     I&O's Detail           Discussed with:     Cultures:	        Radiology

## 2018-01-12 NOTE — PROGRESS NOTE ADULT - ATTENDING COMMENTS
Patient seen and examined. NAD. No complaints.  WBC trending downwards  Clinically stable for d/c home

## 2018-01-12 NOTE — PROGRESS NOTE ADULT - PROBLEM SELECTOR PROBLEM 6
Hypercalcemia
Hypertension

## 2018-01-12 NOTE — PROGRESS NOTE ADULT - PROBLEM SELECTOR PLAN 4
likely chronic disease
likely chronic disease
chronic diastolic chf   ProBNP elevated  no evidence of fluid overload on exam. Cardio ([facility]) consulted  - continue home dose lasix   - will closely monitor for fluid overload
likely chronic disease
chronic diastolic chf   ProBNP elevated  no evidence of fluid overload on exam. Cardio ([facility]) consulted  - continue home dose lasix   - will closely monitor for fluid overload
chronic diastolic chf   ProBNP elevated  no evidence of fluid overload on exam. Cardio ([facility]) consulted  - continue home dose lasix   - will closely monitor for fluid overload

## 2018-01-12 NOTE — PROGRESS NOTE ADULT - PROBLEM SELECTOR PLAN 1
leukocytosis improving, admitted with uri sob  RVP + coronavirus c/w viral pneumonia possible bacterial component as well  -Completed course of Rocephin  -Id following (Dr. Whitehead).  - continuous pulse ox, keep 02 sat >90  -02 saturation remained above 90% when ambulating  -Continue PO lasix  -Stable for d/c home

## 2018-01-12 NOTE — PROGRESS NOTE ADULT - PROBLEM SELECTOR PROBLEM 5
Hypertension
Hypertension
Anemia, unspecified type
Hypertension
Anemia, unspecified type
Anemia, unspecified type

## 2018-01-12 NOTE — PROGRESS NOTE ADULT - SUBJECTIVE AND OBJECTIVE BOX
Patient is a 89y old  Female who presents with a chief complaint of coughing (05 Jan 2018 18:28)    INTERVAL HPI/OVERNIGHT EVENTS:  No acute events overnight, patient feeling well. WBC trending down, procalcitonin negative.     Vital Signs Last 24 Hrs  T(C): 36.6 (12 Jan 2018 12:36), Max: 37 (12 Jan 2018 08:14)  T(F): 97.8 (12 Jan 2018 12:36), Max: 98.6 (12 Jan 2018 08:14)  HR: 55 (12 Jan 2018 12:36) (55 - 78)  BP: 176/69 (12 Jan 2018 13:20) (135/67 - 196/79)  BP(mean): --  RR: 16 (12 Jan 2018 12:36) (16 - 17)  SpO2: 94% (12 Jan 2018 12:36) (90% - 94%)    01-11    135  |  98  |  34<H>  ----------------------------<  98  3.8   |  30  |  0.98    Ca    9.1      11 Jan 2018 07:23                            9.7    12.9  )-----------( 260      ( 12 Jan 2018 07:23 )             30.0       CAPILLARY BLOOD GLUCOSE                  acetaminophen   Tablet 650 milliGRAM(s) Oral every 6 hours PRN  ALBUTerol/ipratropium for Nebulization 3 milliLiter(s) Nebulizer every 8 hours  carvedilol 12.5 milliGRAM(s) Oral every 12 hours  cinacalcet 30 milliGRAM(s) Oral daily  doxazosin 4 milliGRAM(s) Oral at bedtime  ferrous    sulfate 325 milliGRAM(s) Oral daily  furosemide    Tablet 40 milliGRAM(s) Oral daily  heparin  Injectable 5000 Unit(s) SubCutaneous every 12 hours  hydrALAZINE 75 milliGRAM(s) Oral every 8 hours  lactobacillus acidophilus 1 Tablet(s) Oral two times a day with meals  potassium chloride    Tablet ER 10 milliEquivalent(s) Oral daily      REVIEW OF SYSTEMS:  CONSTITUTIONAL: No fever, No chills, No fatigue, No myalgia, No Body ache  EYES: No eye pain, visual disturbances, or discharge  ENMT:  No ear pain, No nose bleed, No vertigo; No sinus or throat pain  NECK: No pain, No stiffness  RESPIRATORY: No cough, wheezing, No  hemoptysis; No shortness of breath  CARDIOVASCULAR: No chest pain, palpitations, leg swelling  GASTROINTESTINAL: No abdominal or epigastric pain. No nausea, No vomiting; No diarrhea or constipation. [1] BM  GENITOURINARY: No dysuria, No frequency, No urgency, No hematuria, or incontinence  NEUROLOGICAL: No headaches, No dizziness, No numbness,  SKIN:   No itching, burning, rashes, or lesions   MUSCULOSKELETAL: No joint pain or swelling; No muscle pain, No back pain, No extremity pain  PSYCHIATRIC: No depression, anxiety, mood swings, or difficulty sleeping  REST OF REVIEW Of SYSTEM - [x] Normal       PHYSICAL EXAM:  GENERAL:  No acute distress  HEAD:  normal  ENMT: normal  NECK:  normal    NERVOUS SYSTEM:  Alert & Oriented X3, no focal deficits  CHEST/LUNG: Clear to auscultation bilaterally,  No wheezing or crackles  HEART:  Regular rate and rhythm, No murmurs, rubs, or gallops  ABDOMEN:  soft, nontender, nondistended, positive bowel sounds    EXTREMITIES: No clubbing, cyanosis or edema  SKIN: Warm and dry    LABS:                        10.1   13.6  )-----------( 274      ( 11 Jan 2018 07:23 )             30.8     11 Jan 2018 07:23    135    |  98     |  34     ----------------------------<  98     3.8     |  30     |  0.98     Ca    9.1        11 Jan 2018 07:23      Cultures  Culture Results:   Normal Respiratory Kim present (01-10 @ 01:39)  Culture Results:   No growth at 5 days. (01-02 @ 12:02)  Culture Results:   No growth at 5 days. (01-02 @ 12:02)    culture blood  -- .Sputum Sputum 01-10 @ 01:39    culture urine  --  01-10 @ 01:39      Care Discussed with [X] Consultants  [ ] Patient  [ ] Family  [X]   /   [ ] Other; RN  DVT prophylaxis [ ] lovenox   [x] subq heparin  [ ] coumadin  [ ] venodynes [ ] ambulating frequently at how risk for vte and no pharm or  mechanical prophylaxis required    [ ] other   Advanced directive:    [x]pt has hcp       Discussed with pt @ bedside

## 2018-01-12 NOTE — PROGRESS NOTE ADULT - SUBJECTIVE AND OBJECTIVE BOX
[facility] - Christofer Isaacs, Sorin, Marisa, Jr, Sandro Hagen  Office Number:  [phone]    The patient reports no new symptoms.  Denies chest discomfort and shortness of breath.  No abdominal pain.  No new neurologic symptoms.    ROS: negative unless otherwise mentioned.    Telemetry:  not on tele    MEDICATIONS  (STANDING):  ALBUTerol/ipratropium for Nebulization 3 milliLiter(s) Nebulizer every 8 hours  carvedilol 25 milliGRAM(s) Oral every 12 hours  cinacalcet 30 milliGRAM(s) Oral daily  doxazosin 4 milliGRAM(s) Oral at bedtime  ferrous    sulfate 325 milliGRAM(s) Oral daily  furosemide    Tablet 40 milliGRAM(s) Oral daily  heparin  Injectable 5000 Unit(s) SubCutaneous every 12 hours  hydrALAZINE 75 milliGRAM(s) Oral every 8 hours  lactobacillus acidophilus 1 Tablet(s) Oral two times a day with meals  potassium chloride    Tablet ER 10 milliEquivalent(s) Oral daily    MEDICATIONS  (PRN):  acetaminophen   Tablet 650 milliGRAM(s) Oral every 6 hours PRN For Temp greater than 38 C (100.4 F)      Allergies    Vasotec (Unknown)    Intolerances        Vital Signs Last 24 Hrs  T(C): 36.6 (12 Jan 2018 12:36), Max: 37 (12 Jan 2018 08:14)  T(F): 97.8 (12 Jan 2018 12:36), Max: 98.6 (12 Jan 2018 08:14)  HR: 55 (12 Jan 2018 12:36) (55 - 78)  BP: 153/53 (12 Jan 2018 12:36) (135/67 - 196/79)  BP(mean): --  RR: 16 (12 Jan 2018 12:36) (16 - 17)  SpO2: 94% (12 Jan 2018 12:36) (90% - 94%)    I&O's Summary      ON EXAM:    Constitutional: well-nourished, well-developed, NAD   HEENT:  MMM, sclerae anicteric, conjunctivae clear, no oral cyanosis.  Pulmonary: Non-labored, breath sounds are clear bilaterally, No wheezing, rales or rhonchi  Cardiovascular: Regular, S1 and S2.  No murmur.  No rubs, gallops or clicks  Gastrointestinal: Bowel Sounds present, soft, nontender.   Lymph: No peripheral edema.   Neurological: Alert, no focal deficits  Skin: No rashes.  Psych:  Mood & affect appropriate    LABS: All Labs Reviewed:                        9.7    12.9  )-----------( 260      ( 12 Jan 2018 07:23 )             30.0                         10.6   16.0  )-----------( 304      ( 11 Jan 2018 14:26 )             32.6                         10.1   13.6  )-----------( 274      ( 11 Jan 2018 07:23 )             30.8     11 Jan 2018 07:23    135    |  98     |  34     ----------------------------<  98     3.8     |  30     |  0.98     Ca    9.1        11 Jan 2018 07:23            Blood Culture: Organism --  Gram Stain Blood -- Gram Stain   No polymorphonuclear leukocytes per low power field  Few Squamous epithelial cells per low power field  Numerous Gram Positive Cocci in Pairs and Chains per oil power field  Numerous Gram Positive Cocci in Clusters per oil power field  Rare Yeast like cells per oil power field  Numerous Gram Variable Rods per oil power field  Specimen Source .Sputum Sputum  Culture-Blood --

## 2018-01-12 NOTE — PROGRESS NOTE ADULT - PROBLEM SELECTOR PLAN 8
pt dnr, molst placed in chart
pt dnr, molst placed in chart
IMPROVE VTE Individual Risk Assessment        RISK                                                          Points  [  ] Previous VTE                                                3    [  ] Thrombophilia                                             2  [x  ] Lower limb paralysis                                   2        (unable to hold up >15 seconds)    [  ] Current Cancer                                             2         (within 6 months)    [ x ] Immobilization > 24 hrs                              1  [  ] ICU/CCU stay > 24 hours                             1  [  x] Age > 60                                                         1    IMPROVE VTE Score: 4  DVT ppx: heparin sub q w cr cl 25.
hcp in place
hcp in place with delineated agent, daughter listed.  long term care plans dw family greater than 15 min.  pt dnr, molst placed in chart
hcp in place with delineated agent, daughter listed.  long term care plans dw family greater than 15 min.  pt dnr, molst placed in chart
pt dnr, molst placed in chart
pt dnr, molst placed in chart
IMPROVE VTE Individual Risk Assessment        RISK                                                          Points  [  ] Previous VTE                                                3    [  ] Thrombophilia                                             2  [x  ] Lower limb paralysis                                   2        (unable to hold up >15 seconds)    [  ] Current Cancer                                             2         (within 6 months)    [ x ] Immobilization > 24 hrs                              1  [  ] ICU/CCU stay > 24 hours                             1  [  x] Age > 60                                                         1    IMPROVE VTE Score: 4  DVT ppx: heparin sub q w cr cl 25.
IMPROVE VTE Individual Risk Assessment        RISK                                                          Points  [  ] Previous VTE                                                3    [  ] Thrombophilia                                             2  [x  ] Lower limb paralysis                                   2        (unable to hold up >15 seconds)    [  ] Current Cancer                                             2         (within 6 months)    [ x ] Immobilization > 24 hrs                              1  [  ] ICU/CCU stay > 24 hours                             1  [  x] Age > 60                                                         1    IMPROVE VTE Score: 4  DVT ppx: heparin sub q w cr cl 25.

## 2018-01-13 VITALS
TEMPERATURE: 98 F | HEART RATE: 58 BPM | SYSTOLIC BLOOD PRESSURE: 179 MMHG | RESPIRATION RATE: 16 BRPM | DIASTOLIC BLOOD PRESSURE: 73 MMHG | OXYGEN SATURATION: 96 %

## 2018-01-13 PROCEDURE — 83880 ASSAY OF NATRIURETIC PEPTIDE: CPT

## 2018-01-13 PROCEDURE — 85730 THROMBOPLASTIN TIME PARTIAL: CPT

## 2018-01-13 PROCEDURE — 80053 COMPREHEN METABOLIC PANEL: CPT

## 2018-01-13 PROCEDURE — 71275 CT ANGIOGRAPHY CHEST: CPT

## 2018-01-13 PROCEDURE — 87486 CHLMYD PNEUM DNA AMP PROBE: CPT

## 2018-01-13 PROCEDURE — 87641 MR-STAPH DNA AMP PROBE: CPT

## 2018-01-13 PROCEDURE — 87070 CULTURE OTHR SPECIMN AEROBIC: CPT

## 2018-01-13 PROCEDURE — 87640 STAPH A DNA AMP PROBE: CPT

## 2018-01-13 PROCEDURE — 96367 TX/PROPH/DG ADDL SEQ IV INF: CPT

## 2018-01-13 PROCEDURE — 84484 ASSAY OF TROPONIN QUANT: CPT

## 2018-01-13 PROCEDURE — 36415 COLL VENOUS BLD VENIPUNCTURE: CPT

## 2018-01-13 PROCEDURE — 99221 1ST HOSP IP/OBS SF/LOW 40: CPT

## 2018-01-13 PROCEDURE — 99285 EMERGENCY DEPT VISIT HI MDM: CPT | Mod: 25

## 2018-01-13 PROCEDURE — 71045 X-RAY EXAM CHEST 1 VIEW: CPT

## 2018-01-13 PROCEDURE — 94640 AIRWAY INHALATION TREATMENT: CPT

## 2018-01-13 PROCEDURE — 83605 ASSAY OF LACTIC ACID: CPT

## 2018-01-13 PROCEDURE — 99232 SBSQ HOSP IP/OBS MODERATE 35: CPT

## 2018-01-13 PROCEDURE — 96365 THER/PROPH/DIAG IV INF INIT: CPT

## 2018-01-13 PROCEDURE — 85027 COMPLETE CBC AUTOMATED: CPT

## 2018-01-13 PROCEDURE — 85610 PROTHROMBIN TIME: CPT

## 2018-01-13 PROCEDURE — 93005 ELECTROCARDIOGRAM TRACING: CPT

## 2018-01-13 PROCEDURE — 84100 ASSAY OF PHOSPHORUS: CPT

## 2018-01-13 PROCEDURE — 94760 N-INVAS EAR/PLS OXIMETRY 1: CPT

## 2018-01-13 PROCEDURE — 97530 THERAPEUTIC ACTIVITIES: CPT

## 2018-01-13 PROCEDURE — 97116 GAIT TRAINING THERAPY: CPT

## 2018-01-13 PROCEDURE — 97161 PT EVAL LOW COMPLEX 20 MIN: CPT

## 2018-01-13 PROCEDURE — 87449 NOS EACH ORGANISM AG IA: CPT

## 2018-01-13 PROCEDURE — 87040 BLOOD CULTURE FOR BACTERIA: CPT

## 2018-01-13 PROCEDURE — 84145 PROCALCITONIN (PCT): CPT

## 2018-01-13 PROCEDURE — 82553 CREATINE MB FRACTION: CPT

## 2018-01-13 PROCEDURE — 87798 DETECT AGENT NOS DNA AMP: CPT

## 2018-01-13 PROCEDURE — 83735 ASSAY OF MAGNESIUM: CPT

## 2018-01-13 PROCEDURE — 86140 C-REACTIVE PROTEIN: CPT

## 2018-01-13 PROCEDURE — 87633 RESP VIRUS 12-25 TARGETS: CPT

## 2018-01-13 PROCEDURE — 82550 ASSAY OF CK (CPK): CPT

## 2018-01-13 PROCEDURE — 93971 EXTREMITY STUDY: CPT

## 2018-01-13 PROCEDURE — 71046 X-RAY EXAM CHEST 2 VIEWS: CPT

## 2018-01-13 PROCEDURE — 87581 M.PNEUMON DNA AMP PROBE: CPT

## 2018-01-13 PROCEDURE — 80048 BASIC METABOLIC PNL TOTAL CA: CPT

## 2018-01-13 RX ORDER — HYDRALAZINE HCL 50 MG
3 TABLET ORAL
Qty: 270 | Refills: 0 | OUTPATIENT
Start: 2018-01-13 | End: 2018-02-11

## 2018-01-13 RX ADMIN — CARVEDILOL PHOSPHATE 12.5 MILLIGRAM(S): 80 CAPSULE, EXTENDED RELEASE ORAL at 06:38

## 2018-01-13 RX ADMIN — Medication 3 MILLILITER(S): at 08:05

## 2018-01-13 RX ADMIN — Medication 10 MILLIEQUIVALENT(S): at 12:11

## 2018-01-13 RX ADMIN — HEPARIN SODIUM 5000 UNIT(S): 5000 INJECTION INTRAVENOUS; SUBCUTANEOUS at 06:38

## 2018-01-13 RX ADMIN — Medication 75 MILLIGRAM(S): at 13:36

## 2018-01-13 RX ADMIN — Medication 1 TABLET(S): at 09:35

## 2018-01-13 RX ADMIN — Medication 40 MILLIGRAM(S): at 06:38

## 2018-01-13 RX ADMIN — Medication 75 MILLIGRAM(S): at 06:38

## 2018-01-13 RX ADMIN — Medication 325 MILLIGRAM(S): at 12:11

## 2018-01-13 NOTE — PROGRESS NOTE ADULT - SUBJECTIVE AND OBJECTIVE BOX
[facility] - Christofer Isaacs, Sorin, Marisa, Jr, Sandro Hagen  Office Number:  [phone]    The patient reports no new symptoms.  Denies chest discomfort and shortness of breath.  No abdominal pain.  No new neurologic symptoms.    ROS: negative unless otherwise mentioned.    Telemetry:  Not on tele    MEDICATIONS  (STANDING):  ALBUTerol/ipratropium for Nebulization 3 milliLiter(s) Nebulizer every 8 hours  carvedilol 12.5 milliGRAM(s) Oral every 12 hours  cinacalcet 30 milliGRAM(s) Oral daily  doxazosin 4 milliGRAM(s) Oral at bedtime  ferrous    sulfate 325 milliGRAM(s) Oral daily  furosemide    Tablet 40 milliGRAM(s) Oral daily  heparin  Injectable 5000 Unit(s) SubCutaneous every 12 hours  hydrALAZINE 75 milliGRAM(s) Oral every 8 hours  lactobacillus acidophilus 1 Tablet(s) Oral two times a day with meals  potassium chloride    Tablet ER 10 milliEquivalent(s) Oral daily    MEDICATIONS  (PRN):  acetaminophen   Tablet 650 milliGRAM(s) Oral every 6 hours PRN For Temp greater than 38 C (100.4 F)      Allergies    Vasotec (Unknown)    Intolerances        Vital Signs Last 24 Hrs  T(C): 36.6 (13 Jan 2018 07:59), Max: 36.7 (13 Jan 2018 04:36)  T(F): 97.8 (13 Jan 2018 07:59), Max: 98.1 (13 Jan 2018 04:36)  HR: 84 (13 Jan 2018 08:06) (55 - 90)  BP: 138/53 (13 Jan 2018 07:59) (138/53 - 179/72)  BP(mean): --  RR: 16 (13 Jan 2018 07:59) (16 - 16)  SpO2: 95% (13 Jan 2018 07:59) (93% - 95%)    I&O's Summary    12 Jan 2018 07:01  -  13 Jan 2018 07:00  --------------------------------------------------------  IN: 200 mL / OUT: 0 mL / NET: 200 mL        ON EXAM:    Constitutional: well-nourished, well-developed, NAD   HEENT:  MMM, sclerae anicteric, conjunctivae clear, no oral cyanosis.  Pulmonary: Non-labored, breath sounds are clear bilaterally, No wheezing, rales or rhonchi  Cardiovascular: Regular, S1 and S2.  No murmur.  No rubs, gallops or clicks  Gastrointestinal: Bowel Sounds present, soft, nontender.   Lymph: No peripheral edema.   Neurological: Alert, no focal deficits  Skin: No rashes.  Psych:  Mood & affect appropriate    LABS: All Labs Reviewed:                        9.7    12.9  )-----------( 260      ( 12 Jan 2018 07:23 )             30.0                         10.6   16.0  )-----------( 304      ( 11 Jan 2018 14:26 )             32.6                         10.1   13.6  )-----------( 274      ( 11 Jan 2018 07:23 )             30.8     11 Jan 2018 07:23    135    |  98     |  34     ----------------------------<  98     3.8     |  30     |  0.98     Ca    9.1        11 Jan 2018 07:23            Blood Culture: Organism --  Gram Stain Blood -- Gram Stain   No polymorphonuclear leukocytes per low power field  Few Squamous epithelial cells per low power field  Numerous Gram Positive Cocci in Pairs and Chains per oil power field  Numerous Gram Positive Cocci in Clusters per oil power field  Rare Yeast like cells per oil power field  Numerous Gram Variable Rods per oil power field  Specimen Source .Sputum Sputum  Culture-Blood --

## 2018-02-24 ENCOUNTER — INPATIENT (INPATIENT)
Facility: HOSPITAL | Age: 83
LOS: 4 days | Discharge: HOME | DRG: 643 | End: 2018-03-01
Attending: INTERNAL MEDICINE | Admitting: INTERNAL MEDICINE
Payer: MEDICARE

## 2018-02-24 VITALS
OXYGEN SATURATION: 95 % | HEART RATE: 60 BPM | TEMPERATURE: 98 F | DIASTOLIC BLOOD PRESSURE: 73 MMHG | RESPIRATION RATE: 18 BRPM | WEIGHT: 115.08 LBS | SYSTOLIC BLOOD PRESSURE: 186 MMHG

## 2018-02-24 DIAGNOSIS — E87.1 HYPO-OSMOLALITY AND HYPONATREMIA: ICD-10-CM

## 2018-02-24 DIAGNOSIS — I16.0 HYPERTENSIVE URGENCY: ICD-10-CM

## 2018-02-24 DIAGNOSIS — R41.0 DISORIENTATION, UNSPECIFIED: ICD-10-CM

## 2018-02-24 DIAGNOSIS — I50.33 ACUTE ON CHRONIC DIASTOLIC (CONGESTIVE) HEART FAILURE: ICD-10-CM

## 2018-02-24 DIAGNOSIS — Z90.89 ACQUIRED ABSENCE OF OTHER ORGANS: Chronic | ICD-10-CM

## 2018-02-24 DIAGNOSIS — Z29.9 ENCOUNTER FOR PROPHYLACTIC MEASURES, UNSPECIFIED: ICD-10-CM

## 2018-02-24 DIAGNOSIS — E89.2 POSTPROCEDURAL HYPOPARATHYROIDISM: Chronic | ICD-10-CM

## 2018-02-24 DIAGNOSIS — R33.9 RETENTION OF URINE, UNSPECIFIED: ICD-10-CM

## 2018-02-24 DIAGNOSIS — Z90.49 ACQUIRED ABSENCE OF OTHER SPECIFIED PARTS OF DIGESTIVE TRACT: Chronic | ICD-10-CM

## 2018-02-24 LAB
ALBUMIN SERPL ELPH-MCNC: 3.8 G/DL — SIGNIFICANT CHANGE UP (ref 3.3–5)
ALP SERPL-CCNC: 84 U/L — SIGNIFICANT CHANGE UP (ref 40–120)
ALT FLD-CCNC: 28 U/L — SIGNIFICANT CHANGE UP (ref 12–78)
ANION GAP SERPL CALC-SCNC: 8 MMOL/L — SIGNIFICANT CHANGE UP (ref 5–17)
APPEARANCE UR: CLEAR — SIGNIFICANT CHANGE UP
APTT BLD: 29.8 SEC — SIGNIFICANT CHANGE UP (ref 27.5–37.4)
AST SERPL-CCNC: 27 U/L — SIGNIFICANT CHANGE UP (ref 15–37)
BASOPHILS # BLD AUTO: 0.1 K/UL — SIGNIFICANT CHANGE UP (ref 0–0.2)
BASOPHILS NFR BLD AUTO: 0.8 % — SIGNIFICANT CHANGE UP (ref 0–2)
BILIRUB SERPL-MCNC: 0.4 MG/DL — SIGNIFICANT CHANGE UP (ref 0.2–1.2)
BILIRUB UR-MCNC: NEGATIVE — SIGNIFICANT CHANGE UP
BUN SERPL-MCNC: 37 MG/DL — HIGH (ref 7–23)
CALCIUM SERPL-MCNC: 9.7 MG/DL — SIGNIFICANT CHANGE UP (ref 8.5–10.1)
CHLORIDE SERPL-SCNC: 90 MMOL/L — LOW (ref 96–108)
CK MB CFR SERPL CALC: 3 NG/ML — SIGNIFICANT CHANGE UP (ref 0–3.6)
CO2 SERPL-SCNC: 31 MMOL/L — SIGNIFICANT CHANGE UP (ref 22–31)
COLOR SPEC: YELLOW — SIGNIFICANT CHANGE UP
CREAT SERPL-MCNC: 0.89 MG/DL — SIGNIFICANT CHANGE UP (ref 0.5–1.3)
DIFF PNL FLD: NEGATIVE — SIGNIFICANT CHANGE UP
EOSINOPHIL # BLD AUTO: 0.1 K/UL — SIGNIFICANT CHANGE UP (ref 0–0.5)
EOSINOPHIL NFR BLD AUTO: 0.6 % — SIGNIFICANT CHANGE UP (ref 0–6)
GLUCOSE SERPL-MCNC: 103 MG/DL — HIGH (ref 70–99)
GLUCOSE UR QL: NEGATIVE — SIGNIFICANT CHANGE UP
HCT VFR BLD CALC: 33.9 % — LOW (ref 34.5–45)
HGB BLD-MCNC: 11.6 G/DL — SIGNIFICANT CHANGE UP (ref 11.5–15.5)
INR BLD: 1.06 RATIO — SIGNIFICANT CHANGE UP (ref 0.88–1.16)
KETONES UR-MCNC: NEGATIVE — SIGNIFICANT CHANGE UP
LACTATE SERPL-SCNC: 0.6 MMOL/L — LOW (ref 0.7–2)
LEUKOCYTE ESTERASE UR-ACNC: NEGATIVE — SIGNIFICANT CHANGE UP
LYMPHOCYTES # BLD AUTO: 1 K/UL — SIGNIFICANT CHANGE UP (ref 1–3.3)
LYMPHOCYTES # BLD AUTO: 11 % — LOW (ref 13–44)
MCHC RBC-ENTMCNC: 30.5 PG — SIGNIFICANT CHANGE UP (ref 27–34)
MCHC RBC-ENTMCNC: 34.2 GM/DL — SIGNIFICANT CHANGE UP (ref 32–36)
MCV RBC AUTO: 89.1 FL — SIGNIFICANT CHANGE UP (ref 80–100)
MONOCYTES # BLD AUTO: 0.9 K/UL — SIGNIFICANT CHANGE UP (ref 0–0.9)
MONOCYTES NFR BLD AUTO: 9.1 % — HIGH (ref 1–9)
NEUTROPHILS # BLD AUTO: 7.4 K/UL — SIGNIFICANT CHANGE UP (ref 1.8–7.4)
NEUTROPHILS NFR BLD AUTO: 78.5 % — HIGH (ref 43–77)
NITRITE UR-MCNC: NEGATIVE — SIGNIFICANT CHANGE UP
PH UR: 7 — SIGNIFICANT CHANGE UP (ref 5–8)
PLATELET # BLD AUTO: 235 K/UL — SIGNIFICANT CHANGE UP (ref 150–400)
POTASSIUM SERPL-MCNC: 3.8 MMOL/L — SIGNIFICANT CHANGE UP (ref 3.5–5.3)
POTASSIUM SERPL-SCNC: 3.8 MMOL/L — SIGNIFICANT CHANGE UP (ref 3.5–5.3)
PROT SERPL-MCNC: 7.4 G/DL — SIGNIFICANT CHANGE UP (ref 6–8.3)
PROT UR-MCNC: NEGATIVE — SIGNIFICANT CHANGE UP
PROTHROM AB SERPL-ACNC: 11.6 SEC — SIGNIFICANT CHANGE UP (ref 9.8–12.7)
RBC # BLD: 3.81 M/UL — SIGNIFICANT CHANGE UP (ref 3.8–5.2)
RBC # FLD: 13 % — SIGNIFICANT CHANGE UP (ref 10.3–14.5)
SODIUM SERPL-SCNC: 129 MMOL/L — LOW (ref 135–145)
SP GR SPEC: 1 — LOW (ref 1.01–1.02)
TROPONIN I SERPL-MCNC: 0.02 NG/ML — SIGNIFICANT CHANGE UP (ref 0.01–0.04)
UROBILINOGEN FLD QL: NEGATIVE — SIGNIFICANT CHANGE UP
WBC # BLD: 9.4 K/UL — SIGNIFICANT CHANGE UP (ref 3.8–10.5)
WBC # FLD AUTO: 9.4 K/UL — SIGNIFICANT CHANGE UP (ref 3.8–10.5)

## 2018-02-24 PROCEDURE — 71045 X-RAY EXAM CHEST 1 VIEW: CPT | Mod: 26

## 2018-02-24 PROCEDURE — 99223 1ST HOSP IP/OBS HIGH 75: CPT

## 2018-02-24 PROCEDURE — 70450 CT HEAD/BRAIN W/O DYE: CPT | Mod: 26

## 2018-02-24 PROCEDURE — 99285 EMERGENCY DEPT VISIT HI MDM: CPT

## 2018-02-24 RX ORDER — ACETAMINOPHEN 500 MG
650 TABLET ORAL EVERY 6 HOURS
Qty: 0 | Refills: 0 | Status: DISCONTINUED | OUTPATIENT
Start: 2018-02-24 | End: 2018-03-01

## 2018-02-24 RX ORDER — CINACALCET 30 MG/1
30 TABLET, FILM COATED ORAL DAILY
Qty: 0 | Refills: 0 | Status: DISCONTINUED | OUTPATIENT
Start: 2018-02-24 | End: 2018-02-25

## 2018-02-24 RX ORDER — HYDRALAZINE HCL 50 MG
75 TABLET ORAL EVERY 8 HOURS
Qty: 0 | Refills: 0 | Status: DISCONTINUED | OUTPATIENT
Start: 2018-02-24 | End: 2018-03-01

## 2018-02-24 RX ORDER — FUROSEMIDE 40 MG
40 TABLET ORAL ONCE
Qty: 0 | Refills: 0 | Status: COMPLETED | OUTPATIENT
Start: 2018-02-24 | End: 2018-02-24

## 2018-02-24 RX ORDER — HEPARIN SODIUM 5000 [USP'U]/ML
5000 INJECTION INTRAVENOUS; SUBCUTANEOUS EVERY 12 HOURS
Qty: 0 | Refills: 0 | Status: DISCONTINUED | OUTPATIENT
Start: 2018-02-24 | End: 2018-03-01

## 2018-02-24 RX ORDER — FUROSEMIDE 40 MG
60 TABLET ORAL DAILY
Qty: 0 | Refills: 0 | Status: DISCONTINUED | OUTPATIENT
Start: 2018-02-25 | End: 2018-02-25

## 2018-02-24 RX ORDER — DOXAZOSIN MESYLATE 4 MG
4 TABLET ORAL AT BEDTIME
Qty: 0 | Refills: 0 | Status: DISCONTINUED | OUTPATIENT
Start: 2018-02-24 | End: 2018-03-01

## 2018-02-24 RX ORDER — CARVEDILOL PHOSPHATE 80 MG/1
12.5 CAPSULE, EXTENDED RELEASE ORAL EVERY 12 HOURS
Qty: 0 | Refills: 0 | Status: DISCONTINUED | OUTPATIENT
Start: 2018-02-24 | End: 2018-03-01

## 2018-02-24 RX ADMIN — Medication 40 MILLIGRAM(S): at 18:08

## 2018-02-24 RX ADMIN — Medication 75 MILLIGRAM(S): at 21:40

## 2018-02-24 RX ADMIN — HEPARIN SODIUM 5000 UNIT(S): 5000 INJECTION INTRAVENOUS; SUBCUTANEOUS at 18:28

## 2018-02-24 RX ADMIN — Medication 650 MILLIGRAM(S): at 21:41

## 2018-02-24 RX ADMIN — Medication 650 MILLIGRAM(S): at 22:41

## 2018-02-24 RX ADMIN — Medication 4 MILLIGRAM(S): at 21:40

## 2018-02-24 RX ADMIN — Medication 0.1 MILLIGRAM(S): at 16:01

## 2018-02-24 NOTE — ED PROVIDER NOTE - CARE PLAN
Principal Discharge DX:	Confusion  Secondary Diagnosis:	Urine retention Principal Discharge DX:	Confusion  Secondary Diagnosis:	Urine retention  Secondary Diagnosis:	Hypertension

## 2018-02-24 NOTE — H&P ADULT - PROBLEM SELECTOR PLAN 2
Patient received one dose of po clonidine in ER  Will monitor on tele  Continue current meds (coreg and hydralazine) and titrate as needed  Hydralazine 10mg ivp q8h prn if SBP > 180  Cardio consult

## 2018-02-24 NOTE — CONSULT NOTE ADULT - ASSESSMENT
89 F  pmhx diastolic chf (EF 65%, echo July 2017), htn, anemia, recently a/w PNA p/w AMS and high BP.  - No signs of significant ischemia. Would reattempt getting ekg  - AMS is likely from urinary retention. Her bladder appears to be distended on exam  - Appears compensated from HF POV.   - reassess blood pressure post abbott.   - Cont coreg  - Monitor and replete electrolytes. Keep K>4.0 and Mg>2.0.   - Further cardiac workup will depend on clinical course.

## 2018-02-24 NOTE — ED PROVIDER NOTE - OBJECTIVE STATEMENT
Pt is a 89 yo f who lives at home with her daughter (who provided this history as pt is confused) p thas hx of chf pn htn hyponatremia uri anemia sp parathyroid surgery.  pmd dr peter weil cards dr haydee doss  who for past 10 days has been increasingly confused.  daughter found pt in bathroom last lianna, pt was taken to br and then pt went back to bathroom. per daughter pt is voiding fine

## 2018-02-24 NOTE — ED PROVIDER NOTE - CHPI ED SYMPTOMS NEG
no fever/no dizziness/no blurred vision/no numbness/no loss of consciousness/no nausea/no weakness/no vomiting

## 2018-02-24 NOTE — H&P ADULT - HISTORY OF PRESENT ILLNESS
This is a 89 F with PMH of HTN diastolic CHF (EF 65%) hyponatremia, hospitalized 6 weeks ago for PNA, who was brought in by her daughter for increasing confusion. Patient has been increasingly confused over the past 10 days. She denies SOB, fevers, chills, cough, CP, or dysuria. She was noted to be in urinary retention (>1000 cc after abbott was placed) and significantly hypertensive in the ER.

## 2018-02-24 NOTE — H&P ADULT - PROBLEM SELECTOR PLAN 3
Lasix 40mg ivp x 1   Continue lasix 40mg ivp qday and titrate as needed  Continue coreg 12.5mg q12h  Further work-up/management pending clinical course. Lasix 40mg ivp x 1   Then continue lasix 60mg po qd  Continue coreg 12.5mg q12h  Further work-up/management pending clinical course.

## 2018-02-24 NOTE — ED ADULT NURSE NOTE - OBJECTIVE STATEMENT
as per daughter patient has icreasing confusions and frequency in urination with elevated blood pressure for few days.

## 2018-02-24 NOTE — H&P ADULT - PROBLEM SELECTOR PLAN 5
Possibly secondary to urinary retention  Continue abbott drainage  PO fluid restriction  Renal consult  Serial labs

## 2018-02-24 NOTE — ED PROVIDER NOTE - PROGRESS NOTE DETAILS
pt's daughter initially refused straight cath, pt bladder scan now is "full" and needs it. the daughter refused rn to do it unless rn was female.  pt's daughter also delayed the ct of head. transporter sent away twice.  dr justin kennedy for dr amadeo ordonez is aware and will see pt. dr perlman paged for admission

## 2018-02-24 NOTE — CONSULT NOTE ADULT - PROBLEM SELECTOR RECOMMENDATION 9
HFpEF stage I on TTE, also has Pulm HTN with elevated RSVP on TTE  cardio eval noted  cont LASIX, monitor labs, monitor pulse ox and vs and HD and Sat  keep sat > 88 pct  oral and skin care  pall care to eval for MOLST  spoke with dtr about diagnosis, prognosis, and goals of care

## 2018-02-24 NOTE — ED PROVIDER NOTE - MUSCULOSKELETAL, MLM
Spine appears normal, range of motion is not limited, no muscle or joint tenderness arthritic fingers

## 2018-02-24 NOTE — ED ADULT NURSE REASSESSMENT NOTE - COMFORT CARE
meal provided/repositioned/side rails up/plan of care explained/wait time explained/warm blanket provided

## 2018-02-24 NOTE — H&P ADULT - PROBLEM SELECTOR PLAN 1
Admit  Possibly secondary to hyponatremia  Monitor esperanza Ramos in place  Further work-up/management pending clinical course.

## 2018-02-25 LAB
ANION GAP SERPL CALC-SCNC: 7 MMOL/L — SIGNIFICANT CHANGE UP (ref 5–17)
BUN SERPL-MCNC: 34 MG/DL — HIGH (ref 7–23)
CALCIUM SERPL-MCNC: 9.6 MG/DL — SIGNIFICANT CHANGE UP (ref 8.5–10.1)
CHLORIDE SERPL-SCNC: 92 MMOL/L — LOW (ref 96–108)
CO2 SERPL-SCNC: 33 MMOL/L — HIGH (ref 22–31)
CREAT SERPL-MCNC: 0.83 MG/DL — SIGNIFICANT CHANGE UP (ref 0.5–1.3)
GLUCOSE SERPL-MCNC: 101 MG/DL — HIGH (ref 70–99)
HCT VFR BLD CALC: 31.1 % — LOW (ref 34.5–45)
HGB BLD-MCNC: 10.6 G/DL — LOW (ref 11.5–15.5)
MAGNESIUM SERPL-MCNC: 2 MG/DL — SIGNIFICANT CHANGE UP (ref 1.6–2.6)
MCHC RBC-ENTMCNC: 30.1 PG — SIGNIFICANT CHANGE UP (ref 27–34)
MCHC RBC-ENTMCNC: 34 GM/DL — SIGNIFICANT CHANGE UP (ref 32–36)
MCV RBC AUTO: 88.6 FL — SIGNIFICANT CHANGE UP (ref 80–100)
NT-PROBNP SERPL-SCNC: 1488 PG/ML — HIGH (ref 0–450)
PLATELET # BLD AUTO: 191 K/UL — SIGNIFICANT CHANGE UP (ref 150–400)
POTASSIUM SERPL-MCNC: 3.4 MMOL/L — LOW (ref 3.5–5.3)
POTASSIUM SERPL-SCNC: 3.4 MMOL/L — LOW (ref 3.5–5.3)
PROCALCITONIN SERPL-MCNC: <0.05 — SIGNIFICANT CHANGE UP (ref 0–0.04)
RBC # BLD: 3.51 M/UL — LOW (ref 3.8–5.2)
RBC # FLD: 13.1 % — SIGNIFICANT CHANGE UP (ref 10.3–14.5)
SODIUM SERPL-SCNC: 132 MMOL/L — LOW (ref 135–145)
WBC # BLD: 6.8 K/UL — SIGNIFICANT CHANGE UP (ref 3.8–10.5)
WBC # FLD AUTO: 6.8 K/UL — SIGNIFICANT CHANGE UP (ref 3.8–10.5)

## 2018-02-25 PROCEDURE — 93010 ELECTROCARDIOGRAM REPORT: CPT

## 2018-02-25 PROCEDURE — 99232 SBSQ HOSP IP/OBS MODERATE 35: CPT

## 2018-02-25 RX ORDER — FERROUS SULFATE 325(65) MG
325 TABLET ORAL DAILY
Qty: 0 | Refills: 0 | Status: DISCONTINUED | OUTPATIENT
Start: 2018-02-25 | End: 2018-03-01

## 2018-02-25 RX ORDER — CHOLECALCIFEROL (VITAMIN D3) 125 MCG
1000 CAPSULE ORAL DAILY
Qty: 0 | Refills: 0 | Status: DISCONTINUED | OUTPATIENT
Start: 2018-02-25 | End: 2018-03-01

## 2018-02-25 RX ORDER — CINACALCET 30 MG/1
30 TABLET, FILM COATED ORAL AT BEDTIME
Qty: 0 | Refills: 0 | Status: DISCONTINUED | OUTPATIENT
Start: 2018-02-25 | End: 2018-03-01

## 2018-02-25 RX ORDER — POTASSIUM CHLORIDE 20 MEQ
40 PACKET (EA) ORAL ONCE
Qty: 0 | Refills: 0 | Status: COMPLETED | OUTPATIENT
Start: 2018-02-25 | End: 2018-02-25

## 2018-02-25 RX ORDER — POTASSIUM CHLORIDE 20 MEQ
40 PACKET (EA) ORAL ONCE
Qty: 0 | Refills: 0 | Status: DISCONTINUED | OUTPATIENT
Start: 2018-02-25 | End: 2018-02-25

## 2018-02-25 RX ADMIN — Medication 40 MILLIEQUIVALENT(S): at 13:55

## 2018-02-25 RX ADMIN — Medication 75 MILLIGRAM(S): at 13:55

## 2018-02-25 RX ADMIN — CARVEDILOL PHOSPHATE 12.5 MILLIGRAM(S): 80 CAPSULE, EXTENDED RELEASE ORAL at 18:16

## 2018-02-25 RX ADMIN — Medication 75 MILLIGRAM(S): at 05:49

## 2018-02-25 RX ADMIN — Medication 1000 UNIT(S): at 13:55

## 2018-02-25 RX ADMIN — Medication 75 MILLIGRAM(S): at 21:30

## 2018-02-25 RX ADMIN — HEPARIN SODIUM 5000 UNIT(S): 5000 INJECTION INTRAVENOUS; SUBCUTANEOUS at 18:16

## 2018-02-25 RX ADMIN — Medication 4 MILLIGRAM(S): at 21:29

## 2018-02-25 RX ADMIN — HEPARIN SODIUM 5000 UNIT(S): 5000 INJECTION INTRAVENOUS; SUBCUTANEOUS at 05:48

## 2018-02-25 RX ADMIN — Medication 325 MILLIGRAM(S): at 13:55

## 2018-02-25 RX ADMIN — CARVEDILOL PHOSPHATE 12.5 MILLIGRAM(S): 80 CAPSULE, EXTENDED RELEASE ORAL at 05:48

## 2018-02-25 RX ADMIN — Medication 60 MILLIGRAM(S): at 05:49

## 2018-02-25 RX ADMIN — CINACALCET 30 MILLIGRAM(S): 30 TABLET, FILM COATED ORAL at 21:30

## 2018-02-25 NOTE — CONSULT NOTE ADULT - ASSESSMENT
ANALYSIS AND PLAN:  This is a 89-year-old with episodes of changes in mental status.    1.	Changes in mental status, possibly metabolic encephalopathy, questionable secondary HTN urgency  also suspect the patient has subtle mild cognitive impairment that may become worse in the hospital setting.  2.	Recommend to monitor electrolytes and sodium.  3.	For mild cognitive impairment versus subtle dementia secondary to the patient's age, would recommend supportive therapy.    4.	called daughter, Eula, at [phone]. no response  5.	neurology wise stable

## 2018-02-25 NOTE — PROGRESS NOTE ADULT - SUBJECTIVE AND OBJECTIVE BOX
Date/Time Patient Seen:  		  Referring MD:   Data Reviewed	       Patient is a 89y old  Female who presents with a chief complaint of coughing (24 Feb 2018 19:35)  in bed  on room air  alert  verbal  hard of hearing        Subjective/HPI     PAST MEDICAL & SURGICAL HISTORY:  Anemia, unspecified type  Edema of lower extremity  Heart murmur  Hypertension  Hypercalcemia  History of appendectomy  S/P tonsillectomy  H/O parathyroidectomy        Medication list         MEDICATIONS  (STANDING):  carvedilol 12.5 milliGRAM(s) Oral every 12 hours  cinacalcet 30 milliGRAM(s) Oral daily  doxazosin 4 milliGRAM(s) Oral at bedtime  furosemide    Tablet 60 milliGRAM(s) Oral daily  heparin  Injectable 5000 Unit(s) SubCutaneous every 12 hours  hydrALAZINE 75 milliGRAM(s) Oral every 8 hours    MEDICATIONS  (PRN):  acetaminophen   Tablet 650 milliGRAM(s) Oral every 6 hours PRN For Temp greater than 38 C (100.4 F)  acetaminophen   Tablet. 650 milliGRAM(s) Oral every 6 hours PRN Mild Pain (1 - 3)         Vitals log        ICU Vital Signs Last 24 Hrs  T(C): 36.4 (25 Feb 2018 04:21), Max: 36.9 (24 Feb 2018 13:35)  T(F): 97.5 (25 Feb 2018 04:21), Max: 98.5 (24 Feb 2018 13:35)  HR: 54 (25 Feb 2018 04:21) (52 - 61)  BP: 144/70 (25 Feb 2018 04:21) (137/53 - 205/60)  BP(mean): --  ABP: --  ABP(mean): --  RR: 17 (25 Feb 2018 04:21) (14 - 18)  SpO2: 97% (25 Feb 2018 04:21) (93% - 97%)           Input and Output:  I&O's Detail    24 Feb 2018 07:01  -  25 Feb 2018 05:33  --------------------------------------------------------  IN:  Total IN: 0 mL    OUT:    Indwelling Catheter - Urethral: 250 mL  Total OUT: 250 mL    Total NET: -250 mL          Lab Data                        11.6   9.4   )-----------( 235      ( 24 Feb 2018 15:03 )             33.9     02-24    129<L>  |  90<L>  |  37<H>  ----------------------------<  103<H>  3.8   |  31  |  0.89    Ca    9.7      24 Feb 2018 15:03    TPro  7.4  /  Alb  3.8  /  TBili  0.4  /  DBili  x   /  AST  27  /  ALT  28  /  AlkPhos  84  02-24      CARDIAC MARKERS ( 24 Feb 2018 15:03 )  .020 ng/mL / x     / x     / x     / 3.0 ng/mL        Review of Systems	      Objective     Physical Examination    frail  weak  lung dec BS  abd soft      Pertinent Lab findings & Imaging      Richard:  NO   Adequate UO     I&O's Detail    24 Feb 2018 07:01  -  25 Feb 2018 05:33  --------------------------------------------------------  IN:  Total IN: 0 mL    OUT:    Indwelling Catheter - Urethral: 250 mL  Total OUT: 250 mL    Total NET: -250 mL               Discussed with:     Cultures:	        Radiology

## 2018-02-25 NOTE — PROGRESS NOTE ADULT - SUBJECTIVE AND OBJECTIVE BOX
Patient is a 89y old  Female who presents with a chief complaint of coughing (2018 19:35)      INTERVAL HPI/OVERNIGHT EVENTS: Patient seen and examined. NAD. No complaints.  Mental status back to baseline.    Vital Signs Last 24 Hrs  T(C): 36.5 (2018 07:30), Max: 36.9 (2018 13:35)  T(F): 97.7 (2018 07:30), Max: 98.5 (2018 13:35)  HR: 56 (2018 07:30) (52 - 61)  BP: 115/69 (2018 07:30) (115/69 - 205/60)  BP(mean): --  RR: 16 (2018 07:30) (14 - 18)  SpO2: 93% (2018 07:30) (93% - 97%)    02    132<L>  |  92<L>  |  34<H>  ----------------------------<  101<H>  3.4<L>   |  33<H>  |  0.83    Ca    9.6      2018 08:13  Mg     2.0         TPro  7.4  /  Alb  3.8  /  TBili  0.4  /  DBili  x   /  AST  27  /  ALT  28  /  AlkPhos  84  02-24                          10.6   6.8   )-----------( 191      ( 2018 08:13 )             31.1     PT/INR - ( 2018 15:03 )   PT: 11.6 sec;   INR: 1.06 ratio         PTT - ( 2018 15:03 )  PTT:29.8 sec  CAPILLARY BLOOD GLUCOSE        Urinalysis Basic - ( 2018 15:03 )    Color: Yellow / Appearance: Clear / S.005 / pH: x  Gluc: x / Ketone: Negative  / Bili: Negative / Urobili: Negative   Blood: x / Protein: Negative / Nitrite: Negative   Leuk Esterase: Negative / RBC: x / WBC x   Sq Epi: x / Non Sq Epi: x / Bacteria: x              acetaminophen   Tablet 650 milliGRAM(s) Oral every 6 hours PRN  acetaminophen   Tablet. 650 milliGRAM(s) Oral every 6 hours PRN  carvedilol 12.5 milliGRAM(s) Oral every 12 hours  cholecalciferol 1000 Unit(s) Oral daily  cinacalcet 30 milliGRAM(s) Oral at bedtime  doxazosin 4 milliGRAM(s) Oral at bedtime  ferrous    sulfate 325 milliGRAM(s) Oral daily  furosemide    Tablet 60 milliGRAM(s) Oral daily  heparin  Injectable 5000 Unit(s) SubCutaneous every 12 hours  hydrALAZINE 75 milliGRAM(s) Oral every 8 hours  potassium chloride   Powder 40 milliEquivalent(s) Oral once            REVIEW OF SYSTEMS:  CONSTITUTIONAL: No fever, no weight loss, or no fatigue  NECK: No pain, no stiffness  RESPIRATORY: No cough, no wheezing, no chills, no hemoptysis, No shortness of breath  CARDIOVASCULAR: No chest pain, no palpitations, no dizziness, no leg swelling  GASTROINTESTINAL: No abdominal pain. No nausea, no vomiting, no hematemesis; No diarrhea, no constipation. No melena, no hematochezia.  GENITOURINARY: No dysuria, no frequency, no hematuria, no incontinence  NEUROLOGICAL: No headaches, no loss of strength, no numbness, no tremors  SKIN: No itching, no burning  MUSCULOSKELETAL: No joint pain, no swelling; No muscle, no back, no extremity pain  PSYCHIATRIC: No depression, no mood swings,   HEME/LYMPH: No easy bruising, no bleeding gums  ALLERY AND IMMUNOLOGIC: No hives       Consultant(s) Notes Reviewed:  [x ] YES  [ ] NO    PHYSICAL EXAM:  GENERAL: NAD  HEAD:  Atraumatic, Normocephalic  EYES: EOMI, PERRLA, conjunctiva and sclera clear  ENMT: No tonsillar erythema, exudates, or enlargement; Moist mucous membranes  NECK: Supple, No JVD  NERVOUS SYSTEM:  Awake & alert  CHEST/LUNG: Clear to auscultation bilaterally; No rales, rhonchi, wheezing,  HEART: Regular rate and rhythm  ABDOMEN: Soft, Nontender, Nondistended; Bowel sounds present  EXTREMITIES:  No clubbing, cyanosis, or edema  LYMPH: No lymphadenopathy noted  SKIN: No rashes      Advanced care planning discussed with patient/family [ x] YES   [ ] NO    Advanced care planning discussed with patient/family. Advanced care planning forms reviewed/discussed/completed. 20 minutes spent.

## 2018-02-25 NOTE — PROGRESS NOTE ADULT - PROBLEM SELECTOR PLAN 3
Received lasix 40mg ivp x 1 in ER   Then continue lasix 60mg po qd  Continue coreg 12.5mg q12h  Further work-up/management pending clinical course.

## 2018-02-25 NOTE — PROGRESS NOTE ADULT - SUBJECTIVE AND OBJECTIVE BOX
HPI:  This is a 89 F with PMH of HTN diastolic CHF (EF 65%) hyponatremia, hospitalized 6 weeks ago for PNA, who was brought in by her daughter for increasing confusion. Patient has been increasingly confused over the past 10 days. She denies SOB, fevers, chills, cough, CP, or dysuria. She was noted to be in urinary retention (>1000 cc after abbott was placed) and significantly hypertensive in the ER. (2018 17:20)      SUBJECTIVE:  Patient is a 89y old  Female who presents with a chief complaint of coughing (2018 19:35)          OBJECTIVE:  Review Of Systems:  Constitutional: [ ] Fever [ ] Chills [ ] Fatigue [ ] Weight change   HEENT: [ ] Blurred vision [ ] Eye Pain [ ] Headache [ ] Runny nose [ ] Sore Throat   Respiratory: [ ] Cough [ ] Wheezing [ ] Shortness of breath  Cardiovascular: [ ] Chest Pain [ ] Palpitations [ ] PEPE [ ] PND [ ] Orthopnea  Gastrointestinal: [ ] Abdominal Pain [ ] Diarrhea [ ] Constipation [ ] Hemorrhoids [ ] Nausea [ ] Vomiting  Genitourinary: [ ] Nocturia [ ] Dysuria [ ] Incontinence  Extremities: [ ] Swelling [ ] Joint Pain  Neurologic: [ ] Focal deficit [ ] Paresthesias [ ] Syncope  Lymphatic: [ ] Swelling [ ] Lymphadenopathy   Skin: [ ] Rash [ ] Ecchymoses [ ] Wounds [ ] Lesions  Psychiatry: [ ] Depression [ ] Suicidal/Homicidal Ideation [ ] Anxiety [ ] Sleep Disturbances  [x ] 10 point review of systems is otherwise negative except as mentioned above            [ ]Unable to obtain    Allergy:  Allergies    Vasotec (Unknown)    Intolerances        Medications:  MEDICATIONS  (STANDING):  carvedilol 12.5 milliGRAM(s) Oral every 12 hours  cholecalciferol 1000 Unit(s) Oral daily  cinacalcet 30 milliGRAM(s) Oral at bedtime  doxazosin 4 milliGRAM(s) Oral at bedtime  ferrous    sulfate 325 milliGRAM(s) Oral daily  furosemide    Tablet 60 milliGRAM(s) Oral daily  heparin  Injectable 5000 Unit(s) SubCutaneous every 12 hours  hydrALAZINE 75 milliGRAM(s) Oral every 8 hours    MEDICATIONS  (PRN):  acetaminophen   Tablet 650 milliGRAM(s) Oral every 6 hours PRN For Temp greater than 38 C (100.4 F)  acetaminophen   Tablet. 650 milliGRAM(s) Oral every 6 hours PRN Mild Pain (1 - 3)      PMH/PSH/FH/SH: [ ] Unchanged    Vitals:  T(C): 36.5 (18 @ 07:30), Max: 36.6 (18 @ 18:10)  HR: 61 (18 @ 12:13) (52 - 61)  BP: 122/62 (18 @ 12:13) (115/69 - 205/60)  BP(mean): --  RR: 18 (18 @ 12:13) (14 - 18)  SpO2: 94% (18 @ 12:13) (93% - 97%)  Wt(kg): --  Daily     Daily Weight in k.2 (2018 04:21)  I&O's Summary    2018 07:01  -  2018 07:00  --------------------------------------------------------  IN: 0 mL / OUT: 650 mL / NET: -650 mL        Labs:                        10.6   6.8   )-----------( 191      ( 2018 08:13 )             31.1         132<L>  |  92<L>  |  34<H>  ----------------------------<  101<H>  3.4<L>   |  33<H>  |  0.83    Ca    9.6      2018 08:13  Mg     2.0         TPro  7.4  /  Alb  3.8  /  TBili  0.4  /  DBili  x   /  AST  27  /  ALT  28  /  AlkPhos  84      PT/INR - ( 2018 15:03 )   PT: 11.6 sec;   INR: 1.06 ratio         PTT - ( 2018 15:03 )  PTT:29.8 sec  CARDIAC MARKERS ( 2018 15:03 )  .020 ng/mL / x     / x     / x     / 3.0 ng/mL      Magnesium, Serum: 2.0 mg/dL ( @ 08:13)  Serum Pro-Brain Natriuretic Peptide: 1488 pg/mL ( @ 08:13)              ECG:  < from: 12 Lead ECG (18 @ 08:36) >  Ventricular Rate 58 BPM    Atrial Rate 58 BPM    P-R Interval 160 ms    QRS Duration 78 ms    Q-T Interval 398 ms    QTC Calculation(Bezet) 390 ms    P Axis 20 degrees    R Axis 63 degrees    T Axis 48 degrees    Diagnosis Line Sinus bradycardia  Otherwise normal ECG  When compared with ECG of 25-AUG-2017 09:14,  Questionable change in QRS axis  T wave inversion no longer evident in Inferior leads  Confirmed by NIDHI VARELA ([id number]) on 2018 8:52:44 PM    < end of copied text >    Echo:  < from: TTE Echo Doppler w/o Cont (17 @ 08:32) >   EXAM:  ECHO TTE W/O CON COMP W/DOPPLR         PROCEDURE DATE:  2017        INTERPRETATION:  Ordering Physician: Unknown Doctor    Indication: Hypertension    Study Quality: Technically fair  A complete echocardiographic study was performed utilizing standard   protocol including spectral and color Doppler in all echocardiographic   windows.    Height: 152 cm  Weight: 49 kg  BSA: 1.4 sq m  Blood Pressure: 189/63    MEASUREMENTS  IVS: 1.0cm  PWT: 1.0cm  LA: 4.2cm  AO: 2.7cm  LVIDd: 4.5cm  LVIDs: 3.3cm    LVEF: 65%  RVSP: 54mmHg    FINDINGS  Left Ventricle:  Normal left ventricular systolic function.  Aortic Valve: Calcified trileaflet aortic valve. Mild aortic   insufficiency.  Mitral Valve: Mitral annular calcification and calcified mitral valve   leaflets with normal diastolic opening. Mild mitral insufficiency.  Tricuspid Valve: Normal tricuspid valve. Mild tricuspid insufficiency.  Pulmonic Valve: Normal pulmonic valve. Mild pulmonic insufficiency.  Left Atrium: Mildly enlarged.  Right Ventricle: Normal right ventricular size and systolic function.  Right Atrium: Normal  Diastolic Function: Grade 1 diastolic dysfunction.  Pericardium/Pleura: Normal pericardium with no pericardial effusion.                      MORENA ADORNO M.D., ATTENDING CARDIOLOGIST  This document has been electronically signed. 2017 10:13AM    < end of copied text >    Stress Testing:     Cath:    Imaging:  < from: Xray Chest 1 View- PORTABLE-Urgent (18 @ 16:23) >    EXAM:  XR CHEST PORTABLE URGENT 1V                            PROCEDURE DATE:  2018          INTERPRETATION:  Portable chest radiograph       CLINICAL INFORMATION: Admission chest x-ray     TECHNIQUE:  Single portable frontal AP view of the chest was obtained.    FINDINGS: The examination of 2018 is available for review.    There is prominence of the interstitial markings bilaterally. There is a   left basilar opacity, possibly representing a subsegmental atelectasis.   Small effusionor small infiltrate. The heart is prominent. The wilmer and   mediastinum appear to be unremarkable. Degenerative changes are seen in   the shoulders and spine.      IMPRESSION: Cardiomegaly and slight prominence of the interstitial and/or   vascular markings possibly due to mild CHF. Left basilar opacity,   possibly representing subsegmental atelectasis, small effusion or small   infiltrate. Follow-up is recommended.                BRIANNA HARRIS M.D. ATTENDING RADIOLOGIST  This document has been electronically signed. 2018  4:55PM    < end of copied text >  < from: CT Head No Cont (18 @ 15:32) >    EXAM:  CT BRAIN                            PROCEDURE DATE:  2018          INTERPRETATION:  INDICATION:  Diffusion  TECHNIQUE:  A non contrast 2.5mm axial CT study of the brain was   performed from skull base to vertex. Coronal and sagittal reformations   were generated from the axial data.  COMPARISON EXAMINATION:  CT 2017    FINDINGS:      HEMISPHERES:  Diffuse chronic small vessel ischemic changes are again   noted throughout both hemispheres with atrophy. No acute abnormality is   noted.  VENTRICLES:  Mildly prominent but stable compared with prior CT.  POSTERIOR FOSSA:  The brain stem and cerebellum are unremarkable.  No CP   angle lesion noted.  EXTRACEREBRAL SPACES:  No subdural or epidural collections are noted.  SKULL BASEAND CALVARIUM:  Appears intact.  No fracture or destructive   lesion is identified.  SINUSES AND MASTOIDS:  Clear.  MISCELLANEOUS:  No orbital or suprasellar abnormality noted.      IMPRESSION:    1)  chronic ischemic changes noted throughout both hemispheres with   moderate volume loss. Similar findings were noted previously.  2)  ventricles are mildly prominent but are stable compared with prior CT.                  SHELIA CURRY M.D., ATTENDING RADIOLOGIST  This document has been electronically signed. 2018  3:33PM    < end of copied text >    Interpretation of Telemetry:  SB 40s with NSVT x 3 beats and PAC 50-60    Physical Exam:  Appearance: [ ] Normal  [ ] abnormal [x ] NAD   Eyes: [x ] PERRL [ ] EOMI  HENT: [x ] Normal [ ] Abnormal oral muscosa [x ]NC/AT  Cardiovascular: [x ] S1 [ x] S2 [x ] RRR [ x] 2/6 SM [ ]edema [ ] JVP  Procedural Access Site: [ ]  hematoma [ ] tender to palpation [ ] 2+ pulse [ ] bruit [ ] Ecchymosis  Respiratory: [x ] Clear to auscultation bilaterally  Gastrointestinal: [x ] Soft [ ] tenderness[ ] distension [ x] BS  Musculoskeletal: [ ] clubbing [ ] joint deformity   Neurologic: [x ] Non-focal  Lymphatic: [ ] lymphadenopathy [x] no peripheral edema  Psychiatry: [ ] AAOx3  [ x] confused pleasant [ ] disoriented [ ] Mood & affect appropriate   Skin: [ ]  rashes [ ] ecchymoses [ ] cyanosis

## 2018-02-25 NOTE — PROGRESS NOTE ADULT - ASSESSMENT
89 F  pmhx diastolic chf (EF 65%, echo July 2017), htn, anemia, recently a/w PNA p/w AMS and high BP.    - No signs of significant ischemia. Would reattempt getting ekg  - AMS is likely from urinary retention. Her bladder appears to be distended on exam  - Appears compensated from HF POV.   - blood pressure more controlled post abbott.   - Cont coreg  - Cont hydralazine  - Cont lasix  - hyponatremia improving  - Monitor and replete electrolytes. Keep K>4.0 and Mg>2.0.  K 3.4 with NSVT on tele x 3 beats supplemented K  - Further cardiac workup will depend on clinical course.   - Will follow    Arlene Dubose NP-C  Cardiology

## 2018-02-25 NOTE — PROGRESS NOTE ADULT - PROBLEM SELECTOR PLAN 1
am Na pending  monitor labs  on LASIX  replete lytes as needed  pt with HFpEF stage I and Moderate Pulm HTN  cardio follow up  keep sat > 88 pct  at present tolerating room air at rest  will follow

## 2018-02-25 NOTE — PROGRESS NOTE ADULT - PROBLEM SELECTOR PLAN 2
Patient received one dose of po clonidine in ER  Now patient normotensive  Will monitor on tele  Continue current meds (coreg and hydralazine) and titrate as needed  Cardio f/u

## 2018-02-25 NOTE — PROGRESS NOTE ADULT - PROBLEM SELECTOR PLAN 1
Improving  Possibly secondary to hyponatremia  Monitor lytes  Neuro eval  Further work-up/management pending clinical course.

## 2018-02-25 NOTE — PROGRESS NOTE ADULT - PROBLEM SELECTOR PLAN 4
Continue abbott drainage for now  Trial of voiding in AM  Continue Doxazosin   consult -- Dr. Kimble

## 2018-02-25 NOTE — CONSULT NOTE ADULT - ASSESSMENT
Hyponatremia, mild, not likely contributing to confusion. May have been triggered by urinary retention, so was hypertension.   UTI.  Appears euvolemic at present.  Will hod lasix, treat UTI, f/u urine culture report.

## 2018-02-26 ENCOUNTER — TRANSCRIPTION ENCOUNTER (OUTPATIENT)
Age: 83
End: 2018-02-26

## 2018-02-26 LAB
-  AMPICILLIN: SIGNIFICANT CHANGE UP
-  CIPROFLOXACIN: SIGNIFICANT CHANGE UP
-  NITROFURANTOIN: SIGNIFICANT CHANGE UP
-  TETRACYCLINE: SIGNIFICANT CHANGE UP
-  VANCOMYCIN: SIGNIFICANT CHANGE UP
ANION GAP SERPL CALC-SCNC: 8 MMOL/L — SIGNIFICANT CHANGE UP (ref 5–17)
BUN SERPL-MCNC: 47 MG/DL — HIGH (ref 7–23)
CALCIUM SERPL-MCNC: 9.7 MG/DL — SIGNIFICANT CHANGE UP (ref 8.5–10.1)
CHLORIDE SERPL-SCNC: 93 MMOL/L — LOW (ref 96–108)
CO2 SERPL-SCNC: 30 MMOL/L — SIGNIFICANT CHANGE UP (ref 22–31)
CREAT SERPL-MCNC: 0.95 MG/DL — SIGNIFICANT CHANGE UP (ref 0.5–1.3)
CULTURE RESULTS: SIGNIFICANT CHANGE UP
CULTURE RESULTS: SIGNIFICANT CHANGE UP
GLUCOSE SERPL-MCNC: 97 MG/DL — SIGNIFICANT CHANGE UP (ref 70–99)
MAGNESIUM SERPL-MCNC: 2.1 MG/DL — SIGNIFICANT CHANGE UP (ref 1.6–2.6)
METHOD TYPE: SIGNIFICANT CHANGE UP
ORGANISM # SPEC MICROSCOPIC CNT: SIGNIFICANT CHANGE UP
ORGANISM # SPEC MICROSCOPIC CNT: SIGNIFICANT CHANGE UP
POTASSIUM SERPL-MCNC: 4 MMOL/L — SIGNIFICANT CHANGE UP (ref 3.5–5.3)
POTASSIUM SERPL-SCNC: 4 MMOL/L — SIGNIFICANT CHANGE UP (ref 3.5–5.3)
SODIUM SERPL-SCNC: 131 MMOL/L — LOW (ref 135–145)
SPECIMEN SOURCE: SIGNIFICANT CHANGE UP

## 2018-02-26 PROCEDURE — 99232 SBSQ HOSP IP/OBS MODERATE 35: CPT

## 2018-02-26 RX ORDER — HYDRALAZINE HCL 50 MG
10 TABLET ORAL ONCE
Qty: 0 | Refills: 0 | Status: COMPLETED | OUTPATIENT
Start: 2018-02-26 | End: 2018-02-26

## 2018-02-26 RX ADMIN — Medication 75 MILLIGRAM(S): at 05:34

## 2018-02-26 RX ADMIN — CINACALCET 30 MILLIGRAM(S): 30 TABLET, FILM COATED ORAL at 21:09

## 2018-02-26 RX ADMIN — Medication 325 MILLIGRAM(S): at 11:26

## 2018-02-26 RX ADMIN — Medication 75 MILLIGRAM(S): at 21:09

## 2018-02-26 RX ADMIN — Medication 1000 UNIT(S): at 11:26

## 2018-02-26 RX ADMIN — Medication 10 MILLIGRAM(S): at 18:35

## 2018-02-26 RX ADMIN — HEPARIN SODIUM 5000 UNIT(S): 5000 INJECTION INTRAVENOUS; SUBCUTANEOUS at 17:33

## 2018-02-26 RX ADMIN — CARVEDILOL PHOSPHATE 12.5 MILLIGRAM(S): 80 CAPSULE, EXTENDED RELEASE ORAL at 17:33

## 2018-02-26 RX ADMIN — CARVEDILOL PHOSPHATE 12.5 MILLIGRAM(S): 80 CAPSULE, EXTENDED RELEASE ORAL at 05:34

## 2018-02-26 RX ADMIN — HEPARIN SODIUM 5000 UNIT(S): 5000 INJECTION INTRAVENOUS; SUBCUTANEOUS at 05:34

## 2018-02-26 RX ADMIN — Medication 75 MILLIGRAM(S): at 13:19

## 2018-02-26 RX ADMIN — Medication 4 MILLIGRAM(S): at 21:09

## 2018-02-26 NOTE — PROGRESS NOTE ADULT - SUBJECTIVE AND OBJECTIVE BOX
Patient is a 89y old  Female who presents with a chief complaint of coughing (2018 19:35) PMH of HTN diastolic CHF (EF 65%) hyponatremia, hospitalized 6 weeks ago for PNA, who was brought in by her daughter for increasing confusion. Patient has been increasingly confused over the past 10 days. She denies SOB, fevers, chills, cough, CP, or dysuria. She was noted to be in urinary retention (>1000 cc after abbott was placed) and significantly hypertensive in the ER.       INTERVAL HPI/OVERNIGHT EVENTS: BP well controlled overnight. No events noted.    MEDICATIONS  (STANDING):  carvedilol 12.5 milliGRAM(s) Oral every 12 hours  cholecalciferol 1000 Unit(s) Oral daily  cinacalcet 30 milliGRAM(s) Oral at bedtime  doxazosin 4 milliGRAM(s) Oral at bedtime  ferrous    sulfate 325 milliGRAM(s) Oral daily  heparin  Injectable 5000 Unit(s) SubCutaneous every 12 hours  hydrALAZINE 75 milliGRAM(s) Oral every 8 hours    MEDICATIONS  (PRN):  acetaminophen   Tablet 650 milliGRAM(s) Oral every 6 hours PRN For Temp greater than 38 C (100.4 F)  acetaminophen   Tablet. 650 milliGRAM(s) Oral every 6 hours PRN Mild Pain (1 - 3)      Allergies  Vasotec (Unknown)      REVIEW OF SYSTEMS:  Unable to obtain due to confusion      Weight (kg): 52.2 (- @ 13:35)  Vital Signs Last 24 Hrs  T(C): 36.7 (2018 07:17), Max: 36.9 (2018 15:52)  T(F): 98.1 (2018 07:17), Max: 98.5 (2018 15:52)  HR: 57 (2018 07:17) (53 - 67)  BP: 125/70 (2018 07:17) (107/67 - 152/64)  BP(mean): --  RR: 16 (2018 07:17) (14 - 18)  SpO2: 94% (2018 07:17) (93% - 96%)  [ ] room air   [ ] 02    PHYSICAL EXAM:  GENERAL:  No acute distresss   HEAD:  NCAT, EOMI, PERRLA  ENMT: MMM  NECK:  Supple , no JVD  CHEST/LUNG: CTA B/L , No W/R/R  HEART:  Regular rate and rhythm, No M/R/G  ABDOMEN:  soft, nontender, nondistended, positive bowel sounds   EXTREMITIES: No clubbing, cyanosis or edema      LABS:    2018 06:57    131    |  93     |  47     ----------------------------<  97     4.0     |  30     |  0.95     Ca    9.7        2018 06:57  Mg     2.1       2018 06:57    PT/INR - ( 2018 15:03 )   PT: 11.6 sec;   INR: 1.06 ratio    PTT - ( 2018 15:03 )  PTT:29.8 sec  Urinalysis Basic - ( 2018 15:03 )    Color: Yellow / Appearance: Clear / S.005 / pH: x  Gluc: x / Ketone: Negative  / Bili: Negative / Urobili: Negative   Blood: x / Protein: Negative / Nitrite: Negative   Leuk Esterase: Negative / RBC: x / WBC x   Sq Epi: x / Non Sq Epi: x / Bacteria: x    Cultures  Culture Results:   >100,000 CFU/ml Enterococcus faecium ( @ 19:50)    culture blood  -- .Urine Catheterized  @ 19:50    culture urine  --   @ 19:50 Patient is a 89y old  Female who presents with a chief complaint of coughing (2018 19:35) PMH of HTN diastolic CHF (EF 65%) hyponatremia, hospitalized 6 weeks ago for PNA, who was brought in by her daughter for increasing confusion. Patient has been increasingly confused over the past 10 days. She denies SOB, fevers, chills, cough, CP, or dysuria. She was noted to be in urinary retention (>1000 cc after abbott was placed) and significantly hypertensive in the ER.       INTERVAL HPI/OVERNIGHT EVENTS: Confusion improved, pt states she is feeling well. States they took the abbott out this am and she has been voiding. Denies chest pain, shortness of breath, palpitations, orthopnea, lower extremity swelling. BP well controlled overnight. No events noted.    MEDICATIONS  (STANDING):  carvedilol 12.5 milliGRAM(s) Oral every 12 hours  cholecalciferol 1000 Unit(s) Oral daily  cinacalcet 30 milliGRAM(s) Oral at bedtime  doxazosin 4 milliGRAM(s) Oral at bedtime  ferrous    sulfate 325 milliGRAM(s) Oral daily  heparin  Injectable 5000 Unit(s) SubCutaneous every 12 hours  hydrALAZINE 75 milliGRAM(s) Oral every 8 hours    MEDICATIONS  (PRN):  acetaminophen   Tablet 650 milliGRAM(s) Oral every 6 hours PRN For Temp greater than 38 C (100.4 F)  acetaminophen   Tablet. 650 milliGRAM(s) Oral every 6 hours PRN Mild Pain (1 - 3)      Allergies  Vasotec (Unknown)      REVIEW OF SYSTEMS:  Denies headaches, weakness   Denies chest pain, shortness of breath, palpitations, orthopnea  Denies cough, wheezing, sputum production  Denies nausea, vomiting, abdominal pain   Denies urinary complaints.  All other ROS negative.      Weight (kg): 52.2 (02-24 @ 13:35)  Vital Signs Last 24 Hrs  T(C): 36.7 (2018 07:17), Max: 36.9 (2018 15:52)  T(F): 98.1 (2018 07:17), Max: 98.5 (2018 15:52)  HR: 57 (2018 07:17) (53 - 67)  BP: 125/70 (2018 07:17) (107/67 - 152/64)  BP(mean): --  RR: 16 (2018 07:17) (14 - 18)  SpO2: 94% (2018 07:17) (93% - 96%)  [ ] room air   [ ] 02    PHYSICAL EXAM:  GENERAL:  No acute distresss , A&O x 3  HEAD:  NCAT, EOMI  ENMT: MMM  NECK:  Supple , no JVD  CHEST/LUNG: CTA B/L , No W/R/R  HEART:  Regular rate and rhythm, No M/R/G  ABDOMEN:  soft, nontender, nondistended, positive bowel sounds   EXTREMITIES: No clubbing, cyanosis or edema      LABS:    2018 06:57    131    |  93     |  47     ----------------------------<  97     4.0     |  30     |  0.95     Ca    9.7        2018 06:57  Mg     2.1       2018 06:57    PT/INR - ( 2018 15:03 )   PT: 11.6 sec;   INR: 1.06 ratio    PTT - ( 2018 15:03 )  PTT:29.8 sec  Urinalysis Basic - ( 2018 15:03 )    Color: Yellow / Appearance: Clear / S.005 / pH: x  Gluc: x / Ketone: Negative  / Bili: Negative / Urobili: Negative   Blood: x / Protein: Negative / Nitrite: Negative   Leuk Esterase: Negative / RBC: x / WBC x   Sq Epi: x / Non Sq Epi: x / Bacteria: x    Cultures  Culture Results:   >100,000 CFU/ml Enterococcus faecium ( @ 19:50)    culture blood  -- .Urine Catheterized  @ 19:50    culture urine  --   @ 19:50

## 2018-02-26 NOTE — CONSULT NOTE ADULT - CONSULT REQUESTED DATE/TIME
24-Feb-2018 14:22
24-Feb-2018 20:41
25-Feb-2018
25-Feb-2018 14:29
25-Feb-2018 19:53
26-Feb-2018 13:57

## 2018-02-26 NOTE — PROGRESS NOTE ADULT - ASSESSMENT
·	Hyponatremia, SIADH, Urinary retention  ·	Hypertension    Stable sodium levels. Recheck bladder scan. BP better controlled.   Monitor BP trend. Titrate BP meds as needed. Will follow electrolytes and renal function trend.

## 2018-02-26 NOTE — PROGRESS NOTE ADULT - PROBLEM SELECTOR PLAN 3
Received lasix 40mg ivp x 1 in ER, continued lasix 60 qd, now off lasix   Continue coreg 12.5mg q12h  Further work-up/management pending clinical course.

## 2018-02-26 NOTE — GOALS OF CARE CONVERSATION - PERSONAL ADVANCE DIRECTIVE - CONVERSATION DETAILS
discussed with pt and then her daughter advance directives as per copy of her MOLST. They both state there are no changes.  Daughter will bring in original.

## 2018-02-26 NOTE — PROGRESS NOTE ADULT - PROBLEM SELECTOR PLAN 1
Resolved  Unlikely hyponatremia as primary cause, possible multifactorial in juncture with urinary retention/htn urgency  Monitor lytes  Stable from neuro pov  Further work-up/management pending clinical course. Resolved  Unlikely hyponatremia as primary cause, possible multifactorial in juncture with urinary retention/htn urgency, uti, urine cx growing e faecium ?containment   Monitor lytes  Stable from neuro pov  Further work-up/management pending clinical course. Resolved  Unlikely hyponatremia as primary cause, possible multifactorial in juncture with urinary retention/htn urgency, uti, urine cx growing e faecium ?containment -- ID consult note, will not treat  Monitor lytes  Stable from neuro pov  Further work-up/management pending clinical course.

## 2018-02-26 NOTE — PROGRESS NOTE ADULT - PROBLEM SELECTOR PLAN 2
Patient received one dose of po clonidine in ER  Now resolved, patient now normotensive  Will monitor on tele  Continue current meds (coreg and hydralazine) and titrate as needed  Cardio following

## 2018-02-26 NOTE — PROGRESS NOTE ADULT - PROBLEM SELECTOR PLAN 1
lasix on hold  cvs regimen and BP control  I and O  renal and cardio following  increase activity  keep sat > 88 pct  am labs pending  replete lytes  overall prognosis guarded to poor  pt is 89 y/o with multiple medical issues  assist with ADL

## 2018-02-26 NOTE — PROGRESS NOTE ADULT - SUBJECTIVE AND OBJECTIVE BOX
Patient is a 89y old  Female who presents with a chief complaint of coughing (2018 19:35)      Patient seen in follow up for hyponatremia, urinary retention. Ramos removed early this am. Pt sitting on chair at bedside.     PAST MEDICAL HISTORY:  Anemia, unspecified type  Edema of lower extremity  Heart murmur  Hypertension  Hypercalcemia    MEDICATIONS  (STANDING):  carvedilol 12.5 milliGRAM(s) Oral every 12 hours  cholecalciferol 1000 Unit(s) Oral daily  cinacalcet 30 milliGRAM(s) Oral at bedtime  doxazosin 4 milliGRAM(s) Oral at bedtime  ferrous    sulfate 325 milliGRAM(s) Oral daily  heparin  Injectable 5000 Unit(s) SubCutaneous every 12 hours  hydrALAZINE 75 milliGRAM(s) Oral every 8 hours    MEDICATIONS  (PRN):  acetaminophen   Tablet 650 milliGRAM(s) Oral every 6 hours PRN For Temp greater than 38 C (100.4 F)  acetaminophen   Tablet. 650 milliGRAM(s) Oral every 6 hours PRN Mild Pain (1 - 3)    T(C): 36.7 (18 @ 07:17), Max: 36.9 (18 @ 15:52)  HR: 57 (18 @ 07:17) (53 - 67)  BP: 125/70 (18 @ 07:17) (107/67 - 152/64)  RR: 16 (18 @ 07:17) (14 - 18)  SpO2: 94% (18 @ 07:17) (93% - 97%)  Wt(kg): --  I&O's Detail    2018 07:01  -  2018 07:00  --------------------------------------------------------  IN:  Total IN: 0 mL    OUT:    Indwelling Catheter - Urethral: 1850 mL  Total OUT: 1850 mL    Total NET: -1850 mL      PHYSICAL EXAM:  General: NAD  Respiratory: b/l air entry  Cardiovascular: S1 S2  Gastrointestinal: soft, distended  Extremities:  no edema                          10.6   6.8   )-----------( 191      ( 2018 08:13 )             31.1         131<L>  |  93<L>  |  47<H>  ----------------------------<  97  4.0   |  30  |  0.95    Ca    9.7      2018 06:57  Mg     2.1         TPro  7.4  /  Alb  3.8  /  TBili  0.4  /  DBili  x   /  AST  27  /  ALT  28  /  AlkPhos  84      CARDIAC MARKERS ( 2018 15:03 )  .020 ng/mL / x     / x     / x     / 3.0 ng/mL      LIVER FUNCTIONS - ( 2018 15:03 )  Alb: 3.8 g/dL / Pro: 7.4 g/dL / ALK PHOS: 84 U/L / ALT: 28 U/L / AST: 27 U/L / GGT: x           Urinalysis Basic - ( 2018 15:03 )    Color: Yellow / Appearance: Clear / S.005 / pH: x  Gluc: x / Ketone: Negative  / Bili: Negative / Urobili: Negative   Blood: x / Protein: Negative / Nitrite: Negative   Leuk Esterase: Negative / RBC: x / WBC x   Sq Epi: x / Non Sq Epi: x / Bacteria: x        Sodium, Serum: 131 ( @ 06:57)  Sodium, Serum: 132 ( @ 08:13)  Sodium, Serum: 129 ( @ 15:03)    Creatinine, Serum: 0.95 ( @ 06:57)  Creatinine, Serum: 0.83 ( @ 08:13)  Creatinine, Serum: 0.89 ( 15:03)    Potassium, Serum: 4.0 ( @ 06:57)  Potassium, Serum: 3.4 ( @ 08:13)  Potassium, Serum: 3.8 ( @ 15:03)    Hemoglobin: 10.6 ( @ 08:13)  Hemoglobin: 11.6 ( 15:03)

## 2018-02-26 NOTE — DISCHARGE NOTE ADULT - CARE PROVIDER_API CALL
Henry Peguero), Neurology  [address] [place] [address]  Phone: [phone]  Fax: [phone] Henry Peguero), Neurology  [address] [place] [address]  Phone: [phone]  Fax: [phone]    Weil, Peter A (MD), Critical Care Medicine; Internal Medicine; Pulmonary Disease  [address] [facility]  [address]  [place] [address]  Phone: [phone]  Fax: [phone]

## 2018-02-26 NOTE — DISCHARGE NOTE ADULT - HOSPITAL COURSE
This is a 89 F with PMH of HTN diastolic CHF (EF 65%) hyponatremia, hospitalized 6 weeks ago for PNA, who was brought in by her daughter for increasing confusion. Patient has been increasingly confused over the past 10 days. She denies SOB, fevers, chills, cough, CP, or dysuria. She was noted to be in urinary retention (>1000 cc after abbott was placed) and significantly hypertensive in the ER.    Found to be hyponatremic at 129 and in urinary retention.  Abbott placed. Na improved. Abbott removed. Voiding on her own.  No change in medications.    >30 minutes spent on discharge

## 2018-02-26 NOTE — PROGRESS NOTE ADULT - SUBJECTIVE AND OBJECTIVE BOX
Patient is a 89y old  Female who presents with a chief complaint of coughing (2018 19:35)      INTERVAL HPI/OVERNIGHT EVENTS: Patient seen and examined. NAD. No complaints.  Mental status back to baseline.    Vital Signs Last 24 Hrs  T(C): 36.7 (2018 07:17), Max: 36.9 (2018 15:52)  T(F): 98.1 (2018 07:17), Max: 98.5 (2018 15:52)  HR: 57 (2018 07:17) (53 - 67)  BP: 125/70 (2018 07:17) (107/67 - 152/64)  BP(mean): --  RR: 16 (2018 07:17) (14 - 18)  SpO2: 94% (2018 07:17) (93% - 96%)               Labs    2018 06:57    131    |  93     |  47     ----------------------------<  97     4.0     |  30     |  0.95     Ca    9.7        2018 06:57  Mg     2.1       2018 06:57    TPro  7.4    /  Alb  3.8    /  TBili  0.4    /  DBili  x      /  AST  27     /  ALT  28     /  AlkPhos  84     2018 15:03    LIVER FUNCTIONS - ( 2018 15:03 )  Alb: 3.8 g/dL / Pro: 7.4 g/dL / ALK PHOS: 84 U/L / ALT: 28 U/L / AST: 27 U/L / GGT: x           PT/INR - ( 2018 15:03 )   PT: 11.6 sec;   INR: 1.06 ratio         PTT - ( 2018 15:03 )  PTT:29.8 sec  CAPILLARY BLOOD GLUCOSE        CARDIAC MARKERS ( 2018 15:03 )  .020 ng/mL / x     / x     / x     / 3.0 ng/mL      Urinalysis Basic - ( 2018 15:03 )    Color: Yellow / Appearance: Clear / S.005 / pH: x  Gluc: x / Ketone: Negative  / Bili: Negative / Urobili: Negative   Blood: x / Protein: Negative / Nitrite: Negative   Leuk Esterase: Negative / RBC: x / WBC x   Sq Epi: x / Non Sq Epi: x / Bacteria: x        Urinalysis Basic - ( 2018 15:03 )    Color: Yellow / Appearance: Clear / S.005 / pH: x  Gluc: x / Ketone: Negative  / Bili: Negative / Urobili: Negative   Blood: x / Protein: Negative / Nitrite: Negative   Leuk Esterase: Negative / RBC: x / WBC x   Sq Epi: x / Non Sq Epi: x / Bacteria: x        MEDICATIONS  (STANDING):  carvedilol 12.5 milliGRAM(s) Oral every 12 hours  cholecalciferol 1000 Unit(s) Oral daily  cinacalcet 30 milliGRAM(s) Oral at bedtime  doxazosin 4 milliGRAM(s) Oral at bedtime  ferrous    sulfate 325 milliGRAM(s) Oral daily  heparin  Injectable 5000 Unit(s) SubCutaneous every 12 hours  hydrALAZINE 75 milliGRAM(s) Oral every 8 hours    MEDICATIONS  (PRN):  acetaminophen   Tablet 650 milliGRAM(s) Oral every 6 hours PRN For Temp greater than 38 C (100.4 F)  acetaminophen   Tablet. 650 milliGRAM(s) Oral every 6 hours PRN Mild Pain (1 - 3)        REVIEW OF SYSTEMS:  CONSTITUTIONAL: No fever, no weight loss, or no fatigue  NECK: No pain, no stiffness  RESPIRATORY: No cough, no wheezing, no chills, no hemoptysis, No shortness of breath  CARDIOVASCULAR: No chest pain, no palpitations, no dizziness, no leg swelling  GASTROINTESTINAL: No abdominal pain. No nausea, no vomiting, no hematemesis; No diarrhea, no constipation. No melena, no hematochezia.  GENITOURINARY: No dysuria, no frequency, no hematuria, no incontinence  NEUROLOGICAL: No headaches, no loss of strength, no numbness, no tremors  SKIN: No itching, no burning  MUSCULOSKELETAL: No joint pain, no swelling; No muscle, no back, no extremity pain  PSYCHIATRIC: No depression, no mood swings,   HEME/LYMPH: No easy bruising, no bleeding gums  ALLERY AND IMMUNOLOGIC: No hives       Consultant(s) Notes Reviewed:  [x ] YES  [ ] NO    PHYSICAL EXAM:  GENERAL: NAD  HEAD:  Atraumatic, Normocephalic  EYES: EOMI, PERRLA, conjunctiva and sclera clear  ENMT: No tonsillar erythema, exudates, or enlargement; Moist mucous membranes  NECK: Supple, No JVD  NERVOUS SYSTEM:  Awake & alert  CHEST/LUNG: Clear to auscultation bilaterally; No rales, rhonchi, wheezing,  HEART: Regular rate and rhythm  ABDOMEN: Soft, Nontender, Nondistended; Bowel sounds present  EXTREMITIES:  No clubbing, cyanosis, or edema  LYMPH: No lymphadenopathy noted  SKIN: No rashes      Advanced care planning discussed with patient/family [ x] YES   [ ] NO    Advanced care planning discussed with patient/family. Advanced care planning forms reviewed/discussed/completed. 20 minutes spent.

## 2018-02-26 NOTE — CONSULT NOTE ADULT - SUBJECTIVE AND OBJECTIVE BOX
[facility], Division of Infectious Diseases  [facility]    ESTHER, SAIRA  89y, Female  [id number]    HPI--  89F previously known to our group admitted with confusion, found to have urinary retention with > 1 liter. Patient had abbott catheter placed, removed today. Patient does not recall events leading up to admission but presently she is A&O to person, place, month/year. Denies fevers, chills, or rigors. No abdominal pain. No N/V. BM documented yesterday and today. No back/flank pain. States was in [facility], had recently celebrated her birthday.     Patient is afebrile/HD stable off abx,.    U/A bland, UC&S with Enterococcus spp.     PMH/PSH--  Anemia, unspecified type  Edema of lower extremity  Heart murmur  Hypertension  Hypercalcemia  History of appendectomy  S/P tonsillectomy  H/O parathyroidectomy    Prior epiosodes of pneumonia,  CHF, diastolic      Allergies-- vasotec -> ?      Medications--  Antibiotics: none  Immunologic:   Other: acetaminophen   Tablet PRN  acetaminophen   Tablet. PRN  carvedilol  cholecalciferol  cinacalcet  doxazosin  ferrous    sulfate  heparin  Injectable  hydrALAZINE      Social History--  EtOH: denies  Tobacco: denies  Drug Use: denies    Family/Marital History--  No pertinent family history in first degree relatives  Remainder not relevant to clinical concern.      Review of Systems:  A >=10-point review of systems was obtained.   Review of systems otherwise negative except as previously noted.    Physical Exam--  Vital Signs: T(F): 97.5 (02-26-18 @ 12:01), Max: 98.5 (02-25-18 @ 15:52)  HR: 56 (02-26-18 @ 13:21)  BP: 184/66 (02-26-18 @ 13:21)  RR: 18 (02-26-18 @ 12:01)  SpO2: 97% (02-26-18 @ 12:01)  Wt(kg): --  General: Frail but nontoxic-appearing Female in no acute distress.  HEENT: AT/NC. Anicteric. Conjunctiva pink and moist. Oropharynx clear. Dentition fair.  Neck: Not rigid. No sense of mass.  Nodes: None palpable.  Lungs: Clear bilaterally without rales, wheezing or rhonchi  Heart: Regular rate and rhythm. 1-2/6 systolic Murmur. No rub. No gallop. No palpable thrill.  Abdomen: Bowel sounds present and normoactive. Soft. Nondistended. Nontender. No sense of mass. No organomegaly.  Back: No spinal tenderness. No costovertebral angle tenderness.   Extremities: No cyanosis or clubbing. Trace LE edema.   Skin: Warm. Dry. Good turgor. No rash. No vasculitic stigmata.  Psychiatric: Appropriate affect and mood for situation.       Laboratory & Imaging Data--  CBC                        10.6   6.8   )-----------( 191      ( 25 Feb 2018 08:13 )             31.1       Chemistries  02-26    131<L>  |  93<L>  |  47<H>  ----------------------------<  97  4.0   |  30  |  0.95    Ca    9.7      26 Feb 2018 06:57  Mg     2.1     02-26    TPro  7.4  /  Alb  3.8  /  TBili  0.4  /  DBili  x   /  AST  27  /  ALT  28  /  AlkPhos  84  02-24    Urinalysis (02.24.18 @ 15:03)    pH Urine: 7.0    Glucose Qualitative, Urine: Negative    Blood, Urine: Negative    Color: Yellow    Urine Appearance: Clear    Bilirubin: Negative    Ketone - Urine: Negative    Specific Gravity: 1.005    Protein, Urine: Negative    Urobilinogen: Negative    Nitrite: Negative    Leukocyte Esterase Concentration: Negative    Procalcitonin, Serum: <0.05: Procalcitonin (PCT) Interpretation (ng/mL) - Diagnosis of systemic  bacterial infection/sepsis  PCT < 0.5: Systemic infection (sepsis) is not likely and risk for  progression to severe systemic infection is low. Local bacterial  infection is possible. If early sepsis is suspected clinically, PCT  should be re-assessed in 6-24 hours.  PCT >/= 0.5 but < 2.0: Systemic infection (sepsis) is possible, but other  conditions are known to elevate PCT as well. Moderate risk for  progression to severe systemic infection. The patient should be closely  monitored both clinically and by re-assessing PCT within 6-24 hours.  PCT >/= 2.0 but < 10.0: Systemic infection (sepsis) is likely, unless  other causes are known. High risk of progression to severe systemic  infection (severe sepsis/septic shock).  PCT >/= 10.0: Important systemic inflammatory response, almost  exclusively due to severe bacterial sepsis or septic shock. High  likelihood of severe sepsis or septic shock. (02.25.18 @ 08:13)    < from: CT Head No Cont (02.24.18 @ 15:32) >  IMPRESSION:    1)  chronic ischemic changes noted throughout both hemispheres with   moderate volume loss. Similar findings were noted previously.  2)  ventricles are mildly prominent but are stable compared with prior CT.  < end of copied text >      Culture Data  Culture - Urine (collected 24 Feb 2018 19:50)  Source: .Urine Catheterized  Preliminary Report (25 Feb 2018 18:56):    >100,000 CFU/ml Enterococcus faecium      < from: Xray Chest 1 View- PORTABLE-Urgent (02.24.18 @ 16:23) >  IMPRESSION: Cardiomegaly and slight prominence of the interstitial and/or   vascular markings possibly due to mild CHF. Left basilar opacity,   possibly representing subsegmental atelectasis, small effusion or small   infiltrate. Follow-up is recommended.  < end of copied text >        Assessment--      Suggestions--        Willem Helton MD  [phone]
Patient is a 89y old  Female with PMH of HTN diastolic CHF (EF 65%) hyponatremia, urinary retention, primary hyperparathyroidism, recent PNA, for coughing and confusion. No SOB, fevers, chills, or dysuria. She was noted to be in urinary retention (>1000 cc after Ramos placed), hypertensive in the ER, and hyponatremic again. The patient denies excessive oral fluids intake.    Past Medical History:  Hyponatremia  Urinary retention  Anemia, unspecified type    Edema of lower extremity    Heart murmur    Hypercalcemia    Hypertension.    Past Surgical History:  H/O parathyroidectomy    History of appendectomy    S/P tonsillectomy.    Allergies:  	Vasotec    Home Medications:   · 	hydrALAZINE 25 mg oral tablet: 3 tab(s) orally every 8 hours  · 	carvedilol 12.5 mg oral tablet: 1 tab(s) orally every 12 hours  · 	furosemide 20 mg oral tablet: 3 tab(s) orally once a day  · 	Sensipar 30 mg oral tablet: 1 tab(s) orally once a day  · 	doxazosin 4 mg oral tablet: 1 tab(s) orally once a day   · 	potassium chloride 10 mEq oral capsule, extended release: 1 cap(s) orally once a day     MEDICATIONS  (STANDING):  carvedilol 12.5 milliGRAM(s) Oral every 12 hours  cholecalciferol 1000 Unit(s) Oral daily  cinacalcet 30 milliGRAM(s) Oral at bedtime  doxazosin 4 milliGRAM(s) Oral at bedtime  ferrous    sulfate 325 milliGRAM(s) Oral daily  furosemide    Tablet 60 milliGRAM(s) Oral daily  heparin  Injectable 5000 Unit(s) SubCutaneous every 12 hours  hydrALAZINE 75 milliGRAM(s) Oral every 8 hours    MEDICATIONS  (PRN):  acetaminophen   Tablet 650 milliGRAM(s) Oral every 6 hours PRN For Temp greater than 38 C (100.4 F)  acetaminophen   Tablet. 650 milliGRAM(s) Oral every 6 hours PRN Mild Pain (1 - 3)    I&O's Detail    2018 07:01  -  2018 07:00  --------------------------------------------------------  IN:  Total IN: 0 mL    OUT:    Indwelling Catheter - Urethral: 650 mL  Total OUT: 650 mL    Total NET: -650 mL      2018 07:01  -  2018 19:53  --------------------------------------------------------  IN:  Total IN: 0 mL    OUT:    Indwelling Catheter - Urethral: 1000 mL  Total OUT: 1000 mL    Total NET: -1000 mL      Vital Signs Last 24 Hrs  T(C): 36.9 (2018 15:52), Max: 36.9 (2018 15:52)  T(F): 98.5 (2018 15:52), Max: 98.5 (2018 15:52)  HR: 63 (2018 18:13) (52 - 63)  BP: 134/61 (2018 18:13) (115/69 - 145/59)  BP(mean): --  RR: 14 (2018 15:52) (14 - 18)  SpO2: 96% (2018 15:52) (93% - 97%)    PHYSICAL EXAM:  General: NAD, supine, no JVD  Respiratory: b/l air entry  Cardiovascular: S1 S2 reg  Gastrointestinal: soft, BS present  Extremities:  no edema        132<L>  |  92<L>  |  34<H>  ----------------------------<  101<H>  3.4<L>   |  33<H>  |  0.83    Ca    9.6      2018 08:13  Mg     2.0         TPro  7.4  /  Alb  3.8  /  TBili  0.4  /  DBili  x   /  AST  27  /  ALT  28  /  AlkPhos  84      Potassium, Serum: 3.4 mmol/L ( @ 08:13)  Blood Urea Nitrogen, Serum: 34 mg/dL ( @ 08:13)  Calcium, Total Serum: 9.6 mg/dL ( @ 08:13)  Hemoglobin: 10.6 g/dL ( @ 08:13)  Hematocrit: 31.1 % ( @ 08:13)  Hematocrit: 33.9 % ( @ 15:03)  Hemoglobin: 11.6 g/dL ( @ 15:03)  Potassium, Serum: 3.8 mmol/L ( @ 15:03)  Blood Urea Nitrogen, Serum: 37 mg/dL ( @ 15:03)  Calcium, Total Serum: 9.7 mg/dL ( @ 15:03)      Creatinine, Serum: 0.83 ( @ 08:13)  Creatinine, Serum: 0.89 ( @ 15:03)    CBC Full  -  ( 2018 08:13 )  WBC Count : 6.8 K/uL  Hemoglobin : 10.6 g/dL  Hematocrit : 31.1 %  Platelet Count - Automated : 191 K/uL  Mean Cell Volume : 88.6 fl  Mean Cell Hemoglobin : 30.1 pg  Mean Cell Hemoglobin Concentration : 34.0 gm/dL  Auto Neutrophil # : x  Auto Lymphocyte # : x  Auto Monocyte # : x  Auto Eosinophil # : x  Auto Basophil # : x  Auto Neutrophil % : x  Auto Lymphocyte % : x  Auto Monocyte % : x  Auto Eosinophil % : x  Auto Basophil % : x    Urinalysis Basic - ( 2018 15:03 )    Color: Yellow / Appearance: Clear / S.005 / pH: x  Gluc: x / Ketone: Negative  / Bili: Negative / Urobili: Negative   Blood: x / Protein: Negative / Nitrite: Negative   Leuk Esterase: Negative / RBC: x / WBC x   Sq Epi: x / Non Sq Epi: x / Bacteria: x
.
CHIEF COMPLAINT:  89y Female with chief complaint of urinary retention         HISTORY OF PRESENT ILLNESS:    This is a 89 F with PMH of HTN diastolic CHF (EF 65%) hyponatremia, hospitalized 6 weeks ago for PNA, who was brought in by her daughter for increasing confusion. Patient has been increasingly confused over the past 10 days. She denies SOB, fevers, chills, cough, CP, or dysuria. She was noted to be in urinary retention (>1000 cc after abbott was placed) and significantly hypertensive in the ER.    *************************************************************************************************  PAST MEDICAL & SURGICAL HISTORY:  Anemia, unspecified type  Edema of lower extremity  Heart murmur  Hypertension  Hypercalcemia  History of appendectomy  S/P tonsillectomy  H/O parathyroidectomy      MEDICATIONS  (STANDING):  carvedilol 12.5 milliGRAM(s) Oral every 12 hours  cinacalcet 30 milliGRAM(s) Oral daily  doxazosin 4 milliGRAM(s) Oral at bedtime  furosemide    Tablet 60 milliGRAM(s) Oral daily  heparin  Injectable 5000 Unit(s) SubCutaneous every 12 hours  hydrALAZINE 75 milliGRAM(s) Oral every 8 hours    MEDICATIONS  (PRN):  acetaminophen   Tablet 650 milliGRAM(s) Oral every 6 hours PRN For Temp greater than 38 C (100.4 F)  acetaminophen   Tablet. 650 milliGRAM(s) Oral every 6 hours PRN Mild Pain (1 - 3)      ALLERGIES:  Vasotec (Unknown)      SOCIAL HISTORY:          ETOH:                                  Smoking:                                   Drugs:                                         Occupation:    FAMILY HISTORY:  No pertinent family history in first degree relatives      CONSTITUTIONAL: No weakness, fevers or chills    EYES/ENT: No visual changes;  No vertigo or throat pain     NECK: No pain or stiffness    RESPIRATORY: No cough, wheezing, hemoptysis; No shortness of breath    CARDIOVASCULAR: No chest pain or palpitations    GASTROINTESTINAL: No abdominal or epigastric pain. No nausea, vomiting, or hematemesis; No diarrhea or constipation. No melena or hematochezia.    GENITOURINARY:  denies hematuria or dysuria  hx frequency     NEUROLOGICAL: No numbness or weakness    SKIN: No itching, burning, rashes, or lesions     All other review of systems is negative unless indicated above.    ****************************************************************************************************  PHYSICAL EXAM:    Vital Signs Last 24 Hrs  T(C): 36.5 (2018 07:30), Max: 36.9 (2018 13:35)  T(F): 97.7 (2018 07:30), Max: 98.5 (2018 13:35)  HR: 56 (2018 07:30) (52 - 61)  BP: 115/69 (2018 07:30) (115/69 - 205/60)  BP(mean): --  RR: 16 (2018 07:30) (14 - 18)  SpO2: 93% (2018 07:30) (93% - 97%)    GENERAL: alert     ABDOMEN:  soft  16 f abbott   BACK:  no cva tenderness   LOWER EXTREMITIES:  no edema   NEUROLOGICAL:      *******************************************************************************************************  LABS:                        10.6   6.8   )-----------( 191      ( 2018 08:13 )             31.1     02-25    132<L>  |  92<L>  |  34<H>  ----------------------------<  101<H>  3.4<L>   |  33<H>  |  0.83    Ca    9.6      2018 08:13  Mg     2.0         TPro  7.4  /  Alb  3.8  /  TBili  0.4  /  DBili  x   /  AST  27  /  ALT  28  /  AlkPhos  84      PT/INR - ( 2018 15:03 )   PT: 11.6 sec;   INR: 1.06 ratio         PTT - ( 2018 15:03 )  PTT:29.8 sec  Urinalysis Basic - ( 2018 15:03 )    Color: Yellow / Appearance: Clear / S.005 / pH: x  Gluc: x / Ketone: Negative  / Bili: Negative / Urobili: Negative   Blood: x / Protein: Negative / Nitrite: Negative   Leuk Esterase: Negative / RBC: x / WBC x   Sq Epi: x / Non Sq Epi: x / Bacteria: x      Urine Culture:     RADIOLOGY & ADDITIONAL STUDIES:      *********************************************************************************************************  IMPRESSION: urinary retention     PLAN: maintain abbott   is on doxazosin   voiding trial in am
CHIEF COMPLAINT: Patient is a 89y old  Female who presents with a chief complaint of HTN    HPI:  89 F  pmhx diastolic chf (EF 65%, echo July 2017), htn, anemia, recently a/w PNA p/w AMS and high BP. Pt's daughter states that she has been more confused recently. Denies any chest pain, dyspnea, PND, orthopnea, near syncope, syncope, lower extremity edema, stroke like symptoms. Noted that the pt is significantly hypertensive.     EKG: refused    REVIEW OF SYSTEMS:   All other review of systems are negative unless indicated above    PAST MEDICAL & SURGICAL HISTORY:  Anemia, unspecified type  Edema of lower extremity  Heart murmur  Hypertension  Hypercalcemia  History of appendectomy  S/P tonsillectomy  H/O parathyroidectomy      SOCIAL HISTORY:  No tobacco, ethanol, or drug abuse.    FAMILY HISTORY:  No pertinent family history in first degree relatives    No family history of acute MI or sudden cardiac death.    MEDICATIONS  (STANDING):    MEDICATIONS  (PRN):      Allergies    Vasotec (Unknown)    Intolerances        Home meds:  Home Medications:  carvedilol 12.5 mg oral tablet: 1 tab(s) orally every 12 hours (24 Feb 2018 13:36)  doxazosin 4 mg oral tablet: 1 tab(s) orally once a day (at bedtime) (24 Feb 2018 13:36)  furosemide 20 mg oral tablet: 3 tab(s) orally once a day (24 Feb 2018 13:36)  potassium chloride 10 mEq oral capsule, extended release: 1 cap(s) orally once a day (24 Feb 2018 13:36)  Sensipar 30 mg oral tablet: 1 tab(s) orally once a day (24 Feb 2018 13:36)        VITAL SIGNS:   Vital Signs Last 24 Hrs  T(C): 36.9 (24 Feb 2018 13:35), Max: 36.9 (24 Feb 2018 13:35)  T(F): 98.5 (24 Feb 2018 13:35), Max: 98.5 (24 Feb 2018 13:35)  HR: 60 (24 Feb 2018 13:35) (60 - 60)  BP: 186/73 (24 Feb 2018 13:35) (186/73 - 186/73)  BP(mean): --  RR: 18 (24 Feb 2018 13:35) (18 - 18)  SpO2: 95% (24 Feb 2018 13:35) (95% - 95%)    I&O's Summary      On Exam:     Constitutional: NAD, awake and alert, well-developed  HEENT: Dry Mucous Membranes, Anicteric  Pulmonary: Decreased breath sounds b/l. No rales, crackles or wheeze appreciated.   Cardiovascular: Regular, S1 and S2, 2/6 Sm  Gastrointestinal: Bowel Sounds present, soft, nontender. + distention  Lymph: trace peripheral edema. No lymphadenopathy.  Skin: No visible rashes or ulcers.  Psych:  flat affect confused    LABS: All Labs Reviewed:                Blood Culture:         RADIOLOGY:    < from: Xray Chest 2 Views PA/Lat (01.09.18 @ 13:26) >    EXAM:  XR CHEST PA LAT 2V                            PROCEDURE DATE:  01/09/2018          INTERPRETATION:  History: Rising white count.    PA lateral. Prior 1/2/2018.    There is improvement in right basilar infiltrate. Minimal patchy streaky   infiltrate persists. There is a new small right pleural effusion.   Otherwise no change. Continued close follow-up is in order.    Impression: As above                JULIETTE PATRICIA M.D., ATTENDING RADIOLOGIST  This document has been electronically signed. Jan 9 2018  1:30PM                < end of copied text >
Date/Time Patient Seen:  		  Referring MD:   Data Reviewed	       Patient is a 89y old  Female who presents with a chief complaint of coughing (24 Feb 2018 19:35)      Subjective/HPI  in bed  seen and examined  vs and meds reviewed  dtr at the BS  cxr and labs reviewed  pt has abbott cath    89 F with PMH of HTN diastolic CHF (EF 65%) hyponatremia, hospitalized 6 weeks ago for PNA, who was brought in by her daughter for increasing confusion. Patient has been increasingly confused over the past 10 days. She denies SOB, fevers, chills, cough, CP, or dysuria. She was noted to be in urinary retention (>1000 cc after abbott was placed) and significantly hypertensive in the ER.           PAST MEDICAL & SURGICAL HISTORY:  Anemia, unspecified type  Edema of lower extremity  Heart murmur  Hypertension  Hypercalcemia  History of appendectomy  S/P tonsillectomy  H/O parathyroidectomy        Medication list         MEDICATIONS  (STANDING):  carvedilol 12.5 milliGRAM(s) Oral every 12 hours  cinacalcet 30 milliGRAM(s) Oral daily  doxazosin 4 milliGRAM(s) Oral at bedtime  heparin  Injectable 5000 Unit(s) SubCutaneous every 12 hours  hydrALAZINE 75 milliGRAM(s) Oral every 8 hours    MEDICATIONS  (PRN):  acetaminophen   Tablet 650 milliGRAM(s) Oral every 6 hours PRN For Temp greater than 38 C (100.4 F)  acetaminophen   Tablet. 650 milliGRAM(s) Oral every 6 hours PRN Mild Pain (1 - 3)         Vitals log        ICU Vital Signs Last 24 Hrs  T(C): 36.3 (24 Feb 2018 18:56), Max: 36.9 (24 Feb 2018 13:35)  T(F): 97.4 (24 Feb 2018 18:56), Max: 98.5 (24 Feb 2018 13:35)  HR: 60 (24 Feb 2018 18:56) (54 - 61)  BP: 185/72 (24 Feb 2018 18:56) (169/36 - 205/60)  BP(mean): --  ABP: --  ABP(mean): --  RR: 17 (24 Feb 2018 18:56) (14 - 18)  SpO2: 96% (24 Feb 2018 18:56) (95% - 97%)           Input and Output:  I&O's Detail      Lab Data                        11.6   9.4   )-----------( 235      ( 24 Feb 2018 15:03 )             33.9     02-24    129<L>  |  90<L>  |  37<H>  ----------------------------<  103<H>  3.8   |  31  |  0.89    Ca    9.7      24 Feb 2018 15:03    TPro  7.4  /  Alb  3.8  /  TBili  0.4  /  DBili  x   /  AST  27  /  ALT  28  /  AlkPhos  84  02-24      CARDIAC MARKERS ( 24 Feb 2018 15:03 )  .020 ng/mL / x     / x     / x     / 3.0 ng/mL    non smoker  non drinker      Review of Systems	  urinary retention  weak  min confusion      Objective     Physical Examination    head at  heart s1s2  lungs dec BS  abd soft   abbott cath in  frail and weak      Pertinent Lab findings & Imaging      Abbott:  NO   Adequate UO     I&O's Detail           Discussed with:     Cultures:	        Radiology      EXAM:  XR CHEST PORTABLE URGENT 1V                            PROCEDURE DATE:  02/24/2018          INTERPRETATION:  Portable chest radiograph       CLINICAL INFORMATION: Admission chest x-ray     TECHNIQUE:  Single portable frontal AP view of the chest was obtained.    FINDINGS: The examination of 1/9/2018 is available for review.    There is prominence of the interstitial markings bilaterally. There is a   left basilar opacity, possibly representing a subsegmental atelectasis.   Small effusion or small infiltrate. The heart is prominent. The wilmer and   mediastinum appear to be unremarkable. Degenerative changes are seen in   the shoulders and spine.      IMPRESSION: Cardiomegaly and slight prominence of the interstitial and/or   vascular markings possibly due to mild CHF. Left basilar opacity,   possibly representing subsegmental atelectasis, small effusion or small   infiltrate. Follow-up is recommended.                BRIANNA HARRIS M.D. ATTENDING RADIOLOGIST  This document has been electronically signed. Feb 24 2018  4:55PM

## 2018-02-26 NOTE — PROGRESS NOTE ADULT - SUBJECTIVE AND OBJECTIVE BOX
Neurology follow up note    SAIRA [id number]      Interval History:    Patient feels ok no new complaints.    MEDICATIONS    acetaminophen   Tablet 650 milliGRAM(s) Oral every 6 hours PRN  acetaminophen   Tablet. 650 milliGRAM(s) Oral every 6 hours PRN  carvedilol 12.5 milliGRAM(s) Oral every 12 hours  cholecalciferol 1000 Unit(s) Oral daily  cinacalcet 30 milliGRAM(s) Oral at bedtime  doxazosin 4 milliGRAM(s) Oral at bedtime  ferrous    sulfate 325 milliGRAM(s) Oral daily  heparin  Injectable 5000 Unit(s) SubCutaneous every 12 hours  hydrALAZINE 75 milliGRAM(s) Oral every 8 hours      Allergies    Vasotec (Unknown)    Intolerances            Vital Signs Last 24 Hrs  T(C): 36.4 (2018 12:01), Max: 36.9 (2018 15:52)  T(F): 97.5 (2018 12:01), Max: 98.5 (2018 15:52)  HR: 57 (2018 12:01) (53 - 67)  BP: 186/69 (2018 12:01) (107/67 - 186/69)  BP(mean): --  RR: 18 (2018 12:01) (14 - 18)  SpO2: 97% (2018 12:01) (93% - 97%)      REVIEW OF SYSTEMS:     Constitutional: No fever, chills, fatigue, weakness  Eyes: no eye pain, visual disturbances, or discharge  ENT:  No difficulty hearing, tinnitus, vertigo; No sinus or throat pain  Neck: No pain or stiffness  Respiratory: No cough, dyspnea, wheezing   Cardiovascular: No chest pain, palpitations,   Gastrointestinal: No abdominal or epigastric pain. No nausea, vomiting  No diarrhea or constipation.   Genitourinary: No dysuria, frequency, hematuria or incontinence  Neurological: No headaches, lightheadedness, vertigo, numbness or tremors  Psychiatric: No depression, anxiety, mood swings or difficulty sleeping  Musculoskeletal: No joint pain or swelling; No muscle, back or extremity pain  Skin: No itching, burning, rashes or lesions   Lymph Nodes: No enlarged glands  Endocrine: No heat or cold intolerance; No hair loss   Allergy and Immunologic: No hives or eczema    On Neurological Examination:  The patient is awake and alert.      Location [facility] year , month was January,   was able to name simple objects.    Recall was 0/3 within three minutes.      Extraocular movements were intact.      Pupils bilaterally 2 mm reactive to 1.  Speech was fluent.      Smile symmetric.      Motor, bilateral upper 4/5, bilateral lower 4-/5.      Sensory, bilateral upper and lower intact to light touch.      Follow simple commands  Follow complex commands      GENERAL Exam: Nontoxic , No Acute Distress   	  HEENT:  normocephalic, atraumatic  		  LUNGS: Clear bilaterally   	  HEART: Normal S1S2   No murmur RRR        	  GI/ ABDOMEN:  Soft  Non tender    EXTREMITIES:   No Edema  No Clubbing  No Cyanosis No Edema    MUSCULOSKELETAL: Range of Motion all 4 extremities   	   SKIN: Normal  No Ecchymosis               LABS:  CBC Full  -  ( 2018 08:13 )  WBC Count : 6.8 K/uL  Hemoglobin : 10.6 g/dL  Hematocrit : 31.1 %  Platelet Count - Automated : 191 K/uL  Mean Cell Volume : 88.6 fl  Mean Cell Hemoglobin : 30.1 pg  Mean Cell Hemoglobin Concentration : 34.0 gm/dL  Auto Neutrophil # : x  Auto Lymphocyte # : x  Auto Monocyte # : x  Auto Eosinophil # : x  Auto Basophil # : x  Auto Neutrophil % : x  Auto Lymphocyte % : x  Auto Monocyte % : x  Auto Eosinophil % : x  Auto Basophil % : x    Urinalysis Basic - ( 2018 15:03 )    Color: Yellow / Appearance: Clear / S.005 / pH: x  Gluc: x / Ketone: Negative  / Bili: Negative / Urobili: Negative   Blood: x / Protein: Negative / Nitrite: Negative   Leuk Esterase: Negative / RBC: x / WBC x   Sq Epi: x / Non Sq Epi: x / Bacteria: x          131<L>  |  93<L>  |  47<H>  ----------------------------<  97  4.0   |  30  |  0.95    Ca    9.7      2018 06:57  Mg     2.1         TPro  7.4  /  Alb  3.8  /  TBili  0.4  /  DBili  x   /  AST  27  /  ALT  28  /  AlkPhos  84      Hemoglobin A1C:     LIVER FUNCTIONS - ( 2018 15:03 )  Alb: 3.8 g/dL / Pro: 7.4 g/dL / ALK PHOS: 84 U/L / ALT: 28 U/L / AST: 27 U/L / GGT: x           Vitamin B12   PT/INR - ( 2018 15:03 )   PT: 11.6 sec;   INR: 1.06 ratio         PTT - ( 2018 15:03 )  PTT:29.8 sec      RADIOLOGY    ANALYSIS AND PLAN:  This is a 89-year-old with episodes of changes in mental status.    1.	For episodes of change in mental status, questionable this could be hypertensive encephalopathy type of picture versus possibly subtle signs from SMA-7 dehydration along with possibly progressive mild cognitive impairment versus subtle dementia.  2.	For history of hypertension, monitor the patient's systolic blood pressure.  3.	I would recommend to monitor renal function.  4.	For mild cognitive impairment versus subtle dementia secondary to the patient's age would recommend supportive therapy.  5.	Fall precautions.  6.	overall mental status   7.	Attempted to contact daughter, Malka, at [phone], alternate number is [phone], no response.  I will continue to follow. work number  ext [phone]    Thank you for the courtesy of consultation.    Physical therapy evaluation as tolerated  OOB to chair/ambulation with assistance only if possible.    Greater than 45 minutes spent in direct patient care reviewing  the notes, lab data/ imaging , discussion with multidisciplinary team.

## 2018-02-26 NOTE — CONSULT NOTE ADULT - ASSESSMENT
No concern of active infection on exam.  Urinary retention in/of itself can cause confusion, especially in the frail elderly.   Retention can be from numerous underlying issues including immobility, medications, and constipation.  Of note, patient is on hydralazine which rarely can cause urinary retention.  Question whether retention could have a chronic component (see CT A/P 7/2017)  Patient has no symptoms of UTI. Patient has asymptomatic bacteruria.  WBC normal. Afebrile. [place] U/A. Procalcitonin (for whatever that test may or may not be worth) not elevated.   Urinary retention in/of itself is not an indication to treat bacteruria.  In the setting of retention, it is possible to develop UTI and bacteremia even without pyuria. However there are no symptoms here, the patient was hypertensive at presentation, and has been stable off antibiotics. Treatment of asymptomatic bacteruria has been extensively studied  and outside of very selected situations (e.g. febrile neutropenia, impending urologic procedure, pregnancy) results in resistant storm and adverse events without benefit to the patient.    Suggestions--  No role for antibiotics at this time.   Management of urinary retention per primary service.    D/W Dr. GEORGE Swann.  Thank you for the courtesy of this referral.  Willem Helton MD  [phone] No concern of active infection on exam.  Urinary retention in/of itself can cause confusion, especially in the frail elderly.   Retention can be from numerous underlying issues including immobility, medications, and constipation.  Of note, patient is on hydralazine which rarely can cause urinary retention.  Question whether retention could have a chronic component (see CT A/P 7/2017)  Patient has no symptoms of UTI. Patient has asymptomatic bacteruria.  WBC normal. Afebrile. [place] U/A. Procalcitonin (for whatever that test may or may not be worth) not elevated.   Urinary retention in/of itself is not an indication to treat bacteruria.  In the setting of retention, it is possible to develop UTI and bacteremia even without pyuria. However there are no symptoms here, the patient was hypertensive at presentation, and has been stable off antibiotics. Treatment of asymptomatic bacteruria has been extensively studied  and outside of very selected situations (e.g. febrile neutropenia, impending urologic procedure, pregnancy) results in resistant storm and adverse events without benefit to the patient.    Suggestions--  No role for antibiotics at this time.   Management of urinary retention per primary service.    D/W Dr. GEORGE Swann.  Thank you for the courtesy of this referral.  Please recall as needed, I'll sign off at this time.  Willem Helton MD  [phone]

## 2018-02-26 NOTE — DISCHARGE NOTE ADULT - NS AS DC STROKE ED MATERIALS
Stroke Education Booklet/High Blood pressure is a risk factor for Strokes/Stroke Warning Signs and Symptoms/Call 911 for Stroke/Prescribed Medications/Risk Factors for Stroke/Need for Followup After Discharge

## 2018-02-26 NOTE — GOALS OF CARE CONVERSATION - PERSONAL ADVANCE DIRECTIVE - NS PRO AD PATIENT TYPE
Do Not Resuscitate (DNR)/Medical Orders for Life-Sustaining Treatment (MOLST)/Living Will/Health Care Proxy (HCP)

## 2018-02-26 NOTE — DISCHARGE NOTE ADULT - CARE PLAN
Principal Discharge DX:	Hypertension, unspecified type  Goal:	.  Assessment and plan of treatment:	Follow-up with your primary care doctor within 1 week.  Continue current medications. No change in medication doses.  Secondary Diagnosis:	Acute on chronic diastolic heart failure  Assessment and plan of treatment:	Continue current medications  Follow-up with your primary care doctor within 1 week.

## 2018-02-26 NOTE — DISCHARGE NOTE ADULT - MEDICATION SUMMARY - MEDICATIONS TO TAKE
I will START or STAY ON the medications listed below when I get home from the hospital:    doxazosin 4 mg oral tablet  -- 1 tab(s) by mouth once a day (at bedtime)  -- Indication: For Hypertension    carvedilol 12.5 mg oral tablet  -- 1 tab(s) by mouth every 12 hours  -- Indication: For Hypertension    Sensipar 30 mg oral tablet  -- 1 tab(s) by mouth once a day  -- Indication: For Prophylactic measure    furosemide 20 mg oral tablet  -- 3 tab(s) by mouth once a day  -- Indication: For Acute on chronic diastolic heart failure    potassium chloride 10 mEq oral capsule, extended release  -- 1 cap(s) by mouth once a day  -- Indication: For Prophylactic measure    hydrALAZINE 25 mg oral tablet  -- 3 tab(s) by mouth every 8 hours  -- Indication: For Hypertension    cholecalciferol oral tablet  -- 1000 unit(s) by mouth once a day  -- Indication: For Hyponatremia

## 2018-02-26 NOTE — DISCHARGE NOTE ADULT - PLAN OF CARE
. Follow-up with your primary care doctor within 1 week.  Continue current medications. No change in medication doses. Continue current medications  Follow-up with your primary care doctor within 1 week.

## 2018-02-26 NOTE — DISCHARGE NOTE ADULT - PATIENT PORTAL LINK FT
You can access the [facility] Portal, offered by [facility], by registering with the following website: [email or web address]

## 2018-02-26 NOTE — PROGRESS NOTE ADULT - SUBJECTIVE AND OBJECTIVE BOX
Date/Time Patient Seen:  		  Referring MD:   Data Reviewed	       Patient is a 89y old  Female who presents with a chief complaint of coughing (24 Feb 2018 19:35)  in bed  seen and examined  vs and meds reviewed  off LASIX      Subjective/HPI     PAST MEDICAL & SURGICAL HISTORY:  Anemia, unspecified type  Edema of lower extremity  Heart murmur  Hypertension  Hypercalcemia  History of appendectomy  S/P tonsillectomy  H/O parathyroidectomy        Medication list         MEDICATIONS  (STANDING):  carvedilol 12.5 milliGRAM(s) Oral every 12 hours  cholecalciferol 1000 Unit(s) Oral daily  cinacalcet 30 milliGRAM(s) Oral at bedtime  doxazosin 4 milliGRAM(s) Oral at bedtime  ferrous    sulfate 325 milliGRAM(s) Oral daily  heparin  Injectable 5000 Unit(s) SubCutaneous every 12 hours  hydrALAZINE 75 milliGRAM(s) Oral every 8 hours    MEDICATIONS  (PRN):  acetaminophen   Tablet 650 milliGRAM(s) Oral every 6 hours PRN For Temp greater than 38 C (100.4 F)  acetaminophen   Tablet. 650 milliGRAM(s) Oral every 6 hours PRN Mild Pain (1 - 3)         Vitals log        ICU Vital Signs Last 24 Hrs  T(C): 36.8 (26 Feb 2018 04:45), Max: 36.9 (25 Feb 2018 15:52)  T(F): 98.2 (26 Feb 2018 04:45), Max: 98.5 (25 Feb 2018 15:52)  HR: 60 (26 Feb 2018 04:45) (53 - 67)  BP: 126/47 (26 Feb 2018 04:45) (107/67 - 152/64)  BP(mean): --  ABP: --  ABP(mean): --  RR: 16 (26 Feb 2018 04:45) (14 - 18)  SpO2: 94% (26 Feb 2018 04:45) (93% - 96%)           Input and Output:  I&O's Detail    24 Feb 2018 07:01  -  25 Feb 2018 07:00  --------------------------------------------------------  IN:  Total IN: 0 mL    OUT:    Indwelling Catheter - Urethral: 650 mL  Total OUT: 650 mL    Total NET: -650 mL      25 Feb 2018 07:01  -  26 Feb 2018 06:05  --------------------------------------------------------  IN:  Total IN: 0 mL    OUT:    Indwelling Catheter - Urethral: 1850 mL  Total OUT: 1850 mL    Total NET: -1850 mL          Lab Data                        10.6   6.8   )-----------( 191      ( 25 Feb 2018 08:13 )             31.1     02-25    132<L>  |  92<L>  |  34<H>  ----------------------------<  101<H>  3.4<L>   |  33<H>  |  0.83    Ca    9.6      25 Feb 2018 08:13  Mg     2.0     02-25    TPro  7.4  /  Alb  3.8  /  TBili  0.4  /  DBili  x   /  AST  27  /  ALT  28  /  AlkPhos  84  02-24      CARDIAC MARKERS ( 24 Feb 2018 15:03 )  .020 ng/mL / x     / x     / x     / 3.0 ng/mL        Review of Systems	      Objective     Physical Examination      head at  heart s1s2  lungs dec BS  abd soft    Pertinent Lab findings & Imaging      Richard:  NO   Adequate UO     I&O's Detail    24 Feb 2018 07:01  -  25 Feb 2018 07:00  --------------------------------------------------------  IN:  Total IN: 0 mL    OUT:    Indwelling Catheter - Urethral: 650 mL  Total OUT: 650 mL    Total NET: -650 mL      25 Feb 2018 07:01  -  26 Feb 2018 06:05  --------------------------------------------------------  IN:  Total IN: 0 mL    OUT:    Indwelling Catheter - Urethral: 1850 mL  Total OUT: 1850 mL    Total NET: -1850 mL               Discussed with:     Cultures:	        Radiology

## 2018-02-26 NOTE — PROGRESS NOTE ADULT - ASSESSMENT
89 F  pmhx diastolic chf (EF 65%, echo July 2017), htn, anemia, recently a/w PNA p/w AMS and high BP.    - No signs of significant ischemia. Refusing EKG.   - AMS is likely from urinary retention. BP has improved s/p abbott placement.  - Appears Euvolemic   - Cont coreg  - Monitor and replete electrolytes. Keep K>4.0 and Mg>2.0.   - Further cardiac workup will depend on clinical course. 89 F  pmhx diastolic chf (EF 65%, echo July 2017), htn, anemia, recently a/w PNA p/w AMS and high BP.    - No signs of significant ischemia.   - AMS is likely from urinary retention. Significantly improved. Pt A&Ox3 this am.   - BP has improved s/p abbott placement for urinary retention, now abbott out, pt voiding well.  - Appears Euvolemic   - Cont coreg  - Monitor and replete electrolytes. Keep K>4.0 and Mg>2.0.   - Further cardiac workup will depend on clinical course. 89 F  pmhx diastolic chf (EF 65%, echo July 2017), htn, anemia, recently a/w PNA p/w AMS and high BP.    - No signs of significant ischemia.   - AMS is likely from urinary retention. Significantly improved. Pt A&Ox3 this am.   - BP has improved s/p abbott placement for urinary retention, now abbott out, pt voiding well.  - Appears Euvolemic   - Cont coreg, hydralazine  - Monitor and replete electrolytes. Keep K>4.0 and Mg>2.0.   - Further cardiac workup will depend on clinical course.

## 2018-02-26 NOTE — GOALS OF CARE CONVERSATION - PERSONAL ADVANCE DIRECTIVE - NS PRO AD PATIENT TYPE ON CHART
Health Care Proxy (HCP)/Medical Orders for Life-Sustaining Treatment (MOLST)/Living Will/Do Not Resuscitate (DNR)

## 2018-02-26 NOTE — CHART NOTE - NSCHARTNOTEFT_GEN_A_CORE
Called by RN for patient with HTN, /65. Patient seen and examined at bedside. Patient denies headache, blurry vision, chest pain, sob. Attending physician, Dr. GEORGE Swann ordered Hydralazine 10mg to be pushed for HTN. IV cleaned with alcohol pad. Patient tolerated medication well. IV flushed with normal saline. Will continue to monitor vitals.     Vital Signs Last 24 Hrs  T(C): 36.4 (26 Feb 2018 15:25), Max: 36.8 (25 Feb 2018 19:58)  T(F): 97.5 (26 Feb 2018 15:25), Max: 98.2 (25 Feb 2018 19:58)  HR: 64 (26 Feb 2018 17:30) (53 - 67)  BP: 193/65 (26 Feb 2018 17:30) (107/67 - 193/65)  RR: 18 (26 Feb 2018 15:25) (16 - 18)  SpO2: 98% (26 Feb 2018 15:25) (93% - 98%)    Kimi Stewart, PGY-1

## 2018-02-26 NOTE — PROGRESS NOTE ADULT - PROBLEM SELECTOR PLAN 4
TOV in progress, abbott if still retaining, uro recs appreciated   Continue Doxazosin   -- Dr. Kimble

## 2018-02-27 ENCOUNTER — APPOINTMENT (OUTPATIENT)
Dept: CARDIOLOGY | Facility: CLINIC | Age: 83
End: 2018-02-27

## 2018-02-27 LAB
ANION GAP SERPL CALC-SCNC: 7 MMOL/L — SIGNIFICANT CHANGE UP (ref 5–17)
BUN SERPL-MCNC: 45 MG/DL — HIGH (ref 7–23)
CALCIUM SERPL-MCNC: 10.4 MG/DL — HIGH (ref 8.5–10.1)
CHLORIDE SERPL-SCNC: 95 MMOL/L — LOW (ref 96–108)
CO2 SERPL-SCNC: 31 MMOL/L — SIGNIFICANT CHANGE UP (ref 22–31)
CREAT SERPL-MCNC: 0.93 MG/DL — SIGNIFICANT CHANGE UP (ref 0.5–1.3)
GLUCOSE SERPL-MCNC: 102 MG/DL — HIGH (ref 70–99)
POTASSIUM SERPL-MCNC: 4.5 MMOL/L — SIGNIFICANT CHANGE UP (ref 3.5–5.3)
POTASSIUM SERPL-SCNC: 4.5 MMOL/L — SIGNIFICANT CHANGE UP (ref 3.5–5.3)
SODIUM SERPL-SCNC: 133 MMOL/L — LOW (ref 135–145)

## 2018-02-27 PROCEDURE — 99232 SBSQ HOSP IP/OBS MODERATE 35: CPT

## 2018-02-27 RX ORDER — FUROSEMIDE 40 MG
40 TABLET ORAL DAILY
Qty: 0 | Refills: 0 | Status: DISCONTINUED | OUTPATIENT
Start: 2018-02-27 | End: 2018-02-28

## 2018-02-27 RX ADMIN — HEPARIN SODIUM 5000 UNIT(S): 5000 INJECTION INTRAVENOUS; SUBCUTANEOUS at 17:28

## 2018-02-27 RX ADMIN — CARVEDILOL PHOSPHATE 12.5 MILLIGRAM(S): 80 CAPSULE, EXTENDED RELEASE ORAL at 05:03

## 2018-02-27 RX ADMIN — HEPARIN SODIUM 5000 UNIT(S): 5000 INJECTION INTRAVENOUS; SUBCUTANEOUS at 05:03

## 2018-02-27 RX ADMIN — Medication 40 MILLIGRAM(S): at 14:19

## 2018-02-27 RX ADMIN — Medication 75 MILLIGRAM(S): at 14:18

## 2018-02-27 RX ADMIN — CARVEDILOL PHOSPHATE 12.5 MILLIGRAM(S): 80 CAPSULE, EXTENDED RELEASE ORAL at 17:28

## 2018-02-27 RX ADMIN — Medication 325 MILLIGRAM(S): at 12:33

## 2018-02-27 RX ADMIN — Medication 75 MILLIGRAM(S): at 21:14

## 2018-02-27 RX ADMIN — Medication 1000 UNIT(S): at 12:33

## 2018-02-27 RX ADMIN — Medication 75 MILLIGRAM(S): at 05:03

## 2018-02-27 RX ADMIN — CINACALCET 30 MILLIGRAM(S): 30 TABLET, FILM COATED ORAL at 21:13

## 2018-02-27 RX ADMIN — Medication 4 MILLIGRAM(S): at 21:14

## 2018-02-27 NOTE — PROGRESS NOTE ADULT - ASSESSMENT
·	Hyponatremia, SIADH, Urinary retention  ·	Hypertension    Stable sodium levels. can resume the lasix at 40 mg a day for now, then slowly increrase to 60 mg a day.  BP better controlled.   Monitor BP trend. Titrate BP meds as needed. Will follow electrolytes and renal function trend.   spoke to patient's dtr at bed side.

## 2018-02-27 NOTE — PROGRESS NOTE ADULT - SUBJECTIVE AND OBJECTIVE BOX
abbott was removed and pt now voiding   abdomen soft   will stop flomax due to advanced age with possible hypotension now that she is voiding abbott was removed and pt now voiding   abdomen soft   can repeat bladder scan later aftre voids

## 2018-02-27 NOTE — PROGRESS NOTE ADULT - SUBJECTIVE AND OBJECTIVE BOX
[facility] - Christofer Isaacs, Sorin, Marisa, Jr, Sandro Hagen  Office Number:  [phone]    Patient resting comfortably in bed in NAD.  Laying flat with no respiratory distress.  No complaints of chest pain, dyspnea, palpitations, PND, or orthopnea.    Telemetry:  Sinus rhythm    MEDICATIONS  (STANDING):  carvedilol 12.5 milliGRAM(s) Oral every 12 hours  cholecalciferol 1000 Unit(s) Oral daily  cinacalcet 30 milliGRAM(s) Oral at bedtime  doxazosin 4 milliGRAM(s) Oral at bedtime  ferrous    sulfate 325 milliGRAM(s) Oral daily  heparin  Injectable 5000 Unit(s) SubCutaneous every 12 hours  hydrALAZINE 75 milliGRAM(s) Oral every 8 hours    MEDICATIONS  (PRN):  acetaminophen   Tablet 650 milliGRAM(s) Oral every 6 hours PRN For Temp greater than 38 C (100.4 F)  acetaminophen   Tablet. 650 milliGRAM(s) Oral every 6 hours PRN Mild Pain (1 - 3)      Allergies    Vasotec (Unknown)        Vital Signs Last 24 Hrs  T(C): 36.7 (27 Feb 2018 07:30), Max: 36.8 (26 Feb 2018 23:51)  T(F): 98.1 (27 Feb 2018 07:30), Max: 98.2 (26 Feb 2018 23:51)  HR: 57 (27 Feb 2018 07:30) (56 - 70)  BP: 131/61 (27 Feb 2018 07:30) (131/61 - 193/65)  BP(mean): --  RR: 16 (27 Feb 2018 07:30) (16 - 18)  SpO2: 93% (27 Feb 2018 07:30) (91% - 98%)    I&O's Summary    26 Feb 2018 07:01  -  27 Feb 2018 07:00  --------------------------------------------------------  IN: 540 mL / OUT: 1150 mL / NET: -610 mL    27 Feb 2018 07:01  -  27 Feb 2018 09:37  --------------------------------------------------------  IN: 260 mL / OUT: 0 mL / NET: 260 mL        ON EXAM:    General: NAD, awake and alert, oriented x 3  HEENT: Mucous membranes are moist, anicteric  Lungs: Non-labored, breath sounds are clear bilaterally, No wheezing, rales or rhonchi  Cardiovascular: Regular, S1 and S2, 3/6 systolic murmur  Gastrointestinal: Bowel Sounds present, soft, nontender.   Lymph: No peripheral edema. No lymphadenopathy.  Skin: No rashes or ulcers  Psych:  Mood & affect appropriate    LABS: All Labs Reviewed:                        10.6   6.8   )-----------( 191      ( 25 Feb 2018 08:13 )             31.1                         11.6   9.4   )-----------( 235      ( 24 Feb 2018 15:03 )             33.9     27 Feb 2018 07:20    133    |  95     |  45     ----------------------------<  102    4.5     |  31     |  0.93   26 Feb 2018 06:57    131    |  93     |  47     ----------------------------<  97     4.0     |  30     |  0.95   25 Feb 2018 08:13    132    |  92     |  34     ----------------------------<  101    3.4     |  33     |  0.83     Ca    10.4       27 Feb 2018 07:20  Ca    9.7        26 Feb 2018 06:57  Ca    9.6        25 Feb 2018 08:13  Mg     2.1       26 Feb 2018 06:57  Mg     2.0       25 Feb 2018 08:13    TPro  7.4    /  Alb  3.8    /  TBili  0.4    /  DBili  x      /  AST  27     /  ALT  28     /  AlkPhos  84     24 Feb 2018 15:03          Blood Culture: Organism Enterococcus faecium (vancomycin resistant)  Gram Stain Blood -- Gram Stain --  Specimen Source .Urine Catheterized  Culture-Blood --      02-25 @ 08:13  Pro Bnp [id number]

## 2018-02-27 NOTE — PROGRESS NOTE ADULT - SUBJECTIVE AND OBJECTIVE BOX
Date/Time Patient Seen:  		  Referring MD:   Data Reviewed	       Patient is a 89y old  Female who presents with a chief complaint of coughing (26 Feb 2018 13:02)  in bed  seen and examined  vs and meds reviewed      Subjective/HPI     PAST MEDICAL & SURGICAL HISTORY:  Anemia, unspecified type  Edema of lower extremity  Heart murmur  Hypertension  Hypercalcemia  History of appendectomy  S/P tonsillectomy  H/O parathyroidectomy        Medication list         MEDICATIONS  (STANDING):  carvedilol 12.5 milliGRAM(s) Oral every 12 hours  cholecalciferol 1000 Unit(s) Oral daily  cinacalcet 30 milliGRAM(s) Oral at bedtime  doxazosin 4 milliGRAM(s) Oral at bedtime  ferrous    sulfate 325 milliGRAM(s) Oral daily  heparin  Injectable 5000 Unit(s) SubCutaneous every 12 hours  hydrALAZINE 75 milliGRAM(s) Oral every 8 hours    MEDICATIONS  (PRN):  acetaminophen   Tablet 650 milliGRAM(s) Oral every 6 hours PRN For Temp greater than 38 C (100.4 F)  acetaminophen   Tablet. 650 milliGRAM(s) Oral every 6 hours PRN Mild Pain (1 - 3)         Vitals log        ICU Vital Signs Last 24 Hrs  T(C): 36.6 (27 Feb 2018 05:08), Max: 36.8 (26 Feb 2018 23:51)  T(F): 97.9 (27 Feb 2018 05:08), Max: 98.2 (26 Feb 2018 23:51)  HR: 64 (27 Feb 2018 05:08) (56 - 70)  BP: 176/65 (27 Feb 2018 05:08) (125/70 - 193/65)  BP(mean): --  ABP: --  ABP(mean): --  RR: 18 (27 Feb 2018 05:08) (16 - 18)  SpO2: 94% (27 Feb 2018 05:08) (91% - 98%)           Input and Output:  I&O's Detail    25 Feb 2018 07:01  -  26 Feb 2018 07:00  --------------------------------------------------------  IN:  Total IN: 0 mL    OUT:    Indwelling Catheter - Urethral: 1850 mL  Total OUT: 1850 mL    Total NET: -1850 mL      26 Feb 2018 07:01  -  27 Feb 2018 06:07  --------------------------------------------------------  IN:    Oral Fluid: 540 mL  Total IN: 540 mL    OUT:    Voided: 1150 mL  Total OUT: 1150 mL    Total NET: -610 mL          Lab Data                        10.6   6.8   )-----------( 191      ( 25 Feb 2018 08:13 )             31.1     02-26    131<L>  |  93<L>  |  47<H>  ----------------------------<  97  4.0   |  30  |  0.95    Ca    9.7      26 Feb 2018 06:57  Mg     2.1     02-26              Review of Systems	      Objective     Physical Examination    head at  heart s1s2  lungs dec BS  abd soft      Pertinent Lab findings & Imaging      Richard:  NO   Adequate UO     I&O's Detail    25 Feb 2018 07:01  -  26 Feb 2018 07:00  --------------------------------------------------------  IN:  Total IN: 0 mL    OUT:    Indwelling Catheter - Urethral: 1850 mL  Total OUT: 1850 mL    Total NET: -1850 mL      26 Feb 2018 07:01  -  27 Feb 2018 06:07  --------------------------------------------------------  IN:    Oral Fluid: 540 mL  Total IN: 540 mL    OUT:    Voided: 1150 mL  Total OUT: 1150 mL    Total NET: -610 mL               Discussed with:     Cultures:	        Radiology

## 2018-02-27 NOTE — PROGRESS NOTE ADULT - SUBJECTIVE AND OBJECTIVE BOX
Neurology follow up note    SAIRA [id number]      Interval History:    Patient feels ok no new complaints seen with daughter     MEDICATIONS    acetaminophen   Tablet 650 milliGRAM(s) Oral every 6 hours PRN  acetaminophen   Tablet. 650 milliGRAM(s) Oral every 6 hours PRN  carvedilol 12.5 milliGRAM(s) Oral every 12 hours  cholecalciferol 1000 Unit(s) Oral daily  cinacalcet 30 milliGRAM(s) Oral at bedtime  doxazosin 4 milliGRAM(s) Oral at bedtime  ferrous    sulfate 325 milliGRAM(s) Oral daily  heparin  Injectable 5000 Unit(s) SubCutaneous every 12 hours  hydrALAZINE 75 milliGRAM(s) Oral every 8 hours      Allergies    Vasotec (Unknown)    Intolerances            Vital Signs Last 24 Hrs  T(C): 36.7 (27 Feb 2018 07:30), Max: 36.8 (26 Feb 2018 23:51)  T(F): 98.1 (27 Feb 2018 07:30), Max: 98.2 (26 Feb 2018 23:51)  HR: 57 (27 Feb 2018 07:30) (56 - 70)  BP: 131/61 (27 Feb 2018 07:30) (131/61 - 193/65)  BP(mean): --  RR: 16 (27 Feb 2018 07:30) (16 - 18)  SpO2: 93% (27 Feb 2018 07:30) (91% - 98%)      REVIEW OF SYSTEMS:     Constitutional: No fever, chills, fatigue, weakness  Eyes: no eye pain, visual disturbances, or discharge  ENT:  No difficulty hearing, tinnitus, vertigo; No sinus or throat pain  Neck: No pain or stiffness  Respiratory: No cough, dyspnea, wheezing   Cardiovascular: No chest pain, palpitations,   Gastrointestinal: No abdominal or epigastric pain. No nausea, vomiting  No diarrhea or constipation.   Genitourinary: No dysuria, frequency, hematuria or incontinence  Neurological: No headaches, lightheadedness, vertigo, numbness or tremors  Psychiatric: No depression, anxiety, mood swings or difficulty sleeping  Musculoskeletal: No joint pain or swelling; No muscle, back or extremity pain  Skin: No itching, burning, rashes or lesions   Lymph Nodes: No enlarged glands  Endocrine: No heat or cold intolerance; No hair loss   Allergy and Immunologic: No hives or eczema    On Neurological Examination:  The patient is awake and alert.      Location hospital, year 2008, month was FEB,   was able to name simple objects.    Recall was 1/3 within t1 minute.      Extraocular movements were intact.      Pupils bilaterally 2 mm reactive to 1.  Speech was fluent.      Smile symmetric.      Motor, bilateral upper 4/5, bilateral lower 4-/5.      Sensory, bilateral upper and lower intact to light touch.      Follow simple commands  Follow complex commands      GENERAL Exam: Nontoxic , No Acute Distress   	  HEENT:  normocephalic, atraumatic  		  LUNGS: Clear bilaterally   	  HEART: Normal S1S2   No murmur RRR        	  GI/ ABDOMEN:  Soft  Non tender    EXTREMITIES:   No Edema  No Clubbing  No Cyanosis No Edema    MUSCULOSKELETAL: Range of Motion all 4 extremities   	   SKIN: Normal  No Ecchymosis             LABS:      02-27    133<L>  |  95<L>  |  45<H>  ----------------------------<  102<H>  4.5   |  31  |  0.93    Ca    10.4<H>      27 Feb 2018 07:20  Mg     2.1     02-26      Hemoglobin A1C:       Vitamin B12         RADIOLOGY    ANALYSIS AND PLAN:  This is a 89-year-old with episodes of changes in mental status.    1.	For episodes of change in mental status, questionable this could be hypertensive encephalopathy type of picture versus possibly subtle signs from SMA-7 dehydration along with possibly progressive mild cognitive impairment versus subtle dementia.  2.	For history of hypertension, monitor the patient's systolic blood pressure.  3.	I would recommend to monitor renal function.  4.	For mild cognitive impairment versus subtle dementia secondary to the patient's age would recommend supportive therapy we decided no medications for now   5.	Fall precautions.  6.	overall mental status   7.	spoke to daughter, Malka, at bedside today 2/27/[phone], alternate number is [phone], no response.  I will continue to follow. work number  ext [phone]    Thank you for the courtesy of consultation.    Physical therapy evaluation as tolerated  OOB to chair/ambulation with assistance only if possible.    Greater than 40 minutes spent in direct patient care reviewing  the notes, lab data/ imaging , discussion with multidisciplinary team.

## 2018-02-27 NOTE — PROGRESS NOTE ADULT - PROBLEM SELECTOR PLAN 1
frail and weak and has multi organ involvement  pt with urinary retention and SIADH  am labs pending  no active infection as per ID  off Diuresis  cont supportive medical regimen  medicated overnight for HTN  monitor vs and HD and Sat  keep sat > 88 pct  out of bed as tolerated  pt is DNR

## 2018-02-27 NOTE — PROGRESS NOTE ADULT - PROBLEM SELECTOR PLAN 1
Improving  Unlikely hyponatremia as primary cause, possible multifactorial in juncture with urinary retention/htn urgency, uti, urine cx growing e faecium ?containment -- ID consult note, will not treat  Monitor lytes  Stable from neuro pov  Further work-up/management pending clinical course.

## 2018-02-27 NOTE — PROGRESS NOTE ADULT - ASSESSMENT
89 F  pmhx diastolic chf (EF 65%, echo July 2017), htn, anemia, recently a/w PNA p/w AMS and high BP.    - No signs of significant ischemia.   - AMS is likely from urinary retention. Significantly improved. Pt A&Ox3 this am.   - BP has improved s/p abbott placement for urinary retention, now abbott out, pt voiding well.  - Appears Euvolemic   - Cont coreg, hydralazine at current doses  - d/c telemetry  - off abx per ID  - Monitor and replete electrolytes. Keep K>4.0 and Mg>2.0.   - Further cardiac workup will depend on clinical course.

## 2018-02-27 NOTE — PROGRESS NOTE ADULT - SUBJECTIVE AND OBJECTIVE BOX
SAIRA SANTANA  89y  Female    Patient is a 89y old  Female who presents with a chief complaint of coughing (26 Feb 2018 13:02)      HPI:  This is a 89 F with PMH of HTN diastolic CHF (EF 65%) hyponatremia, hospitalized 6 weeks ago for PNA, who was brought in by her daughter for increasing confusion. Patient has been increasingly confused over the past 10 days. She denies SOB, fevers, chills, cough, CP, or dysuria. She was noted to be in urinary retention (>1000 cc after abbott was placed) and significantly hypertensive in the ER. (24 Feb 2018 17:20)    out of bed to chair  family at bed side.      PAST MEDICAL & SURGICAL HISTORY:  Anemia, unspecified type  Edema of lower extremity  Heart murmur  Hypertension  Hypercalcemia  History of appendectomy  S/P tonsillectomy  H/O parathyroidectomy          PHYSICAL EXAM:    T(C): 36.8 (02-27-18 @ 12:20), Max: 36.8 (02-26-18 @ 23:51)  HR: 60 (02-27-18 @ 12:20) (57 - 70)  BP: 133/66 (02-27-18 @ 12:20) (131/61 - 193/65)  RR: 16 (02-27-18 @ 12:20) (16 - 18)  SpO2: 98% (02-27-18 @ 12:20) (91% - 98%)  Wt(kg): --    I&O's Detail    26 Feb 2018 07:01  -  27 Feb 2018 07:00  --------------------------------------------------------  IN:    Oral Fluid: 540 mL  Total IN: 540 mL    OUT:    Voided: 1150 mL  Total OUT: 1150 mL    Total NET: -610 mL      27 Feb 2018 07:01  -  27 Feb 2018 14:27  --------------------------------------------------------  IN:    Oral Fluid: 500 mL  Total IN: 500 mL    OUT:    Indwelling Catheter - Urethral: 1 mL    Voided: 200 mL  Total OUT: 201 mL    Total NET: 299 mL          Respiratory: clear anteriorly, decreased BS at bases  Cardiovascular: S1 S2  Gastrointestinal: soft NT ND +BS  Extremities: edema   Neuro: Awake and alert    MEDICATIONS  (STANDING):  carvedilol 12.5 milliGRAM(s) Oral every 12 hours  cholecalciferol 1000 Unit(s) Oral daily  cinacalcet 30 milliGRAM(s) Oral at bedtime  doxazosin 4 milliGRAM(s) Oral at bedtime  ferrous    sulfate 325 milliGRAM(s) Oral daily  furosemide    Tablet 40 milliGRAM(s) Oral daily  heparin  Injectable 5000 Unit(s) SubCutaneous every 12 hours  hydrALAZINE 75 milliGRAM(s) Oral every 8 hours    MEDICATIONS  (PRN):  acetaminophen   Tablet 650 milliGRAM(s) Oral every 6 hours PRN For Temp greater than 38 C (100.4 F)  acetaminophen   Tablet. 650 milliGRAM(s) Oral every 6 hours PRN Mild Pain (1 - 3)          02-27    133<L>  |  95<L>  |  45<H>  ----------------------------<  102<H>  4.5   |  31  |  0.93    Ca    10.4<H>      27 Feb 2018 07:20  Mg     2.1     02-26

## 2018-02-27 NOTE — PROGRESS NOTE ADULT - PROBLEM SELECTOR PLAN 3
Received lasix 40mg ivp x 1 in ER, continued lasix 60 qd, now off lasix; restart if ok with renal  Continue coreg 12.5mg q12h  Further work-up/management pending clinical course.

## 2018-02-27 NOTE — PROGRESS NOTE ADULT - SUBJECTIVE AND OBJECTIVE BOX
Patient is a 89y old  Female who presents with a chief complaint of coughing (26 Feb 2018 13:02)      INTERVAL HPI/OVERNIGHT EVENTS: Patient seen and examined. NAD. No complaints.    Vital Signs Last 24 Hrs  T(C): 36.8 (27 Feb 2018 12:20), Max: 36.8 (26 Feb 2018 23:51)  T(F): 98.2 (27 Feb 2018 12:20), Max: 98.2 (26 Feb 2018 23:51)  HR: 60 (27 Feb 2018 12:20) (57 - 70)  BP: 133/66 (27 Feb 2018 12:20) (131/61 - 193/65)  BP(mean): --  RR: 16 (27 Feb 2018 12:20) (16 - 18)  SpO2: 98% (27 Feb 2018 12:20) (91% - 98%)    02-27    133<L>  |  95<L>  |  45<H>  ----------------------------<  102<H>  4.5   |  31  |  0.93    Ca    10.4<H>      27 Feb 2018 07:20  Mg     2.1     02-26          CAPILLARY BLOOD GLUCOSE                  acetaminophen   Tablet 650 milliGRAM(s) Oral every 6 hours PRN  acetaminophen   Tablet. 650 milliGRAM(s) Oral every 6 hours PRN  carvedilol 12.5 milliGRAM(s) Oral every 12 hours  cholecalciferol 1000 Unit(s) Oral daily  cinacalcet 30 milliGRAM(s) Oral at bedtime  doxazosin 4 milliGRAM(s) Oral at bedtime  ferrous    sulfate 325 milliGRAM(s) Oral daily  heparin  Injectable 5000 Unit(s) SubCutaneous every 12 hours  hydrALAZINE 75 milliGRAM(s) Oral every 8 hours          REVIEW OF SYSTEMS:  CONSTITUTIONAL: No fever, no weight loss, or no fatigue  NECK: No pain, no stiffness  RESPIRATORY: No cough, no wheezing, no chills, no hemoptysis, No shortness of breath  CARDIOVASCULAR: No chest pain, no palpitations, no dizziness, no leg swelling  GASTROINTESTINAL: No abdominal pain. No nausea, no vomiting, no hematemesis; No diarrhea, no constipation. No melena, no hematochezia.  GENITOURINARY: No dysuria, no frequency, no hematuria, no incontinence  NEUROLOGICAL: No headaches, no loss of strength, no numbness, no tremors  SKIN: No itching, no burning  MUSCULOSKELETAL: No joint pain, no swelling; No muscle, no back, no extremity pain  PSYCHIATRIC: No depression, no mood swings,   HEME/LYMPH: No easy bruising, no bleeding gums  ALLERY AND IMMUNOLOGIC: No hives       Consultant(s) Notes Reviewed:  [x ] YES  [ ] NO    PHYSICAL EXAM:  GENERAL: NAD  HEAD:  Atraumatic, Normocephalic  EYES: EOMI, PERRLA, conjunctiva and sclera clear  ENMT: No tonsillar erythema, exudates, or enlargement; Moist mucous membranes  NECK: Supple, No JVD  NERVOUS SYSTEM:  Awake & alert  CHEST/LUNG: Clear to auscultation bilaterally; No rales, rhonchi, wheezing,  HEART: Regular rate and rhythm  ABDOMEN: Soft, Nontender, Nondistended; Bowel sounds present  EXTREMITIES:  No clubbing, cyanosis, or edema  LYMPH: No lymphadenopathy noted  SKIN: No rashes      Advanced care planning discussed with patient/family [ x] YES   [ ] NO    Advanced care planning discussed with patient/family. Advanced care planning forms reviewed/discussed/completed. 20 minutes spent.

## 2018-02-28 LAB
ANION GAP SERPL CALC-SCNC: 7 MMOL/L — SIGNIFICANT CHANGE UP (ref 5–17)
BUN SERPL-MCNC: 51 MG/DL — HIGH (ref 7–23)
CALCIUM SERPL-MCNC: 10.7 MG/DL — HIGH (ref 8.5–10.1)
CHLORIDE SERPL-SCNC: 97 MMOL/L — SIGNIFICANT CHANGE UP (ref 96–108)
CO2 SERPL-SCNC: 30 MMOL/L — SIGNIFICANT CHANGE UP (ref 22–31)
CREAT SERPL-MCNC: 0.95 MG/DL — SIGNIFICANT CHANGE UP (ref 0.5–1.3)
GLUCOSE SERPL-MCNC: 140 MG/DL — HIGH (ref 70–99)
MAGNESIUM SERPL-MCNC: 2 MG/DL — SIGNIFICANT CHANGE UP (ref 1.6–2.6)
POTASSIUM SERPL-MCNC: 3.8 MMOL/L — SIGNIFICANT CHANGE UP (ref 3.5–5.3)
POTASSIUM SERPL-SCNC: 3.8 MMOL/L — SIGNIFICANT CHANGE UP (ref 3.5–5.3)
SODIUM SERPL-SCNC: 134 MMOL/L — LOW (ref 135–145)

## 2018-02-28 PROCEDURE — 99232 SBSQ HOSP IP/OBS MODERATE 35: CPT

## 2018-02-28 RX ORDER — FUROSEMIDE 40 MG
60 TABLET ORAL DAILY
Qty: 0 | Refills: 0 | Status: DISCONTINUED | OUTPATIENT
Start: 2018-02-28 | End: 2018-03-01

## 2018-02-28 RX ADMIN — CARVEDILOL PHOSPHATE 12.5 MILLIGRAM(S): 80 CAPSULE, EXTENDED RELEASE ORAL at 05:13

## 2018-02-28 RX ADMIN — Medication 325 MILLIGRAM(S): at 11:18

## 2018-02-28 RX ADMIN — HEPARIN SODIUM 5000 UNIT(S): 5000 INJECTION INTRAVENOUS; SUBCUTANEOUS at 17:22

## 2018-02-28 RX ADMIN — CINACALCET 30 MILLIGRAM(S): 30 TABLET, FILM COATED ORAL at 21:20

## 2018-02-28 RX ADMIN — Medication 75 MILLIGRAM(S): at 13:00

## 2018-02-28 RX ADMIN — Medication 75 MILLIGRAM(S): at 21:20

## 2018-02-28 RX ADMIN — CARVEDILOL PHOSPHATE 12.5 MILLIGRAM(S): 80 CAPSULE, EXTENDED RELEASE ORAL at 17:22

## 2018-02-28 RX ADMIN — Medication 75 MILLIGRAM(S): at 05:13

## 2018-02-28 RX ADMIN — Medication 4 MILLIGRAM(S): at 21:20

## 2018-02-28 RX ADMIN — Medication 40 MILLIGRAM(S): at 05:13

## 2018-02-28 RX ADMIN — HEPARIN SODIUM 5000 UNIT(S): 5000 INJECTION INTRAVENOUS; SUBCUTANEOUS at 05:13

## 2018-02-28 RX ADMIN — Medication 1000 UNIT(S): at 11:18

## 2018-02-28 NOTE — PROGRESS NOTE ADULT - SUBJECTIVE AND OBJECTIVE BOX
Date/Time Patient Seen:  		  Referring MD:   Data Reviewed	       Patient is a 89y old  Female who presents with a chief complaint of coughing (26 Feb 2018 13:02)  in bed  seen and examined  vs and meds reviewed  restarted on LASIX        Subjective/HPI     PAST MEDICAL & SURGICAL HISTORY:  Anemia, unspecified type  Edema of lower extremity  Heart murmur  Hypertension  Hypercalcemia  History of appendectomy  S/P tonsillectomy  H/O parathyroidectomy        Medication list         MEDICATIONS  (STANDING):  carvedilol 12.5 milliGRAM(s) Oral every 12 hours  cholecalciferol 1000 Unit(s) Oral daily  cinacalcet 30 milliGRAM(s) Oral at bedtime  doxazosin 4 milliGRAM(s) Oral at bedtime  ferrous    sulfate 325 milliGRAM(s) Oral daily  furosemide    Tablet 40 milliGRAM(s) Oral daily  heparin  Injectable 5000 Unit(s) SubCutaneous every 12 hours  hydrALAZINE 75 milliGRAM(s) Oral every 8 hours    MEDICATIONS  (PRN):  acetaminophen   Tablet 650 milliGRAM(s) Oral every 6 hours PRN For Temp greater than 38 C (100.4 F)  acetaminophen   Tablet. 650 milliGRAM(s) Oral every 6 hours PRN Mild Pain (1 - 3)         Vitals log        ICU Vital Signs Last 24 Hrs  T(C): 36.7 (28 Feb 2018 04:58), Max: 37.1 (28 Feb 2018 00:50)  T(F): 98 (28 Feb 2018 04:58), Max: 98.7 (28 Feb 2018 00:50)  HR: 60 (28 Feb 2018 04:58) (57 - 74)  BP: 161/63 (28 Feb 2018 04:58) (111/65 - 161/63)  BP(mean): --  ABP: --  ABP(mean): --  RR: 18 (28 Feb 2018 04:58) (16 - 18)  SpO2: 92% (28 Feb 2018 04:58) (92% - 98%)           Input and Output:  I&O's Detail    26 Feb 2018 07:01  -  27 Feb 2018 07:00  --------------------------------------------------------  IN:    Oral Fluid: 540 mL  Total IN: 540 mL    OUT:    Voided: 1150 mL  Total OUT: 1150 mL    Total NET: -610 mL      27 Feb 2018 07:01 - 28 Feb 2018 05:54  --------------------------------------------------------  IN:    Oral Fluid: 900 mL  Total IN: 900 mL    OUT:    Indwelling Catheter - Urethral: 1 mL    Voided: 700 mL  Total OUT: 701 mL    Total NET: 199 mL          Lab Data    02-27    133<L>  |  95<L>  |  45<H>  ----------------------------<  102<H>  4.5   |  31  |  0.93    Ca    10.4<H>      27 Feb 2018 07:20  Mg     2.1     02-26              Review of Systems	      Objective     Physical Examination    head at  heart s1s2  lungs dec BS  abd soft      Pertinent Lab findings & Imaging      Richard:  NO   Adequate UO     I&O's Detail    26 Feb 2018 07:01 - 27 Feb 2018 07:00  --------------------------------------------------------  IN:    Oral Fluid: 540 mL  Total IN: 540 mL    OUT:    Voided: 1150 mL  Total OUT: 1150 mL    Total NET: -610 mL      27 Feb 2018 07:01 - 28 Feb 2018 05:54  --------------------------------------------------------  IN:    Oral Fluid: 900 mL  Total IN: 900 mL    OUT:    Indwelling Catheter - Urethral: 1 mL    Voided: 700 mL  Total OUT: 701 mL    Total NET: 199 mL               Discussed with:     Cultures:	        Radiology

## 2018-02-28 NOTE — PROGRESS NOTE ADULT - PROBLEM SELECTOR PLAN 3
Received lasix 40mg ivp x 1 in ER, back on lasix 40mg qd  Continue coreg 12.5mg q12h  Further work-up/management pending clinical course.

## 2018-02-28 NOTE — PROGRESS NOTE ADULT - PROBLEM SELECTOR PLAN 1
renal follow up noted  restarted on LASIX  am labs pending  replete lytes as needed  HOB elev  asp prec  oral hygiene  keep sat > 88 pct  increase activity as tolerated  pt is DNR   prognosis guarded

## 2018-02-28 NOTE — PROGRESS NOTE ADULT - SUBJECTIVE AND OBJECTIVE BOX
Patient is a 89y old  Female who presents with a chief complaint of coughing (26 Feb 2018 13:02)      INTERVAL HPI/OVERNIGHT EVENTS: Patient seen and examined. NAD. No complaints.    Vital Signs Last 24 Hrs  T(C): 36.4 (28 Feb 2018 07:24), Max: 37.1 (28 Feb 2018 00:50)  T(F): 97.5 (28 Feb 2018 07:24), Max: 98.7 (28 Feb 2018 00:50)  HR: 60 (28 Feb 2018 11:22) (56 - 74)  BP: 150/50 (28 Feb 2018 11:22) (111/65 - 171/51)  BP(mean): --  RR: 18 (28 Feb 2018 07:24) (16 - 18)  SpO2: 96% (28 Feb 2018 07:24) (92% - 98%)    02-28    134<L>  |  97  |  51<H>  ----------------------------<  140<H>  3.8   |  30  |  0.95    Ca    10.7<H>      28 Feb 2018 10:12  Mg     2.0     02-28          CAPILLARY BLOOD GLUCOSE                  acetaminophen   Tablet 650 milliGRAM(s) Oral every 6 hours PRN  acetaminophen   Tablet. 650 milliGRAM(s) Oral every 6 hours PRN  carvedilol 12.5 milliGRAM(s) Oral every 12 hours  cholecalciferol 1000 Unit(s) Oral daily  cinacalcet 30 milliGRAM(s) Oral at bedtime  doxazosin 4 milliGRAM(s) Oral at bedtime  ferrous    sulfate 325 milliGRAM(s) Oral daily  furosemide    Tablet 40 milliGRAM(s) Oral daily  heparin  Injectable 5000 Unit(s) SubCutaneous every 12 hours  hydrALAZINE 75 milliGRAM(s) Oral every 8 hours      REVIEW OF SYSTEMS:  CONSTITUTIONAL: No fever, no weight loss, or no fatigue  NECK: No pain, no stiffness  RESPIRATORY: No cough, no wheezing, no chills, no hemoptysis, No shortness of breath  CARDIOVASCULAR: No chest pain, no palpitations, no dizziness, no leg swelling  GASTROINTESTINAL: No abdominal pain. No nausea, no vomiting, no hematemesis; No diarrhea, no constipation. No melena, no hematochezia.  GENITOURINARY: No dysuria, no frequency, no hematuria, no incontinence  NEUROLOGICAL: No headaches, no loss of strength, no numbness, no tremors  SKIN: No itching, no burning  MUSCULOSKELETAL: No joint pain, no swelling; No muscle, no back, no extremity pain  PSYCHIATRIC: No depression, no mood swings,   HEME/LYMPH: No easy bruising, no bleeding gums  ALLERY AND IMMUNOLOGIC: No hives       Consultant(s) Notes Reviewed:  [x ] YES  [ ] NO    PHYSICAL EXAM:  GENERAL: NAD  HEAD:  Atraumatic, Normocephalic  EYES: EOMI, PERRLA, conjunctiva and sclera clear  ENMT: No tonsillar erythema, exudates, or enlargement; Moist mucous membranes  NECK: Supple, No JVD  NERVOUS SYSTEM:  Awake & alert  CHEST/LUNG: Clear to auscultation bilaterally; No rales, rhonchi, wheezing,  HEART: Regular rate and rhythm  ABDOMEN: Soft, Nontender, Nondistended; Bowel sounds present  EXTREMITIES:  No clubbing, cyanosis, or edema  LYMPH: No lymphadenopathy noted  SKIN: No rashes      Advanced care planning discussed with patient/family [ x] YES   [ ] NO    Advanced care planning discussed with patient/family. Advanced care planning forms reviewed/discussed/completed. 20 minutes spent.

## 2018-02-28 NOTE — PROGRESS NOTE ADULT - SUBJECTIVE AND OBJECTIVE BOX
Patient is a 89y old  Female who presents with a chief complaint of coughing (24 Feb 2018 19:35)      Patient seen in follow up for hyponatremia, urinary retention. Pt voiding without abbott. Seen by .      PAST MEDICAL HISTORY:  Anemia, unspecified type  Edema of lower extremity  Heart murmur  Hypertension  Hypercalcemia      MEDICATIONS  (STANDING):  carvedilol 12.5 milliGRAM(s) Oral every 12 hours  cholecalciferol 1000 Unit(s) Oral daily  cinacalcet 30 milliGRAM(s) Oral at bedtime  doxazosin 4 milliGRAM(s) Oral at bedtime  ferrous    sulfate 325 milliGRAM(s) Oral daily  furosemide    Tablet 40 milliGRAM(s) Oral daily  heparin  Injectable 5000 Unit(s) SubCutaneous every 12 hours  hydrALAZINE 75 milliGRAM(s) Oral every 8 hours    MEDICATIONS  (PRN):  acetaminophen   Tablet 650 milliGRAM(s) Oral every 6 hours PRN For Temp greater than 38 C (100.4 F)  acetaminophen   Tablet. 650 milliGRAM(s) Oral every 6 hours PRN Mild Pain (1 - 3)    T(C): 36.4 (02-28-18 @ 07:24), Max: 37.1 (02-28-18 @ 00:50)  HR: 56 (02-28-18 @ 07:24) (56 - 74)  BP: 171/51 (02-28-18 @ 07:24) (111/65 - 193/65)  RR: 18 (02-28-18 @ 07:24)  SpO2: 96% (02-28-18 @ 07:24)  Wt(kg): --  I&O's Detail    27 Feb 2018 07:01  -  28 Feb 2018 07:00  --------------------------------------------------------  IN:    Oral Fluid: 900 mL  Total IN: 900 mL    OUT:    Indwelling Catheter - Urethral: 1 mL    Voided: 700 mL  Total OUT: 701 mL    Total NET: 199 mL      PHYSICAL EXAM:  General: NAD  Respiratory: b/l air entry  Cardiovascular: S1 S2  Gastrointestinal: soft, distended  Extremities:  no edema    LABORATORY:    02-27    133<L>  |  95<L>  |  45<H>  ----------------------------<  102<H>  4.5   |  31  |  0.93    Ca    10.4<H>      27 Feb 2018 07:20      Sodium, Serum: 133 mmol/L (02-27 @ 07:20)    Potassium, Serum: 4.5 mmol/L (02-27 @ 07:20)      Creatinine, Serum 0.93 (02-27 @ 07:20)  Creatinine, Serum 0.95 (02-26 @ 06:57)

## 2018-02-28 NOTE — PROGRESS NOTE ADULT - PROBLEM SELECTOR PLAN 1
Improving  Multifactorial in juncture with hyponatremia, urinary retention/htn urgency,  urine cx growing e faecium -- ID consult note, will not treat  Monitor lytes  Stable from neuro pov  Further work-up/management pending clinical course.  D/C planning

## 2018-02-28 NOTE — PROGRESS NOTE ADULT - SUBJECTIVE AND OBJECTIVE BOX
Neurology follow up note    SAIRA [id number]      Interval History:    Patient feels ok no new complaints.    MEDICATIONS    acetaminophen   Tablet 650 milliGRAM(s) Oral every 6 hours PRN  acetaminophen   Tablet. 650 milliGRAM(s) Oral every 6 hours PRN  carvedilol 12.5 milliGRAM(s) Oral every 12 hours  cholecalciferol 1000 Unit(s) Oral daily  cinacalcet 30 milliGRAM(s) Oral at bedtime  doxazosin 4 milliGRAM(s) Oral at bedtime  ferrous    sulfate 325 milliGRAM(s) Oral daily  furosemide    Tablet 40 milliGRAM(s) Oral daily  heparin  Injectable 5000 Unit(s) SubCutaneous every 12 hours  hydrALAZINE 75 milliGRAM(s) Oral every 8 hours      Allergies    Vasotec (Unknown)    Intolerances            Vital Signs Last 24 Hrs  T(C): 36.3 (28 Feb 2018 11:22), Max: 37.1 (28 Feb 2018 00:50)  T(F): 97.4 (28 Feb 2018 11:22), Max: 98.7 (28 Feb 2018 00:50)  HR: 60 (28 Feb 2018 11:22) (56 - 74)  BP: 150/50 (28 Feb 2018 11:22) (111/65 - 171/51)  BP(mean): --  RR: 18 (28 Feb 2018 07:24) (16 - 18)  SpO2: 96% (28 Feb 2018 07:24) (92% - 98%)      REVIEW OF SYSTEMS:     Constitutional: No fever, chills, fatigue, weakness  Eyes: no eye pain, visual disturbances, or discharge  ENT:  No difficulty hearing, tinnitus, vertigo; No sinus or throat pain  Neck: No pain or stiffness  Respiratory: No cough, dyspnea, wheezing   Cardiovascular: No chest pain, palpitations,   Gastrointestinal: No abdominal or epigastric pain. No nausea, vomiting  No diarrhea or constipation.   Genitourinary: No dysuria, frequency, hematuria or incontinence  Neurological: No headaches, lightheadedness, vertigo, numbness or tremors  Psychiatric: No depression, anxiety, mood swings or difficulty sleeping  Musculoskeletal: No joint pain or swelling; No muscle, back or extremity pain  Skin: No itching, burning, rashes or lesions   Lymph Nodes: No enlarged glands  Endocrine: No heat or cold intolerance; No hair loss   Allergy and Immunologic: No hives or eczema    On Neurological Examination:  The patient is awake and alert.      Location hospital,   was able to name simple objects.        Extraocular movements were intact.      Pupils bilaterally 2 mm reactive to 1.  Speech was fluent.      Smile symmetric.      Motor, bilateral upper 4/5, bilateral lower 4-/5.      Sensory, bilateral upper and lower intact to light touch.      Follow simple commands  Follow complex commands      GENERAL Exam: Nontoxic , No Acute Distress   	  HEENT:  normocephalic, atraumatic  		  LUNGS: Clear bilaterally   	  HEART: Normal S1S2   No murmur RRR        	  GI/ ABDOMEN:  Soft  Non tender    EXTREMITIES:   No Edema  No Clubbing  No Cyanosis No Edema    MUSCULOSKELETAL: Range of Motion all 4 extremities   	   SKIN: Normal  No Ecchymosis                  LABS:      02-28    134<L>  |  97  |  51<H>  ----------------------------<  140<H>  3.8   |  30  |  0.95    Ca    10.7<H>      28 Feb 2018 10:12  Mg     2.0     02-28      Hemoglobin A1C:       Vitamin B12         RADIOLOGY      ANALYSIS AND PLAN:  This is a 89-year-old with episodes of changes in mental status.    1.	For episodes of change in mental status, questionable this could be hypertensive encephalopathy type of picture versus possibly subtle signs from SMA-7 dehydration along with possibly progressive mild cognitive impairment versus subtle dementia. Overall mental status is stable  2.	For history of hypertension, monitor the patient's systolic blood pressure.  3.	I would recommend to monitor renal function.  4.	For mild cognitive impairment versus subtle dementia secondary to the patient's age would recommend supportive therapy we decided no medications for now   5.	Fall precautions.  6.	monitor oral intake   7.	overall mental status   8.	spoke to daughter, Malka, at bedside today 2/28/18 [phone], alternate number is [phone], no response.  I will continue to follow. work number  ext [id number]    Thank you for the courtesy of consultation.    Physical therapy evaluation as tolerated  OOB to chair/ambulation with assistance only if possible.    Greater than 35 minutes spent in direct patient care reviewing  the notes, lab data/ imaging , discussion with multidisciplinary team.

## 2018-02-28 NOTE — PROGRESS NOTE ADULT - ASSESSMENT
·	Hyponatremia, SIADH, Urinary retention  ·	Hypertension    Stable sodium levels. BP better controlled. Monitor BP trend. Titrate BP meds as needed.    follow up. Will follow electrolytes and renal function trend.

## 2018-02-28 NOTE — PROGRESS NOTE ADULT - ATTENDING COMMENTS
I was physically present for the key portions of the evaluation and management (E/M) service provided.  I agree with the above history, physical, and plan which I have reviewed and edited where appropriate.
I was physically present for the key portions of the evaluation and management (E/M) service provided.  I agree with the above history, physical, and plan which I have reviewed and edited where appropriate.
Seen/examined. Agree with above. Currently on lasix 40, BUN is trending up. Encourage po intake.
Patient seen and examined. NAD. No complaints.  Agree with above.  Overall improving.  Na stable.  Trial of voiding.  Will not treat urine culture. ID consult noted.  RADHA Swann MD

## 2018-02-28 NOTE — PROGRESS NOTE ADULT - ASSESSMENT
89yr old female PMH  diastolic CHF (EF 65%, echo July 2017), HTN , anemia, recently p/w PNA now p/w AMS and high BP.    - No signs of significant ischemia.   - AMS is likely from urinary retention. Significantly improved. Pt A&Ox3 this am.   - BP has improved -171 DBP 51-66 HR 50's-60's   - Appears Euvolemic   - Cont coreg, hydralazine at current doses  - Off abx per ID  - Monitor and replete electrolytes. Keep K>4.0 and Mg>2.0.   - Further cardiac workup will depend on clinical course. 89yr old female PMH  diastolic CHF (EF 65%, echo July 2017), HTN , anemia, recently p/w PNA now p/w AMS and high BP.    - No signs of significant ischemia.   - Mental status significantly improved. Pt A&Ox3 this am.   - BP has improved -171 DBP 51-66 HR 50's-60's   - Appears Euvolemic   - Cont coreg, hydralazine at current doses  - Off abx per ID  - Monitor and replete electrolytes. Keep K>4.0 and Mg>2.0.   - Further cardiac workup will depend on clinical course. 89yr old female PMH  diastolic CHF (EF 65%, echo July 2017), HTN , anemia, recently p/w PNA now p/w AMS and high BP.    - No signs of significant ischemia.   - Mental status significantly improved. Pt A&Ox3 this am.   - BP has improved -171 DBP 51-66 HR 50's-60's   - Appears Euvolemic   - Cont coreg, hydralazine at current doses  - Watch her on lasix 40. BUN is trending upwards.  - Off abx per ID  - Monitor and replete electrolytes. Keep K>4.0 and Mg>2.0.   - Further cardiac workup will depend on clinical course. negative...

## 2018-02-28 NOTE — PROGRESS NOTE ADULT - SUBJECTIVE AND OBJECTIVE BOX
HPI:  This is a 89 F with PMH of HTN diastolic CHF (EF 65%) hyponatremia, hospitalized 6 weeks ago for PNA, who was brought in by her daughter for increasing confusion. Patient has been increasingly confused over the past 10 days. She denies SOB, fevers, chills, cough, CP, or dysuria. She was noted to be in urinary retention (>1000 cc after abbott was placed) and significantly hypertensive in the ER. (2018 17:20)      SUBJECTIVE: Pt. resting comfortably in bed in NAD, lying flat with no respiratory distress, no c/o chest pain, dyspnea, palpitations, PND, or orthopnea   Patient is a 89y old  Female who presents with a chief complaint of coughing (2018 13:02)        OBJECTIVE:  Review Of Systems:  Constitutional: [ ] Fever [ ] Chills [ ] Fatigue [ ] Weight change   HEENT: [ ] Blurred vision [ ] Eye Pain [ ] Headache [ ] Runny nose [ ] Sore Throat   Respiratory: [ ] Cough [ ] Wheezing [ ] Shortness of breath  Cardiovascular: [ ] Chest Pain [ ] Palpitations [ ] PEPE [ ] PND [ ] Orthopnea  Gastrointestinal: [ ] Abdominal Pain [ ] Diarrhea [ ] Constipation [ ] Hemorrhoids [ ] Nausea [ ] Vomiting  Genitourinary: [ ] Nocturia [ ] Dysuria [ ] Incontinence  Extremities: [ ] Swelling [ ] Joint Pain  Neurologic: [ ] Focal deficit [ ] Paresthesias [ ] Syncope  Lymphatic: [ ] Swelling [ ] Lymphadenopathy   Skin: [ ] Rash [ ] Ecchymoses [ ] Wounds [ ] Lesions  Psychiatry: [ ] Depression [ ] Suicidal/Homicidal Ideation [ ] Anxiety [ ] Sleep Disturbances  [ ] 10 point review of systems is otherwise negative except as mentioned above              [ ]Unable to obtain    Allergy:  Vasotec (Unknown)    Medications:  MEDICATIONS  (STANDING):  carvedilol 12.5 milliGRAM(s) Oral every 12 hours  cholecalciferol 1000 Unit(s) Oral daily  cinacalcet 30 milliGRAM(s) Oral at bedtime  doxazosin 4 milliGRAM(s) Oral at bedtime  ferrous    sulfate 325 milliGRAM(s) Oral daily  furosemide    Tablet 40 milliGRAM(s) Oral daily  heparin  Injectable 5000 Unit(s) SubCutaneous every 12 hours  hydrALAZINE 75 milliGRAM(s) Oral every 8 hours    MEDICATIONS  (PRN):  acetaminophen   Tablet 650 milliGRAM(s) Oral every 6 hours PRN For Temp greater than 38 C (100.4 F)  acetaminophen   Tablet. 650 milliGRAM(s) Oral every 6 hours PRN Mild Pain (1 - 3)      PMH/PSH/FH/SH: [ ] Unchanged    Vitals:  T(C): 36.4 (18 @ 07:24), Max: 37.1 (18 @ 00:50)  HR: 56 (18 @ 07:24) (56 - 74)  BP: 171/51 (18 @ 07:24) (111/65 - 171/51)  BP(mean): --  RR: 18 (18 @ 07:24) (16 - 18)  SpO2: 96% (18 @ 07:24) (92% - 98%)  Wt(kg): --  Daily     Daily Weight in k.2 (2018 04:58)  I&O's Summary    2018 07:01  -  2018 07:00  --------------------------------------------------------  IN: 900 mL / OUT: 701 mL / NET: 199 mL        Labs:                        ECG:    Echo:    Stress Testing:     Cath:    Imaging:    Interpretation of Telemetry: Pt. is not on telemetry       Physical Exam:  Appearance: [ ] Normal  [ ] abnormal [ ] NAD   Eyes: [ ] PERRL [ ] EOMI  HENT: [ ] Normal [ ] Abnormal oral mucosa [ ]NC/AT  Cardiovascular: [ ] S1 [ ] S2 [ ] RRR [ ] m/r/g [ ]edema [ ] JVP  Procedural Access Site: [ ]  hematoma [ ] tender to palpation [ ] 2+ pulse [ ] bruit [ ] Ecchymosis  Respiratory: [ ] Clear to auscultation bilaterally  Gastrointestinal: [ ] Soft [ ] tenderness[ ] distension [ ] BS  Musculoskeletal: [ ] clubbing [ ] joint deformity   Neurologic: [ ] Non-focal  Lymphatic: [ ] lymphadenopathy  Psychiatry: [ ] AAOx3  [ ] confused [ ] disoriented [ ] Mood & affect appropriate  Skin: [ ]  rashes [ ] ecchymoses [ ] cyanosis HPI:  This is a 89 F with PMH of HTN diastolic CHF (EF 65%) hyponatremia, hospitalized 6 weeks ago for PNA, who was brought in by her daughter for increasing confusion. Patient has been increasingly confused over the past 10 days. She denies SOB, fevers, chills, cough, CP, or dysuria. She was noted to be in urinary retention (>1000 cc after abbott was placed) and significantly hypertensive in the ER. (2018 17:20)      SUBJECTIVE: Pt. resting comfortably in chair in NAD, with no respiratory distress, no c/o chest pain, dyspnea, palpitations, PND, or orthopnea   Patient is a 89y old  Female who presents with a chief complaint of coughing (2018 13:02)        OBJECTIVE:  Review Of Systems:  Constitutional: [ ] Fever [ ] Chills [ ] Fatigue [ ] Weight change   HEENT: [ ] Blurred vision [ ] Eye Pain [ ] Headache [ ] Runny nose [ ] Sore Throat   Respiratory: [ ] Cough [ ] Wheezing [ ] Shortness of breath  Cardiovascular: [ ] Chest Pain [ ] Palpitations [ ] PEPE [ ] PND [ ] Orthopnea  Gastrointestinal: [ ] Abdominal Pain [ ] Diarrhea [ ] Constipation [ ] Hemorrhoids [ ] Nausea [ ] Vomiting  Genitourinary: [ ] Nocturia [ ] Dysuria [ ] Incontinence  Extremities: [ ] Swelling [ ] Joint Pain  Neurologic: [ ] Focal deficit [ ] Paresthesias [ ] Syncope  Lymphatic: [ ] Swelling [ ] Lymphadenopathy   Skin: [ ] Rash [ ] Ecchymoses [ ] Wounds [ ] Lesions  Psychiatry: [ ] Depression [ ] Suicidal/Homicidal Ideation [ ] Anxiety [ ] Sleep Disturbances  [X ] 10 point review of systems is otherwise negative except as mentioned above              [ ]Unable to obtain    Allergy:  Vasotec (Unknown)    Medications:  MEDICATIONS  (STANDING):  carvedilol 12.5 milliGRAM(s) Oral every 12 hours  cholecalciferol 1000 Unit(s) Oral daily  cinacalcet 30 milliGRAM(s) Oral at bedtime  doxazosin 4 milliGRAM(s) Oral at bedtime  ferrous    sulfate 325 milliGRAM(s) Oral daily  furosemide    Tablet 40 milliGRAM(s) Oral daily  heparin  Injectable 5000 Unit(s) SubCutaneous every 12 hours  hydrALAZINE 75 milliGRAM(s) Oral every 8 hours    MEDICATIONS  (PRN):  acetaminophen   Tablet 650 milliGRAM(s) Oral every 6 hours PRN For Temp greater than 38 C (100.4 F)  acetaminophen   Tablet. 650 milliGRAM(s) Oral every 6 hours PRN Mild Pain (1 - 3)      PMH/PSH/FH/SH: [ ] Unchanged    Vitals:  T(C): 36.4 (18 @ 07:24), Max: 37.1 (18 @ 00:50)  HR: 56 (18 @ 07:24) (56 - 74)  BP: 171/51 (18 @ 07:24) (111/65 - 171/51)  BP(mean): --  RR: 18 (18 @ 07:24) (16 - 18)  SpO2: 96% (18 @ 07:24) (92% - 98%)  Wt(kg): --  Daily     Daily Weight in k.2 (2018 04:58)  I&O's Summary    2018 07:01  -  2018 07:00  --------------------------------------------------------  IN: 900 mL / OUT: 701 mL / NET: 199 mL        Labs:                        ECG:    Echo:    Stress Testing:     Cath:    Imaging:    Interpretation of Telemetry: Pt. is not on telemetry       Physical Exam:  Appearance: [ ] Normal  [ ] abnormal [X ] NAD   Eyes: [ ] PERRL [ ] EOMI  HENT: [ ] Normal [ ] Abnormal oral mucosa [ ]NC/AT  Cardiovascular: [X] S1 [X ] S2 +murmur  Procedural Access Site: [ ]  hematoma [ ] tender to palpation [ ] 2+ pulse [ ] bruit [ ] Ecchymosis  Respiratory: [X ] Clear to auscultation bilaterally  Gastrointestinal: [ ] Soft [ ] tenderness[ ] distension [ ] BS  Musculoskeletal: [ ] clubbing [ ] joint deformity   Neurologic: [ ] Non-focal  Lymphatic: [ ] lymphadenopathy  Psychiatry: [X ] AAOx3  [ ] confused [ ] disoriented [ ] Mood & affect appropriate  Skin: [ ]  rashes [ ] ecchymoses [ ] cyanosis

## 2018-02-28 NOTE — PROGRESS NOTE ADULT - PROBLEM SELECTOR PLAN 2
Patient received one dose of po clonidine in ER  Now resolved, patient now normotensive  Continue current meds (coreg and hydralazine) and titrate as needed  Cardio following  D/C planning

## 2018-02-28 NOTE — PROGRESS NOTE ADULT - SUBJECTIVE AND OBJECTIVE BOX
CHIEF COMPLAINT/ PRESENT FINDINGS:    has been voiding well large amount  since abbott removed   bladder scan has not should significant residuals 100-200 cc      ****************************************************************************************************  PHYSICAL EXAM:    Vital Signs Last 24 Hrs  T(C): 36.4 (28 Feb 2018 07:24), Max: 37.1 (28 Feb 2018 00:50)  T(F): 97.5 (28 Feb 2018 07:24), Max: 98.7 (28 Feb 2018 00:50)  HR: 56 (28 Feb 2018 07:24) (56 - 74)  BP: 171/51 (28 Feb 2018 07:24) (111/65 - 171/51)  BP(mean): --  RR: 18 (28 Feb 2018 07:24) (16 - 18)  SpO2: 96% (28 Feb 2018 07:24) (92% - 98%)    GENERAL: alert  no complaints     ABDOMEN: soft     BACK: no cva tenderness     LOWER EXTREMITIES:    NEUROLOGICAL:    **********************************************************************************************************  LABS:    02-27    133<L>  |  95<L>  |  45<H>  ----------------------------<  102<H>  4.5   |  31  |  0.93    Ca    10.4<H>      27 Feb 2018 07:20          Urine Culture:   Culture - Urine (02.24.18 @ 19:50)    -  Ampicillin: R >8    -  Ciprofloxacin: R >2    -  Nitrofurantoin: I 64    -  Tetra/Doxy: R >8    -  Vancomycin: R >16    Specimen Source: .Urine Catheterized    Culture Results:   >100,000 CFU/ml Enterococcus faecium (vancomycin resistant)  <10,000 CFU/ml Normal Urogenital storm present    Organism Identification: Enterococcus faecium (vancomycin resistant)    Organism: Enterococcus faecium (vancomycin resistant)    Method Type: JOSE      RADIOLOGY & ADDITIONAL STUDIES:      IMPRESSION: resolved urinary retention                      asymtomatic enterocooccus     PLAN: is already on Cardura 4mg               would not treat urine culture result  if asymptomatic

## 2018-03-01 VITALS — SYSTOLIC BLOOD PRESSURE: 130 MMHG | DIASTOLIC BLOOD PRESSURE: 50 MMHG

## 2018-03-01 PROCEDURE — 83735 ASSAY OF MAGNESIUM: CPT

## 2018-03-01 PROCEDURE — 85027 COMPLETE CBC AUTOMATED: CPT

## 2018-03-01 PROCEDURE — 99232 SBSQ HOSP IP/OBS MODERATE 35: CPT

## 2018-03-01 PROCEDURE — 71045 X-RAY EXAM CHEST 1 VIEW: CPT

## 2018-03-01 PROCEDURE — 99285 EMERGENCY DEPT VISIT HI MDM: CPT | Mod: 25

## 2018-03-01 PROCEDURE — 80048 BASIC METABOLIC PNL TOTAL CA: CPT

## 2018-03-01 PROCEDURE — 97162 PT EVAL MOD COMPLEX 30 MIN: CPT

## 2018-03-01 PROCEDURE — 87186 SC STD MICRODIL/AGAR DIL: CPT

## 2018-03-01 PROCEDURE — 81003 URINALYSIS AUTO W/O SCOPE: CPT

## 2018-03-01 PROCEDURE — 82553 CREATINE MB FRACTION: CPT

## 2018-03-01 PROCEDURE — 84145 PROCALCITONIN (PCT): CPT

## 2018-03-01 PROCEDURE — 85610 PROTHROMBIN TIME: CPT

## 2018-03-01 PROCEDURE — 87086 URINE CULTURE/COLONY COUNT: CPT

## 2018-03-01 PROCEDURE — 97530 THERAPEUTIC ACTIVITIES: CPT

## 2018-03-01 PROCEDURE — 83880 ASSAY OF NATRIURETIC PEPTIDE: CPT

## 2018-03-01 PROCEDURE — 93005 ELECTROCARDIOGRAM TRACING: CPT

## 2018-03-01 PROCEDURE — 70450 CT HEAD/BRAIN W/O DYE: CPT

## 2018-03-01 PROCEDURE — 80053 COMPREHEN METABOLIC PANEL: CPT

## 2018-03-01 PROCEDURE — 83605 ASSAY OF LACTIC ACID: CPT

## 2018-03-01 PROCEDURE — 97116 GAIT TRAINING THERAPY: CPT

## 2018-03-01 PROCEDURE — 85730 THROMBOPLASTIN TIME PARTIAL: CPT

## 2018-03-01 PROCEDURE — 84484 ASSAY OF TROPONIN QUANT: CPT

## 2018-03-01 RX ORDER — CHOLECALCIFEROL (VITAMIN D3) 125 MCG
1000 CAPSULE ORAL
Qty: 0 | Refills: 0 | COMMUNITY
Start: 2018-03-01

## 2018-03-01 RX ADMIN — Medication 60 MILLIGRAM(S): at 05:16

## 2018-03-01 RX ADMIN — CARVEDILOL PHOSPHATE 12.5 MILLIGRAM(S): 80 CAPSULE, EXTENDED RELEASE ORAL at 05:16

## 2018-03-01 RX ADMIN — HEPARIN SODIUM 5000 UNIT(S): 5000 INJECTION INTRAVENOUS; SUBCUTANEOUS at 05:16

## 2018-03-01 RX ADMIN — Medication 75 MILLIGRAM(S): at 05:16

## 2018-03-01 NOTE — PROGRESS NOTE ADULT - SUBJECTIVE AND OBJECTIVE BOX
Neurology follow up note    SAIRA [id number]      Interval History:    Patient feels ok no new complaints.    MEDICATIONS    acetaminophen   Tablet 650 milliGRAM(s) Oral every 6 hours PRN  acetaminophen   Tablet. 650 milliGRAM(s) Oral every 6 hours PRN  carvedilol 12.5 milliGRAM(s) Oral every 12 hours  cholecalciferol 1000 Unit(s) Oral daily  cinacalcet 30 milliGRAM(s) Oral at bedtime  doxazosin 4 milliGRAM(s) Oral at bedtime  ferrous    sulfate 325 milliGRAM(s) Oral daily  furosemide    Tablet 60 milliGRAM(s) Oral daily  heparin  Injectable 5000 Unit(s) SubCutaneous every 12 hours  hydrALAZINE 75 milliGRAM(s) Oral every 8 hours      Allergies    Vasotec (Unknown)    Intolerances            Vital Signs Last 24 Hrs  T(C): 36.4 (01 Mar 2018 07:44), Max: 36.7 (01 Mar 2018 04:34)  T(F): 97.5 (01 Mar 2018 07:44), Max: 98.1 (01 Mar 2018 04:34)  HR: 55 (01 Mar 2018 07:44) (53 - 69)  BP: 130/50 (01 Mar 2018 10:22) (125/65 - 175/75)  BP(mean): --  RR: 18 (01 Mar 2018 07:44) (16 - 18)  SpO2: 95% (01 Mar 2018 07:44) (94% - 98%)    REVIEW OF SYSTEMS:     Constitutional: No fever, chills, fatigue, weakness  Eyes: no eye pain, visual disturbances, or discharge  ENT:  No difficulty hearing, tinnitus, vertigo; No sinus or throat pain  Neck: No pain or stiffness  Respiratory: No cough, dyspnea, wheezing   Cardiovascular: No chest pain, palpitations,   Gastrointestinal: No abdominal or epigastric pain. No nausea, vomiting  No diarrhea or constipation.   Genitourinary: No dysuria, frequency, hematuria or incontinence  Neurological: No headaches, lightheadedness, vertigo, numbness or tremors  Psychiatric: No depression, anxiety, mood swings or difficulty sleeping  Musculoskeletal: No joint pain or swelling; No muscle, back or extremity pain  Skin: No itching, burning, rashes or lesions   Lymph Nodes: No enlarged glands  Endocrine: No heat or cold intolerance; No hair loss   Allergy and Immunologic: No hives or eczema    On Neurological Examination:  The patient is awake and alert.      [facility],   was able to name simple objects.        Extraocular movements were intact.      Pupils bilaterally 2 mm reactive to 1.  Speech was fluent.      Smile symmetric.      Motor, bilateral upper 4/5, bilateral lower 4-/5.      Sensory, bilateral upper and lower intact to light touch.      Follow simple commands  Follow complex commands      GENERAL Exam: Nontoxic , No Acute Distress   	  HEENT:  normocephalic, atraumatic  		  LUNGS: Clear bilaterally   	  HEART: Normal S1S2   No murmur RRR        	  GI/ ABDOMEN:  Soft  Non tender    EXTREMITIES:   No Edema  No Clubbing  No Cyanosis No Edema    MUSCULOSKELETAL: Range of Motion all 4 extremities   	   SKIN: Normal  No Ecchymosis               LABS:      02-28    134<L>  |  97  |  51<H>  ----------------------------<  140<H>  3.8   |  30  |  0.95    Ca    10.7<H>      28 Feb 2018 10:12  Mg     2.0     02-28      Hemoglobin A1C:       Vitamin B12         RADIOLOGY    ANALYSIS AND PLAN:  This is a 89-year-old with episodes of changes in mental status.    1.	For episodes of change in mental status, questionable this could be hypertensive encephalopathy type of picture versus possibly subtle signs from SMA-7 dehydration along with possibly progressive mild cognitive impairment versus subtle dementia. Overall mental status is stable  2.	For history of hypertension, monitor the patient's systolic blood pressure.  3.	I would recommend to monitor renal function.  4.	For mild cognitive impairment versus subtle dementia secondary to the patient's age would recommend supportive therapy we decided no medications for now   5.	Fall precautions.  6.	monitor oral intake   7.	overall mental status   8.	no new events overall doing better   9.	spoke to daughter, Malka, at bedside today 2/28/18 [phone], alternate number is [phone], no response.  I will continue to follow. work number  ext [phone]    Thank you for the courtesy of consultation.    Physical therapy evaluation as tolerated  OOB to chair/ambulation with assistance only if possible.    Greater than 30 minutes spent in direct patient care reviewing  the notes, lab data/ imaging , discussion with multidisciplinary team.

## 2018-03-01 NOTE — PROGRESS NOTE ADULT - ASSESSMENT
89yr old female PMH  diastolic CHF (EF 65%, echo July 2017), HTN , anemia, recently p/w PNA now p/w AMS and high BP.    - No signs of significant ischemia.   - Mental status improved.    - BP has improved   - Appears Euvolemic   - Cont coreg, hydralazine at current doses  - Watch her on lasix 40. BUN is trending upwards.  - Off abx per ID  - Monitor and replete electrolytes. Keep K>4.0 and Mg>2.0.   - Further cardiac workup will depend on clinical course.   - dc planning per med

## 2018-03-01 NOTE — PROGRESS NOTE ADULT - SUBJECTIVE AND OBJECTIVE BOX
Patient is a 89y old  Female who presents with a chief complaint of coughing (24 Feb 2018 19:35)      Patient seen in follow up for hyponatremia, urinary retention. On higher dose of lasix.      PAST MEDICAL HISTORY:  Anemia, unspecified type  Edema of lower extremity  Heart murmur  Hypertension  Hypercalcemia      MEDICATIONS  (STANDING):  carvedilol 12.5 milliGRAM(s) Oral every 12 hours  cholecalciferol 1000 Unit(s) Oral daily  cinacalcet 30 milliGRAM(s) Oral at bedtime  doxazosin 4 milliGRAM(s) Oral at bedtime  ferrous    sulfate 325 milliGRAM(s) Oral daily  furosemide    Tablet 60 milliGRAM(s) Oral daily  heparin  Injectable 5000 Unit(s) SubCutaneous every 12 hours  hydrALAZINE 75 milliGRAM(s) Oral every 8 hours    MEDICATIONS  (PRN):  acetaminophen   Tablet 650 milliGRAM(s) Oral every 6 hours PRN For Temp greater than 38 C (100.4 F)  acetaminophen   Tablet. 650 milliGRAM(s) Oral every 6 hours PRN Mild Pain (1 - 3)    T(C): 36.4 (03-01-18 @ 07:44), Max: 37.1 (02-28-18 @ 00:50)  HR: 55 (03-01-18 @ 07:44) (53 - 74)  BP: 125/65 (03-01-18 @ 07:44) (111/65 - 175/75)  RR: 18 (03-01-18 @ 07:44)  SpO2: 95% (03-01-18 @ 07:44)  Wt(kg): --  I&O's Detail      PHYSICAL EXAM:  General: NAD  Respiratory: b/l air entry  Cardiovascular: S1 S2  Gastrointestinal: soft, distended  Extremities:  no edema    LABORATORY:    02-28    134<L>  |  97  |  51<H>  ----------------------------<  140<H>  3.8   |  30  |  0.95    Ca    10.7<H>      28 Feb 2018 10:12  Mg     2.0     02-28      Sodium, Serum: 134 mmol/L (02-28 @ 10:12)    Potassium, Serum: 3.8 mmol/L (02-28 @ 10:12)      Creatinine, Serum 0.95 (02-28 @ 10:12)  Creatinine, Serum 0.93 (02-27 @ 07:20)

## 2018-03-01 NOTE — PROGRESS NOTE ADULT - PROBLEM SELECTOR PLAN 1
on LASIX  monitor labs  planned for poss DC this am  am labs pending  replete lytes as needed  prognosis guarded  pt with advanced age and multiple medical issues  frail and weak  pt is DNR DNI

## 2018-03-01 NOTE — PROGRESS NOTE ADULT - ASSESSMENT
·	Hyponatremia, SIADH, Urinary retention  ·	Hypertension    Stable sodium levels. BP better controlled. Monitor BP trend. Titrate BP meds as needed.   Elevated bun likely secondary to diuresis. Titrate diuretic dose as outpt. Outpt  follow up.   D/c planning.

## 2018-03-01 NOTE — PROGRESS NOTE ADULT - SUBJECTIVE AND OBJECTIVE BOX
Date/Time Patient Seen:  		  Referring MD:   Data Reviewed	       Patient is a 89y old  Female who presents with a chief complaint of coughing (26 Feb 2018 13:02)  in bed  seen and examined  vs and meds reviewed      Subjective/HPI     PAST MEDICAL & SURGICAL HISTORY:  Anemia, unspecified type  Edema of lower extremity  Heart murmur  Hypertension  Hypercalcemia  History of appendectomy  S/P tonsillectomy  H/O parathyroidectomy        Medication list         MEDICATIONS  (STANDING):  carvedilol 12.5 milliGRAM(s) Oral every 12 hours  cholecalciferol 1000 Unit(s) Oral daily  cinacalcet 30 milliGRAM(s) Oral at bedtime  doxazosin 4 milliGRAM(s) Oral at bedtime  ferrous    sulfate 325 milliGRAM(s) Oral daily  furosemide    Tablet 60 milliGRAM(s) Oral daily  heparin  Injectable 5000 Unit(s) SubCutaneous every 12 hours  hydrALAZINE 75 milliGRAM(s) Oral every 8 hours    MEDICATIONS  (PRN):  acetaminophen   Tablet 650 milliGRAM(s) Oral every 6 hours PRN For Temp greater than 38 C (100.4 F)  acetaminophen   Tablet. 650 milliGRAM(s) Oral every 6 hours PRN Mild Pain (1 - 3)         Vitals log        ICU Vital Signs Last 24 Hrs  T(C): 36.7 (01 Mar 2018 04:34), Max: 36.7 (01 Mar 2018 04:34)  T(F): 98.1 (01 Mar 2018 04:34), Max: 98.1 (01 Mar 2018 04:34)  HR: 63 (01 Mar 2018 04:34) (53 - 69)  BP: 165/58 (01 Mar 2018 04:34) (126/66 - 175/75)  BP(mean): --  ABP: --  ABP(mean): --  RR: 16 (01 Mar 2018 04:34) (16 - 18)  SpO2: 94% (01 Mar 2018 04:34) (94% - 98%)           Input and Output:  I&O's Detail    27 Feb 2018 07:01  -  28 Feb 2018 07:00  --------------------------------------------------------  IN:    Oral Fluid: 900 mL  Total IN: 900 mL    OUT:    Indwelling Catheter - Urethral: 1 mL    Voided: 700 mL  Total OUT: 701 mL    Total NET: 199 mL          Lab Data    02-28    134<L>  |  97  |  51<H>  ----------------------------<  140<H>  3.8   |  30  |  0.95    Ca    10.7<H>      28 Feb 2018 10:12  Mg     2.0     02-28              Review of Systems	      Objective     Physical Examination  head at  heart s1s2  frail  weak  verbal  lungs dec BS  abd soft        Pertinent Lab findings & Imaging      Rmaos:  NO   Adequate UO     I&O's Detail    27 Feb 2018 07:01  -  28 Feb 2018 07:00  --------------------------------------------------------  IN:    Oral Fluid: 900 mL  Total IN: 900 mL    OUT:    Indwelling Catheter - Urethral: 1 mL    Voided: 700 mL  Total OUT: 701 mL    Total NET: 199 mL               Discussed with:     Cultures:	        Radiology

## 2018-03-01 NOTE — PROGRESS NOTE ADULT - PROVIDER SPECIALTY LIST ADULT
Cardiology
Internal Medicine
Nephrology
Neurology
Pulmonology
Urology
Neurology
Urology
Urology

## 2018-03-01 NOTE — PROGRESS NOTE ADULT - PROBLEM SELECTOR PROBLEM 1
Acute on chronic diastolic heart failure
Confusion

## 2018-03-01 NOTE — PROGRESS NOTE ADULT - SUBJECTIVE AND OBJECTIVE BOX
[facility]    Christofer Isaacs, Sorin, Marisa, Jr, Hieu, Sandro      [phone]    CHIEF COMPLAINT: Patient is a 89y old  Female who presents with a chief complaint of coughing (26 Feb 2018 13:02)      Follow Up: htn, hfpef    Interim history: The patient reports no new symptoms.  Denies chest discomfort and shortness of breath.  No abdominal pain.  No new neurologic symptoms.      MEDICATIONS  (STANDING):  carvedilol 12.5 milliGRAM(s) Oral every 12 hours  cholecalciferol 1000 Unit(s) Oral daily  cinacalcet 30 milliGRAM(s) Oral at bedtime  doxazosin 4 milliGRAM(s) Oral at bedtime  ferrous    sulfate 325 milliGRAM(s) Oral daily  furosemide    Tablet 60 milliGRAM(s) Oral daily  heparin  Injectable 5000 Unit(s) SubCutaneous every 12 hours  hydrALAZINE 75 milliGRAM(s) Oral every 8 hours    MEDICATIONS  (PRN):  acetaminophen   Tablet 650 milliGRAM(s) Oral every 6 hours PRN For Temp greater than 38 C (100.4 F)  acetaminophen   Tablet. 650 milliGRAM(s) Oral every 6 hours PRN Mild Pain (1 - 3)      REVIEW OF SYSTEMS: unable    Vital Signs Last 24 Hrs  T(C): 36.4 (01 Mar 2018 07:44), Max: 36.7 (01 Mar 2018 04:34)  T(F): 97.5 (01 Mar 2018 07:44), Max: 98.1 (01 Mar 2018 04:34)  HR: 55 (01 Mar 2018 07:44) (53 - 69)  BP: 130/50 (01 Mar 2018 10:22) (125/65 - 175/75)  BP(mean): --  RR: 18 (01 Mar 2018 07:44) (16 - 18)  SpO2: 95% (01 Mar 2018 07:44) (94% - 98%)    I&O's Summary      Telemetry past 24h: off    PHYSICAL EXAM:    Constitutional: well-nourished, well-developed, NAD   HEENT:  MMM, sclerae anicteric, conjunctivae clear, no oral cyanosis.  Pulmonary: Non-labored, breath sounds are clear bilaterally, No wheezing, rales or rhonchi  Cardiovascular: Regular, S1 and S2.  2/6 ant murmur.  No rubs, gallops or clicks  Gastrointestinal: Bowel Sounds present, soft, nontender.   Lymph: No peripheral edema.   Neurological: Alert, no focal deficits  Skin: No rashes.  Psych:  Mood & affect appropriate    LABS: All Labs Reviewed:    28 Feb 2018 10:12    134    |  97     |  51     ----------------------------<  140    3.8     |  30     |  0.95   27 Feb 2018 07:20    133    |  95     |  45     ----------------------------<  102    4.5     |  31     |  0.93     Ca    10.7       28 Feb 2018 10:12  Ca    10.4       27 Feb 2018 07:20  Mg     2.0       28 Feb 2018 10:12            Blood Culture: Organism Enterococcus faecium (vancomycin resistant)  Gram Stain Blood -- Gram Stain --  Specimen Source .Urine Catheterized  Culture-Blood --            RADIOLOGY:    EKG:    Echo:

## 2018-03-26 ENCOUNTER — APPOINTMENT (OUTPATIENT)
Dept: CARDIOLOGY | Facility: CLINIC | Age: 83
End: 2018-03-26
Payer: MEDICARE

## 2018-03-26 ENCOUNTER — NON-APPOINTMENT (OUTPATIENT)
Age: 83
End: 2018-03-26

## 2018-03-26 VITALS
BODY MASS INDEX: 22.38 KG/M2 | SYSTOLIC BLOOD PRESSURE: 214 MMHG | HEART RATE: 58 BPM | DIASTOLIC BLOOD PRESSURE: 80 MMHG | WEIGHT: 114 LBS | HEIGHT: 60 IN | OXYGEN SATURATION: 97 %

## 2018-03-26 DIAGNOSIS — I10 ESSENTIAL (PRIMARY) HYPERTENSION: ICD-10-CM

## 2018-03-26 PROCEDURE — 99214 OFFICE O/P EST MOD 30 MIN: CPT

## 2018-03-26 PROCEDURE — 93000 ELECTROCARDIOGRAM COMPLETE: CPT

## 2018-07-12 ENCOUNTER — APPOINTMENT (OUTPATIENT)
Dept: CARDIOLOGY | Facility: CLINIC | Age: 83
End: 2018-07-12
Payer: MEDICARE

## 2018-07-12 ENCOUNTER — NON-APPOINTMENT (OUTPATIENT)
Age: 83
End: 2018-07-12

## 2018-07-12 VITALS
DIASTOLIC BLOOD PRESSURE: 66 MMHG | SYSTOLIC BLOOD PRESSURE: 167 MMHG | WEIGHT: 114 LBS | HEIGHT: 60 IN | HEART RATE: 72 BPM | BODY MASS INDEX: 22.38 KG/M2 | OXYGEN SATURATION: 72 %

## 2018-07-12 DIAGNOSIS — I50.30 UNSPECIFIED DIASTOLIC (CONGESTIVE) HEART FAILURE: ICD-10-CM

## 2018-07-12 DIAGNOSIS — I35.0 NONRHEUMATIC AORTIC (VALVE) STENOSIS: ICD-10-CM

## 2018-07-12 PROCEDURE — 93000 ELECTROCARDIOGRAM COMPLETE: CPT

## 2018-07-12 PROCEDURE — 99214 OFFICE O/P EST MOD 30 MIN: CPT

## 2018-07-12 RX ORDER — ASPIRIN 325 MG
81 TABLET ORAL
Refills: 0 | Status: DISCONTINUED | COMMUNITY
End: 2018-07-12

## 2018-07-12 RX ORDER — DENOSUMAB 60 MG/ML
60 INJECTION SUBCUTANEOUS
Qty: 1 | Refills: 0 | Status: ACTIVE | COMMUNITY
Start: 2017-12-26

## 2018-08-20 PROBLEM — D64.9 ANEMIA, UNSPECIFIED: Chronic | Status: ACTIVE | Noted: 2017-04-22

## 2018-09-05 NOTE — PROGRESS NOTE ADULT - PROBLEM/PLAN-5
DISPLAY PLAN FREE TEXT
For information on Fall & Injury Prevention, visit [email or web address]

## 2018-10-29 ENCOUNTER — OTHER (OUTPATIENT)
Age: 83
End: 2018-10-29

## 2018-11-05 ENCOUNTER — APPOINTMENT (OUTPATIENT)
Dept: CARDIOLOGY | Facility: CLINIC | Age: 83
End: 2018-11-05

## 2018-11-08 ENCOUNTER — INPATIENT (INPATIENT)
Facility: HOSPITAL | Age: 83
LOS: 5 days | Discharge: HOME | DRG: 291 | End: 2018-11-14
Attending: INTERNAL MEDICINE | Admitting: INTERNAL MEDICINE
Payer: MEDICARE

## 2018-11-08 ENCOUNTER — APPOINTMENT (OUTPATIENT)
Dept: CARDIOLOGY | Facility: CLINIC | Age: 83
End: 2018-11-08

## 2018-11-08 VITALS
OXYGEN SATURATION: 93 % | DIASTOLIC BLOOD PRESSURE: 55 MMHG | WEIGHT: 113.98 LBS | TEMPERATURE: 98 F | HEIGHT: 59 IN | SYSTOLIC BLOOD PRESSURE: 134 MMHG | RESPIRATION RATE: 16 BRPM | HEART RATE: 76 BPM

## 2018-11-08 DIAGNOSIS — Z90.89 ACQUIRED ABSENCE OF OTHER ORGANS: Chronic | ICD-10-CM

## 2018-11-08 DIAGNOSIS — E89.2 POSTPROCEDURAL HYPOPARATHYROIDISM: Chronic | ICD-10-CM

## 2018-11-08 DIAGNOSIS — Z90.49 ACQUIRED ABSENCE OF OTHER SPECIFIED PARTS OF DIGESTIVE TRACT: Chronic | ICD-10-CM

## 2018-11-08 LAB
BASOPHILS # BLD AUTO: 0.03 K/UL — SIGNIFICANT CHANGE UP (ref 0–0.2)
BASOPHILS NFR BLD AUTO: 0.3 % — SIGNIFICANT CHANGE UP (ref 0–2)
EOSINOPHIL # BLD AUTO: 0.14 K/UL — SIGNIFICANT CHANGE UP (ref 0–0.5)
EOSINOPHIL NFR BLD AUTO: 1.5 % — SIGNIFICANT CHANGE UP (ref 0–6)
HCT VFR BLD CALC: 26.7 % — LOW (ref 34.5–45)
HGB BLD-MCNC: 8.9 G/DL — LOW (ref 11.5–15.5)
IMM GRANULOCYTES NFR BLD AUTO: 1.1 % — SIGNIFICANT CHANGE UP (ref 0–1.5)
LYMPHOCYTES # BLD AUTO: 0.77 K/UL — LOW (ref 1–3.3)
LYMPHOCYTES # BLD AUTO: 8 % — LOW (ref 13–44)
MCHC RBC-ENTMCNC: 26.6 PG — LOW (ref 27–34)
MCHC RBC-ENTMCNC: 33.3 GM/DL — SIGNIFICANT CHANGE UP (ref 32–36)
MCV RBC AUTO: 79.7 FL — LOW (ref 80–100)
MONOCYTES # BLD AUTO: 0.4 K/UL — SIGNIFICANT CHANGE UP (ref 0–0.9)
MONOCYTES NFR BLD AUTO: 4.2 % — SIGNIFICANT CHANGE UP (ref 2–14)
NEUTROPHILS # BLD AUTO: 8.14 K/UL — HIGH (ref 1.8–7.4)
NEUTROPHILS NFR BLD AUTO: 84.9 % — HIGH (ref 43–77)
PLATELET # BLD AUTO: 270 K/UL — SIGNIFICANT CHANGE UP (ref 150–400)
RBC # BLD: 3.35 M/UL — LOW (ref 3.8–5.2)
RBC # FLD: 15.8 % — HIGH (ref 10.3–14.5)
WBC # BLD: 9.59 K/UL — SIGNIFICANT CHANGE UP (ref 3.8–10.5)
WBC # FLD AUTO: 9.59 K/UL — SIGNIFICANT CHANGE UP (ref 3.8–10.5)

## 2018-11-08 PROCEDURE — 71045 X-RAY EXAM CHEST 1 VIEW: CPT | Mod: 26

## 2018-11-08 RX ORDER — FUROSEMIDE 40 MG
40 TABLET ORAL ONCE
Qty: 0 | Refills: 0 | Status: COMPLETED | OUTPATIENT
Start: 2018-11-08 | End: 2018-11-08

## 2018-11-08 RX ORDER — POTASSIUM CHLORIDE 20 MEQ
1 PACKET (EA) ORAL
Qty: 0 | Refills: 0 | COMMUNITY

## 2018-11-08 RX ORDER — CINACALCET 30 MG/1
1 TABLET, FILM COATED ORAL
Qty: 0 | Refills: 0 | COMMUNITY

## 2018-11-08 RX ORDER — DOXAZOSIN MESYLATE 4 MG
1 TABLET ORAL
Qty: 0 | Refills: 0 | COMMUNITY

## 2018-11-08 NOTE — ED PROVIDER NOTE - NS ED ROS FT
Constitutional: - Fever, - Chills, - Anorexia, - Fatigue, - Night sweats  Eyes: - Discharge, - Irritation, - Redness, - Visual changes, - Light sensitivity, - Pain  EARS: - Ear Pain, - Tinnitus, - Decreased hearing  NOSE: - Congestion, - Bloody nose  MOUTH/THROAT: - Vocal Changes, - Drooling, - Sore throat  NECK: - Lumps, - Stiffness, - Pain  CV: - Palpitations, - Chest Pain, - Edema, - Syncope  RESP:  - Cough, - Shortness of Breath, - Dyspnea on Exertion, - Trouble speaking, - Pleuritic pain - Wheezing  GI: - Diarrhea, - Constipation, - Bloody stools, - Nausea, - Vomiting, - Abdominal Pain  : - Dysuria, -Frequency, + Hematuria, - Hesitancy, - Incontinence, - Saddle Anesthesia, - Abnormal discharge  MSK: - Myalgias, - Arthralgias, - Weakness, - Deformities, - Injuries  SKIN: - Color change, - Rash, +Swelling, - Ecchymosis, - Abrasion, - laceration  NEURO: - Change in behavior, - Dec. Alertness, - Headache, - Dizziness, - Change in speech, - Weakness, - Seizure-like activity, - Difficulty ambulating

## 2018-11-08 NOTE — ED ADULT NURSE NOTE - CAS EDN DISCHARGE INTERVENTIONS
Pt has an appointment for Monday 25th of June at the [facility].  She wants to change this to Wednesday the 27th at the [place] location, but Dr. Álvarez is completely booked.  She requires an  with extra time.  Can we work her in that day sometime?  Please  service to advise if possible.  [phone]   IV intact

## 2018-11-08 NOTE — ED PROVIDER NOTE - CARE PLAN
Principal Discharge DX:	Leg swelling Principal Discharge DX:	Leg swelling  Secondary Diagnosis:	Acute on chronic congestive heart failure, unspecified heart failure type

## 2018-11-08 NOTE — ED PROVIDER NOTE - PHYSICAL EXAMINATION
Gen: Alert, NAD  Head/eyes: NC/AT, PERRL, EOMI, normal lids/conjunctiva, no scleral icterus  ENT: Bilateral TM WNL, normal hearing, patent oropharynx without erythema/exudate, uvula midline, no peritonsillar abscess, no tongue/uvula swelling  Neck: supple, no tenderness/meningismus/JVD, Trachea midline  Pulm: Bilateral clear BS, normal resp effort, no wheeze/stridor/retractions  CV: RRR, no M/R/G, +2 dist pulses (radial, pedal DP/PT, popliteal)  Abd: soft, NT/ND, +BS, no guarding/rebound tenderness, rectal exam: no blood in vault  Musculoskeletal: no edema/erythema/cyanosis, FROM in all extremities, no C/T/L spine ttp  Skin: +2 pitting edema in b/l LE at mid tib/fib   Neuro: AAOx3, CN 2-12 intact, normal sensation, 5/5 motor strength in all extremities, normal gait, no dysmetria

## 2018-11-08 NOTE — ED ADULT NURSE NOTE - NSIMPLEMENTINTERV_GEN_ALL_ED
Implemented All Fall with Harm Risk Interventions:  [place] to call system. Call bell, personal items and telephone within reach. Instruct patient to call for assistance. Room bathroom lighting operational. Non-slip footwear when patient is off stretcher. Physically safe environment: no spills, clutter or unnecessary equipment. Stretcher in lowest position, wheels locked, appropriate side rails in place. Provide visual cue, wrist band, yellow gown, etc. Monitor gait and stability. Monitor for mental status changes and reorient to person, place, and time. Review medications for side effects contributing to fall risk. Reinforce activity limits and safety measures with patient and family. Provide visual clues: red socks.

## 2018-11-08 NOTE — ED ADULT NURSE NOTE - OBJECTIVE STATEMENT
patient came in ED, from home BIBA with Daughter at the bedside. as per the daughter, patient alert and oriented x 1 with short term memory loss. noted with blood in the diaper today and worsening bilateral leg swelling. Med Hx of CHF and was under the care of DR SAÚL Rowell (Cardiologist). non-labored respiration noted. denies chest pain.

## 2018-11-08 NOTE — ED PROVIDER NOTE - OBJECTIVE STATEMENT
89 yo female hx of CHF, anemia, htn BIBEMS c/o b/l leg swelling (gained 6 pounds in past week) and as per daughter noticed that she had pink-perlita urine/blood in diaper.  Patient admits to feeling weak for past few days.  PMD Dr. Peter Weil.  Cards Dr. Rowell.  Daughter gave another dose of her lasix, had 80mg total for today.

## 2018-11-08 NOTE — ED ADULT NURSE NOTE - CHPI ED NUR SYMPTOMS NEG
no dizziness/no vomiting/no tingling/no weakness/no nausea/no decreased eating/drinking/no chills/no fever/no pain

## 2018-11-09 ENCOUNTER — TRANSCRIPTION ENCOUNTER (OUTPATIENT)
Age: 83
End: 2018-11-09

## 2018-11-09 DIAGNOSIS — M79.89 OTHER SPECIFIED SOFT TISSUE DISORDERS: ICD-10-CM

## 2018-11-09 DIAGNOSIS — R82.71 BACTERIURIA: ICD-10-CM

## 2018-11-09 DIAGNOSIS — I10 ESSENTIAL (PRIMARY) HYPERTENSION: ICD-10-CM

## 2018-11-09 DIAGNOSIS — R93.8 ABNORMAL FINDINGS ON DIAGNOSTIC IMAGING OF OTHER SPECIFIED BODY STRUCTURES: ICD-10-CM

## 2018-11-09 DIAGNOSIS — Z29.9 ENCOUNTER FOR PROPHYLACTIC MEASURES, UNSPECIFIED: ICD-10-CM

## 2018-11-09 DIAGNOSIS — I50.9 HEART FAILURE, UNSPECIFIED: ICD-10-CM

## 2018-11-09 DIAGNOSIS — E87.1 HYPO-OSMOLALITY AND HYPONATREMIA: ICD-10-CM

## 2018-11-09 DIAGNOSIS — D64.9 ANEMIA, UNSPECIFIED: ICD-10-CM

## 2018-11-09 DIAGNOSIS — N17.9 ACUTE KIDNEY FAILURE, UNSPECIFIED: ICD-10-CM

## 2018-11-09 LAB
ALBUMIN SERPL ELPH-MCNC: 2.4 G/DL — LOW (ref 3.3–5)
ALP SERPL-CCNC: 71 U/L — SIGNIFICANT CHANGE UP (ref 40–120)
ALT FLD-CCNC: 22 U/L — SIGNIFICANT CHANGE UP (ref 12–78)
ANION GAP SERPL CALC-SCNC: 7 MMOL/L — SIGNIFICANT CHANGE UP (ref 5–17)
ANION GAP SERPL CALC-SCNC: 9 MMOL/L — SIGNIFICANT CHANGE UP (ref 5–17)
APPEARANCE UR: CLEAR — SIGNIFICANT CHANGE UP
APTT BLD: 29.8 SEC — SIGNIFICANT CHANGE UP (ref 27.5–36.3)
AST SERPL-CCNC: 28 U/L — SIGNIFICANT CHANGE UP (ref 15–37)
BILIRUB SERPL-MCNC: 0.4 MG/DL — SIGNIFICANT CHANGE UP (ref 0.2–1.2)
BILIRUB UR-MCNC: NEGATIVE — SIGNIFICANT CHANGE UP
BUN SERPL-MCNC: 39 MG/DL — HIGH (ref 7–23)
BUN SERPL-MCNC: 39 MG/DL — HIGH (ref 7–23)
CALCIUM SERPL-MCNC: 7 MG/DL — LOW (ref 8.5–10.1)
CALCIUM SERPL-MCNC: 7 MG/DL — LOW (ref 8.5–10.1)
CHLORIDE SERPL-SCNC: 92 MMOL/L — LOW (ref 96–108)
CHLORIDE SERPL-SCNC: 94 MMOL/L — LOW (ref 96–108)
CO2 SERPL-SCNC: 28 MMOL/L — SIGNIFICANT CHANGE UP (ref 22–31)
CO2 SERPL-SCNC: 28 MMOL/L — SIGNIFICANT CHANGE UP (ref 22–31)
COLOR SPEC: YELLOW — SIGNIFICANT CHANGE UP
CREAT SERPL-MCNC: 1.1 MG/DL — SIGNIFICANT CHANGE UP (ref 0.5–1.3)
CREAT SERPL-MCNC: 1.1 MG/DL — SIGNIFICANT CHANGE UP (ref 0.5–1.3)
CRP SERPL-MCNC: 6.19 MG/DL — HIGH (ref 0–0.4)
DIFF PNL FLD: ABNORMAL
ERYTHROCYTE [SEDIMENTATION RATE] IN BLOOD: 23 MM/HR — HIGH (ref 0–20)
GLUCOSE SERPL-MCNC: 102 MG/DL — HIGH (ref 70–99)
GLUCOSE SERPL-MCNC: 131 MG/DL — HIGH (ref 70–99)
GLUCOSE UR QL: NEGATIVE — SIGNIFICANT CHANGE UP
INR BLD: 1.26 RATIO — HIGH (ref 0.88–1.16)
IRON SATN MFR SERPL: 14 UG/DL — LOW (ref 30–160)
IRON SATN MFR SERPL: 8 % — LOW (ref 14–50)
KETONES UR-MCNC: NEGATIVE — SIGNIFICANT CHANGE UP
LEUKOCYTE ESTERASE UR-ACNC: ABNORMAL
NITRITE UR-MCNC: NEGATIVE — SIGNIFICANT CHANGE UP
NT-PROBNP SERPL-SCNC: 5718 PG/ML — HIGH (ref 0–450)
OB PNL STL: NEGATIVE — SIGNIFICANT CHANGE UP
PH UR: 6.5 — SIGNIFICANT CHANGE UP (ref 5–8)
POTASSIUM SERPL-MCNC: 3.4 MMOL/L — LOW (ref 3.5–5.3)
POTASSIUM SERPL-MCNC: 3.9 MMOL/L — SIGNIFICANT CHANGE UP (ref 3.5–5.3)
POTASSIUM SERPL-SCNC: 3.4 MMOL/L — LOW (ref 3.5–5.3)
POTASSIUM SERPL-SCNC: 3.9 MMOL/L — SIGNIFICANT CHANGE UP (ref 3.5–5.3)
PROCALCITONIN SERPL-MCNC: <0.05 — SIGNIFICANT CHANGE UP (ref 0–0.04)
PROT SERPL-MCNC: 6.5 G/DL — SIGNIFICANT CHANGE UP (ref 6–8.3)
PROT UR-MCNC: 25 MG/DL
PROTHROM AB SERPL-ACNC: 14.3 SEC — HIGH (ref 10–12.9)
RBC # BLD: 3.16 M/UL — LOW (ref 3.8–5.2)
RETICS #: 53.1 K/UL — SIGNIFICANT CHANGE UP (ref 25–125)
RETICS/RBC NFR: 1.7 % — SIGNIFICANT CHANGE UP (ref 0.5–2.5)
SODIUM SERPL-SCNC: 127 MMOL/L — LOW (ref 135–145)
SODIUM SERPL-SCNC: 131 MMOL/L — LOW (ref 135–145)
SP GR SPEC: 1.01 — SIGNIFICANT CHANGE UP (ref 1.01–1.02)
TIBC SERPL-MCNC: 184 UG/DL — LOW (ref 220–430)
UIBC SERPL-MCNC: 170 UG/DL — SIGNIFICANT CHANGE UP (ref 110–370)
UROBILINOGEN FLD QL: NEGATIVE — SIGNIFICANT CHANGE UP

## 2018-11-09 PROCEDURE — 93010 ELECTROCARDIOGRAM REPORT: CPT

## 2018-11-09 PROCEDURE — 93970 EXTREMITY STUDY: CPT | Mod: 26

## 2018-11-09 PROCEDURE — 99285 EMERGENCY DEPT VISIT HI MDM: CPT

## 2018-11-09 PROCEDURE — 76770 US EXAM ABDO BACK WALL COMP: CPT | Mod: 26

## 2018-11-09 PROCEDURE — 99223 1ST HOSP IP/OBS HIGH 75: CPT

## 2018-11-09 RX ORDER — HYDRALAZINE HCL 50 MG
75 TABLET ORAL EVERY 8 HOURS
Qty: 0 | Refills: 0 | Status: DISCONTINUED | OUTPATIENT
Start: 2018-11-09 | End: 2018-11-09

## 2018-11-09 RX ORDER — CHOLECALCIFEROL (VITAMIN D3) 125 MCG
1000 CAPSULE ORAL DAILY
Qty: 0 | Refills: 0 | Status: DISCONTINUED | OUTPATIENT
Start: 2018-11-09 | End: 2018-11-14

## 2018-11-09 RX ORDER — POTASSIUM CHLORIDE 20 MEQ
40 PACKET (EA) ORAL ONCE
Qty: 0 | Refills: 0 | Status: COMPLETED | OUTPATIENT
Start: 2018-11-09 | End: 2018-11-09

## 2018-11-09 RX ORDER — FERROUS SULFATE 325(65) MG
325 TABLET ORAL DAILY
Qty: 0 | Refills: 0 | Status: DISCONTINUED | OUTPATIENT
Start: 2018-11-09 | End: 2018-11-14

## 2018-11-09 RX ORDER — CINACALCET 30 MG/1
30 TABLET, FILM COATED ORAL
Qty: 0 | Refills: 0 | Status: DISCONTINUED | OUTPATIENT
Start: 2018-11-09 | End: 2018-11-14

## 2018-11-09 RX ORDER — DOXAZOSIN MESYLATE 4 MG
4 TABLET ORAL
Qty: 0 | Refills: 0 | Status: DISCONTINUED | OUTPATIENT
Start: 2018-11-09 | End: 2018-11-14

## 2018-11-09 RX ORDER — FUROSEMIDE 40 MG
40 TABLET ORAL ONCE
Qty: 0 | Refills: 0 | Status: COMPLETED | OUTPATIENT
Start: 2018-11-09 | End: 2018-11-09

## 2018-11-09 RX ORDER — HYDRALAZINE HCL 50 MG
75 TABLET ORAL EVERY 8 HOURS
Qty: 0 | Refills: 0 | Status: DISCONTINUED | OUTPATIENT
Start: 2018-11-09 | End: 2018-11-13

## 2018-11-09 RX ORDER — HEPARIN SODIUM 5000 [USP'U]/ML
5000 INJECTION INTRAVENOUS; SUBCUTANEOUS EVERY 12 HOURS
Qty: 0 | Refills: 0 | Status: DISCONTINUED | OUTPATIENT
Start: 2018-11-09 | End: 2018-11-14

## 2018-11-09 RX ORDER — DOXAZOSIN MESYLATE 4 MG
4 TABLET ORAL
Qty: 0 | Refills: 0 | Status: DISCONTINUED | OUTPATIENT
Start: 2018-11-09 | End: 2018-11-09

## 2018-11-09 RX ORDER — FUROSEMIDE 40 MG
40 TABLET ORAL EVERY 12 HOURS
Qty: 0 | Refills: 0 | Status: DISCONTINUED | OUTPATIENT
Start: 2018-11-10 | End: 2018-11-11

## 2018-11-09 RX ORDER — CARVEDILOL PHOSPHATE 80 MG/1
12.5 CAPSULE, EXTENDED RELEASE ORAL EVERY 12 HOURS
Qty: 0 | Refills: 0 | Status: DISCONTINUED | OUTPATIENT
Start: 2018-11-09 | End: 2018-11-14

## 2018-11-09 RX ORDER — FUROSEMIDE 40 MG
40 TABLET ORAL DAILY
Qty: 0 | Refills: 0 | Status: DISCONTINUED | OUTPATIENT
Start: 2018-11-09 | End: 2018-11-09

## 2018-11-09 RX ADMIN — Medication 1000 UNIT(S): at 14:30

## 2018-11-09 RX ADMIN — CARVEDILOL PHOSPHATE 12.5 MILLIGRAM(S): 80 CAPSULE, EXTENDED RELEASE ORAL at 06:07

## 2018-11-09 RX ADMIN — Medication 4 MILLIGRAM(S): at 21:39

## 2018-11-09 RX ADMIN — Medication 40 MILLIGRAM(S): at 11:30

## 2018-11-09 RX ADMIN — Medication 40 MILLIEQUIVALENT(S): at 17:43

## 2018-11-09 RX ADMIN — HEPARIN SODIUM 5000 UNIT(S): 5000 INJECTION INTRAVENOUS; SUBCUTANEOUS at 10:25

## 2018-11-09 RX ADMIN — HEPARIN SODIUM 5000 UNIT(S): 5000 INJECTION INTRAVENOUS; SUBCUTANEOUS at 21:39

## 2018-11-09 RX ADMIN — CARVEDILOL PHOSPHATE 12.5 MILLIGRAM(S): 80 CAPSULE, EXTENDED RELEASE ORAL at 17:43

## 2018-11-09 RX ADMIN — Medication 75 MILLIGRAM(S): at 14:30

## 2018-11-09 RX ADMIN — Medication 40 MILLIGRAM(S): at 00:36

## 2018-11-09 RX ADMIN — Medication 75 MILLIGRAM(S): at 06:07

## 2018-11-09 RX ADMIN — CINACALCET 30 MILLIGRAM(S): 30 TABLET, FILM COATED ORAL at 21:39

## 2018-11-09 RX ADMIN — Medication 325 MILLIGRAM(S): at 11:30

## 2018-11-09 RX ADMIN — Medication 75 MILLIGRAM(S): at 21:39

## 2018-11-09 NOTE — DISCHARGE NOTE ADULT - CARE PLAN
Principal Discharge DX:	Acute on chronic congestive heart failure, unspecified heart failure type  Secondary Diagnosis:	SABRINA (acute kidney injury)  Secondary Diagnosis:	Anemia, unspecified type  Secondary Diagnosis:	Bacteriuria, asymptomatic  Secondary Diagnosis:	HTN (hypertension)  Secondary Diagnosis:	Hyponatremia Principal Discharge DX:	Acute on chronic congestive heart failure, unspecified heart failure type  Goal:	Resolved  Assessment and plan of treatment:	You came to the hospital with shortness of breath that was most likely due CHF exacerbation  - Dr. Jo (cardiologist) increased your Lasix dose to 40mg IV twice a day  - Continue to follow with your cardiologist (Dr. Rowell)  Secondary Diagnosis:	SABRINA (acute kidney injury)  Goal:	Controlled  Assessment and plan of treatment:	Your kidney function gradually improved during your hospital course  - Continue to follow with your primary care doctor on discharge  Secondary Diagnosis:	Anemia, unspecified type  Goal:	Controlled  Assessment and plan of treatment:	You received one unit of PRBC as your hemoglobin level was low  - Please continue to follow with Dr. Temple on discharge for CBC and further management  Secondary Diagnosis:	Bacteriuria, asymptomatic  Goal:	Controlled  Assessment and plan of treatment:	On admission you were found to have a urinary tract infection, however you did not have symptoms  - As per Dr. Grace (ID), empiric antibiotics were deferred.   - patient remained otherwise stable and the UTI was not treated  Secondary Diagnosis:	HTN (hypertension)  Goal:	Controlled  Assessment and plan of treatment:	Continue to take Carvedilol 12.5mg, doxazocin 4mg, hydralazine 75mg  Secondary Diagnosis:	Hyponatremia  Goal:	Controlled  Assessment and plan of treatment:	Your sodium levels were low on arrival to the hospital. Over your hospital course, the sodium level improved  - Continue to follow with your cardiologist (Dr. Rowell) Principal Discharge DX:	Acute on chronic congestive heart failure, unspecified heart failure type  Goal:	Resolved  Assessment and plan of treatment:	You came to the hospital with shortness of breath that was most likely due CHF exacerbation  - Dr. Jo (cardiologist) increased your Lasix dose to 40mg twice a day and Hydralazine to 100mg three times a day.   - Continue to follow with your cardiologist (Dr. Rowell)  Secondary Diagnosis:	SABRINA (acute kidney injury)  Goal:	Controlled  Assessment and plan of treatment:	Your kidney function gradually improved during your hospital course  - Continue to follow with your primary care doctor on discharge  Secondary Diagnosis:	Anemia, unspecified type  Goal:	Controlled  Assessment and plan of treatment:	You received one unit of PRBC as your hemoglobin level was low  - Please continue to follow with Dr. Temple on discharge for CBC and further management  - Continue to take your iron tablets  Secondary Diagnosis:	Bacteriuria, asymptomatic  Goal:	Controlled  Assessment and plan of treatment:	On admission you were found to have a urinary tract infection, however you did not have symptoms  - As per Dr. Grace (ID), empiric antibiotics were deferred.   - patient remained otherwise stable and the UTI was not treated  Secondary Diagnosis:	HTN (hypertension)  Goal:	Controlled  Assessment and plan of treatment:	Continue to take Carvedilol 12.5mg, doxazocin 4mg, hydralazine 100mg  Secondary Diagnosis:	Hyponatremia  Goal:	Controlled  Assessment and plan of treatment:	Your sodium levels were low on arrival to the hospital. Over your hospital course, the sodium level improved  - Continue to follow with your primary care doctor

## 2018-11-09 NOTE — DISCHARGE NOTE ADULT - HOSPITAL COURSE
89F with PMH of diastolic CHF (TTE July 2017 EF 65%), HTN, anemia presents with increased bilateral leg swelling. History obtained from daughter at bedside, requesting to not having patient woken up. States around October 18/2018, patient's bilateral lower extremities started to swell around the ankles. Patient was seen by her cardiologist, Dr. Rowell, and recommended increasing Lasix to 80mg daily for one week. Patient states symptoms persisted, and patient only lost 1 lb. Reports increased weight from 108 to 114 in the past 2 weeks. Patient was scheduled for an appointment with Dr. Rowell today but office staff rescheduled her appointment. She gave patient an additional 20mg Lasix this evening, total of 80mg oral Lasix. Daughter also reports seeing Dr. Temple, hematology, for anemia two weeks ago and was told patient's hemoglobin was around 9 and should be monitored closely. She also states, patient had an episode of "pink urine" in her diaper today prompting her to come to the ED for evaluation. Per daughter, patient has not reported burning with urination, dysuria. Reports increased frequency with Lasix use. Per daughter, patient at baseline is A&Ox2 and ambulates with a walker.   In the ED: temp 98.1, HR 76, /55, RR 16, SpO2 93% RA, repeat 98% RA. H/H: 8.9/26.7, Na 127, BUN/Cr: 39/1.1, pro-BNP: 5718. UA positive for moderate LEC, 11-25 WBC, 11-25 RBC. Received IV Lasix 40mg x1. Chest xray (pre-sequeira read): increased intersitial markings, questionable infiltrate in L lobe, small L pleural effusion. EKG: NSR at 67.    Patient admitted to [facility] for acute on chronic CHF exacerbation in the setting of hyponatremia, SABRINA, and anemia. Cardio (savella) examined patient, started IV lasix 40mg BID. Heme (faye) stated anemia may be microcytic or anemia of chronic disease. Patient's low hb could be from intermittent GIB and serial FOBT were ordered. Iron panel and b12, folate workup revealed....... 89F with PMH of diastolic CHF (TTE July 2017 EF 65%), HTN, anemia presents with increased bilateral leg swelling. History obtained from daughter at bedside, requesting to not having patient woken up. States around October 18/2018, patient's bilateral lower extremities started to swell around the ankles. Patient was seen by her cardiologist, Dr. Rowell, and recommended increasing Lasix to 80mg daily for one week. Patient states symptoms persisted, and patient only lost 1 lb. Reports increased weight from 108 to 114 in the past 2 weeks. Patient was scheduled for an appointment with Dr. Rowell today but office staff rescheduled her appointment. She gave patient an additional 20mg Lasix this evening, total of 80mg oral Lasix. Daughter also reports seeing Dr. Temple, hematology, for anemia two weeks ago and was told patient's hemoglobin was around 9 and should be monitored closely. She also states, patient had an episode of "pink urine" in her diaper today prompting her to come to the ED for evaluation. Per daughter, patient has not reported burning with urination, dysuria. Reports increased frequency with Lasix use. Per daughter, patient at baseline is A&Ox2 and ambulates with a walker.   In the ED: temp 98.1, HR 76, /55, RR 16, SpO2 93% RA, repeat 98% RA. H/H: 8.9/26.7, Na 127, BUN/Cr: 39/1.1, pro-BNP: 5718. UA positive for moderate LEC, 11-25 WBC, 11-25 RBC. Received IV Lasix 40mg x1. Chest xray (pre-seuqeira read): increased intersitial markings, questionable infiltrate in L lobe, small L pleural effusion. EKG: NSR at 67.    Patient admitted to [facility] for acute on chronic CHF exacerbation in the setting of hyponatremia, SABRINA, and anemia. Cardio (savella) examined patient, started IV lasix 40mg BID. Heme (faye) stated anemia may be microcytic or anemia of chronic disease. Patient's low hb could be from intermittent GIB and serial FOBT were ordered. Iron panel and b12, folate workup was pursued. Patient's hb dropped to 7.6 on 11/12 and was given one unit PRBC. Ucx resulted in >100,000 VRE. Repeat UA significant for small LE and moderate bacteruria. Blood cx and repeat Ucx pending. Dr Grace (ID) evaluated patient and determined to hold off on starting empiric abx at this time. 89F with PMH of diastolic CHF (TTE July 2017 EF 65%), HTN, anemia presents with increased bilateral leg swelling. History obtained from daughter at bedside, requesting to not having patient woken up. States around October 18/2018, patient's bilateral lower extremities started to swell around the ankles. Patient was seen by her cardiologist, Dr. Rowell, and recommended increasing Lasix to 80mg daily for one week. Patient states symptoms persisted, and patient only lost 1 lb. Reports increased weight from 108 to 114 in the past 2 weeks. Patient was scheduled for an appointment with Dr. Rowell today but office staff rescheduled her appointment. She gave patient an additional 20mg Lasix this evening, total of 80mg oral Lasix. Daughter also reports seeing Dr. Temple, hematology, for anemia two weeks ago and was told patient's hemoglobin was around 9 and should be monitored closely. She also states, patient had an episode of "pink urine" in her diaper today prompting her to come to the ED for evaluation. Per daughter, patient has not reported burning with urination, dysuria. Reports increased frequency with Lasix use. Per daughter, patient at baseline is A&Ox2 and ambulates with a walker.   In the ED: temp 98.1, HR 76, /55, RR 16, SpO2 93% RA, repeat 98% RA. H/H: 8.9/26.7, Na 127, BUN/Cr: 39/1.1, pro-BNP: 5718. UA positive for moderate LEC, 11-25 WBC, 11-25 RBC. Received IV Lasix 40mg x1. Chest xray (pre-sequeira read): increased intersitial markings, questionable infiltrate in L lobe, small L pleural effusion. EKG: NSR at 67.    Patient admitted to [facility] for acute on chronic CHF exacerbation in the setting of hyponatremia, SABRINA, and anemia. Cardio (savella) examined patient, started IV lasix 40mg BID. Heme (faye) stated anemia may be microcytic or anemia of chronic disease. Patient's low hb could be from intermittent GIB and serial FOBT were ordered. Iron panel and b12, folate workup was pursued. Patient's hb dropped to 7.6 on 11/12 and was given one unit PRBC. Ucx resulted in >100,000 VRE. Repeat UA significant for small LE and moderate bacteruria. Blood cx and repeat Ucx pending. Dr Grace (ID) evaluated patient and determined to hold off on starting empiric abx at this time. Patient retaining urine 11/12,  bladder scan 800cc s/p straight cath'd had 400cc pvr. Dr. Corea (uro) recommended straight cath prn, follow with bladder scan if needed. Dr Gu (heme)  Pt retaining urine yesterday, bladder scan 800cc s/p straight cath'd had 400cc pvr. As per Dr. Norris (cardio), hydralazine was increased to 100mg TID. Pt approved for home hospice care however Daughter refused hospice at this time because she wants her Mother to continue seeing  all her doctors, ongoing discussions with Social Work. 89F with PMH of diastolic CHF (TTE July 2017 EF 65%), HTN, anemia presents with increased bilateral leg swelling. History obtained from daughter at bedside, requesting to not having patient woken up. States around October 18/2018, patient's bilateral lower extremities started to swell around the ankles. Patient was seen by her cardiologist, Dr. Rowell, and recommended increasing Lasix to 80mg daily for one week. Patient states symptoms persisted, and patient only lost 1 lb. Reports increased weight from 108 to 114 in the past 2 weeks. Patient was scheduled for an appointment with Dr. Rowell today but office staff rescheduled her appointment. She gave patient an additional 20mg Lasix this evening, total of 80mg oral Lasix. Daughter also reports seeing Dr. Temple, hematology, for anemia two weeks ago and was told patient's hemoglobin was around 9 and should be monitored closely. She also states, patient had an episode of "pink urine" in her diaper today prompting her to come to the ED for evaluation. Per daughter, patient has not reported burning with urination, dysuria. Reports increased frequency with Lasix use. Per daughter, patient at baseline is A&Ox2 and ambulates with a walker. In the ED: temp 98.1, HR 76, /55, RR 16, SpO2 93% RA, repeat 98% RA. H/H: 8.9/26.7, Na 127, BUN/Cr: 39/1.1, pro-BNP: 5718. UA positive for moderate LEC, 11-25 WBC, 11-25 RBC. Received IV Lasix 40mg x1. Chest xray (pre-sequeira read): increased intersitial markings, questionable infiltrate in L lobe, small L pleural effusion. EKG: NSR at 67.    Patient admitted to [facility] for acute on chronic CHF exacerbation in the setting of hyponatremia, SABRINA, and anemia. CXR small left and trace right pleural effusion. Cardio (savella) examined patient, started IV lasix 40mg BID. Heme (faye) stated anemia may be microcytic or anemia of chronic disease. Patient's low hb could be from intermittent GIB and serial FOBT were ordered. Iron panel and b12, folate workup was pursued. Patient's hb dropped to 7.6 on 11/12 and was given one unit PRBC. Ucx resulted in >100,000 VRE. Repeat UA significant for small LE and moderate bacteruria. Blood cx with no growth. Dr Grace (ID) evaluated patient and recommended to hold off on empiric abx at this time. Patient retaining urine 11/12,  bladder scan 800cc s/p straight cath'd had 400cc pvr. Dr. Corea (uro) recommended straight cath prn, follow with bladder scan if needed. As per Dr. Norris (cardio), hydralazine was increased to 100mg TID. Pt approved for home hospice care however Daughter refused hospice at this time because she wants her Mother to continue seeing  all her doctors, ongoing discussions with Social Work. Patient's hb stabilized to 9.1 after transfusion.  Dr. Laguna, (heme) evaluated patient as anemia of chronic disease with sufficient B12/ folate.     Patient examined on day of discharge, anemia and hypernatremia improved throughout hospital course. Patient's peripheral edema resolved. Patient is medically optimized and hemodynamically stable for discharge home with close follow up with Dr. Rowell (cardio), Dr. Weil (primary/pulm), and Dr. Temple (heme).     Vital Signs Last 24 Hrs  T(C): 36.7 (14 Nov 2018 07:41), Max: 36.7 (13 Nov 2018 23:17)  T(F): 98 (14 Nov 2018 07:41), Max: 98 (13 Nov 2018 23:17)  HR: 69 (14 Nov 2018 07:41) (65 - 72)  BP: 149/82 (14 Nov 2018 07:41) (123/73 - 154/67)  BP(mean): --  RR: 17 (14 Nov 2018 07:41) (16 - 18)  SpO2: 96% (14 Nov 2018 07:41) (92% - 96%)    PHYSICAL EXAM:  GENERAL: NAD, frail appearing  HEAD: Atraumatic, Normocephalic  EYES: EOMI, PERRLA, conjunctiva and sclera clear  ENMT:Moist mucous membrane  NECK: Supple  NERVOUS SYSTEM: Alert & Oriented X3  CHEST/LUNG: nonlabored respirations, CTA b/l  HEART: reg rate and rhythm, systolic murmur  ABDOMEN: Soft, Nontender, Nondistended; Bowel sounds present  EXTREMITIES: 2+ Peripheral Pulses, no pitting edema

## 2018-11-09 NOTE — CONSULT NOTE ADULT - ASSESSMENT
*********************  [ASSESSMENT and  PLAN]  I have discussed the above plan of care with patient/family in detail. They expressed understanding of the treatment plan . Risks, benefits and alternatives discussed in detail. I have asked if they have any questions or concerns and appropriately addressed them; all questions answered to their satisfactions and in lay terms.     > xxxxxx minutes spent in direct patient care, examining and counseling patient,  reviewing  the notes, lab data/ imaging , discussion with multidisciplinary team. [ASSESSMENT and  PLAN]  Worsening and now microcytic anemia.   FOBT negative.   Likely intermittent GIB.     Suspect Fe def, and concurrent anemia chronic disease, possible concurrent B12 def, as prior low normal levels despite normal diet.     [RECOMMEND]  Check FOBT serially.   Repeat iron studies, Ferritin, ESR, CRP, Retic, B12, Folate.   Start PO B12 empirically pending levels.   If ferritin <100 with elevated markers of inflammation, consider IV iron.           I have discussed the above plan of care with patient/family in detail. They expressed understanding of the treatment plan . Risks, benefits and alternatives discussed in detail. I have asked if they have any questions or concerns and appropriately addressed them; all questions answered to their satisfactions and in lay terms.     > xxxxxx minutes spent in direct patient care, examining and counseling patient,  reviewing  the notes, lab data/ imaging , discussion with multidisciplinary team. [ASSESSMENT and  PLAN]  Worsening anemia and now microcytic anemia.   FOBT negative.   Likely intermittent bleeding, with GIB most likely to account for amount blood loss without overt sx    Suspect Fe def, and concurrent anemia chronic disease, possible concurrent B12 def, as prior low normal levels despite normal diet.     [RECOMMEND]  Check FOBT serially.   Repeat iron studies, Ferritin, ESR, CRP, Retic, B12, Folate.   Start PO B12 empirically pending levels.   If ferritin <100 with elevated markers of inflammation, consider IV iron.     Further recommendations pending results.      I have discussed the above plan of care with patient in detail. They expressed understanding of the treatment plan . Risks, benefits and alternatives discussed in detail. I have asked if they have any questions or concerns and appropriately addressed them; all questions answered to their satisfactions and in lay terms. [ASSESSMENT and  PLAN]  Worsening anemia and now microcytic anemia.   FOBT negative.   Likely intermittent bleeding, with GIB most likely to account for amount blood loss without overt sx    Suspect Fe def, and concurrent anemia chronic disease, possible concurrent B12 def, as prior low normal levels despite normal diet.     Possible component anemia due to CKD.     [RECOMMEND]  Check FOBT serially.   Repeat iron studies, Ferritin, ESR, CRP, Retic, B12, Folate.   Start PO B12 empirically pending levels.   If ferritin <100 with elevated markers of inflammation, consider IV iron.     Further recommendations pending results.      I have discussed the above plan of care with patient in detail. They expressed understanding of the treatment plan . Risks, benefits and alternatives discussed in detail. I have asked if they have any questions or concerns and appropriately addressed them; all questions answered to their satisfactions and in lay terms.

## 2018-11-09 NOTE — H&P ADULT - PROBLEM SELECTOR PLAN 4
UA positive for WBC 11-25, moderate LEC. Patient asymptomatic, without fever or WBC.   -Will hold off abx for now  -Follow up UCx

## 2018-11-09 NOTE — DISCHARGE NOTE ADULT - PATIENT PORTAL LINK FT
You can access the [facility] Patient Portal, offered by [facility], by registering with the following website: [email or web address]

## 2018-11-09 NOTE — H&P ADULT - PROBLEM SELECTOR PLAN 9
DVT prophylaxis: Heparin SQ  Fall risk   OOB to chair DVT prophylaxis: Heparin SQ  Fall risk   OOB with assistance

## 2018-11-09 NOTE — CONSULT NOTE ADULT - SUBJECTIVE AND OBJECTIVE BOX
HPI:  89F with PMH of diastolic CHF (TTE 2017 EF 65%), HTN, anemia presents with increased bilateral leg swelling.Around , patient's bilateral lower extremities started to swell around the ankles. Patient was seen by her cardiologist, Dr. Rowell, and recommended increasing Lasix to 80mg daily for one week. Patient states symptoms persisted, and patient only lost 1 lb. Reports increased weight from 108 to 114 in the past 2 weeks. Patient was scheduled for an appointment with Dr. Rowell but office staff rescheduled her appointment. She gave patient an additional 20mg Lasix this evening, total of 80mg oral Lasix. Daughter also reports seeing Dr. Temple, hematology, for anemia two weeks ago and was told patient's hemoglobin was around 9 and should be monitored closely. She also states, patient had an episode of "pink urine" in her diaper today prompting her to come to the ED for evaluation. Per daughter, patient has not reported burning with urination, dysuria. Reports increased frequency with Lasix use. Per daughter, patient at baseline is A&Ox2 and ambulates with a walker.     In the ED: temp 98.1, HR 76, /55, RR 16, SpO2 93% RA, repeat 98% RA. H/H: 8.9/26.7, Na 127, BUN/Cr: 39/1.1, pro-BNP: 5718. UA positive for moderate LEC, 11-25 WBC, 11-25 RBC. Received IV Lasix 40mg x1. Chest xray (pre-sequeira read): increased intersitial markings, questionable infiltrate in L lobe, small L pleural effusion. EKG: NSR at 67. (2018 00:56)      PAST MEDICAL & SURGICAL HISTORY:  Anemia, unspecified type  Edema of lower extremity  Heart murmur  Hypertension  Hypercalcemia  History of appendectomy  S/P tonsillectomy  H/O parathyroidectomy      Antimicrobials      Immunological      Other  carvedilol 12.5 milliGRAM(s) Oral every 12 hours  cholecalciferol 1000 Unit(s) Oral daily  cinacalcet 30 milliGRAM(s) Oral <User Schedule>  doxazosin 4 milliGRAM(s) Oral <User Schedule>  ferrous    sulfate 325 milliGRAM(s) Oral daily  furosemide   Injectable 40 milliGRAM(s) IV Push once  heparin  Injectable 5000 Unit(s) SubCutaneous every 12 hours  hydrALAZINE 75 milliGRAM(s) Oral every 8 hours      Allergies    Vasotec (Unknown)    Intolerances      SOCIAL HISTORY: no toxic habits reported    FAMILY HISTORY:  No pertinent family history in first degree relatives      ROS:    EYES:  Negative  blurry vision or double vision  GASTROINTESTINAL:  Negative for nausea, vomiting, diarrhea  -otherwise negative except for subjective    Vital Signs Last 24 Hrs  T(C): 36.4 (2018 07:18), Max: 36.7 (2018 21:41)  T(F): 97.6 (2018 07:18), Max: 98.1 (2018 21:41)  HR: 70 (2018 07:18) (70 - 95)  BP: 110/69 (2018 07:18) (110/69 - 157/73)  BP(mean): --  RR: 18 (2018 07:18) (15 - 18)  SpO2: 92% (2018 07:18) (92% - 98%)    PE:  WDWN in no distress  HEENT:  NC, PERRL, sclerae anicteric, conjunctivae clear, EOMI.  Sinuses nontender, no nasal exudate.  No buccal or pharyngeal lesions, erythema or exudate  Neck:  Supple, no adenopathy, elevated JVP  Lungs:  No accessory muscle use, bilaterally decreased in bases  Cor:  RRR, S1, S2, murmur appreciated  Abd:  Symmetric, normoactive BS.  Soft, nontender, no masses, guarding or rebound.  Liver and spleen not enlarged  Extrem:  No cyanosis or noted bilaterally LE edema  Skin:  No rashes.  Neuro: grossly intact  Musc: moving all limbs freely, no focal deficits    LABS:                        8.9    9.59  )-----------( 270      ( 2018 22:12 )             26.7       WBC Count: 9.59 K/uL (18 @ 22:12)          127<L>  |  92<L>  |  39<H>  ----------------------------<  102<H>  3.9   |  28  |  1.10    Ca    7.0<L>      2018 22:12    TPro  6.5  /  Alb  2.4<L>  /  TBili  0.4  /  DBili  x   /  AST  28  /  ALT  22  /  AlkPhos  71        Creatinine, Serum: 1.10 mg/dL (18 @ 22:12)      Urinalysis Basic - ( 2018 01:17 )    Color: Yellow / Appearance: Clear / S.010 / pH: x  Gluc: x / Ketone: Negative  / Bili: Negative / Urobili: Negative   Blood: x / Protein: 25 mg/dL / Nitrite: Negative   Leuk Esterase: Moderate / RBC: 11-25 /HPF / WBC 11-25   Sq Epi: x / Non Sq Epi: Few / Bacteria: Many      MICROBIOLOGY:        RADIOLOGY & ADDITIONAL STUDIES:    --< from: US Kidney and Bladder (18 @ 08:51) >  EXAM:  US KIDNEYS AND BLADDER                            PROCEDURE DATE:  2018          INTERPRETATION:  CLINICAL INFORMATION:        COMPARISON: CT scan abdomen pelvis 2017.    TECHNIQUE: Sonography of the kidneys and bladder.     FINDINGS:    Right kidney:  11 cm. No hydronephrosis or intrarenal calcifications are   noted.  There is an approximately 2 cm cyst laterally at the lower pole, with   some mild low level internal echoes.  This is corresponds to the area of the cyst seen onCT scan from 2017.    Left kidney:  9 cm.   There is a 9 mm shadowing intrarenal calcification at the lower pole.  No hydronephrosis is noted.  There is a 1 cm cyst at the midpole.    The visualized urinary bladder is distended, unremarkable in appearance.        IMPRESSION:     Nonobstructing left intrarenal calcification.  No hydronephrosis noted.    Cyst at the lower pole of the right kidney, may represent a complex or   hemorrhagic cyst.  Recommend short interval follow-up renal ultrasound in 6 months.    < from: Xray Chest 1 View AP/PA (18 @ 23:20) >  EXAM:  XR CHEST AP OR PA 1V                            PROCEDURE DATE:  2018          INTERPRETATION:  Clinical information: Bilateral leg swelling. Shortness   of breath.    Technique: Frontal view of the chest.    Comparison: Prior chest x-ray examination from 2018.    Findings: Small left and trace right-sided pleural effusions are notable   with adjacent atelectasis and/or pneumonia. There is pulmonary vascular   congestion and/or edema. The heart size is enlarged. Multilevel   degenerative changes are noted within the imaged potions of the spine.   Degenerative changes are notable within the bilateral shoulders.    IMPRESSION: Small left and trace right-sided pleural effusions with   adjacent atelectasis and/or pneumonia.    Pulmonary vascular congestion or edema.    Cardiomegaly.

## 2018-11-09 NOTE — PROGRESS NOTE ADULT - PROBLEM SELECTOR PLAN 1
Diastolic CHF exacerbation of unknown etiology at this time  -s/p 80mg oral Lasix prior to arrival and 40mg IV Lasix in ED, pro-BNP elevated at 5718.   Chest xray: small left and trace right sided pleural effusion with adjacent atelectasis and/or pneumonia  -Start IV Lasix 40mg BID starting tomorrow AM as per cardio   -Fluid restriction  -Leg elevation  -Strict I&Os, daily weights Diastolic CHF exacerbation of unknown etiology at this time  -s/p 80mg oral Lasix prior to arrival and 40mg IV Lasix in ED, pro-BNP elevated at 5718.   Chest xray: small left and trace right sided pleural effusion with adjacent atelectasis and/or pneumonia, pulmonary vascular congestion or edema  -Start IV Lasix 40mg BID starting tomorrow AM as per cardio   -Fluid restriction  -Leg elevation  -Strict I&Os, daily weights

## 2018-11-09 NOTE — H&P ADULT - PROBLEM SELECTOR PLAN 5
Questionable infiltrate in L lobe, WBC within normal limits. Patient denies cough, fever, URI symptoms. Denies sick contacts.   -Follow up AM procalcitonin   -Will hold off abx for now

## 2018-11-09 NOTE — H&P ADULT - ATTENDING COMMENTS
case discussed extensivly with daughter at [facility] bedside  pt with dissltolic chf  gained 6 lbs over 2 weeks.  continue diuresis  daily weights  hyponatremia improving

## 2018-11-09 NOTE — DISCHARGE NOTE ADULT - COMMUNITY RESOURCES
[facility] Mon-Fri, Sat 8 hours and Sunday 6 hours. [facility] Mrs. Gutierrez [phone].

## 2018-11-09 NOTE — H&P ADULT - PROBLEM SELECTOR PLAN 1
s/p 80mg oral Lasix prior to arrival and 40mg IV Lasix in ED, pro-BNP elevated at 5718. Patient doesn't appear grossly volume overloaded. Chest xray (pre-sequeira) increased intersitial markings and small L pleural effusion  -Admit to medicine  -Start IV Lasix 40mg daily, plan to transition to oral Lasix  -Dr. Isaacs, cardiology, consulted  -Fluid restriction  -Leg elevation  -Strict I&Os, daily weights

## 2018-11-09 NOTE — DISCHARGE NOTE ADULT - MEDICATION SUMMARY - MEDICATIONS TO TAKE
I will START or STAY ON the medications listed below when I get home from the hospital:    potassium  -- 5 milliliter(s) by mouth once a day  -- Indication: For Prophylactic measure    acetaminophen 325 mg oral tablet  -- 2 tab(s) by mouth every 6 hours, As needed, Temp greater or equal to 38C (100.4F), Mild Pain (1 - 3)  -- Indication: For Pain    doxazosin 4 mg oral tablet  -- 1 tab(s) by mouth once a day (at bedtime)  -- Indication: For HTN (hypertension)    carvedilol 12.5 mg oral tablet  -- 1 tab(s) by mouth every 12 hours  -- Indication: For Congestive Heart Failure    Sensipar 30 mg oral tablet  -- 1 tab(s) by mouth once a day (at bedtime)  -- Indication: For Prophylactic measure    furosemide 40 mg oral tablet  -- 1 tab(s) by mouth every 12 hours  -- Indication: For Congestive Heart Failure    ferrous sulfate 324 mg (65 mg elemental iron) oral delayed release tablet  -- 1 tab(s) by mouth once a day  -- Indication: For Anemia, unspecified type    hydrALAZINE 100 mg oral tablet  -- 1 tab(s) by mouth every 8 hours  -- Indication: For HTN (hypertension)    Vitamin D3 1000 intl units oral capsule  -- 1 cap(s) by mouth once a day  -- Indication: For Vitamins I will START or STAY ON the medications listed below when I get home from the hospital:    potassium  -- 5 milliliter(s) by mouth once a day  -- Indication: For Prophylactic measure    acetaminophen 325 mg oral tablet  -- 2 tab(s) by mouth every 6 hours, As needed, Temp greater or equal to 38C (100.4F), Mild Pain (1 - 3)  -- Indication: For Pain    doxazosin 4 mg oral tablet  -- 1 tab(s) by mouth once a day (at bedtime)  -- Indication: For HTN (hypertension)    carvedilol 12.5 mg oral tablet  -- 1 tab(s) by mouth every 12 hours  -- Indication: For Congestive Heart Failure    albuterol 2.5 mg/3 mL (0.083%) inhalation solution  -- 1 pump(s) inhaled once a day   -- Indication: For Mucus clearance    Sensipar 30 mg oral tablet  -- 1 tab(s) by mouth once a day (at bedtime)  -- Indication: For Prophylactic measure    furosemide 40 mg oral tablet  -- 1 tab(s) by mouth every 12 hours  -- Indication: For Congestive Heart Failure    ferrous sulfate 324 mg (65 mg elemental iron) oral delayed release tablet  -- 1 tab(s) by mouth once a day  -- Indication: For Anemia, unspecified type    hydrALAZINE 100 mg oral tablet  -- 1 tab(s) by mouth every 8 hours  -- Indication: For HTN (hypertension)    Vitamin D3 1000 intl units oral capsule  -- 1 cap(s) by mouth once a day  -- Indication: For Vitamins

## 2018-11-09 NOTE — DISCHARGE NOTE ADULT - CARE PROVIDER_API CALL
Frankie Rowell), Cardiovascular Disease  [address] [place] [address]  Phone: [phone]  Fax: [phone]    Cali Temple (MD), Hematology; Internal Medicine; Medical Oncology  [address] [facility]  [address]  [place] [address]  Phone: [phone]  Fax: [phone]    Weil, Peter A (MD), Critical Care Medicine; Internal Medicine; Pulmonary Disease  [address] [place] [address] [address]  [place] [address]  Phone: [phone]  Fax: [phone]

## 2018-11-09 NOTE — DISCHARGE NOTE ADULT - MEDICATION SUMMARY - MEDICATIONS TO CHANGE
I will SWITCH the dose or number of times a day I take the medications listed below when I get home from the hospital:    hydrALAZINE 25 mg oral tablet  -- 3 tab(s) by mouth every 8 hours    furosemide 20 mg oral tablet  -- 3 tab(s) by mouth once a day

## 2018-11-09 NOTE — DISCHARGE NOTE ADULT - SECONDARY DIAGNOSIS.
SABRINA (acute kidney injury) Anemia, unspecified type Bacteriuria, asymptomatic HTN (hypertension) Hyponatremia

## 2018-11-09 NOTE — H&P ADULT - HISTORY OF PRESENT ILLNESS
89F with PMH of diastolic CHF (TTE July 2017 EF 65%), HTN, anemia presents with increased bilateral leg swelling. Family reports 6 lb weight gain in the past week. Daughter gave double her Lasix dose today, for a total of 80mg. Daughter also reports noticing pink colored blood in diaper.       In the ED: temp 98.1, H/H: 8.9/26.7, Na 127, BUN/Cr: 39/1.1, pro-BNP: 5718. Received IV Lasix 40mg x1. Chest xray (pre-sequeira read): increased intersitial markings. 89F with PMH of diastolic CHF (TTE July 2017 EF 65%), HTN, anemia presents with increased bilateral leg swelling. History obtained from daughter at bedside, requesting to not having patient woken up. States around October 18/2018, patient's bilateral lower extremities started to swell around the ankles. Patient was seen by her cardiologist, Dr. Rowell, and recommended increasing Lasix to 80mg daily for one week. Patient states symptoms persisted, and patient only lost 1 lb. Reports increased weight from 108 to 114 in the past 2 weeks. Patient was scheduled for an appointment with Dr. Rowell today but office staff rescheduled her appointment. She gave patient an additional 20mg Lasix this evening, total of 80mg oral Lasix. Daughter also reports seeing Dr. Temple, hematology, for anemia two weeks ago and was told patient's hemoglobin was around 9 and should be monitored closely. She also states, patient had an episode of "pink urine" in her diaper today prompting her to come to the ED for evaluation. Per daughter, patient has not reported burning with urination, dysuria. Reports increased frequency with Lasix use. Per daughter, patient at baseline is A&Ox2 and ambulates with a walker.     In the ED: temp 98.1, HR 76, /55, RR 16, SpO2 93% RA, repeat 98% RA. H/H: 8.9/26.7, Na 127, BUN/Cr: 39/1.1, pro-BNP: 5718. UA positive for moderate LEC, 11-25 WBC, 11-25 RBC. Received IV Lasix 40mg x1. Chest xray (pre-sequeira read): increased intersitial markings, questionable infiltrate in L lobe, small L pleural effusion. EKG: NSR at 67.

## 2018-11-09 NOTE — PROGRESS NOTE ADULT - PROBLEM SELECTOR PLAN 6
Likely anemia of chronic disease v iron def anemia  -Reports going to Dr. Temple, heme/onc, two weeks ago and hemoglobin was around 9   -Continue home iron  -Dr. Temple, hematologist: Likely intermittent bleeding, with GIB most likely to account for amount blood loss without overt sx.   - possible concurrent B12 def, as prior low normal levels despite normal diet.   -Check FOBT serially, neg X1  - f/u iron studies, Ferritin, ESR, CRP, Retic, B12, Folate Likely anemia of chronic disease v iron def anemia  -Reports going to Dr. Temple, heme/onc, two weeks ago and hemoglobin was around 9   -Continue home iron  -Dr. Valdez, hematologist: Likely intermittent bleeding, with GIB most likely to account for amount blood loss without overt sx.   - possible concurrent B12 def, as prior low normal levels despite normal diet.   -Check FOBT serially, neg X1  - f/u iron studies, Ferritin, ESR, CRP, Retic, B12, Folate

## 2018-11-09 NOTE — CONSULT NOTE ADULT - PROBLEM SELECTOR RECOMMENDATION 4
per medicine/hematology.    Thank you for consulting us and involving us in the management of this most interesting and challenging case.     Please Call with any further questions

## 2018-11-09 NOTE — DISCHARGE NOTE ADULT - CARE PROVIDERS DIRECT ADDRESSES
[email or web address]

## 2018-11-09 NOTE — CONSULT NOTE ADULT - ASSESSMENT
89F with PMH of diastolic CHF (TTE July 2017 EF 65%), HTN, anemia presents with CHF exacerbation and noted to have an abn urinalysis

## 2018-11-09 NOTE — H&P ADULT - PROBLEM SELECTOR PLAN 3
Na 127, Likely due to dehydration and increased diuretic use  -hold fluids in setting CHF  -Continue IV Lasix daily, monitor BMP

## 2018-11-09 NOTE — H&P ADULT - NSHPSOCIALHISTORY_GEN_ALL_CORE
Lives with: daughter, Eula, health care proxy    Substance Use (street drugs): denies  Tobacco Usage: denies  Alcohol Usage: denies    Immunization Hx:   ( X ) flu shot - 2018

## 2018-11-09 NOTE — H&P ADULT - NSHPPHYSICALEXAM_GEN_ALL_CORE
Height (cm): 149.86 (11-08 @ 21:41)  Weight (kg): 51.7 (11-08 @ 21:41)  BMI (kg/m2): 23 (11-08 @ 21:41)  BSA (m2): 1.45 (11-08 @ 21:41)  Vital Signs Last 24 Hrs  T(C): 36.4 (09 Nov 2018 02:38), Max: 36.7 (08 Nov 2018 21:41)  T(F): 97.5 (09 Nov 2018 02:38), Max: 98.1 (08 Nov 2018 21:41)  HR: 70 (09 Nov 2018 02:38) (70 - 95)  BP: 153/63 (09 Nov 2018 02:38) (134/55 - 153/63)  BP(mean): --  RR: 15 (09 Nov 2018 02:38) (15 - 16)  SpO2: 94% (09 Nov 2018 02:38) (93% - 98%)  [ X] room air   [ ] 02    PHYSICAL EXAM: extremely limited, daughter didn't want patient to be awoken   GENERAL: elderly female sleeping in NAD, resting comfortably breathing on room air  HEAD:  AT/NC  ENMT: EOMI  NERVOUS SYSTEM:  Alert & Oriented X2 (per daughter patient unable to state what year it is)  CHEST/LUNG: diminished breath sounds at bases   HEART:  Regular rate and rhythm, No murmurs, rubs, or gallops  ABDOMEN:  soft, nontender, nondistended, positive bowel sounds    EXTREMITIES: +1/2 pitting edema around L ankle and lower shin, mild erythema around lower shin. trace pitting edema around R ankle   SKIN: no venous stasis skin changes

## 2018-11-09 NOTE — PROGRESS NOTE ADULT - SUBJECTIVE AND OBJECTIVE BOX
Patient is a 89y old  Female who presents with a chief complaint of acute on chronic CHF (2018 10:14)      INTERVAL HPI/OVERNIGHT EVENTS: No overnight events. Patient examined at bedside this morning, when asked how feeling pt replies " I cant tell you". Patient's daughter at bedside, stating patient is very tired. Reports hematuria on day of admission with increased urinary frequency but denies fevers and dysuria.     T(C): 36.4 (18 @ 07:18), Max: 36.7 (18 @ 21:41)  HR: 70 (18 @ 07:18) (70 - 95)  BP: 110/69 (18 @ 07:18) (110/69 - 157/73)  RR: 18 (18 @ 07:18) (15 - 18)  SpO2: 92% (18 @ 07:18) (92% - 98%)  Wt(kg): --    I&O's Summary    2018 07:01  -  2018 07:00  --------------------------------------------------------  IN: 200 mL / OUT: 500 mL / NET: -300 mL      LABS:                        8.9    9.59  )-----------( 270      ( 2018 22:12 )             26.7     11    127<L>  |  92<L>  |  39<H>  ----------------------------<  102<H>  3.9   |  28  |  1.10    Ca    7.0<L>      2018 22:12    TPro  6.5  /  Alb  2.4<L>  /  TBili  0.4  /  DBili  x   /  AST  28  /  ALT  22  /  AlkPhos  71  11-08    PT/INR - ( 2018 22:12 )   PT: 14.3 sec;   INR: 1.26 ratio         PTT - ( 2018 22:12 )  PTT:29.8 sec  CAPILLARY BLOOD GLUCOSE    Urinalysis Basic - ( 2018 01:17 )    Color: Yellow / Appearance: Clear / S.010 / pH: x  Gluc: x / Ketone: Negative  / Bili: Negative / Urobili: Negative   Blood: x / Protein: 25 mg/dL / Nitrite: Negative   Leuk Esterase: Moderate / RBC: 11-25 /HPF / WBC 11-25   Sq Epi: x / Non Sq Epi: Few / Bacteria: Many      MEDICATIONS  (STANDING):  carvedilol 12.5 milliGRAM(s) Oral every 12 hours  cholecalciferol 1000 Unit(s) Oral daily  cinacalcet 30 milliGRAM(s) Oral <User Schedule>  doxazosin 4 milliGRAM(s) Oral <User Schedule>  ferrous    sulfate 325 milliGRAM(s) Oral daily  furosemide   Injectable 40 milliGRAM(s) IV Push once  heparin  Injectable 5000 Unit(s) SubCutaneous every 12 hours  hydrALAZINE 75 milliGRAM(s) Oral every 8 hours    MEDICATIONS  (PRN):      REVIEW OF SYSTEMS:  Unable to obtain 2/2 lethargy     RADIOLOGY & ADDITIONAL TESTS:  EXAM:  US KIDNEYS AND BLADDER                            PROCEDURE DATE:  2018          INTERPRETATION:  CLINICAL INFORMATION:        COMPARISON: CT scan abdomen pelvis 2017.    TECHNIQUE: Sonography of the kidneys and bladder.     FINDINGS:    Right kidney:  11 cm. No hydronephrosis or intrarenal calcifications are   noted.  There is an approximately 2 cm cyst laterally at the lower pole, with   some mild low level internal echoes.  This is corresponds to the area of the cyst seen onCT scan from 2017.    Left kidney:  9 cm.   There is a 9 mm shadowing intrarenal calcification at the lower pole.  No hydronephrosis is noted.  There is a 1 cm cyst at the midpole.    The visualized urinary bladder is distended, unremarkable in appearance.        IMPRESSION:     Nonobstructing left intrarenal calcification.  No hydronephrosis noted.    Cyst at the lower pole of the right kidney, may represent a complex or   hemorrhagic cyst.  Recommend short interval follow-up renal ultrasound in 6 months.      UMAIR LIM M.D., ATTENDING RADIOLOGIST  This document has been electronically signed. 2018  9:48AM        Imaging Personally Reviewed:  [ x] YES  [ ] NO    Consultant(s) Notes Reviewed:  [x ] YES  [ ] NO    PHYSICAL EXAM:  GENERAL: NAD  HEAD: Atraumatic, Normocephalic  EYES: EOMI, conjunctiva and sclera clear  ENMT:  Moist mucous membranes  NECK: Supple, No JVD, Normal thyroid  NERVOUS SYSTEM: Alert & Oriented X2, lethargic   CHEST/LUNG: Clear to ascultation b/l  HEART: Regular rate and rhythm; systolic murmur  ABDOMEN: Soft, Nontender, Nondistended; Bowel sounds present  EXTREMITIES: 2+ Peripheral Pulses, b/l pitting edema +2 L>R    Care Discussed with Consultants/Other Providers [ ] YES  [ ] NO

## 2018-11-09 NOTE — DISCHARGE NOTE ADULT - ADDITIONAL INSTRUCTIONS
-Please follow up with your primary doctor within one week.  -Please follow up with ____ outpatient (information below).  -Patient and family to set up follow up appointments.  -Continue taking your medications as directed above.  -If symptoms persist/worsen, please call your PMD or return to the ED. -Please follow up with your primary doctor within one week.  -Please follow up with hematologist, cardiologist and pulmonologist outpatient (information below).  -Patient and family to set up follow up appointments.  -Continue taking your medications as directed above.  -If symptoms persist/worsen, please call your PMD or return to the ED.

## 2018-11-09 NOTE — H&P ADULT - PROBLEM SELECTOR PLAN 7
H/H: 8.9/26.7, daughter reports patient sleeping more throughout the day. Likely anemia of chronic disease v iron def anemia. FOBT negative.   -Reports going to Dr. Temple, heme/onc, two weeks ago and hemoglobin was around 9   -Will continue to monitor  -Continue home iron  -Follow up iron, ferritin, TIBC, reticulocyte count   -Dr. Temple, hematologist, consulted

## 2018-11-09 NOTE — H&P ADULT - PROBLEM SELECTOR PLAN 6
Cr elevated at 1.1, likely due to Lasix use  -Follow up AM BMP  -Will avoid fluids given patient's history of CHF  -Follow up kidney ultrasound

## 2018-11-09 NOTE — CONSULT NOTE ADULT - ASSESSMENT
89F with PMH of diastolic CHF (TTE July 2017 EF 65%), HTN, anemia presented with increasing bilateral LE edema, admitted for acute on chronic CHF.     - No clear evidence of acute ischemia. EKG shows NSR, patient remained in NSR with no acute events on tele monitor.   - For volume overload, increased dose of IV Lasix to 40 mg BID.  - Monitor strict I&Os and daily weight.  - Previous ECHO in 07/17 shows normal LV systolic function with EF: 65%, mild mitral/tricuspid/pulmonic insufficiency, diastolic dysfunction. May recheck ECHO.  - BP controlled, continue home BP meds, monitor routine hemodynamics.  - Monitor and replete lytes, keep K>4, Mg>2.  - Other cardiovascular workup will depend on clinical course.  - All other workup per primary team.  - Will follow. 89F with PMH of diastolic CHF (TTE July 2017 EF 65%), HTN, anemia presented with increasing bilateral LE edema, admitted for acute on chronic CHF.     - No clear evidence of acute ischemia. EKG shows NSR, patient remained in NSR with no acute events on tele monitor.   - For volume overload, increased dose of IV Lasix to 40 mg BID.  - Monitor strict I&Os and daily weight.  - For patient's anemia, continue to monitor H/H (currently 8.9), management per heme/onc Dr. Temple.  - Previous ECHO in 07/17 shows normal LV systolic function with EF: 65%, mild mitral/tricuspid/pulmonic insufficiency, diastolic dysfunction. May recheck ECHO.  - BP controlled, continue home BP meds, monitor routine hemodynamics.  - Monitor and replete lytes, keep K>4, Mg>2.  - Other cardiovascular workup will depend on clinical course.  - All other workup per primary team.  - Will follow. 89F with PMH of diastolic CHF (TTE July 2017 EF 65%), HTN, anemia presented with increasing bilateral LE edema, admitted for acute on chronic CHF.     - No clear evidence of acute ischemia. EKG shows NSR, patient remained in NSR with no acute events on tele monitor.   - For volume overload, increased dose of IV Lasix to 40 mg BID.  - Monitor strict I&Os and daily weight.  - For patient's anemia, continue to monitor H/H (currently 8.9), management per heme/onc Dr. Temple.  - Previous ECHO in 07/17 shows normal LV systolic function with EF: 65%, mild mitral/tricuspid/pulmonic insufficiency, diastolic dysfunction  - BP controlled, continue home BP meds, monitor routine hemodynamics.  - Monitor and replete lytes, keep K>4, Mg>2.  - Other cardiovascular workup will depend on clinical course.  - All other workup per primary team.  - Will follow.

## 2018-11-09 NOTE — H&P ADULT - ASSESSMENT
89F with PMH of diastolic CHF (TTE July 2017 EF 65%), HTN, anemia presents with increased bilateral leg swelling admitted for acute on chronic CHF, SABRINA, hyponatremia, and anemia.

## 2018-11-09 NOTE — CONSULT NOTE ADULT - SUBJECTIVE AND OBJECTIVE BOX
INCOMPLETE  NOTE:   Pt seen and evaluated. Full note to follow.   CONSULTATION & DOCUMENTATION IN PROGRESS      Patient is a 89y old  Female who presents with a chief complaint of acute on chronic CHF (2018 09:51)      HPI:  89F with PMH of diastolic CHF (TTE 2017 EF 65%), HTN, anemia presents with increased bilateral leg swelling. History obtained from daughter at bedside, requesting to not having patient woken up. States around , patient's bilateral lower extremities started to swell around the ankles. Patient was seen by her cardiologist, Dr. Rowell, and recommended increasing Lasix to 80mg daily for one week. Patient states symptoms persisted, and patient only lost 1 lb. Reports increased weight from 108 to 114 in the past 2 weeks. Patient was scheduled for an appointment with Dr. Rowell today but office staff rescheduled her appointment. She gave patient an additional 20mg Lasix this evening, total of 80mg oral Lasix. Daughter also reports seeing Dr. Temple, hematology, for anemia two weeks ago and was told patient's hemoglobin was around 9 and should be monitored closely. She also states, patient had an episode of "pink urine" in her diaper today prompting her to come to the ED for evaluation. Per daughter, patient has not reported burning with urination, dysuria. Reports increased frequency with Lasix use. Per daughter, patient at baseline is A&Ox2 and ambulates with a walker.     In the ED: temp 98.1, HR 76, /55, RR 16, SpO2 93% RA, repeat 98% RA. H/H: 8.9/26.7, Na 127, BUN/Cr: 39/1.1, pro-BNP: 5718. UA positive for moderate LEC, 11-25 WBC, 11-25 RBC. Received IV Lasix 40mg x1. Chest xray (pre-sequeira read): increased intersitial markings, questionable infiltrate in L lobe, small L pleural effusion. EKG: NSR at 67. (2018 00:56)    Heme asked to see pt for progressive microcytic anemia.   Pt Hgb 11 ealrier in 2018 with normal MCV.   Hx of low normal B12 prior.   Denies bleeding, no melena, no hematochezia.   Ambulated without walker or cane per pt.   Saw Dr Temple in office recently.       PAST MEDICAL & SURGICAL HISTORY:  Anemia, unspecified type  Edema of lower extremity  Heart murmur  Hypertension  Hypercalcemia  History of appendectomy  S/P tonsillectomy  H/O parathyroidectomy      HEALTH ISSUES - PROBLEM Dx:  Bacteriuria, asymptomatic: Bacteriuria, asymptomatic  Prophylactic measure: Prophylactic measure  HTN (hypertension): HTN (hypertension)  Anemia, unspecified type: Anemia, unspecified type  SABRINA (acute kidney injury): SABRINA (acute kidney injury)  Abnormal chest xray: Abnormal chest xray  Asymptomatic bacteriuria: Asymptomatic bacteriuria  Hyponatremia: Hyponatremia  Leg swelling: Leg swelling  Acute on chronic congestive heart failure, unspecified heart failure type: Acute on chronic congestive heart failure, unspecified heart failure type          FAMILY HISTORY:  No pertinent family history in first degree relatives  mother   father       [SOCIAL HISTORY: ]     smoking:  denies     EtOH:  denies     illicit drugs:  denies     occupation:  retired     marital status:       Other:       [ALLERGIES/INTOLERANCES:]  Allergies     Vasotec (Unknown)  Intolerances        [MEDICATIONS]  MEDICATIONS  (STANDING):  carvedilol 12.5 milliGRAM(s) Oral every 12 hours  cholecalciferol 1000 Unit(s) Oral daily  cinacalcet 30 milliGRAM(s) Oral <User Schedule>  doxazosin 4 milliGRAM(s) Oral <User Schedule>  ferrous    sulfate 325 milliGRAM(s) Oral daily  furosemide   Injectable 40 milliGRAM(s) IV Push once  heparin  Injectable 5000 Unit(s) SubCutaneous every 12 hours  hydrALAZINE 75 milliGRAM(s) Oral every 8 hours    MEDICATIONS  (PRN):        [REVIEW OF SYSTEMS: ]  CONSTITUTIONAL: normal, no fever, no shakes, no chills   EYES: No eye pain, no visual disturbances, no discharge  ENMT:  no discharge  NECK: No pain, no stiffness  BREASTS: No pain, no masses, no nipple discharge  RESPIRATORY: No cough, no wheezing, no chills, no hemoptysis; No shortness of breath  CARDIOVASCULAR: No chest pain, no palpitations, no dizziness, no leg swelling  GASTROINTESTINAL: No abdominal or epigastric pain. No nausea, no vomiting, no hematemesis; No diarrhea , no constipation. No melena, no hematochezia.  GENITOURINARY: No dysuria, no frequency, no hematuria, no incontinence  NEUROLOGICAL: No headaches, no memory loss, no loss of strength, no numbness, no tremors  SKIN: No itching, no burning, no rashes, no lesions   LYMPH NODES: No enlarged glands  ENDOCRINE: No heat or cold intolerance; No hair loss  MUSCULOSKELETAL: No joint pain or swelling; No muscle, no back, no extremity pain  PSYCHIATRIC: No depression, no anxiety, no mood swings, no difficulty sleeping  HEME/LYMPH: No easy bruising, no bleeding gums    [VITALS SIGNS 24hrs]  Vital Signs Last 24 Hrs  T(C): 36.4 (2018 07:18), Max: 36.7 (2018 21:41)  T(F): 97.6 (2018 07:18), Max: 98.1 (2018 21:41)  HR: 70 (2018 07:18) (70 - 95)  BP: 110/69 (2018 07:18) (110/69 - 157/73)  BP(mean): --  RR: 18 (2018 07:18) (15 - 18)  SpO2: 92% (2018 07:18) (92% - 98%)    [PHYSICAL EXAM]  General: adult in NAD,  WN,  WD.  HEENT: clear oropharynx, anicteric sclera, pink conjunctivae.  Neck: supple, no masses.  CV: normal S1S2, no murmur, no rubs, no gallops. 2/6 ALE  Lungs: clear to auscultation, no wheezes, no rales, no rhonchi.  Abdomen: soft, non-tender, non-distended, no hepatosplenomegaly, normal BS, no guarding.  Ext: no clubbing, no cyanosis, + pitting edema.  Skin: no rashes,  no petechiae, no venous stasis changes.  Neuro: alert and oriented X2, no focal motor deficits.  LN: no SC EDISON.      [LABS:]                        8.9    9.59  )-----------( 270      ( 2018 22:12 )             26.7     CBC Full  -  ( 2018 22:12 )  WBC Count : 9.59 K/uL  Hemoglobin : 8.9 g/dL  Hematocrit : 26.7 %  Platelet Count - Automated : 270 K/uL  Mean Cell Volume : 79.7 fl  Mean Cell Hemoglobin : 26.6 pg  Mean Cell Hemoglobin Concentration : 33.3 gm/dL  Auto Neutrophil # : 8.14 K/uL  Auto Lymphocyte # : 0.77 K/uL  Auto Monocyte # : 0.40 K/uL  Auto Eosinophil # : 0.14 K/uL  Auto Basophil # : 0.03 K/uL  Auto Neutrophil % : 84.9 %  Auto Lymphocyte % : 8.0 %  Auto Monocyte % : 4.2 %  Auto Eosinophil % : 1.5 %  Auto Basophil % : 0.3 %        127<L>  |  92<L>  |  39<H>  ----------------------------<  102<H>  3.9   |  28  |  1.10    Ca    7.0<L>      2018 22:12    TPro  6.5  /  Alb  2.4<L>  /  TBili  0.4  /  DBili  x   /  AST  28  /  ALT  22  /  AlkPhos  71      PT/INR - ( 2018 22:12 )   PT: 14.3 sec;   INR: 1.26 ratio         PTT - ( 2018 22:12 )  PTT:29.8 sec  LIVER FUNCTIONS - ( 2018 22:12 )  Alb: 2.4 g/dL / Pro: 6.5 g/dL / ALK PHOS: 71 U/L / ALT: 22 U/L / AST: 28 U/L / GGT: x             Urinalysis Basic - ( 2018 01:17 )  Color: Yellow / Appearance: Clear / S.010 / pH: x  Gluc: x / Ketone: Negative  / Bili: Negative / Urobili: Negative   Blood: x / Protein: 25 mg/dL / Nitrite: Negative   Leuk Esterase: Moderate / RBC: 11-25 /HPF / WBC 25   Sq Epi: x / Non Sq Epi: Few / Bacteria: Many      WBC  TREND (5 Days)  WBC Count: 9.59 K/uL ( @ 22:12)    HGB  TREND (5 Days)  Hemoglobin: 8.9 g/dL ( @ 22:12)    HCT  TREND (5 Days)  Hematocrit: 26.7 % ( @ 22:12)    PLT  TREND (5 Days)  Platelet Count - Automated: 270 K/uL ( @ 22:12)      OLD LABS    Vitamin B12, Serum in AM (17 @ 13:47)    Vitamin B12, Serum: 425 pg/mL    Folate, Serum in AM (17 @ 13:47)    Folate, Serum: 11.5 ng/mL    Iron with Total Binding Capacity in AM (17 @ 20:07)    % Saturation, Iron: 22 %    Iron - Total Binding Capacity.: 291 ug/dL    Iron Total, Serum: 63 ug/dL    Unsaturated Iron Binding Capacity: 228 ug/dL    Ferritin, Serum in AM (17 @ 20:07)    Ferritin, Serum: 123.0 ng/mL                  Microbiology        EKG: NSR @ 67 bpm        [BLOOD SMEAR INTERPRETATION: ]  RBC:   WBC:   Plts:     [RADIOLOGY & ADDITIONAL STUDIES:]  < from: Xray Chest 1 View- PORTABLE-Urgent (18 @ 16:23) >  Cardiomegaly and slight prominence of the interstitial and/or   vascular markings possibly due to mild CHF. Left basilar opacity,   possibly representing subsegmental atelectasis, small effusion or small   infiltrate. Follow-up is recommended.  < end of copied text > INCOMPLETE  NOTE:   Pt seen and evaluated. Full note to follow.   CONSULTATION & DOCUMENTATION IN PROGRESS      Patient is a 89y old  Female who presents with a chief complaint of acute on chronic CHF (2018 09:51)      HPI:  89F with PMH of diastolic CHF (TTE 2017 EF 65%), HTN, anemia presents with increased bilateral leg swelling. History obtained from daughter at bedside, requesting to not having patient woken up. States around , patient's bilateral lower extremities started to swell around the ankles. Patient was seen by her cardiologist, Dr. Rowell, and recommended increasing Lasix to 80mg daily for one week. Patient states symptoms persisted, and patient only lost 1 lb. Reports increased weight from 108 to 114 in the past 2 weeks. Patient was scheduled for an appointment with Dr. Rowell today but office staff rescheduled her appointment. She gave patient an additional 20mg Lasix this evening, total of 80mg oral Lasix. Daughter also reports seeing Dr. Temple, hematology, for anemia two weeks ago and was told patient's hemoglobin was around 9 and should be monitored closely. She also states, patient had an episode of "pink urine" in her diaper today prompting her to come to the ED for evaluation. Per daughter, patient has not reported burning with urination, dysuria. Reports increased frequency with Lasix use. Per daughter, patient at baseline is A&Ox2 and ambulates with a walker.     In the ED: temp 98.1, HR 76, /55, RR 16, SpO2 93% RA, repeat 98% RA. H/H: 8.9/26.7, Na 127, BUN/Cr: 39/1.1, pro-BNP: 5718. UA positive for moderate LEC, 11-25 WBC, 11-25 RBC. Received IV Lasix 40mg x1. Chest xray (pre-sequeira read): increased intersitial markings, questionable infiltrate in L lobe, small L pleural effusion. EKG: NSR at 67. (2018 00:56)    Heme asked to see pt for progressive microcytic anemia.   Pt Hgb 11 ealrier in 2018 with normal MCV.   Hx of low normal B12 prior.   Denies bleeding, no melena, no hematochezia.   Ambulated without walker or cane per pt.   Saw Dr Temple in office recently.   18 Hgb 11.0, hct 32.6, Plt 215, MCV 90.7, Ferritin 63, Fe 79, TIBC 339, sat 23%,, Cr 1.07  10/26/18  Hgb 9.4, hct 29.3, Plt 277, MCV 83.1, ferritin 187, Fe 23, TIBC 197, Sat 12%, Cr 0.94      PAST MEDICAL & SURGICAL HISTORY:  Anemia, unspecified type  Edema of lower extremity  Heart murmur  Hypertension  Hypercalcemia  History of appendectomy  S/P tonsillectomy  H/O parathyroidectomy      HEALTH ISSUES - PROBLEM Dx:  Bacteriuria, asymptomatic: Bacteriuria, asymptomatic  Prophylactic measure: Prophylactic measure  HTN (hypertension): HTN (hypertension)  Anemia, unspecified type: Anemia, unspecified type  SABRINA (acute kidney injury): SABRINA (acute kidney injury)  Abnormal chest xray: Abnormal chest xray  Asymptomatic bacteriuria: Asymptomatic bacteriuria  Hyponatremia: Hyponatremia  Leg swelling: Leg swelling  Acute on chronic congestive heart failure, unspecified heart failure type: Acute on chronic congestive heart failure, unspecified heart failure type          FAMILY HISTORY:  No pertinent family history in first degree relatives  mother   father       [SOCIAL HISTORY: ]     smoking:  denies     EtOH:  denies     illicit drugs:  denies     occupation:  retired     marital status:       Other:       [ALLERGIES/INTOLERANCES:]  Allergies     Vasotec (Unknown)  Intolerances        [MEDICATIONS]  MEDICATIONS  (STANDING):  carvedilol 12.5 milliGRAM(s) Oral every 12 hours  cholecalciferol 1000 Unit(s) Oral daily  cinacalcet 30 milliGRAM(s) Oral <User Schedule>  doxazosin 4 milliGRAM(s) Oral <User Schedule>  ferrous    sulfate 325 milliGRAM(s) Oral daily  furosemide   Injectable 40 milliGRAM(s) IV Push once  heparin  Injectable 5000 Unit(s) SubCutaneous every 12 hours  hydrALAZINE 75 milliGRAM(s) Oral every 8 hours    MEDICATIONS  (PRN):        [REVIEW OF SYSTEMS: ]  CONSTITUTIONAL: normal, no fever, no shakes, no chills   EYES: No eye pain, no visual disturbances, no discharge  ENMT:  no discharge  NECK: No pain, no stiffness  BREASTS: No pain, no masses, no nipple discharge  RESPIRATORY: No cough, no wheezing, no chills, no hemoptysis; No shortness of breath  CARDIOVASCULAR: No chest pain, no palpitations, no dizziness, no leg swelling  GASTROINTESTINAL: No abdominal or epigastric pain. No nausea, no vomiting, no hematemesis; No diarrhea , no constipation. No melena, no hematochezia.  GENITOURINARY: No dysuria, no frequency, no hematuria, no incontinence  NEUROLOGICAL: No headaches, no memory loss, no loss of strength, no numbness, no tremors  SKIN: No itching, no burning, no rashes, no lesions   LYMPH NODES: No enlarged glands  ENDOCRINE: No heat or cold intolerance; No hair loss  MUSCULOSKELETAL: No joint pain or swelling; No muscle, no back, no extremity pain  PSYCHIATRIC: No depression, no anxiety, no mood swings, no difficulty sleeping  HEME/LYMPH: No easy bruising, no bleeding gums    [VITALS SIGNS 24hrs]  Vital Signs Last 24 Hrs  T(C): 36.4 (2018 07:18), Max: 36.7 (2018 21:41)  T(F): 97.6 (2018 07:18), Max: 98.1 (2018 21:41)  HR: 70 (2018 07:18) (70 - 95)  BP: 110/69 (2018 07:18) (110/69 - 157/73)  BP(mean): --  RR: 18 (2018 07:18) (15 - 18)  SpO2: 92% (2018 07:18) (92% - 98%)    [PHYSICAL EXAM]  General: adult in NAD,  WN,  WD.  HEENT: clear oropharynx, anicteric sclera, pink conjunctivae.  Neck: supple, no masses.  CV: normal S1S2, no murmur, no rubs, no gallops. 2/6 ALE  Lungs: clear to auscultation, no wheezes, no rales, no rhonchi.  Abdomen: soft, non-tender, non-distended, no hepatosplenomegaly, normal BS, no guarding.  Ext: no clubbing, no cyanosis, + pitting edema.  Skin: no rashes,  no petechiae, no venous stasis changes.  Neuro: alert and oriented X2, no focal motor deficits.  LN: no SC EDISON.      [LABS:]                        8.9    9.59  )-----------( 270      ( 2018 22:12 )             26.7     CBC Full  -  ( 2018 22:12 )  WBC Count : 9.59 K/uL  Hemoglobin : 8.9 g/dL  Hematocrit : 26.7 %  Platelet Count - Automated : 270 K/uL  Mean Cell Volume : 79.7 fl  Mean Cell Hemoglobin : 26.6 pg  Mean Cell Hemoglobin Concentration : 33.3 gm/dL  Auto Neutrophil # : 8.14 K/uL  Auto Lymphocyte # : 0.77 K/uL  Auto Monocyte # : 0.40 K/uL  Auto Eosinophil # : 0.14 K/uL  Auto Basophil # : 0.03 K/uL  Auto Neutrophil % : 84.9 %  Auto Lymphocyte % : 8.0 %  Auto Monocyte % : 4.2 %  Auto Eosinophil % : 1.5 %  Auto Basophil % : 0.3 %        127<L>  |  92<L>  |  39<H>  ----------------------------<  102<H>  3.9   |  28  |  1.10    Ca    7.0<L>      2018 22:12    TPro  6.5  /  Alb  2.4<L>  /  TBili  0.4  /  DBili  x   /  AST  28  /  ALT  22  /  AlkPhos  71      PT/INR - ( 2018 22:12 )   PT: 14.3 sec;   INR: 1.26 ratio         PTT - ( 2018 22:12 )  PTT:29.8 sec  LIVER FUNCTIONS - ( 2018 22:12 )  Alb: 2.4 g/dL / Pro: 6.5 g/dL / ALK PHOS: 71 U/L / ALT: 22 U/L / AST: 28 U/L / GGT: x             Urinalysis Basic - ( 2018 01:17 )  Color: Yellow / Appearance: Clear / S.010 / pH: x  Gluc: x / Ketone: Negative  / Bili: Negative / Urobili: Negative   Blood: x / Protein: 25 mg/dL / Nitrite: Negative   Leuk Esterase: Moderate / RBC: 11-25 /HPF / WBC 25   Sq Epi: x / Non Sq Epi: Few / Bacteria: Many      WBC  TREND (5 Days)  WBC Count: 9.59 K/uL ( @ 22:12)    HGB  TREND (5 Days)  Hemoglobin: 8.9 g/dL ( @ 22:12)    HCT  TREND (5 Days)  Hematocrit: 26.7 % ( @ 22:12)    PLT  TREND (5 Days)  Platelet Count - Automated: 270 K/uL ( @ 22:12)      OLD LABS    Vitamin B12, Serum in AM (17 @ 13:47)    Vitamin B12, Serum: 425 pg/mL    Folate, Serum in AM (17 @ 13:47)    Folate, Serum: 11.5 ng/mL    Iron with Total Binding Capacity in AM (17 @ 20:07)    % Saturation, Iron: 22 %    Iron - Total Binding Capacity.: 291 ug/dL    Iron Total, Serum: 63 ug/dL    Unsaturated Iron Binding Capacity: 228 ug/dL    Ferritin, Serum in AM (17 @ 20:07)    Ferritin, Serum: 123.0 ng/mL                  Microbiology        EKG: NSR @ 67 bpm        [BLOOD SMEAR INTERPRETATION: ]  RBC:   WBC:   Plts:     [RADIOLOGY & ADDITIONAL STUDIES:]  < from: Xray Chest 1 View- PORTABLE-Urgent (18 @ 16:23) >  Cardiomegaly and slight prominence of the interstitial and/or   vascular markings possibly due to mild CHF. Left basilar opacity,   possibly representing subsegmental atelectasis, small effusion or small   infiltrate. Follow-up is recommended.  < end of copied text > INCOMPLETE  NOTE:   Pt seen and evaluated. Full note to follow.   CONSULTATION & DOCUMENTATION IN PROGRESS      Patient is a 89y old  Female who presents with a chief complaint of acute on chronic CHF (2018 09:51)      HPI:  89F with PMH of diastolic CHF (TTE 2017 EF 65%), HTN, anemia presents with increased bilateral leg swelling. History obtained from daughter at bedside, requesting to not having patient woken up. States around , patient's bilateral lower extremities started to swell around the ankles. Patient was seen by her cardiologist, Dr. Rowell, and recommended increasing Lasix to 80mg daily for one week. Patient states symptoms persisted, and patient only lost 1 lb. Reports increased weight from 108 to 114 in the past 2 weeks. Patient was scheduled for an appointment with Dr. Rowell today but office staff rescheduled her appointment. She gave patient an additional 20mg Lasix this evening, total of 80mg oral Lasix. Daughter also reports seeing Dr. Temple, hematology, for anemia two weeks ago and was told patient's hemoglobin was around 9 and should be monitored closely. She also states, patient had an episode of "pink urine" in her diaper today prompting her to come to the ED for evaluation. Per daughter, patient has not reported burning with urination, dysuria. Reports increased frequency with Lasix use. Per daughter, patient at baseline is A&Ox2 and ambulates with a walker.     In the ED: temp 98.1, HR 76, /55, RR 16, SpO2 93% RA, repeat 98% RA. H/H: 8.9/26.7, Na 127, BUN/Cr: 39/1.1, pro-BNP: 5718. UA positive for moderate LEC, 11-25 WBC, 11-25 RBC. Received IV Lasix 40mg x1. Chest xray (pre-sequeira read): increased intersitial markings, questionable infiltrate in L lobe, small L pleural effusion. EKG: NSR at 67. (2018 00:56)    Heme asked to see pt for progressive microcytic anemia.   Pt Hgb 11 ealrier in 2018 with normal MCV.   Hx of low normal B12 prior.   Denies bleeding, no melena, no hematochezia.   Ambulated without walker or cane per pt.   Saw Dr Temple in office recently.   18 Hgb 11.0, hct 32.6, Plt 215, MCV 90.7, Ferritin 63, Fe 79, TIBC 339, sat 23%,, Cr 1.07  10/26/18  Hgb 9.4, hct 29.3, Plt 277, MCV 83.1, ferritin 187, Fe 23, TIBC 197, Sat 12%, Cr 0.94      PAST MEDICAL & SURGICAL HISTORY:  Anemia, unspecified type  Edema of lower extremity  Heart murmur  Hypertension  Hypercalcemia  History of appendectomy  S/P tonsillectomy  H/O parathyroidectomy      HEALTH ISSUES - PROBLEM Dx:  Bacteriuria, asymptomatic: Bacteriuria, asymptomatic  Prophylactic measure: Prophylactic measure  HTN (hypertension): HTN (hypertension)  Anemia, unspecified type: Anemia, unspecified type  SABRINA (acute kidney injury): SABRINA (acute kidney injury)  Abnormal chest xray: Abnormal chest xray  Asymptomatic bacteriuria: Asymptomatic bacteriuria  Hyponatremia: Hyponatremia  Leg swelling: Leg swelling  Acute on chronic congestive heart failure, unspecified heart failure type: Acute on chronic congestive heart failure, unspecified heart failure type          FAMILY HISTORY:  No pertinent family history in first degree relatives  mother   father       [SOCIAL HISTORY: ]     smoking:  denies     EtOH:  denies     illicit drugs:  denies     occupation:  retired     marital status:       Other:       [ALLERGIES/INTOLERANCES:]  Allergies     Vasotec (Unknown)  Intolerances        [MEDICATIONS]  MEDICATIONS  (STANDING):  carvedilol 12.5 milliGRAM(s) Oral every 12 hours  cholecalciferol 1000 Unit(s) Oral daily  cinacalcet 30 milliGRAM(s) Oral <User Schedule>  doxazosin 4 milliGRAM(s) Oral <User Schedule>  ferrous    sulfate 325 milliGRAM(s) Oral daily  furosemide   Injectable 40 milliGRAM(s) IV Push once  heparin  Injectable 5000 Unit(s) SubCutaneous every 12 hours  hydrALAZINE 75 milliGRAM(s) Oral every 8 hours    MEDICATIONS  (PRN):        [REVIEW OF SYSTEMS: ]  CONSTITUTIONAL: +fatigue, no fever, no shakes, no chills   EYES: No eye pain, no visual disturbances, no discharge  ENMT:  no discharge  NECK: No pain, no stiffness  BREASTS: No pain, no masses, no nipple discharge  RESPIRATORY: No cough, no wheezing, no chills, no hemoptysis; +shortness of breath  CARDIOVASCULAR: No chest pain, no palpitations, no dizziness, no leg swelling  GASTROINTESTINAL: No abdominal or epigastric pain. No nausea, no vomiting, no hematemesis; No diarrhea , no constipation. No melena, no hematochezia.  GENITOURINARY: No dysuria, no frequency, +hematuria, no incontinence  NEUROLOGICAL: No headaches, no memory loss, no loss of strength, no numbness, no tremors  SKIN: No itching, no burning, no rashes, no lesions   LYMPH NODES: No enlarged glands  ENDOCRINE: No heat or cold intolerance; No hair loss  MUSCULOSKELETAL: No joint pain or swelling; No muscle, no back, no extremity pain  PSYCHIATRIC: No depression, no anxiety, no mood swings, no difficulty sleeping  HEME/LYMPH: No easy bruising, no bleeding gums    [VITALS SIGNS 24hrs]  Vital Signs Last 24 Hrs  T(C): 36.4 (2018 07:18), Max: 36.7 (2018 21:41)  T(F): 97.6 (2018 07:18), Max: 98.1 (2018 21:41)  HR: 70 (2018 07:18) (70 - 95)  BP: 110/69 (2018 07:18) (110/69 - 157/73)  BP(mean): --  RR: 18 (2018 07:18) (15 - 18)  SpO2: 92% (2018 07:18) (92% - 98%)    [PHYSICAL EXAM]  General: adult in NAD,  WN,  WD.  HEENT: clear oropharynx, anicteric sclera, pink conjunctivae.  Neck: supple, no masses.  CV: normal S1S2, no murmur, no rubs, no gallops. 2/6 ALE  Lungs: clear to auscultation, no wheezes, no rales, no rhonchi.  Abdomen: soft, non-tender, non-distended, no hepatosplenomegaly, normal BS, no guarding.  Ext: no clubbing, no cyanosis, + pitting edema.  Skin: no rashes,  no petechiae, no venous stasis changes.  Neuro: alert and oriented X2, no focal motor deficits.  LN: no SC EDISON.      [LABS:]                        8.9    9.59  )-----------( 270      ( 2018 22:12 )             26.7     CBC Full  -  ( 2018 22:12 )  WBC Count : 9.59 K/uL  Hemoglobin : 8.9 g/dL  Hematocrit : 26.7 %  Platelet Count - Automated : 270 K/uL  Mean Cell Volume : 79.7 fl  Mean Cell Hemoglobin : 26.6 pg  Mean Cell Hemoglobin Concentration : 33.3 gm/dL  Auto Neutrophil # : 8.14 K/uL  Auto Lymphocyte # : 0.77 K/uL  Auto Monocyte # : 0.40 K/uL  Auto Eosinophil # : 0.14 K/uL  Auto Basophil # : 0.03 K/uL  Auto Neutrophil % : 84.9 %  Auto Lymphocyte % : 8.0 %  Auto Monocyte % : 4.2 %  Auto Eosinophil % : 1.5 %  Auto Basophil % : 0.3 %        127<L>  |  92<L>  |  39<H>  ----------------------------<  102<H>  3.9   |  28  |  1.10    Ca    7.0<L>      2018 22:12    TPro  6.5  /  Alb  2.4<L>  /  TBili  0.4  /  DBili  x   /  AST  28  /  ALT  22  /  AlkPhos  71      PT/INR - ( 2018 22:12 )   PT: 14.3 sec;   INR: 1.26 ratio         PTT - ( 2018 22:12 )  PTT:29.8 sec  LIVER FUNCTIONS - ( 2018 22:12 )  Alb: 2.4 g/dL / Pro: 6.5 g/dL / ALK PHOS: 71 U/L / ALT: 22 U/L / AST: 28 U/L / GGT: x             Urinalysis Basic - ( 2018 01:17 )  Color: Yellow / Appearance: Clear / S.010 / pH: x  Gluc: x / Ketone: Negative  / Bili: Negative / Urobili: Negative   Blood: x / Protein: 25 mg/dL / Nitrite: Negative   Leuk Esterase: Moderate / RBC: 11-25 /HPF / WBC 25   Sq Epi: x / Non Sq Epi: Few / Bacteria: Many      WBC  TREND (5 Days)  WBC Count: 9.59 K/uL ( @ 22:12)    HGB  TREND (5 Days)  Hemoglobin: 8.9 g/dL ( @ 22:12)    HCT  TREND (5 Days)  Hematocrit: 26.7 % ( @ 22:12)    PLT  TREND (5 Days)  Platelet Count - Automated: 270 K/uL ( @ 22:12)      OLD LABS    Vitamin B12, Serum in AM (17 @ 13:47)    Vitamin B12, Serum: 425 pg/mL    Folate, Serum in AM (17 @ 13:47)    Folate, Serum: 11.5 ng/mL    Iron with Total Binding Capacity in AM (17 @ 20:07)    % Saturation, Iron: 22 %    Iron - Total Binding Capacity.: 291 ug/dL    Iron Total, Serum: 63 ug/dL    Unsaturated Iron Binding Capacity: 228 ug/dL    Ferritin, Serum in AM (17 @ 20:07)    Ferritin, Serum: 123.0 ng/mL                   EKG: NSR @ 67 bpm        [BLOOD SMEAR INTERPRETATION: ]  RBC:   WBC:   Plts:     [RADIOLOGY & ADDITIONAL STUDIES:]  < from: Xray Chest 1 View- PORTABLE-Urgent (18 @ 16:23) >  Cardiomegaly and slight prominence of the interstitial and/or   vascular markings possibly due to mild CHF. Left basilar opacity,   possibly representing subsegmental atelectasis, small effusion or small   infiltrate. Follow-up is recommended.  < end of copied text > Patient is a 89y old  Female who presents with a chief complaint of acute on chronic CHF (2018 09:51)      HPI:  89F with PMH of diastolic CHF (TTE 2017 EF 65%), HTN, anemia presents with increased bilateral leg swelling. History obtained from daughter at bedside, requesting to not having patient woken up. States around , patient's bilateral lower extremities started to swell around the ankles. Patient was seen by her cardiologist, Dr. Rowell, and recommended increasing Lasix to 80mg daily for one week. Patient states symptoms persisted, and patient only lost 1 lb. Reports increased weight from 108 to 114 in the past 2 weeks. Patient was scheduled for an appointment with Dr. Rowell today but office staff rescheduled her appointment. She gave patient an additional 20mg Lasix this evening, total of 80mg oral Lasix. Daughter also reports seeing Dr. Temple, hematology, for anemia two weeks ago and was told patient's hemoglobin was around 9 and should be monitored closely. She also states, patient had an episode of "pink urine" in her diaper today prompting her to come to the ED for evaluation. Per daughter, patient has not reported burning with urination, dysuria. Reports increased frequency with Lasix use. Per daughter, patient at baseline is A&Ox2 and ambulates with a walker.     In the ED: temp 98.1, HR 76, /55, RR 16, SpO2 93% RA, repeat 98% RA. H/H: 8.9/26.7, Na 127, BUN/Cr: 39/1.1, pro-BNP: 5718. UA positive for moderate LEC, 11-25 WBC, 11-25 RBC. Received IV Lasix 40mg x1. Chest xray (pre-sequeira read): increased intersitial markings, questionable infiltrate in L lobe, small L pleural effusion. EKG: NSR at 67. (2018 00:56)    Heme asked to see pt for progressive microcytic anemia.   Pt Hgb 11 ealrier in 2018 with normal MCV.   Hx of low normal B12 prior.   Denies bleeding, no melena, no hematochezia.   Ambulated without walker or cane per pt.   Saw Dr Temple in office recently.   18 Hgb 11.0, hct 32.6, Plt 215, MCV 90.7, Ferritin 63, Fe 79, TIBC 339, sat 23%,, Cr 1.07  10/26/18  Hgb 9.4, hct 29.3, Plt 277, MCV 83.1, ferritin 187, Fe 23, TIBC 197, Sat 12%, Cr 0.94      PAST MEDICAL & SURGICAL HISTORY:  Anemia, unspecified type  Edema of lower extremity  Heart murmur  Hypertension  Hypercalcemia  History of appendectomy  S/P tonsillectomy  H/O parathyroidectomy      HEALTH ISSUES - PROBLEM Dx:  Bacteriuria, asymptomatic: Bacteriuria, asymptomatic  Prophylactic measure: Prophylactic measure  HTN (hypertension): HTN (hypertension)  Anemia, unspecified type: Anemia, unspecified type  SABRINA (acute kidney injury): SABRINA (acute kidney injury)  Abnormal chest xray: Abnormal chest xray  Asymptomatic bacteriuria: Asymptomatic bacteriuria  Hyponatremia: Hyponatremia  Leg swelling: Leg swelling  Acute on chronic congestive heart failure, unspecified heart failure type: Acute on chronic congestive heart failure, unspecified heart failure type          FAMILY HISTORY:  No pertinent family history in first degree relatives  mother   father       [SOCIAL HISTORY: ]     smoking:  denies     EtOH:  denies     illicit drugs:  denies     occupation:  retired     marital status:       Other:       [ALLERGIES/INTOLERANCES:]  Allergies     Vasotec (Unknown)  Intolerances        [MEDICATIONS]  MEDICATIONS  (STANDING):  carvedilol 12.5 milliGRAM(s) Oral every 12 hours  cholecalciferol 1000 Unit(s) Oral daily  cinacalcet 30 milliGRAM(s) Oral <User Schedule>  doxazosin 4 milliGRAM(s) Oral <User Schedule>  ferrous    sulfate 325 milliGRAM(s) Oral daily  furosemide   Injectable 40 milliGRAM(s) IV Push once  heparin  Injectable 5000 Unit(s) SubCutaneous every 12 hours  hydrALAZINE 75 milliGRAM(s) Oral every 8 hours    MEDICATIONS  (PRN):        [REVIEW OF SYSTEMS: ]  CONSTITUTIONAL: +fatigue, no fever, no shakes, no chills   EYES: No eye pain, no visual disturbances, no discharge  ENMT:  no discharge  NECK: No pain, no stiffness  BREASTS: No pain, no masses, no nipple discharge  RESPIRATORY: No cough, no wheezing, no chills, no hemoptysis; +shortness of breath  CARDIOVASCULAR: No chest pain, no palpitations, no dizziness, no leg swelling  GASTROINTESTINAL: No abdominal or epigastric pain. No nausea, no vomiting, no hematemesis; No diarrhea , no constipation. No melena, no hematochezia.  GENITOURINARY: No dysuria, no frequency, +hematuria, no incontinence  NEUROLOGICAL: No headaches, no memory loss, no loss of strength, no numbness, no tremors  SKIN: No itching, no burning, no rashes, no lesions   LYMPH NODES: No enlarged glands  ENDOCRINE: No heat or cold intolerance; No hair loss  MUSCULOSKELETAL: No joint pain or swelling; No muscle, no back, no extremity pain  PSYCHIATRIC: No depression, no anxiety, no mood swings, no difficulty sleeping  HEME/LYMPH: No easy bruising, no bleeding gums    [VITALS SIGNS 24hrs]  Vital Signs Last 24 Hrs  T(C): 36.4 (2018 07:18), Max: 36.7 (2018 21:41)  T(F): 97.6 (2018 07:18), Max: 98.1 (2018 21:41)  HR: 70 (2018 07:18) (70 - 95)  BP: 110/69 (2018 07:18) (110/69 - 157/73)  BP(mean): --  RR: 18 (2018 07:18) (15 - 18)  SpO2: 92% (2018 07:18) (92% - 98%)    [PHYSICAL EXAM]  General: adult in NAD,  WN,  WD.  HEENT: clear oropharynx, anicteric sclera, pink conjunctivae.  Neck: supple, no masses.  CV: normal S1S2, no murmur, no rubs, no gallops. 2/6 ALE  Lungs: clear to auscultation, no wheezes, no rales, no rhonchi.  Abdomen: soft, non-tender, non-distended, no hepatosplenomegaly, normal BS, no guarding.  Ext: no clubbing, no cyanosis, + pitting edema.  Skin: no rashes,  no petechiae, no venous stasis changes.  Neuro: alert and oriented X2, no focal motor deficits.  LN: no SC EDISON.      [LABS:]                        8.9    9.59  )-----------( 270      ( 2018 22:12 )             26.7     CBC Full  -  ( 2018 22:12 )  WBC Count : 9.59 K/uL  Hemoglobin : 8.9 g/dL  Hematocrit : 26.7 %  Platelet Count - Automated : 270 K/uL  Mean Cell Volume : 79.7 fl  Mean Cell Hemoglobin : 26.6 pg  Mean Cell Hemoglobin Concentration : 33.3 gm/dL  Auto Neutrophil # : 8.14 K/uL  Auto Lymphocyte # : 0.77 K/uL  Auto Monocyte # : 0.40 K/uL  Auto Eosinophil # : 0.14 K/uL  Auto Basophil # : 0.03 K/uL  Auto Neutrophil % : 84.9 %  Auto Lymphocyte % : 8.0 %  Auto Monocyte % : 4.2 %  Auto Eosinophil % : 1.5 %  Auto Basophil % : 0.3 %        127<L>  |  92<L>  |  39<H>  ----------------------------<  102<H>  3.9   |  28  |  1.10    Ca    7.0<L>      2018 22:12    TPro  6.5  /  Alb  2.4<L>  /  TBili  0.4  /  DBili  x   /  AST  28  /  ALT  22  /  AlkPhos  71      PT/INR - ( 2018 22:12 )   PT: 14.3 sec;   INR: 1.26 ratio         PTT - ( 2018 22:12 )  PTT:29.8 sec  LIVER FUNCTIONS - ( 2018 22:12 )  Alb: 2.4 g/dL / Pro: 6.5 g/dL / ALK PHOS: 71 U/L / ALT: 22 U/L / AST: 28 U/L / GGT: x             Urinalysis Basic - ( 2018 01:17 )  Color: Yellow / Appearance: Clear / S.010 / pH: x  Gluc: x / Ketone: Negative  / Bili: Negative / Urobili: Negative   Blood: x / Protein: 25 mg/dL / Nitrite: Negative   Leuk Esterase: Moderate / RBC: 11-25 /HPF / WBC    Sq Epi: x / Non Sq Epi: Few / Bacteria: Many      WBC  TREND (5 Days)  WBC Count: 9.59 K/uL ( @ 22:12)    HGB  TREND (5 Days)  Hemoglobin: 8.9 g/dL ( @ 22:12)    HCT  TREND (5 Days)  Hematocrit: 26.7 % ( @ 22:12)    PLT  TREND (5 Days)  Platelet Count - Automated: 270 K/uL ( @ 22:12)      OLD LABS    Vitamin B12, Serum in AM (17 @ 13:47)    Vitamin B12, Serum: 425 pg/mL    Folate, Serum in AM (17 @ 13:47)    Folate, Serum: 11.5 ng/mL    Iron with Total Binding Capacity in AM (17 @ 20:07)    % Saturation, Iron: 22 %    Iron - Total Binding Capacity.: 291 ug/dL    Iron Total, Serum: 63 ug/dL    Unsaturated Iron Binding Capacity: 228 ug/dL    Ferritin, Serum in AM (17 @ 20:07)    Ferritin, Serum: 123.0 ng/mL                   EKG: NSR @ 67 bpm        [BLOOD SMEAR INTERPRETATION: ]  RBC: normocytic, normochromic, with rare microcytic/hypochromic RBC, rare polychromatic/macrocytic RBC. No schistocytes/targets  WBC: adequate, unremarkable segs/lymphs/monos. Rare eos. No blasts. No toxic granulaiton  Plts: adequate, no clumps, no large plts, no giant plts.     [RADIOLOGY & ADDITIONAL STUDIES:]  < from: Xray Chest 1 View- PORTABLE-Urgent (18 @ 16:23) >  Cardiomegaly and slight prominence of the interstitial and/or   vascular markings possibly due to mild CHF. Left basilar opacity,   possibly representing subsegmental atelectasis, small effusion or small   infiltrate. Follow-up is recommended.  < end of copied text >

## 2018-11-09 NOTE — CONSULT NOTE ADULT - SUBJECTIVE AND OBJECTIVE BOX
[facility]         Christofer Isaacs, Sorin, Marisa, Jr, Hieu, Sandro        [phone] (office)    CHIEF COMPLAINT: Patient is a 89y old  Female who presents with a chief complaint of acute on chronic CHF (09 Nov 2018 00:56)      HPI:  89F with PMH of diastolic CHF (TTE July 2017 EF 65%), HTN, anemia presents with increased bilateral leg swelling. History obtained from daughter at bedside, requesting to not having patient woken up. States around October 18/2018, patient's bilateral lower extremities started to swell around the ankles. Patient was seen by her cardiologist, Dr. Rowell, and recommended increasing Lasix to 80mg daily for one week. Patient states symptoms persisted, and patient only lost 1 lb. Reports increased weight from 108 to 114 in the past 2 weeks. Patient was scheduled for an appointment with Dr. Rowell today but office staff rescheduled her appointment. She gave patient an additional 20mg Lasix this evening, total of 80mg oral Lasix. Daughter also reports seeing Dr. Temple, hematology, for anemia two weeks ago and was told patient's hemoglobin was around 9 and should be monitored closely. She also states, patient had an episode of "pink urine" in her diaper today prompting her to come to the ED for evaluation. Per daughter, patient has not reported burning with urination, dysuria. Reports increased frequency with Lasix use. Per daughter, patient at baseline is A&Ox2 and ambulates with a walker.   In the ED: temp 98.1, HR 76, /55, RR 16, SpO2 93% RA, repeat 98% RA. H/H: 8.9/26.7, Na 127, BUN/Cr: 39/1.1, pro-BNP: 5718. UA positive for moderate LEC, 11-25 WBC, 11-25 RBC. Received IV Lasix 40mg x1. Chest xray (pre-sequeira read): increased intersitial markings, questionable infiltrate in L lobe, small L pleural effusion. EKG: NSR at 67. (09 Nov 2018 00:56)      Patient seen and examined with daughter Eula at bedside this morning. Daughter reported that patient has had increasing bilateral LE swelling since mid October. She normally takes Lasix 60 mg daily, was given Lasix 80 mg for one week at the end of October. Daughter reports that the increased dose may have helped slightly. When the dose went back to Lasix 60 mg, daughter noted worsening of the edema, especially in the left leg. Daughter reported that the left leg became red and swollen and doppler was done on 10/29, showed no clot. Patient denied having any SOB but has been sitting up on her recliner early in the mornings. She still sleeps comfortably with 1 pillow, lying flat.    Patient also has had increased frequency of urination and an episode of pink urine in her diaper yesterday. Patient also follows with heme/onc Dr Temple as she has a history of anemia, about 2 weeks ago her Hb was 9.1 and she was told to watch the Hb closely. Daughter noted that her mother has been sleeping much more than usual for the past few weeks.      PAST MEDICAL & SURGICAL HISTORY:  Anemia, unspecified type  Edema of lower extremity  Heart murmur  Hypertension  Hypercalcemia  History of appendectomy  S/P tonsillectomy  H/O parathyroidectomy      SOCIAL HISTORY: No active tobacco, alcohol or illicit drug use    FAMILY HISTORY:  No pertinent family history in first degree relatives      Outpatient medications:  carvedilol 12.5 mg oral tablet: 1 tab(s) orally every 12 hours (09 Nov 2018 03:30)  doxazosin 4 mg oral tablet: 1 tab(s) orally once a day (at bedtime) (09 Nov 2018 03:30)  ferrous sulfate 324 mg (65 mg elemental iron) oral delayed release tablet: 1 tab(s) orally once a day (09 Nov 2018 03:30)  furosemide 20 mg oral tablet: 3 tab(s) orally once a day (09 Nov 2018 03:30)  potassium: 5 milliliter(s) orally once a day (09 Nov 2018 03:30)  Sensipar 30 mg oral tablet: 1 tab(s) orally once a day (at bedtime) (09 Nov 2018 03:30)  Vitamin D3 1000 intl units oral capsule: 1 cap(s) orally once a day (09 Nov 2018 03:30)    MEDICATIONS  (STANDING):  carvedilol 12.5 milliGRAM(s) Oral every 12 hours  cholecalciferol 1000 Unit(s) Oral daily  cinacalcet 30 milliGRAM(s) Oral <User Schedule>  doxazosin 4 milliGRAM(s) Oral <User Schedule>  ferrous    sulfate 325 milliGRAM(s) Oral daily  furosemide   Injectable 40 milliGRAM(s) IV Push once  heparin  Injectable 5000 Unit(s) SubCutaneous every 12 hours  hydrALAZINE 75 milliGRAM(s) Oral every 8 hours    MEDICATIONS  (PRN):      Allergies    Vasotec (Unknown)    Intolerances        REVIEW OF SYSTEMS: Is negative for eye, ENT, GI, , allergic, dermatologic, musculoskeletal and neurologic, except as described above.    VITAL SIGNS:   Vital Signs Last 24 Hrs  T(C): 36.4 (09 Nov 2018 07:18), Max: 36.7 (08 Nov 2018 21:41)  T(F): 97.6 (09 Nov 2018 07:18), Max: 98.1 (08 Nov 2018 21:41)  HR: 70 (09 Nov 2018 07:18) (70 - 95)  BP: 110/69 (09 Nov 2018 07:18) (110/69 - 157/73)  BP(mean): --  RR: 18 (09 Nov 2018 07:18) (15 - 18)  SpO2: 92% (09 Nov 2018 07:18) (92% - 98%)    I&O's Summary    08 Nov 2018 07:01  -  09 Nov 2018 07:00  --------------------------------------------------------  IN: 200 mL / OUT: 500 mL / NET: -300 mL        PHYSICAL EXAM:  Constitutional: NAD, awake and alert, well-developed  Eyes: EOMI, no oral cyanosis, conjunctivae clear, anicteric  Pulmonary: Non-labored, breath sounds are clear bilaterally, no wheezing, rales or rhonchi  Cardiovascular: Regular S1 and S2, normal rate. Systolic murmur. No rubs, gallops or clicks  Gastrointestinal: Bowel Sounds present, soft, nontender  Lymph: +2 Pitting edema in bilateral LE (L > R)  Neurological: Alert, strength and sensitivity are grossly intact  Skin: No obvious lesions/rashes  Psych:  Mood & affect appropriate    LABS: All Labs Reviewed:                        8.9    9.59  )-----------( 270      ( 08 Nov 2018 22:12 )             26.7     08 Nov 2018 22:12    127    |  92     |  39     ----------------------------<  102    3.9     |  28     |  1.10     Ca    7.0        08 Nov 2018 22:12    TPro  6.5    /  Alb  2.4    /  TBili  0.4    /  DBili  x      /  AST  28     /  ALT  22     /  AlkPhos  71     08 Nov 2018 22:12    PT/INR - ( 08 Nov 2018 22:12 )   PT: 14.3 sec;   INR: 1.26 ratio    PTT - ( 08 Nov 2018 22:12 )  PTT:29.8 sec    Blood Culture:   11-08 @ 22:12  Pro Bnp [id number]      RADIOLOGY:  < from: Xray Chest 1 View- PORTABLE-Urgent (02.24.18 @ 16:23) >  Cardiomegaly and slight prominence of the interstitial and/or   vascular markings possibly due to mild CHF. Left basilar opacity,   possibly representing subsegmental atelectasis, small effusion or small   infiltrate. Follow-up is recommended.  < end of copied text >    EKG: NSR @ 67 bpm

## 2018-11-09 NOTE — CONSULT NOTE ADULT - ATTENDING COMMENTS
Seen/examined. Agree with above. Acute on chronic diastolic heart failure.  IV lasix bid. Prior echo with diastolic dysfunction.

## 2018-11-09 NOTE — H&P ADULT - PROBLEM SELECTOR PLAN 2
Unilateral L>R leg swelling, daughter reports left leg usually swells more  -Lower extremity dopplers bilaterally to r/o DVT

## 2018-11-09 NOTE — ED ADULT NURSE REASSESSMENT NOTE - NS ED NURSE REASSESS COMMENT FT1
3096 Pt. daughter stated she is upset and calling administration in the morning because pt. remaining in ED. Pt. daughter educated on why pt. was not assigned a bed. Charge nurse made aware, spoke with daughter. Hospital bed provided for comfort, pt. remains holding in ED at this time.

## 2018-11-10 LAB
ANION GAP SERPL CALC-SCNC: 8 MMOL/L — SIGNIFICANT CHANGE UP (ref 5–17)
BUN SERPL-MCNC: 36 MG/DL — HIGH (ref 7–23)
CALCIUM SERPL-MCNC: 7.6 MG/DL — LOW (ref 8.5–10.1)
CHLORIDE SERPL-SCNC: 95 MMOL/L — LOW (ref 96–108)
CO2 SERPL-SCNC: 29 MMOL/L — SIGNIFICANT CHANGE UP (ref 22–31)
CREAT SERPL-MCNC: 1.1 MG/DL — SIGNIFICANT CHANGE UP (ref 0.5–1.3)
CRP SERPL-MCNC: 6.4 MG/DL — HIGH (ref 0–0.4)
ERYTHROCYTE [SEDIMENTATION RATE] IN BLOOD: 31 MM/HR — HIGH (ref 0–20)
FERRITIN SERPL-MCNC: 209 NG/ML — HIGH (ref 15–150)
FERRITIN SERPL-MCNC: 217 NG/ML — HIGH (ref 15–150)
FERRITIN SERPL-MCNC: 221 NG/ML — HIGH (ref 15–150)
FOLATE SERPL-MCNC: 5.8 NG/ML — SIGNIFICANT CHANGE UP
FOLATE SERPL-MCNC: 6 NG/ML — SIGNIFICANT CHANGE UP
GLUCOSE SERPL-MCNC: 126 MG/DL — HIGH (ref 70–99)
HCT VFR BLD CALC: 28.1 % — LOW (ref 34.5–45)
HGB BLD-MCNC: 9.2 G/DL — LOW (ref 11.5–15.5)
IRON SATN MFR SERPL: 16 UG/DL — LOW (ref 30–160)
IRON SATN MFR SERPL: 8 % — LOW (ref 14–50)
MCHC RBC-ENTMCNC: 26.4 PG — LOW (ref 27–34)
MCHC RBC-ENTMCNC: 32.7 GM/DL — SIGNIFICANT CHANGE UP (ref 32–36)
MCV RBC AUTO: 80.5 FL — SIGNIFICANT CHANGE UP (ref 80–100)
NRBC # BLD: 0 /100 WBCS — SIGNIFICANT CHANGE UP (ref 0–0)
PLATELET # BLD AUTO: 277 K/UL — SIGNIFICANT CHANGE UP (ref 150–400)
POTASSIUM SERPL-MCNC: 3.7 MMOL/L — SIGNIFICANT CHANGE UP (ref 3.5–5.3)
POTASSIUM SERPL-SCNC: 3.7 MMOL/L — SIGNIFICANT CHANGE UP (ref 3.5–5.3)
RBC # BLD: 3.49 M/UL — LOW (ref 3.8–5.2)
RBC # FLD: 15.7 % — HIGH (ref 10.3–14.5)
SODIUM SERPL-SCNC: 132 MMOL/L — LOW (ref 135–145)
TIBC SERPL-MCNC: 197 UG/DL — LOW (ref 220–430)
UIBC SERPL-MCNC: 181 UG/DL — SIGNIFICANT CHANGE UP (ref 110–370)
VIT B12 SERPL-MCNC: 450 PG/ML — SIGNIFICANT CHANGE UP (ref 232–1245)
VIT B12 SERPL-MCNC: 489 PG/ML — SIGNIFICANT CHANGE UP (ref 232–1245)
WBC # BLD: 9.62 K/UL — SIGNIFICANT CHANGE UP (ref 3.8–10.5)
WBC # FLD AUTO: 9.62 K/UL — SIGNIFICANT CHANGE UP (ref 3.8–10.5)

## 2018-11-10 PROCEDURE — 99233 SBSQ HOSP IP/OBS HIGH 50: CPT

## 2018-11-10 RX ADMIN — Medication 75 MILLIGRAM(S): at 05:11

## 2018-11-10 RX ADMIN — Medication 40 MILLIGRAM(S): at 05:12

## 2018-11-10 RX ADMIN — Medication 4 MILLIGRAM(S): at 21:04

## 2018-11-10 RX ADMIN — Medication 325 MILLIGRAM(S): at 11:36

## 2018-11-10 RX ADMIN — Medication 75 MILLIGRAM(S): at 12:53

## 2018-11-10 RX ADMIN — HEPARIN SODIUM 5000 UNIT(S): 5000 INJECTION INTRAVENOUS; SUBCUTANEOUS at 05:12

## 2018-11-10 RX ADMIN — CARVEDILOL PHOSPHATE 12.5 MILLIGRAM(S): 80 CAPSULE, EXTENDED RELEASE ORAL at 05:11

## 2018-11-10 RX ADMIN — Medication 1000 UNIT(S): at 11:36

## 2018-11-10 RX ADMIN — CINACALCET 30 MILLIGRAM(S): 30 TABLET, FILM COATED ORAL at 21:04

## 2018-11-10 RX ADMIN — Medication 40 MILLIGRAM(S): at 17:26

## 2018-11-10 RX ADMIN — HEPARIN SODIUM 5000 UNIT(S): 5000 INJECTION INTRAVENOUS; SUBCUTANEOUS at 17:27

## 2018-11-10 RX ADMIN — Medication 75 MILLIGRAM(S): at 21:04

## 2018-11-10 RX ADMIN — CARVEDILOL PHOSPHATE 12.5 MILLIGRAM(S): 80 CAPSULE, EXTENDED RELEASE ORAL at 17:27

## 2018-11-10 NOTE — PROGRESS NOTE ADULT - PROBLEM SELECTOR PLAN 6
Likely anemia of chronic disease v iron def anemia  -Reports going to Dr. Temple, heme/onc, two weeks ago and hemoglobin was around 9   -Continue home iron  -Dr. Valdez, hematologist: Likely intermittent bleeding, with GIB most likely to account for amount blood loss without overt sx.   - possible concurrent B12 def, as prior low normal levels despite normal diet.   -Check FOBT serially, neg X1

## 2018-11-10 NOTE — PROGRESS NOTE ADULT - ASSESSMENT
89F with PMH of diastolic CHF (TTE July 2017 EF 65%), HTN, anemia presented with increasing bilateral LE edema, admitted for acute on chronic CHF.     - No clear evidence of acute ischemia. EKG shows NSR, patient remained in NSR with no acute events on tele monitor.   - improved volume overload. Continue IV diuresis through today, and can switch to po tomorrow morning. Would change today if decline in renal function.  - Monitor strict I&Os and daily weights  - For patient's anemia, continue to monitor H/H  - Previous ECHO in 07/17 shows normal LV systolic function with EF: 65%, mild mitral/tricuspid/pulmonic insufficiency, diastolic dysfunction  - hyponatremia improving with IV diuresis.  - Continue with Coreg and hydralazine for HTN  - Monitor and replete lytes, keep K>4, Mg>2.  - Other cardiovascular workup will depend on clinical course.  - All other workup per primary team.  - Will follow with you

## 2018-11-10 NOTE — PHYSICAL THERAPY INITIAL EVALUATION ADULT - PERTINENT HX OF CURRENT PROBLEM, REHAB EVAL
This is a 89 F with PMH of HTN diastolic CHF (EF 65%) , HTN, anemia. Pt admitted with acute on chronic CHF and LE swelling

## 2018-11-10 NOTE — PROGRESS NOTE ADULT - SUBJECTIVE AND OBJECTIVE BOX
[facility] - Christofer Isaacs, Sorin, Marisa, Jr, Sandro Hagen  Office Number:  [phone]    Patient resting comfortably in bed in NAD.  Laying flat with no respiratory distress.  No complaints of chest pain, dyspnea, palpitations, PND, or orthopnea.    ROS: negative unless otherwise mentioned.    Telemetry:  SR    MEDICATIONS  (STANDING):  carvedilol 12.5 milliGRAM(s) Oral every 12 hours  cholecalciferol 1000 Unit(s) Oral daily  cinacalcet 30 milliGRAM(s) Oral <User Schedule>  doxazosin 4 milliGRAM(s) Oral <User Schedule>  ferrous    sulfate 325 milliGRAM(s) Oral daily  furosemide   Injectable 40 milliGRAM(s) IV Push every 12 hours  heparin  Injectable 5000 Unit(s) SubCutaneous every 12 hours  hydrALAZINE 75 milliGRAM(s) Oral every 8 hours    MEDICATIONS  (PRN):      Allergies    Vasotec (Unknown)    Intolerances        Vital Signs Last 24 Hrs  T(C): 36.7 (10 Nov 2018 04:38), Max: 36.8 (09 Nov 2018 11:18)  T(F): 98.1 (10 Nov 2018 04:38), Max: 98.3 (09 Nov 2018 23:36)  HR: 78 (10 Nov 2018 04:38) (74 - 79)  BP: 151/91 (10 Nov 2018 04:38) (128/65 - 161/71)  BP(mean): --  RR: 17 (10 Nov 2018 04:38) (17 - 18)  SpO2: 93% (10 Nov 2018 04:38) (92% - 93%)    I&O's Summary    09 Nov 2018 07:01  -  10 Nov 2018 07:00  --------------------------------------------------------  IN: 0 mL / OUT: 1200 mL / NET: -1200 mL        ON EXAM:  Constitutional: NAD, awake and alert, well-developed  Eyes: EOMI, no oral cyanosis, conjunctivae clear, anicteric  Pulmonary: Non-labored, breath sounds are clear bilaterally, no wheezing, rales or rhonchi  Cardiovascular: Regular S1 and S2, normal rate. Systolic murmur. No rubs, gallops or clicks  Gastrointestinal: Bowel Sounds present, soft, nontender  Lymph: +1-2 Pitting edema in bilateral LE (L > R)  Neurological: Alert, strength and sensitivity are grossly intact  Skin: No obvious lesions/rashes  Psych:  Mood & affect appropriate    LABS: All Labs Reviewed:                        8.3    9.07  )-----------( 255      ( 09 Nov 2018 10:50 )             25.4                         8.9    9.59  )-----------( 270      ( 08 Nov 2018 22:12 )             26.7     09 Nov 2018 10:50    131    |  94     |  39     ----------------------------<  131    3.4     |  28     |  1.10   08 Nov 2018 22:12    127    |  92     |  39     ----------------------------<  102    3.9     |  28     |  1.10     Ca    7.0        09 Nov 2018 10:50  Ca    7.0        08 Nov 2018 22:12    TPro  6.5    /  Alb  2.4    /  TBili  0.4    /  DBili  x      /  AST  28     /  ALT  22     /  AlkPhos  71     08 Nov 2018 22:12    PT/INR - ( 08 Nov 2018 22:12 )   PT: 14.3 sec;   INR: 1.26 ratio         PTT - ( 08 Nov 2018 22:12 )  PTT:29.8 sec      Blood Culture:   11-08 @ 22:12  Pro Bnp [id number]

## 2018-11-10 NOTE — PROGRESS NOTE ADULT - ASSESSMENT
[ASSESSMENT and  PLAN]  Worsening anemia and now microcytic anemia.   FOBT negative.   Likely intermittent bleeding, with GIB most likely to account for amount blood loss without overt sx    Suspect Fe def, and concurrent anemia chronic disease, possible concurrent B12 def, as prior low normal levels despite normal diet.     Possible component anemia due to CKD.     [RECOMMEND]  Check FOBT serially.   Repeat iron studies, Ferritin, ESR, CRP, Retic, B12, Folate.   Start PO B12 empirically pending levels.   If ferritin <100 with elevated markers of inflammation, consider IV iron  labs are stable and will follow CBC  continue medical/cardiac treatment  to hold venofer and PRBC and observe      I have discussed the above plan of care with patient in detail. They expressed understanding of the treatment plan . Risks, benefits and alternatives discussed in detail. I have asked if they have any questions or concerns and appropriately addressed them; all questions answered to their satisfactions and in lay terms.

## 2018-11-10 NOTE — PROGRESS NOTE ADULT - SUBJECTIVE AND OBJECTIVE BOX
Interval History:  no visible bleeding  breathing is better  has been intermittently confused    Chart reviewed and events noted;   Overnight events:    MEDICATIONS  (STANDING):  carvedilol 12.5 milliGRAM(s) Oral every 12 hours  cholecalciferol 1000 Unit(s) Oral daily  cinacalcet 30 milliGRAM(s) Oral <User Schedule>  doxazosin 4 milliGRAM(s) Oral <User Schedule>  ferrous    sulfate 325 milliGRAM(s) Oral daily  furosemide   Injectable 40 milliGRAM(s) IV Push every 12 hours  heparin  Injectable 5000 Unit(s) SubCutaneous every 12 hours  hydrALAZINE 75 milliGRAM(s) Oral every 8 hours    MEDICATIONS  (PRN):      Vital Signs Last 24 Hrs  T(C): 36.4 (10 Nov 2018 08:05), Max: 36.8 (09 Nov 2018 23:36)  T(F): 97.6 (10 Nov 2018 08:05), Max: 98.3 (09 Nov 2018 23:36)  HR: 75 (10 Nov 2018 08:05) (74 - 79)  BP: 178/80 (10 Nov 2018 08:05) (128/65 - 178/80)  BP(mean): --  RR: 18 (10 Nov 2018 08:05) (17 - 18)  SpO2: 94% (10 Nov 2018 08:05) (93% - 94%)    PHYSICAL EXAM  General: adult in NAD, chronically ill appearing  HEENT: clear oropharynx, anicteric sclera, pink conjunctivae  Neck: supple  CV: normal S1S2 with no murmur rubs or gallops  Lungs: clear to auscultation, no wheezes, no rhales  Abdomen: soft non-tender non-distended, no hepato/splenomegaly  Ext: no clubbing cyanosis or edema  Skin: no rashes and no petichiae  Neuro: intermittently confused      LABS:  CBC Full  -  ( 10 Nov 2018 09:10 )  WBC Count : 9.02 K/uL  Hemoglobin : 8.4 g/dL  Hematocrit : 25.0 %  Platelet Count - Automated : 288 K/uL  Mean Cell Volume : 79.4 fl  Mean Cell Hemoglobin : 26.7 pg  Mean Cell Hemoglobin Concentration : 33.6 gm/dL  Auto Neutrophil # : x  Auto Lymphocyte # : x  Auto Monocyte # : x  Auto Eosinophil # : x  Auto Basophil # : x  Auto Neutrophil % : x  Auto Lymphocyte % : x  Auto Monocyte % : x  Auto Eosinophil % : x  Auto Basophil % : x    11-10    132<L>  |  95<L>  |  36<H>  ----------------------------<  126<H>  3.7   |  29  |  1.10    Ca    7.6<L>      10 Nov 2018 09:10    TPro  6.5  /  Alb  2.4<L>  /  TBili  0.4  /  DBili  x   /  AST  28  /  ALT  22  /  AlkPhos  71  11-08    PT/INR - ( 08 Nov 2018 22:12 )   PT: 14.3 sec;   INR: 1.26 ratio         PTT - ( 08 Nov 2018 22:12 )  PTT:29.8 sec    fe studies  Ferritin, Serum: 209 ng/mL (11-09 @ 15:11)  Ferritin, Serum: 217 ng/mL (11-09 @ 15:11)  Iron - Total Binding Capacity.: 184 ug/dL (11-09 @ 15:11)      WBC trend  9.02 K/uL (11-10-18 @ 09:10)  9.07 K/uL (11-09-18 @ 10:50)  9.59 K/uL (11-08-18 @ 22:12)      Hgb trend  8.4 g/dL (11-10-18 @ 09:10)  8.3 g/dL (11-09-18 @ 10:50)  8.9 g/dL (11-08-18 @ 22:12)      plt trend  288 K/uL (11-10-18 @ 09:10)  255 K/uL (11-09-18 @ 10:50)  270 K/uL (11-08-18 @ 22:12)        RADIOLOGY & ADDITIONAL STUDIES:

## 2018-11-10 NOTE — PROGRESS NOTE ADULT - SUBJECTIVE AND OBJECTIVE BOX
Patient is a 89y old  Female who presents with a chief complaint of acute on chronic CHF (10 Nov 2018 11:26)      INTERVAL HPI/OVERNIGHT EVENTS: Patient seen and examined. NAD. No complaints.    Vital Signs Last 24 Hrs  T(C): 36.7 (10 Nov 2018 15:33), Max: 36.8 (2018 23:36)  T(F): 98 (10 Nov 2018 15:), Max: 98.3 (2018 23:36)  HR: 72 (10 Nov 2018 15:) (69 - 79)  BP: 163/66 (10 Nov 2018 15:) (128/65 - 187/67)  BP(mean): --  RR: 16 (10 Nov 2018 15:) (16 - 18)  SpO2: 94% (10 Nov 2018 15:) (93% - 95%)    11-10    132<L>  |  95<L>  |  36<H>  ----------------------------<  126<H>  3.7   |  29  |  1.10    Ca    7.6<L>      10 Nov 2018 09:10    TPro  6.5  /  Alb  2.4<L>  /  TBili  0.4  /  DBili  x   /  AST  28  /  ALT  22  /  AlkPhos  71                            9.2    9.62  )-----------( 277      ( 10 Nov 2018 12:21 )             28.1     PT/INR - ( 2018 22:12 )   PT: 14.3 sec;   INR: 1.26 ratio         PTT - ( 2018 22:12 )  PTT:29.8 sec  CAPILLARY BLOOD GLUCOSE        Urinalysis Basic - ( 2018 01:17 )    Color: Yellow / Appearance: Clear / S.010 / pH: x  Gluc: x / Ketone: Negative  / Bili: Negative / Urobili: Negative   Blood: x / Protein: 25 mg/dL / Nitrite: Negative   Leuk Esterase: Moderate / RBC: 11-25 /HPF / WBC 11-25   Sq Epi: x / Non Sq Epi: Few / Bacteria: Many              carvedilol 12.5 milliGRAM(s) Oral every 12 hours  cholecalciferol 1000 Unit(s) Oral daily  cinacalcet 30 milliGRAM(s) Oral <User Schedule>  doxazosin 4 milliGRAM(s) Oral <User Schedule>  ferrous    sulfate 325 milliGRAM(s) Oral daily  furosemide   Injectable 40 milliGRAM(s) IV Push every 12 hours  heparin  Injectable 5000 Unit(s) SubCutaneous every 12 hours  hydrALAZINE 75 milliGRAM(s) Oral every 8 hours              REVIEW OF SYSTEMS:  CONSTITUTIONAL: No fever, no weight loss, or no fatigue  NECK: No pain, no stiffness  RESPIRATORY: No cough, no wheezing, no chills, no hemoptysis, No shortness of breath  CARDIOVASCULAR: No chest pain, no palpitations, no dizziness, no leg swelling  GASTROINTESTINAL: No abdominal pain. No nausea, no vomiting, no hematemesis; No diarrhea, no constipation. No melena, no hematochezia.  GENITOURINARY: No dysuria, no frequency, no hematuria, no incontinence  NEUROLOGICAL: No headaches, no loss of strength, no numbness, no tremors  SKIN: No itching, no burning  MUSCULOSKELETAL: No joint pain, no swelling; No muscle, no back, no extremity pain  PSYCHIATRIC: No depression, no mood swings,   HEME/LYMPH: No easy bruising, no bleeding gums  ALLERY AND IMMUNOLOGIC: No hives       Consultant(s) Notes Reviewed:  [X] YES  [ ] NO    PHYSICAL EXAM:  GENERAL: NAD  HEAD:  Atraumatic, Normocephalic  EYES: EOMI, PERRLA, conjunctiva and sclera clear  ENMT: No tonsillar erythema, exudates, or enlargement; Moist mucous membranes  NECK: Supple, No JVD  NERVOUS SYSTEM:  Awake & alert  CHEST/LUNG: Clear to auscultation bilaterally; No rales, rhonchi, wheezing,  HEART: Regular rate and rhythm  ABDOMEN: Soft, Nontender, Nondistended; Bowel sounds present  EXTREMITIES:  No clubbing, cyanosis, or edema  LYMPH: No lymphadenopathy noted  SKIN: No rashes      Advanced care planning discussed with patient/family [X] YES   [ ] NO    Advanced care planning discussed with patient/family. Advanced care planning forms reviewed/discussed/completed. 20 minutes spent.

## 2018-11-10 NOTE — PHYSICAL THERAPY INITIAL EVALUATION ADULT - ADDITIONAL COMMENTS
Per daughter, patient ambulates with RW with supervision at home. 12 hr aide 5 days a week, 4 hrs on  Saturday. Family provides all other assistance.

## 2018-11-10 NOTE — PROGRESS NOTE ADULT - PROBLEM SELECTOR PLAN 1
Diastolic CHF exacerbation of unknown etiology at this time  -s/p 80mg oral Lasix prior to arrival and 40mg IV Lasix in ED, pro-BNP elevated at 5718.   Chest xray: small left and trace right sided pleural effusion with adjacent atelectasis and/or pneumonia, pulmonary vascular congestion or edema  - Continue IV Lasix 40mg BID   -Fluid restriction  -Leg elevation  -Strict I&Os, daily weights

## 2018-11-11 DIAGNOSIS — R09.89 OTHER SPECIFIED SYMPTOMS AND SIGNS INVOLVING THE CIRCULATORY AND RESPIRATORY SYSTEMS: ICD-10-CM

## 2018-11-11 LAB
-  AMPICILLIN: SIGNIFICANT CHANGE UP
-  CIPROFLOXACIN: SIGNIFICANT CHANGE UP
-  DAPTOMYCIN: SIGNIFICANT CHANGE UP
-  LEVOFLOXACIN: SIGNIFICANT CHANGE UP
-  LINEZOLID: SIGNIFICANT CHANGE UP
-  NITROFURANTOIN: SIGNIFICANT CHANGE UP
-  TETRACYCLINE: SIGNIFICANT CHANGE UP
-  VANCOMYCIN: SIGNIFICANT CHANGE UP
ANION GAP SERPL CALC-SCNC: 10 MMOL/L — SIGNIFICANT CHANGE UP (ref 5–17)
BLD GP AB SCN SERPL QL: SIGNIFICANT CHANGE UP
BUN SERPL-MCNC: 38 MG/DL — HIGH (ref 7–23)
CALCIUM SERPL-MCNC: 7.8 MG/DL — LOW (ref 8.5–10.1)
CHLORIDE SERPL-SCNC: 94 MMOL/L — LOW (ref 96–108)
CO2 SERPL-SCNC: 30 MMOL/L — SIGNIFICANT CHANGE UP (ref 22–31)
CREAT SERPL-MCNC: 1.1 MG/DL — SIGNIFICANT CHANGE UP (ref 0.5–1.3)
CULTURE RESULTS: SIGNIFICANT CHANGE UP
GLUCOSE SERPL-MCNC: 102 MG/DL — HIGH (ref 70–99)
HCT VFR BLD CALC: 25.9 % — LOW (ref 34.5–45)
HGB BLD-MCNC: 8.4 G/DL — LOW (ref 11.5–15.5)
MAGNESIUM SERPL-MCNC: 1.7 MG/DL — SIGNIFICANT CHANGE UP (ref 1.6–2.6)
MCHC RBC-ENTMCNC: 26.1 PG — LOW (ref 27–34)
MCHC RBC-ENTMCNC: 32.4 GM/DL — SIGNIFICANT CHANGE UP (ref 32–36)
MCV RBC AUTO: 80.4 FL — SIGNIFICANT CHANGE UP (ref 80–100)
METHOD TYPE: SIGNIFICANT CHANGE UP
NRBC # BLD: 0 /100 WBCS — SIGNIFICANT CHANGE UP (ref 0–0)
ORGANISM # SPEC MICROSCOPIC CNT: SIGNIFICANT CHANGE UP
ORGANISM # SPEC MICROSCOPIC CNT: SIGNIFICANT CHANGE UP
PLATELET # BLD AUTO: 288 K/UL — SIGNIFICANT CHANGE UP (ref 150–400)
POTASSIUM SERPL-MCNC: 3.5 MMOL/L — SIGNIFICANT CHANGE UP (ref 3.5–5.3)
POTASSIUM SERPL-SCNC: 3.5 MMOL/L — SIGNIFICANT CHANGE UP (ref 3.5–5.3)
RAPID RVP RESULT: SIGNIFICANT CHANGE UP
RBC # BLD: 3.22 M/UL — LOW (ref 3.8–5.2)
RBC # FLD: 15.9 % — HIGH (ref 10.3–14.5)
SODIUM SERPL-SCNC: 134 MMOL/L — LOW (ref 135–145)
SPECIMEN SOURCE: SIGNIFICANT CHANGE UP
WBC # BLD: 9.1 K/UL — SIGNIFICANT CHANGE UP (ref 3.8–10.5)
WBC # FLD AUTO: 9.1 K/UL — SIGNIFICANT CHANGE UP (ref 3.8–10.5)

## 2018-11-11 PROCEDURE — 99232 SBSQ HOSP IP/OBS MODERATE 35: CPT

## 2018-11-11 PROCEDURE — 71045 X-RAY EXAM CHEST 1 VIEW: CPT | Mod: 26

## 2018-11-11 RX ORDER — POTASSIUM CHLORIDE 20 MEQ
40 PACKET (EA) ORAL ONCE
Qty: 0 | Refills: 0 | Status: DISCONTINUED | OUTPATIENT
Start: 2018-11-11 | End: 2018-11-11

## 2018-11-11 RX ORDER — FUROSEMIDE 40 MG
40 TABLET ORAL EVERY 12 HOURS
Qty: 0 | Refills: 0 | Status: DISCONTINUED | OUTPATIENT
Start: 2018-11-12 | End: 2018-11-14

## 2018-11-11 RX ORDER — FUROSEMIDE 40 MG
40 TABLET ORAL ONCE
Qty: 0 | Refills: 0 | Status: DISCONTINUED | OUTPATIENT
Start: 2018-11-11 | End: 2018-11-11

## 2018-11-11 RX ORDER — ALBUTEROL 90 UG/1
2.5 AEROSOL, METERED ORAL DAILY
Qty: 0 | Refills: 0 | Status: DISCONTINUED | OUTPATIENT
Start: 2018-11-11 | End: 2018-11-14

## 2018-11-11 RX ORDER — POTASSIUM CHLORIDE 20 MEQ
40 PACKET (EA) ORAL ONCE
Qty: 0 | Refills: 0 | Status: COMPLETED | OUTPATIENT
Start: 2018-11-11 | End: 2018-11-11

## 2018-11-11 RX ORDER — ACETAMINOPHEN 500 MG
650 TABLET ORAL EVERY 6 HOURS
Qty: 0 | Refills: 0 | Status: DISCONTINUED | OUTPATIENT
Start: 2018-11-11 | End: 2018-11-14

## 2018-11-11 RX ORDER — ACETAMINOPHEN 500 MG
650 TABLET ORAL ONCE
Qty: 0 | Refills: 0 | Status: DISCONTINUED | OUTPATIENT
Start: 2018-11-11 | End: 2018-11-14

## 2018-11-11 RX ORDER — POTASSIUM CHLORIDE 20 MEQ
20 PACKET (EA) ORAL ONCE
Qty: 0 | Refills: 0 | Status: COMPLETED | OUTPATIENT
Start: 2018-11-11 | End: 2018-11-11

## 2018-11-11 RX ORDER — POTASSIUM CHLORIDE 20 MEQ
20 PACKET (EA) ORAL ONCE
Qty: 0 | Refills: 0 | Status: DISCONTINUED | OUTPATIENT
Start: 2018-11-11 | End: 2018-11-11

## 2018-11-11 RX ORDER — MAGNESIUM SULFATE 500 MG/ML
2 VIAL (ML) INJECTION ONCE
Qty: 0 | Refills: 0 | Status: COMPLETED | OUTPATIENT
Start: 2018-11-11 | End: 2018-11-11

## 2018-11-11 RX ADMIN — Medication 50 GRAM(S): at 09:29

## 2018-11-11 RX ADMIN — Medication 1000 UNIT(S): at 12:36

## 2018-11-11 RX ADMIN — Medication 4 MILLIGRAM(S): at 22:10

## 2018-11-11 RX ADMIN — HEPARIN SODIUM 5000 UNIT(S): 5000 INJECTION INTRAVENOUS; SUBCUTANEOUS at 06:09

## 2018-11-11 RX ADMIN — Medication 75 MILLIGRAM(S): at 22:10

## 2018-11-11 RX ADMIN — Medication 40 MILLIEQUIVALENT(S): at 12:37

## 2018-11-11 RX ADMIN — Medication 20 MILLIEQUIVALENT(S): at 18:59

## 2018-11-11 RX ADMIN — Medication 40 MILLIGRAM(S): at 06:09

## 2018-11-11 RX ADMIN — Medication 325 MILLIGRAM(S): at 12:36

## 2018-11-11 RX ADMIN — CINACALCET 30 MILLIGRAM(S): 30 TABLET, FILM COATED ORAL at 22:10

## 2018-11-11 RX ADMIN — Medication 650 MILLIGRAM(S): at 13:16

## 2018-11-11 RX ADMIN — Medication 75 MILLIGRAM(S): at 13:17

## 2018-11-11 RX ADMIN — Medication 75 MILLIGRAM(S): at 06:09

## 2018-11-11 RX ADMIN — CARVEDILOL PHOSPHATE 12.5 MILLIGRAM(S): 80 CAPSULE, EXTENDED RELEASE ORAL at 06:09

## 2018-11-11 RX ADMIN — Medication 650 MILLIGRAM(S): at 14:47

## 2018-11-11 RX ADMIN — HEPARIN SODIUM 5000 UNIT(S): 5000 INJECTION INTRAVENOUS; SUBCUTANEOUS at 18:09

## 2018-11-11 NOTE — CONSULT NOTE ADULT - SUBJECTIVE AND OBJECTIVE BOX
Date/Time Patient Seen:  		  Referring MD:   Data Reviewed	       Patient is a 89y old  Female who presents with a chief complaint of acute on chronic CHF (11 Nov 2018 11:30)      Subjective/HPI  in bed, seen and examined, vs and meds reviewed, H and P reviewed, ER provider note reviewed  CXR and TTE and CT chest and LABS reviewed  pt is known to me from prior admissions    spoke with daughter,     H&P Adult [Charted Location: \[facility]\"" TELE 305 D1] [Authored: 09-Nov-2018 00:56]- for Visit: [id number], In Progress, Revised, Signed w/additional Signatures Pending, General    History and Physical:   Source of Information	Chart(s), Patient  Outpatient Providers	Weil (PMD)  Sorin (Cardio)  Regina (Heme)     Language:  · Patient/Family of Limited English Proficiency	No       History of Present Illness:  Reason for Admission: acute on chronic CHF  History of Present Illness:   89F with PMH of diastolic CHF (TTE July 2017 EF 65%), HTN, anemia presents with increased bilateral leg swelling. History obtained from daughter at bedside, requesting to not having patient woken up. States around October 18/2018, patient's bilateral lower extremities started to swell around the ankles. Patient was seen by her cardiologist, Dr. Rowell, and recommended increasing Lasix to 80mg daily for one week. Patient states symptoms persisted, and patient only lost 1 lb. Reports increased weight from 108 to 114 in the past 2 weeks. Patient was scheduled for an appointment with Dr. Rowell today but office staff rescheduled her appointment. She gave patient an additional 20mg Lasix this evening, total of 80mg oral Lasix. Daughter also reports seeing Dr. Temple, hematology, for anemia two weeks ago and was told patient's hemoglobin was around 9 and should be monitored closely. She also states, patient had an episode of "pink urine" in her diaper today prompting her to come to the ED for evaluation. Per daughter, patient has not reported burning with urination, dysuria. Reports increased frequency with Lasix use. Per daughter, patient at baseline is A&Ox2 and ambulates with a walker.     In the ED: temp 98.1, HR 76, /55, RR 16, SpO2 93% RA, repeat 98% RA. H/H: 8.9/26.7, Na 127, BUN/Cr: 39/1.1, pro-BNP: 5718. UA positive for moderate LEC, 11-25 WBC, 11-25 RBC. Received IV Lasix 40mg x1. Chest xray (pre-sequeira read): increased intersitial markings, questionable infiltrate in L lobe, small L pleural effusion. EKG: NSR at 67.     PAST MEDICAL & SURGICAL HISTORY:  Anemia, unspecified type  Edema of lower extremity  Heart murmur  Hypertension  Hypercalcemia  History of appendectomy  S/P tonsillectomy  H/O parathyroidectomy        Medication list         MEDICATIONS  (STANDING):  acetaminophen   Tablet .. 650 milliGRAM(s) Oral once  ALBUTerol    0.083% 2.5 milliGRAM(s) Nebulizer daily  carvedilol 12.5 milliGRAM(s) Oral every 12 hours  cholecalciferol 1000 Unit(s) Oral daily  cinacalcet 30 milliGRAM(s) Oral <User Schedule>  doxazosin 4 milliGRAM(s) Oral <User Schedule>  ferrous    sulfate 325 milliGRAM(s) Oral daily  furosemide   Injectable 40 milliGRAM(s) IV Push once  guaiFENesin  milliGRAM(s) Oral <User Schedule>  heparin  Injectable 5000 Unit(s) SubCutaneous every 12 hours  hydrALAZINE 75 milliGRAM(s) Oral every 8 hours  potassium chloride   Powder 20 milliEquivalent(s) Oral once    MEDICATIONS  (PRN):  acetaminophen   Tablet .. 650 milliGRAM(s) Oral every 6 hours PRN Temp greater or equal to 38C (100.4F), Mild Pain (1 - 3)         Vitals log        ICU Vital Signs Last 24 Hrs  T(C): 36.7 (11 Nov 2018 17:36), Max: 38.2 (11 Nov 2018 13:00)  T(F): 98.1 (11 Nov 2018 17:36), Max: 100.7 (11 Nov 2018 13:00)  HR: 74 (11 Nov 2018 17:36) (69 - 77)  BP: 98/59 (11 Nov 2018 17:36) (98/59 - 162/74)  BP(mean): --  ABP: --  ABP(mean): --  RR: 18 (11 Nov 2018 17:36) (16 - 18)  SpO2: 92% (11 Nov 2018 17:36) (92% - 95%)     lives with dtr        Input and Output:  I&O's Detail    10 Nov 2018 07:01  -  11 Nov 2018 07:00  --------------------------------------------------------  IN:    Oral Fluid: 420 mL  Total IN: 420 mL    OUT:    Voided: 400 mL  Total OUT: 400 mL    Total NET: 20 mL          Lab Data                        8.4    9.10  )-----------( 288      ( 11 Nov 2018 06:47 )             25.9     11-11    134<L>  |  94<L>  |  38<H>  ----------------------------<  102<H>  3.5   |  30  |  1.10    Ca    7.8<L>      11 Nov 2018 06:47  Mg     1.7     11-11              Review of Systems	  forgetful    Objective     Physical Examination    heart s1s2  lung dec BS  abd soft  frail  weak  forgetful      Pertinent Lab findings & Imaging      Richard:  NO   Adequate UO     I&O's Detail    10 Nov 2018 07:01  -  11 Nov 2018 07:00  --------------------------------------------------------  IN:    Oral Fluid: 420 mL  Total IN: 420 mL    OUT:    Voided: 400 mL  Total OUT: 400 mL    Total NET: 20 mL               Discussed with:     Cultures:	        Radiology

## 2018-11-11 NOTE — PROGRESS NOTE ADULT - PROBLEM SELECTOR PLAN 6
Likely anemia of chronic disease v iron def anemia  -Transfuse 1 unit PRBC  -Reports going to Dr. Temple, heme/onc, two weeks ago and hemoglobin was around 9   -Continue home iron  -Dr. Valdez, hematologist: Likely intermittent bleeding, with GIB most likely to account for amount blood loss without overt sx.   - possible concurrent B12 def, as prior low normal levels despite normal diet.   -Check FOBT serially, neg X1

## 2018-11-11 NOTE — PROGRESS NOTE ADULT - SUBJECTIVE AND OBJECTIVE BOX
Patient is a 89y old  Female who presents with a chief complaint of acute on chronic CHF (11 Nov 2018 11:22)      INTERVAL HPI/OVERNIGHT EVENTS: Patient seen and examined. NAD. No complaints.    Vital Signs Last 24 Hrs  T(C): 36.5 (11 Nov 2018 08:12), Max: 36.8 (10 Nov 2018 19:36)  T(F): 97.7 (11 Nov 2018 08:12), Max: 98.3 (10 Nov 2018 19:36)  HR: 75 (11 Nov 2018 08:12) (69 - 76)  BP: 107/58 (11 Nov 2018 08:12) (107/58 - 187/67)  BP(mean): --  RR: 18 (11 Nov 2018 08:12) (16 - 18)  SpO2: 93% (11 Nov 2018 08:12) (93% - 95%)    11-11    134<L>  |  94<L>  |  38<H>  ----------------------------<  102<H>  3.5   |  30  |  1.10    Ca    7.8<L>      11 Nov 2018 06:47  Mg     1.7     11-11                            8.4    9.10  )-----------( 288      ( 11 Nov 2018 06:47 )             25.9       CAPILLARY BLOOD GLUCOSE                  acetaminophen   Tablet .. 650 milliGRAM(s) Oral once  carvedilol 12.5 milliGRAM(s) Oral every 12 hours  cholecalciferol 1000 Unit(s) Oral daily  cinacalcet 30 milliGRAM(s) Oral <User Schedule>  doxazosin 4 milliGRAM(s) Oral <User Schedule>  ferrous    sulfate 325 milliGRAM(s) Oral daily  furosemide   Injectable 40 milliGRAM(s) IV Push once  heparin  Injectable 5000 Unit(s) SubCutaneous every 12 hours  hydrALAZINE 75 milliGRAM(s) Oral every 8 hours  potassium chloride   Powder 40 milliEquivalent(s) Oral once  potassium chloride   Powder 20 milliEquivalent(s) Oral once              REVIEW OF SYSTEMS:  CONSTITUTIONAL: No fever, no weight loss, or no fatigue  NECK: No pain, no stiffness  RESPIRATORY: No cough, no wheezing, no chills, no hemoptysis, No shortness of breath  CARDIOVASCULAR: No chest pain, no palpitations, no dizziness, no leg swelling  GASTROINTESTINAL: No abdominal pain. No nausea, no vomiting, no hematemesis; No diarrhea, no constipation. No melena, no hematochezia.  GENITOURINARY: No dysuria, no frequency, no hematuria, no incontinence  NEUROLOGICAL: No headaches, no loss of strength, no numbness, no tremors  SKIN: No itching, no burning  MUSCULOSKELETAL: No joint pain, no swelling; No muscle, no back, no extremity pain  PSYCHIATRIC: No depression, no mood swings,   HEME/LYMPH: No easy bruising, no bleeding gums  ALLERY AND IMMUNOLOGIC: No hives       Consultant(s) Notes Reviewed:  [X] YES  [ ] NO    PHYSICAL EXAM:  GENERAL: NAD  HEAD:  Atraumatic, Normocephalic  EYES: EOMI, PERRLA, conjunctiva and sclera clear  ENMT: No tonsillar erythema, exudates, or enlargement; Moist mucous membranes  NECK: Supple, No JVD  NERVOUS SYSTEM:  sleeping  CHEST/LUNG: Clear to auscultation bilaterally; No rales, rhonchi, wheezing,  HEART: Regular rate and rhythm  ABDOMEN: Soft, Nontender, Nondistended; Bowel sounds present  EXTREMITIES:  No clubbing, cyanosis, or edema  LYMPH: No lymphadenopathy noted  SKIN: No rashes      Advanced care planning discussed with patient/family [X] YES   [ ] NO    Advanced care planning discussed with patient/family. Advanced care planning forms reviewed/discussed/completed. 20 minutes spent.

## 2018-11-11 NOTE — PROGRESS NOTE ADULT - SUBJECTIVE AND OBJECTIVE BOX
[facility], Division of Infectious Diseases  [facility] [address]  [phone]    Name: SAIRA SANTANA  Age: 89y  Gender: Female  MRN: [id number]    Interval History--  Notes reviewed. Asked to assess patient for fever.   T100.7 then 100.4.  Patient's daughter exceedingly concerned.  Patient lethargic. Did not eat much. Family states not handling her food well, coughing and gurgling sometimes. Cough is nonproductive. Patient rouses but is not a reliable historian.       Past Medical History--  Anemia, unspecified type  Edema of lower extremity  Heart murmur  Hypertension  Hypercalcemia  History of appendectomy  S/P tonsillectomy  H/O parathyroidectomy      For details regarding the patient's social history, family history, and other miscellaneous elements, please refer the initial infectious diseases consultation and/or the admitting history and physical examination for this admission.    Allergies    Vasotec (Unknown)    Intolerances        Medications--  Antibiotics:    Immunologic:    Other:  acetaminophen   Tablet ..  acetaminophen   Tablet .. PRN  carvedilol  cholecalciferol  cinacalcet  doxazosin  ferrous    sulfate  furosemide   Injectable  heparin  Injectable  hydrALAZINE  potassium chloride   Powder      Review of Systems--  Review of systems unable due to dementia.     Physical Examination--  Vital Signs: T(F): 100.4 (11-11-18 @ 15:11), Max: 100.7 (11-11-18 @ 13:00)  HR: 77 (11-11-18 @ 15:11)  BP: 118/60 (11-11-18 @ 15:11)  RR: 18 (11-11-18 @ 15:11)  SpO2: 92% (11-11-18 @ 15:11)  Wt(kg): --  General: Frail nontoxic-appearing Female in no acute distress.  HEENT: AT/NC. Anicteric. Conjunctiva pink and moist. Oropharynx dry  Neck: Not rigid. No sense of mass.  Nodes: None palpable.  Lungs: Poor effort, diminished breath sounds, scant bibasilar crackles  Heart: RRR  Abdomen: Bowel sounds present and normoactive. Soft. Nondistended. Nontender. No sense of mass. No organomegaly.  Back: No spinal tenderness. No costovertebral angle tenderness.   Extremities: No cyanosis or clubbing. 1+ LLE edema.   Skin: Warm. Dry. Good turgor. No rash. No vasculitic stigmata.  Psychiatric: Largely unable      Laboratory Studies--  CBC                        8.4    9.10  )-----------( 288      ( 11 Nov 2018 06:47 )             25.9       Chemistries  11-11    134<L>  |  94<L>  |  38<H>  ----------------------------<  102<H>  3.5   |  30  |  1.10    Ca    7.8<L>      11 Nov 2018 06:47  Mg     1.7     11-11    Culture Data  Culture - Urine (collected 09 Nov 2018 21:48)  Source: .Urine Clean Catch (Midstream)  Preliminary Report (10 Nov 2018 18:50):    >100,000 CFU/ml Enterococcus faecium    CXR (personally reviewed) < from: Xray Chest 1 View- PORTABLE-Urgent (11.11.18 @ 13:18) >  INTERPRETATION:  Portable chest radiograph        CLINICAL INFORMATION:   Fever. Pneumonia. Follow-up.    TECHNIQUE:  Portable  AP view of the chest was obtained.    COMPARISON: 11/8/2018 available for review.    FINDINGS:   The lungs  show persistent perihilar and basilar airspace consolidations   obscuring of RIGHT LEFT hemidiaphragmatic contours . bilateral pleural   effusions cannot be excluded. There is mild vascular congestion.    The  heart is enlarged in transverse diameter. No hilar mass. Trachea   midline.       .         Visualized osseous structures are intact.        IMPRESSION:   Cardiomegaly. Persistent perihilar/basilar infiltrates   and/or effusions..          < end of copied text >

## 2018-11-11 NOTE — PROGRESS NOTE ADULT - ASSESSMENT
[ASSESSMENT and  PLAN]  Worsening anemia and now microcytic anemia.   FOBT negative.   Likely intermittent bleeding, with GIB most likely to account for amount blood loss without overt sx    Suspect Fe def, and concurrent anemia chronic disease, possible concurrent B12 def, as prior low normal levels despite normal diet.     Possible component anemia due to CKD.     hgb remains decreased and maybe contributing CHF issues    [RECOMMEND]  Check FOBT serially.   discussed with cardiology, patient's daughter and Dr. Swann.  to transfuse 1 unit PRBC today  continue medical and cardiac evaluation  with lethargy to hold benadryl as premedication

## 2018-11-11 NOTE — PROGRESS NOTE ADULT - SUBJECTIVE AND OBJECTIVE BOX
[facility] -- Christofer Isaacs, Marisa Rowell Pannella, Patel, Savella  Office # [phone]    Follow Up: Acute on chronic HF    Subjective/Observations: Denies SOB or chest pain.  Otherwise, unable to provide ROS.  Seen comfortable on RA at 30 degree angle    REVIEW OF SYSTEMS: All other review of systems is negative unless indicated above    PAST MEDICAL & SURGICAL HISTORY:  Anemia, unspecified type  Edema of lower extremity  Heart murmur  Hypertension  Hypercalcemia  History of appendectomy  S/P tonsillectomy  H/O parathyroidectomy      MEDICATIONS  (STANDING):  carvedilol 12.5 milliGRAM(s) Oral every 12 hours  cholecalciferol 1000 Unit(s) Oral daily  cinacalcet 30 milliGRAM(s) Oral <User Schedule>  doxazosin 4 milliGRAM(s) Oral <User Schedule>  ferrous    sulfate 325 milliGRAM(s) Oral daily  furosemide   Injectable 40 milliGRAM(s) IV Push every 12 hours  heparin  Injectable 5000 Unit(s) SubCutaneous every 12 hours  hydrALAZINE 75 milliGRAM(s) Oral every 8 hours  magnesium sulfate  IVPB 2 Gram(s) IV Intermittent once  potassium chloride   Solution 40 milliEquivalent(s) Oral once  potassium chloride   Solution 20 milliEquivalent(s) Oral once    MEDICATIONS  (PRN):    Allergies    Vasotec (Unknown)    Intolerances    Vital Signs Last 24 Hrs  T(C): 36.5 (11 Nov 2018 08:12), Max: 36.8 (10 Nov 2018 19:36)  T(F): 97.7 (11 Nov 2018 08:12), Max: 98.3 (10 Nov 2018 19:36)  HR: 75 (11 Nov 2018 08:12) (69 - 76)  BP: 107/58 (11 Nov 2018 08:12) (107/58 - 187/67)  BP(mean): --  RR: 18 (11 Nov 2018 08:12) (16 - 18)  SpO2: 93% (11 Nov 2018 08:12) (93% - 95%)    I&O's Summary    10 Nov 2018 07:01  -  11 Nov 2018 07:00  --------------------------------------------------------  IN: 420 mL / OUT: 400 mL / NET: 20 mL    PHYSICAL EXAM:  TELE:   Constitutional: NAD, awake and letharhic, well-developed  HEENT: Moist Mucous Membranes, Anicteric  Pulmonary: Non-labored, breath sounds are clear bilaterally, No wheezing or rhonchi.  +fine rales at bases  Cardiovascular: Regular, S1 and S2, + murmurs, No rubs, gallops or clicks  Gastrointestinal: Bowel Sounds present, soft, nontender.   Lymph: + LLE edema. No lymphadenopathy.  Skin: No visible rashes or ulcers.  Psych:  Mood & affect flat    LABS: All Labs Reviewed:                        8.4    9.10  )-----------( 288      ( 11 Nov 2018 06:47 )             25.9                         9.2    9.62  )-----------( 277      ( 10 Nov 2018 12:21 )             28.1                         8.4    9.02  )-----------( 288      ( 10 Nov 2018 09:10 )             25.0     11 Nov 2018 06:47    134    |  94     |  38     ----------------------------<  102    3.5     |  30     |  1.10   10 Nov 2018 09:10    132    |  95     |  36     ----------------------------<  126    3.7     |  29     |  1.10   09 Nov 2018 10:50    131    |  94     |  39     ----------------------------<  131    3.4     |  28     |  1.10     Ca    7.8        11 Nov 2018 06:47  Ca    7.6        10 Nov 2018 09:10  Ca    7.0        09 Nov 2018 10:50  Mg     1.7       11 Nov 2018 06:47    TPro  6.5    /  Alb  2.4    /  TBili  0.4    /  DBili  x      /  AST  28     /  ALT  22     /  AlkPhos  71     08 Nov 2018 22:12    < from: TTE Echo Doppler w/o Cont (07.09.17 @ 08:32) >     EXAM:  ECHO TTE W/O CON COMP W/DOPPLR         PROCEDURE DATE:  07/09/2017        INTERPRETATION:  Ordering Physician: Unknown Doctor    Indication: Hypertension    Study Quality: Technically fair  A complete echocardiographic study was performed utilizing standard   protocol including spectral and color Doppler in all echocardiographic   windows.    Height: 152 cm  Weight: 49 kg  BSA: 1.4 sq m  Blood Pressure: 189/63    MEASUREMENTS  IVS: 1.0cm  PWT: 1.0cm  LA: 4.2cm  AO: 2.7cm  LVIDd: 4.5cm  LVIDs: 3.3cm    LVEF: 65%  RVSP: 54mmHg    FINDINGS  Left Ventricle:  Normal left ventricular systolic function.  Aortic Valve: Calcified trileaflet aortic valve. Mild aortic   insufficiency.  Mitral Valve: Mitral annular calcification and calcified mitral valve   leaflets with normal diastolic opening. Mild mitral insufficiency.  Tricuspid Valve: Normal tricuspid valve. Mild tricuspid insufficiency.  Pulmonic Valve: Normal pulmonic valve. Mild pulmonic insufficiency.  Left Atrium: Mildly enlarged.  Right Ventricle: Normal right ventricular size and systolic function.  Right Atrium: Normal  Diastolic Function: Grade 1 diastolic dysfunction.  Pericardium/Pleura: Normal pericardium with no pericardial effusion.    MORENA ADORNO M.D., ATTENDING CARDIOLOGIST  This document has been electronically signed. Jul 9 2017 10:13AM     < end of copied text >

## 2018-11-11 NOTE — PROGRESS NOTE ADULT - SUBJECTIVE AND OBJECTIVE BOX
Interval History:  remains weak, lethargic  no visible bleeding  Chart reviewed and events noted;   Overnight events:    MEDICATIONS  (STANDING):  acetaminophen   Tablet .. 650 milliGRAM(s) Oral once  carvedilol 12.5 milliGRAM(s) Oral every 12 hours  cholecalciferol 1000 Unit(s) Oral daily  cinacalcet 30 milliGRAM(s) Oral <User Schedule>  doxazosin 4 milliGRAM(s) Oral <User Schedule>  ferrous    sulfate 325 milliGRAM(s) Oral daily  furosemide   Injectable 40 milliGRAM(s) IV Push once  heparin  Injectable 5000 Unit(s) SubCutaneous every 12 hours  hydrALAZINE 75 milliGRAM(s) Oral every 8 hours  potassium chloride   Powder 40 milliEquivalent(s) Oral once  potassium chloride   Powder 20 milliEquivalent(s) Oral once    MEDICATIONS  (PRN):      Vital Signs Last 24 Hrs  T(C): 36.5 (11 Nov 2018 08:12), Max: 36.8 (10 Nov 2018 19:36)  T(F): 97.7 (11 Nov 2018 08:12), Max: 98.3 (10 Nov 2018 19:36)  HR: 75 (11 Nov 2018 08:12) (69 - 76)  BP: 107/58 (11 Nov 2018 08:12) (107/58 - 187/67)  BP(mean): --  RR: 18 (11 Nov 2018 08:12) (16 - 18)  SpO2: 93% (11 Nov 2018 08:12) (93% - 95%)    PHYSICAL EXAM  General: adult in NAD, chronically ill appearing  HEENT: clear oropharynx, anicteric sclera, pink conjunctivae  Neck: supple  CV: normal S1S2 with no murmur rubs or gallops  Lungs: clear to auscultation, no wheezes, no rhales  Abdomen: soft non-tender non-distended, no hepato/splenomegaly  Ext: no clubbing cyanosis or edema  Skin: no rashes and no petichiae  Neuro: lethargic      LABS:  CBC Full  -  ( 11 Nov 2018 06:47 )  WBC Count : 9.10 K/uL  Hemoglobin : 8.4 g/dL  Hematocrit : 25.9 %  Platelet Count - Automated : 288 K/uL  Mean Cell Volume : 80.4 fl  Mean Cell Hemoglobin : 26.1 pg  Mean Cell Hemoglobin Concentration : 32.4 gm/dL  Auto Neutrophil # : x  Auto Lymphocyte # : x  Auto Monocyte # : x  Auto Eosinophil # : x  Auto Basophil # : x  Auto Neutrophil % : x  Auto Lymphocyte % : x  Auto Monocyte % : x  Auto Eosinophil % : x  Auto Basophil % : x    11-11    134<L>  |  94<L>  |  38<H>  ----------------------------<  102<H>  3.5   |  30  |  1.10    Ca    7.8<L>      11 Nov 2018 06:47  Mg     1.7     11-11          fe studies  Ferritin, Serum: 221 ng/mL (11-10 @ 16:20)  Iron - Total Binding Capacity.: 197 ug/dL (11-10 @ 16:20)  Ferritin, Serum: 209 ng/mL (11-09 @ 15:11)  Ferritin, Serum: 217 ng/mL (11-09 @ 15:11)  Iron - Total Binding Capacity.: 184 ug/dL (11-09 @ 15:11)      WBC trend  9.10 K/uL (11-11-18 @ 06:47)  9.62 K/uL (11-10-18 @ 12:21)  9.02 K/uL (11-10-18 @ 09:10)  9.07 K/uL (11-09-18 @ 10:50)  9.59 K/uL (11-08-18 @ 22:12)      Hgb trend  8.4 g/dL (11-11-18 @ 06:47)  9.2 g/dL (11-10-18 @ 12:21)  8.4 g/dL (11-10-18 @ 09:10)  8.3 g/dL (11-09-18 @ 10:50)  8.9 g/dL (11-08-18 @ 22:12)      plt trend  288 K/uL (11-11-18 @ 06:47)  277 K/uL (11-10-18 @ 12:21)  288 K/uL (11-10-18 @ 09:10)  255 K/uL (11-09-18 @ 10:50)  270 K/uL (11-08-18 @ 22:12)        RADIOLOGY & ADDITIONAL STUDIES:

## 2018-11-11 NOTE — PROGRESS NOTE ADULT - PROBLEM SELECTOR PLAN 1
Diastolic CHF exacerbation of unknown etiology at this time -- clinically improving  -s/p 80mg oral Lasix prior to arrival and 40mg IV Lasix in ED, pro-BNP elevated at 5718.   Chest xray: small left and trace right sided pleural effusion with adjacent atelectasis and/or pneumonia, pulmonary vascular congestion or edema  - Continue IV Lasix 40mg BID; change to po in AM  -Fluid restriction  -Leg elevation  -Strict I&Os, daily weights

## 2018-11-11 NOTE — CONSULT NOTE ADULT - PROBLEM SELECTOR RECOMMENDATION 9
pulm congestion  HFpEF and Pulm HTN  anemia  weakness  frail elderly  mild cognitive impairment  may need speech and swallow eval  tylenol PRN for fever  agree with ID, defer ABX for now, monitor clinical course and progress  cont Diuresis, I and O, BP control, supportive care and regimen  will add Albuterol NEBS daily and Mucinex daily for assist with mucociliary clearance  keep HOB elev  oral hygiene  pt is DNR DNI  prognosis guarded  discussed with daughter at the bedside
On review patient reports no urinary symptoms, no fever, no leukocytosis so would classify this as asymptomatic bacteriuria and concur with no abx.

## 2018-11-11 NOTE — PROGRESS NOTE ADULT - ASSESSMENT
Low grade fever  Lethargy  Could be aspirating- either as a cause or consequence of fevers- but chest exam unimpressive and CXR not much different accounting for differing technique  Will repeat U/A UC&S  No evidence of SSTI or IV catheter phlebitis   Abdominal exam benign  HD stable    Suggestions--  Defer empiric antibiotics  Need to make NPO/ speech/language pathology to be determined  Recheck UA UC&S  Discussed with family at bedside in extensive detail.  All Q's answered to the best of my ability.    D/W nursing  Left message for Dr. Hue Helton MD  [phone]

## 2018-11-11 NOTE — PROGRESS NOTE ADULT - ASSESSMENT
89F with PMH of diastolic CHF (TTE July 2017 EF 65%), HTN, anemia presented with increasing bilateral LE edema, admitted for acute on chronic CHF.     - Patient has no clinical signs of significant volume overload.  Per flowsheet, patient is net negative ~1.5 L since 11/9 with some incontinence.    - Although, still with slight edema of LLE, patient's daughter claims, there is dramatic improvement from prior  - Will continue Lasix 40 mg IV q12H for today and will reevaluate in am.  Monitor I & O's strictly as much as possible  - Monitor and replete lytes, keep K>4, Mg>2.  Monitor renal function  - Monitor daily standing weights  - No clear evidence of acute ischemia. EKG shows NSR, patient remained in NSR with no acute events on tele monitor.   - For patient's anemia, defer to her Homonc/primary for transfusion if needed.  - Previous ECHO in 07/17 shows normal LV systolic function with EF: 65%, mild mitral/tricuspid/pulmonic insufficiency, diastolic dysfunction  - hyponatremia improving with IV diuresis.  - SBP is labile, range 100's-160.  Continue with Coreg and hydralazine.    - Patient is a DNR/DNI    - Other cardiovascular workup will depend on clinical course.  - All other workup per primary team.  - Will follow with you    Tiffany Sim NP  Cardiology 89F with PMH of diastolic CHF (TTE July 2017 EF 65%), HTN, anemia presented with increasing bilateral LE edema, admitted for acute on chronic CHF.     - Patient has improving volume status.  Per flowsheet, patient is net negative ~1.5 L since 11/9 with some incontinence.    - Although, still with slight edema of LLE, patient's daughter claims, there is dramatic improvement from prior  - Plan to get PRBC transfusion today, so will get one more dose of IV lasix.  - Will continue Lasix 40 mg IV q12, and will switch to 40 po bid tomorrow morning. Monitor I & O's strictly as much as possible  - Monitor and replete lytes, keep K>4, Mg>2.  Monitor renal function  - Monitor daily standing weights  - No clear evidence of acute ischemia. EKG shows NSR, patient remained in NSR with no acute events on tele monitor.   - Previous ECHO in 07/17 shows normal LV systolic function with EF: 65%, mild mitral/tricuspid/pulmonic insufficiency, diastolic dysfunction  - hyponatremia improving with IV diuresis.  - SBP is labile, range 100's-160.  Continue with Coreg and hydralazine.    - Patient is a DNR/DNI    - Other cardiovascular workup will depend on clinical course.  - All other workup per primary team.  - Will follow with you    Tiffany Sim NP  Cardiology

## 2018-11-12 DIAGNOSIS — Z71.89 OTHER SPECIFIED COUNSELING: ICD-10-CM

## 2018-11-12 DIAGNOSIS — R50.9 FEVER, UNSPECIFIED: ICD-10-CM

## 2018-11-12 LAB
ALBUMIN SERPL ELPH-MCNC: 2.1 G/DL — LOW (ref 3.3–5)
ALP SERPL-CCNC: 50 U/L — SIGNIFICANT CHANGE UP (ref 40–120)
ALT FLD-CCNC: 16 U/L — SIGNIFICANT CHANGE UP (ref 12–78)
ANION GAP SERPL CALC-SCNC: 6 MMOL/L — SIGNIFICANT CHANGE UP (ref 5–17)
APPEARANCE UR: CLEAR — SIGNIFICANT CHANGE UP
AST SERPL-CCNC: 17 U/L — SIGNIFICANT CHANGE UP (ref 15–37)
BACTERIA # UR AUTO: ABNORMAL
BILIRUB DIRECT SERPL-MCNC: 0.1 MG/DL — SIGNIFICANT CHANGE UP (ref 0.05–0.2)
BILIRUB INDIRECT FLD-MCNC: 0.3 MG/DL — SIGNIFICANT CHANGE UP (ref 0.2–1)
BILIRUB SERPL-MCNC: 0.4 MG/DL — SIGNIFICANT CHANGE UP (ref 0.2–1.2)
BILIRUB UR-MCNC: NEGATIVE — SIGNIFICANT CHANGE UP
BUN SERPL-MCNC: 42 MG/DL — HIGH (ref 7–23)
CALCIUM SERPL-MCNC: 7.6 MG/DL — LOW (ref 8.5–10.1)
CHLORIDE SERPL-SCNC: 99 MMOL/L — SIGNIFICANT CHANGE UP (ref 96–108)
CO2 SERPL-SCNC: 29 MMOL/L — SIGNIFICANT CHANGE UP (ref 22–31)
COLOR SPEC: SIGNIFICANT CHANGE UP
CREAT SERPL-MCNC: 1.3 MG/DL — SIGNIFICANT CHANGE UP (ref 0.5–1.3)
DIFF PNL FLD: ABNORMAL
EPI CELLS # UR: SIGNIFICANT CHANGE UP
GLUCOSE SERPL-MCNC: 97 MG/DL — SIGNIFICANT CHANGE UP (ref 70–99)
GLUCOSE UR QL: NEGATIVE — SIGNIFICANT CHANGE UP
HCT VFR BLD CALC: 23.1 % — LOW (ref 34.5–45)
HCT VFR BLD CALC: 27.2 % — LOW (ref 34.5–45)
HGB BLD-MCNC: 7.6 G/DL — LOW (ref 11.5–15.5)
HGB BLD-MCNC: 8.9 G/DL — LOW (ref 11.5–15.5)
KETONES UR-MCNC: NEGATIVE — SIGNIFICANT CHANGE UP
LEUKOCYTE ESTERASE UR-ACNC: ABNORMAL
MAGNESIUM SERPL-MCNC: 2.2 MG/DL — SIGNIFICANT CHANGE UP (ref 1.6–2.6)
MCHC RBC-ENTMCNC: 26.6 PG — LOW (ref 27–34)
MCHC RBC-ENTMCNC: 32.9 GM/DL — SIGNIFICANT CHANGE UP (ref 32–36)
MCV RBC AUTO: 80.8 FL — SIGNIFICANT CHANGE UP (ref 80–100)
NITRITE UR-MCNC: NEGATIVE — SIGNIFICANT CHANGE UP
NRBC # BLD: 0 /100 WBCS — SIGNIFICANT CHANGE UP (ref 0–0)
PH UR: 6.5 — SIGNIFICANT CHANGE UP (ref 5–8)
PLATELET # BLD AUTO: 268 K/UL — SIGNIFICANT CHANGE UP (ref 150–400)
POTASSIUM SERPL-MCNC: 4.5 MMOL/L — SIGNIFICANT CHANGE UP (ref 3.5–5.3)
POTASSIUM SERPL-SCNC: 4.5 MMOL/L — SIGNIFICANT CHANGE UP (ref 3.5–5.3)
PROCALCITONIN SERPL-MCNC: 0.08 NG/ML — HIGH (ref 0–0.04)
PROT SERPL-MCNC: 5.6 G/DL — LOW (ref 6–8.3)
PROT UR-MCNC: 25 MG/DL
RBC # BLD: 2.86 M/UL — LOW (ref 3.8–5.2)
RBC # FLD: 16.2 % — HIGH (ref 10.3–14.5)
RBC CASTS # UR COMP ASSIST: ABNORMAL /HPF (ref 0–4)
SODIUM SERPL-SCNC: 134 MMOL/L — LOW (ref 135–145)
SP GR SPEC: 1 — LOW (ref 1.01–1.02)
UROBILINOGEN FLD QL: NEGATIVE — SIGNIFICANT CHANGE UP
WBC # BLD: 9.41 K/UL — SIGNIFICANT CHANGE UP (ref 3.8–10.5)
WBC # FLD AUTO: 9.41 K/UL — SIGNIFICANT CHANGE UP (ref 3.8–10.5)
WBC UR QL: ABNORMAL

## 2018-11-12 PROCEDURE — 99232 SBSQ HOSP IP/OBS MODERATE 35: CPT

## 2018-11-12 RX ORDER — FUROSEMIDE 40 MG
20 TABLET ORAL ONCE
Qty: 0 | Refills: 0 | Status: COMPLETED | OUTPATIENT
Start: 2018-11-12 | End: 2018-11-12

## 2018-11-12 RX ORDER — ACETAMINOPHEN 500 MG
650 TABLET ORAL ONCE
Qty: 0 | Refills: 0 | Status: COMPLETED | OUTPATIENT
Start: 2018-11-12 | End: 2018-11-12

## 2018-11-12 RX ADMIN — CINACALCET 30 MILLIGRAM(S): 30 TABLET, FILM COATED ORAL at 21:28

## 2018-11-12 RX ADMIN — Medication 650 MILLIGRAM(S): at 09:17

## 2018-11-12 RX ADMIN — Medication 1000 UNIT(S): at 11:41

## 2018-11-12 RX ADMIN — Medication 20 MILLIGRAM(S): at 12:35

## 2018-11-12 RX ADMIN — CARVEDILOL PHOSPHATE 12.5 MILLIGRAM(S): 80 CAPSULE, EXTENDED RELEASE ORAL at 05:26

## 2018-11-12 RX ADMIN — Medication 650 MILLIGRAM(S): at 10:17

## 2018-11-12 RX ADMIN — Medication 75 MILLIGRAM(S): at 14:59

## 2018-11-12 RX ADMIN — Medication 40 MILLIGRAM(S): at 05:26

## 2018-11-12 RX ADMIN — CARVEDILOL PHOSPHATE 12.5 MILLIGRAM(S): 80 CAPSULE, EXTENDED RELEASE ORAL at 17:49

## 2018-11-12 RX ADMIN — Medication 4 MILLIGRAM(S): at 21:28

## 2018-11-12 RX ADMIN — Medication 600 MILLIGRAM(S): at 09:16

## 2018-11-12 RX ADMIN — HEPARIN SODIUM 5000 UNIT(S): 5000 INJECTION INTRAVENOUS; SUBCUTANEOUS at 17:49

## 2018-11-12 RX ADMIN — Medication 325 MILLIGRAM(S): at 11:41

## 2018-11-12 RX ADMIN — Medication 75 MILLIGRAM(S): at 05:26

## 2018-11-12 RX ADMIN — Medication 75 MILLIGRAM(S): at 21:28

## 2018-11-12 RX ADMIN — Medication 40 MILLIGRAM(S): at 17:49

## 2018-11-12 RX ADMIN — ALBUTEROL 2.5 MILLIGRAM(S): 90 AEROSOL, METERED ORAL at 07:30

## 2018-11-12 RX ADMIN — HEPARIN SODIUM 5000 UNIT(S): 5000 INJECTION INTRAVENOUS; SUBCUTANEOUS at 05:26

## 2018-11-12 NOTE — PROGRESS NOTE ADULT - SUBJECTIVE AND OBJECTIVE BOX
[facility] -- Christofer Isaacs, Sorin, Marisa, Hieu Norris Savella  Office # [phone]      Follow Up:  CHF    Subjective/Observations: Seen and evaluated Patient is resting in bed, denies chest pain, SOB, palpitation / orthopnea.      REVIEW OF SYSTEMS: All other review of systems is negative unless indicated above    PAST MEDICAL & SURGICAL HISTORY:  Anemia, unspecified type  Edema of lower extremity  Heart murmur  Hypertension  Hypercalcemia  History of appendectomy  S/P tonsillectomy  H/O parathyroidectomy      MEDICATIONS  (STANDING):  acetaminophen   Tablet .. 650 milliGRAM(s) Oral once  acetaminophen   Tablet .. 650 milliGRAM(s) Oral once  ALBUTerol    0.083% 2.5 milliGRAM(s) Nebulizer daily  carvedilol 12.5 milliGRAM(s) Oral every 12 hours  cholecalciferol 1000 Unit(s) Oral daily  cinacalcet 30 milliGRAM(s) Oral <User Schedule>  doxazosin 4 milliGRAM(s) Oral <User Schedule>  ferrous    sulfate 325 milliGRAM(s) Oral daily  furosemide    Tablet 40 milliGRAM(s) Oral every 12 hours  guaiFENesin  milliGRAM(s) Oral <User Schedule>  heparin  Injectable 5000 Unit(s) SubCutaneous every 12 hours  hydrALAZINE 75 milliGRAM(s) Oral every 8 hours    MEDICATIONS  (PRN):  acetaminophen   Tablet .. 650 milliGRAM(s) Oral every 6 hours PRN Temp greater or equal to 38C (100.4F), Mild Pain (1 - 3)      Allergies    Vasotec (Unknown)    Intolerances            Vital Signs Last 24 Hrs  T(C): 36.8 (12 Nov 2018 04:35), Max: 38.2 (11 Nov 2018 13:00)  T(F): 98.2 (12 Nov 2018 04:35), Max: 100.7 (11 Nov 2018 13:00)  HR: 71 (12 Nov 2018 07:30) (69 - 77)  BP: 162/68 (12 Nov 2018 04:35) (98/59 - 162/68)  BP(mean): --  RR: 18 (12 Nov 2018 04:35) (16 - 18)  SpO2: 94% (12 Nov 2018 07:30) (92% - 96%)    I&O's Summary    11 Nov 2018 07:01  -  12 Nov 2018 07:00  --------------------------------------------------------  IN: 0 mL / OUT: 300 mL / NET: -300 mL          PHYSICAL EXAM:  TELE: SR @ 68 BPM with occasional PACs  Constitutional: NAD, awake and alert, well-developed  HEENT: Moist Mucous Membranes, Anicteric  Pulmonary: Non-labored, breath sounds are clear bilaterally, No wheezing, rales or rhonchi  Cardiovascular: Regular, S1 and S2, No murmurs, rubs, gallops or clicks  Gastrointestinal: Bowel Sounds present, soft, nontender.   Lymph: No peripheral edema. No lymphadenopathy.  Skin: No visible rashes or ulcers.  Psych:  Mood & affect appropriate    LABS: All Labs Reviewed:                        7.6    9.41  )-----------( 268      ( 12 Nov 2018 06:34 )             23.1                         8.4    9.10  )-----------( 288      ( 11 Nov 2018 06:47 )             25.9                         9.2    9.62  )-----------( 277      ( 10 Nov 2018 12:21 )             28.1     12 Nov 2018 06:34    134    |  99     |  42     ----------------------------<  97     4.5     |  29     |  1.30   11 Nov 2018 06:47    134    |  94     |  38     ----------------------------<  102    3.5     |  30     |  1.10   10 Nov 2018 09:10    132    |  95     |  36     ----------------------------<  126    3.7     |  29     |  1.10     Ca    7.6        12 Nov 2018 06:34  Ca    7.8        11 Nov 2018 06:47  Ca    7.6        10 Nov 2018 09:10  Mg     2.2       12 Nov 2018 06:34  Mg     1.7       11 Nov 2018 06:47    TPro  5.6    /  Alb  2.1    /  TBili  0.4    /  DBili  .10    /  AST  17     /  ALT  16     /  AlkPhos  50     12 Nov 2018 06:34    < from: TTE Echo Doppler w/o Cont (07.09.17 @ 08:32) >    FINDINGS  Left Ventricle:  Normal left ventricular systolic function.  Aortic Valve: Calcified trileaflet aortic valve. Mild aortic   insufficiency.  Mitral Valve: Mitral annular calcification and calcified mitral valve   leaflets with normal diastolic opening. Mild mitral insufficiency.  Tricuspid Valve: Normal tricuspid valve. Mild tricuspid insufficiency.  Pulmonic Valve: Normal pulmonic valve. Mild pulmonic insufficiency.  Left Atrium: Mildly enlarged.  Right Ventricle: Normal right ventricular size and systolic function.  Right Atrium: Normal  Diastolic Function: Grade 1 diastolic dysfunction.  Pericardium/Pleura: Normal pericardium with no pericardial effusion.        < end of copied text >    < from: Xray Chest 1 View- PORTABLE-Urgent (11.11.18 @ 13:18) >  FINDINGS:   The lungs  show persistent perihilar and basilar airspace consolidations   obscuring of RIGHT LEFT hemidiaphragmatic contours . bilateral pleural   effusions cannot be excluded. There is mild vascular congestion.    The  heart is enlarged in transverse diameter. No hilar mass. Trachea   midline.       .         Visualized osseous structures are intact.        IMPRESSION:   Cardiomegaly. Persistent perihilar/basilar infiltrates   and/or effusions..          < from: 12 Lead ECG (11.09.18 @ 02:30) >    Diagnosis Line Normal sinus rhythm  Minimal voltage criteria for LVH, may be normal variant  Borderline ECG  Confirmed by Samson Worthington MD ([id number]) on 11/9/2018 8:08:20 AM [facility] -- Christofer Isaacs, Sorin, Marisa, Hieu Norris Savella  Office # [phone]      Follow Up:  CHF    Subjective/Observations: Seen and evaluated Patient is resting in bed, denies chest pain,  palpitation / orthopnea. SOb improved. Per daughter cough has improved as well.       REVIEW OF SYSTEMS: All other review of systems is negative unless indicated above    PAST MEDICAL & SURGICAL HISTORY:  Anemia, unspecified type  Edema of lower extremity  Heart murmur  Hypertension  Hypercalcemia  History of appendectomy  S/P tonsillectomy  H/O parathyroidectomy      MEDICATIONS  (STANDING):  acetaminophen   Tablet .. 650 milliGRAM(s) Oral once  acetaminophen   Tablet .. 650 milliGRAM(s) Oral once  ALBUTerol    0.083% 2.5 milliGRAM(s) Nebulizer daily  carvedilol 12.5 milliGRAM(s) Oral every 12 hours  cholecalciferol 1000 Unit(s) Oral daily  cinacalcet 30 milliGRAM(s) Oral <User Schedule>  doxazosin 4 milliGRAM(s) Oral <User Schedule>  ferrous    sulfate 325 milliGRAM(s) Oral daily  furosemide    Tablet 40 milliGRAM(s) Oral every 12 hours  guaiFENesin  milliGRAM(s) Oral <User Schedule>  heparin  Injectable 5000 Unit(s) SubCutaneous every 12 hours  hydrALAZINE 75 milliGRAM(s) Oral every 8 hours    MEDICATIONS  (PRN):  acetaminophen   Tablet .. 650 milliGRAM(s) Oral every 6 hours PRN Temp greater or equal to 38C (100.4F), Mild Pain (1 - 3)      Allergies    Vasotec (Unknown)    Intolerances            Vital Signs Last 24 Hrs  T(C): 36.8 (12 Nov 2018 04:35), Max: 38.2 (11 Nov 2018 13:00)  T(F): 98.2 (12 Nov 2018 04:35), Max: 100.7 (11 Nov 2018 13:00)  HR: 71 (12 Nov 2018 07:30) (69 - 77)  BP: 162/68 (12 Nov 2018 04:35) (98/59 - 162/68)  BP(mean): --  RR: 18 (12 Nov 2018 04:35) (16 - 18)  SpO2: 94% (12 Nov 2018 07:30) (92% - 96%)    I&O's Summary    11 Nov 2018 07:01  -  12 Nov 2018 07:00  --------------------------------------------------------  IN: 0 mL / OUT: 300 mL / NET: -300 mL          PHYSICAL EXAM:  TELE: SR @ 68 BPM with occasional PACs  Constitutional: NAD, awake frail  HEENT: Moist Mucous Membranes, Anicteric  Pulmonary: Non-labored, breath sounds are clear bilaterally,bibasilar crackles  Cardiovascular: Regular, S1 and S2, No murmurs, rubs, gallops or clicks  Gastrointestinal: Bowel Sounds present, soft, nontender.   Lymph: trace peripheral edema. No lymphadenopathy.  Skin: No visible rashes or ulcers.  Psych:  Mood & affect appropriate    LABS: All Labs Reviewed:                        7.6    9.41  )-----------( 268      ( 12 Nov 2018 06:34 )             23.1                         8.4    9.10  )-----------( 288      ( 11 Nov 2018 06:47 )             25.9                         9.2    9.62  )-----------( 277      ( 10 Nov 2018 12:21 )             28.1     12 Nov 2018 06:34    134    |  99     |  42     ----------------------------<  97     4.5     |  29     |  1.30   11 Nov 2018 06:47    134    |  94     |  38     ----------------------------<  102    3.5     |  30     |  1.10   10 Nov 2018 09:10    132    |  95     |  36     ----------------------------<  126    3.7     |  29     |  1.10     Ca    7.6        12 Nov 2018 06:34  Ca    7.8        11 Nov 2018 06:47  Ca    7.6        10 Nov 2018 09:10  Mg     2.2       12 Nov 2018 06:34  Mg     1.7       11 Nov 2018 06:47    TPro  5.6    /  Alb  2.1    /  TBili  0.4    /  DBili  .10    /  AST  17     /  ALT  16     /  AlkPhos  50     12 Nov 2018 06:34    < from: TTE Echo Doppler w/o Cont (07.09.17 @ 08:32) >    FINDINGS  Left Ventricle:  Normal left ventricular systolic function.  Aortic Valve: Calcified trileaflet aortic valve. Mild aortic   insufficiency.  Mitral Valve: Mitral annular calcification and calcified mitral valve   leaflets with normal diastolic opening. Mild mitral insufficiency.  Tricuspid Valve: Normal tricuspid valve. Mild tricuspid insufficiency.  Pulmonic Valve: Normal pulmonic valve. Mild pulmonic insufficiency.  Left Atrium: Mildly enlarged.  Right Ventricle: Normal right ventricular size and systolic function.  Right Atrium: Normal  Diastolic Function: Grade 1 diastolic dysfunction.  Pericardium/Pleura: Normal pericardium with no pericardial effusion.        < end of copied text >    < from: Xray Chest 1 View- PORTABLE-Urgent (11.11.18 @ 13:18) >  FINDINGS:   The lungs  show persistent perihilar and basilar airspace consolidations   obscuring of RIGHT LEFT hemidiaphragmatic contours . bilateral pleural   effusions cannot be excluded. There is mild vascular congestion.    The  heart is enlarged in transverse diameter. No hilar mass. Trachea   midline.       .         Visualized osseous structures are intact.        IMPRESSION:   Cardiomegaly. Persistent perihilar/basilar infiltrates   and/or effusions..          < from: 12 Lead ECG (11.09.18 @ 02:30) >    Diagnosis Line Normal sinus rhythm  Minimal voltage criteria for LVH, may be normal variant  Borderline ECG  Confirmed by Samson Worthington MD ([id number]) on 11/9/2018 8:08:20 AM

## 2018-11-12 NOTE — PROGRESS NOTE ADULT - PROBLEM SELECTOR PLAN 1
Diastolic CHF exacerbation of unknown etiology at this time -- clinically improving  -s/p 80mg oral Lasix prior to arrival and 40mg IV Lasix in ED, pro-BNP elevated at 5718.   Chest xray: small left and trace right sided pleural effusion with adjacent atelectasis and/or pneumonia, pulmonary vascular congestion or edema  - Continue Po Lasix 40mg BID; Lasix 20mg IV push x1 this morning  -Fluid restriction  -Leg elevation  -Strict I&Os, daily weights  - f/u speech and swallow, pt NPO until recs

## 2018-11-12 NOTE — PROGRESS NOTE ADULT - SUBJECTIVE AND OBJECTIVE BOX
Date/Time Patient Seen:  		  Referring MD:   Data Reviewed	       Patient is a 89y old  Female who presents with a chief complaint of acute on chronic CHF (11 Nov 2018 17:47)  in bed  seen and examined  vs and meds reviewed  on room air  afebrile        Subjective/HPI     PAST MEDICAL & SURGICAL HISTORY:  Anemia, unspecified type  Edema of lower extremity  Heart murmur  Hypertension  Hypercalcemia  History of appendectomy  S/P tonsillectomy  H/O parathyroidectomy        Medication list         MEDICATIONS  (STANDING):  acetaminophen   Tablet .. 650 milliGRAM(s) Oral once  ALBUTerol    0.083% 2.5 milliGRAM(s) Nebulizer daily  carvedilol 12.5 milliGRAM(s) Oral every 12 hours  cholecalciferol 1000 Unit(s) Oral daily  cinacalcet 30 milliGRAM(s) Oral <User Schedule>  doxazosin 4 milliGRAM(s) Oral <User Schedule>  ferrous    sulfate 325 milliGRAM(s) Oral daily  furosemide    Tablet 40 milliGRAM(s) Oral every 12 hours  guaiFENesin  milliGRAM(s) Oral <User Schedule>  heparin  Injectable 5000 Unit(s) SubCutaneous every 12 hours  hydrALAZINE 75 milliGRAM(s) Oral every 8 hours    MEDICATIONS  (PRN):  acetaminophen   Tablet .. 650 milliGRAM(s) Oral every 6 hours PRN Temp greater or equal to 38C (100.4F), Mild Pain (1 - 3)         Vitals log        ICU Vital Signs Last 24 Hrs  T(C): 36.8 (12 Nov 2018 04:35), Max: 38.2 (11 Nov 2018 13:00)  T(F): 98.2 (12 Nov 2018 04:35), Max: 100.7 (11 Nov 2018 13:00)  HR: 73 (12 Nov 2018 04:35) (69 - 77)  BP: 162/68 (12 Nov 2018 04:35) (98/59 - 162/68)  BP(mean): --  ABP: --  ABP(mean): --  RR: 18 (12 Nov 2018 04:35) (16 - 18)  SpO2: 95% (12 Nov 2018 04:35) (92% - 96%)           Input and Output:  I&O's Detail      Lab Data                        7.6    9.41  )-----------( 268      ( 12 Nov 2018 06:34 )             23.1     11-12    134<L>  |  99  |  42<H>  ----------------------------<  97  4.5   |  29  |  1.30    Ca    7.6<L>      12 Nov 2018 06:34  Mg     2.2     11-12    TPro  5.6<L>  /  Alb  2.1<L>  /  TBili  0.4  /  DBili  .10  /  AST  17  /  ALT  16  /  AlkPhos  50  11-12            Review of Systems	      Objective     Physical Examination    heart s1s2  lung dec BS  abd soft  frail  weak      Pertinent Lab findings & Imaging      Ramos:  NO   Adequate UO     I&O's Detail           Discussed with:     Cultures:	        Radiology

## 2018-11-12 NOTE — PROGRESS NOTE ADULT - PROBLEM SELECTOR PLAN 1
afebrile, urine cx noted, ID following, monitored off ABX  cont diuresis for HF  cont Albuterol daily and Mucinex daily for pulm congestion and mucociliary clearance assist  keep sat > 88 pct  PT  mobilize  nutrition  asp prec  HOB elev  discussed with daughter  pt is DNR

## 2018-11-12 NOTE — SWALLOW BEDSIDE ASSESSMENT ADULT - COMMENTS
89F with PMH of diastolic CHF, HTN, anemia presents with increased bilateral leg swelling.  Pt awake, alert, cognitive-linguistic deficits, seen c daughter present bedside.  Pt c decreased mastication c weak bolus prep/propulsion of trials. Pt c timely trigger of swallow c thin liquids, no overt si/sx of aspiration 89F with PMH of diastolic CHF, HTN, anemia presents with increased bilateral leg swelling.  Pt awake, alert, cognitive-linguistic deficits, seen c daughter present bedside.  Pt c decreased mastication c weak bolus prep/propulsion of trials. Pt c untimely trigger of swallow c thin liquids, throat clear and cough reflex response c increased aspiration risk  Daughter stated she does not want thickened liquids/ refused. Spoke c Dr Perlman

## 2018-11-12 NOTE — PROGRESS NOTE ADULT - SUBJECTIVE AND OBJECTIVE BOX
Patient seen and examined;  Chart reviewed and events noted;   daughter at bedside; patient was placed on NPO in order to have swallow study done due to concern for aspiration as she had fever; however daughter has been feeding her anyway  last temp at 1pm on 11/11/18    MEDICATIONS  (STANDING):  acetaminophen   Tablet .. 650 milliGRAM(s) Oral once  ALBUTerol    0.083% 2.5 milliGRAM(s) Nebulizer daily  carvedilol 12.5 milliGRAM(s) Oral every 12 hours  cholecalciferol 1000 Unit(s) Oral daily  cinacalcet 30 milliGRAM(s) Oral <User Schedule>  doxazosin 4 milliGRAM(s) Oral <User Schedule>  ferrous    sulfate 325 milliGRAM(s) Oral daily  furosemide    Tablet 40 milliGRAM(s) Oral every 12 hours  guaiFENesin  milliGRAM(s) Oral <User Schedule>  heparin  Injectable 5000 Unit(s) SubCutaneous every 12 hours  hydrALAZINE 75 milliGRAM(s) Oral every 8 hours    MEDICATIONS  (PRN):  acetaminophen   Tablet .. 650 milliGRAM(s) Oral every 6 hours PRN Temp greater or equal to 38C (100.4F), Mild Pain (1 - 3)      Vital Signs Last 24 Hrs  T(C): 36.8 (12 Nov 2018 04:35), Max: 38.2 (11 Nov 2018 13:00)  T(F): 98.2 (12 Nov 2018 04:35), Max: 100.7 (11 Nov 2018 13:00)  HR: 71 (12 Nov 2018 07:30) (69 - 77)  BP: 162/68 (12 Nov 2018 04:35) (98/59 - 162/68)  RR: 18 (12 Nov 2018 04:35) (16 - 18)  SpO2: 94% (12 Nov 2018 07:30) (92% - 96%)    PHYSICAL EXAM  General: elderly adult woman in NAD  HEENT: clear oropharynx, anicteric sclera, pink conjunctivae  Neck: supple  CV: normal S1S2 with no murmur rubs or gallops  Lungs: clear to auscultation, no wheezes, no rhales  Abdomen: soft non-tender non-distended, no hepato/splenomegaly  Ext: no clubbing cyanosis or edema  Skin: no rashes and no petichiae  Neuro: alert and oriented X3 no focal deficits      LABS:                        7.6    9.41  )-----------( 268      ( 12 Nov 2018 06:34 )             23.1     Hemoglobin: 7.6 g/dL (11-12 @ 06:34)  Hemoglobin: 8.4 g/dL (11-11 @ 06:47)  Hemoglobin: 9.2 g/dL (11-10 @ 12:21)  Hemoglobin: 8.4 g/dL (11-10 @ 09:10)  Hemoglobin: 8.3 g/dL (11-09 @ 10:50)    11-12    134<L>  |  99  |  42<H>  ----------------------------<  97  4.5   |  29  |  1.30    Ca    7.6<L>      12 Nov 2018 06:34  Mg     2.2     11-12    TPro  5.6<L>  /  Alb  2.1<L>  /  TBili  0.4  /  DBili  .10  /  AST  17  /  ALT  16  /  AlkPhos  50  11-12    Iron with Total Binding Capacity in AM (11.10.18 @ 16:20)    Iron - Total Binding Capacity.: 197 ug/dL    % Saturation, Iron: 8 %    Iron Total, Serum: 16 ug/dL    Unsaturated Iron Binding Capacity: 181 ug/dL  Ferritin, Serum: 221 ng/mL (11.10.18 @ 16:20)

## 2018-11-12 NOTE — PROGRESS NOTE ADULT - ASSESSMENT
Low grade fever with lethargy, both appear better without any antibiotics  Intermittent aspiration remains highest in DDx  U/A with mild pyuria. cx pending. For whatever it's worth, no urinary complaints  No other localizing findings on exam  WBC normal  HD stable  Per discussion with home care, daughter exploring home hospice for her mom but patient's daugherdid not mention it to me when I was there.

## 2018-11-12 NOTE — PROGRESS NOTE ADULT - ASSESSMENT
[ASSESSMENT and  PLAN]    89 yo woman well known to our group with history of iron deficiency anemia, admitted for CHF exacerbation; course complicated by fever; noted to have progressive microcytic anemia; suspect anemia is multifactorial with iron deficiency due to chronic GI blood loss intermittently along with anemia due to chronic kidney disease    - was ordered for transfusion on 11/11/18 but held due to fever; work-up ongoing  - noted further drop in Hg; will transfuse 1 unit with IV lasix after transfusion to prevent exacerbation of CHF  - strongly urged daughter to stop feeding patient until swallow study is completed; for now will change to NPO except meds  - will follow with you

## 2018-11-12 NOTE — SWALLOW BEDSIDE ASSESSMENT ADULT - ORAL PREPARATORY PHASE
Decreased mastication ability/Within functional limits/Reduced oral grading Decreased mastication ability/Reduced oral grading

## 2018-11-12 NOTE — PROGRESS NOTE ADULT - ATTENDING COMMENTS
Seen/examined. Agree with above.  Plan for PRBC today  Switch IV lasix to po tomorrow morning.
I saw and examined the patient personally. Spoke with above provider regarding this case. I reviewed the above findings completely.  I agree with the above history, physical, and plan which I have edited where appropriate.
Discussed with daughter at bedside.    Discussed with Vincent Hagen & D. Perlman Paul Zelenetz, MD  [phone]
D/C planning in AM if stable

## 2018-11-12 NOTE — PROGRESS NOTE ADULT - PROBLEM SELECTOR PLAN 6
Cr elevated at 1.1, likely due to Lasix use  -Will avoid fluids given patient's history of CHF  - kidney ultrasound: no hydronephrosis or obstruction, R lower pole cyst- may be hemorrhagic cyst or complex  - Trend renal indices

## 2018-11-12 NOTE — PROGRESS NOTE ADULT - ASSESSMENT
· Assessment		  89F with PMH of diastolic CHF (TTE July 2017 EF 65%), HTN, anemia presented with increasing bilateral LE edema, admitted for acute on chronic CHF.     - Patient has improving volume status.  Per flowsheet, patient is net negative ~ -300 ml with some incontinence.    - Although, still with slight edema of LLE, patient's daughter claims, there is dramatic improvement from prior  - Will continue Lasix 40 po bid.  Monitor I & O's strictly as much as possible  - Monitor and replete lytes, keep K>4, Mg>2.  Monitor renal function  - Monitor daily standing weights  - No clear evidence of acute ischemia. EKG shows NSR, patient remained in NSR with no acute events on tele monitor.   - Previous ECHO in 07/17 shows normal LV systolic function with EF: 65%, mild mitral/tricuspid/pulmonic insufficiency, diastolic dysfunction  - hyponatremia improving with IV diuresis.  - SBP is labile, range 100's-160.  Continue with Coreg and hydralazine as tolerated  - Patient is a DNR/DNI  - Other cardiovascular workup will depend on clinical course.  - All other workup per primary team.  - Patient is receiving a unit of PRBC now for low H/H,( 7/23). Give IV Lasix post infusion as needed.   - Will follow with you  Kalli Richardson, MSN, Cardiology NP. · Assessment		  89F with PMH of diastolic CHF (TTE July 2017 EF 65%), HTN, anemia presented with increasing bilateral LE edema, admitted for acute on chronic CHF.     - Patient has improving volume status.  Per flowsheet, patient is net negative ~ -300 ml with some incontinence.    - Although, still with slight edema of LLE, patient's daughter claims, there is dramatic improvement from prior  - Better compensated from volume standpoint. Will continue Lasix 40 po bid.  Monitor I & O's strictly as much as possible  - Agree with given PRBC. I would give lasix 20mg IV post PRBC.     - Monitor and replete lytes, keep K>4, Mg>2.  Monitor renal function  - Monitor daily standing weights    - No clear evidence of acute ischemia. EKG shows NSR, patient remained in NSR with no acute events on tele monitor.   - Previous ECHO in 07/17 shows normal LV systolic function with EF: 65%, mild mitral/tricuspid/pulmonic insufficiency, diastolic dysfunction    - hyponatremia improving with IV diuresis.  - Will need to be very careful with IVF in setting of being NPO. Although reportedly daughter may be feeding pt     - SBP is labile, range 100's-160.  Continue with Coreg and hydralazine as tolerated    - Patient is a DNR/DNI  - Other cardiovascular workup will depend on clinical course.  - All other workup per primary team.  - Will follow with you  Kalli Richardson, YELITZA, Cardiology NP.

## 2018-11-12 NOTE — PROGRESS NOTE ADULT - SUBJECTIVE AND OBJECTIVE BOX
[facility], Division of Infectious Diseases  [address]  [phone]    Name: SAIRA SANTANA  Age: 89y  Gender: Female  MRN: [id number]    Interval History--  Notes reviewed. Patient much more alert. No complaints for what it's worth. No further fevers.  Daughter at bedside, stated fed patient despite NPO order. Though it was ok because did not hear any coughing when she fed her (I educated re: silent aspiration).    Past Medical History--  Anemia, unspecified type  Edema of lower extremity  Heart murmur  Hypertension  Hypercalcemia  History of appendectomy  S/P tonsillectomy  H/O parathyroidectomy      For details regarding the patient's social history, family history, and other miscellaneous elements, please refer the initial infectious diseases consultation and/or the admitting history and physical examination for this admission.    Allergies    Vasotec (Unknown)    Intolerances        Medications--  Antibiotics:    Immunologic:    Other:  acetaminophen   Tablet ..  acetaminophen   Tablet .. PRN  ALBUTerol    0.083%  carvedilol  cholecalciferol  cinacalcet  doxazosin  ferrous    sulfate  furosemide    Tablet  guaiFENesin ER  heparin  Injectable  hydrALAZINE      Review of Systems--  Review of systems unable secondary to clinical condition.     Physical Examination--  Vital Signs: T(F): 97.8 (11-12-18 @ 12:31), Max: 100.7 (11-11-18 @ 13:00)  HR: 60 (11-12-18 @ 12:31)  BP: 149/78 (11-12-18 @ 12:31)  RR: 18 (11-12-18 @ 12:31)  SpO2: 95% (11-12-18 @ 12:31)  Wt(kg): --  General: Frail nontoxic-appearing Female in no acute distress.  HEENT: AT/NC. Anicteric. Conjunctiva pink and moist. Oropharynx dry.   Neck: Not rigid. No sense of mass.  Nodes: None palpable.  Lungs: Poor effort, diminished breath sounds, scant bibasilar crackles  Heart: RRR, I don't discern any murmurs. No RGT   Abdomen: Bowel sounds present and normoactive. Soft. Nondistended. Nontender. No sense of mass. No organomegaly.  Extremities: No cyanosis or clubbing. 1+ LLE edema.   Skin: Warm. Dry. Good turgor. No rash. No vasculitic stigmata.  Psychiatric: Pleasant, interactive, reasonably appropriate        Laboratory Studies--  CBC                        7.6    9.41  )-----------( 268      ( 12 Nov 2018 06:34 )             23.1       Chemistries  11-12    134<L>  |  99  |  42<H>  ----------------------------<  97  4.5   |  29  |  1.30    Ca    7.6<L>      12 Nov 2018 06:34  Mg     2.2     11-12    TPro  5.6<L>  /  Alb  2.1<L>  /  TBili  0.4  /  DBili  .10  /  AST  17  /  ALT  16  /  AlkPhos  50  11-12    Urinalysis (11.12.18 @ 03:22)    Blood, Urine: Large    Glucose Qualitative, Urine: Negative    pH Urine: 6.5    Color: Pale Yellow    Urine Appearance: Clear    Bilirubin: Negative    Ketone - Urine: Negative    Specific Gravity: 1.005    Protein, Urine: 25 mg/dL    Urobilinogen: Negative    Nitrite: Negative    Leukocyte Esterase Concentration: Small  Urine Microscopic-Add On (NC) (11.12.18 @ 03:22)    Epithelial Cells: Few    White Blood Cell - Urine: 11-25    Bacteria: Moderate    Red Blood Cell - Urine: 11-25 /HPF      Culture Data    Culture - Urine (collected 09 Nov 2018 21:48)  Source: .Urine Clean Catch (Midstream)  Final Report (11 Nov 2018 19:49):    >100,000 CFU/ml Enterococcus faecium (vancomycin resistant)  Organism: Enterococcus faecium (vancomycin resistant) (11 Nov 2018 19:49)  Organism: Enterococcus faecium (vancomycin resistant) (11 Nov 2018 19:49)

## 2018-11-12 NOTE — SWALLOW BEDSIDE ASSESSMENT ADULT - SWALLOW EVAL: RECOMMENDED FEEDING/EATING TECHNIQUES
crush medication (when feasible)/maintain upright posture during/after eating for 30 mins/hard swallow w/ each bite or sip/alternate food with liquid

## 2018-11-13 LAB
ANION GAP SERPL CALC-SCNC: 10 MMOL/L — SIGNIFICANT CHANGE UP (ref 5–17)
BUN SERPL-MCNC: 40 MG/DL — HIGH (ref 7–23)
CALCIUM SERPL-MCNC: 8 MG/DL — LOW (ref 8.5–10.1)
CHLORIDE SERPL-SCNC: 98 MMOL/L — SIGNIFICANT CHANGE UP (ref 96–108)
CO2 SERPL-SCNC: 27 MMOL/L — SIGNIFICANT CHANGE UP (ref 22–31)
CREAT SERPL-MCNC: 1.3 MG/DL — SIGNIFICANT CHANGE UP (ref 0.5–1.3)
GLUCOSE SERPL-MCNC: 92 MG/DL — SIGNIFICANT CHANGE UP (ref 70–99)
HCT VFR BLD CALC: 28 % — LOW (ref 34.5–45)
HGB BLD-MCNC: 9.1 G/DL — LOW (ref 11.5–15.5)
MCHC RBC-ENTMCNC: 26.4 PG — LOW (ref 27–34)
MCHC RBC-ENTMCNC: 32.5 GM/DL — SIGNIFICANT CHANGE UP (ref 32–36)
MCV RBC AUTO: 81.2 FL — SIGNIFICANT CHANGE UP (ref 80–100)
NRBC # BLD: 0 /100 WBCS — SIGNIFICANT CHANGE UP (ref 0–0)
PLATELET # BLD AUTO: 255 K/UL — SIGNIFICANT CHANGE UP (ref 150–400)
POTASSIUM SERPL-MCNC: 4 MMOL/L — SIGNIFICANT CHANGE UP (ref 3.5–5.3)
POTASSIUM SERPL-SCNC: 4 MMOL/L — SIGNIFICANT CHANGE UP (ref 3.5–5.3)
RBC # BLD: 3.45 M/UL — LOW (ref 3.8–5.2)
RBC # FLD: 16 % — HIGH (ref 10.3–14.5)
SODIUM SERPL-SCNC: 135 MMOL/L — SIGNIFICANT CHANGE UP (ref 135–145)
WBC # BLD: 8.17 K/UL — SIGNIFICANT CHANGE UP (ref 3.8–10.5)
WBC # FLD AUTO: 8.17 K/UL — SIGNIFICANT CHANGE UP (ref 3.8–10.5)

## 2018-11-13 PROCEDURE — 99232 SBSQ HOSP IP/OBS MODERATE 35: CPT

## 2018-11-13 RX ORDER — HYDRALAZINE HCL 50 MG
100 TABLET ORAL EVERY 8 HOURS
Qty: 0 | Refills: 0 | Status: DISCONTINUED | OUTPATIENT
Start: 2018-11-13 | End: 2018-11-14

## 2018-11-13 RX ADMIN — HEPARIN SODIUM 5000 UNIT(S): 5000 INJECTION INTRAVENOUS; SUBCUTANEOUS at 05:06

## 2018-11-13 RX ADMIN — Medication 100 MILLIGRAM(S): at 14:04

## 2018-11-13 RX ADMIN — CARVEDILOL PHOSPHATE 12.5 MILLIGRAM(S): 80 CAPSULE, EXTENDED RELEASE ORAL at 05:06

## 2018-11-13 RX ADMIN — Medication 1000 UNIT(S): at 11:09

## 2018-11-13 RX ADMIN — Medication 40 MILLIGRAM(S): at 05:06

## 2018-11-13 RX ADMIN — Medication 75 MILLIGRAM(S): at 05:06

## 2018-11-13 RX ADMIN — Medication 325 MILLIGRAM(S): at 11:09

## 2018-11-13 RX ADMIN — CARVEDILOL PHOSPHATE 12.5 MILLIGRAM(S): 80 CAPSULE, EXTENDED RELEASE ORAL at 17:21

## 2018-11-13 RX ADMIN — HEPARIN SODIUM 5000 UNIT(S): 5000 INJECTION INTRAVENOUS; SUBCUTANEOUS at 17:21

## 2018-11-13 RX ADMIN — Medication 40 MILLIGRAM(S): at 17:21

## 2018-11-13 RX ADMIN — Medication 100 MILLIGRAM(S): at 21:28

## 2018-11-13 RX ADMIN — CINACALCET 30 MILLIGRAM(S): 30 TABLET, FILM COATED ORAL at 21:28

## 2018-11-13 RX ADMIN — ALBUTEROL 2.5 MILLIGRAM(S): 90 AEROSOL, METERED ORAL at 07:56

## 2018-11-13 RX ADMIN — Medication 4 MILLIGRAM(S): at 21:28

## 2018-11-13 NOTE — CONSULT NOTE ADULT - SUBJECTIVE AND OBJECTIVE BOX
HISTORY OF PRESENT ILLNESS:  Reason for Admission: acute on chronic CHF	  History of Present Illness: 	  89F with PMH of diastolic CHF (TTE 2017 EF 65%), HTN, anemia presents with increased bilateral leg swelling. History obtained from daughter at bedside, requesting to not having patient woken up. States around , patient's bilateral lower extremities started to swell around the ankles. Patient was seen by her cardiologist, Dr. Rowell, and recommended increasing Lasix to 80mg daily for one week. Patient states symptoms persisted, and patient only lost 1 lb. Reports increased weight from 108 to 114 in the past 2 weeks. Patient was scheduled for an appointment with Dr. Rowell today but office staff rescheduled her appointment. She gave patient an additional 20mg Lasix this evening, total of 80mg oral Lasix. Daughter also reports seeing Dr. Temple, hematology, for anemia two weeks ago and was told patient's hemoglobin was around 9 and should be monitored closely. She also states, patient had an episode of "pink urine" in her diaper today prompting her to come to the ED for evaluation. Per daughter, patient has not reported burning with urination, dysuria. Reports increased frequency with Lasix use. Per daughter, patient at baseline is A&Ox2 and ambulates with a walker.     In the ED: temp 98.1, HR 76, /55, RR 16, SpO2 93% RA, repeat 98% RA. H/H: 8.9/26.7, Na 127, BUN/Cr: 39/1.1, pro-BNP: 5718. UA positive for moderate LEC, 11-25 WBC, 11-25 RBC. Received IV Lasix 40mg x1. Chest xray (pre-sequeira read): increased intersitial markings, questionable infiltrate in L lobe, small L pleural effusion. EKG: NSR at 67.      PAST MEDICAL & SURGICAL HISTORY:  Anemia, unspecified type  Edema of lower extremity  Heart murmur  Hypertension  Hypercalcemia  History of appendectomy  S/P tonsillectomy  H/O parathyroidectomy      REVIEW OF SYSTEMS:    CONSTITUTIONAL: No weakness, fevers or chills  EYES/ENT: No visual changes;  No vertigo or throat pain   NECK: No pain or stiffness  RESPIRATORY: No cough, wheezing, hemoptysis; No shortness of breath  CARDIOVASCULAR: No chest pain or palpitations  GASTROINTESTINAL: No abdominal or epigastric pain. No nausea, vomiting, or hematemesis; No diarrhea or constipation. No melena or hematochezia.  GENITOURINARY: No dysuria, frequency or hematuria  NEUROLOGICAL: No numbness or weakness  SKIN: No itching, burning, rashes, or lesions   All other review of systems is negative unless indicated above.    MEDICATIONS  (STANDING):  acetaminophen   Tablet .. 650 milliGRAM(s) Oral once  ALBUTerol    0.083% 2.5 milliGRAM(s) Nebulizer daily  carvedilol 12.5 milliGRAM(s) Oral every 12 hours  cholecalciferol 1000 Unit(s) Oral daily  cinacalcet 30 milliGRAM(s) Oral <User Schedule>  doxazosin 4 milliGRAM(s) Oral <User Schedule>  ferrous    sulfate 325 milliGRAM(s) Oral daily  furosemide    Tablet 40 milliGRAM(s) Oral every 12 hours  guaiFENesin/dextromethorphan  Syrup 10 milliLiter(s) Oral every 24 hours  heparin  Injectable 5000 Unit(s) SubCutaneous every 12 hours  hydrALAZINE 100 milliGRAM(s) Oral every 8 hours    MEDICATIONS  (PRN):  acetaminophen   Tablet .. 650 milliGRAM(s) Oral every 6 hours PRN Temp greater or equal to 38C (100.4F), Mild Pain (1 - 3)      Allergies    Vasotec (Unknown)    Intolerances        SOCIAL HISTORY:    FAMILY HISTORY:  No pertinent family history in first degree relatives      Vital Signs Last 24 Hrs  T(C): 36.8 (2018 08:02), Max: 37.2 (2018 19:55)  T(F): 98.2 (2018 08:02), Max: 99 (2018 19:55)  HR: 68 (2018 08:02) (57 - 84)  BP: 167/64 (2018 08:02) (128/89 - 167/64)  BP(mean): --  RR: 17 (2018 08:02) (17 - 18)  SpO2: 95% (2018 08:02) (92% - 96%)    PHYSICAL EXAM:  Awake, alert  Abd soft, non tender, no masses, bladder not palpable    LABS:                        9.1    8.17  )-----------( 255      ( 2018 07:16 )             28.0     11-13    135  |  98  |  40<H>  ----------------------------<  92  4.0   |  27  |  1.30    Ca    8.0<L>      2018 07:16  Mg     2.2         TPro  5.6<L>  /  Alb  2.1<L>  /  TBili  0.4  /  DBili  .10  /  AST  17  /  ALT  16  /  AlkPhos  50        Urinalysis Basic - ( 2018 03:22 )    Color: Pale Yellow / Appearance: Clear / S.005 / pH: x  Gluc: x / Ketone: Negative  / Bili: Negative / Urobili: Negative   Blood: x / Protein: 25 mg/dL / Nitrite: Negative   Leuk Esterase: Small / RBC: 11-25 /HPF / WBC 11-25   Sq Epi: x / Non Sq Epi: Few / Bacteria: Moderate      Urine Culture:  @ 09:35  Urine Culture Results   10,000 - 49,000 CFU/mL Escherichia coli  >100,000 CFU/ml Enterococcus faecium  Organism --    Hemoglobin: 9.1 g/dL ( @ 07:16)  Hematocrit: 28.0 % ( @ 07:16)  Hemoglobin: 8.9 g/dL ( @ 14:13)  Hematocrit: 27.2 % ( @ 14:13)  Hemoglobin: 7.6 g/dL ( @ 06:34)  Hematocrit: 23.1 % ( @ 06:34)      RADIOLOGY & ADDITIONAL STUDIES: HISTORY OF PRESENT ILLNESS:  Reason for Admission: acute on chronic CHF	  History of Present Illness: 	  89F with PMH of diastolic CHF (TTE 2017 EF 65%), HTN, anemia presents with increased bilateral leg swelling. History obtained from daughter at bedside, requesting to not having patient woken up. States around , patient's bilateral lower extremities started to swell around the ankles. Patient was seen by her cardiologist, Dr. Rowell, and recommended increasing Lasix to 80mg daily for one week. Patient states symptoms persisted, and patient only lost 1 lb. Reports increased weight from 108 to 114 in the past 2 weeks. Patient was scheduled for an appointment with Dr. Rowell today but office staff rescheduled her appointment. She gave patient an additional 20mg Lasix this evening, total of 80mg oral Lasix. Daughter also reports seeing Dr. Temple, hematology, for anemia two weeks ago and was told patient's hemoglobin was around 9 and should be monitored closely. She also states, patient had an episode of "pink urine" in her diaper today prompting her to come to the ED for evaluation. Per daughter, patient has not reported burning with urination, dysuria. Reports increased frequency with Lasix use. Per daughter, patient at baseline is A&Ox2 and ambulates with a walker.     In the ED: temp 98.1, HR 76, /55, RR 16, SpO2 93% RA, repeat 98% RA. H/H: 8.9/26.7, Na 127, BUN/Cr: 39/1.1, pro-BNP: 5718. UA positive for moderate LEC, 11-25 WBC, 11-25 RBC. Received IV Lasix 40mg x1. Chest xray (pre-sequeira read): increased intersitial markings, questionable infiltrate in L lobe, small L pleural effusion. EKG: NSR at 67.      PAST MEDICAL & SURGICAL HISTORY:  Anemia, unspecified type  Edema of lower extremity  Heart murmur  Hypertension  Hypercalcemia  History of appendectomy  S/P tonsillectomy  H/O parathyroidectomy      REVIEW OF SYSTEMS:    CONSTITUTIONAL: No weakness, fevers or chills  EYES/ENT: No visual changes;  No vertigo or throat pain   NECK: No pain or stiffness  RESPIRATORY: No cough, wheezing, hemoptysis; No shortness of breath  CARDIOVASCULAR: No chest pain or palpitations  GASTROINTESTINAL: No abdominal or epigastric pain. No nausea, vomiting, or hematemesis; No diarrhea or constipation. No melena or hematochezia.  GENITOURINARY: No dysuria, frequency or hematuria  NEUROLOGICAL: No numbness or weakness  SKIN: No itching, burning, rashes, or lesions   All other review of systems is negative unless indicated above.    MEDICATIONS  (STANDING):  acetaminophen   Tablet .. 650 milliGRAM(s) Oral once  ALBUTerol    0.083% 2.5 milliGRAM(s) Nebulizer daily  carvedilol 12.5 milliGRAM(s) Oral every 12 hours  cholecalciferol 1000 Unit(s) Oral daily  cinacalcet 30 milliGRAM(s) Oral <User Schedule>  doxazosin 4 milliGRAM(s) Oral <User Schedule>  ferrous    sulfate 325 milliGRAM(s) Oral daily  furosemide    Tablet 40 milliGRAM(s) Oral every 12 hours  guaiFENesin/dextromethorphan  Syrup 10 milliLiter(s) Oral every 24 hours  heparin  Injectable 5000 Unit(s) SubCutaneous every 12 hours  hydrALAZINE 100 milliGRAM(s) Oral every 8 hours    MEDICATIONS  (PRN):  acetaminophen   Tablet .. 650 milliGRAM(s) Oral every 6 hours PRN Temp greater or equal to 38C (100.4F), Mild Pain (1 - 3)      Allergies    Vasotec (Unknown)    Intolerances        SOCIAL HISTORY:    FAMILY HISTORY:  No pertinent family history in first degree relatives      Vital Signs Last 24 Hrs  T(C): 36.8 (2018 08:02), Max: 37.2 (2018 19:55)  T(F): 98.2 (2018 08:02), Max: 99 (2018 19:55)  HR: 68 (2018 08:02) (57 - 84)  BP: 167/64 (2018 08:02) (128/89 - 167/64)  BP(mean): --  RR: 17 (2018 08:02) (17 - 18)  SpO2: 95% (2018 08:02) (92% - 96%)    PHYSICAL EXAM:  Awake, alert  Abd soft, non tender, no masses, bladder not palpable    LABS:                        9.1    8.17  )-----------( 255      ( 2018 07:16 )             28.0     11-13    135  |  98  |  40<H>  ----------------------------<  92  4.0   |  27  |  1.30    Ca    8.0<L>      2018 07:16  Mg     2.2         TPro  5.6<L>  /  Alb  2.1<L>  /  TBili  0.4  /  DBili  .10  /  AST  17  /  ALT  16  /  AlkPhos  50        Urinalysis Basic - ( 2018 03:22 )    Color: Pale Yellow / Appearance: Clear / S.005 / pH: x  Gluc: x / Ketone: Negative  / Bili: Negative / Urobili: Negative   Blood: x / Protein: 25 mg/dL / Nitrite: Negative   Leuk Esterase: Small / RBC: 11-25 /HPF / WBC 11-25   Sq Epi: x / Non Sq Epi: Few / Bacteria: Moderate      Urine Culture:  @ 09:35  Urine Culture Results   10,000 - 49,000 CFU/mL Escherichia coli  >100,000 CFU/ml Enterococcus faecium  Organism --    Hemoglobin: 9.1 g/dL ( @ 07:16)  Hematocrit: 28.0 % ( @ 07:16)  Hemoglobin: 8.9 g/dL ( @ 14:13)  Hematocrit: 27.2 % ( @ 14:13)  Hemoglobin: 7.6 g/dL ( @ 06:34)  Hematocrit: 23.1 % ( @ 06:34)      RADIOLOGY & ADDITIONAL STUDIES: Renal sono:   Nonobstructing left intrarenal calcification.   No hydronephrosis noted.     Cyst at the lower pole of the right kidney, may represent a complex or   hemorrhagic cyst.   Recommend short interval follow-up renal ultrasound in 6 months.

## 2018-11-13 NOTE — PROGRESS NOTE ADULT - PROBLEM SELECTOR PLAN 5
Cr elevated at 1.1, likely due to Lasix use  -Will avoid fluids given patient's history of CHF  - kidney ultrasound: no hydronephrosis or obstruction, R lower pole cyst- may be hemorrhagic cyst or complex  - Trend renal indices
Cr elevated at 1.1, likely due to Lasix use  -Will avoid fluids given patient's history of CHF  - kidney ultrasound: no hydronephrosis or obstruction, R lower pole cyst- may be hemorrhagic cyst or complex  - Trend renal indices
Hypervolemic hyponatremia likely due to dehydration and increased diuretic use -- improving  -hold fluids in setting CHF  - IV Lasix 40 BID daily as per Cardio  - monitor BMP
Cr elevated at 1.1, likely due to Lasix use  -Will avoid fluids given patient's history of CHF  - kidney ultrasound: no hydronephrosis or obstruction, R lower pole cyst- may be hemorrhagic cyst or complex  - Trend renal indices
Hypervolemic hyponatremia likely due to dehydration and increased diuretic use -- improving  -hold fluids in setting CHF  - IV Lasix 40 BID daily as per Cardio  - monitor BMP

## 2018-11-13 NOTE — CONSULT NOTE ADULT - REASON FOR ADMISSION
Acute on chronic CHF

## 2018-11-13 NOTE — PROGRESS NOTE ADULT - PROBLEM SELECTOR PLAN 4
Likely 2/2 CHF exacerbation  - Unilateral L>R leg swelling, clinically improving  -Lower extremity dopplers bilaterally neg for DVT  - Leg elevation
UA positive for WBC 11-25, moderate LEC. Patient asymptomatic, without fever or WBC.   -Will hold off abx for now  -Follow up UCx
Likely 2/2 CHF exacerbation  - Unilateral L>R leg swelling, clinically resolved  -Lower extremity dopplers bilaterally neg for DVT  - Leg elevation

## 2018-11-13 NOTE — PROGRESS NOTE ADULT - PROBLEM SELECTOR PLAN 1
urine cx pending  ID follow up noted  pulm congestion - LASIX for HFpEF, albuterol and mucinex daily for congestion and mucociliary clearance assist  HOB elev  asp prec  DC planning under way, poss Home with Hospice  pt is DNR DNI  Cardio follow up noted

## 2018-11-13 NOTE — GOALS OF CARE CONVERSATION - PERSONAL ADVANCE DIRECTIVE - CONVERSATION DETAILS
Hospice care Network:   11/12/ - 11/13/18      Pt approved for home hospice care however Daughter refused hospice at this time because she wants her Mother to continue seeing  all her doctors ( Orthopedists, Endocrinology Ophthalmologist ). Which can be done  while on hospice but each  physician  must complete  a Modified Medicare billing form .   KATHIE Weathers  made aware .  Lyn Roberto RN CHPN
Spoke with dtr/HCP Eula Villeda at bedside .She consented to update the MOLST for DNR DNI no feeding tube. Message left for Dr Perlman to order DNR

## 2018-11-13 NOTE — DIETITIAN INITIAL EVALUATION ADULT. - OTHER INFO
Pt dislikes mech soft diet. Speech therapist rx dysphagia 2 mech soft diet with thin liuqids. Pt's daughter requesting diet be upgraded/diet has since been upgraded.

## 2018-11-13 NOTE — PROGRESS NOTE ADULT - PROBLEM SELECTOR PLAN 3
Highest in my DDx  Doubt would be easily modifiable, if aspiration/dysphagia present
Hypervolemic hyponatremia likely due to dehydration and increased diuretic use  -hold fluids in setting CHF  - IV Lasix 40 BID daily as per Cardio  - monitor BMP
Hypervolemic hyponatremia likely due to dehydration and increased diuretic use -- improving  -hold fluids in setting CHF  - IV Lasix 40 BID daily as per Cardio  - monitor BMP
UA positive for WBC 11-25, moderate LEC  - Patient febrile yesterday at 100.7F  - UCx from 11/9 positive for VRE  - Will follow Id recommendations  - f/u repeat UCx and Bcx
Hypervolemic hyponatremia likely due to dehydration and increased diuretic use  -hold fluids in setting CHF  - IV Lasix 40 BID daily as per Cardio  - monitor BMP
UA positive for WBC 11-25, moderate LEC  - Pt retaining urine yesterday, bladder scan 800cc s/p straight cath'd had 400cc pvr  -Dr. Corea (uro): Rec straight cath prn, follow with bladder scan if needed  - UCx from 11/9 positive for VRE  - Will follow Id recommendations for abx recs if needed  - f/u repeat UCx and Bcx

## 2018-11-13 NOTE — PROGRESS NOTE ADULT - PROBLEM SELECTOR PROBLEM 3
Asymptomatic bacteriuria
Hyponatremia
Hyponatremia
R/O Aspiration pneumonitis
Hyponatremia
Asymptomatic bacteriuria

## 2018-11-13 NOTE — PROGRESS NOTE ADULT - PROBLEM SELECTOR PROBLEM 6
Anemia, unspecified type
Anemia, unspecified type
SABRINA (acute kidney injury)
Anemia, unspecified type
SABRINA (acute kidney injury)

## 2018-11-13 NOTE — PROGRESS NOTE ADULT - PROBLEM SELECTOR PLAN 7
chronic, stable  -Continue Carvedilol, Hydralazine with hold parameters  -Continue Doxazosin
chronic, stable  - Increased hydralazine to 100TID as per Dr. Norris (cardio)  -Continue Carvedilol, Hydralazine with hold parameters  -Continue Doxazosin

## 2018-11-13 NOTE — GOALS OF CARE CONVERSATION - PERSONAL ADVANCE DIRECTIVE - NS PRO AD PATIENT TYPE
Medical Orders for Life-Sustaining Treatment (MOLST)/Living Will/Health Care Proxy (HCP)
Medical Orders for Life-Sustaining Treatment (MOLST)/Living Will/Health Care Proxy (HCP)

## 2018-11-13 NOTE — PROGRESS NOTE ADULT - PROBLEM SELECTOR PROBLEM 5
Hyponatremia
SABRINA (acute kidney injury)
Hyponatremia

## 2018-11-13 NOTE — CONSULT NOTE ADULT - ASSESSMENT
Pt has had difficulty voiding yesterday, straight cath'd last night for 800 cc of urine.  Urine was pink in the diaper, per the pt's daughter.  Today she is voiding approx 200 cc at a time, pvr 400 cc, which is acceptable at this time. Will discuss question of antibiotics with Dr. Perlman but ID has deferred treating this asympt infection.  Rec straight cath prn, follow with bladder scan if needed.  Thanks, we will follow

## 2018-11-13 NOTE — PROGRESS NOTE ADULT - PROBLEM SELECTOR PROBLEM 4
Asymptomatic bacteriuria
Asymptomatic bacteriuria
Leg swelling
Asymptomatic bacteriuria
Leg swelling

## 2018-11-13 NOTE — PROGRESS NOTE ADULT - PROBLEM SELECTOR PROBLEM 2
Anemia, unspecified type
Bacteriuria, asymptomatic
Leg swelling
Anemia, unspecified type

## 2018-11-13 NOTE — PROGRESS NOTE ADULT - ASSESSMENT
· Assessment		  89F with PMH of diastolic CHF (TTE July 2017 EF 65%), HTN, anemia presented with increasing bilateral LE edema, admitted for acute on chronic CHF.     - Patient has improving volume status.   - Better compensated from volume standpoint. Will continue Lasix 40 po bid.  Monitor I & O's strictly as much as possible  - d/c telemetry  - Trend H/H and transfuse as needed    - Monitor and replete lytes, keep K>4, Mg>2.  Monitor renal function  - Monitor daily standing weights    - No clear evidence of acute ischemia. EKG shows NSR, patient remained in NSR with no acute events on tele monitor.   - Previous ECHO in 07/17 shows normal LV systolic function with EF: 65%, mild mitral/tricuspid/pulmonic insufficiency, diastolic dysfunction    -SBP remains high.  Increase hydralazine to 100 TID. Continue coreg at the current dose  - Patient is a DNR/DNI  - Other cardiovascular workup will depend on clinical course.  - All other workup per primary team.  - Will follow with you

## 2018-11-13 NOTE — PROGRESS NOTE ADULT - PROBLEM SELECTOR PLAN 2
Likely anemia of chronic disease v iron def anemia  -Transfuse 1 unit PRBC today, held yesterday for fever  - f/u H/H after transfusion  -Continue home iron  -Dr. Valdez, hematologist: Likely intermittent bleeding, with GIB most likely to account for amount blood loss without overt sx.   - possible concurrent B12 def, as prior low normal levels despite normal diet.   -Check FOBT serially, neg X1  - f/u repeat FOBT
Unilateral L>R leg swelling, daughter reports left leg usually swells more  -Lower extremity dopplers bilaterally neg for DVT  - Leg elevation  - likely 2/2 CHF exacerbation
Unilateral L>R leg swelling, daughter reports left leg usually swells more  -Lower extremity dopplers bilaterally neg for DVT  - Leg elevation  - likely 2/2 CHF exacerbation
Would defer empiric antibiotics  Follow up urine culture but if patient remains otherwise stable would not treat bacteruria presently
Unilateral L>R leg swelling, daughter reports left leg usually swells more  -Lower extremity dopplers bilaterally neg for DVT  - Leg elevation  - likely 2/2 CHF exacerbation
Likely anemia of chronic disease v iron def anemia  -s/p 1 unit PRBC   -Continue home iron  -As per Dr. Gu, anupama in H/H due to acute illness w poor response during stress  - h/h now stable, will continue to trend

## 2018-11-13 NOTE — GOALS OF CARE CONVERSATION - PERSONAL ADVANCE DIRECTIVE - NS PRO AD PATIENT TYPE ON CHART
Health Care Proxy (HCP)/Medical Orders for Life-Sustaining Treatment (MOLST)/Living Will
Medical Orders for Life-Sustaining Treatment (MOLST)/Health Care Proxy (HCP)/Living Will

## 2018-11-13 NOTE — PROGRESS NOTE ADULT - SUBJECTIVE AND OBJECTIVE BOX
Date/Time Patient Seen:  		  Referring MD:   Data Reviewed	       Patient is a 89y old  Female who presents with a chief complaint of acute on chronic CHF (13 Nov 2018 06:14)  in bed  on room air  vs and meds reviewed      Subjective/HPI     PAST MEDICAL & SURGICAL HISTORY:  Anemia, unspecified type  Edema of lower extremity  Heart murmur  Hypertension  Hypercalcemia  History of appendectomy  S/P tonsillectomy  H/O parathyroidectomy        Medication list         MEDICATIONS  (STANDING):  acetaminophen   Tablet .. 650 milliGRAM(s) Oral once  ALBUTerol    0.083% 2.5 milliGRAM(s) Nebulizer daily  carvedilol 12.5 milliGRAM(s) Oral every 12 hours  cholecalciferol 1000 Unit(s) Oral daily  cinacalcet 30 milliGRAM(s) Oral <User Schedule>  doxazosin 4 milliGRAM(s) Oral <User Schedule>  ferrous    sulfate 325 milliGRAM(s) Oral daily  furosemide    Tablet 40 milliGRAM(s) Oral every 12 hours  guaiFENesin  milliGRAM(s) Oral <User Schedule>  heparin  Injectable 5000 Unit(s) SubCutaneous every 12 hours  hydrALAZINE 100 milliGRAM(s) Oral every 8 hours    MEDICATIONS  (PRN):  acetaminophen   Tablet .. 650 milliGRAM(s) Oral every 6 hours PRN Temp greater or equal to 38C (100.4F), Mild Pain (1 - 3)         Vitals log        ICU Vital Signs Last 24 Hrs  T(C): 36.4 (13 Nov 2018 04:04), Max: 37.2 (12 Nov 2018 19:55)  T(F): 97.5 (13 Nov 2018 04:04), Max: 99 (12 Nov 2018 19:55)  HR: 84 (13 Nov 2018 04:04) (57 - 84)  BP: 163/71 (13 Nov 2018 04:04) (105/58 - 163/71)  BP(mean): --  ABP: --  ABP(mean): --  RR: 18 (13 Nov 2018 04:04) (17 - 18)  SpO2: 92% (13 Nov 2018 04:04) (92% - 96%)           Input and Output:  I&O's Detail    12 Nov 2018 07:01  -  13 Nov 2018 07:00  --------------------------------------------------------  IN:    Oral Fluid: 200 mL  Total IN: 200 mL    OUT:    Intermittent Catheterization - Urethral: 850 mL    Voided: 170 mL  Total OUT: 1020 mL    Total NET: -820 mL          Lab Data                        9.1    8.17  )-----------( 255      ( 13 Nov 2018 07:16 )             28.0     11-12    134<L>  |  99  |  42<H>  ----------------------------<  97  4.5   |  29  |  1.30    Ca    7.6<L>      12 Nov 2018 06:34  Mg     2.2     11-12    TPro  5.6<L>  /  Alb  2.1<L>  /  TBili  0.4  /  DBili  .10  /  AST  17  /  ALT  16  /  AlkPhos  50  11-12            Review of Systems	      Objective     Physical Examination  heart s1s2  lung dec BS        Pertinent Lab findings & Imaging      Richard:  NO   Adequate UO     I&O's Detail    12 Nov 2018 07:01  -  13 Nov 2018 07:00  --------------------------------------------------------  IN:    Oral Fluid: 200 mL  Total IN: 200 mL    OUT:    Intermittent Catheterization - Urethral: 850 mL    Voided: 170 mL  Total OUT: 1020 mL    Total NET: -820 mL               Discussed with:     Cultures:	        Radiology

## 2018-11-13 NOTE — PROGRESS NOTE ADULT - PROBLEM SELECTOR PLAN 8
DVT prophylaxis: Heparin SQ  Fall risk   OOB with assistance

## 2018-11-13 NOTE — PROGRESS NOTE ADULT - SUBJECTIVE AND OBJECTIVE BOX
All interim records and events noted.    awake, appear comfortable  daughter present      MEDICATIONS  (STANDING):  acetaminophen   Tablet .. 650 milliGRAM(s) Oral once  ALBUTerol    0.083% 2.5 milliGRAM(s) Nebulizer daily  carvedilol 12.5 milliGRAM(s) Oral every 12 hours  cholecalciferol 1000 Unit(s) Oral daily  cinacalcet 30 milliGRAM(s) Oral <User Schedule>  doxazosin 4 milliGRAM(s) Oral <User Schedule>  ferrous    sulfate 325 milliGRAM(s) Oral daily  furosemide    Tablet 40 milliGRAM(s) Oral every 12 hours  guaiFENesin/dextromethorphan  Syrup 10 milliLiter(s) Oral every 24 hours  heparin  Injectable 5000 Unit(s) SubCutaneous every 12 hours  hydrALAZINE 100 milliGRAM(s) Oral every 8 hours    MEDICATIONS  (PRN):  acetaminophen   Tablet .. 650 milliGRAM(s) Oral every 6 hours PRN Temp greater or equal to 38C (100.4F), Mild Pain (1 - 3)      Vital Signs Last 24 Hrs  T(C): 36.8 (13 Nov 2018 08:02), Max: 37.2 (12 Nov 2018 19:55)  T(F): 98.2 (13 Nov 2018 08:02), Max: 99 (12 Nov 2018 19:55)  HR: 68 (13 Nov 2018 08:02) (57 - 84)  BP: 167/64 (13 Nov 2018 08:02) (128/89 - 167/64)  BP(mean): --  RR: 17 (13 Nov 2018 08:02) (17 - 18)  SpO2: 95% (13 Nov 2018 08:02) (92% - 96%)    PHYSICAL EXAM  General: well developed  and frail, in no acute distress  Head: atraumatic, normocephalic  ENT: sclera anicteric, buccal mucosa moist  Neck: supple, trachea midline  CV: S1 S2, regular rate and rhythm  Lungs: clear to auscultation, no wheezes/rhonchi  Abdomen: soft, nontender, bowel sounds present, no palpable masses  Extrem: no clubbing/cyanosis/edema  Skin: no significant increased ecchymosis/petechiae        LABS:             9.1    8.17  )-----------( 255      ( 11-13 @ 07:16 )             28.0                8.9    x     )-----------( x        ( 11-12 @ 14:13 )             27.2                7.6    9.41  )-----------( 268      ( 11-12 @ 06:34 )             23.1                8.4    9.10  )-----------( 288      ( 11-11 @ 06:47 )             25.9                9.2    9.62  )-----------( 277      ( 11-10 @ 12:21 )             28.1       11-13    135  |  98  |  40<H>  ----------------------------<  92  4.0   |  27  |  1.30    Ca    8.0<L>      13 Nov 2018 07:16  Mg     2.2     11-12    TPro  5.6<L>  /  Alb  2.1<L>  /  TBili  0.4  /  DBili  .10  /  AST  17  /  ALT  16  /  AlkPhos  50  11-12 11-08 @ 22:12  PT14.3 INR1.26  PTT29.8      RADIOLOGY & ADDITIONAL STUDIES:    IMPRESSION/RECOMMENDATIONS:

## 2018-11-13 NOTE — PROGRESS NOTE ADULT - PROBLEM SELECTOR PLAN 9
advance care planning and long term prognosis dw family at length  Pt approved for home hospice care however Daughter refused hospice at this time because she wants her Mother to continue seeing  all her doctors   - will follow Social work's recommendation
advance care planning and long term prognosis dw family at length  to arrange for home hospice eval

## 2018-11-13 NOTE — PROGRESS NOTE ADULT - PROBLEM SELECTOR PLAN 1
Diastolic CHF exacerbation of unknown etiology at this time -- clinically improving  - Chest xray: small left and trace right sided pleural effusion with adjacent atelectasis and/or pneumonia, pulmonary vascular congestion or edema  - Continue Po Lasix 40mg BID  - Fluid restriction  - Leg elevation  - Strict I&Os, daily weights  - Speech/swallow: D2 soft mechanical liquids however daughter requesting regular diet as pt is approved for home hospice

## 2018-11-13 NOTE — PROGRESS NOTE ADULT - ASSESSMENT
[ASSESSMENT and  PLAN]    89 yo woman well known to our group with multifactorial anemia including iron deficiency, chronic disease, admitted for CHF exacerbation; course complicated by fever; H/H decreasing. Urine culture positive w 100k enterococcus faecium and 10-49k E coli but pt spont defervesced, ID on case    - appropriate response to one unit PRBC yesterday, decr in H/H due to acute illness w poor response during stress  - no other acute heme intervention needed, continue symptomatic management from Heme standpoint

## 2018-11-13 NOTE — PROGRESS NOTE ADULT - SUBJECTIVE AND OBJECTIVE BOX
Patient is a 89y old  Female who presents with a chief complaint of acute on chronic CHF (2018 11:37)      INTERVAL HPI/OVERNIGHT EVENTS: Overnight patient was found to have urinary retention, straight cathed for 800 cc of urine, daughter refusing Ramos for patient. Patient examined in bed, NAD and no current complaints. Denies HA, SOB, CP, palpitations, abdominal pain, dyuria, hematuria.       T(C): 36.8 (18 @ 08:02), Max: 37.2 (18 @ 19:55)  HR: 68 (18 @ 08:02) (57 - 84)  BP: 167/64 (18 @ 08:02) (128/89 - 167/64)  RR: 17 (18 @ 08:02) (17 - 18)  SpO2: 95% (18 @ 08:02) (92% - 96%)  Wt(kg): --    I&O's Summary    2018 07:  -  2018 07:00  --------------------------------------------------------  IN: 200 mL / OUT: 1020 mL / NET: -820 mL    2018 07:01  -  2018 11:45  --------------------------------------------------------  IN: 200 mL / OUT: 0 mL / NET: 200 mL        LABS:                        9.1    8.17  )-----------( 255      ( 2018 07:16 )             28.0         135  |  98  |  40<H>  ----------------------------<  92  4.0   |  27  |  1.30    Ca    8.0<L>      2018 07:16  Mg     2.2         TPro  5.6<L>  /  Alb  2.1<L>  /  TBili  0.4  /  DBili  .10  /  AST  17  /  ALT  16  /  AlkPhos  50  11-12      CAPILLARY BLOOD GLUCOSE          Urinalysis Basic - ( 2018 03:22 )    Color: Pale Yellow / Appearance: Clear / S.005 / pH: x  Gluc: x / Ketone: Negative  / Bili: Negative / Urobili: Negative   Blood: x / Protein: 25 mg/dL / Nitrite: Negative   Leuk Esterase: Small / RBC: 11-25 /HPF / WBC 11-25   Sq Epi: x / Non Sq Epi: Few / Bacteria: Moderate        Culture - Urine (collected 2018 09:35)  Source: .Urine Catheterized  Preliminary Report (2018 09:53):    10,000 - 49,000 CFU/mL Escherichia coli    >100,000 CFU/ml Enterococcus faecium    Culture - Blood (collected 2018 17:22)  Source: .Blood Blood  Preliminary Report (2018 18:01):    No growth to date.    Culture - Blood (collected 2018 17:22)  Source: .Blood Blood  Preliminary Report (2018 18:01):    No growth to date.    Culture - Urine (collected 2018 21:48)  Source: .Urine Clean Catch (Midstream)  Final Report (2018 19:49):    >100,000 CFU/ml Enterococcus faecium (vancomycin resistant)  Organism: Enterococcus faecium (vancomycin resistant) (2018 19:49)  Organism: Enterococcus faecium (vancomycin resistant) (2018 19:49)         MEDICATIONS  (STANDING):  acetaminophen   Tablet .. 650 milliGRAM(s) Oral once  ALBUTerol    0.083% 2.5 milliGRAM(s) Nebulizer daily  carvedilol 12.5 milliGRAM(s) Oral every 12 hours  cholecalciferol 1000 Unit(s) Oral daily  cinacalcet 30 milliGRAM(s) Oral <User Schedule>  doxazosin 4 milliGRAM(s) Oral <User Schedule>  ferrous    sulfate 325 milliGRAM(s) Oral daily  furosemide    Tablet 40 milliGRAM(s) Oral every 12 hours  guaiFENesin/dextromethorphan  Syrup 10 milliLiter(s) Oral every 24 hours  heparin  Injectable 5000 Unit(s) SubCutaneous every 12 hours  hydrALAZINE 100 milliGRAM(s) Oral every 8 hours    MEDICATIONS  (PRN):  acetaminophen   Tablet .. 650 milliGRAM(s) Oral every 6 hours PRN Temp greater or equal to 38C (100.4F), Mild Pain (1 - 3)      REVIEW OF SYSTEMS:  CONSTITUTIONAL: No fever, or fatigue  RESPIRATORY: No cough,  chills; No shortness of breath  CARDIOVASCULAR: No chest pain, palpitations, dizziness, or leg swelling  GASTROINTESTINAL: No abdominal or epigastric pain. No nausea, vomiting,; No diarrhea or constipation  GENITOURINARY: No dysuria, frequency, hematuria  NEUROLOGICAL: No headaches,numbness, or tremors  MUSCULOSKELETAL:No muscle, back, or extremity pain    RADIOLOGY & ADDITIONAL TESTS:    Imaging Personally Reviewed:  [x ] YES  [ ] NO    Consultant(s) Notes Reviewed:  [x ] YES  [ ] NO    PHYSICAL EXAM:  GENERAL: NAD, frail appearing  HEAD: Atraumatic, Normocephalic  EYES: EOMI, PERRLA, conjunctiva and sclera clear  ENMT:Moist mucous membrane  NECK: Supple, No JVD, Normal thyroid  NERVOUS SYSTEM: Alert & Oriented X3  CHEST/LUNG: decreased breath sounds b/l  HEART: reg rate and rhythm, systolic murmur  ABDOMEN: Soft, Nontender, Nondistended; Bowel sounds present  EXTREMITIES: 2+ Peripheral Pulses, no pitting edema     Care Discussed with Consultants/Other Providers [x ] YES  [ ] NO

## 2018-11-14 VITALS
RESPIRATION RATE: 17 BRPM | OXYGEN SATURATION: 96 % | DIASTOLIC BLOOD PRESSURE: 82 MMHG | TEMPERATURE: 98 F | SYSTOLIC BLOOD PRESSURE: 149 MMHG | HEART RATE: 69 BPM

## 2018-11-14 LAB
ANION GAP SERPL CALC-SCNC: 9 MMOL/L — SIGNIFICANT CHANGE UP (ref 5–17)
BUN SERPL-MCNC: 40 MG/DL — HIGH (ref 7–23)
CALCIUM SERPL-MCNC: 7.5 MG/DL — LOW (ref 8.5–10.1)
CHLORIDE SERPL-SCNC: 98 MMOL/L — SIGNIFICANT CHANGE UP (ref 96–108)
CO2 SERPL-SCNC: 26 MMOL/L — SIGNIFICANT CHANGE UP (ref 22–31)
CREAT SERPL-MCNC: 1.3 MG/DL — SIGNIFICANT CHANGE UP (ref 0.5–1.3)
CULTURE RESULTS: SIGNIFICANT CHANGE UP
GLUCOSE SERPL-MCNC: 95 MG/DL — SIGNIFICANT CHANGE UP (ref 70–99)
HCT VFR BLD CALC: 28 % — LOW (ref 34.5–45)
HGB BLD-MCNC: 9.2 G/DL — LOW (ref 11.5–15.5)
MCHC RBC-ENTMCNC: 26.5 PG — LOW (ref 27–34)
MCHC RBC-ENTMCNC: 32.9 GM/DL — SIGNIFICANT CHANGE UP (ref 32–36)
MCV RBC AUTO: 80.7 FL — SIGNIFICANT CHANGE UP (ref 80–100)
NRBC # BLD: 0 /100 WBCS — SIGNIFICANT CHANGE UP (ref 0–0)
PLATELET # BLD AUTO: 239 K/UL — SIGNIFICANT CHANGE UP (ref 150–400)
POTASSIUM SERPL-MCNC: 3.6 MMOL/L — SIGNIFICANT CHANGE UP (ref 3.5–5.3)
POTASSIUM SERPL-SCNC: 3.6 MMOL/L — SIGNIFICANT CHANGE UP (ref 3.5–5.3)
RBC # BLD: 3.47 M/UL — LOW (ref 3.8–5.2)
RBC # FLD: 16.1 % — HIGH (ref 10.3–14.5)
SODIUM SERPL-SCNC: 133 MMOL/L — LOW (ref 135–145)
SPECIMEN SOURCE: SIGNIFICANT CHANGE UP
WBC # BLD: 7.46 K/UL — SIGNIFICANT CHANGE UP (ref 3.8–10.5)
WBC # FLD AUTO: 7.46 K/UL — SIGNIFICANT CHANGE UP (ref 3.8–10.5)

## 2018-11-14 PROCEDURE — 85014 HEMATOCRIT: CPT

## 2018-11-14 PROCEDURE — 86850 RBC ANTIBODY SCREEN: CPT

## 2018-11-14 PROCEDURE — 83550 IRON BINDING TEST: CPT

## 2018-11-14 PROCEDURE — 86901 BLOOD TYPING SEROLOGIC RH(D): CPT

## 2018-11-14 PROCEDURE — 84145 PROCALCITONIN (PCT): CPT

## 2018-11-14 PROCEDURE — 82607 VITAMIN B-12: CPT

## 2018-11-14 PROCEDURE — 80053 COMPREHEN METABOLIC PANEL: CPT

## 2018-11-14 PROCEDURE — 87040 BLOOD CULTURE FOR BACTERIA: CPT

## 2018-11-14 PROCEDURE — 36430 TRANSFUSION BLD/BLD COMPNT: CPT

## 2018-11-14 PROCEDURE — 83540 ASSAY OF IRON: CPT

## 2018-11-14 PROCEDURE — 71045 X-RAY EXAM CHEST 1 VIEW: CPT

## 2018-11-14 PROCEDURE — 85730 THROMBOPLASTIN TIME PARTIAL: CPT

## 2018-11-14 PROCEDURE — 83880 ASSAY OF NATRIURETIC PEPTIDE: CPT

## 2018-11-14 PROCEDURE — 76770 US EXAM ABDO BACK WALL COMP: CPT

## 2018-11-14 PROCEDURE — 82272 OCCULT BLD FECES 1-3 TESTS: CPT

## 2018-11-14 PROCEDURE — 85652 RBC SED RATE AUTOMATED: CPT

## 2018-11-14 PROCEDURE — 80048 BASIC METABOLIC PNL TOTAL CA: CPT

## 2018-11-14 PROCEDURE — 83735 ASSAY OF MAGNESIUM: CPT

## 2018-11-14 PROCEDURE — 81001 URINALYSIS AUTO W/SCOPE: CPT

## 2018-11-14 PROCEDURE — 87798 DETECT AGENT NOS DNA AMP: CPT

## 2018-11-14 PROCEDURE — 99285 EMERGENCY DEPT VISIT HI MDM: CPT | Mod: 25

## 2018-11-14 PROCEDURE — P9016: CPT

## 2018-11-14 PROCEDURE — 87186 SC STD MICRODIL/AGAR DIL: CPT

## 2018-11-14 PROCEDURE — 87581 M.PNEUMON DNA AMP PROBE: CPT

## 2018-11-14 PROCEDURE — 86140 C-REACTIVE PROTEIN: CPT

## 2018-11-14 PROCEDURE — 97162 PT EVAL MOD COMPLEX 30 MIN: CPT

## 2018-11-14 PROCEDURE — 87086 URINE CULTURE/COLONY COUNT: CPT

## 2018-11-14 PROCEDURE — 86900 BLOOD TYPING SEROLOGIC ABO: CPT

## 2018-11-14 PROCEDURE — 85018 HEMOGLOBIN: CPT

## 2018-11-14 PROCEDURE — 36415 COLL VENOUS BLD VENIPUNCTURE: CPT

## 2018-11-14 PROCEDURE — 80076 HEPATIC FUNCTION PANEL: CPT

## 2018-11-14 PROCEDURE — 82728 ASSAY OF FERRITIN: CPT

## 2018-11-14 PROCEDURE — 85027 COMPLETE CBC AUTOMATED: CPT

## 2018-11-14 PROCEDURE — 93970 EXTREMITY STUDY: CPT

## 2018-11-14 PROCEDURE — 87633 RESP VIRUS 12-25 TARGETS: CPT

## 2018-11-14 PROCEDURE — 87486 CHLMYD PNEUM DNA AMP PROBE: CPT

## 2018-11-14 PROCEDURE — 82746 ASSAY OF FOLIC ACID SERUM: CPT

## 2018-11-14 PROCEDURE — 86923 COMPATIBILITY TEST ELECTRIC: CPT

## 2018-11-14 PROCEDURE — 99232 SBSQ HOSP IP/OBS MODERATE 35: CPT

## 2018-11-14 PROCEDURE — 96374 THER/PROPH/DIAG INJ IV PUSH: CPT

## 2018-11-14 PROCEDURE — 93005 ELECTROCARDIOGRAM TRACING: CPT

## 2018-11-14 PROCEDURE — 94640 AIRWAY INHALATION TREATMENT: CPT

## 2018-11-14 PROCEDURE — 85610 PROTHROMBIN TIME: CPT

## 2018-11-14 PROCEDURE — 85045 AUTOMATED RETICULOCYTE COUNT: CPT

## 2018-11-14 RX ORDER — HYDRALAZINE HCL 50 MG
1 TABLET ORAL
Qty: 90 | Refills: 0 | OUTPATIENT
Start: 2018-11-14 | End: 2018-12-13

## 2018-11-14 RX ORDER — FUROSEMIDE 40 MG
3 TABLET ORAL
Qty: 0 | Refills: 0 | COMMUNITY

## 2018-11-14 RX ORDER — ALBUTEROL 90 UG/1
3 AEROSOL, METERED ORAL
Qty: 1 | Refills: 0 | OUTPATIENT
Start: 2018-11-14 | End: 2018-12-13

## 2018-11-14 RX ORDER — FUROSEMIDE 40 MG
1 TABLET ORAL
Qty: 60 | Refills: 0 | OUTPATIENT
Start: 2018-11-14 | End: 2018-12-13

## 2018-11-14 RX ORDER — ALBUTEROL 90 UG/1
1 AEROSOL, METERED ORAL
Qty: 1 | Refills: 0 | OUTPATIENT
Start: 2018-11-14 | End: 2018-12-13

## 2018-11-14 RX ORDER — ACETAMINOPHEN 500 MG
2 TABLET ORAL
Qty: 0 | Refills: 0 | COMMUNITY
Start: 2018-11-14

## 2018-11-14 RX ADMIN — ALBUTEROL 2.5 MILLIGRAM(S): 90 AEROSOL, METERED ORAL at 07:48

## 2018-11-14 RX ADMIN — HEPARIN SODIUM 5000 UNIT(S): 5000 INJECTION INTRAVENOUS; SUBCUTANEOUS at 05:18

## 2018-11-14 RX ADMIN — Medication 100 MILLIGRAM(S): at 05:19

## 2018-11-14 RX ADMIN — Medication 325 MILLIGRAM(S): at 11:10

## 2018-11-14 RX ADMIN — Medication 1000 UNIT(S): at 11:09

## 2018-11-14 RX ADMIN — Medication 40 MILLIGRAM(S): at 05:19

## 2018-11-14 RX ADMIN — CARVEDILOL PHOSPHATE 12.5 MILLIGRAM(S): 80 CAPSULE, EXTENDED RELEASE ORAL at 05:19

## 2018-11-14 NOTE — PROGRESS NOTE ADULT - PROVIDER SPECIALTY LIST ADULT
Cardiology
Heme/Onc
Hospitalist
Hospitalist
Infectious Disease
Infectious Disease
Internal Medicine
Internal Medicine
Pulmonology
Hospitalist

## 2018-11-14 NOTE — PROGRESS NOTE ADULT - SUBJECTIVE AND OBJECTIVE BOX
[facility] - Christofer Isaacs, Sorin, Marisa, Jr, Sandro Hagen  Office Number:  [phone]    Patient resting comfortably in bed in NAD.  Laying flat with no respiratory distress.  No complaints of chest pain, dyspnea, palpitations, PND, or orthopnea.  no overnight events    ROS: negative unless otherwise mentioned.    Telemetry:  off tele    MEDICATIONS  (STANDING):  acetaminophen   Tablet .. 650 milliGRAM(s) Oral once  ALBUTerol    0.083% 2.5 milliGRAM(s) Nebulizer daily  carvedilol 12.5 milliGRAM(s) Oral every 12 hours  cholecalciferol 1000 Unit(s) Oral daily  cinacalcet 30 milliGRAM(s) Oral <User Schedule>  doxazosin 4 milliGRAM(s) Oral <User Schedule>  ferrous    sulfate 325 milliGRAM(s) Oral daily  furosemide    Tablet 40 milliGRAM(s) Oral every 12 hours  guaiFENesin/dextromethorphan  Syrup 10 milliLiter(s) Oral every 24 hours  heparin  Injectable 5000 Unit(s) SubCutaneous every 12 hours  hydrALAZINE 100 milliGRAM(s) Oral every 8 hours    MEDICATIONS  (PRN):  acetaminophen   Tablet .. 650 milliGRAM(s) Oral every 6 hours PRN Temp greater or equal to 38C (100.4F), Mild Pain (1 - 3)      Allergies    Vasotec (Unknown)    Intolerances        Vital Signs Last 24 Hrs  T(C): 36.7 (14 Nov 2018 07:41), Max: 36.7 (13 Nov 2018 23:17)  T(F): 98 (14 Nov 2018 07:41), Max: 98 (13 Nov 2018 23:17)  HR: 69 (14 Nov 2018 07:41) (65 - 72)  BP: 149/82 (14 Nov 2018 07:41) (123/73 - 154/67)  BP(mean): --  RR: 17 (14 Nov 2018 07:41) (16 - 18)  SpO2: 96% (14 Nov 2018 07:41) (92% - 96%)    I&O's Summary    13 Nov 2018 07:01  -  14 Nov 2018 07:00  --------------------------------------------------------  IN: 820 mL / OUT: 0 mL / NET: 820 mL        ON EXAM:    General: NAD, awake and alert  HEENT: Mucous membranes are moist, anicteric  Lungs: Non-labored, breath sounds are clear bilaterally, No wheezing, rales or rhonchi  Cardiovascular: Regular, S1 and S2, 3/6 systolic murmur  Gastrointestinal: Bowel Sounds present, soft, nontender.   Lymph: No peripheral edema. No lymphadenopathy.  Skin: No rashes or ulcers  Psych:  Mood & affect appropriate    LABS: All Labs Reviewed:                        9.1    8.17  )-----------( 255      ( 13 Nov 2018 07:16 )             28.0                         8.9    x     )-----------( x        ( 12 Nov 2018 14:13 )             27.2                         7.6    9.41  )-----------( 268      ( 12 Nov 2018 06:34 )             23.1     13 Nov 2018 07:16    135    |  98     |  40     ----------------------------<  92     4.0     |  27     |  1.30   12 Nov 2018 06:34    134    |  99     |  42     ----------------------------<  97     4.5     |  29     |  1.30     Ca    8.0        13 Nov 2018 07:16  Ca    7.6        12 Nov 2018 06:34  Mg     2.2       12 Nov 2018 06:34    TPro  5.6    /  Alb  2.1    /  TBili  0.4    /  DBili  .10    /  AST  17     /  ALT  16     /  AlkPhos  50     12 Nov 2018 06:34          Blood Culture: Organism --  Gram Stain Blood -- Gram Stain --  Specimen Source .Urine Catheterized  Culture-Blood --    Organism --  Gram Stain Blood -- Gram Stain --  Specimen Source .Blood Blood  Culture-Blood --    Organism Enterococcus faecium (vancomycin resistant)  Gram Stain Blood -- Gram Stain --  Specimen Source .Urine Clean Catch (Midstream)  Culture-Blood --

## 2018-11-14 NOTE — PROGRESS NOTE ADULT - SUBJECTIVE AND OBJECTIVE BOX
Date/Time Patient Seen:  		  Referring MD:   Data Reviewed	       Patient is a 89y old  Female who presents with a chief complaint of acute on chronic CHF (14 Nov 2018 08:34)  in bed  seen and examined  vs and meds reviewed        Subjective/HPI     PAST MEDICAL & SURGICAL HISTORY:  Anemia, unspecified type  Edema of lower extremity  Heart murmur  Hypertension  Hypercalcemia  History of appendectomy  S/P tonsillectomy  H/O parathyroidectomy        Medication list         MEDICATIONS  (STANDING):  acetaminophen   Tablet .. 650 milliGRAM(s) Oral once  ALBUTerol    0.083% 2.5 milliGRAM(s) Nebulizer daily  carvedilol 12.5 milliGRAM(s) Oral every 12 hours  cholecalciferol 1000 Unit(s) Oral daily  cinacalcet 30 milliGRAM(s) Oral <User Schedule>  doxazosin 4 milliGRAM(s) Oral <User Schedule>  ferrous    sulfate 325 milliGRAM(s) Oral daily  furosemide    Tablet 40 milliGRAM(s) Oral every 12 hours  heparin  Injectable 5000 Unit(s) SubCutaneous every 12 hours  hydrALAZINE 100 milliGRAM(s) Oral every 8 hours    MEDICATIONS  (PRN):  acetaminophen   Tablet .. 650 milliGRAM(s) Oral every 6 hours PRN Temp greater or equal to 38C (100.4F), Mild Pain (1 - 3)         Vitals log        ICU Vital Signs Last 24 Hrs  T(C): 36.7 (14 Nov 2018 07:41), Max: 36.7 (13 Nov 2018 23:17)  T(F): 98 (14 Nov 2018 07:41), Max: 98 (13 Nov 2018 23:17)  HR: 69 (14 Nov 2018 07:41) (65 - 72)  BP: 149/82 (14 Nov 2018 07:41) (123/73 - 154/67)  BP(mean): --  ABP: --  ABP(mean): --  RR: 17 (14 Nov 2018 07:41) (16 - 18)  SpO2: 96% (14 Nov 2018 07:41) (92% - 96%)           Input and Output:  I&O's Detail    13 Nov 2018 07:01  -  14 Nov 2018 07:00  --------------------------------------------------------  IN:    Oral Fluid: 820 mL  Total IN: 820 mL    OUT:  Total OUT: 0 mL    Total NET: 820 mL      14 Nov 2018 07:01  -  14 Nov 2018 09:11  --------------------------------------------------------  IN:    Oral Fluid: 120 mL  Total IN: 120 mL    OUT:  Total OUT: 0 mL    Total NET: 120 mL          Lab Data                        9.2    7.46  )-----------( 239      ( 14 Nov 2018 08:41 )             28.0     11-14    133<L>  |  98  |  40<H>  ----------------------------<  95  3.6   |  26  |  1.30    Ca    7.5<L>      14 Nov 2018 08:41              Review of Systems	      Objective     Physical Examination    heart s1s2  lung dec BS  abd soft      Pertinent Lab findings & Imaging      Richard:  NO   Adequate UO     I&O's Detail    13 Nov 2018 07:01  -  14 Nov 2018 07:00  --------------------------------------------------------  IN:    Oral Fluid: 820 mL  Total IN: 820 mL    OUT:  Total OUT: 0 mL    Total NET: 820 mL      14 Nov 2018 07:01  -  14 Nov 2018 09:11  --------------------------------------------------------  IN:    Oral Fluid: 120 mL  Total IN: 120 mL    OUT:  Total OUT: 0 mL    Total NET: 120 mL               Discussed with:     Cultures:	        Radiology

## 2018-11-14 NOTE — PROGRESS NOTE ADULT - PROBLEM SELECTOR PLAN 1
frail, weak  DC Planning under way  oral hygiene  skin care  Albuterol NEBS daily to help with mucociliary clearance  Diuresis - as per Cardio, I and O, for HF management  cvs regimen optimization  overall prognosis guarded  will follow and monitor  pt is DNR DNI  planned for home DC

## 2018-11-14 NOTE — PROGRESS NOTE ADULT - ASSESSMENT
89F with PMH of diastolic CHF (TTE July 2017 EF 65%), HTN, anemia presented with increasing bilateral LE edema, admitted for acute on chronic CHF.     - Patient has improving volume status.   - Better compensated from volume standpoint. Will continue Lasix 40 po bid.  Monitor I & O's strictly as much as possible  - Trend H/H and transfuse as needed    - Monitor and replete lytes, keep K>4, Mg>2.  Monitor renal function  - Monitor daily standing weights    - No clear evidence of acute ischemia. EKG shows NSR, patient remained in NSR with no acute events on tele monitor.   - Previous ECHO in 07/17 shows normal LV systolic function with EF: 65%, mild mitral/tricuspid/pulmonic insufficiency, diastolic dysfunction    - BP better controlled. Continue hydralazine. Continue coreg at the current dose  - Patient is a DNR/DNI  - Other cardiovascular workup will depend on clinical course.  - All other workup per primary team.  - Will follow with you

## 2018-11-14 NOTE — PROGRESS NOTE ADULT - REASON FOR ADMISSION
acute on chronic CHF
acute on chronic CHF; heme asked to evaluate for anemia
acute on chronic CHF

## 2018-11-14 NOTE — PROGRESS NOTE ADULT - PROBLEM SELECTOR PROBLEM 1
Advance care planning
Pulmonary congestion
Pulmonary congestion
Acute on chronic congestive heart failure, unspecified heart failure type
Fever
Acute on chronic congestive heart failure, unspecified heart failure type
Acute on chronic congestive heart failure, unspecified heart failure type

## 2018-11-14 NOTE — PROGRESS NOTE ADULT - SUBJECTIVE AND OBJECTIVE BOX
Interval History:    Chart reviewed and events noted;   ROS:negative exept    MEDICATIONS  (STANDING):  acetaminophen   Tablet .. 650 milliGRAM(s) Oral once  ALBUTerol    0.083% 2.5 milliGRAM(s) Nebulizer daily  carvedilol 12.5 milliGRAM(s) Oral every 12 hours  cholecalciferol 1000 Unit(s) Oral daily  cinacalcet 30 milliGRAM(s) Oral <User Schedule>  doxazosin 4 milliGRAM(s) Oral <User Schedule>  ferrous    sulfate 325 milliGRAM(s) Oral daily  furosemide    Tablet 40 milliGRAM(s) Oral every 12 hours  guaiFENesin/dextromethorphan  Syrup 10 milliLiter(s) Oral every 24 hours  heparin  Injectable 5000 Unit(s) SubCutaneous every 12 hours  hydrALAZINE 100 milliGRAM(s) Oral every 8 hours    MEDICATIONS  (PRN):  acetaminophen   Tablet .. 650 milliGRAM(s) Oral every 6 hours PRN Temp greater or equal to 38C (100.4F), Mild Pain (1 - 3)      Vital Signs Last 24 Hrs  T(C): 36.7 (14 Nov 2018 07:41), Max: 36.7 (13 Nov 2018 23:17)  T(F): 98 (14 Nov 2018 07:41), Max: 98 (13 Nov 2018 23:17)  HR: 69 (14 Nov 2018 07:41) (65 - 72)  BP: 149/82 (14 Nov 2018 07:41) (123/73 - 154/67)  BP(mean): --  RR: 17 (14 Nov 2018 07:41) (16 - 18)  SpO2: 96% (14 Nov 2018 07:41) (92% - 96%)    PHYSICAL EXAM  General: adult in NAD  HEENT: clear oropharynx, anicteric sclera, pink conjunctivae  Neck: supple  CV: normal S1S2 with no murmur rubs or gallops  Lungs: clear to auscultation, no wheezes, no rhales  Abdomen: soft non-tender non-distended, no hepato/splenomegaly  Ext: no clubbing cyanosis or edema  Skin: no rashes and no petichiae  Neuro: alert and oriented X3 no focal deficits      LABS:                        9.1    8.17  )-----------( 255      ( 13 Nov 2018 07:16 )             28.0     11-13    135  |  98  |  40<H>  ----------------------------<  92  4.0   |  27  |  1.30    Ca    8.0<L>      13 Nov 2018 07:16            RADIOLOGY & ADDITIONAL STUDIES:    IMPRESSION/RECOMMENDATIONS:  IMPRESSION/RECOMMENDATIONS: Interval History:    Chart reviewed and events noted;   comfortable in bed, eating breakfast    MEDICATIONS  (STANDING):  acetaminophen   Tablet .. 650 milliGRAM(s) Oral once  ALBUTerol    0.083% 2.5 milliGRAM(s) Nebulizer daily  carvedilol 12.5 milliGRAM(s) Oral every 12 hours  cholecalciferol 1000 Unit(s) Oral daily  cinacalcet 30 milliGRAM(s) Oral <User Schedule>  doxazosin 4 milliGRAM(s) Oral <User Schedule>  ferrous    sulfate 325 milliGRAM(s) Oral daily  furosemide    Tablet 40 milliGRAM(s) Oral every 12 hours  guaiFENesin/dextromethorphan  Syrup 10 milliLiter(s) Oral every 24 hours  heparin  Injectable 5000 Unit(s) SubCutaneous every 12 hours  hydrALAZINE 100 milliGRAM(s) Oral every 8 hours    MEDICATIONS  (PRN):  acetaminophen   Tablet .. 650 milliGRAM(s) Oral every 6 hours PRN Temp greater or equal to 38C (100.4F), Mild Pain (1 - 3)      Vital Signs Last 24 Hrs  T(C): 36.7 (14 Nov 2018 07:41), Max: 36.7 (13 Nov 2018 23:17)  T(F): 98 (14 Nov 2018 07:41), Max: 98 (13 Nov 2018 23:17)  HR: 69 (14 Nov 2018 07:41) (65 - 72)  BP: 149/82 (14 Nov 2018 07:41) (123/73 - 154/67)  BP(mean): --  RR: 17 (14 Nov 2018 07:41) (16 - 18)  SpO2: 96% (14 Nov 2018 07:41) (92% - 96%)    PHYSICAL EXAM  General: frail elderly  HEENT: clear oropharynx, anicteric sclera, pink conjunctivae  Neck: supple  CV: normal S1S2 with no murmur rubs or gallops  Lungs: clear to auscultation, no wheezes, no rales  Abdomen: soft non-tender non-distended, no hepatosplenomegaly  Ext: no clubbing cyanosis or edema  Skin: no rashes and no petechiae  Neuro: alert and oriented X2 no focal deficits      LABS:                        9.1    8.17  )-----------( 255      ( 13 Nov 2018 07:16 )             28.0     11-13    135  |  98  |  40<H>  ----------------------------<  92  4.0   |  27  |  1.30    Ca    8.0<L>      13 Nov 2018 07:16            RADIOLOGY & ADDITIONAL STUDIES:    IMPRESSION/RECOMMENDATIONS:  IMPRESSION/RECOMMENDATIONS:

## 2018-11-14 NOTE — PROGRESS NOTE ADULT - ASSESSMENT
[ASSESSMENT and  PLAN]    89 yo woman well known to our group with multifactorial anemia including iron deficiency, chronic disease, admitted for CHF exacerbation; course complicated by fever; H/H decreasing. Urine culture positive w 100k enterococcus faecium and 10-49k E coli but pt spont defervesced, ID on case    - appropriate response to one unit PRBC yesterday, decr in H/H due to acute illness w poor response during stress  -HB is stable   - iron studies : anemia in chronic disease, sufficient B12/ folate  - f/u TSH   - no other acute heme intervention needed, continue symptomatic management from Heme standpoint,   -signing off, reconsult as needed [ASSESSMENT and  PLAN]    89 yo woman well known to our group with multifactorial anemia including iron deficiency, chronic disease, admitted for CHF exacerbation; course complicated by fever; H/H decreasing. Urine culture positive w 100k enterococcus faecium and 10-49k E coli but pt spont defervesced, ID on case    - appropriate response to one unit PRBC yesterday, decr in H/H due to acute illness w poor response during stress  - HB is stable , 9.1 yesterday  - iron studies : anemia in chronic disease, sufficient B12/ folate( performed prior to transfusion)  - no other acute heme intervention needed, continue symptomatic management from Heme standpoint,   - pending d/c signing off, reconsult as needed

## 2018-11-16 LAB
CULTURE RESULTS: SIGNIFICANT CHANGE UP
CULTURE RESULTS: SIGNIFICANT CHANGE UP
SPECIMEN SOURCE: SIGNIFICANT CHANGE UP
SPECIMEN SOURCE: SIGNIFICANT CHANGE UP

## 2018-11-19 RX ORDER — HYDRALAZINE HYDROCHLORIDE 100 MG/1
100 TABLET ORAL
Qty: 270 | Refills: 3 | Status: ACTIVE | COMMUNITY

## 2018-11-26 ENCOUNTER — OTHER (OUTPATIENT)
Age: 83
End: 2018-11-26

## 2018-11-26 RX ORDER — FUROSEMIDE 20 MG/1
20 TABLET ORAL DAILY
Refills: 0 | Status: ACTIVE | COMMUNITY

## 2018-11-27 ENCOUNTER — OTHER (OUTPATIENT)
Age: 83
End: 2018-11-27

## 2018-11-28 ENCOUNTER — INPATIENT (INPATIENT)
Facility: HOSPITAL | Age: 83
LOS: 7 days | DRG: 682 | End: 2018-12-06
Attending: INTERNAL MEDICINE | Admitting: INTERNAL MEDICINE
Payer: MEDICARE

## 2018-11-28 VITALS
WEIGHT: 106.92 LBS | HEART RATE: 60 BPM | TEMPERATURE: 99 F | RESPIRATION RATE: 20 BRPM | DIASTOLIC BLOOD PRESSURE: 72 MMHG | SYSTOLIC BLOOD PRESSURE: 168 MMHG | OXYGEN SATURATION: 94 %

## 2018-11-28 DIAGNOSIS — Z90.49 ACQUIRED ABSENCE OF OTHER SPECIFIED PARTS OF DIGESTIVE TRACT: Chronic | ICD-10-CM

## 2018-11-28 DIAGNOSIS — I50.9 HEART FAILURE, UNSPECIFIED: ICD-10-CM

## 2018-11-28 DIAGNOSIS — E89.2 POSTPROCEDURAL HYPOPARATHYROIDISM: Chronic | ICD-10-CM

## 2018-11-28 DIAGNOSIS — I50.32 CHRONIC DIASTOLIC (CONGESTIVE) HEART FAILURE: ICD-10-CM

## 2018-11-28 DIAGNOSIS — Z90.89 ACQUIRED ABSENCE OF OTHER ORGANS: Chronic | ICD-10-CM

## 2018-11-28 LAB
ALBUMIN SERPL ELPH-MCNC: 2.4 G/DL — LOW (ref 3.3–5)
ALBUMIN SERPL ELPH-MCNC: 2.5 G/DL — LOW (ref 3.3–5)
ALP SERPL-CCNC: 67 U/L — SIGNIFICANT CHANGE UP (ref 40–120)
ALP SERPL-CCNC: 72 U/L — SIGNIFICANT CHANGE UP (ref 40–120)
ALT FLD-CCNC: 16 U/L — SIGNIFICANT CHANGE UP (ref 12–78)
ALT FLD-CCNC: 16 U/L — SIGNIFICANT CHANGE UP (ref 12–78)
ANION GAP SERPL CALC-SCNC: 13 MMOL/L — SIGNIFICANT CHANGE UP (ref 5–17)
ANION GAP SERPL CALC-SCNC: 15 MMOL/L — SIGNIFICANT CHANGE UP (ref 5–17)
APTT BLD: 31.7 SEC — SIGNIFICANT CHANGE UP (ref 28.5–37)
AST SERPL-CCNC: 16 U/L — SIGNIFICANT CHANGE UP (ref 15–37)
AST SERPL-CCNC: 22 U/L — SIGNIFICANT CHANGE UP (ref 15–37)
BASOPHILS # BLD AUTO: 0.02 K/UL — SIGNIFICANT CHANGE UP (ref 0–0.2)
BASOPHILS NFR BLD AUTO: 0.2 % — SIGNIFICANT CHANGE UP (ref 0–2)
BILIRUB SERPL-MCNC: 0.3 MG/DL — SIGNIFICANT CHANGE UP (ref 0.2–1.2)
BILIRUB SERPL-MCNC: 0.3 MG/DL — SIGNIFICANT CHANGE UP (ref 0.2–1.2)
BUN SERPL-MCNC: 72 MG/DL — HIGH (ref 7–23)
BUN SERPL-MCNC: 73 MG/DL — HIGH (ref 7–23)
CALCIUM SERPL-MCNC: 7 MG/DL — LOW (ref 8.5–10.1)
CALCIUM SERPL-MCNC: 7.2 MG/DL — LOW (ref 8.5–10.1)
CHLORIDE SERPL-SCNC: 84 MMOL/L — LOW (ref 96–108)
CHLORIDE SERPL-SCNC: 85 MMOL/L — LOW (ref 96–108)
CK MB BLD-MCNC: 10.3 % — HIGH (ref 0–3.5)
CK MB CFR SERPL CALC: 3.2 NG/ML — SIGNIFICANT CHANGE UP (ref 0–3.6)
CK SERPL-CCNC: 31 U/L — SIGNIFICANT CHANGE UP (ref 26–192)
CO2 SERPL-SCNC: 23 MMOL/L — SIGNIFICANT CHANGE UP (ref 22–31)
CO2 SERPL-SCNC: 24 MMOL/L — SIGNIFICANT CHANGE UP (ref 22–31)
CREAT SERPL-MCNC: 2.9 MG/DL — HIGH (ref 0.5–1.3)
CREAT SERPL-MCNC: 2.9 MG/DL — HIGH (ref 0.5–1.3)
EOSINOPHIL # BLD AUTO: 0.12 K/UL — SIGNIFICANT CHANGE UP (ref 0–0.5)
EOSINOPHIL NFR BLD AUTO: 1.2 % — SIGNIFICANT CHANGE UP (ref 0–6)
GLUCOSE SERPL-MCNC: 103 MG/DL — HIGH (ref 70–99)
GLUCOSE SERPL-MCNC: 95 MG/DL — SIGNIFICANT CHANGE UP (ref 70–99)
HCT VFR BLD CALC: 23.1 % — LOW (ref 34.5–45)
HCT VFR BLD CALC: 23.5 % — LOW (ref 34.5–45)
HGB BLD-MCNC: 7.9 G/DL — LOW (ref 11.5–15.5)
HGB BLD-MCNC: 8 G/DL — LOW (ref 11.5–15.5)
IMM GRANULOCYTES NFR BLD AUTO: 0.9 % — SIGNIFICANT CHANGE UP (ref 0–1.5)
INR BLD: 1.12 RATIO — SIGNIFICANT CHANGE UP (ref 0.88–1.16)
LYMPHOCYTES # BLD AUTO: 0.75 K/UL — LOW (ref 1–3.3)
LYMPHOCYTES # BLD AUTO: 7.8 % — LOW (ref 13–44)
MCHC RBC-ENTMCNC: 26.6 PG — LOW (ref 27–34)
MCHC RBC-ENTMCNC: 26.8 PG — LOW (ref 27–34)
MCHC RBC-ENTMCNC: 34 GM/DL — SIGNIFICANT CHANGE UP (ref 32–36)
MCHC RBC-ENTMCNC: 34.2 GM/DL — SIGNIFICANT CHANGE UP (ref 32–36)
MCV RBC AUTO: 77.8 FL — LOW (ref 80–100)
MCV RBC AUTO: 78.6 FL — LOW (ref 80–100)
MONOCYTES # BLD AUTO: 0.31 K/UL — SIGNIFICANT CHANGE UP (ref 0–0.9)
MONOCYTES NFR BLD AUTO: 3.2 % — SIGNIFICANT CHANGE UP (ref 2–14)
NEUTROPHILS # BLD AUTO: 8.36 K/UL — HIGH (ref 1.8–7.4)
NEUTROPHILS NFR BLD AUTO: 86.7 % — HIGH (ref 43–77)
NRBC # BLD: 0 /100 WBCS — SIGNIFICANT CHANGE UP (ref 0–0)
NRBC # BLD: 0 /100 WBCS — SIGNIFICANT CHANGE UP (ref 0–0)
NT-PROBNP SERPL-SCNC: HIGH PG/ML (ref 0–450)
PLATELET # BLD AUTO: 254 K/UL — SIGNIFICANT CHANGE UP (ref 150–400)
PLATELET # BLD AUTO: 269 K/UL — SIGNIFICANT CHANGE UP (ref 150–400)
POTASSIUM SERPL-MCNC: 3.6 MMOL/L — SIGNIFICANT CHANGE UP (ref 3.5–5.3)
POTASSIUM SERPL-MCNC: 3.9 MMOL/L — SIGNIFICANT CHANGE UP (ref 3.5–5.3)
POTASSIUM SERPL-SCNC: 3.6 MMOL/L — SIGNIFICANT CHANGE UP (ref 3.5–5.3)
POTASSIUM SERPL-SCNC: 3.9 MMOL/L — SIGNIFICANT CHANGE UP (ref 3.5–5.3)
PROT SERPL-MCNC: 6.2 G/DL — SIGNIFICANT CHANGE UP (ref 6–8.3)
PROT SERPL-MCNC: 6.3 G/DL — SIGNIFICANT CHANGE UP (ref 6–8.3)
PROTHROM AB SERPL-ACNC: 12.8 SEC — SIGNIFICANT CHANGE UP (ref 10–12.9)
RBC # BLD: 2.97 M/UL — LOW (ref 3.8–5.2)
RBC # BLD: 2.99 M/UL — LOW (ref 3.8–5.2)
RBC # FLD: 16.1 % — HIGH (ref 10.3–14.5)
RBC # FLD: 16.1 % — HIGH (ref 10.3–14.5)
SODIUM SERPL-SCNC: 122 MMOL/L — LOW (ref 135–145)
SODIUM SERPL-SCNC: 122 MMOL/L — LOW (ref 135–145)
TROPONIN I SERPL-MCNC: 0.04 NG/ML — SIGNIFICANT CHANGE UP (ref 0.01–0.04)
TSH SERPL-MCNC: 3.54 UIU/ML — SIGNIFICANT CHANGE UP (ref 0.36–3.74)
URATE SERPL-MCNC: 9.9 MG/DL — HIGH (ref 2.5–7)
WBC # BLD: 8.32 K/UL — SIGNIFICANT CHANGE UP (ref 3.8–10.5)
WBC # BLD: 9.65 K/UL — SIGNIFICANT CHANGE UP (ref 3.8–10.5)
WBC # FLD AUTO: 8.32 K/UL — SIGNIFICANT CHANGE UP (ref 3.8–10.5)
WBC # FLD AUTO: 9.65 K/UL — SIGNIFICANT CHANGE UP (ref 3.8–10.5)

## 2018-11-28 PROCEDURE — 93010 ELECTROCARDIOGRAM REPORT: CPT

## 2018-11-28 PROCEDURE — 99284 EMERGENCY DEPT VISIT MOD MDM: CPT

## 2018-11-28 PROCEDURE — 99223 1ST HOSP IP/OBS HIGH 75: CPT

## 2018-11-28 PROCEDURE — 71045 X-RAY EXAM CHEST 1 VIEW: CPT | Mod: 26

## 2018-11-28 RX ORDER — HEPARIN SODIUM 5000 [USP'U]/ML
5000 INJECTION INTRAVENOUS; SUBCUTANEOUS EVERY 12 HOURS
Qty: 0 | Refills: 0 | Status: DISCONTINUED | OUTPATIENT
Start: 2018-11-28 | End: 2018-12-06

## 2018-11-28 RX ORDER — FERROUS SULFATE 325(65) MG
325 TABLET ORAL DAILY
Qty: 0 | Refills: 0 | Status: DISCONTINUED | OUTPATIENT
Start: 2018-11-28 | End: 2018-11-28

## 2018-11-28 RX ORDER — CARVEDILOL PHOSPHATE 80 MG/1
12.5 CAPSULE, EXTENDED RELEASE ORAL EVERY 12 HOURS
Qty: 0 | Refills: 0 | Status: DISCONTINUED | OUTPATIENT
Start: 2018-11-28 | End: 2018-11-28

## 2018-11-28 RX ORDER — CARVEDILOL PHOSPHATE 80 MG/1
12.5 CAPSULE, EXTENDED RELEASE ORAL EVERY 12 HOURS
Qty: 0 | Refills: 0 | Status: DISCONTINUED | OUTPATIENT
Start: 2018-11-28 | End: 2018-12-03

## 2018-11-28 RX ORDER — CHOLECALCIFEROL (VITAMIN D3) 125 MCG
1000 CAPSULE ORAL
Qty: 0 | Refills: 0 | Status: DISCONTINUED | OUTPATIENT
Start: 2018-11-28 | End: 2018-12-06

## 2018-11-28 RX ORDER — CHOLECALCIFEROL (VITAMIN D3) 125 MCG
1000 CAPSULE ORAL DAILY
Qty: 0 | Refills: 0 | Status: DISCONTINUED | OUTPATIENT
Start: 2018-11-28 | End: 2018-11-28

## 2018-11-28 RX ORDER — HYDRALAZINE HCL 50 MG
100 TABLET ORAL EVERY 8 HOURS
Qty: 0 | Refills: 0 | Status: DISCONTINUED | OUTPATIENT
Start: 2018-11-28 | End: 2018-12-03

## 2018-11-28 RX ORDER — DOXAZOSIN MESYLATE 4 MG
4 TABLET ORAL
Qty: 0 | Refills: 0 | Status: DISCONTINUED | OUTPATIENT
Start: 2018-11-28 | End: 2018-12-06

## 2018-11-28 RX ORDER — DOXAZOSIN MESYLATE 4 MG
4 TABLET ORAL AT BEDTIME
Qty: 0 | Refills: 0 | Status: DISCONTINUED | OUTPATIENT
Start: 2018-11-28 | End: 2018-11-28

## 2018-11-28 RX ORDER — FERROUS SULFATE 325(65) MG
325 TABLET ORAL
Qty: 0 | Refills: 0 | Status: DISCONTINUED | OUTPATIENT
Start: 2018-11-28 | End: 2018-12-06

## 2018-11-28 RX ORDER — HYDRALAZINE HCL 50 MG
100 TABLET ORAL EVERY 8 HOURS
Qty: 0 | Refills: 0 | Status: DISCONTINUED | OUTPATIENT
Start: 2018-11-28 | End: 2018-11-28

## 2018-11-28 RX ORDER — CINACALCET 30 MG/1
30 TABLET, FILM COATED ORAL AT BEDTIME
Qty: 0 | Refills: 0 | Status: DISCONTINUED | OUTPATIENT
Start: 2018-11-28 | End: 2018-11-28

## 2018-11-28 RX ORDER — SODIUM CHLORIDE 9 MG/ML
1000 INJECTION INTRAMUSCULAR; INTRAVENOUS; SUBCUTANEOUS
Qty: 0 | Refills: 0 | Status: DISCONTINUED | OUTPATIENT
Start: 2018-11-28 | End: 2018-12-06

## 2018-11-28 RX ORDER — CINACALCET 30 MG/1
30 TABLET, FILM COATED ORAL
Qty: 0 | Refills: 0 | Status: DISCONTINUED | OUTPATIENT
Start: 2018-11-28 | End: 2018-12-06

## 2018-11-28 RX ADMIN — CINACALCET 30 MILLIGRAM(S): 30 TABLET, FILM COATED ORAL at 22:19

## 2018-11-28 RX ADMIN — Medication 100 MILLIGRAM(S): at 22:20

## 2018-11-28 RX ADMIN — SODIUM CHLORIDE 50 MILLILITER(S): 9 INJECTION INTRAMUSCULAR; INTRAVENOUS; SUBCUTANEOUS at 18:34

## 2018-11-28 RX ADMIN — Medication 4 MILLIGRAM(S): at 22:20

## 2018-11-28 RX ADMIN — HEPARIN SODIUM 5000 UNIT(S): 5000 INJECTION INTRAVENOUS; SUBCUTANEOUS at 22:20

## 2018-11-28 RX ADMIN — CARVEDILOL PHOSPHATE 12.5 MILLIGRAM(S): 80 CAPSULE, EXTENDED RELEASE ORAL at 18:09

## 2018-11-28 NOTE — PATIENT PROFILE ADULT - ARE SIGNIFICANT INDICATORS COMPLETE.
Preventive Health Recommendations  Male Ages 26 - 39    Yearly exam:             See your health care provider every year in order to  o   Review health changes.   o   Discuss preventive care.    o   Review your medicines if your doctor has prescribed any.    You should be tested each year for STDs (sexually transmitted diseases), if you re at risk.     After age 35, talk to your provider about cholesterol testing. If you are at risk for heart disease, have your cholesterol tested at least every 5 years.     If you are at risk for diabetes, you should have a diabetes test (fasting glucose).  Shots: Get a flu shot each year. Get a tetanus shot every 10 years.     Nutrition:    Eat at least 5 servings of fruits and vegetables daily.     Eat whole-grain bread, whole-wheat pasta and brown rice instead of white grains and rice.     Get adequate Calcium and Vitamin D.     Lifestyle    Exercise for at least 150 minutes a week (30 minutes a day, 5 days a week). This will help you control your weight and prevent disease.     Limit alcohol to one drink per day.     No smoking.     Wear sunscreen to prevent skin cancer.     See your dentist every six months for an exam and cleaning.      Yes

## 2018-11-28 NOTE — CONSULT NOTE ADULT - SUBJECTIVE AND OBJECTIVE BOX
[facility] - Christofer Isaacs, Sorin, Marisa, Jr, Sandro Hagen  Office Number: [phone]    Initial Consult Note    CHIEF COMPLAINT: Patient is a 89y old  Female who presents with a chief complaint of dyspnea    HPI:  89F with PMH of diastolic CHF (TTE July 2017 EF 65%), HTN, anemia who was brought to the ED with increased dyspnea per her home aid.  She has been being seen by visiting nurse, and her sodium has been low.  Her Lasix was held for a few days, but she has been taking 60 QD for the past few days.  She was noted today to be increasingly SOB, and brought in for assessment.  She is breathing comfortably on my exam.  She is fatigued, lethargic, and has been  having decreased PO intake.  She has not had syncope, and is not reporting chest pain.  She is on antibiotics for a UTI      PAST MEDICAL & SURGICAL HISTORY:  CHF (congestive heart failure)  Anemia, unspecified type  Edema of lower extremity  Heart murmur  Hypertension  Hypercalcemia  History of appendectomy  S/P tonsillectomy  H/O parathyroidectomy      SOCIAL HISTORY:  No tobacco, ethanol, or drug abuse.    FAMILY HISTORY:  No pertinent family history in first degree relatives    No family history of acute MI or sudden cardiac death.    MEDICATIONS  (STANDING):  Home medications reviewed           Allergies    Vasotec (Unknown)             REVIEW OF SYSTEMS:       All other review of systems is negative unless indicated above    VITAL SIGNS:   Vital Signs Last 24 Hrs  T(C): 36.3 (28 Nov 2018 15:37), Max: 37.2 (28 Nov 2018 14:53)  T(F): 97.3 (28 Nov 2018 15:37), Max: 99 (28 Nov 2018 14:53)  HR: 61 (28 Nov 2018 15:37) (60 - 61)  BP: 136/58 (28 Nov 2018 15:37) (136/58 - 168/72)  BP(mean): --  RR: 18 (28 Nov 2018 15:37) (18 - 20)  SpO2: 95% (28 Nov 2018 15:37) (94% - 95%)    I&O's Summary      On Exam:    Constitutional: NAD, alert and oriented x 3  Lungs:  Non-labored, breath sounds are clear bilaterally, rales on exam bilaterally  Cardiovascular: RRR.  S1 and S2 positive.  3/6 systolic murmur  Gastrointestinal: Bowel Sounds present, soft, nontender.   Lymph: left LE edema.  No cervical lymphadenopathy.  Neurological: Lethargic  Skin: No rashes or ulcers   Psych:  Lethargic.    LABS: All Labs Reviewed:                        8.0    9.65  )-----------( 269      ( 28 Nov 2018 15:51 )             23.5     28 Nov 2018 15:51    122    |  85     |  73     ----------------------------<  103    3.9     |  24     |  2.90     Ca    7.2        28 Nov 2018 15:51    TPro  6.3    /  Alb  2.5    /  TBili  0.3    /  DBili  x      /  AST  16     /  ALT  16     /  AlkPhos  72     28 Nov 2018 15:51    PT/INR - ( 28 Nov 2018 15:51 )   PT: 12.8 sec;   INR: 1.12 ratio         PTT - ( 28 Nov 2018 15:51 )  PTT:31.7 sec  CARDIAC MARKERS ( 28 Nov 2018 15:51 )  .043 ng/mL / x     / 31 U/L / x     / 3.2 ng/mL      Blood Culture:   11-28 @ 15:51  Pro Bnp [id number]        RADIOLOGY:  < from: Xray Chest 1 View AP/PA (11.28.18 @ 15:27) >    EXAM:  XR CHEST AP OR PA 1V                            PROCEDURE DATE:  11/28/2018          INTERPRETATION:  Clinical information: Shortness of breath    Portable study, 3:19 PM    Comparison exam dated 11/11/2018    Patient tilted towards the left.    Airspace disease left lung base, effusion-consolidation. Airspace disease   right lung base, blunted right costophrenic angle. Heart size appears   enlarged although magnified secondary to portable technique, left cardiac   border obscured by left basilar pathology. Aorta shows atherosclerotic   changes. No acute osseous abnormalities. Calcific bursitis present, left   shoulder. Surgical clips present, right paratracheal region.    IMPRESSION:    See above report    < end of copied text >      EKG: Sinus rhythm with LVH

## 2018-11-28 NOTE — H&P ADULT - PROBLEM SELECTOR PLAN 6
Cr elevated at 1.1, likely due to Lasix use  -Follow up AM BMP  -Will avoid fluids given patient's history of CHF  -Follow up kidney ultrasound Chronic, stable  -Continue Carvedilol, Hydralazine with hold parameters  -Continue Doxazosin

## 2018-11-28 NOTE — H&P ADULT - NSHPREVIEWOFSYSTEMS_GEN_ALL_CORE
CONSTITUTIONAL: + fatigue, lethargy, No fevers or chills  EYES/ENT: No visual changes;  No vertigo or throat pain   NECK: No pain or stiffness  RESPIRATORY: +SOB. No cough, wheezing, hemoptysis  CARDIOVASCULAR: No chest pain or palpitations  GASTROINTESTINAL: No abdominal or epigastric pain. No nausea, vomiting, or hematemesis; No diarrhea or constipation. No melena or hematochezia.  GENITOURINARY: No dysuria, frequency or hematuria  NEUROLOGICAL: No numbness or weakness  SKIN: No itching, burning, rashes, or lesions   All other review of systems is negative unless indicated above. CONSTITUTIONAL: + fatigue, lethargy, No fevers or chills  EYES/ENT: No visual changes;  No vertigo or throat pain   NECK: No pain or stiffness  RESPIRATORY: No cough, wheezing, hemoptysis, no SOB  CARDIOVASCULAR: No chest pain or palpitations  GASTROINTESTINAL: No abdominal or epigastric pain. No nausea, vomiting, or hematemesis; No diarrhea or constipation. No melena or hematochezia.  GENITOURINARY: No dysuria, frequency or hematuria  NEUROLOGICAL: No numbness or weakness  SKIN: No itching, burning, rashes, or lesions   All other review of systems is negative unless indicated above.

## 2018-11-28 NOTE — H&P ADULT - PROBLEM SELECTOR PLAN 5
Questionable infiltrate in L lobe, WBC within normal limits. Patient denies cough, fever, URI symptoms. Denies sick contacts.   -Follow up AM procalcitonin   -Will hold off abx for now -Although patient is reporting SOB, as per cardiology, pt does not appear volume overloaded, she appears dry given SABRINA and decreased PO intake  -Will hold diuretics  -Continue carvedilol and hydralazine at home doses with hold parameters  -Monitor K, creatinine, I and O  -No need to repeat echocardiogram

## 2018-11-28 NOTE — ED ADULT NURSE NOTE - NSIMPLEMENTINTERV_GEN_ALL_ED
Implemented All Fall with Harm Risk Interventions:  [place] to call system. Call bell, personal items and telephone within reach. Instruct patient to call for assistance. Room bathroom lighting operational. Non-slip footwear when patient is off stretcher. Physically safe environment: no spills, clutter or unnecessary equipment. Stretcher in lowest position, wheels locked, appropriate side rails in place. Provide visual cue, wrist band, yellow gown, etc. Monitor gait and stability. Monitor for mental status changes and reorient to person, place, and time. Review medications for side effects contributing to fall risk. Reinforce activity limits and safety measures with patient and family. Provide visual clues: red socks.

## 2018-11-28 NOTE — ED ADULT NURSE NOTE - PMH
Anemia, unspecified type    CHF (congestive heart failure)    Edema of lower extremity    Heart murmur    Hypercalcemia    Hypertension

## 2018-11-28 NOTE — ED ADULT NURSE NOTE - OBJECTIVE STATEMENT
89 y/o F patient presents to ED from home c/o worsening SOB and lethargy for the past few days. As per patient's daughter patient was sent by patient's cardiologist for admission for her CHF. Patient's daughter reports patient was recently admitted 2 weeks ago for similar symptoms. Patient's daughter states patient has become increasingly lethargic and has been having decreased PO intake since. Patient A&Ox3. patient is lethargic and sleepy. wheezes auscultated b/l. abdomen soft, non tender, non distended. Patient denies HA, dizziness, chest pain, abdominal pain, N/V/D, fevers/chills, cough. VSS. Safety and comfort measures provided and maintained. Daughter bedside. MD bedside. 89 y/o F patient presents to ED from home c/o worsening SOB and lethargy for the past few days. As per patient's daughter patient was sent by patient's cardiologist for admission for her CHF. Patient's daughter reports patient was recently admitted 2 weeks ago for similar symptoms. Patient's daughter states patient has become increasingly lethargic and has been having decreased PO intake since. Patient A&Ox3. patient is lethargic and sleepy. wheezes auscultated b/l. pressure ulcer noted to b/l buttocks and sacral area. abdomen soft, non tender, non distended. Patient denies HA, dizziness, chest pain, abdominal pain, N/V/D, fevers/chills, cough. VSS. Safety and comfort measures provided and maintained. Daughter bedside. MD bedside.

## 2018-11-28 NOTE — H&P ADULT - PROBLEM SELECTOR PLAN 3
Na 127, Likely due to dehydration and increased diuretic use  -hold fluids in setting CHF  -Continue IV Lasix daily, monitor BMP Chronic history, sees Dr. Temple as an outpatient, likely anemia of chronic disease v iron def anemia. However given SOB, need to consider giving   -STAT FOBT  -Will continue to monitor  -Continue home iron  -Follow up iron, ferritin, TIBC, reticulocyte count   -Dr. Temple, hematologist, consulted Chronic history, sees Dr. Temple as an outpatient, likely anemia of chronic disease v iron def anemia. However given SOB, need to consider giving   -STAT FOBT  -Will continue to monitor.   -Stat type and screen  -Continue home iron  -Dr. Temple, hematologist, consulted

## 2018-11-28 NOTE — H&P ADULT - PROBLEM SELECTOR PLAN 4
UA positive for WBC 11-25, moderate LEC. Patient asymptomatic, without fever or WBC.   -Will hold off abx for now  -Follow up UCx -Not new bilateral pleural effusions compared with prior admission CXR two weeks ago, pt not in acute CHF or volume overloaded, seen by cardiology  -Will monitor continuous pulse ox   -Will hold off abx for now  -Denies recent sick contacts -Not new bilateral pleural effusions compared with prior admission CXR two weeks ago, pt not in acute CHF or volume overloaded, seen by cardiology.  -Will monitor continuous pulse ox   -Will hold off abx for now  -Denies recent sick contacts

## 2018-11-28 NOTE — ED PROVIDER NOTE - OBJECTIVE STATEMENT
pt referred by her cardiologist for chf. + sob worsening past 2 weeks s/p d/c from hospital for chf. + decreased po intake and increaased lethargy past few days. no fevers, chills, cough, cp, abd pain, n/v. no increased le edema  pmd - weil  cards - josé luis

## 2018-11-28 NOTE — H&P ADULT - PROBLEM SELECTOR PLAN 2
Unilateral L>R leg swelling, daughter reports left leg usually swells more  -Lower extremity dopplers bilaterally to r/o DVT Cr elevated at 2.9, likely due to Lasix use and dehydration  -IVF  -Holding diuretics  -STAT repeat BMP  -Follow up AM BMP  -Dr. Eaton consulted Cr elevated at 2.9, likely due to Lasix use and dehydration  -PO hydration for now. Need to discuss IVF with Nephrology  -Holding diuretics  -STAT repeat BMP  -Follow up AM BMP  -Dr. Eaton consulted Cr elevated at 2.9, likely due to Lasix use and dehydration  -gentle dehydration NS 50ml/hr stop after 12 hours  -Holding diuretics  -STAT repeat BMP  -Follow up AM BMP  -Dr. Eaton consulted

## 2018-11-28 NOTE — H&P ADULT - PROBLEM SELECTOR PLAN 1
s/p 80mg oral Lasix prior to arrival and 40mg IV Lasix in ED, pro-BNP elevated at 5718. Patient doesn't appear grossly volume overloaded. Chest xray (pre-sequeira) increased intersitial markings and small L pleural effusion  -Admit to medicine  -Start IV Lasix 40mg daily, plan to transition to oral Lasix  -Dr. Isaacs, cardiology, consulted  -Fluid restriction  -Leg elevation  -Strict I&Os, daily weights Na 122, Likely due to dehydration/decreased PO intake and diuretic use  -Will slowly resuscitate with IVF, avoid rapid correction  -Monitor BMP  -STAT repeat BMP  -Check urine lytes and osm  -Strict Is and Os  -AM labs  -Nephrology consult Dr. Eaton  - Na 122, Likely due to dehydration/decreased PO intake and diuretic use  -Will currently hold off on IVF, need to discuss with nephrology, will also need to avoid rapid correction. Hold diuretics.  -Monitor BMP  -STAT repeat BMP  -Check urine lytes and osm  -Strict Is and Os  -AM labs  -Nephrology consult Dr. Eaton  -Fall precautions  -Ambulate w assist  -SCD for dvt ppx Na 122, Likely due to dehydration/decreased PO intake and diuretic use  -start gentle hydration NS 50ml/hr for 12 hours  need to discuss with nephrology, Dr. Eaton aware, will also need to avoid rapid correction. Hold diuretics.  -Monitor BMP  -STAT repeat BMP  -Check urine lytes and osm  -Strict Is and Os  -AM labs  -Nephrology consult Dr. Eaton  -Fall precautions  -Ambulate w assist  -SCD for dvt ppx  -Dr. Norris following -Na 122, Likely due to dehydration/decreased PO intake and diuretic use  -despite hx of CHF, patient clinically appears dry and is presenting with SABRINA  -start gentle hydration NS 50ml/hr stop after 12 hours, cardio recs appreciated.   -need to discuss with nephrology, Dr. Eaton aware, will also need to avoid rapid correction. Hold diuretics.  -Monitor BMP  -STAT repeat BMP  -Check urine lytes and osm  -Strict Is and Os  -AM labs  -Nephrology consult Dr. Eaton  -Fall precautions  -Ambulate w assist  -SCD for dvt ppx  -Dr. Norris following

## 2018-11-28 NOTE — H&P ADULT - NSHPOUTPATIENTPROVIDERS_GEN_ALL_CORE
Dr. Weil (PMD)  Dr. Rowell (Cardio)  Dr. Temple (Hematology)  Regina (Heme) Dr. Weil (PMD)  Dr. Rowell (Cardio)  Dr. Temple (Hematology)

## 2018-11-28 NOTE — CONSULT NOTE ADULT - ASSESSMENT
89F with PMH of diastolic CHF (TTE July 2017 EF 65%), HTN, anemia who was brought to the ED with increased dyspnea per her home aid.  She is found to have hyponatremia and SABRINA.  There is no evidence of acute ischemia, acute heart failure, or uncontrolled arrhythmia.    Recommend:  -Admit to telemetry  -Despite her rales on exam and BNP, she does not seem volume overloaded.  Rather, she seems dry with SABRINA, hyponatremia, and decreased PO intake  -Obtain renal consultation  -I would hold diuretics for now  -She may need blood. Please trend H/H.  Hematology to evaluate the patient  -Continue carvedilol and hydralazine at home doses with hold parameters  -Monitor K, creatinine, I and O  -No need to repeat echocardiogram  - To follow closely with you while admitted.

## 2018-11-28 NOTE — H&P ADULT - NSHPPHYSICALEXAM_GEN_ALL_CORE
General: Well developed, well nourished, NAD  HEENT: NCAT, PERRLA, EOMI bl, moist mucous membranes   Neck: Supple, nontender, no mass  Neurology: A&Ox3, nonfocal, CN II-XII grossly intact, sensation intact, no gait abnormalities   Respiratory: CTA B/L, No W/R/R  CV: RRR, +S1/S2, no murmurs, rubs or gallops  Abdominal: Soft, NT, ND +BSx4  Extremities: No C/C/E, + peripheral pulses  MSK: Normal ROM, no joint erythema or warmth, no joint swelling   Skin: warm, dry, normal color, no rash or abnormal lesions General: weak,   HEENT: NCAT, EOMI bl, moist mucous membranes   Neck: Supple, nontender, no mass  Neurology: A&Ox3, nonfocal, sensation intact, no gait abnormalities   Respiratory: diffuse lung sounds throughout, No W/R/R  CV: RRR, +S1/S2, no murmurs, rubs or gallops  Abdominal: Soft, NT, ND +BSx4  Extremities: No C/C/E, + peripheral pulses  MSK: Normal ROM, no joint erythema or warmth, no joint swelling   Skin: warm, dry, normal color

## 2018-11-28 NOTE — H&P ADULT - PROBLEM SELECTOR PLAN 7
H/H: 8.9/26.7, daughter reports patient sleeping more throughout the day. Likely anemia of chronic disease v iron def anemia. FOBT negative.   -Reports going to Dr. Temple, heme/onc, two weeks ago and hemoglobin was around 9   -Will continue to monitor  -Continue home iron  -Follow up iron, ferritin, TIBC, reticulocyte count   -Dr. Temple, hematologist, consulted DVT prophylaxis: Heparin SQ  Fall risk   OOB with assistance DVT prophylaxis: Heparin SQ

## 2018-11-28 NOTE — ED PROVIDER NOTE - CARE PLAN
Principal Discharge DX:	SOB (shortness of breath) Principal Discharge DX:	SOB (shortness of breath)  Secondary Diagnosis:	Hyponatremia Principal Discharge DX:	CHF (congestive heart failure)  Secondary Diagnosis:	Hyponatremia  Secondary Diagnosis:	ARF (acute renal failure)

## 2018-11-28 NOTE — H&P ADULT - HISTORY OF PRESENT ILLNESS
89 year old Female with PMH of diastolic CHF (TTE July 2017 EF 65%), HTN, Multifactorial Anemia of Chronic Disease and Iron Deficiency, recent admission to [facility] for Acute CHF exacerbation in the setting of Hyponatremia and SABRINA (was discharged to home) with course complicated by fevers secondary to UTI, Was taking antibiotics for UTI as an outpatient as per her PMD, presents today with worsening shortness of breath and lethargy for the past few days per her home aid. Since discharge on 11/14/18, patient has been being seen by visiting nurse, and her sodium has been low at home. Her Lasix was held for the past few days, but she has been taking Lasix 60 QD for the past few days. Today she had worsening SOB, and brought in for assessment. Patient admits to fatigue, lethargy, and decreased PO intake.  She denies headache, dizziness, syncope, chest pain, palpitations, cough, abdominal pain, nausea, vomiting, diarrhea. Note patient is on antibiotics for a UTI from outpatient. Note patient at baseline is A&Ox2 and ambulates with a walker.     In the ED: temp 99, HR 60, /72, RR 20, SpO2 94% RA, repeat 95% RA. H/H: 8.0/23.5, Na 122, BUN/Cr: 73/2.90, pro-BNP: 31237. Troponin 0.043. UA positive for moderate LEC, 11-25 WBC, 11-25 RBC. Chest x-ray showed airspace disease left lung base, effusion-consolidation. Airspace disease right lung base, blunted right costophrenic angle. Heart size appears enlarged although magnified secondary to portable technique, left cardiac border obscured by left basilar pathology. Aorta shows atherosclerotic changes. No acute osseous abnormalities. Calcific bursitis present, left shoulder. Surgical clips present, right paratracheal region. EKG showed _________________. Cardiologist Dr. Norris was consulted, saw the patient. 89 year old Female with PMH of diastolic CHF (TTE July 2017 EF 65%), HTN, Multifactorial Anemia of Chronic Disease and Iron Deficiency, recent admission to [facility] for Acute CHF exacerbation in the setting of Hyponatremia and SABRINA (was discharged to home) with course complicated by fevers secondary to UTI, Was taking antibiotics for UTI as an outpatient as per her PMD, presents today with worsening shortness of breath and lethargy for the past few days per her home aid. Since discharge on 11/14/18, patient has been being seen by visiting nurse, and her sodium has been low at home. Her Lasix was held for the past few days, but she has been taking Lasix 60 QD for the past few days. Today she had worsening SOB, and brought in for assessment. Patient admits to fatigue, lethargy, and decreased PO intake.  She denies headache, dizziness, syncope, chest pain, palpitations, cough, abdominal pain, nausea, vomiting, diarrhea. Note patient is on antibiotics for a UTI from outpatient. Note patient at baseline is A&Ox2 and ambulates with a walker.     In the ED: temp 99, HR 60, /72, RR 20, SpO2 94% RA, repeat 95% RA. H/H: 8.0/23.5, Na 122, BUN/Cr: 73/2.90, pro-BNP: 82119. Troponin 0.043. UA positive for moderate LEC, 11-25 WBC, 11-25 RBC. Chest x-ray showed airspace disease left lung base, effusion-consolidation. Airspace disease right lung base, blunted right costophrenic angle. Heart size appears enlarged although magnified secondary to portable technique, left cardiac border obscured by left basilar pathology. Aorta shows atherosclerotic changes. No acute osseous abnormalities. Calcific bursitis present, left shoulder. Surgical clips present, right paratracheal region. EKG showed _________________. Cardiologist Dr. Norris was consulted. 89 year old Female with PMH of diastolic CHF (TTE July 2017 EF 65%), HTN, Multifactorial Anemia of Chronic Disease and Iron Deficiency, recent admission to [facility] for Acute CHF exacerbation in the setting of Hyponatremia and SABRINA (was discharged to home) with course complicated by fevers secondary to UTI, Was taking antibiotics for UTI as an outpatient as per her PMD, taking day 2 of Sulfatrim, presents today with worsening shortness of breath and lethargy for the past few days per her home aid. Patient presents with her daughter who reports her mother is SOB. Since discharge on 11/14/18, patient has been being seen by visiting nurse, and her sodium has been low at home. Her Lasix was held for the past few days, but she has been taking Lasix 60 QD for the past few days. According to her daughter she reports her mother had worsening SOB today which prompted the visit to the ER. Patient admits to fatigue, lethargy, and decreased PO intake, however currently the patient reports she is NOT short of breath. She denies headache, dizziness, syncope, chest pain, palpitations, cough, abdominal pain, nausea, vomiting, diarrhea. Note patient is on antibiotics for a UTI from outpatient. Note patient at baseline is A&Ox2 and ambulates with a walker.     In the ED: temp 99, HR 60, /72, RR 20, SpO2 94% RA, repeat 95% RA. H/H: 8.0/23.5, Na 122, BUN/Cr: 73/2.90, pro-BNP: 67809. Troponin 0.043. UA positive for moderate LEC, 11-25 WBC, 11-25 RBC. Chest x-ray showed airspace disease left lung base, effusion-consolidation. Airspace disease right lung base, blunted right costophrenic angle. Heart size appears enlarged although magnified secondary to portable technique, left cardiac border obscured by left basilar pathology. Aorta shows atherosclerotic changes. No acute osseous abnormalities. Calcific bursitis present, left shoulder. Surgical clips present, right paratracheal region. EKG showed NSR VR 61. Cardiologist Dr. Norris was consulted.

## 2018-11-28 NOTE — H&P ADULT - ASSESSMENT
89F with PMH of diastolic CHF (TTE July 2017 EF 65%), HTN, anemia presents with increased bilateral leg swelling admitted for acute on chronic CHF, SABRINA, hyponatremia, and anemia. 89 year old Female with PMH of diastolic CHF (TTE July 2017 EF 65%), HTN, Multifactorial Anemia of Chronic Disease and Iron Deficiency, recent admission to [facility] for Acute CHF exacerbation in the setting of Hyponatremia and SABRINA (was discharged to home) with course complicated by fevers secondary to UTI, Was taking antibiotics for UTI as an outpatient as per her PMD, presents today with worsening shortness of breath and lethargy, Hyponatremia, SABRINA, and worsening Anemia.

## 2018-11-29 DIAGNOSIS — D64.9 ANEMIA, UNSPECIFIED: ICD-10-CM

## 2018-11-29 DIAGNOSIS — E87.1 HYPO-OSMOLALITY AND HYPONATREMIA: ICD-10-CM

## 2018-11-29 DIAGNOSIS — Z71.89 OTHER SPECIFIED COUNSELING: ICD-10-CM

## 2018-11-29 LAB
ALBUMIN SERPL ELPH-MCNC: 2.2 G/DL — LOW (ref 3.3–5)
ALP SERPL-CCNC: 59 U/L — SIGNIFICANT CHANGE UP (ref 40–120)
ALT FLD-CCNC: 13 U/L — SIGNIFICANT CHANGE UP (ref 12–78)
ANION GAP SERPL CALC-SCNC: 14 MMOL/L — SIGNIFICANT CHANGE UP (ref 5–17)
AST SERPL-CCNC: 19 U/L — SIGNIFICANT CHANGE UP (ref 15–37)
BASOPHILS # BLD AUTO: 0.01 K/UL — SIGNIFICANT CHANGE UP (ref 0–0.2)
BASOPHILS NFR BLD AUTO: 0.1 % — SIGNIFICANT CHANGE UP (ref 0–2)
BILIRUB SERPL-MCNC: 0.3 MG/DL — SIGNIFICANT CHANGE UP (ref 0.2–1.2)
BUN SERPL-MCNC: 72 MG/DL — HIGH (ref 7–23)
CALCIUM SERPL-MCNC: 6.9 MG/DL — LOW (ref 8.5–10.1)
CHLORIDE SERPL-SCNC: 88 MMOL/L — LOW (ref 96–108)
CO2 SERPL-SCNC: 22 MMOL/L — SIGNIFICANT CHANGE UP (ref 22–31)
CREAT SERPL-MCNC: 3 MG/DL — HIGH (ref 0.5–1.3)
EOSINOPHIL # BLD AUTO: 0.1 K/UL — SIGNIFICANT CHANGE UP (ref 0–0.5)
EOSINOPHIL NFR BLD AUTO: 1.2 % — SIGNIFICANT CHANGE UP (ref 0–6)
GLUCOSE SERPL-MCNC: 102 MG/DL — HIGH (ref 70–99)
HCT VFR BLD CALC: 21.8 % — LOW (ref 34.5–45)
HCT VFR BLD CALC: 27.3 % — LOW (ref 34.5–45)
HGB BLD-MCNC: 7.3 G/DL — LOW (ref 11.5–15.5)
HGB BLD-MCNC: 9.4 G/DL — LOW (ref 11.5–15.5)
IMM GRANULOCYTES NFR BLD AUTO: 1.1 % — SIGNIFICANT CHANGE UP (ref 0–1.5)
LYMPHOCYTES # BLD AUTO: 0.66 K/UL — LOW (ref 1–3.3)
LYMPHOCYTES # BLD AUTO: 7.9 % — LOW (ref 13–44)
MAGNESIUM SERPL-MCNC: 1.7 MG/DL — SIGNIFICANT CHANGE UP (ref 1.6–2.6)
MCHC RBC-ENTMCNC: 26.2 PG — LOW (ref 27–34)
MCHC RBC-ENTMCNC: 33.5 GM/DL — SIGNIFICANT CHANGE UP (ref 32–36)
MCV RBC AUTO: 78.1 FL — LOW (ref 80–100)
MONOCYTES # BLD AUTO: 0.28 K/UL — SIGNIFICANT CHANGE UP (ref 0–0.9)
MONOCYTES NFR BLD AUTO: 3.4 % — SIGNIFICANT CHANGE UP (ref 2–14)
NEUTROPHILS # BLD AUTO: 7.21 K/UL — SIGNIFICANT CHANGE UP (ref 1.8–7.4)
NEUTROPHILS NFR BLD AUTO: 86.3 % — HIGH (ref 43–77)
OSMOLALITY SERPL: 279 MOS/KG — SIGNIFICANT CHANGE UP (ref 275–300)
PHOSPHATE SERPL-MCNC: 4.2 MG/DL — SIGNIFICANT CHANGE UP (ref 2.5–4.5)
PLATELET # BLD AUTO: 229 K/UL — SIGNIFICANT CHANGE UP (ref 150–400)
POTASSIUM SERPL-MCNC: 3.5 MMOL/L — SIGNIFICANT CHANGE UP (ref 3.5–5.3)
POTASSIUM SERPL-SCNC: 3.5 MMOL/L — SIGNIFICANT CHANGE UP (ref 3.5–5.3)
PROT SERPL-MCNC: 5.7 G/DL — LOW (ref 6–8.3)
RBC # BLD: 2.79 M/UL — LOW (ref 3.8–5.2)
RBC # FLD: 16.2 % — HIGH (ref 10.3–14.5)
SODIUM SERPL-SCNC: 124 MMOL/L — LOW (ref 135–145)
SODIUM UR-SCNC: 37 MMOL/L — SIGNIFICANT CHANGE UP
T3 SERPL-MCNC: 43 NG/DL — LOW (ref 80–200)
T4 AB SER-ACNC: 5.1 UG/DL — SIGNIFICANT CHANGE UP (ref 4.6–12)
WBC # BLD: 8.35 K/UL — SIGNIFICANT CHANGE UP (ref 3.8–10.5)
WBC # FLD AUTO: 8.35 K/UL — SIGNIFICANT CHANGE UP (ref 3.8–10.5)

## 2018-11-29 PROCEDURE — 99233 SBSQ HOSP IP/OBS HIGH 50: CPT

## 2018-11-29 RX ORDER — CHOLECALCIFEROL (VITAMIN D3) 125 MCG
1 CAPSULE ORAL
Qty: 0 | Refills: 0 | COMMUNITY

## 2018-11-29 RX ORDER — SODIUM CHLORIDE 9 MG/ML
2 INJECTION INTRAMUSCULAR; INTRAVENOUS; SUBCUTANEOUS ONCE
Qty: 0 | Refills: 0 | Status: COMPLETED | OUTPATIENT
Start: 2018-11-29 | End: 2018-11-29

## 2018-11-29 RX ORDER — POTASSIUM CHLORIDE 20 MEQ
5 PACKET (EA) ORAL
Qty: 0 | Refills: 0 | COMMUNITY

## 2018-11-29 RX ORDER — FUROSEMIDE 40 MG
60 TABLET ORAL
Qty: 0 | Refills: 0 | COMMUNITY

## 2018-11-29 RX ORDER — DOXAZOSIN MESYLATE 4 MG
1 TABLET ORAL
Qty: 0 | Refills: 0 | COMMUNITY

## 2018-11-29 RX ORDER — BACITRACIN ZINC 500 UNIT/G
1 OINTMENT IN PACKET (EA) TOPICAL DAILY
Qty: 0 | Refills: 0 | Status: DISCONTINUED | OUTPATIENT
Start: 2018-11-29 | End: 2018-12-06

## 2018-11-29 RX ORDER — PETROLATUM,WHITE
1 JELLY (GRAM) TOPICAL
Qty: 0 | Refills: 0 | Status: DISCONTINUED | OUTPATIENT
Start: 2018-11-29 | End: 2018-12-06

## 2018-11-29 RX ORDER — FERROUS SULFATE 325(65) MG
1 TABLET ORAL
Qty: 0 | Refills: 0 | COMMUNITY

## 2018-11-29 RX ORDER — CINACALCET 30 MG/1
1 TABLET, FILM COATED ORAL
Qty: 0 | Refills: 0 | COMMUNITY

## 2018-11-29 RX ADMIN — Medication 100 MILLIGRAM(S): at 21:16

## 2018-11-29 RX ADMIN — Medication 15 MILLILITER(S): at 06:06

## 2018-11-29 RX ADMIN — Medication 100 MILLIGRAM(S): at 14:21

## 2018-11-29 RX ADMIN — CARVEDILOL PHOSPHATE 12.5 MILLIGRAM(S): 80 CAPSULE, EXTENDED RELEASE ORAL at 06:06

## 2018-11-29 RX ADMIN — Medication 325 MILLIGRAM(S): at 09:35

## 2018-11-29 RX ADMIN — Medication 100 MILLIGRAM(S): at 06:06

## 2018-11-29 RX ADMIN — CARVEDILOL PHOSPHATE 12.5 MILLIGRAM(S): 80 CAPSULE, EXTENDED RELEASE ORAL at 18:02

## 2018-11-29 RX ADMIN — HEPARIN SODIUM 5000 UNIT(S): 5000 INJECTION INTRAVENOUS; SUBCUTANEOUS at 18:03

## 2018-11-29 RX ADMIN — HEPARIN SODIUM 5000 UNIT(S): 5000 INJECTION INTRAVENOUS; SUBCUTANEOUS at 09:35

## 2018-11-29 RX ADMIN — SODIUM CHLORIDE 2 GRAM(S): 9 INJECTION INTRAMUSCULAR; INTRAVENOUS; SUBCUTANEOUS at 14:21

## 2018-11-29 RX ADMIN — CINACALCET 30 MILLIGRAM(S): 30 TABLET, FILM COATED ORAL at 21:16

## 2018-11-29 RX ADMIN — Medication 4 MILLIGRAM(S): at 21:16

## 2018-11-29 RX ADMIN — Medication 1 APPLICATION(S): at 11:25

## 2018-11-29 RX ADMIN — Medication 15 MILLILITER(S): at 18:34

## 2018-11-29 RX ADMIN — Medication 1 APPLICATION(S): at 18:03

## 2018-11-29 RX ADMIN — Medication 1000 UNIT(S): at 14:21

## 2018-11-29 RX ADMIN — Medication 15 MILLILITER(S): at 01:17

## 2018-11-29 NOTE — DISCHARGE NOTE ADULT - CARE PROVIDERS DIRECT ADDRESSES
[email or web address]

## 2018-11-29 NOTE — DISCHARGE NOTE ADULT - PLAN OF CARE
improvement of symptoms During your hospital stay, it was determined that your hemoglobin was low. After speaking with your internist, cardiologist, nephrologist, and hematologist there was discussion about giving you blood to increase your hemoglobin level, although you have CHF. Continue taking iron as prescribed. Please follow up with your hematologist within 1 week of hospital discharge to discuss your anemia. A cardiologist saw you during your hospitalization and was monitoring your vitals and management. During your hospital stay, it was recommended to not continue your lasix (diuretic).   Please follow up with your cardiologist within 1 week of hospital discharge, to discuss further management of your CHF. When admitted to the hospital, you were found to have a low sodium level. We encourage you to increase your oral intake of food. Please follow up with your cardiologist within 1 week of hospital discharge. Continue taking your hydralazine and carvedilol as prescribed. Please follow up with your cardiologist within 1 week of hospital discharge.

## 2018-11-29 NOTE — CHART NOTE - NSCHARTNOTEFT_GEN_A_CORE
Called by RN for 3 beats of Vtach and HR of 120. Patient seen and examined at bedside. Resting comfortably in bed. Denies any chest pain, SOB, dizziness, states that she feels well. Now HR in the 70s.      Vital Signs Last 24 Hrs  T(C): 36.6 (29 Nov 2018 04:45), Max: 37.2 (28 Nov 2018 14:53)  T(F): 97.8 (29 Nov 2018 04:45), Max: 99 (28 Nov 2018 14:53)  HR: 75 (29 Nov 2018 04:45) (60 - 78)  BP: 124/61 (29 Nov 2018 04:45) (95/59 - 168/72)  BP(mean): --  RR: 20 (29 Nov 2018 04:45) (14 - 20)  SpO2: 91% (29 Nov 2018 04:45) (91% - 95%)    A/P: 8 year old Female with PMH of diastolic CHF (TTE July 2017 EF 65%), HTN, Multifactorial Anemia of Chronic Disease and Iron Deficiency, recent admission to [facility] for Acute CHF exacerbation in the setting of Hyponatremia and SABRINA (was discharged to home) with course complicated by fevers secondary to UTI, Was taking antibiotics for UTI as an outpatient as per her PMD, presents today with worsening shortness of breath and lethargy, Hyponatremia, SABRINA, and worsening Anemia. Called by RN for 3 beats of Vtach and HR 120s. Now HR in the 70s. Patient asymptomatic.     -F/U AM labs, Mg and Phos   -F/U AM CBC

## 2018-11-29 NOTE — PROGRESS NOTE ADULT - ASSESSMENT
89 year old Female with PMH of diastolic CHF (TTE July 2017 EF 65%), HTN, Multifactorial Anemia of Chronic Disease and Iron Deficiency, recent admission to [facility] for Acute CHF exacerbation in the setting of Hyponatremia and SABRINA (was discharged to home) with course complicated by fevers secondary to UTI, Was taking antibiotics for UTI as an outpatient as per her PMD, presents today with worsening shortness of breath and lethargy, Hyponatremia, SABRINA, and worsening Anemia. 89 year old Female with PMH of diastolic CHF (TTE July 2017 EF 65%), HTN, Multifactorial Anemia of Chronic Disease and Iron Deficiency, recent admission to [facility] for Acute CHF exacerbation in the setting of Hyponatremia and SABRINA (was discharged to home) with course complicated by fevers secondary to UTI, Was taking antibiotics for UTI as an outpatient as per her PMD. Presented yesterday with worsening shortness of breath and lethargy, Hyponatremia, SABRINA, and worsening Anemia. Today, patient denies SOB. Drop in Hgb from 8.0 to 7.3. Rise in Na+ from 122-->124.

## 2018-11-29 NOTE — PROGRESS NOTE ADULT - PROBLEM SELECTOR PLAN 1
-slight improvement from 122 to 124, Likely due to dehydration/decreased PO intake and diuretic use  -despite hx of CHF, patient clinically appears dry and is presenting with SABRINA  -start gentle hydration NS 50ml/hr stop after 12 hours, cardio recs appreciated.   -need to discuss with nephrology, Dr. Eaton aware, will also need to avoid rapid correction. Hold diuretics.  -Monitor BMP  -STAT repeat BMP  -Check urine lytes and osm  -Strict Is and Os  -AM labs  -Nephrology consult Dr. Eaton  -Fall precautions  -Ambulate w assist  -SCD for dvt ppx  -Dr. Norris following -slight improvement from 122 to 124, Likely due to dehydration/decreased PO intake and diuretic use  -despite hx of CHF, patient clinically appears dry and is presenting with SABRINA  -s/p gentle hydration NS 50ml/hr, cardio recs appreciated.   -nephrology, Dr. Eaton aware, will also need to avoid rapid correction. Hold diuretics.  -Monitor BMP  -STAT repeat BMP  -Check urine lytes and osm  -Strict Is and Os  -AM labs  -Nephrology consult Dr. Eaton  -Fall precautions  -Ambulate w assist  -SCD for dvt ppx  -Dr. Norris following

## 2018-11-29 NOTE — PROGRESS NOTE ADULT - SUBJECTIVE AND OBJECTIVE BOX
Patient is a 89y old  Female who presents with a chief complaint of Shortness of breath (28 Nov 2018 16:51)      FROM ADMISSION H+P:   HPI:  89 year old Female with PMH of diastolic CHF (TTE July 2017 EF 65%), HTN, Multifactorial Anemia of Chronic Disease and Iron Deficiency, recent admission to [facility] for Acute CHF exacerbation in the setting of Hyponatremia and SABRINA (was discharged to home) with course complicated by fevers secondary to UTI, Was taking antibiotics for UTI as an outpatient as per her PMD, taking day 2 of Sulfatrim, presents today with worsening shortness of breath and lethargy for the past few days per her home aid. Patient presents with her daughter who reports her mother is SOB. Since discharge on 11/14/18, patient has been being seen by visiting nurse, and her sodium has been low at home. Her Lasix was held for the past few days, but she has been taking Lasix 60 QD for the past few days. According to her daughter she reports her mother had worsening SOB today which prompted the visit to the ER. Patient admits to fatigue, lethargy, and decreased PO intake, however currently the patient reports she is NOT short of breath. She denies headache, dizziness, syncope, chest pain, palpitations, cough, abdominal pain, nausea, vomiting, diarrhea. Note patient is on antibiotics for a UTI from outpatient. Note patient at baseline is A&Ox2 and ambulates with a walker.     In the ED: temp 99, HR 60, /72, RR 20, SpO2 94% RA, repeat 95% RA. H/H: 8.0/23.5, Na 122, BUN/Cr: 73/2.90, pro-BNP: 70185. Troponin 0.043. UA positive for moderate LEC, 11-25 WBC, 11-25 RBC. Chest x-ray showed airspace disease left lung base, effusion-consolidation. Airspace disease right lung base, blunted right costophrenic angle. Heart size appears enlarged although magnified secondary to portable technique, left cardiac border obscured by left basilar pathology. Aorta shows atherosclerotic changes. No acute osseous abnormalities. Calcific bursitis present, left shoulder. Surgical clips present, right paratracheal region. EKG showed NSR VR 61. Cardiologist Dr. Norris was consulted. (28 Nov 2018 16:51)      ----  INTERVAL HPI/OVERNIGHT EVENTS: Pt seen and evaluated at the bedside.     ----  PAST MEDICAL & SURGICAL HISTORY:  CHF (congestive heart failure)  Anemia, unspecified type  Edema of lower extremity  Heart murmur  Hypertension  Hypercalcemia  History of appendectomy  S/P tonsillectomy  H/O parathyroidectomy      FAMILY HISTORY:  No pertinent family history in first degree relatives      ----  MEDICATIONS  (STANDING):  carvedilol 12.5 milliGRAM(s) Oral every 12 hours  cholecalciferol 1000 Unit(s) Oral <User Schedule>  cinacalcet 30 milliGRAM(s) Oral <User Schedule>  doxazosin 4 milliGRAM(s) Oral <User Schedule>  ferrous    sulfate 325 milliGRAM(s) Oral <User Schedule>  heparin  Injectable 5000 Unit(s) SubCutaneous every 12 hours  hydrALAZINE 100 milliGRAM(s) Oral every 8 hours  sodium chloride 0.9%. 1000 milliLiter(s) (50 mL/Hr) IV Continuous <Continuous>  trimethoprim  40 mG/sulfamethoxazole 200 mG Suspension 15 milliLiter(s) Oral <User Schedule>    MEDICATIONS  (PRN):      ----  REVIEW OF SYSTEMS:  CONSTITUTIONAL: denies fever, chills, fatigue, weakness  HEENT: denies blurred vision, sore throat  SKIN: denies new lesions, rash  CARDIOVASCULAR: denies chest pain, chest pressure, palpitations  RESPIRATORY: denies shortness of breath, sputum production  GASTROINTESTINAL: denies nausea, vomiting, diarrhea, abdominal pain  GENITOURINARY: denies dysuria, discharge  NEUROLOGICAL: denies numbness, headache, focal weakness  MUSCULOSKELETAL: denies new joint pain, muscle aches  HEMATOLOGIC: denies gross bleeding, bruising  LYMPHATICS: denies enlarged lymph nodes, extremity swelling  PSYCHIATRIC: denies recent changes in anxiety, depression  ENDOCRINOLOGIC: denies sweating, cold or heat intolerance    ----  PHYSICAL EXAM:  GENERAL: patient appears well, no acute distress, appropriate, pleasant  EYES: sclera clear, no exudates  ENMT: oropharynx clear without erythema, no exudates, moist mucous membranes  NECK: supple, soft, no thyromegaly noted  LUNGS: good air entry bilaterally, clear to auscultation, symmetric breath sounds, no wheezing or rhonchi appreciated  HEART: soft S1/S2, regular rate and rhythm, no murmurs noted, no lower extremity edema  GASTROINTESTINAL: abdomen is soft, nontender, nondistended, normoactive bowel sounds, no palpable masses  INTEGUMENT: good skin turgor, no lesions noted  MUSCULOSKELETAL: no clubbing or cyanosis, no obvious deformity  NEUROLOGIC: awake, alert, oriented x3, good muscle tone in 4 extremities, no obvious sensory deficits  PSYCHIATRIC: mood is good, affect is congruent, linear and logical thought process  HEME/LYMPH: no palpable supraclavicular nodules, no obvious ecchymosis or petechiae     Vital Signs Last 24 Hrs  T(C): 36.8 (29 Nov 2018 07:20), Max: 37.2 (28 Nov 2018 14:53)  T(F): 98.2 (29 Nov 2018 07:20), Max: 99 (28 Nov 2018 14:53)  HR: 68 (29 Nov 2018 07:20) (60 - 78)  BP: 103/59 (29 Nov 2018 07:20) (95/59 - 168/72)  BP(mean): --  RR: 19 (29 Nov 2018 07:20) (14 - 20)  SpO2: 92% (29 Nov 2018 07:20) (91% - 95%)    ----  I&O's Summary      LABS:                        7.3    8.35  )-----------( 229      ( 29 Nov 2018 06:48 )             21.8     11-29    124<L>  |  88<L>  |  72<H>  ----------------------------<  102<H>  3.5   |  22  |  3.00<H>    Ca    6.9<L>      29 Nov 2018 06:48  Phos  4.2     11-29  Mg     1.7     11-29    TPro  5.7<L>  /  Alb  2.2<L>  /  TBili  0.3  /  DBili  x   /  AST  19  /  ALT  13  /  AlkPhos  59  11-29    PT/INR - ( 28 Nov 2018 15:51 )   PT: 12.8 sec;   INR: 1.12 ratio         PTT - ( 28 Nov 2018 15:51 )  PTT:31.7 sec    CAPILLARY BLOOD GLUCOSE    ----  Personally reviewed:  Vital sign trends: [ x ] yes    [  ] no     [  ] n/a  Laboratory results: [ x ] yes    [  ] no     [  ] n/a  Radiology results: [ x ] yes    [  ] no     [  ] n/a  Culture results: [  ] yes    [  ] no     [  ] n/a  Consultant recommendations: [ x ] yes    [  ] no     [  ] n/a Patient is a 89y old  Female who presents with a chief complaint of Shortness of breath (28 Nov 2018 16:51)      FROM ADMISSION H+P:   HPI:  89 year old Female with PMH of diastolic CHF (TTE July 2017 EF 65%), HTN, Multifactorial Anemia of Chronic Disease and Iron Deficiency, recent admission to [facility] for Acute CHF exacerbation in the setting of Hyponatremia and SABRINA (was discharged to home) with course complicated by fevers secondary to UTI, Was taking antibiotics for UTI as an outpatient as per her PMD, taking day 2 of Sulfatrim, presents today with worsening shortness of breath and lethargy for the past few days per her home aid. Patient presents with her daughter who reports her mother is SOB. Since discharge on 11/14/18, patient has been being seen by visiting nurse, and her sodium has been low at home. Her Lasix was held for the past few days, but she has been taking Lasix 60 QD for the past few days. According to her daughter she reports her mother had worsening SOB yesterday which prompted the visit to the ER. Patient admits to fatigue, lethargy, and decreased PO intake, however currently the patient reports she is NOT short of breath. She denies headache, dizziness, syncope, chest pain, palpitations, cough, abdominal pain, nausea, vomiting, diarrhea. Note patient is on antibiotics for a UTI from outpatient. Note patient at baseline is A&Ox2 and ambulates with a walker.     In the ED: temp 99, HR 60, /72, RR 20, SpO2 94% RA, repeat 95% RA. H/H: 8.0/23.5, Na 122, BUN/Cr: 73/2.90, pro-BNP: 44431. Troponin 0.043. UA positive for moderate LEC, 11-25 WBC, 11-25 RBC. Chest x-ray showed airspace disease left lung base, effusion-consolidation. Airspace disease right lung base, blunted right costophrenic angle. Heart size appears enlarged although magnified secondary to portable technique, left cardiac border obscured by left basilar pathology. Aorta shows atherosclerotic changes. No acute osseous abnormalities. Calcific bursitis present, left shoulder. Surgical clips present, right paratracheal region. EKG showed NSR VR 61. Cardiologist Dr. Norris was consulted. (28 Nov 2018 16:51)      ----  INTERVAL HPI/OVERNIGHT EVENTS: Pt seen and evaluated at the bedside this morning. Daughter was at bedside as well. Patient has no acute complaints this morning. Patients O2 saturation was assessed at bedside this morning. Patient's bed was placed upright and encouraged to take deep breaths. O2 saturation rise from 89% -->94%. Daughter concerned about drop in Hgb from 8.0 --> 7.3 this AM. Dr. Perlman and Dr. Temple made aware. Patient denies headache, vision changes, chest pain, palpitations, SOB, N/V, diarrhea, constipation, weakness, numbess, tingling, rectal bleeding, blood in stool. Patient has not had BM today.     ----  PAST MEDICAL & SURGICAL HISTORY:  CHF (congestive heart failure)  Anemia, unspecified type  Edema of lower extremity  Heart murmur  Hypertension  Hypercalcemia  History of appendectomy  S/P tonsillectomy  H/O parathyroidectomy      FAMILY HISTORY:  No pertinent family history in first degree relatives      ----  MEDICATIONS  (STANDING):  carvedilol 12.5 milliGRAM(s) Oral every 12 hours  cholecalciferol 1000 Unit(s) Oral <User Schedule>  cinacalcet 30 milliGRAM(s) Oral <User Schedule>  doxazosin 4 milliGRAM(s) Oral <User Schedule>  ferrous    sulfate 325 milliGRAM(s) Oral <User Schedule>  heparin  Injectable 5000 Unit(s) SubCutaneous every 12 hours  hydrALAZINE 100 milliGRAM(s) Oral every 8 hours  sodium chloride 0.9%. 1000 milliLiter(s) (50 mL/Hr) IV Continuous <Continuous>  trimethoprim  40 mG/sulfamethoxazole 200 mG Suspension 15 milliLiter(s) Oral <User Schedule>    MEDICATIONS  (PRN):      ----  REVIEW OF SYSTEMS: all ROS negative except for that noted in interval HPI  CONSTITUTIONAL: denies fever, chills, fatigue, weakness  HEENT: denies blurred vision, sore throat  SKIN: denies new lesions, rash  CARDIOVASCULAR: denies chest pain, chest pressure, palpitations  RESPIRATORY: denies shortness of breath, sputum production  GASTROINTESTINAL: denies nausea, vomiting, diarrhea, abdominal pain  GENITOURINARY: denies dysuria, discharge  NEUROLOGICAL: denies numbness, headache, focal weakness  MUSCULOSKELETAL: denies new joint pain, muscle aches  HEMATOLOGIC: denies gross bleeding, bruising  LYMPHATICS: denies enlarged lymph nodes, extremity swelling  PSYCHIATRIC: denies recent changes in anxiety, depression  ENDOCRINOLOGIC: denies sweating, cold or heat intolerance    ----  PHYSICAL EXAM:  GENERAL: patient appears well, no acute distress, appropriate, pleasant  EYES: sclera clear, no exudates, conjunctiva pink b/l  ENMT: oropharynx clear without erythema, no exudates, moist mucous membranes  NECK: supple, soft, no thyromegaly noted  LUNGS: good air entry bilaterally, clear to auscultation, symmetric breath sounds, no wheezing or rhonchi appreciated  HEART: soft S1/S2, regular rate and rhythm, no murmurs noted, no lower extremity edema  GASTROINTESTINAL: abdomen is soft, nontender, nondistended, normoactive bowel sounds, no palpable masses  INTEGUMENT: good skin turgor, no lesions noted  MUSCULOSKELETAL: no clubbing or cyanosis, no obvious deformity  NEUROLOGIC: awake, alert, oriented x3, good muscle tone in 4 extremities, no obvious sensory deficits  PSYCHIATRIC: mood is good, affect is congruent, linear and logical thought process  HEME/LYMPH: no palpable supraclavicular nodules, no obvious ecchymosis or petechiae     Vital Signs Last 24 Hrs  T(C): 36.8 (29 Nov 2018 07:20), Max: 37.2 (28 Nov 2018 14:53)  T(F): 98.2 (29 Nov 2018 07:20), Max: 99 (28 Nov 2018 14:53)  HR: 68 (29 Nov 2018 07:20) (60 - 78)  BP: 103/59 (29 Nov 2018 07:20) (95/59 - 168/72)  BP(mean): --  RR: 19 (29 Nov 2018 07:20) (14 - 20)  SpO2: 92% (29 Nov 2018 07:20) (91% - 95%)    ----  I&O's Summary      LABS:                        7.3    8.35  )-----------( 229      ( 29 Nov 2018 06:48 )             21.8     11-29    124<L>  |  88<L>  |  72<H>  ----------------------------<  102<H>  3.5   |  22  |  3.00<H>    Ca    6.9<L>      29 Nov 2018 06:48  Phos  4.2     11-29  Mg     1.7     11-29    TPro  5.7<L>  /  Alb  2.2<L>  /  TBili  0.3  /  DBili  x   /  AST  19  /  ALT  13  /  AlkPhos  59  11-29    PT/INR - ( 28 Nov 2018 15:51 )   PT: 12.8 sec;   INR: 1.12 ratio         PTT - ( 28 Nov 2018 15:51 )  PTT:31.7 sec    CAPILLARY BLOOD GLUCOSE    ----  Personally reviewed:  Vital sign trends: [ x ] yes    [  ] no     [  ] n/a  Laboratory results: [ x ] yes    [  ] no     [  ] n/a  Radiology results: [ x ] yes    [  ] no     [  ] n/a  Culture results: [  ] yes    [  ] no     [  ] n/a  Consultant recommendations: [ x ] yes    [  ] no     [  ] n/a Patient is a 89y old  Female who presents with a chief complaint of Shortness of breath (28 Nov 2018 16:51)      FROM ADMISSION H+P:   HPI:  89 year old Female with PMH of diastolic CHF (TTE July 2017 EF 65%), HTN, Multifactorial Anemia of Chronic Disease and Iron Deficiency, recent admission to [facility] for Acute CHF exacerbation in the setting of Hyponatremia and SABRINA (was discharged to home) with course complicated by fevers secondary to UTI, Was taking antibiotics for UTI as an outpatient as per her PMD, taking day 2 of Sulfatrim, presents today with worsening shortness of breath and lethargy for the past few days per her home aid. Patient presents with her daughter who reports her mother is SOB. Since discharge on 11/14/18, patient has been being seen by visiting nurse, and her sodium has been low at home. Her Lasix was held for the past few days, but she has been taking Lasix 60 QD for the past few days. According to her daughter she reports her mother had worsening SOB yesterday which prompted the visit to the ER. Patient admits to fatigue, lethargy, and decreased PO intake, however currently the patient reports she is NOT short of breath. She denies headache, dizziness, syncope, chest pain, palpitations, cough, abdominal pain, nausea, vomiting, diarrhea. Note patient is on antibiotics for a UTI from outpatient. Note patient at baseline is A&Ox2 and ambulates with a walker.     In the ED: temp 99, HR 60, /72, RR 20, SpO2 94% RA, repeat 95% RA. H/H: 8.0/23.5, Na 122, BUN/Cr: 73/2.90, pro-BNP: 61316. Troponin 0.043. UA positive for moderate LEC, 11-25 WBC, 11-25 RBC. Chest x-ray showed airspace disease left lung base, effusion-consolidation. Airspace disease right lung base, blunted right costophrenic angle. Heart size appears enlarged although magnified secondary to portable technique, left cardiac border obscured by left basilar pathology. Aorta shows atherosclerotic changes. No acute osseous abnormalities. Calcific bursitis present, left shoulder. Surgical clips present, right paratracheal region. EKG showed NSR VR 61. Cardiologist Dr. Norris was consulted. (28 Nov 2018 16:51)      ----  INTERVAL HPI/OVERNIGHT EVENTS: Pt seen and evaluated at the bedside this morning. Daughter was at bedside as well. Patient has no acute complaints this morning. Patients O2 saturation was assessed at bedside this morning. Patient's bed was placed upright and encouraged to take deep breaths. O2 saturation rise from 89% -->94%. Daughter concerned about drop in Hgb from 8.0 --> 7.3 this AM. Dr. Perlman and Dr. Temple made aware. Dr. Temple was contacted - 1 unit will be given this AM. Patient denies headache, vision changes, chest pain, palpitations, SOB, N/V, diarrhea, constipation, weakness, numbess, tingling, rectal bleeding, blood in stool. Patient has not had BM today.     ----  PAST MEDICAL & SURGICAL HISTORY:  CHF (congestive heart failure)  Anemia, unspecified type  Edema of lower extremity  Heart murmur  Hypertension  Hypercalcemia  History of appendectomy  S/P tonsillectomy  H/O parathyroidectomy      FAMILY HISTORY:  No pertinent family history in first degree relatives      ----  MEDICATIONS  (STANDING):  carvedilol 12.5 milliGRAM(s) Oral every 12 hours  cholecalciferol 1000 Unit(s) Oral <User Schedule>  cinacalcet 30 milliGRAM(s) Oral <User Schedule>  doxazosin 4 milliGRAM(s) Oral <User Schedule>  ferrous    sulfate 325 milliGRAM(s) Oral <User Schedule>  heparin  Injectable 5000 Unit(s) SubCutaneous every 12 hours  hydrALAZINE 100 milliGRAM(s) Oral every 8 hours  sodium chloride 0.9%. 1000 milliLiter(s) (50 mL/Hr) IV Continuous <Continuous>  trimethoprim  40 mG/sulfamethoxazole 200 mG Suspension 15 milliLiter(s) Oral <User Schedule>    MEDICATIONS  (PRN):      ----  REVIEW OF SYSTEMS: all ROS negative except for that noted in interval HPI  CONSTITUTIONAL: denies fever, chills, fatigue, weakness  HEENT: denies blurred vision, sore throat  SKIN: denies new lesions, rash  CARDIOVASCULAR: denies chest pain, chest pressure, palpitations  RESPIRATORY: denies shortness of breath, sputum production  GASTROINTESTINAL: denies nausea, vomiting, diarrhea, abdominal pain  GENITOURINARY: denies dysuria, discharge  NEUROLOGICAL: denies numbness, headache, focal weakness  MUSCULOSKELETAL: denies new joint pain, muscle aches  HEMATOLOGIC: denies gross bleeding, bruising  LYMPHATICS: denies enlarged lymph nodes, extremity swelling  PSYCHIATRIC: denies recent changes in anxiety, depression  ENDOCRINOLOGIC: denies sweating, cold or heat intolerance    ----  PHYSICAL EXAM:  GENERAL: patient appears well, no acute distress, appropriate, pleasant  EYES: sclera clear, no exudates, conjunctiva pink b/l  ENMT: oropharynx clear without erythema, no exudates, moist mucous membranes  MOUTH: cold sore on left lower lip. Crusted over. Not bleeding.   NECK: supple, soft, no thyromegaly noted  LUNGS: good air entry bilaterally, clear to auscultation, symmetric breath sounds, no wheezing or rhonchi appreciated  HEART: soft S1/S2, regular rate and rhythm, no murmurs noted, no lower extremity edema  GASTROINTESTINAL: abdomen is soft, nontender, nondistended, normoactive bowel sounds, no palpable masses  INTEGUMENT: good skin turgor, no lesions noted  MUSCULOSKELETAL: no clubbing or cyanosis, no obvious deformity  NEUROLOGIC: awake, alert, oriented x3, good muscle tone in 4 extremities, no obvious sensory deficits  PSYCHIATRIC: mood is good, affect is congruent, linear and logical thought process  HEME/LYMPH: no palpable supraclavicular nodules, no obvious ecchymosis or petechiae     Vital Signs Last 24 Hrs  T(C): 36.8 (29 Nov 2018 07:20), Max: 37.2 (28 Nov 2018 14:53)  T(F): 98.2 (29 Nov 2018 07:20), Max: 99 (28 Nov 2018 14:53)  HR: 68 (29 Nov 2018 07:20) (60 - 78)  BP: 103/59 (29 Nov 2018 07:20) (95/59 - 168/72)  BP(mean): --  RR: 19 (29 Nov 2018 07:20) (14 - 20)  SpO2: 92% (29 Nov 2018 07:20) (91% - 95%)    ----  I&O's Summary      LABS:                        7.3    8.35  )-----------( 229      ( 29 Nov 2018 06:48 )             21.8     11-29    124<L>  |  88<L>  |  72<H>  ----------------------------<  102<H>  3.5   |  22  |  3.00<H>    Ca    6.9<L>      29 Nov 2018 06:48  Phos  4.2     11-29  Mg     1.7     11-29    TPro  5.7<L>  /  Alb  2.2<L>  /  TBili  0.3  /  DBili  x   /  AST  19  /  ALT  13  /  AlkPhos  59  11-29    PT/INR - ( 28 Nov 2018 15:51 )   PT: 12.8 sec;   INR: 1.12 ratio         PTT - ( 28 Nov 2018 15:51 )  PTT:31.7 sec    CAPILLARY BLOOD GLUCOSE    ----  Personally reviewed:  Vital sign trends: [ x ] yes    [  ] no     [  ] n/a  Laboratory results: [ x ] yes    [  ] no     [  ] n/a  Radiology results: [ x ] yes    [  ] no     [  ] n/a  Culture results: [  ] yes    [  ] no     [  ] n/a  Consultant recommendations: [ x ] yes    [  ] no     [  ] n/a Patient is a 89y old  Female who presents with a chief complaint of Shortness of breath (28 Nov 2018 16:51)      FROM ADMISSION H+P:   HPI:  89 year old Female with PMH of diastolic CHF (TTE July 2017 EF 65%), HTN, Multifactorial Anemia of Chronic Disease and Iron Deficiency, recent admission to [facility] for Acute CHF exacerbation in the setting of Hyponatremia and SABRINA (was discharged to home) with course complicated by fevers secondary to UTI, Was taking antibiotics for UTI as an outpatient as per her PMD, taking day 2 of Sulfatrim, presents today with worsening shortness of breath and lethargy for the past few days per her home aid. Patient presents with her daughter who reports her mother is SOB. Since discharge on 11/14/18, patient has been being seen by visiting nurse, and her sodium has been low at home. Her Lasix was held for the past few days, but she has been taking Lasix 60 QD for the past few days. According to her daughter she reports her mother had worsening SOB yesterday which prompted the visit to the ER. Patient admits to fatigue, lethargy, and decreased PO intake, however currently the patient reports she is NOT short of breath. She denies headache, dizziness, syncope, chest pain, palpitations, cough, abdominal pain, nausea, vomiting, diarrhea. Note patient is on antibiotics for a UTI from outpatient. Note patient at baseline is A&Ox2 and ambulates with a walker.     In the ED: temp 99, HR 60, /72, RR 20, SpO2 94% RA, repeat 95% RA. H/H: 8.0/23.5, Na 122, BUN/Cr: 73/2.90, pro-BNP: 02556. Troponin 0.043. UA positive for moderate LEC, 11-25 WBC, 11-25 RBC. Chest x-ray showed airspace disease left lung base, effusion-consolidation. Airspace disease right lung base, blunted right costophrenic angle. Heart size appears enlarged although magnified secondary to portable technique, left cardiac border obscured by left basilar pathology. Aorta shows atherosclerotic changes. No acute osseous abnormalities. Calcific bursitis present, left shoulder. Surgical clips present, right paratracheal region. EKG showed NSR VR 61. Cardiologist Dr. Norris was consulted. (28 Nov 2018 16:51)      ----  INTERVAL HPI/OVERNIGHT EVENTS: Pt seen and evaluated at the bedside this morning. Daughter was at bedside as well. Patient has no acute complaints this morning. Patients O2 saturation was assessed at bedside this morning. Patient's bed was placed upright and encouraged to take deep breaths. O2 saturation rise from 89% -->94%. Daughter concerned about drop in Hgb from 8.0 --> 7.3 this AM. Dr. Perlman and Dr. Temple made aware. Dr. Temple was contacted - 1 unit will be given this AM. Will be given 1/2 unit and then reassessed with physical exam to determine if remainder 1/2 unit will be given. Patient denies headache, vision changes, chest pain, palpitations, SOB, N/V, diarrhea, constipation, weakness, numbness, tingling, rectal bleeding, blood in stool. Patient has not had BM today.     ----  PAST MEDICAL & SURGICAL HISTORY:  CHF (congestive heart failure)  Anemia, unspecified type  Edema of lower extremity  Heart murmur  Hypertension  Hypercalcemia  History of appendectomy  S/P tonsillectomy  H/O parathyroidectomy      FAMILY HISTORY:  No pertinent family history in first degree relatives      ----  MEDICATIONS  (STANDING):  carvedilol 12.5 milliGRAM(s) Oral every 12 hours  cholecalciferol 1000 Unit(s) Oral <User Schedule>  cinacalcet 30 milliGRAM(s) Oral <User Schedule>  doxazosin 4 milliGRAM(s) Oral <User Schedule>  ferrous    sulfate 325 milliGRAM(s) Oral <User Schedule>  heparin  Injectable 5000 Unit(s) SubCutaneous every 12 hours  hydrALAZINE 100 milliGRAM(s) Oral every 8 hours  sodium chloride 0.9%. 1000 milliLiter(s) (50 mL/Hr) IV Continuous <Continuous>  trimethoprim  40 mG/sulfamethoxazole 200 mG Suspension 15 milliLiter(s) Oral <User Schedule>    MEDICATIONS  (PRN):      ----  REVIEW OF SYSTEMS: all ROS negative except for that noted in interval HPI  CONSTITUTIONAL: denies fever, chills, fatigue, weakness  HEENT: denies blurred vision, sore throat  SKIN: denies new lesions, rash  CARDIOVASCULAR: denies chest pain, chest pressure, palpitations  RESPIRATORY: denies shortness of breath, sputum production  GASTROINTESTINAL: denies nausea, vomiting, diarrhea, abdominal pain  GENITOURINARY: denies dysuria, discharge  NEUROLOGICAL: denies numbness, headache, focal weakness  MUSCULOSKELETAL: denies new joint pain, muscle aches  HEMATOLOGIC: denies gross bleeding, bruising  LYMPHATICS: denies enlarged lymph nodes, extremity swelling  PSYCHIATRIC: denies recent changes in anxiety, depression  ENDOCRINOLOGIC: denies sweating, cold or heat intolerance    ----  PHYSICAL EXAM:  GENERAL: patient appears well, no acute distress, appropriate, pleasant  EYES: sclera clear, no exudates, conjunctiva pink b/l  ENMT: oropharynx clear without erythema, no exudates, moist mucous membranes  MOUTH: cold sore on left lower lip. Crusted over. Not bleeding.   NECK: supple, soft, no thyromegaly noted  LUNGS: good air entry bilaterally, clear to auscultation, symmetric breath sounds, no wheezing or rhonchi appreciated  HEART: soft S1/S2, regular rate and rhythm, no murmurs noted, no lower extremity edema  GASTROINTESTINAL: abdomen is soft, nontender, nondistended, normoactive bowel sounds, no palpable masses  INTEGUMENT: good skin turgor, no lesions noted  MUSCULOSKELETAL: no clubbing or cyanosis, no obvious deformity  NEUROLOGIC: awake, alert, oriented x3, good muscle tone in 4 extremities, no obvious sensory deficits  PSYCHIATRIC: mood is good, affect is congruent, linear and logical thought process  HEME/LYMPH: no palpable supraclavicular nodules, no obvious ecchymosis or petechiae     Vital Signs Last 24 Hrs  T(C): 36.8 (29 Nov 2018 07:20), Max: 37.2 (28 Nov 2018 14:53)  T(F): 98.2 (29 Nov 2018 07:20), Max: 99 (28 Nov 2018 14:53)  HR: 68 (29 Nov 2018 07:20) (60 - 78)  BP: 103/59 (29 Nov 2018 07:20) (95/59 - 168/72)  BP(mean): --  RR: 19 (29 Nov 2018 07:20) (14 - 20)  SpO2: 92% (29 Nov 2018 07:20) (91% - 95%)    ----  I&O's Summary      LABS:                        7.3    8.35  )-----------( 229      ( 29 Nov 2018 06:48 )             21.8     11-29    124<L>  |  88<L>  |  72<H>  ----------------------------<  102<H>  3.5   |  22  |  3.00<H>    Ca    6.9<L>      29 Nov 2018 06:48  Phos  4.2     11-29  Mg     1.7     11-29    TPro  5.7<L>  /  Alb  2.2<L>  /  TBili  0.3  /  DBili  x   /  AST  19  /  ALT  13  /  AlkPhos  59  11-29    PT/INR - ( 28 Nov 2018 15:51 )   PT: 12.8 sec;   INR: 1.12 ratio         PTT - ( 28 Nov 2018 15:51 )  PTT:31.7 sec    CAPILLARY BLOOD GLUCOSE    ----  Personally reviewed:  Vital sign trends: [ x ] yes    [  ] no     [  ] n/a  Laboratory results: [ x ] yes    [  ] no     [  ] n/a  Radiology results: [ x ] yes    [  ] no     [  ] n/a  Culture results: [  ] yes    [  ] no     [  ] n/a  Consultant recommendations: [ x ] yes    [  ] no     [  ] n/a Patient is a 89y old  Female who presents with a chief complaint of Shortness of breath (28 Nov 2018 16:51)      FROM ADMISSION H+P:   HPI:  89 year old Female with PMH of diastolic CHF (TTE July 2017 EF 65%), HTN, Multifactorial Anemia of Chronic Disease and Iron Deficiency, recent admission to [facility] for Acute CHF exacerbation in the setting of Hyponatremia and SABRINA (was discharged to home) with course complicated by fevers secondary to UTI, Was taking antibiotics for UTI as an outpatient as per her PMD, taking day 2 of Sulfatrim, presents today with worsening shortness of breath and lethargy for the past few days per her home aid. Patient presents with her daughter who reports her mother is SOB. Since discharge on 11/14/18, patient has been being seen by visiting nurse, and her sodium has been low at home. Her Lasix was held for the past few days, but she has been taking Lasix 60 QD for the past few days. According to her daughter she reports her mother had worsening SOB yesterday which prompted the visit to the ER. Patient admits to fatigue, lethargy, and decreased PO intake, however currently the patient reports she is NOT short of breath. She denies headache, dizziness, syncope, chest pain, palpitations, cough, abdominal pain, nausea, vomiting, diarrhea. Note patient is on antibiotics for a UTI from outpatient. Note patient at baseline is A&Ox2 and ambulates with a walker.     In the ED: temp 99, HR 60, /72, RR 20, SpO2 94% RA, repeat 95% RA. H/H: 8.0/23.5, Na 122, BUN/Cr: 73/2.90, pro-BNP: 90999. Troponin 0.043. UA positive for moderate LEC, 11-25 WBC, 11-25 RBC. Chest x-ray showed airspace disease left lung base, effusion-consolidation. Airspace disease right lung base, blunted right costophrenic angle. Heart size appears enlarged although magnified secondary to portable technique, left cardiac border obscured by left basilar pathology. Aorta shows atherosclerotic changes. No acute osseous abnormalities. Calcific bursitis present, left shoulder. Surgical clips present, right paratracheal region. EKG showed NSR VR 61. Cardiologist Dr. Norris was consulted. (28 Nov 2018 16:51)    ----  INTERVAL HPI/OVERNIGHT EVENTS: Pt seen and evaluated at the bedside this morning. Daughter was at bedside as well. Patient has no acute complaints this morning. Patients O2 saturation was assessed at bedside this morning. Patient's bed was placed upright and patient was encouraged to take deep breaths. O2 saturation rise from 89% -->94% on RA. Daughter concerned about drop in Hgb from 8.0 --> 7.3 this AM. Dr. Perlman and Dr. Temple made aware. Dr. Temple was contacted - 1 unit will be given this AM. Will be given 1/2 unit and then reassessed with physical exam to determine if remainder 1/2 unit will be given/ need for lasix depending on physical exam. Patient denies headache, vision changes, chest pain, palpitations, SOB, N/V, diarrhea, constipation, numbness, tingling, rectal bleeding, blood in stool. Patient has not had BM today.     ----  PAST MEDICAL & SURGICAL HISTORY:  CHF (congestive heart failure)  Anemia, unspecified type  Edema of lower extremity  Heart murmur  Hypertension  Hypercalcemia  History of appendectomy  S/P tonsillectomy  H/O parathyroidectomy      FAMILY HISTORY:  No pertinent family history in first degree relatives      ----  MEDICATIONS  (STANDING):  carvedilol 12.5 milliGRAM(s) Oral every 12 hours  cholecalciferol 1000 Unit(s) Oral <User Schedule>  cinacalcet 30 milliGRAM(s) Oral <User Schedule>  doxazosin 4 milliGRAM(s) Oral <User Schedule>  ferrous    sulfate 325 milliGRAM(s) Oral <User Schedule>  heparin  Injectable 5000 Unit(s) SubCutaneous every 12 hours  hydrALAZINE 100 milliGRAM(s) Oral every 8 hours  sodium chloride 0.9%. 1000 milliLiter(s) (50 mL/Hr) IV Continuous <Continuous>  trimethoprim  40 mG/sulfamethoxazole 200 mG Suspension 15 milliLiter(s) Oral <User Schedule>    MEDICATIONS  (PRN):      ----  REVIEW OF SYSTEMS: all ROS negative except for that noted in interval HPI  CONSTITUTIONAL: denies fever, chills, + fatigue, + generalized weakness  HEENT: denies blurred vision, sore throat  SKIN: denies new lesions, rash  CARDIOVASCULAR: denies chest pain, chest pressure, palpitations  RESPIRATORY: denies shortness of breath, sputum production  GASTROINTESTINAL: denies nausea, vomiting, diarrhea, abdominal pain  GENITOURINARY: denies dysuria, discharge  NEUROLOGICAL: denies numbness, headache, focal weakness  MUSCULOSKELETAL: denies new joint pain, muscle aches  HEMATOLOGIC: denies gross bleeding, bruising  LYMPHATICS: denies enlarged lymph nodes, extremity swelling  PSYCHIATRIC: denies recent changes in anxiety, depression  ENDOCRINOLOGIC: denies sweating, cold or heat intolerance    ----  PHYSICAL EXAM:  GENERAL: patient appears tired, no acute distress  EYES: sclera clear, no exudates, conjunctiva pink b/l  ENMT: oropharynx clear without erythema, no exudates, moist mucous membranes  MOUTH: cold sore on left lower lip. Crusted over. Not bleeding.   NECK: supple, soft, no thyromegaly noted  LUNGS: good air entry bilaterally, clear to auscultation, symmetric breath sounds, no wheezing or rhonchi appreciated  HEART: soft S1/S2, regular rate and rhythm, no murmurs noted, no lower extremity edema  GASTROINTESTINAL: abdomen is soft, nontender, slightly distended, normoactive bowel sounds, no palpable masses  INTEGUMENT: good skin turgor, no lesions noted  MUSCULOSKELETAL: no clubbing or cyanosis, no obvious deformity  NEUROLOGIC: awake, alert, oriented x3, no obvious sensory deficits  PSYCHIATRIC: mood is good, affect is congruent      Vital Signs Last 24 Hrs  T(C): 36.8 (29 Nov 2018 07:20), Max: 37.2 (28 Nov 2018 14:53)  T(F): 98.2 (29 Nov 2018 07:20), Max: 99 (28 Nov 2018 14:53)  HR: 68 (29 Nov 2018 07:20) (60 - 78)  BP: 103/59 (29 Nov 2018 07:20) (95/59 - 168/72)  BP(mean): --  RR: 19 (29 Nov 2018 07:20) (14 - 20)  SpO2: 92% (29 Nov 2018 07:20) (91% - 95%)    ----  I&O's Summary      LABS:                        7.3    8.35  )-----------( 229      ( 29 Nov 2018 06:48 )             21.8     11-29    124<L>  |  88<L>  |  72<H>  ----------------------------<  102<H>  3.5   |  22  |  3.00<H>    Ca    6.9<L>      29 Nov 2018 06:48  Phos  4.2     11-29  Mg     1.7     11-29    TPro  5.7<L>  /  Alb  2.2<L>  /  TBili  0.3  /  DBili  x   /  AST  19  /  ALT  13  /  AlkPhos  59  11-29    PT/INR - ( 28 Nov 2018 15:51 )   PT: 12.8 sec;   INR: 1.12 ratio         PTT - ( 28 Nov 2018 15:51 )  PTT:31.7 sec    CAPILLARY BLOOD GLUCOSE    ----  Personally reviewed:  Vital sign trends: [ x ] yes    [  ] no     [  ] n/a  Laboratory results: [ x ] yes    [  ] no     [  ] n/a  Radiology results: [ x ] yes    [  ] no     [  ] n/a  Culture results: [  ] yes    [  ] no     [  ] n/a  Consultant recommendations: [ x ] yes    [  ] no     [  ] n/a

## 2018-11-29 NOTE — PROGRESS NOTE ADULT - PROBLEM SELECTOR PLAN 3
Chronic history, sees Dr. Temple as an outpatient, likely anemia of chronic disease v iron def anemia. However given SOB, need to consider giving   -STAT FOBT  -Will continue to monitor.   -Stat type and screen  -Continue home iron  -Dr. Temple, hematologist, consulted Chronic history, sees Dr. Temple as an outpatient, likely anemia of chronic disease v iron def anemia.   -STAT FOBT  -Will continue to monitor.   -Stat type and screen  -Continue home iron  -Dr. Temple, hematologist, consulted. 1 unit of blood to be given this AM. 1/2 unit to be given first and then reassessed with physical exam to determine if additional 1/2 unit needs to be given. Chronic history, sees Dr. Temple as an outpatient, likely anemia of chronic disease v iron def anemia.   -f/u FOBT  -Will continue to monitor.   -type and screen  -Continue home iron  -Dr. Temple, hematologist, consulted. 1 unit of blood to be given this AM. 1/2 unit to be given first and then reassessed with physical exam to determine if additional 1/2 unit needs to be given/ if lasix is needed.

## 2018-11-29 NOTE — DISCHARGE NOTE ADULT - ADDITIONAL INSTRUCTIONS
please follow up with your cardiologist, hematologist, and primary care doctor within 1 week of hospital discharge.   It is the patient's/ family's responsibility to make these appointments.  If at any time you feel that your symptoms have returned or getting worse, please do not hesitate to call your doctor or return to the emergency room.

## 2018-11-29 NOTE — DISCHARGE NOTE ADULT - CARE PROVIDER_API CALL
Frankie Rowell), Cardiovascular Disease  [address] [place] [address]  Phone: [phone]  Fax: [phone]    Cali Temple (MD), Hematology; Internal Medicine; Medical Oncology  [address] [facility]  [address]  [place] [address]  Phone: [phone]  Fax: [phone]    Weil, Peter A (MD), Critical Care Medicine; Internal Medicine; Pulmonary Disease  [address] [place] [address] [address]  [place] [address]  Phone: [phone]  Fax: [phone]

## 2018-11-29 NOTE — DISCHARGE NOTE ADULT - CARE PLAN
Principal Discharge DX:	Anemia, unspecified type  Goal:	improvement of symptoms  Assessment and plan of treatment:	During your hospital stay, it was determined that your hemoglobin was low. After speaking with your internist, cardiologist, nephrologist, and hematologist there was discussion about giving you blood to increase your hemoglobin level, although you have CHF. Continue taking iron as prescribed. Please follow up with your hematologist within 1 week of hospital discharge to discuss your anemia.  Secondary Diagnosis:	Chronic diastolic congestive heart failure  Assessment and plan of treatment:	A cardiologist saw you during your hospitalization and was monitoring your vitals and management. During your hospital stay, it was recommended to not continue your lasix (diuretic).   Please follow up with your cardiologist within 1 week of hospital discharge, to discuss further management of your CHF.  Secondary Diagnosis:	Hyponatremia  Assessment and plan of treatment:	When admitted to the hospital, you were found to have a low sodium level. We encourage you to increase your oral intake of food. Please follow up with your cardiologist within 1 week of hospital discharge.  Secondary Diagnosis:	Hypertension  Assessment and plan of treatment:	Continue taking your hydralazine and carvedilol as prescribed. Please follow up with your cardiologist within 1 week of hospital discharge.

## 2018-11-29 NOTE — PROGRESS NOTE ADULT - PROBLEM SELECTOR PLAN 2
Cr elevated at 3.0, likely due to Lasix use and dehydration  -gentle dehydration NS 50ml/hr stop after 12 hours  -Holding diuretics  -STAT repeat BMP  -Follow up AM BMP  -Dr. Eaton consulted Cr elevated at 3.0, likely due to Lasix use and dehydration  -s/p gentle dehydration NS 50ml/hr stop after 12 hours  -Holding diuretics  -STAT repeat BMP  -Follow up AM BMP  -Dr. Eaton following, bladder scan showing 900+, abbott to be placed today.  monitor urine output

## 2018-11-29 NOTE — CONSULT NOTE ADULT - ASSESSMENT
1.	Hyponatremia: r/o retention, Hypovolemic hyponatremia  2.	SABRINA: R/o retention  3.	Hypertension  4.	Anemia    Check bladder scan. Maintain PO fluid restriction. Check urine sodium, urine osm and serum uric acid level.   Avoid hypotonic fluids. Monitor BP closely. Check serial sodium levels.   Salt tablets if BP acceptable. Pt advised on compliance with PO fluid restriction.   Monitor BP trend. Titrate BP meds as needed. Salt restriction. Monitor h/h trend. PRBC transfusion.   D/w pt's daughter at bedside. Will follow electrolytes and renal function trend.   Further recommendations pending clinical course. Thank you for the courtesy of this referral. 1.	Hyponatremia: r/o retention, Hypovolemic hyponatremia  2.	SABRINA: R/o retention  3.	Hypertension  4.	Anemia    Check bladder scan. Maintain PO fluid restriction. Check urine sodium, urine osm and serum uric acid level.   Avoid hypotonic fluids. Monitor BP closely. Check serial sodium levels. Low calcium on sensipar. Will need to hold sensipar if calcium trending low.   Salt tablets if BP acceptable. Pt advised on compliance with PO fluid restriction.   Monitor BP trend. Titrate BP meds as needed. Salt restriction. Monitor h/h trend. PRBC transfusion.   D/w pt's daughter at bedside. Will follow electrolytes and renal function trend.   Further recommendations pending clinical course. Thank you for the courtesy of this referral. 1.	Hyponatremia: r/o retention, Hypovolemic hyponatremia  2.	SABRINA: R/o retention  3.	Hypertension  4.	Anemia  5.	Primary hyperparathyroidism, On sensipar, Low calcium    Check bladder scan. Maintain PO fluid restriction. Check urine sodium, urine osm and serum uric acid level.   Avoid hypotonic fluids. Monitor BP closely. Check serial sodium levels. Low calcium on sensipar. Will need to hold sensipar if calcium trending low.   Check iPTH. Salt tablets if BP acceptable. Pt advised on compliance with PO fluid restriction.   Monitor BP trend. Titrate BP meds as needed. Salt restriction. Monitor h/h trend. PRBC transfusion.   D/w pt's daughter at bedside. Will follow electrolytes and renal function trend.   Further recommendations pending clinical course. Thank you for the courtesy of this referral.

## 2018-11-29 NOTE — PROGRESS NOTE ADULT - PROBLEM SELECTOR PLAN 5
-Although patient is reporting SOB, as per cardiology, pt does not appear volume overloaded, she appears dry given SABRINA and decreased PO intake  -Will hold diuretics  -Continue carvedilol and hydralazine at home doses with hold parameters  -Monitor K, creatinine, I and O  -No need to repeat echocardiogram

## 2018-11-29 NOTE — CONSULT NOTE ADULT - SUBJECTIVE AND OBJECTIVE BOX
Patient is a 89y old  Female who presents with a chief complaint of Shortness of breath (29 Nov 2018 11:46)      HPI:  89 year old Female with PMH of diastolic CHF (TTE July 2017 EF 65%), HTN, Multifactorial Anemia of Chronic Disease and Iron Deficiency, recent admission to [facility] for Acute CHF exacerbation in the setting of Hyponatremia and SABRINA (was discharged to home) with course complicated by fevers secondary to UTI, Was taking antibiotics for UTI as an outpatient as per her PMD, taking day 2 of Sulfatrim, presents today with worsening shortness of breath and lethargy for the past few days per her home aid. Patient presents with her daughter who reports her mother is SOB. Since discharge on 11/14/18, patient has been being seen by visiting nurse, and her sodium has been low at home. Her Lasix was held for the past few days, but she has been taking Lasix 60 QD for the past few days. According to her daughter she reports her mother had worsening SOB today which prompted the visit to the ER. Patient admits to fatigue, lethargy, and decreased PO intake, however currently the patient reports she is NOT short of breath. She denies headache, dizziness, syncope, chest pain, palpitations, cough, abdominal pain, nausea, vomiting, diarrhea. Note patient is on antibiotics for a UTI from outpatient. Note patient at baseline is A&Ox2 and ambulates with a walker.     In the ED: temp 99, HR 60, /72, RR 20, SpO2 94% RA, repeat 95% RA. H/H: 8.0/23.5, Na 122, BUN/Cr: 73/2.90, pro-BNP: 32408. Troponin 0.043. UA positive for moderate LEC, 11-25 WBC, 11-25 RBC. Chest x-ray showed airspace disease left lung base, effusion-consolidation. Airspace disease right lung base, blunted right costophrenic angle. Heart size appears enlarged although magnified secondary to portable technique, left cardiac border obscured by left basilar pathology. Aorta shows atherosclerotic changes. No acute osseous abnormalities. Calcific bursitis present, left shoulder. Surgical clips present, right paratracheal region. EKG showed NSR VR 61. Cardiologist Dr. Norris was consulted. (28 Nov 2018 16:51)       ROS:  Negative except for:    PAST MEDICAL & SURGICAL HISTORY:  Primary hyperparathyroidism  CHF (congestive heart failure)  Anemia, unspecified type  Edema of lower extremity  Heart murmur  Hypertension  Hypercalcemia  History of appendectomy  S/P tonsillectomy  H/O parathyroidectomy      SOCIAL HISTORY:    FAMILY HISTORY:  No pertinent family history in first degree relatives      MEDICATIONS  (STANDING):  BACItracin   Ointment 1 Application(s) Topical daily  carvedilol 12.5 milliGRAM(s) Oral every 12 hours  cholecalciferol 1000 Unit(s) Oral <User Schedule>  cinacalcet 30 milliGRAM(s) Oral <User Schedule>  doxazosin 4 milliGRAM(s) Oral <User Schedule>  ferrous    sulfate 325 milliGRAM(s) Oral <User Schedule>  heparin  Injectable 5000 Unit(s) SubCutaneous every 12 hours  hydrALAZINE 100 milliGRAM(s) Oral every 8 hours  petrolatum white Ointment 1 Application(s) Topical two times a day  sodium chloride 2 Gram(s) Oral once  sodium chloride 0.9%. 1000 milliLiter(s) (50 mL/Hr) IV Continuous <Continuous>  trimethoprim  40 mG/sulfamethoxazole 200 mG Suspension 15 milliLiter(s) Oral <User Schedule>    MEDICATIONS  (PRN):      Allergies    Vasotec (Unknown)    Intolerances        Vital Signs Last 24 Hrs  T(C): 36.7 (29 Nov 2018 12:01), Max: 37.2 (28 Nov 2018 14:53)  T(F): 98.1 (29 Nov 2018 12:01), Max: 99 (28 Nov 2018 14:53)  HR: 68 (29 Nov 2018 12:01) (60 - 78)  BP: 142/65 (29 Nov 2018 12:01) (95/59 - 168/72)  BP(mean): --  RR: 18 (29 Nov 2018 12:01) (14 - 20)  SpO2: 93% (29 Nov 2018 12:01) (90% - 95%)    PHYSICAL EXAM  General: adult in NAD, lethargic, chronically ill appearing, daughter at bedside  HEENT: clear oropharynx, anicteric sclera, pink conjunctivae  Neck: supple  CV: normal S1S2 with no murmur rubs or gallops  Lungs: clear to auscultation, no wheezes, no rhales  Abdomen: soft non-tender non-distended, no hepato/splenomegaly  Ext: no clubbing cyanosis or edema  Skin: no rashes and no petichiae  Neuro: lethargic      LABS:    CBC Full  -  ( 29 Nov 2018 06:48 )  WBC Count : 8.35 K/uL  Hemoglobin : 7.3 g/dL  Hematocrit : 21.8 %  Platelet Count - Automated : 229 K/uL  Mean Cell Volume : 78.1 fl  Mean Cell Hemoglobin : 26.2 pg  Mean Cell Hemoglobin Concentration : 33.5 gm/dL  Auto Neutrophil # : 7.21 K/uL  Auto Lymphocyte # : 0.66 K/uL  Auto Monocyte # : 0.28 K/uL  Auto Eosinophil # : 0.10 K/uL  Auto Basophil # : 0.01 K/uL  Auto Neutrophil % : 86.3 %  Auto Lymphocyte % : 7.9 %  Auto Monocyte % : 3.4 %  Auto Eosinophil % : 1.2 %  Auto Basophil % : 0.1 %    11-29    124<L>  |  88<L>  |  72<H>  ----------------------------<  102<H>  3.5   |  22  |  3.00<H>    Ca    6.9<L>      29 Nov 2018 06:48  Phos  4.2     11-29  Mg     1.7     11-29    TPro  5.7<L>  /  Alb  2.2<L>  /  TBili  0.3  /  DBili  x   /  AST  19  /  ALT  13  /  AlkPhos  59  11-29    PT/INR - ( 28 Nov 2018 15:51 )   PT: 12.8 sec;   INR: 1.12 ratio         PTT - ( 28 Nov 2018 15:51 )  PTT:31.7 sec          BLOOD SMEAR INTERPRETATION:    RADIOLOGY & ADDITIONAL STUDIES:

## 2018-11-29 NOTE — CONSULT NOTE ADULT - ASSESSMENT
Multifactorial anemia related to probable GI blood loss and renal insufficiency. Course also complicated by CHF and deteriating cardiac status    Recommendations:  1.  discussed with cardiology to transfuse 1/2 unit and evaluate for possible lasix and additional PRBC  2.  discussed with daughter. patient continues to have slowly deteriating status.  Agree with DNR/DNI.  if renal function continues to deteriate and Na not improve will continue palliative care with discharge home with hospice. patient wishes to have her at home.   3.  further heme recommendations pending above  discussed with Dr. Perlman, and daughter

## 2018-11-29 NOTE — PROGRESS NOTE ADULT - PROBLEM SELECTOR PLAN 4
-Not new bilateral pleural effusions compared with prior admission CXR two weeks ago, pt not in acute CHF or volume overloaded, seen by cardiology.  -Will monitor continuous pulse ox   -Will hold off abx for now  -Denies recent sick contacts -Not new bilateral pleural effusions compared with prior admission CXR two weeks ago, pt not in acute CHF or volume overloaded, seen by cardiology.  -Will monitor continuous pulse ox   patient stable on RA, spo2 94%   -Will hold off abx for now  -Denies recent sick contacts

## 2018-11-29 NOTE — DISCHARGE NOTE ADULT - NS AS ACTIVITY OBS
Do not make important decisions/Bathing allowed/No Heavy lifting/straining/Do not drive or operate machinery

## 2018-11-29 NOTE — DISCHARGE NOTE ADULT - HOSPITAL COURSE
89 year old Female with PMH of diastolic CHF (TTE July 2017 EF 65%), HTN, Multifactorial Anemia of Chronic Disease and Iron Deficiency, recent admission to [facility] for Acute CHF exacerbation in the setting of Hyponatremia and SABRINA (was discharged to home) with course complicated by fevers secondary to UTI, Was taking antibiotics for UTI as an outpatient as per her PMD, taking day 2 of Sulfatrim, presents today with worsening shortness of breath and lethargy for the past few days per her home aid. Patient presents with her daughter who reports her mother is SOB. Since discharge on 11/14/18, patient has been being seen by visiting nurse, and her sodium has been low at home. Her Lasix was held for the past few days, but she has been taking Lasix 60 QD for the past few days. According to her daughter she reports her mother had worsening SOB today which prompted the visit to the ER. Patient admits to fatigue, lethargy, and decreased PO intake, however currently the patient reports she is NOT short of breath. She denies headache, dizziness, syncope, chest pain, palpitations, cough, abdominal pain, nausea, vomiting, diarrhea. Note patient is on antibiotics for a UTI from outpatient. Note patient at baseline is A&Ox2 and ambulates with a walker.     In the ED: temp 99, HR 60, /72, RR 20, SpO2 94% RA, repeat 95% RA. H/H: 8.0/23.5, Na 122, BUN/Cr: 73/2.90, pro-BNP: 80285. Troponin 0.043. UA positive for moderate LEC, 11-25 WBC, 11-25 RBC. Chest x-ray showed airspace disease left lung base, effusion-consolidation. Airspace disease right lung base, blunted right costophrenic angle. Heart size appears enlarged although magnified secondary to portable technique, left cardiac border obscured by left basilar pathology. Aorta shows atherosclerotic changes. No acute osseous abnormalities. Calcific bursitis present, left shoulder. Surgical clips present, right paratracheal region. EKG showed NSR VR 61. Cardiologist Dr. Norris was consulted.     patient was started on gentle hydration of 50ml/hr normal saline the night of admission, despite patient's CHF history as her sodium was at 122.     After discussing patient's low hemoglobin of 7.3, and assessing her CHF status and hyponatremia of 124 with cardiology, nephrology, hematology, and the internist, it was agreed upon to give patient 1/2 unit of pRBC and then to reassess how patient is clinically (whether to give Lasix or not), and continue second 1/2 of pRBC.   H/H after 1 unit of pRBC______.

## 2018-11-29 NOTE — PHYSICAL THERAPY INITIAL EVALUATION ADULT - PERTINENT HX OF CURRENT PROBLEM, REHAB EVAL
89 yo F presents with worsening SOB and lethargy x days. Patient admits to fatigue, lethargy, and decreased PO intake. Recent admission to [facility] for Acute CHF exacerbation. CXR showed airspace disease left lung base, effusion-consolidation. Calcific bursitis present, left shoulder. H/H 7.3/21.9

## 2018-11-29 NOTE — CONSULT NOTE ADULT - SUBJECTIVE AND OBJECTIVE BOX
Patient is a 89y old  Female who presents with a chief complaint of Shortness of breath (29 Nov 2018 09:16)    HPI:  89 year old Female with PMH of diastolic CHF (TTE July 2017 EF 65%), HTN, Multifactorial Anemia of Chronic Disease and Iron Deficiency, recent admission to [facility] for Acute CHF exacerbation in the setting of Hyponatremia and SABRINA (was discharged to home) with course complicated by fevers secondary to UTI, Was taking antibiotics for UTI as an outpatient as per her PMD, taking day 2 of Sulfatrim, presents today with worsening shortness of breath and lethargy for the past few days per her home aid. Patient presents with her daughter who reports her mother is SOB. Since discharge on 11/14/18, patient has been being seen by visiting nurse, and her sodium has been low at home. Her Lasix was held for the past few days, but she has been taking Lasix 60 QD for the past few days. According to her daughter she reports her mother had worsening SOB today which prompted the visit to the ER. Patient admits to fatigue, lethargy, and decreased PO intake, however currently the patient reports she is NOT short of breath. She denies headache, dizziness, syncope, chest pain, palpitations, cough, abdominal pain, nausea, vomiting, diarrhea. Note patient is on antibiotics for a UTI from outpatient. Note patient at baseline is A&Ox2 and ambulates with a walker.     In the ED: temp 99, HR 60, /72, RR 20, SpO2 94% RA, repeat 95% RA. H/H: 8.0/23.5, Na 122, BUN/Cr: 73/2.90, pro-BNP: 81421. Troponin 0.043. UA positive for moderate LEC, 11-25 WBC, 11-25 RBC. Chest x-ray showed airspace disease left lung base, effusion-consolidation. Airspace disease right lung base, blunted right costophrenic angle. Heart size appears enlarged although magnified secondary to portable technique, left cardiac border obscured by left basilar pathology. Aorta shows atherosclerotic changes. No acute osseous abnormalities. Calcific bursitis present, left shoulder. Surgical clips present, right paratracheal region. EKG showed NSR VR 61. Cardiologist Dr. Norris was consulted. (28 Nov 2018 16:51)    Renal consult called for hyponatremia, SABRINA. Pt's daughter at bedside.       PAST MEDICAL HISTORY:  CHF (congestive heart failure)  Anemia, unspecified type  Edema of lower extremity  Heart murmur  Hypertension  Hypercalcemia      PAST SURGICAL HISTORY:  History of appendectomy  S/P tonsillectomy  H/O parathyroidectomy      FAMILY HISTORY:  No pertinent family history in first degree relatives      SOCIAL HISTORY: No smoking or alcohol use     Allergies    Vasotec (Unknown)    Intolerances      Home Medications:  carvedilol 12.5 mg oral tablet: 1 tab(s) orally every 12 hours (28 Nov 2018 17:40)  doxazosin 4 mg oral tablet: 1 tab(s) orally once a day (at bedtime) (28 Nov 2018 17:40)  ferrous sulfate 324 mg (65 mg elemental iron) oral delayed release tablet: 1 tab(s) orally once a day (28 Nov 2018 17:40)  furosemide: 60 milligram(s) orally once a day (28 Nov 2018 17:40)  potassium: 5 milliliter(s) orally once a day (28 Nov 2018 17:40)  Sensipar 30 mg oral tablet: 1 tab(s) orally once a day (at bedtime) (28 Nov 2018 17:40)  Sulfatrim Pediatric 200 mg-40 mg/5 mL oral suspension: 15 milliliter(s) orally every 12 hours (28 Nov 2018 17:40)  Vitamin D3 1000 intl units oral capsule: 1 cap(s) orally once a day (28 Nov 2018 17:40)    MEDICATIONS  (STANDING):  BACItracin   Ointment 1 Application(s) Topical daily  carvedilol 12.5 milliGRAM(s) Oral every 12 hours  cholecalciferol 1000 Unit(s) Oral <User Schedule>  cinacalcet 30 milliGRAM(s) Oral <User Schedule>  doxazosin 4 milliGRAM(s) Oral <User Schedule>  ferrous    sulfate 325 milliGRAM(s) Oral <User Schedule>  heparin  Injectable 5000 Unit(s) SubCutaneous every 12 hours  hydrALAZINE 100 milliGRAM(s) Oral every 8 hours  petrolatum white Ointment 1 Application(s) Topical two times a day  sodium chloride 0.9%. 1000 milliLiter(s) (50 mL/Hr) IV Continuous <Continuous>  trimethoprim  40 mG/sulfamethoxazole 200 mG Suspension 15 milliLiter(s) Oral <User Schedule>    MEDICATIONS  (PRN):      REVIEW OF SYSTEMS:  General: NAD   Respiratory: No cough, SOB  Cardiovascular: No CP or Palpitations	  Gastrointestinal: No nausea, Vomiting. No diarrhea  Genitourinary: No urinary complaints	  Musculoskeletal: No new rash or lesions		  all other systems negative    T(F): 98.2 (11-29-18 @ 07:20), Max: 99 (11-28-18 @ 14:53)  HR: 68 (11-29-18 @ 07:20) (60 - 78)  BP: 124/63 (11-29-18 @ 10:50) (95/59 - 168/72)  RR: 19 (11-29-18 @ 07:20) (14 - 20)  SpO2: 90% (11-29-18 @ 10:50) (90% - 95%)  Wt(kg): --    PHYSICAL EXAM:  General: NAD  Respiratory: b/l air entry  Cardiovascular: S1 S2  Gastrointestinal: soft, lower abdominal fullness  Extremities: no edema        11-29    124<L>  |  88<L>  |  72<H>  ----------------------------<  102<H>  3.5   |  22  |  3.00<H>    Ca    6.9<L>      29 Nov 2018 06:48  Phos  4.2     11-29  Mg     1.7     11-29    TPro  5.7<L>  /  Alb  2.2<L>  /  TBili  0.3  /  DBili  x   /  AST  19  /  ALT  13  /  AlkPhos  59  11-29                          7.3    8.35  )-----------( 229      ( 29 Nov 2018 06:48 )             21.8       Potassium, Serum: 3.5 mmol/L (11-29 @ 06:48)  Blood Urea Nitrogen, Serum: 72 mg/dL (11-29 @ 06:48)  Calcium, Total Serum: 6.9 mg/dL (11-29 @ 06:48)  Hemoglobin: 7.3 g/dL (11-29 @ 06:48)      Creatinine, Serum: 3.00 (11-29 @ 06:48)  Creatinine, Serum: 2.90 (11-28 @ 18:53)  Creatinine, Serum: 2.90 (11-28 @ 15:51)        LIVER FUNCTIONS - ( 29 Nov 2018 06:48 )  Alb: 2.2 g/dL / Pro: 5.7 g/dL / ALK PHOS: 59 U/L / ALT: 13 U/L / AST: 19 U/L / GGT: x           CARDIAC MARKERS ( 28 Nov 2018 15:51 )  .043 ng/mL / x     / 31 U/L / x     / 3.2 ng/mL      Creatine Kinase, Serum: 31 U/L (11-28-18 @ 15:51)    < from: Xray Chest 1 View AP/PA (11.28.18 @ 15:27) >  EXAM:  XR CHEST AP OR PA 1V                            PROCEDURE DATE:  11/28/2018          INTERPRETATION:  Clinical information: Shortness of breath    Portable study, 3:19 PM    Comparison exam dated 11/11/2018    Patient tilted towards the left.    Airspace disease left lung base, effusion-consolidation. Airspace disease   right lung base, blunted right costophrenic angle. Heart size appears   enlarged although magnified secondary to portable technique, left cardiac   border obscured by left basilar pathology. Aorta shows atherosclerotic   changes. No acute osseous abnormalities. Calcific bursitis present, left   shoulder. Surgical clips present, right paratracheal region.    IMPRESSION:    See above report    < end of copied text > Patient is a 89y old  Female who presents with a chief complaint of Shortness of breath (29 Nov 2018 09:16)    HPI:  89 year old Female with PMH of diastolic CHF (TTE July 2017 EF 65%), HTN, Multifactorial Anemia of Chronic Disease and Iron Deficiency, recent admission to [facility] for Acute CHF exacerbation in the setting of Hyponatremia and SABRINA (was discharged to home) with course complicated by fevers secondary to UTI, Was taking antibiotics for UTI as an outpatient as per her PMD, taking day 2 of Sulfatrim, presents today with worsening shortness of breath and lethargy for the past few days per her home aid. Patient presents with her daughter who reports her mother is SOB. Since discharge on 11/14/18, patient has been being seen by visiting nurse, and her sodium has been low at home. Her Lasix was held for the past few days, but she has been taking Lasix 60 QD for the past few days. According to her daughter she reports her mother had worsening SOB today which prompted the visit to the ER. Patient admits to fatigue, lethargy, and decreased PO intake, however currently the patient reports she is NOT short of breath. She denies headache, dizziness, syncope, chest pain, palpitations, cough, abdominal pain, nausea, vomiting, diarrhea. Note patient is on antibiotics for a UTI from outpatient. Note patient at baseline is A&Ox2 and ambulates with a walker.     In the ED: temp 99, HR 60, /72, RR 20, SpO2 94% RA, repeat 95% RA. H/H: 8.0/23.5, Na 122, BUN/Cr: 73/2.90, pro-BNP: 87965. Troponin 0.043. UA positive for moderate LEC, 11-25 WBC, 11-25 RBC. Chest x-ray showed airspace disease left lung base, effusion-consolidation. Airspace disease right lung base, blunted right costophrenic angle. Heart size appears enlarged although magnified secondary to portable technique, left cardiac border obscured by left basilar pathology. Aorta shows atherosclerotic changes. No acute osseous abnormalities. Calcific bursitis present, left shoulder. Surgical clips present, right paratracheal region. EKG showed NSR VR 61. Cardiologist Dr. Norris was consulted. (28 Nov 2018 16:51)    Renal consult called for hyponatremia, SABRINA. Pt's daughter at bedside.       PAST MEDICAL HISTORY:  CHF (congestive heart failure)  Anemia, unspecified type  Edema of lower extremity  Heart murmur  Hypertension  Hypercalcemia  Primary hyperparathyroidism      PAST SURGICAL HISTORY:  History of appendectomy  S/P tonsillectomy  H/O parathyroidectomy      FAMILY HISTORY:  No pertinent family history in first degree relatives      SOCIAL HISTORY: No smoking or alcohol use     Allergies    Vasotec (Unknown)    Intolerances      Home Medications:  carvedilol 12.5 mg oral tablet: 1 tab(s) orally every 12 hours (28 Nov 2018 17:40)  doxazosin 4 mg oral tablet: 1 tab(s) orally once a day (at bedtime) (28 Nov 2018 17:40)  ferrous sulfate 324 mg (65 mg elemental iron) oral delayed release tablet: 1 tab(s) orally once a day (28 Nov 2018 17:40)  furosemide: 60 milligram(s) orally once a day (28 Nov 2018 17:40)  potassium: 5 milliliter(s) orally once a day (28 Nov 2018 17:40)  Sensipar 30 mg oral tablet: 1 tab(s) orally once a day (at bedtime) (28 Nov 2018 17:40)  Sulfatrim Pediatric 200 mg-40 mg/5 mL oral suspension: 15 milliliter(s) orally every 12 hours (28 Nov 2018 17:40)  Vitamin D3 1000 intl units oral capsule: 1 cap(s) orally once a day (28 Nov 2018 17:40)    MEDICATIONS  (STANDING):  BACItracin   Ointment 1 Application(s) Topical daily  carvedilol 12.5 milliGRAM(s) Oral every 12 hours  cholecalciferol 1000 Unit(s) Oral <User Schedule>  cinacalcet 30 milliGRAM(s) Oral <User Schedule>  doxazosin 4 milliGRAM(s) Oral <User Schedule>  ferrous    sulfate 325 milliGRAM(s) Oral <User Schedule>  heparin  Injectable 5000 Unit(s) SubCutaneous every 12 hours  hydrALAZINE 100 milliGRAM(s) Oral every 8 hours  petrolatum white Ointment 1 Application(s) Topical two times a day  sodium chloride 0.9%. 1000 milliLiter(s) (50 mL/Hr) IV Continuous <Continuous>  trimethoprim  40 mG/sulfamethoxazole 200 mG Suspension 15 milliLiter(s) Oral <User Schedule>    MEDICATIONS  (PRN):      REVIEW OF SYSTEMS:  General: NAD   Respiratory: No cough, SOB  Cardiovascular: No CP or Palpitations	  Gastrointestinal: No nausea, Vomiting. No diarrhea  Genitourinary: No urinary complaints	  Musculoskeletal: No new rash or lesions		  all other systems negative    T(F): 98.2 (11-29-18 @ 07:20), Max: 99 (11-28-18 @ 14:53)  HR: 68 (11-29-18 @ 07:20) (60 - 78)  BP: 124/63 (11-29-18 @ 10:50) (95/59 - 168/72)  RR: 19 (11-29-18 @ 07:20) (14 - 20)  SpO2: 90% (11-29-18 @ 10:50) (90% - 95%)  Wt(kg): --    PHYSICAL EXAM:  General: NAD  Respiratory: b/l air entry  Cardiovascular: S1 S2  Gastrointestinal: soft, lower abdominal fullness  Extremities: no edema        11-29    124<L>  |  88<L>  |  72<H>  ----------------------------<  102<H>  3.5   |  22  |  3.00<H>    Ca    6.9<L>      29 Nov 2018 06:48  Phos  4.2     11-29  Mg     1.7     11-29    TPro  5.7<L>  /  Alb  2.2<L>  /  TBili  0.3  /  DBili  x   /  AST  19  /  ALT  13  /  AlkPhos  59  11-29                          7.3    8.35  )-----------( 229      ( 29 Nov 2018 06:48 )             21.8       Potassium, Serum: 3.5 mmol/L (11-29 @ 06:48)  Blood Urea Nitrogen, Serum: 72 mg/dL (11-29 @ 06:48)  Calcium, Total Serum: 6.9 mg/dL (11-29 @ 06:48)  Hemoglobin: 7.3 g/dL (11-29 @ 06:48)      Creatinine, Serum: 3.00 (11-29 @ 06:48)  Creatinine, Serum: 2.90 (11-28 @ 18:53)  Creatinine, Serum: 2.90 (11-28 @ 15:51)        LIVER FUNCTIONS - ( 29 Nov 2018 06:48 )  Alb: 2.2 g/dL / Pro: 5.7 g/dL / ALK PHOS: 59 U/L / ALT: 13 U/L / AST: 19 U/L / GGT: x           CARDIAC MARKERS ( 28 Nov 2018 15:51 )  .043 ng/mL / x     / 31 U/L / x     / 3.2 ng/mL      Creatine Kinase, Serum: 31 U/L (11-28-18 @ 15:51)    < from: Xray Chest 1 View AP/PA (11.28.18 @ 15:27) >  EXAM:  XR CHEST AP OR PA 1V                            PROCEDURE DATE:  11/28/2018          INTERPRETATION:  Clinical information: Shortness of breath    Portable study, 3:19 PM    Comparison exam dated 11/11/2018    Patient tilted towards the left.    Airspace disease left lung base, effusion-consolidation. Airspace disease   right lung base, blunted right costophrenic angle. Heart size appears   enlarged although magnified secondary to portable technique, left cardiac   border obscured by left basilar pathology. Aorta shows atherosclerotic   changes. No acute osseous abnormalities. Calcific bursitis present, left   shoulder. Surgical clips present, right paratracheal region.    IMPRESSION:    See above report    < end of copied text >

## 2018-11-29 NOTE — DISCHARGE NOTE ADULT - PATIENT PORTAL LINK FT
You can access the [facility] Patient Portal, offered by [facility], by registering with the following website: [email or web address]

## 2018-11-29 NOTE — PROGRESS NOTE ADULT - SUBJECTIVE AND OBJECTIVE BOX
[facility] -- Christofer Isaacs, Marisa Rowell, Hieu Norris Savella  Office # [phone]      Follow Up:  CHF    Subjective/Observations: Patient seen and examined. Events noted. Resting  in bed. mildly lethargic. No complaints of chest pain, dyspnea, or palpitations reported.        REVIEW OF SYSTEMS: Limited 2/2 comorbidities     PAST MEDICAL & SURGICAL HISTORY:  Primary hyperparathyroidism  CHF (congestive heart failure)  Anemia, unspecified type  Edema of lower extremity  Heart murmur  Hypertension  Hypercalcemia  History of appendectomy  S/P tonsillectomy  H/O parathyroidectomy      MEDICATIONS  (STANDING):  BACItracin   Ointment 1 Application(s) Topical daily  carvedilol 12.5 milliGRAM(s) Oral every 12 hours  cholecalciferol 1000 Unit(s) Oral <User Schedule>  cinacalcet 30 milliGRAM(s) Oral <User Schedule>  doxazosin 4 milliGRAM(s) Oral <User Schedule>  ferrous    sulfate 325 milliGRAM(s) Oral <User Schedule>  heparin  Injectable 5000 Unit(s) SubCutaneous every 12 hours  hydrALAZINE 100 milliGRAM(s) Oral every 8 hours  petrolatum white Ointment 1 Application(s) Topical two times a day  sodium chloride 2 Gram(s) Oral once  sodium chloride 0.9%. 1000 milliLiter(s) (50 mL/Hr) IV Continuous <Continuous>  trimethoprim  40 mG/sulfamethoxazole 200 mG Suspension 15 milliLiter(s) Oral <User Schedule>    MEDICATIONS  (PRN):      Allergies    Vasotec (Unknown)    Intolerances            Vital Signs Last 24 Hrs  T(C): 36.7 (29 Nov 2018 12:01), Max: 37.2 (28 Nov 2018 14:53)  T(F): 98.1 (29 Nov 2018 12:01), Max: 99 (28 Nov 2018 14:53)  HR: 68 (29 Nov 2018 12:01) (60 - 78)  BP: 142/65 (29 Nov 2018 12:01) (95/59 - 168/72)  BP(mean): --  RR: 18 (29 Nov 2018 12:01) (14 - 20)  SpO2: 93% (29 Nov 2018 12:01) (90% - 95%)    I&O's Summary    Weight (kg): 48.5 (11-28 @ 14:53)    PHYSICAL EXAM:  TELE: SR helton  Constitutional: NAD, awake    HEENT: Moist Mucous Membranes, Anicteric  Pulmonary: Decreased breath sounds b/l. No rales, crackles or wheeze appreciated.   Cardiovascular: Regular, S1 and S2, 2/6 SM  Gastrointestinal: Bowel Sounds present, soft, nontender.   Lymph: trace peripheral edema. No lymphadenopathy.  Skin: No visible rashes or ulcers.  Psych:  Mood & affect appropriate for situation    LABS: All Labs Reviewed:                        7.3    8.35  )-----------( 229      ( 29 Nov 2018 06:48 )             21.8                         7.9    8.32  )-----------( 254      ( 28 Nov 2018 18:53 )             23.1                         8.0    9.65  )-----------( 269      ( 28 Nov 2018 15:51 )             23.5     29 Nov 2018 06:48    124    |  88     |  72     ----------------------------<  102    3.5     |  22     |  3.00   28 Nov 2018 18:53    122    |  84     |  72     ----------------------------<  95     3.6     |  23     |  2.90   28 Nov 2018 15:51    122    |  85     |  73     ----------------------------<  103    3.9     |  24     |  2.90     Ca    6.9        29 Nov 2018 06:48  Ca    7.0        28 Nov 2018 18:53  Ca    7.2        28 Nov 2018 15:51  Phos  4.2       29 Nov 2018 06:48  Mg     1.7       29 Nov 2018 06:48    TPro  5.7    /  Alb  2.2    /  TBili  0.3    /  DBili  x      /  AST  19     /  ALT  13     /  AlkPhos  59     29 Nov 2018 06:48  TPro  6.2    /  Alb  2.4    /  TBili  0.3    /  DBili  x      /  AST  22     /  ALT  16     /  AlkPhos  67     28 Nov 2018 18:53  TPro  6.3    /  Alb  2.5    /  TBili  0.3    /  DBili  x      /  AST  16     /  ALT  16     /  AlkPhos  72     28 Nov 2018 15:51    PT/INR - ( 28 Nov 2018 15:51 )   PT: 12.8 sec;   INR: 1.12 ratio         PTT - ( 28 Nov 2018 15:51 )  PTT:31.7 sec  CARDIAC MARKERS ( 28 Nov 2018 15:51 )  .043 ng/mL / x     / 31 U/L / x     / 3.2 ng/mL

## 2018-11-29 NOTE — CHART NOTE - NSCHARTNOTEFT_GEN_A_CORE
Bladder scan > 999 cc. Will place abbott. Pt with recurrent urinary retention.    evaluation. D/w pt's daughter.

## 2018-11-29 NOTE — PROGRESS NOTE ADULT - ASSESSMENT
89F with PMH of diastolic CHF (TTE July 2017 EF 65%), HTN, anemia who was brought to the ED with increased dyspnea per her home aid.  She is found to have hyponatremia and SABRINA.  There is no evidence of acute ischemia, acute heart failure, or uncontrolled arrhythmia.    Recommend:  - She still appears to be dry  - She has worsening anemia today. I agree with PRBC  - Will give 1/2 units and assess volume status in between units.     - Please continue to maintain strict I/Os, monitor daily weights, Cr, and K.   - I would hold diuretics for now  - renal f/u     - BP labile but controlled.   - Continue carvedilol and hydralazine at home doses with hold parameters  -No need to repeat echocardiogram  - To follow closely with you while admitted.

## 2018-11-30 DIAGNOSIS — R33.9 RETENTION OF URINE, UNSPECIFIED: ICD-10-CM

## 2018-11-30 LAB
ALBUMIN SERPL ELPH-MCNC: 2.3 G/DL — LOW (ref 3.3–5)
ALP SERPL-CCNC: 60 U/L — SIGNIFICANT CHANGE UP (ref 40–120)
ALT FLD-CCNC: 13 U/L — SIGNIFICANT CHANGE UP (ref 12–78)
ANION GAP SERPL CALC-SCNC: 11 MMOL/L — SIGNIFICANT CHANGE UP (ref 5–17)
AST SERPL-CCNC: 22 U/L — SIGNIFICANT CHANGE UP (ref 15–37)
BASOPHILS # BLD AUTO: 0.02 K/UL — SIGNIFICANT CHANGE UP (ref 0–0.2)
BASOPHILS NFR BLD AUTO: 0.2 % — SIGNIFICANT CHANGE UP (ref 0–2)
BILIRUB SERPL-MCNC: 0.4 MG/DL — SIGNIFICANT CHANGE UP (ref 0.2–1.2)
BUN SERPL-MCNC: 72 MG/DL — HIGH (ref 7–23)
CALCIUM SERPL-MCNC: 7.1 MG/DL — LOW (ref 8.4–10.5)
CALCIUM SERPL-MCNC: 7.2 MG/DL — LOW (ref 8.5–10.1)
CHLORIDE SERPL-SCNC: 91 MMOL/L — LOW (ref 96–108)
CO2 SERPL-SCNC: 25 MMOL/L — SIGNIFICANT CHANGE UP (ref 22–31)
CREAT SERPL-MCNC: 3.2 MG/DL — HIGH (ref 0.5–1.3)
EOSINOPHIL # BLD AUTO: 0.05 K/UL — SIGNIFICANT CHANGE UP (ref 0–0.5)
EOSINOPHIL NFR BLD AUTO: 0.6 % — SIGNIFICANT CHANGE UP (ref 0–6)
GLUCOSE SERPL-MCNC: 97 MG/DL — SIGNIFICANT CHANGE UP (ref 70–99)
HCT VFR BLD CALC: 25.8 % — LOW (ref 34.5–45)
HGB BLD-MCNC: 9 G/DL — LOW (ref 11.5–15.5)
IMM GRANULOCYTES NFR BLD AUTO: 0.8 % — SIGNIFICANT CHANGE UP (ref 0–1.5)
LYMPHOCYTES # BLD AUTO: 0.6 K/UL — LOW (ref 1–3.3)
LYMPHOCYTES # BLD AUTO: 6.8 % — LOW (ref 13–44)
MAGNESIUM SERPL-MCNC: 1.8 MG/DL — SIGNIFICANT CHANGE UP (ref 1.6–2.6)
MCHC RBC-ENTMCNC: 27.7 PG — SIGNIFICANT CHANGE UP (ref 27–34)
MCHC RBC-ENTMCNC: 34.9 GM/DL — SIGNIFICANT CHANGE UP (ref 32–36)
MCV RBC AUTO: 79.4 FL — LOW (ref 80–100)
MONOCYTES # BLD AUTO: 0.33 K/UL — SIGNIFICANT CHANGE UP (ref 0–0.9)
MONOCYTES NFR BLD AUTO: 3.7 % — SIGNIFICANT CHANGE UP (ref 2–14)
NEUTROPHILS # BLD AUTO: 7.76 K/UL — HIGH (ref 1.8–7.4)
NEUTROPHILS NFR BLD AUTO: 87.9 % — HIGH (ref 43–77)
OSMOLALITY UR: 232 MOS/KG — SIGNIFICANT CHANGE UP (ref 50–1200)
PHOSPHATE SERPL-MCNC: 4.4 MG/DL — SIGNIFICANT CHANGE UP (ref 2.5–4.5)
PLATELET # BLD AUTO: 213 K/UL — SIGNIFICANT CHANGE UP (ref 150–400)
POTASSIUM SERPL-MCNC: 3.5 MMOL/L — SIGNIFICANT CHANGE UP (ref 3.5–5.3)
POTASSIUM SERPL-SCNC: 3.5 MMOL/L — SIGNIFICANT CHANGE UP (ref 3.5–5.3)
PROT SERPL-MCNC: 5.8 G/DL — LOW (ref 6–8.3)
PTH-INTACT FLD-MCNC: 272 PG/ML — HIGH (ref 15–65)
RBC # BLD: 3.25 M/UL — LOW (ref 3.8–5.2)
RBC # FLD: 16 % — HIGH (ref 10.3–14.5)
SODIUM SERPL-SCNC: 127 MMOL/L — LOW (ref 135–145)
WBC # BLD: 8.83 K/UL — SIGNIFICANT CHANGE UP (ref 3.8–10.5)
WBC # FLD AUTO: 8.83 K/UL — SIGNIFICANT CHANGE UP (ref 3.8–10.5)

## 2018-11-30 PROCEDURE — 99232 SBSQ HOSP IP/OBS MODERATE 35: CPT

## 2018-11-30 RX ORDER — SODIUM CHLORIDE 9 MG/ML
1000 INJECTION INTRAMUSCULAR; INTRAVENOUS; SUBCUTANEOUS
Qty: 0 | Refills: 0 | Status: DISCONTINUED | OUTPATIENT
Start: 2018-11-30 | End: 2018-11-30

## 2018-11-30 RX ORDER — POTASSIUM CHLORIDE 20 MEQ
20 PACKET (EA) ORAL ONCE
Qty: 0 | Refills: 0 | Status: COMPLETED | OUTPATIENT
Start: 2018-11-30 | End: 2018-11-30

## 2018-11-30 RX ORDER — POTASSIUM CHLORIDE 20 MEQ
20 PACKET (EA) ORAL ONCE
Qty: 0 | Refills: 0 | Status: DISCONTINUED | OUTPATIENT
Start: 2018-11-30 | End: 2018-11-30

## 2018-11-30 RX ADMIN — Medication 15 MILLILITER(S): at 05:36

## 2018-11-30 RX ADMIN — Medication 100 MILLIGRAM(S): at 14:25

## 2018-11-30 RX ADMIN — Medication 20 MILLIEQUIVALENT(S): at 11:44

## 2018-11-30 RX ADMIN — Medication 1000 UNIT(S): at 14:25

## 2018-11-30 RX ADMIN — Medication 1 APPLICATION(S): at 05:35

## 2018-11-30 RX ADMIN — HEPARIN SODIUM 5000 UNIT(S): 5000 INJECTION INTRAVENOUS; SUBCUTANEOUS at 05:35

## 2018-11-30 RX ADMIN — CARVEDILOL PHOSPHATE 12.5 MILLIGRAM(S): 80 CAPSULE, EXTENDED RELEASE ORAL at 05:35

## 2018-11-30 RX ADMIN — Medication 1 APPLICATION(S): at 11:44

## 2018-11-30 RX ADMIN — Medication 1 APPLICATION(S): at 17:19

## 2018-11-30 RX ADMIN — Medication 100 MILLIGRAM(S): at 05:35

## 2018-11-30 RX ADMIN — CINACALCET 30 MILLIGRAM(S): 30 TABLET, FILM COATED ORAL at 21:04

## 2018-11-30 RX ADMIN — Medication 15 MILLILITER(S): at 18:26

## 2018-11-30 RX ADMIN — CARVEDILOL PHOSPHATE 12.5 MILLIGRAM(S): 80 CAPSULE, EXTENDED RELEASE ORAL at 17:19

## 2018-11-30 RX ADMIN — Medication 100 MILLIGRAM(S): at 21:04

## 2018-11-30 RX ADMIN — HEPARIN SODIUM 5000 UNIT(S): 5000 INJECTION INTRAVENOUS; SUBCUTANEOUS at 17:19

## 2018-11-30 RX ADMIN — Medication 325 MILLIGRAM(S): at 08:08

## 2018-11-30 RX ADMIN — Medication 4 MILLIGRAM(S): at 21:04

## 2018-11-30 NOTE — DIETITIAN INITIAL EVALUATION ADULT. - OTHER INFO
Dtr reports wt hx of ~114# maybe 4 months ago.  Went down to 108#, now 106# per dtr last admission.  Per MD, pt not currently in CHF, rather is dehydrated and with SABRINA.  Received 2  Half units of PRBC (1 U) 11/29.   NFPE performed, pt shows mild temporal wasting, prominent clavicle and scapula.  7% wt loss past 4 months, and with intake of <75% of estd needs for > 1 month.  Pt can be classified to have moderate malnutrition in setting of chronic illness.

## 2018-11-30 NOTE — CHART NOTE - NSCHARTNOTEFT_GEN_A_CORE
Upon Nutritional Assessment by the Registered Dietitian your patient was determined to meet criteria / has evidence of the following diagnosis/diagnoses:          [ ]  Mild Protein Calorie Malnutrition        [x]  Moderate Protein Calorie Malnutrition        [ ] Severe Protein Calorie Malnutrition        [ ] Unspecified Protein Calorie Malnutrition        [ ] Underweight / BMI <19        [ ] Morbid Obesity / BMI > 40      Findings as based on:  •  Comprehensive nutrition assessment and consultation     Nutrition Focused Physical Examination performed 11/30/18.  Pt with mild temporal wasting, prominent clavicle and scapular bones.  PO intake of <75% of estimated needs for >1 months and an approximate wt loss of 7% x 4 months meets criteria for moderate malnutrition of chronic disease.       Treatment:    The following diet has been recommended:  Add Ensure Enlive 8oz bid, daily MVI, Vit C 500mg bid    PROVIDER Section:     By signing this assessment you are acknowledging and agree with the diagnosis/diagnoses assigned by the Registered Dietitian    Comments:

## 2018-11-30 NOTE — PROGRESS NOTE ADULT - PROBLEM SELECTOR PLAN 4
-Not new bilateral pleural effusions compared with prior admission CXR two weeks ago, pt not in acute CHF or volume overloaded, seen by cardiology.  -Will monitor continuous pulse ox   patient stable on RA, spo2 94%   -Will hold off abx for now  -Denies recent sick contacts -Not new bilateral pleural effusions compared with prior admission CXR two weeks ago, pt not in acute CHF or volume overloaded, seen by cardiology.  -Will monitor continuous pulse ox   patient stable on RA, spo2 94%   -Will hold off abx for now   -Denies recent sick contacts

## 2018-11-30 NOTE — PROGRESS NOTE ADULT - SUBJECTIVE AND OBJECTIVE BOX
Patient is a 89y old  Female who presents with a chief complaint of Shortness of breath (28 Nov 2018 16:51)      FROM ADMISSION H+P:   HPI:  89 year old Female with PMH of diastolic CHF (TTE July 2017 EF 65%), HTN, Multifactorial Anemia of Chronic Disease and Iron Deficiency, recent admission to [facility] for Acute CHF exacerbation in the setting of Hyponatremia and SABRINA (was discharged to home) with course complicated by fevers secondary to UTI, Was taking antibiotics for UTI as an outpatient as per her PMD, taking day 2 of Sulfatrim, presents today with worsening shortness of breath and lethargy for the past few days per her home aid. Patient presents with her daughter who reports her mother is SOB. Since discharge on 11/14/18, patient has been being seen by visiting nurse, and her sodium has been low at home. Her Lasix was held for the past few days, but she has been taking Lasix 60 QD for the past few days. According to her daughter she reports her mother had worsening SOB yesterday which prompted the visit to the ER. Patient admits to fatigue, lethargy, and decreased PO intake, however currently the patient reports she is NOT short of breath. She denies headache, dizziness, syncope, chest pain, palpitations, cough, abdominal pain, nausea, vomiting, diarrhea. Note patient is on antibiotics for a UTI from outpatient. Note patient at baseline is A&Ox2 and ambulates with a walker.     In the ED: temp 99, HR 60, /72, RR 20, SpO2 94% RA, repeat 95% RA. H/H: 8.0/23.5, Na 122, BUN/Cr: 73/2.90, pro-BNP: 98493. Troponin 0.043. UA positive for moderate LEC, 11-25 WBC, 11-25 RBC. Chest x-ray showed airspace disease left lung base, effusion-consolidation. Airspace disease right lung base, blunted right costophrenic angle. Heart size appears enlarged although magnified secondary to portable technique, left cardiac border obscured by left basilar pathology. Aorta shows atherosclerotic changes. No acute osseous abnormalities. Calcific bursitis present, left shoulder. Surgical clips present, right paratracheal region. EKG showed NSR VR 61. Cardiologist Dr. Norris was consulted. (28 Nov 2018 16:51)    ----  INTERVAL HPI/OVERNIGHT EVENTS: Pt seen and evaluated at the bedside this morning. Daughter was at bedside as well. Patient has no acute complaints this morning. Patient states she feels better than yesterday. Patient denies headache, vision changes, chest pain, palpitations, SOB, N/V, diarrhea, constipation, numbness, tingling, rectal bleeding, blood in stool. Of note, patient had a abbott placed in yesterday from her urinary retention.      ----  PAST MEDICAL & SURGICAL HISTORY:  CHF (congestive heart failure)  Anemia, unspecified type  Edema of lower extremity  Heart murmur  Hypertension  Hypercalcemia  History of appendectomy  S/P tonsillectomy  H/O parathyroidectomy      FAMILY HISTORY:  No pertinent family history in first degree relatives  ----  MEDICATIONS  (STANDING):  carvedilol 12.5 milliGRAM(s) Oral every 12 hours  cholecalciferol 1000 Unit(s) Oral <User Schedule>  cinacalcet 30 milliGRAM(s) Oral <User Schedule>  doxazosin 4 milliGRAM(s) Oral <User Schedule>  ferrous    sulfate 325 milliGRAM(s) Oral <User Schedule>  heparin  Injectable 5000 Unit(s) SubCutaneous every 12 hours  hydrALAZINE 100 milliGRAM(s) Oral every 8 hours  sodium chloride 0.9%. 1000 milliLiter(s) (50 mL/Hr) IV Continuous <Continuous>  trimethoprim  40 mG/sulfamethoxazole 200 mG Suspension 15 milliLiter(s) Oral <User Schedule>    MEDICATIONS  (PRN):    ----  REVIEW OF SYSTEMS: all ROS negative except for that noted in interval HPI  CONSTITUTIONAL: denies fever, chills, + fatigue, + generalized weakness  HEENT: denies blurred vision, sore throat  SKIN: denies new lesions, rash  CARDIOVASCULAR: denies chest pain, chest pressure, palpitations  RESPIRATORY: denies shortness of breath, sputum production  GASTROINTESTINAL: denies nausea, vomiting, diarrhea, abdominal pain  GENITOURINARY: denies dysuria, discharge  NEUROLOGICAL: denies numbness, headache, focal weakness  MUSCULOSKELETAL: denies new joint pain, muscle aches  HEMATOLOGIC: denies gross bleeding, bruising  LYMPHATICS: denies enlarged lymph nodes, extremity swelling  PSYCHIATRIC: denies recent changes in anxiety, depression  ENDOCRINOLOGIC: denies sweating, cold or heat intolerance    ----  PHYSICAL EXAM:  GENERAL: patient appears tired, no acute distress  EYES: sclera clear, no exudates, conjunctiva pink b/l  ENMT: oropharynx clear without erythema, no exudates, moist mucous membranes  MOUTH: cold sore on left lower lip. Crusted over. Not bleeding.   NECK: supple, soft, no thyromegaly noted  LUNGS: good air entry bilaterally, clear to auscultation, symmetric breath sounds, no wheezing or rhonchi appreciated  HEART: soft S1/S2, regular rate and rhythm, no murmurs noted, no lower extremity edema  GASTROINTESTINAL: abdomen is soft, nontender, slightly distended, normoactive bowel sounds, no palpable masses  INTEGUMENT: good skin turgor, no lesions noted  MUSCULOSKELETAL: no clubbing or cyanosis, no obvious deformity  NEUROLOGIC: awake, alert, oriented x3, no obvious sensory deficits  PSYCHIATRIC: mood is good, affect is congruent    Vital Signs Last 24 Hrs  T(C): 36.8 (30 Nov 2018 08:20), Max: 36.9 (29 Nov 2018 14:10)  T(F): 98.2 (30 Nov 2018 08:20), Max: 98.5 (29 Nov 2018 14:10)  HR: 78 (30 Nov 2018 08:20) (63 - 78)  BP: 153/68 (30 Nov 2018 08:20) (124/63 - 157/62)  BP(mean): --  RR: 17 (30 Nov 2018 08:20) (17 - 18)  SpO2: 93% (30 Nov 2018 08:20) (90% - 93%)    ----  I&O's Summary    LABS:                        9.0    8.83  )-----------( 213      ( 30 Nov 2018 07:46 )             25.8     11-30    127<L>  |  91<L>  |  72<H>  ----------------------------<  97  3.5   |  25  |  3.20<H>    Ca    7.2<L>      30 Nov 2018 07:46  Phos  4.4     11-30  Mg     1.8     11-30    TPro  5.8<L>  /  Alb  2.3<L>  /  TBili  0.4  /  DBili  x   /  AST  22  /  ALT  13  /  AlkPhos  60  11-30    LIVER FUNCTIONS - ( 30 Nov 2018 07:46 )  Alb: 2.3 g/dL / Pro: 5.8 g/dL / ALK PHOS: 60 U/L / ALT: 13 U/L / AST: 22 U/L / GGT: x           PT/INR - ( 28 Nov 2018 15:51 )   PT: 12.8 sec;   INR: 1.12 ratio         PTT - ( 28 Nov 2018 15:51 )  PTT:31.7 sec    ----  Personally reviewed:  Vital sign trends: [ x ] yes    [  ] no     [  ] n/a  Laboratory results: [ x ] yes    [  ] no     [  ] n/a  Radiology results: [ x ] yes    [  ] no     [  ] n/a  Culture results: [  ] yes    [  ] no     [  ] n/a  Consultant recommendations: [ x ] yes    [  ] no     [  ] n/a

## 2018-11-30 NOTE — PROGRESS NOTE ADULT - ASSESSMENT
89F with PMH of diastolic CHF (TTE July 2017 EF 65%), HTN, anemia who was brought to the ED with increased dyspnea per her home aid.  She is found to have hyponatremia and SABRINA.  There is no evidence of acute ischemia, acute heart failure, or uncontrolled arrhythmia.    Recommend:  - She has decreased breath sounds and mild crackles bibasilar s/p 1 Unit PRBC divided yesterday  - H/H responded appropriately  - Does not appear overloaded monitor closely.   Neg 400 in 24 hours.      - Please continue to maintain strict I/Os, monitor daily weights, Cr,  K and Mag.  NSVT on tele cont BB replace K and Mag as needed  - I would hold diuretics for now.  Pt with worsening SABRINA.  Ramos placed yesterday for urinary retention  - Avoid Nephrotoxic agents.    - Hyponatremia improving  - renal f/u     - BP labile but controlled.   - Continue carvedilol and hydralazine at home doses with hold parameters  - No need to repeat echocardiogram  - To follow closely with you while admitted.    Arlene Dubose, NP-C  Cardiology

## 2018-11-30 NOTE — PROGRESS NOTE ADULT - ASSESSMENT
[age] white female with multiple medical problems including possible UTI now with SABRINA possibly due to urinary retention complicated by bactrim. Ramos removed at the request of the dtr.   Spoke to patient's dtr at bed side and she is planning to place the patient on comfort care and hospice. spoke to PMD and house staff.

## 2018-11-30 NOTE — DIETITIAN INITIAL EVALUATION ADULT. - PROBLEM SELECTOR PLAN 3
Chronic history, sees Dr. Temple as an outpatient, likely anemia of chronic disease v iron def anemia. However given SOB, need to consider giving   -STAT FOBT  -Will continue to monitor.   -Stat type and screen  -Continue home iron  -Dr. Temple, hematologist, consulted

## 2018-11-30 NOTE — PROGRESS NOTE ADULT - PROBLEM SELECTOR PLAN 3
Chronic history, sees Dr. Temple as an outpatient, likely anemia of chronic disease v iron def anemia.   -f/u FOBT  -Will continue to monitor.   -type and screen  -Continue home iron  -Dr. Temple, hematologist, consulted. 1 unit of blood to be given this AM. 1/2 unit to be given first and then reassessed with physical exam to determine if additional 1/2 unit needs to be given/ if lasix is needed. Chronic history, sees Dr. Temple as an outpatient, likely anemia of chronic disease v iron def anemia.   -f/u FOBT  -Will continue to monitor.   -type and screen  -Continue home iron  -Dr. Temple, hematologist, consulted. 1 unit of blood given 11/30 by 1/2 units at a time to evaluate for fluid overload. Patient's H/H 1 hour after unit administration 9.4/27.3.  morning H/H 9.0/25.8

## 2018-11-30 NOTE — PROGRESS NOTE ADULT - PROBLEM SELECTOR PLAN 9
discussed advance care planning w family  pt is dnr  appropriate for hospice as well  family declined home hospice last admission

## 2018-11-30 NOTE — PROGRESS NOTE ADULT - PROBLEM SELECTOR PLAN 2
Cr elevated at 3.0, likely due to Lasix use and dehydration  -s/p gentle dehydration NS 50ml/hr stop after 12 hours  -Holding diuretics  -STAT repeat BMP  -Follow up AM BMP  -Dr. Eaton following, bladder scan showing 900+, abbott to be placed today.  monitor urine output Cr elevated at 3.2, likely due to Lasix use and dehydration  -s/p gentle dehydration NS 50ml/hr stop after 12 hours  -Holding diuretics as per cardiology  -STAT repeat BMP  -Follow up AM BMP  -Dr. Eaton following, bladder scan showing 900+, abbott placed 11/29.  continue to monitor urine output

## 2018-11-30 NOTE — PROGRESS NOTE ADULT - PROBLEM SELECTOR PLAN 1
-slight improvement from 124 to 127, Likely due to dehydration/decreased PO intake and diuretic use  -despite hx of CHF, patient clinically appears dry and is presenting with SABRINA  -s/p gentle hydration NS 50ml/hr, cardio recs appreciated.   -nephrology, Dr. Eaton aware, will also need to avoid rapid correction. Hold diuretics.  -Monitor BMP  -STAT repeat BMP  -Check urine lytes and osm  -Strict Is and Os  -AM labs  -Nephrology consult Dr. Eaton  -Fall precautions  -Ambulate w assist  -SCD for dvt ppx  -Dr. Norris following

## 2018-11-30 NOTE — PROGRESS NOTE ADULT - PROBLEM SELECTOR PLAN 7
patient takes Doxazosin outpatient for her retention  Dr. Eaton (nephrologist) following  recommended bladder scan  on bladder scan, patient was found to have +900  abbott placed 700 output daily 11/30  Dr. Eaton- recs appreciated on removal of Abbott when deemed appropriate  urology consulted- recs appreciated patient takes Doxazosin outpatient for her retention  Dr. Eaton (nephrologist) following  recommended bladder scan on 11/29  on bladder scan, patient was found to have +900 on 11/29  abbott placed on 11/29  700 output daily 11/30  urology consulted and agreed with bladder scan in the AM.   spoke with Dr. Eaton 11/30 and he recommended to D/C Abbott and f/u bladder scan in the AM (12/1)

## 2018-11-30 NOTE — PROGRESS NOTE ADULT - SUBJECTIVE AND OBJECTIVE BOX
[INTERVAL HX: ]  Patient seen and examined;  Chart reviewed and events noted;   weak appearing, denies pain, denies abd pain  Cr worse    MEDICATIONS  (STANDING):  BACItracin   Ointment 1 Application(s) Topical daily  carvedilol 12.5 milliGRAM(s) Oral every 12 hours  cholecalciferol 1000 Unit(s) Oral <User Schedule>  cinacalcet 30 milliGRAM(s) Oral <User Schedule>  doxazosin 4 milliGRAM(s) Oral <User Schedule>  ferrous    sulfate 325 milliGRAM(s) Oral <User Schedule>  heparin  Injectable 5000 Unit(s) SubCutaneous every 12 hours  hydrALAZINE 100 milliGRAM(s) Oral every 8 hours  petrolatum white Ointment 1 Application(s) Topical two times a day  sodium chloride 0.9%. 1000 milliLiter(s) (50 mL/Hr) IV Continuous <Continuous>  trimethoprim  40 mG/sulfamethoxazole 200 mG Suspension 15 milliLiter(s) Oral <User Schedule>    MEDICATIONS  (PRN):      Vital Signs Last 24 Hrs  T(C): 36.8 (30 Nov 2018 11:25), Max: 36.9 (29 Nov 2018 14:10)  T(F): 98.3 (30 Nov 2018 11:25), Max: 98.5 (29 Nov 2018 14:10)  HR: 72 (30 Nov 2018 11:25) (63 - 78)  BP: 162/68 (30 Nov 2018 11:25) (132/62 - 162/68)  BP(mean): --  RR: 17 (30 Nov 2018 11:25) (17 - 18)  SpO2: 91% (30 Nov 2018 11:25) (91% - 93%)    [PHYSICAL EXAM]  General: adult in NAD,  WN,  WD, elderly frail appearing.   HEENT: clear oropharynx, anicteric sclera, pink conjunctivae.  Neck: supple, no masses.  CV: normal S1S2, no murmur, no rubs, no gallops.  Lungs: clear to auscultation, no wheezes, no rales, no rhonchi.  Abdomen: soft, non-tender, non-distended, no hepatosplenomegaly, normal BS, no guarding.  Ext: no clubbing, no cyanosis, no edema.  Skin: no rashes,  no petechiae, no venous stasis changes.  Neuro: alert and oriented X2, no focal motor deficits.  LN: no EDISON.      [LABS:]                        9.0    8.83  )-----------( 213      ( 30 Nov 2018 07:46 )             25.8     11-30    127<L>  |  91<L>  |  72<H>  ----------------------------<  97  3.5   |  25  |  3.20<H>    Ca    7.2<L>      30 Nov 2018 07:46  Phos  4.4     11-30  Mg     1.8     11-30    TPro  5.8<L>  /  Alb  2.3<L>  /  TBili  0.4  /  DBili  x   /  AST  22  /  ALT  13  /  AlkPhos  60  11-30    PT/INR - ( 28 Nov 2018 15:51 )   PT: 12.8 sec;   INR: 1.12 ratio         PTT - ( 28 Nov 2018 15:51 )  PTT:31.7 sec      [RADIOLOGY STUDIES:]

## 2018-11-30 NOTE — DIETITIAN INITIAL EVALUATION ADULT. - PROBLEM SELECTOR PLAN 4
-Not new bilateral pleural effusions compared with prior admission CXR two weeks ago, pt not in acute CHF or volume overloaded, seen by cardiology.  -Will monitor continuous pulse ox   -Will hold off abx for now  -Denies recent sick contacts

## 2018-11-30 NOTE — PROGRESS NOTE ADULT - ASSESSMENT
Multifactorial anemia related to probable GI blood loss and renal insufficiency. Course also complicated by CHF and deteriorating cardiac status  s/p 1U split PRBC txfusion yesterday. Mild bi-basilar crackles.   Now with ARF/SABRINA. Cr worsening over last 2-3days.     Recommendations:  Dr Temple discussed with daughter 11/29/18. Patient continues to have slowly deteriorating status.  Agree with DNR/DNI.    if renal function continues to deteriorates and Na not improve will continue palliative care with discharge home with hospice. patient wishes to have her at home.   Cr worsening, re-eval tomorrow.  further heme recommendations pending above.

## 2018-11-30 NOTE — CONSULT NOTE ADULT - ASSESSMENT
This patient was seen by us on 11/13/18 for acute on chronic urinary retention and possible uti.  Abbott had been removed, and was followed with straight cath pvr's.  On this admission, pt is hyonatremic with elev pr and renal insuff.  Abbott was removed today at request of the family.  Rec follow with bladder scans; if cret rises, would strongly urge abbott to be reinserted.    Thank you.

## 2018-11-30 NOTE — PROGRESS NOTE ADULT - SUBJECTIVE AND OBJECTIVE BOX
SAIRA SANTANA  89y  Female    Patient is a 89y old  Female who presents with a chief complaint of Shortness of breath (30 Nov 2018 15:47)      lethargic   pateint's dtr at bed side  abbott catheter removed at the request of dtr.    PAST MEDICAL & SURGICAL HISTORY:  Primary hyperparathyroidism  CHF (congestive heart failure)  Anemia, unspecified type  Edema of lower extremity  Heart murmur  Hypertension  Hypercalcemia  History of appendectomy  S/P tonsillectomy  H/O parathyroidectomy          PHYSICAL EXAM:    T(C): 36.8 (11-30-18 @ 11:25), Max: 36.8 (11-29-18 @ 19:53)  HR: 72 (11-30-18 @ 11:25) (65 - 78)  BP: 162/68 (11-30-18 @ 11:25) (132/62 - 162/68)  RR: 17 (11-30-18 @ 11:25) (17 - 18)  SpO2: 91% (11-30-18 @ 11:25) (91% - 93%)  Wt(kg): --    I&O's Detail    29 Nov 2018 07:01  -  30 Nov 2018 07:00  --------------------------------------------------------  IN:    Oral Fluid: 300 mL  Total IN: 300 mL    OUT:    Indwelling Catheter - Urethral: 700 mL  Total OUT: 700 mL    Total NET: -400 mL      30 Nov 2018 07:01  -  30 Nov 2018 16:37  --------------------------------------------------------  IN:  Total IN: 0 mL    OUT:    Indwelling Catheter - Urethral: 400 mL  Total OUT: 400 mL    Total NET: -400 mL          Respiratory: clear anteriorly, decreased BS at bases  Cardiovascular: S1 S2  Gastrointestinal: soft NT ND +BS  Extremities: no edema   Neuro: lethargic    MEDICATIONS  (STANDING):  BACItracin   Ointment 1 Application(s) Topical daily  carvedilol 12.5 milliGRAM(s) Oral every 12 hours  cholecalciferol 1000 Unit(s) Oral <User Schedule>  cinacalcet 30 milliGRAM(s) Oral <User Schedule>  doxazosin 4 milliGRAM(s) Oral <User Schedule>  ferrous    sulfate 325 milliGRAM(s) Oral <User Schedule>  heparin  Injectable 5000 Unit(s) SubCutaneous every 12 hours  hydrALAZINE 100 milliGRAM(s) Oral every 8 hours  petrolatum white Ointment 1 Application(s) Topical two times a day  sodium chloride 0.9%. 1000 milliLiter(s) (50 mL/Hr) IV Continuous <Continuous>  trimethoprim  40 mG/sulfamethoxazole 200 mG Suspension 15 milliLiter(s) Oral <User Schedule>    MEDICATIONS  (PRN):                            9.0    8.83  )-----------( 213      ( 30 Nov 2018 07:46 )             25.8       11-30    127<L>  |  91<L>  |  72<H>  ----------------------------<  97  3.5   |  25  |  3.20<H>    Ca    7.2<L>      30 Nov 2018 07:46  Phos  4.4     11-30  Mg     1.8     11-30    TPro  5.8<L>  /  Alb  2.3<L>  /  TBili  0.4  /  DBili  x   /  AST  22  /  ALT  13  /  AlkPhos  60  11-30

## 2018-11-30 NOTE — CONSULT NOTE ADULT - SUBJECTIVE AND OBJECTIVE BOX
HISTORY OF PRESENT ILLNESS:  89 year old Female with PMH of diastolic CHF (TTE July 2017 EF 65%), HTN, Multifactorial Anemia of Chronic Disease and Iron Deficiency, recent admission to \[facility] for Acute CHF exacerbation in the setting of Hyponatremia and SABRINA (was discharged to home) with course complicated by fevers secondary to UTI, Was taking antibiotics for UTI as an outpatient as per her PMD, taking day 2 of Sulfatrim, presents today with worsening shortness of breath and lethargy for the past few days per her home aid. Patient presents with her daughter who reports her mother is SOB. Since discharge on 11/14/18, patient has been being seen by visiting nurse, and her sodium has been low at home. Her Lasix was held for the past few days, but she has been taking Lasix 60 QD for the past few days. According to her daughter she reports her mother had worsening SOB today which prompted the visit to the ER. Patient admits to fatigue, lethargy, and decreased PO intake, however currently the patient reports she is NOT short of breath. She denies headache, dizziness, syncope, chest pain, palpitations, cough, abdominal pain, nausea, vomiting, diarrhea. Note patient is on antibiotics for a UTI from outpatient. Note patient at baseline is A&Ox2 and ambulates with a walker.     In the ED: temp 99, HR 60, /72, RR 20, SpO2 94% RA, repeat 95% RA. H/H: 8.0/23.5, Na 122, BUN/Cr: 73/2.90, pro-BNP: 40826. Troponin 0.043. UA positive for moderate LEC, 11-25 WBC, 11-25 RBC. Chest x-ray showed airspace disease left lung base, effusion-consolidation. Airspace disease right lung base, blunted right costophrenic angle. Heart size appears enlarged although magnified secondary to portable technique, left cardiac border obscured by left basilar pathology. Aorta shows atherosclerotic changes. No acute osseous abnormalities. Calcific bursitis present, left shoulder. Surgical clips present, right paratracheal region. EKG showed NSR VR 61.        PAST MEDICAL & SURGICAL HISTORY:  Primary hyperparathyroidism  CHF (congestive heart failure)  Anemia, unspecified type  Edema of lower extremity  Heart murmur  Hypertension  Hypercalcemia  History of appendectomy  S/P tonsillectomy  H/O parathyroidectomy      REVIEW OF SYSTEMS:    MEDICATIONS  (STANDING):  BACItracin   Ointment 1 Application(s) Topical daily  carvedilol 12.5 milliGRAM(s) Oral every 12 hours  cholecalciferol 1000 Unit(s) Oral <User Schedule>  cinacalcet 30 milliGRAM(s) Oral <User Schedule>  doxazosin 4 milliGRAM(s) Oral <User Schedule>  ferrous    sulfate 325 milliGRAM(s) Oral <User Schedule>  heparin  Injectable 5000 Unit(s) SubCutaneous every 12 hours  hydrALAZINE 100 milliGRAM(s) Oral every 8 hours  petrolatum white Ointment 1 Application(s) Topical two times a day  sodium chloride 0.9%. 1000 milliLiter(s) (50 mL/Hr) IV Continuous <Continuous>  trimethoprim  40 mG/sulfamethoxazole 200 mG Suspension 15 milliLiter(s) Oral <User Schedule>    MEDICATIONS  (PRN):      PE: Awake, alert       Abd sft    Allergies    Vasotec (Unknown)    Intolerances        FAMILY HISTORY:  No pertinent family history in first degree relatives      Vital Signs Last 24 Hrs  T(C): 36.8 (30 Nov 2018 11:25), Max: 36.8 (29 Nov 2018 19:53)  T(F): 98.3 (30 Nov 2018 11:25), Max: 98.3 (29 Nov 2018 23:42)  HR: 72 (30 Nov 2018 11:25) (65 - 78)  BP: 162/68 (30 Nov 2018 11:25) (132/62 - 162/68)  BP(mean): --  RR: 17 (30 Nov 2018 11:25) (17 - 18)  SpO2: 91% (30 Nov 2018 11:25) (91% - 93%)      LABS:                        9.0    8.83  )-----------( 213      ( 30 Nov 2018 07:46 )             25.8     11-30    127<L>  |  91<L>  |  72<H>  ----------------------------<  97  3.5   |  25  |  3.20<H>    Ca    7.2<L>      30 Nov 2018 07:46  Phos  4.4     11-30  Mg     1.8     11-30    TPro  5.8<L>  /  Alb  2.3<L>  /  TBili  0.4  /  DBili  x   /  AST  22  /  ALT  13  /  AlkPhos  60  11-30    PT/INR - ( 28 Nov 2018 15:51 )   PT: 12.8 sec;   INR: 1.12 ratio         PTT - ( 28 Nov 2018 15:51 )  PTT:31.7 sec    Urine Culture:     RADIOLOGY & ADDITIONAL STUDIES:

## 2018-11-30 NOTE — PROGRESS NOTE ADULT - ASSESSMENT
89 year old Female with PMH of diastolic CHF (TTE July 2017 EF 65%), HTN, Multifactorial Anemia of Chronic Disease and Iron Deficiency, recent admission to [facility] for Acute CHF exacerbation in the setting of Hyponatremia and SABRINA (was discharged to home) with course complicated by fevers secondary to UTI, Was taking antibiotics for UTI as an outpatient as per her PMD. Presented yesterday with worsening shortness of breath and lethargy, Hyponatremia, SABRINA, and worsening Anemia. Today, patient denies SOB. Drop in Hgb from 8.0 to 7.3. Rise in Na+ from 124-->127.

## 2018-11-30 NOTE — PROGRESS NOTE ADULT - SUBJECTIVE AND OBJECTIVE BOX
[facility] -- Christofer Isaacs, Sorin, Marisa, Hieu Norris Savella  Office # [phone]      Follow Up:  CHF	    Subjective/Observations: Seen and examined.  Sitting up in bed awake and alert resting comfortably with no new complaints.  Daughter at bedside.  NAD.       REVIEW OF SYSTEMS: All other review of systems is negative unless indicated above    PAST MEDICAL & SURGICAL HISTORY:  Primary hyperparathyroidism  CHF (congestive heart failure)  Anemia, unspecified type  Edema of lower extremity  Heart murmur  Hypertension  Hypercalcemia  History of appendectomy  S/P tonsillectomy  H/O parathyroidectomy      MEDICATIONS  (STANDING):  BACItracin   Ointment 1 Application(s) Topical daily  carvedilol 12.5 milliGRAM(s) Oral every 12 hours  cholecalciferol 1000 Unit(s) Oral <User Schedule>  cinacalcet 30 milliGRAM(s) Oral <User Schedule>  doxazosin 4 milliGRAM(s) Oral <User Schedule>  ferrous    sulfate 325 milliGRAM(s) Oral <User Schedule>  heparin  Injectable 5000 Unit(s) SubCutaneous every 12 hours  hydrALAZINE 100 milliGRAM(s) Oral every 8 hours  petrolatum white Ointment 1 Application(s) Topical two times a day  potassium chloride   Powder 20 milliEquivalent(s) Oral once  sodium chloride 0.9%. 1000 milliLiter(s) (50 mL/Hr) IV Continuous <Continuous>  trimethoprim  40 mG/sulfamethoxazole 200 mG Suspension 15 milliLiter(s) Oral <User Schedule>    MEDICATIONS  (PRN):      Allergies    Vasotec (Unknown)    Intolerances            Vital Signs Last 24 Hrs  T(C): 36.8 (30 Nov 2018 11:25), Max: 36.9 (29 Nov 2018 14:10)  T(F): 98.3 (30 Nov 2018 11:25), Max: 98.5 (29 Nov 2018 14:10)  HR: 72 (30 Nov 2018 11:25) (63 - 78)  BP: 162/68 (30 Nov 2018 11:25) (132/62 - 162/68)  BP(mean): --  RR: 17 (30 Nov 2018 11:25) (17 - 18)  SpO2: 91% (30 Nov 2018 11:25) (91% - 93%)    I&O's Summary    29 Nov 2018 07:01  -  30 Nov 2018 07:00  --------------------------------------------------------  IN: 300 mL / OUT: 700 mL / NET: -400 mL          PHYSICAL EXAM:  TELE: SR 60s with NSVT 3-6 beats  Constitutional: NAD, awake and alert, well-developed, frail  HEENT: Moist Mucous Membranes, Anicteric  Pulmonary: Non-labored, breath sounds decreased in bases with mild crackles noted bilaterally  Cardiovascular: Regular, S1 and S2, No murmurs, rubs, gallops or clicks  Gastrointestinal: Bowel Sounds present, soft, nontender.   Lymph: No peripheral edema. No lymphadenopathy.  Skin: No visible rashes or ulcers.  Psych:  Mood & affect flat    LABS: All Labs Reviewed:                        9.0    8.83  )-----------( 213      ( 30 Nov 2018 07:46 )             25.8                         9.4    x     )-----------( x        ( 29 Nov 2018 17:14 )             27.3                         7.3    8.35  )-----------( 229      ( 29 Nov 2018 06:48 )             21.8     30 Nov 2018 07:46    127    |  91     |  72     ----------------------------<  97     3.5     |  25     |  3.20   29 Nov 2018 06:48    124    |  88     |  72     ----------------------------<  102    3.5     |  22     |  3.00   28 Nov 2018 18:53    122    |  84     |  72     ----------------------------<  95     3.6     |  23     |  2.90     Ca    7.2        30 Nov 2018 07:46  Ca    6.9        29 Nov 2018 06:48  Ca    7.0        28 Nov 2018 18:53  Phos  4.4       30 Nov 2018 07:46  Phos  4.2       29 Nov 2018 06:48  Mg     1.8       30 Nov 2018 07:46  Mg     1.7       29 Nov 2018 06:48    TPro  5.8    /  Alb  2.3    /  TBili  0.4    /  DBili  x      /  AST  22     /  ALT  13     /  AlkPhos  60     30 Nov 2018 07:46  TPro  5.7    /  Alb  2.2    /  TBili  0.3    /  DBili  x      /  AST  19     /  ALT  13     /  AlkPhos  59     29 Nov 2018 06:48  TPro  6.2    /  Alb  2.4    /  TBili  0.3    /  DBili  x      /  AST  22     /  ALT  16     /  AlkPhos  67     28 Nov 2018 18:53    PT/INR - ( 28 Nov 2018 15:51 )   PT: 12.8 sec;   INR: 1.12 ratio         PTT - ( 28 Nov 2018 15:51 )  PTT:31.7 sec  CARDIAC MARKERS ( 28 Nov 2018 15:51 )  .043 ng/mL / x     / 31 U/L / x     / 3.2 ng/mL    < from: 12 Lead ECG (11.28.18 @ 15:26) >  Ventricular Rate 61 BPM    Atrial Rate 61 BPM    P-R Interval 160 ms    QRS Duration 80 ms    Q-T Interval 422 ms    QTC Calculation(Bezet) 424 ms    P Axis 77 degrees    R Axis -9 degrees    T Axis 33 degrees    Diagnosis Line Normal sinus rhythm  Normal ECG  When compared with ECG of 09-NOV-2018 02:30,  No significant change was found  Confirmed by NIDHI VARELA ([id number]) on 11/29/2018 7:13:49 PM    < end of copied text >    < from: TTE Echo Doppler w/o Cont (07.09.17 @ 08:32) >   EXAM:  ECHO TTE W/O CON COMP W/DOPPLR         PROCEDURE DATE:  07/09/2017        INTERPRETATION:  Ordering Physician: Unknown Doctor    Indication: Hypertension    Study Quality: Technically fair  A complete echocardiographic study was performed utilizing standard   protocol including spectral and color Doppler in all echocardiographic   windows.    Height: 152 cm  Weight: 49 kg  BSA: 1.4 sq m  Blood Pressure: 189/63    MEASUREMENTS  IVS: 1.0cm  PWT: 1.0cm  LA: 4.2cm  AO: 2.7cm  LVIDd: 4.5cm  LVIDs: 3.3cm    LVEF: 65%  RVSP: 54mmHg    FINDINGS  Left Ventricle:  Normal left ventricular systolic function.  Aortic Valve: Calcified trileaflet aortic valve. Mild aortic   insufficiency.  Mitral Valve: Mitral annular calcification and calcified mitral valve   leaflets with normal diastolic opening. Mild mitral insufficiency.  Tricuspid Valve: Normal tricuspid valve. Mild tricuspid insufficiency.  Pulmonic Valve: Normal pulmonic valve. Mild pulmonic insufficiency.  Left Atrium: Mildly enlarged.  Right Ventricle: Normal right ventricular size and systolic function.  Right Atrium: Normal  Diastolic Function: Grade 1 diastolic dysfunction.  Pericardium/Pleura: Normal pericardium with no pericardial effusion.                      MORENA ADORNO M.D., ATTENDING CARDIOLOGIST  This document has been electronically signed. Jul 9 2017 10:13AM        < end of copied text >      < from: Xray Chest 1 View AP/PA (11.28.18 @ 15:27) >  EXAM:  XR CHEST AP OR PA 1V                            PROCEDURE DATE:  11/28/2018          INTERPRETATION:  Clinical information: Shortness of breath    Portable study, 3:19 PM    Comparison exam dated 11/11/2018    Patient tilted towards the left.    Airspace disease left lung base, effusion-consolidation. Airspace disease   right lung base, blunted right costophrenic angle. Heart size appears   enlarged although magnified secondary to portable technique, left cardiac   border obscured by left basilar pathology. Aorta shows atherosclerotic   changes. No acute osseous abnormalities. Calcific bursitis present, left   shoulder. Surgical clips present, right paratracheal region.    IMPRESSION:    See above report                JESSICA VEGA M.D.,ATTENDING RADIOLOGIST  This document has been electronically signed. Nov 28 2018  3:32PM          < end of copied text >

## 2018-11-30 NOTE — DIETITIAN INITIAL EVALUATION ADULT. - PROBLEM SELECTOR PLAN 2
Cr elevated at 2.9, likely due to Lasix use and dehydration  -gentle dehydration NS 50ml/hr stop after 12 hours  -Holding diuretics  -STAT repeat BMP  -Follow up AM BMP  -Dr. Eaton consulted

## 2018-11-30 NOTE — DIETITIAN INITIAL EVALUATION ADULT. - PROBLEM SELECTOR PLAN 1
-Na 122, Likely due to dehydration/decreased PO intake and diuretic use  -despite hx of CHF, patient clinically appears dry and is presenting with SABRINA  -start gentle hydration NS 50ml/hr stop after 12 hours, cardio recs appreciated.   -need to discuss with nephrology, Dr. Eaton aware, will also need to avoid rapid correction. Hold diuretics.  -Monitor BMP  -STAT repeat BMP  -Check urine lytes and osm  -Strict Is and Os  -AM labs  -Nephrology consult Dr. Eaton  -Fall precautions  -Ambulate w assist  -SCD for dvt ppx  -Dr. Norris following

## 2018-12-01 LAB
ALBUMIN SERPL ELPH-MCNC: 2.4 G/DL — LOW (ref 3.3–5)
ALP SERPL-CCNC: 61 U/L — SIGNIFICANT CHANGE UP (ref 40–120)
ALT FLD-CCNC: 15 U/L — SIGNIFICANT CHANGE UP (ref 12–78)
ANION GAP SERPL CALC-SCNC: 12 MMOL/L — SIGNIFICANT CHANGE UP (ref 5–17)
AST SERPL-CCNC: 21 U/L — SIGNIFICANT CHANGE UP (ref 15–37)
BASOPHILS # BLD AUTO: 0.03 K/UL — SIGNIFICANT CHANGE UP (ref 0–0.2)
BASOPHILS NFR BLD AUTO: 0.3 % — SIGNIFICANT CHANGE UP (ref 0–2)
BILIRUB SERPL-MCNC: 0.3 MG/DL — SIGNIFICANT CHANGE UP (ref 0.2–1.2)
BUN SERPL-MCNC: 75 MG/DL — HIGH (ref 7–23)
CALCIUM SERPL-MCNC: 7.5 MG/DL — LOW (ref 8.5–10.1)
CHLORIDE SERPL-SCNC: 93 MMOL/L — LOW (ref 96–108)
CO2 SERPL-SCNC: 25 MMOL/L — SIGNIFICANT CHANGE UP (ref 22–31)
CREAT SERPL-MCNC: 3.4 MG/DL — HIGH (ref 0.5–1.3)
EOSINOPHIL # BLD AUTO: 0.05 K/UL — SIGNIFICANT CHANGE UP (ref 0–0.5)
EOSINOPHIL NFR BLD AUTO: 0.5 % — SIGNIFICANT CHANGE UP (ref 0–6)
GLUCOSE SERPL-MCNC: 94 MG/DL — SIGNIFICANT CHANGE UP (ref 70–99)
HCT VFR BLD CALC: 26.6 % — LOW (ref 34.5–45)
HGB BLD-MCNC: 9 G/DL — LOW (ref 11.5–15.5)
IMM GRANULOCYTES NFR BLD AUTO: 0.9 % — SIGNIFICANT CHANGE UP (ref 0–1.5)
LYMPHOCYTES # BLD AUTO: 0.68 K/UL — LOW (ref 1–3.3)
LYMPHOCYTES # BLD AUTO: 6.7 % — LOW (ref 13–44)
MAGNESIUM SERPL-MCNC: 1.7 MG/DL — SIGNIFICANT CHANGE UP (ref 1.6–2.6)
MCHC RBC-ENTMCNC: 27.3 PG — SIGNIFICANT CHANGE UP (ref 27–34)
MCHC RBC-ENTMCNC: 33.8 GM/DL — SIGNIFICANT CHANGE UP (ref 32–36)
MCV RBC AUTO: 80.6 FL — SIGNIFICANT CHANGE UP (ref 80–100)
MONOCYTES # BLD AUTO: 0.46 K/UL — SIGNIFICANT CHANGE UP (ref 0–0.9)
MONOCYTES NFR BLD AUTO: 4.5 % — SIGNIFICANT CHANGE UP (ref 2–14)
NEUTROPHILS # BLD AUTO: 8.89 K/UL — HIGH (ref 1.8–7.4)
NEUTROPHILS NFR BLD AUTO: 87.1 % — HIGH (ref 43–77)
PHOSPHATE SERPL-MCNC: 3.8 MG/DL — SIGNIFICANT CHANGE UP (ref 2.5–4.5)
PLATELET # BLD AUTO: 241 K/UL — SIGNIFICANT CHANGE UP (ref 150–400)
POTASSIUM SERPL-MCNC: 4 MMOL/L — SIGNIFICANT CHANGE UP (ref 3.5–5.3)
POTASSIUM SERPL-SCNC: 4 MMOL/L — SIGNIFICANT CHANGE UP (ref 3.5–5.3)
PROT SERPL-MCNC: 5.9 G/DL — LOW (ref 6–8.3)
RBC # BLD: 3.3 M/UL — LOW (ref 3.8–5.2)
RBC # FLD: 16.3 % — HIGH (ref 10.3–14.5)
SODIUM SERPL-SCNC: 130 MMOL/L — LOW (ref 135–145)
WBC # BLD: 10.2 K/UL — SIGNIFICANT CHANGE UP (ref 3.8–10.5)
WBC # FLD AUTO: 10.2 K/UL — SIGNIFICANT CHANGE UP (ref 3.8–10.5)

## 2018-12-01 PROCEDURE — 99232 SBSQ HOSP IP/OBS MODERATE 35: CPT

## 2018-12-01 RX ADMIN — HEPARIN SODIUM 5000 UNIT(S): 5000 INJECTION INTRAVENOUS; SUBCUTANEOUS at 06:58

## 2018-12-01 RX ADMIN — Medication 1 APPLICATION(S): at 18:41

## 2018-12-01 RX ADMIN — CARVEDILOL PHOSPHATE 12.5 MILLIGRAM(S): 80 CAPSULE, EXTENDED RELEASE ORAL at 06:58

## 2018-12-01 RX ADMIN — Medication 1 APPLICATION(S): at 12:22

## 2018-12-01 RX ADMIN — Medication 15 MILLILITER(S): at 06:59

## 2018-12-01 RX ADMIN — Medication 15 MILLILITER(S): at 18:41

## 2018-12-01 RX ADMIN — Medication 100 MILLIGRAM(S): at 06:58

## 2018-12-01 RX ADMIN — Medication 1000 UNIT(S): at 14:30

## 2018-12-01 RX ADMIN — CARVEDILOL PHOSPHATE 12.5 MILLIGRAM(S): 80 CAPSULE, EXTENDED RELEASE ORAL at 18:41

## 2018-12-01 RX ADMIN — Medication 100 MILLIGRAM(S): at 14:30

## 2018-12-01 RX ADMIN — Medication 1 APPLICATION(S): at 06:58

## 2018-12-01 RX ADMIN — HEPARIN SODIUM 5000 UNIT(S): 5000 INJECTION INTRAVENOUS; SUBCUTANEOUS at 18:41

## 2018-12-01 RX ADMIN — Medication 325 MILLIGRAM(S): at 09:46

## 2018-12-01 NOTE — PROGRESS NOTE ADULT - SUBJECTIVE AND OBJECTIVE BOX
[INTERVAL HX: ]  Patient seen and examined;  Chart reviewed and events noted;   dtr at bedside.   Discussed pt prognosis.   Cr worse. s/p removal abbott yesterday/   Today PM with no output since AM, residual 600cc urine.   to have abbott placed again  Hgb stable.     MEDICATIONS  (STANDING):  BACItracin   Ointment 1 Application(s) Topical daily  carvedilol 12.5 milliGRAM(s) Oral every 12 hours  cholecalciferol 1000 Unit(s) Oral <User Schedule>  cinacalcet 30 milliGRAM(s) Oral <User Schedule>  doxazosin 4 milliGRAM(s) Oral <User Schedule>  ferrous    sulfate 325 milliGRAM(s) Oral <User Schedule>  heparin  Injectable 5000 Unit(s) SubCutaneous every 12 hours  hydrALAZINE 100 milliGRAM(s) Oral every 8 hours  petrolatum white Ointment 1 Application(s) Topical two times a day  sodium chloride 0.9%. 1000 milliLiter(s) (50 mL/Hr) IV Continuous <Continuous>    MEDICATIONS  (PRN):      Vital Signs Last 24 Hrs  T(C): 36.8 (01 Dec 2018 15:59), Max: 36.9 (30 Nov 2018 19:31)  T(F): 98.3 (01 Dec 2018 15:59), Max: 98.4 (30 Nov 2018 19:31)  HR: 87 (01 Dec 2018 15:59) (67 - 87)  BP: 124/85 (01 Dec 2018 15:59) (123/68 - 163/78)  BP(mean): --  RR: 17 (01 Dec 2018 15:59) (16 - 17)  SpO2: 93% (01 Dec 2018 15:59) (90% - 94%)    [PHYSICAL EXAM]  General: adult in NAD,  WN,  WD. more letahrgic. chronically ill-looking  HEENT: clear oropharynx, anicteric sclera, pink conjunctivae.  Neck: supple, no masses.  CV: normal S1S2, no murmur, no rubs, no gallops.  Lungs: clear to auscultation, no wheezes, trace rales, no rhonchi.  Abdomen: soft, non-tender, non-distended, no hepatosplenomegaly, normal BS, no guarding.  Ext: no clubbing, no cyanosis, no edema.  Skin: no rashes,  no petechiae, no venous stasis changes.  Neuro: alert and oriented X1 no focal motor deficits.  LN: no EDISON.      [LABS:]                        9.0    10.20 )-----------( 241      ( 01 Dec 2018 06:23 )             26.6     12-01    130<L>  |  93<L>  |  75<H>  ----------------------------<  94  4.0   |  25  |  3.40<H>    Ca    7.5<L>      01 Dec 2018 06:23  Phos  3.8     12-01  Mg     1.7     12-01    TPro  5.9<L>  /  Alb  2.4<L>  /  TBili  0.3  /  DBili  x   /  AST  21  /  ALT  15  /  AlkPhos  61  12-01          [RADIOLOGY STUDIES:]

## 2018-12-01 NOTE — PROGRESS NOTE ADULT - ASSESSMENT
Multifactorial anemia related to probable GI blood loss and renal insufficiency. Course also complicated by CHF and deteriorating cardiac status  s/p 1U split PRBC txfusion yesterday. Mild bi-basilar crackles.   Now with ARF/SABRINA. Cr worsening over last 2-3days.     Recommendations:  Dr Temple discussed with daughter 11/29/18. Patient continues to have slowly deteriorating status.  Agree with DNR/DNI.    if renal function continues to deteriorates and Na not improve will continue palliative care with discharge home with hospice.   patient wishes to have her at home.   Cr worsening, today. Ramos to be placed.   Since retention, to perhaps keep in, and place on hospice.     No additional heme recommendations.  Await further urology recommendation.   Prognosis poor given advanced age.     Dtr aware of pt prognosis, agreeable for hospice, home hospice eval, refuses inpt hospice.

## 2018-12-01 NOTE — PROGRESS NOTE ADULT - SUBJECTIVE AND OBJECTIVE BOX
Patient is a 89y old  Female who presents with a chief complaint of Shortness of breath (30 Nov 2018 16:37)      INTERVAL HPI/OVERNIGHT EVENTS: Patient seen and examined. NAD. No complaints.    Vital Signs Last 24 Hrs  T(C): 36.7 (01 Dec 2018 07:49), Max: 36.9 (30 Nov 2018 19:31)  T(F): 98.1 (01 Dec 2018 07:49), Max: 98.4 (30 Nov 2018 19:31)  HR: 68 (01 Dec 2018 07:49) (67 - 79)  BP: 128/64 (01 Dec 2018 07:49) (123/68 - 163/78)  BP(mean): --  RR: 16 (01 Dec 2018 07:49) (16 - 17)  SpO2: 90% (01 Dec 2018 07:49) (90% - 93%)    12-01    130<L>  |  93<L>  |  75<H>  ----------------------------<  94  4.0   |  25  |  3.40<H>    Ca    7.5<L>      01 Dec 2018 06:23  Phos  3.8     12-01  Mg     1.7     12-01    TPro  5.9<L>  /  Alb  2.4<L>  /  TBili  0.3  /  DBili  x   /  AST  21  /  ALT  15  /  AlkPhos  61  12-01                          9.0    10.20 )-----------( 241      ( 01 Dec 2018 06:23 )             26.6       CAPILLARY BLOOD GLUCOSE                  BACItracin   Ointment 1 Application(s) Topical daily  carvedilol 12.5 milliGRAM(s) Oral every 12 hours  cholecalciferol 1000 Unit(s) Oral <User Schedule>  cinacalcet 30 milliGRAM(s) Oral <User Schedule>  doxazosin 4 milliGRAM(s) Oral <User Schedule>  ferrous    sulfate 325 milliGRAM(s) Oral <User Schedule>  heparin  Injectable 5000 Unit(s) SubCutaneous every 12 hours  hydrALAZINE 100 milliGRAM(s) Oral every 8 hours  petrolatum white Ointment 1 Application(s) Topical two times a day  sodium chloride 0.9%. 1000 milliLiter(s) IV Continuous <Continuous>  trimethoprim  40 mG/sulfamethoxazole 200 mG Suspension 15 milliLiter(s) Oral <User Schedule>              REVIEW OF SYSTEMS:  CONSTITUTIONAL: No fever, no weight loss, or no fatigue  NECK: No pain, no stiffness  RESPIRATORY: No cough, no wheezing, no chills, no hemoptysis, No shortness of breath  CARDIOVASCULAR: No chest pain, no palpitations, no dizziness, no leg swelling  GASTROINTESTINAL: No abdominal pain. No nausea, no vomiting, no hematemesis; No diarrhea, no constipation. No melena, no hematochezia.  GENITOURINARY: No dysuria, no frequency, no hematuria, no incontinence  NEUROLOGICAL: No headaches, no loss of strength, no numbness, no tremors  SKIN: No itching, no burning  MUSCULOSKELETAL: No joint pain, no swelling; No muscle, no back, no extremity pain  PSYCHIATRIC: No depression, no mood swings,   HEME/LYMPH: No easy bruising, no bleeding gums  ALLERY AND IMMUNOLOGIC: No hives       Consultant(s) Notes Reviewed:  [X] YES  [ ] NO    PHYSICAL EXAM:  GENERAL: NAD  HEAD:  Atraumatic, Normocephalic  EYES: EOMI, PERRLA, conjunctiva and sclera clear  ENMT: No tonsillar erythema, exudates, or enlargement; Moist mucous membranes  NECK: Supple, No JVD  NERVOUS SYSTEM:  Awake & alert  CHEST/LUNG: Clear to auscultation bilaterally; No rales, rhonchi, wheezing,  HEART: Regular rate and rhythm  ABDOMEN: Soft, Nontender, Nondistended; Bowel sounds present  EXTREMITIES:  No clubbing, cyanosis, or edema  LYMPH: No lymphadenopathy noted  SKIN: No rashes      Advanced care planning discussed with patient/family [X] YES   [ ] NO    Advanced care planning discussed with patient/family. Advanced care planning forms reviewed/discussed/completed. 20 minutes spent.

## 2018-12-01 NOTE — PROGRESS NOTE ADULT - PROBLEM SELECTOR PLAN 2
Cr elevated at 3.4, likely due to Lasix use and dehydration  -s/p gentle dehydration NS 50ml/hr stop after 12 hours  -Holding diuretics as per cardiology  -Follow up AM BMP  -Dr. Eaton following, bladder scan showing 900+, abbott placed 11/29 -- now daughter refusing abbott  continue to monitor urine output

## 2018-12-01 NOTE — PROGRESS NOTE ADULT - PROBLEM SELECTOR PLAN 1
-improving, Likely due to dehydration/decreased PO intake and diuretic use  -despite hx of CHF, patient clinically appears dry and is presenting with SABRINA  -s/p gentle hydration NS 50ml/hr, cardio recs appreciated.   -nephrology, Dr. Eaton aware, will also need to avoid rapid correction. Hold diuretics.  -Monitor BMP  -Check urine lytes and osm  -Strict Is and Os  -AM labs  -Nephrology consult Dr. Eaton  -Fall precautions  -Ambulate w assist  -SCD for dvt ppx  -Dr. Norris following

## 2018-12-01 NOTE — PROGRESS NOTE ADULT - SUBJECTIVE AND OBJECTIVE BOX
[facility] -- Christofer Isaacs, Sorin, Marisa, Hieu Norris Savella  Office # [phone]      Follow Up:  CHF    Subjective/Observations: Seen and examined.  She is more lethargic today sitting up with daughter at bedside.  Not taking in food but tolerating her medications as per daughter and RN.  NAD.        REVIEW OF SYSTEMS: All other review of systems is negative unless indicated above    PAST MEDICAL & SURGICAL HISTORY:  Primary hyperparathyroidism  CHF (congestive heart failure)  Anemia, unspecified type  Edema of lower extremity  Heart murmur  Hypertension  Hypercalcemia  History of appendectomy  S/P tonsillectomy  H/O parathyroidectomy      MEDICATIONS  (STANDING):  BACItracin   Ointment 1 Application(s) Topical daily  carvedilol 12.5 milliGRAM(s) Oral every 12 hours  cholecalciferol 1000 Unit(s) Oral <User Schedule>  cinacalcet 30 milliGRAM(s) Oral <User Schedule>  doxazosin 4 milliGRAM(s) Oral <User Schedule>  ferrous    sulfate 325 milliGRAM(s) Oral <User Schedule>  heparin  Injectable 5000 Unit(s) SubCutaneous every 12 hours  hydrALAZINE 100 milliGRAM(s) Oral every 8 hours  petrolatum white Ointment 1 Application(s) Topical two times a day  sodium chloride 0.9%. 1000 milliLiter(s) (50 mL/Hr) IV Continuous <Continuous>  trimethoprim  40 mG/sulfamethoxazole 200 mG Suspension 15 milliLiter(s) Oral <User Schedule>    MEDICATIONS  (PRN):      Allergies    Vasotec (Unknown)    Intolerances            Vital Signs Last 24 Hrs  T(C): 36.7 (01 Dec 2018 07:49), Max: 36.9 (30 Nov 2018 19:31)  T(F): 98.1 (01 Dec 2018 07:49), Max: 98.4 (30 Nov 2018 19:31)  HR: 68 (01 Dec 2018 07:49) (67 - 79)  BP: 128/64 (01 Dec 2018 07:49) (123/68 - 163/78)  BP(mean): --  RR: 16 (01 Dec 2018 07:49) (16 - 17)  SpO2: 90% (01 Dec 2018 07:49) (90% - 93%)    I&O's Summary    30 Nov 2018 07:01  -  01 Dec 2018 07:00  --------------------------------------------------------  IN: 0 mL / OUT: 401 mL / NET: -401 mL          PHYSICAL EXAM:  TELE: SR -ST 80s-120s  Constitutional: NAD, lethargic sleeping opens her eyes briefly, frail  HEENT: Dry Mucous Membranes, Anicteric  Pulmonary: Non-labored, breath sounds with +crackles 1/3 up bilaterally  Cardiovascular: Regular, S1 and S2, No murmurs, rubs, gallops or clicks  Gastrointestinal: Bowel Sounds present, soft, nontender.   Lymph: No peripheral edema. No lymphadenopathy.  Skin: No visible rashes or ulcers.  Psych:  Unable to assess 2/2 lethargy    LABS: All Labs Reviewed:                        9.0    10.20 )-----------( 241      ( 01 Dec 2018 06:23 )             26.6                         9.0    8.83  )-----------( 213      ( 30 Nov 2018 07:46 )             25.8                         9.4    x     )-----------( x        ( 29 Nov 2018 17:14 )             27.3     01 Dec 2018 06:23    130    |  93     |  75     ----------------------------<  94     4.0     |  25     |  3.40   30 Nov 2018 07:46    127    |  91     |  72     ----------------------------<  97     3.5     |  25     |  3.20   29 Nov 2018 06:48    124    |  88     |  72     ----------------------------<  102    3.5     |  22     |  3.00     Ca    7.5        01 Dec 2018 06:23  Ca    7.2        30 Nov 2018 07:46  Ca    6.9        29 Nov 2018 06:48  Phos  3.8       01 Dec 2018 06:23  Phos  4.4       30 Nov 2018 07:46  Phos  4.2       29 Nov 2018 06:48  Mg     1.7       01 Dec 2018 06:23  Mg     1.8       30 Nov 2018 07:46  Mg     1.7       29 Nov 2018 06:48    TPro  5.9    /  Alb  2.4    /  TBili  0.3    /  DBili  x      /  AST  21     /  ALT  15     /  AlkPhos  61     01 Dec 2018 06:23  TPro  5.8    /  Alb  2.3    /  TBili  0.4    /  DBili  x      /  AST  22     /  ALT  13     /  AlkPhos  60     30 Nov 2018 07:46  TPro  5.7    /  Alb  2.2    /  TBili  0.3    /  DBili  x      /  AST  19     /  ALT  13     /  AlkPhos  59     29 Nov 2018 06:48    < from: 12 Lead ECG (11.28.18 @ 15:26) >  Ventricular Rate 61 BPM    Atrial Rate 61 BPM    P-R Interval 160 ms    QRS Duration 80 ms    Q-T Interval 422 ms    QTC Calculation(Bezet) 424 ms    P Axis 77 degrees    R Axis -9 degrees    T Axis 33 degrees    Diagnosis Line Normal sinus rhythm  Normal ECG  When compared with ECG of 09-NOV-2018 02:30,  No significant change was found  Confirmed by NIDHI VARELA ([id number]) on 11/29/2018 7:13:49 PM    < end of copied text >    < from: TTE Echo Doppler w/o Cont (07.09.17 @ 08:32) >   EXAM:  ECHO TTE W/O CON COMP W/DOPPLR         PROCEDURE DATE:  07/09/2017        INTERPRETATION:  Ordering Physician: Unknown Doctor    Indication: Hypertension    Study Quality: Technically fair  A complete echocardiographic study was performed utilizing standard   protocol including spectral and color Doppler in all echocardiographic   windows.    Height: 152 cm  Weight: 49 kg  BSA: 1.4 sq m  Blood Pressure: 189/63    MEASUREMENTS  IVS: 1.0cm  PWT: 1.0cm  LA: 4.2cm  AO: 2.7cm  LVIDd: 4.5cm  LVIDs: 3.3cm    LVEF: 65%  RVSP: 54mmHg    FINDINGS  Left Ventricle:  Normal left ventricular systolic function.  Aortic Valve: Calcified trileaflet aortic valve. Mild aortic   insufficiency.  Mitral Valve: Mitral annular calcification and calcified mitral valve   leaflets with normal diastolic opening. Mild mitral insufficiency.  Tricuspid Valve: Normal tricuspid valve. Mild tricuspid insufficiency.  Pulmonic Valve: Normal pulmonic valve. Mild pulmonic insufficiency.  Left Atrium: Mildly enlarged.  Right Ventricle: Normal right ventricular size and systolic function.  Right Atrium: Normal  Diastolic Function: Grade 1 diastolic dysfunction.  Pericardium/Pleura: Normal pericardium with no pericardial effusion.                      MORENA ADORNO M.D., ATTENDING CARDIOLOGIST  This document has been electronically signed. Jul 9 2017 10:13AM                < end of copied text >    < from: Xray Chest 1 View AP/PA (11.28.18 @ 15:27) >  EXAM:  XR CHEST AP OR PA 1V                            PROCEDURE DATE:  11/28/2018          INTERPRETATION:  Clinical information: Shortness of breath    Portable study, 3:19 PM    Comparison exam dated 11/11/2018    Patient tilted towards the left.    Airspace disease left lung base, effusion-consolidation. Airspace disease   right lung base, blunted right costophrenic angle. Heart size appears   enlarged although magnified secondary to portable technique, left cardiac   border obscured by left basilar pathology. Aorta shows atherosclerotic   changes. No acute osseous abnormalities. Calcific bursitis present, left   shoulder. Surgical clips present, right paratracheal region.    IMPRESSION:    See above report                JESSICA VEGA M.D.,ATTENDING RADIOLOGIST  This document has been electronically signed. Nov 28 2018  3:32PM    < end of copied text >

## 2018-12-01 NOTE — PROGRESS NOTE ADULT - PROBLEM SELECTOR PLAN 4
-Not new bilateral pleural effusions compared with prior admission CXR two weeks ago, pt not in acute CHF or volume overloaded, seen by cardiology.  -Will monitor continuous pulse ox   patient stable on RA, spo2 94%   -Will hold off abx for now   -Denies recent sick contacts

## 2018-12-01 NOTE — PROGRESS NOTE ADULT - ASSESSMENT
89 year old Female with PMH of diastolic CHF (TTE July 2017 EF 65%), HTN, Multifactorial Anemia of Chronic Disease and Iron Deficiency, recent admission to [facility] for Acute CHF exacerbation in the setting of Hyponatremia and SABRINA (was discharged to home) with course complicated by fevers secondary to UTI, Was taking antibiotics for UTI as an outpatient as per her PMD. Presented yesterday with worsening shortness of breath and lethargy, Hyponatremia, SABRINA, and worsening Anemia. Today, patient denies SOB. Drop in Hgb from 8.0 to 7.3. Rise in Na+ from 124-->127.

## 2018-12-01 NOTE — PROGRESS NOTE ADULT - PROBLEM SELECTOR PLAN 3
Chronic history, sees Dr. Temple as an outpatient, likely anemia of chronic disease v iron def anemia.   -Will continue to monitor.   -type and screen  -Continue home iron  -Dr. Temple, hematologist, consulted. 1 unit of blood given 11/30 by 1/2 units at a time to evaluate for fluid overload. Patient's H/H 1 hour after unit administration 9.4/27.3.

## 2018-12-01 NOTE — PROGRESS NOTE ADULT - SUBJECTIVE AND OBJECTIVE BOX
Patient appears comfortable, no acute distress;     MEDICATIONS  (STANDING):  BACItracin   Ointment 1 Application(s) Topical daily  carvedilol 12.5 milliGRAM(s) Oral every 12 hours  cholecalciferol 1000 Unit(s) Oral <User Schedule>  cinacalcet 30 milliGRAM(s) Oral <User Schedule>  doxazosin 4 milliGRAM(s) Oral <User Schedule>  ferrous    sulfate 325 milliGRAM(s) Oral <User Schedule>  heparin  Injectable 5000 Unit(s) SubCutaneous every 12 hours  hydrALAZINE 100 milliGRAM(s) Oral every 8 hours  petrolatum white Ointment 1 Application(s) Topical two times a day  sodium chloride 0.9%. 1000 milliLiter(s) (50 mL/Hr) IV Continuous <Continuous>    MEDICATIONS  (PRN):      11-30-18 @ 07:01  -  12-01-18 @ 07:00  --------------------------------------------------------  IN: 0 mL / OUT: 401 mL / NET: -401 mL    12-01-18 @ 07:01  -  12-01-18 @ 20:51  --------------------------------------------------------  IN: 180 mL / OUT: 0 mL / NET: 180 mL      PHYSICAL EXAM:      T(C): 37.4 (12-01-18 @ 20:20), Max: 37.4 (12-01-18 @ 20:20)  HR: 68 (12-01-18 @ 20:20) (68 - 87)  BP: 131/49 (12-01-18 @ 20:20) (124/85 - 163/78)  RR: 17 (12-01-18 @ 20:20) (16 - 17)  SpO2: 95% (12-01-18 @ 20:20) (90% - 95%)  Wt(kg): --  Respiratory: clear anteriorly, decreased BS at bases  Cardiovascular: S1 S2  Gastrointestinal: soft NT ND +BS  Extremities:  1 edema                                    9.0    10.20 )-----------( 241      ( 01 Dec 2018 06:23 )             26.6     12-01    130<L>  |  93<L>  |  75<H>  ----------------------------<  94  4.0   |  25  |  3.40<H>    Ca    7.5<L>      01 Dec 2018 06:23  Phos  3.8     12-01  Mg     1.7     12-01    TPro  5.9<L>  /  Alb  2.4<L>  /  TBili  0.3  /  DBili  x   /  AST  21  /  ALT  15  /  AlkPhos  61  12-01      LIVER FUNCTIONS - ( 01 Dec 2018 06:23 )  Alb: 2.4 g/dL / Pro: 5.9 g/dL / ALK PHOS: 61 U/L / ALT: 15 U/L / AST: 21 U/L / GGT: x             Assessment and Plan:    SABRINA with urinary retention; active urine sediment; hypervolemic hyponatremia; possible complex, wide differential  Would defer extensive diagnostic studies for now;   Follow bladder scan PVR in am;   Supportive care. Patient appears comfortable, no acute distress;     MEDICATIONS  (STANDING):  BACItracin   Ointment 1 Application(s) Topical daily  carvedilol 12.5 milliGRAM(s) Oral every 12 hours  cholecalciferol 1000 Unit(s) Oral <User Schedule>  cinacalcet 30 milliGRAM(s) Oral <User Schedule>  doxazosin 4 milliGRAM(s) Oral <User Schedule>  ferrous    sulfate 325 milliGRAM(s) Oral <User Schedule>  heparin  Injectable 5000 Unit(s) SubCutaneous every 12 hours  hydrALAZINE 100 milliGRAM(s) Oral every 8 hours  petrolatum white Ointment 1 Application(s) Topical two times a day  sodium chloride 0.9%. 1000 milliLiter(s) (50 mL/Hr) IV Continuous <Continuous>    MEDICATIONS  (PRN):      11-30-18 @ 07:01  -  12-01-18 @ 07:00  --------------------------------------------------------  IN: 0 mL / OUT: 401 mL / NET: -401 mL    12-01-18 @ 07:01  -  12-01-18 @ 20:51  --------------------------------------------------------  IN: 180 mL / OUT: 0 mL / NET: 180 mL      PHYSICAL EXAM:      T(C): 37.4 (12-01-18 @ 20:20), Max: 37.4 (12-01-18 @ 20:20)  HR: 68 (12-01-18 @ 20:20) (68 - 87)  BP: 131/49 (12-01-18 @ 20:20) (124/85 - 163/78)  RR: 17 (12-01-18 @ 20:20) (16 - 17)  SpO2: 95% (12-01-18 @ 20:20) (90% - 95%)  Wt(kg): --  Respiratory: clear anteriorly, decreased BS at bases  Cardiovascular: S1 S2  Gastrointestinal: soft NT ND +BS  Extremities:  1 edema                                    9.0    10.20 )-----------( 241      ( 01 Dec 2018 06:23 )             26.6     12-01    130<L>  |  93<L>  |  75<H>  ----------------------------<  94  4.0   |  25  |  3.40<H>    Ca    7.5<L>      01 Dec 2018 06:23  Phos  3.8     12-01  Mg     1.7     12-01    TPro  5.9<L>  /  Alb  2.4<L>  /  TBili  0.3  /  DBili  x   /  AST  21  /  ALT  15  /  AlkPhos  61  12-01      LIVER FUNCTIONS - ( 01 Dec 2018 06:23 )  Alb: 2.4 g/dL / Pro: 5.9 g/dL / ALK PHOS: 61 U/L / ALT: 15 U/L / AST: 21 U/L / GGT: x             Assessment and Plan:    SABRINA with urinary retention; active urine sediment; hypervolemic hyponatremia; possible complex, wide differential  Would defer extensive diagnostic studies for now;    Supportive care.

## 2018-12-01 NOTE — PROGRESS NOTE ADULT - ASSESSMENT
89F with PMH of diastolic CHF (TTE July 2017 EF 65%), HTN, anemia who was brought to the ED with increased dyspnea per her home aid.  She is found to have hyponatremia and SABRINA.  There is no evidence of acute ischemia, acute heart failure, or uncontrolled arrhythmia.    Recommend:  - Increased lethargy today with decreased PO intake.  Continues to receive her medications.      - Anemia s/p 1 unit PRBC divided doses 11/29 with H/H responded appropriately  - Does not appear overloaded monitor closely.   Neg 400 in 24 hours.      - Please continue to maintain strict I/Os, monitor daily weights, Cr,  K and Mag.    - Diuretics on hold for worsening SABRINA.  Pt appears comfortable but on exam has +crackles bilaterally now requiring supplemental O2 for low RA sat.  Cont to monitor for worsening symptoms.    - Avoid Nephrotoxic agents.    - Hyponatremia improving  - renal f/u     - BP labile but controlled.   - Continue carvedilol and hydralazine at home doses with hold parameters  - No need to repeat echocardiogram  - To follow closely with you while admitted.  - Pt is a DNR - GOC to be determined 2/2 poor prognosis.  Daughter is considering Home Hospice.  Spoke in length with her today wants to speak to Social Work.      Arlene Dubose, NP-C  Cardiology

## 2018-12-01 NOTE — PROGRESS NOTE ADULT - PROBLEM SELECTOR PLAN 7
patient takes Doxazosin outpatient for her retention  Dr. Eaton (nephrologist) following  recommended bladder scan on 11/29  on bladder scan, patient was found to have +900 on 11/29  abbott placed on 11/29  700 output daily 11/30  urology consulted and agreed with bladder scan in the AM.   spoke with Dr. Eaton 11/30 and he recommended to D/C Abbott and f/u bladder scan qday

## 2018-12-02 LAB
ANION GAP SERPL CALC-SCNC: 12 MMOL/L — SIGNIFICANT CHANGE UP (ref 5–17)
BUN SERPL-MCNC: 79 MG/DL — HIGH (ref 7–23)
CALCIUM SERPL-MCNC: 8 MG/DL — LOW (ref 8.5–10.1)
CHLORIDE SERPL-SCNC: 95 MMOL/L — LOW (ref 96–108)
CO2 SERPL-SCNC: 25 MMOL/L — SIGNIFICANT CHANGE UP (ref 22–31)
CREAT SERPL-MCNC: 3.5 MG/DL — HIGH (ref 0.5–1.3)
GLUCOSE SERPL-MCNC: 94 MG/DL — SIGNIFICANT CHANGE UP (ref 70–99)
HCT VFR BLD CALC: 24.6 % — LOW (ref 34.5–45)
HGB BLD-MCNC: 8.3 G/DL — LOW (ref 11.5–15.5)
MAGNESIUM SERPL-MCNC: 1.9 MG/DL — SIGNIFICANT CHANGE UP (ref 1.6–2.6)
MCHC RBC-ENTMCNC: 27.7 PG — SIGNIFICANT CHANGE UP (ref 27–34)
MCHC RBC-ENTMCNC: 33.7 GM/DL — SIGNIFICANT CHANGE UP (ref 32–36)
MCV RBC AUTO: 82 FL — SIGNIFICANT CHANGE UP (ref 80–100)
NRBC # BLD: 0 /100 WBCS — SIGNIFICANT CHANGE UP (ref 0–0)
PLATELET # BLD AUTO: 231 K/UL — SIGNIFICANT CHANGE UP (ref 150–400)
POTASSIUM SERPL-MCNC: 3.9 MMOL/L — SIGNIFICANT CHANGE UP (ref 3.5–5.3)
POTASSIUM SERPL-SCNC: 3.9 MMOL/L — SIGNIFICANT CHANGE UP (ref 3.5–5.3)
RBC # BLD: 3 M/UL — LOW (ref 3.8–5.2)
RBC # FLD: 16.7 % — HIGH (ref 10.3–14.5)
SODIUM SERPL-SCNC: 132 MMOL/L — LOW (ref 135–145)
WBC # BLD: 9.9 K/UL — SIGNIFICANT CHANGE UP (ref 3.8–10.5)
WBC # FLD AUTO: 9.9 K/UL — SIGNIFICANT CHANGE UP (ref 3.8–10.5)

## 2018-12-02 PROCEDURE — 99232 SBSQ HOSP IP/OBS MODERATE 35: CPT

## 2018-12-02 RX ORDER — CARVEDILOL PHOSPHATE 80 MG/1
12.5 CAPSULE, EXTENDED RELEASE ORAL ONCE
Qty: 0 | Refills: 0 | Status: COMPLETED | OUTPATIENT
Start: 2018-12-02 | End: 2018-12-02

## 2018-12-02 RX ADMIN — HEPARIN SODIUM 5000 UNIT(S): 5000 INJECTION INTRAVENOUS; SUBCUTANEOUS at 05:42

## 2018-12-02 RX ADMIN — Medication 100 MILLIGRAM(S): at 05:42

## 2018-12-02 RX ADMIN — Medication 4 MILLIGRAM(S): at 21:19

## 2018-12-02 RX ADMIN — Medication 100 MILLIGRAM(S): at 21:19

## 2018-12-02 RX ADMIN — Medication 1 APPLICATION(S): at 05:44

## 2018-12-02 RX ADMIN — Medication 1 APPLICATION(S): at 18:08

## 2018-12-02 RX ADMIN — Medication 325 MILLIGRAM(S): at 09:50

## 2018-12-02 RX ADMIN — CARVEDILOL PHOSPHATE 12.5 MILLIGRAM(S): 80 CAPSULE, EXTENDED RELEASE ORAL at 18:07

## 2018-12-02 RX ADMIN — Medication 100 MILLIGRAM(S): at 14:41

## 2018-12-02 RX ADMIN — CARVEDILOL PHOSPHATE 12.5 MILLIGRAM(S): 80 CAPSULE, EXTENDED RELEASE ORAL at 05:42

## 2018-12-02 RX ADMIN — CARVEDILOL PHOSPHATE 12.5 MILLIGRAM(S): 80 CAPSULE, EXTENDED RELEASE ORAL at 12:35

## 2018-12-02 RX ADMIN — Medication 1 APPLICATION(S): at 11:56

## 2018-12-02 RX ADMIN — Medication 1000 UNIT(S): at 14:41

## 2018-12-02 RX ADMIN — CINACALCET 30 MILLIGRAM(S): 30 TABLET, FILM COATED ORAL at 21:19

## 2018-12-02 RX ADMIN — HEPARIN SODIUM 5000 UNIT(S): 5000 INJECTION INTRAVENOUS; SUBCUTANEOUS at 18:08

## 2018-12-02 NOTE — PROGRESS NOTE ADULT - PROBLEM SELECTOR PLAN 7
patient takes Doxazosin outpatient for her retention  Dr. Eaton (nephrologist) following  recommended bladder scan on 11/29  on bladder scan, patient was found to have +900 on 11/29  urology consulted

## 2018-12-02 NOTE — PROGRESS NOTE ADULT - PROBLEM SELECTOR PLAN 2
Cr elevated at 3.5, likely due to Lasix use and dehydration  -s/p gentle dehydration NS 50ml/hr stop after 12 hours  -Holding diuretics as per cardiology  -Follow up AM BMP  -Dr. Eaton following, bladder scan showing 900+, abbott re-placed 12/1  continue to monitor urine output

## 2018-12-02 NOTE — PROGRESS NOTE ADULT - SUBJECTIVE AND OBJECTIVE BOX
[INTERVAL HX: ]  Patient seen and examined;  Chart reviewed and events noted;   dtr at bedside. Eating little bites.   Drinks occasionally.   Ramos in place     MEDICATIONS  (STANDING):  BACItracin   Ointment 1 Application(s) Topical daily  carvedilol 12.5 milliGRAM(s) Oral every 12 hours  cholecalciferol 1000 Unit(s) Oral <User Schedule>  cinacalcet 30 milliGRAM(s) Oral <User Schedule>  doxazosin 4 milliGRAM(s) Oral <User Schedule>  ferrous    sulfate 325 milliGRAM(s) Oral <User Schedule>  heparin  Injectable 5000 Unit(s) SubCutaneous every 12 hours  hydrALAZINE 100 milliGRAM(s) Oral every 8 hours  petrolatum white Ointment 1 Application(s) Topical two times a day  sodium chloride 0.9%. 1000 milliLiter(s) (50 mL/Hr) IV Continuous <Continuous>    MEDICATIONS  (PRN):      Vital Signs Last 24 Hrs  T(C): 37.2 (02 Dec 2018 15:54), Max: 37.4 (01 Dec 2018 20:20)  T(F): 99 (02 Dec 2018 15:54), Max: 99.4 (01 Dec 2018 20:20)  HR: 70 (02 Dec 2018 15:54) (68 - 84)  BP: 93/58 (02 Dec 2018 15:54) (93/58 - 137/68)  BP(mean): --  RR: 19 (02 Dec 2018 15:54) (16 - 19)  SpO2: 94% (02 Dec 2018 15:54) (90% - 97%)    [PHYSICAL EXAM]  General: adult in NAD,  WN,  WD.  HEENT: clear oropharynx, anicteric sclera, pink conjunctivae.  Neck: supple, no masses.  CV: normal S1S2, no murmur, no rubs, no gallops.  Lungs: clear to auscultation, no wheezes, no rales, no rhonchi.  Abdomen: soft, non-tender, non-distended, no hepatosplenomegaly, normal BS, no guarding.  Ext: no clubbing, no cyanosis, no edema.  Skin: no rashes,  no petechiae, no venous stasis changes.  Neuro: alert and oriented X2, no focal motor deficits.  LN: no EDISON.      [LABS:]                        8.3    9.90  )-----------( 231      ( 02 Dec 2018 07:21 )             24.6     12-02    132<L>  |  95<L>  |  79<H>  ----------------------------<  94  3.9   |  25  |  3.50<H>    Ca    8.0<L>      02 Dec 2018 07:21  Phos  3.8     12-01  Mg     1.9     12-02    TPro  5.9<L>  /  Alb  2.4<L>  /  TBili  0.3  /  DBili  x   /  AST  21  /  ALT  15  /  AlkPhos  61  12-01          [RADIOLOGY STUDIES:]

## 2018-12-02 NOTE — PROGRESS NOTE ADULT - ASSESSMENT
89F with PMH of diastolic CHF (TTE July 2017 EF 65%), HTN, anemia who was brought to the ED with increased dyspnea per her home aid.  She is found to have hyponatremia and SABRINA.  There is no evidence of acute ischemia, acute heart failure, or uncontrolled arrhythmia.    Recommend:  - Continues to be lethargic but has intermittent periods of wakefulness with decreased PO intake.  Continues to receive her medications.      - Anemia s/p 1 unit PRBC divided doses 11/29 with H/H responded appropriately  - Does not appear overloaded monitor closely.   Neg 400 in 24 hours.      - Please continue to maintain strict I/Os, monitor daily weights, Cr,  K and Mag.      - Diuretics on hold for worsening SABRINA.  Pt appears comfortable but on exam has +crackles bilaterally now requiring supplemental O2 for low RA sat.  Receiving gently hydration 2/2 poor PO intake.  Monitor volume status closely.     - Avoid Nephrotoxic agents.    - Hyponatremia improving  - renal f/u     - BP labile but controlled.   - Continue carvedilol and hydralazine at home doses with hold parameters  - No need to repeat echocardiogram    - To follow closely with you while admitted.    - Pt is a DNR/DNI - GOC to be determined 2/2 poor prognosis.  Daughter is considering Home Hospice.  Spoke in length with her wants to speak to Social Work.      Arlene Dubose, NP-C  Cardiology 89F with PMH of diastolic CHF (TTE July 2017 EF 65%), HTN, anemia who was brought to the ED with increased dyspnea per her home aid.  She is found to have hyponatremia and SABRINA.  There is no evidence of acute ischemia, acute heart failure, or uncontrolled arrhythmia.    Recommend:  - Continues to be lethargic but has intermittent periods of wakefulness with decreased PO intake.  Continues to receive her medications.      - Tele with episodes of PAT up to 130s given extra dose of Coreg 12.5mg will monitor.  If cont to have breakthrough tachycardia may consider dosing with Digoxin cautiously 2/2 CKD.  Cont tele for now.      - Anemia s/p 1 unit PRBC divided doses 11/29 with H/H responded appropriately  - Does not appear overloaded monitor closely.   Neg 400 in 24 hours.      - Please continue to maintain strict I/Os, monitor daily weights, Cr,  K and Mag.      - Diuretics on hold for worsening SABRINA.  Pt appears comfortable but on exam has +crackles bilaterally now requiring supplemental O2 for low RA sat.  Receiving gently hydration 2/2 poor PO intake.  Monitor volume status closely.     - Avoid Nephrotoxic agents.    - Hyponatremia improving  - renal f/u     - BP labile but controlled.   - Continue carvedilol and hydralazine at home doses with hold parameters  - No need to repeat echocardiogram    - To follow closely with you while admitted.    - Pt is a DNR/DNI - GOC to be determined 2/2 poor prognosis.  Daughter is considering Home Hospice.  Spoke in length with her wants to speak to Social Work which will most likely happen on Monday.        Arlene Dubsoe, NP-C  Cardiology

## 2018-12-02 NOTE — PROGRESS NOTE ADULT - PROBLEM SELECTOR PLAN 1
-improving, Likely due to dehydration/decreased PO intake and diuretic use  -despite hx of CHF, patient clinically appears dry and is presenting with SABRINA  -s/p gentle hydration NS 50ml/hr, cardio recs appreciated.   -nephrology, Dr. Eaton aware, will also need to avoid rapid correction. Hold diuretics.  -Monitor BMP  -Strict Is and Os  -AM labs  -Nephrology consult Dr. Eaton  -Fall precautions  -Ambulate w assist  -SCD for dvt ppx  -Dr. Norris following

## 2018-12-02 NOTE — PROGRESS NOTE ADULT - ASSESSMENT
89 year old Female with PMH of diastolic CHF (TTE July 2017 EF 65%), HTN, Multifactorial Anemia of Chronic Disease and Iron Deficiency, recent admission to [facility] for Acute CHF exacerbation in the setting of Hyponatremia and SABRINA (was discharged to home) with course complicated by fevers secondary to UTI, Was taking antibiotics for UTI as an outpatient as per her PMD. Presented yesterday with worsening shortness of breath and lethargy, Hyponatremia, SABRINA, and worsening Anemia. Today, patient denies SOB. Drop in Hgb from 8.0 to 7.3. Rise in Na+ from 124-->127.

## 2018-12-02 NOTE — PROGRESS NOTE ADULT - SUBJECTIVE AND OBJECTIVE BOX
Patient is a 89y old  Female who presents with a chief complaint of Shortness of breath (01 Dec 2018 20:51)      INTERVAL HPI/OVERNIGHT EVENTS: Patient seen and examined. NAD. No complaints.    Vital Signs Last 24 Hrs  T(C): 36.9 (02 Dec 2018 07:13), Max: 37.4 (01 Dec 2018 20:20)  T(F): 98.5 (02 Dec 2018 07:13), Max: 99.4 (01 Dec 2018 20:20)  HR: 73 (02 Dec 2018 07:13) (68 - 87)  BP: 124/64 (02 Dec 2018 07:13) (116/68 - 137/73)  BP(mean): --  RR: 18 (02 Dec 2018 07:13) (16 - 18)  SpO2: 90% (02 Dec 2018 07:13) (90% - 97%)    12-02    132<L>  |  95<L>  |  79<H>  ----------------------------<  94  3.9   |  25  |  3.50<H>    Ca    8.0<L>      02 Dec 2018 07:21  Phos  3.8     12-01  Mg     1.9     12-02    TPro  5.9<L>  /  Alb  2.4<L>  /  TBili  0.3  /  DBili  x   /  AST  21  /  ALT  15  /  AlkPhos  61  12-01                          8.3    9.90  )-----------( 231      ( 02 Dec 2018 07:21 )             24.6       CAPILLARY BLOOD GLUCOSE                  BACItracin   Ointment 1 Application(s) Topical daily  carvedilol 12.5 milliGRAM(s) Oral every 12 hours  cholecalciferol 1000 Unit(s) Oral <User Schedule>  cinacalcet 30 milliGRAM(s) Oral <User Schedule>  doxazosin 4 milliGRAM(s) Oral <User Schedule>  ferrous    sulfate 325 milliGRAM(s) Oral <User Schedule>  heparin  Injectable 5000 Unit(s) SubCutaneous every 12 hours  hydrALAZINE 100 milliGRAM(s) Oral every 8 hours  petrolatum white Ointment 1 Application(s) Topical two times a day  sodium chloride 0.9%. 1000 milliLiter(s) IV Continuous <Continuous>              REVIEW OF SYSTEMS:  CONSTITUTIONAL: No fever, no weight loss, or no fatigue  NECK: No pain, no stiffness  RESPIRATORY: No cough, no wheezing, no chills, no hemoptysis, No shortness of breath  CARDIOVASCULAR: No chest pain, no palpitations, no dizziness, no leg swelling  GASTROINTESTINAL: No abdominal pain. No nausea, no vomiting, no hematemesis; No diarrhea, no constipation. No melena, no hematochezia.  GENITOURINARY: No dysuria, no frequency, no hematuria, no incontinence  NEUROLOGICAL: No headaches, no loss of strength, no numbness, no tremors  SKIN: No itching, no burning  MUSCULOSKELETAL: No joint pain, no swelling; No muscle, no back, no extremity pain  PSYCHIATRIC: No depression, no mood swings,   HEME/LYMPH: No easy bruising, no bleeding gums  ALLERY AND IMMUNOLOGIC: No hives       Consultant(s) Notes Reviewed:  [X] YES  [ ] NO    PHYSICAL EXAM:  GENERAL: NAD  HEAD:  Atraumatic, Normocephalic  EYES: EOMI, PERRLA, conjunctiva and sclera clear  ENMT: No tonsillar erythema, exudates, or enlargement; Moist mucous membranes  NECK: Supple, No JVD  NERVOUS SYSTEM:  Awake & alert  CHEST/LUNG: Clear to auscultation bilaterally; No rales, rhonchi, wheezing,  HEART: Regular rate and rhythm  ABDOMEN: Soft, Nontender, Nondistended; Bowel sounds present  EXTREMITIES:  No clubbing, cyanosis, or edema  LYMPH: No lymphadenopathy noted  SKIN: No rashes      Advanced care planning discussed with patient/family [X] YES   [ ] NO    Advanced care planning discussed with patient/family. Advanced care planning forms reviewed/discussed/completed. 20 minutes spent.

## 2018-12-02 NOTE — PROGRESS NOTE ADULT - PROBLEM SELECTOR PLAN 3
Chronic history, sees Dr. Temple as an outpatient, likely anemia of chronic disease v iron def anemia.   -Will continue to monitor.   -type and screen  -Continue home iron  -Dr. Temple, hematologist, consulted. 1 unit of blood given 11/30 by 1/2 units at a time to evaluate for fluid overload.

## 2018-12-02 NOTE — PROGRESS NOTE ADULT - SUBJECTIVE AND OBJECTIVE BOX
[facility] -- Christofer Isaacs, Marisa Rowell Pannella, Patel, Savella  Office # [phone]      Follow Up:  CHF    Subjective/Observations: Seen and examined.  Sitting in chair arouses intermittently resting comfortably but remains lethargic and poor po intake as per daughter.  NAD.        REVIEW OF SYSTEMS: All other review of systems is negative unless indicated above    PAST MEDICAL & SURGICAL HISTORY:  Primary hyperparathyroidism  CHF (congestive heart failure)  Anemia, unspecified type  Edema of lower extremity  Heart murmur  Hypertension  Hypercalcemia  History of appendectomy  S/P tonsillectomy  H/O parathyroidectomy      MEDICATIONS  (STANDING):  BACItracin   Ointment 1 Application(s) Topical daily  carvedilol 12.5 milliGRAM(s) Oral every 12 hours  cholecalciferol 1000 Unit(s) Oral <User Schedule>  cinacalcet 30 milliGRAM(s) Oral <User Schedule>  doxazosin 4 milliGRAM(s) Oral <User Schedule>  ferrous    sulfate 325 milliGRAM(s) Oral <User Schedule>  heparin  Injectable 5000 Unit(s) SubCutaneous every 12 hours  hydrALAZINE 100 milliGRAM(s) Oral every 8 hours  petrolatum white Ointment 1 Application(s) Topical two times a day  sodium chloride 0.9%. 1000 milliLiter(s) (50 mL/Hr) IV Continuous <Continuous>    MEDICATIONS  (PRN):      Allergies    Vasotec (Unknown)    Intolerances            Vital Signs Last 24 Hrs  T(C): 37 (02 Dec 2018 11:27), Max: 37.4 (01 Dec 2018 20:20)  T(F): 98.6 (02 Dec 2018 11:27), Max: 99.4 (01 Dec 2018 20:20)  HR: 72 (02 Dec 2018 11:27) (68 - 87)  BP: 137/68 (02 Dec 2018 11:27) (116/68 - 137/73)  BP(mean): --  RR: 16 (02 Dec 2018 11:27) (16 - 18)  SpO2: 90% (02 Dec 2018 07:13) (90% - 97%)    I&O's Summary    01 Dec 2018 07:01  -  02 Dec 2018 07:00  --------------------------------------------------------  IN: 180 mL / OUT: 1100 mL / NET: -920 mL          PHYSICAL EXAM:  TELE: SR-ST 90s-100s up to 130s  Constitutional: NAD, lethargic with brief periods of wakefulness, frail, cachectic  HEENT: Moist Mucous Membranes, Anicteric  Pulmonary: Non-labored, breath sounds are decreased in bases bilaterally  Cardiovascular: Tachy, S1 and S2, No murmurs, rubs, gallops or clicks  Gastrointestinal: Bowel Sounds present, soft, nontender.   Lymph: No peripheral edema. No lymphadenopathy.  Skin: No visible rashes or ulcers.  Psych:  Lethargic unable to assess    LABS: All Labs Reviewed:                        8.3    9.90  )-----------( 231      ( 02 Dec 2018 07:21 )             24.6                         9.0    10.20 )-----------( 241      ( 01 Dec 2018 06:23 )             26.6                         9.0    8.83  )-----------( 213      ( 30 Nov 2018 07:46 )             25.8     02 Dec 2018 07:21    132    |  95     |  79     ----------------------------<  94     3.9     |  25     |  3.50   01 Dec 2018 06:23    130    |  93     |  75     ----------------------------<  94     4.0     |  25     |  3.40   30 Nov 2018 07:46    127    |  91     |  72     ----------------------------<  97     3.5     |  25     |  3.20     Ca    8.0        02 Dec 2018 07:21  Ca    7.5        01 Dec 2018 06:23  Ca    7.2        30 Nov 2018 07:46  Phos  3.8       01 Dec 2018 06:23  Phos  4.4       30 Nov 2018 07:46  Mg     1.9       02 Dec 2018 07:21  Mg     1.7       01 Dec 2018 06:23  Mg     1.8       30 Nov 2018 07:46    TPro  5.9    /  Alb  2.4    /  TBili  0.3    /  DBili  x      /  AST  21     /  ALT  15     /  AlkPhos  61     01 Dec 2018 06:23  TPro  5.8    /  Alb  2.3    /  TBili  0.4    /  DBili  x      /  AST  22     /  ALT  13     /  AlkPhos  60     30 Nov 2018 07:46    < from: 12 Lead ECG (11.28.18 @ 15:26) >  Ventricular Rate 61 BPM    Atrial Rate 61 BPM    P-R Interval 160 ms    QRS Duration 80 ms    Q-T Interval 422 ms    QTC Calculation(Bezet) 424 ms    P Axis 77 degrees    R Axis -9 degrees    T Axis 33 degrees    Diagnosis Line Normal sinus rhythm  Normal ECG  When compared with ECG of 09-NOV-2018 02:30,  No significant change was found  Confirmed by NIDHI VARELA ([id number]) on 11/29/2018 7:13:49 PM    < end of copied text >  < from: TTE Echo Doppler w/o Cont (07.09.17 @ 08:32) >   EXAM:  ECHO TTE W/O CON COMP W/DOPPLR         PROCEDURE DATE:  07/09/2017        INTERPRETATION:  Ordering Physician: Unknown Doctor    Indication: Hypertension    Study Quality: Technically fair  A complete echocardiographic study was performed utilizing standard   protocol including spectral and color Doppler in all echocardiographic   windows.    Height: 152 cm  Weight: 49 kg  BSA: 1.4 sq m  Blood Pressure: 189/63    MEASUREMENTS  IVS: 1.0cm  PWT: 1.0cm  LA: 4.2cm  AO: 2.7cm  LVIDd: 4.5cm  LVIDs: 3.3cm    LVEF: 65%  RVSP: 54mmHg    FINDINGS  Left Ventricle:  Normal left ventricular systolic function.  Aortic Valve: Calcified trileaflet aortic valve. Mild aortic   insufficiency.  Mitral Valve: Mitral annular calcification and calcified mitral valve   leaflets with normal diastolic opening. Mild mitral insufficiency.  Tricuspid Valve: Normal tricuspid valve. Mild tricuspid insufficiency.  Pulmonic Valve: Normal pulmonic valve. Mild pulmonic insufficiency.  Left Atrium: Mildly enlarged.  Right Ventricle: Normal right ventricular size and systolic function.  Right Atrium: Normal  Diastolic Function: Grade 1 diastolic dysfunction.  Pericardium/Pleura: Normal pericardium with no pericardial effusion.                      MORENA ADORNO M.D., ATTENDING CARDIOLOGIST  This document has been electronically signed. Jul 9 2017 10:13AM        < end of copied text >    < from: Xray Chest 1 View AP/PA (11.28.18 @ 15:27) >  EXAM:  XR CHEST AP OR PA 1V                            PROCEDURE DATE:  11/28/2018          INTERPRETATION:  Clinical information: Shortness of breath    Portable study, 3:19 PM    Comparison exam dated 11/11/2018    Patient tilted towards the left.    Airspace disease left lung base, effusion-consolidation. Airspace disease   right lung base, blunted right costophrenic angle. Heart size appears   enlarged although magnified secondary to portable technique, left cardiac   border obscured by left basilar pathology. Aorta shows atherosclerotic   changes. No acute osseous abnormalities. Calcific bursitis present, left   shoulder. Surgical clips present, right paratracheal region.    IMPRESSION:    See above report                JESSICA VGEA M.D.,ATTENDING RADIOLOGIST  This document has been electronically signed. Nov 28 2018  3:32PM    < end of copied text >

## 2018-12-02 NOTE — PROGRESS NOTE ADULT - ASSESSMENT
Multifactorial anemia related to probable GI blood loss and renal insufficiency. Course also complicated by CHF and deteriorating cardiac status  s/p 1U split PRBC txfusion yesterday. Mild bi-basilar crackles.   Now with ARF/SABRINA. Cr worsening over last 2-3days.     Recommendations:  Dr Temple discussed with daughter 11/29/18. Patient continues to have slowly deteriorating status.  Agree with DNR/DNI.    if renal function continues to deteriorates and Na not improve will continue palliative care with discharge home with hospice.   patient wishes to have her at home.   Cr worsening, today. Ramos placed.   Since retention, to perhaps keep in, and place on hospice.     No additional heme recommendations.  Await further urology recommendation.   Prognosis poor given advanced age.     Dtr aware of pt prognosis, agreeable for hospice, home hospice eval, refuses inpt hospice.

## 2018-12-03 LAB
ANION GAP SERPL CALC-SCNC: 11 MMOL/L — SIGNIFICANT CHANGE UP (ref 5–17)
BLD GP AB SCN SERPL QL: SIGNIFICANT CHANGE UP
BUN SERPL-MCNC: 88 MG/DL — HIGH (ref 7–23)
CALCIUM SERPL-MCNC: 8.4 MG/DL — LOW (ref 8.5–10.1)
CHLORIDE SERPL-SCNC: 96 MMOL/L — SIGNIFICANT CHANGE UP (ref 96–108)
CO2 SERPL-SCNC: 26 MMOL/L — SIGNIFICANT CHANGE UP (ref 22–31)
CREAT SERPL-MCNC: 3.9 MG/DL — HIGH (ref 0.5–1.3)
GLUCOSE SERPL-MCNC: 118 MG/DL — HIGH (ref 70–99)
HCT VFR BLD CALC: 23.3 % — LOW (ref 34.5–45)
HGB BLD-MCNC: 7.8 G/DL — LOW (ref 11.5–15.5)
MAGNESIUM SERPL-MCNC: 2 MG/DL — SIGNIFICANT CHANGE UP (ref 1.6–2.6)
MCHC RBC-ENTMCNC: 27.9 PG — SIGNIFICANT CHANGE UP (ref 27–34)
MCHC RBC-ENTMCNC: 33.5 GM/DL — SIGNIFICANT CHANGE UP (ref 32–36)
MCV RBC AUTO: 83.2 FL — SIGNIFICANT CHANGE UP (ref 80–100)
NRBC # BLD: 0 /100 WBCS — SIGNIFICANT CHANGE UP (ref 0–0)
PLATELET # BLD AUTO: 215 K/UL — SIGNIFICANT CHANGE UP (ref 150–400)
POTASSIUM SERPL-MCNC: 4.1 MMOL/L — SIGNIFICANT CHANGE UP (ref 3.5–5.3)
POTASSIUM SERPL-SCNC: 4.1 MMOL/L — SIGNIFICANT CHANGE UP (ref 3.5–5.3)
RBC # BLD: 2.8 M/UL — LOW (ref 3.8–5.2)
RBC # FLD: 17 % — HIGH (ref 10.3–14.5)
SODIUM SERPL-SCNC: 133 MMOL/L — LOW (ref 135–145)
WBC # BLD: 10.42 K/UL — SIGNIFICANT CHANGE UP (ref 3.8–10.5)
WBC # FLD AUTO: 10.42 K/UL — SIGNIFICANT CHANGE UP (ref 3.8–10.5)

## 2018-12-03 PROCEDURE — 99233 SBSQ HOSP IP/OBS HIGH 50: CPT

## 2018-12-03 RX ORDER — CARVEDILOL PHOSPHATE 80 MG/1
25 CAPSULE, EXTENDED RELEASE ORAL EVERY 12 HOURS
Qty: 0 | Refills: 0 | Status: DISCONTINUED | OUTPATIENT
Start: 2018-12-03 | End: 2018-12-05

## 2018-12-03 RX ORDER — SODIUM CHLORIDE 9 MG/ML
1000 INJECTION, SOLUTION INTRAVENOUS
Qty: 0 | Refills: 0 | Status: DISCONTINUED | OUTPATIENT
Start: 2018-12-03 | End: 2018-12-04

## 2018-12-03 RX ORDER — HYDRALAZINE HCL 50 MG
75 TABLET ORAL EVERY 8 HOURS
Qty: 0 | Refills: 0 | Status: DISCONTINUED | OUTPATIENT
Start: 2018-12-03 | End: 2018-12-05

## 2018-12-03 RX ADMIN — Medication 325 MILLIGRAM(S): at 09:34

## 2018-12-03 RX ADMIN — Medication 1 APPLICATION(S): at 14:41

## 2018-12-03 RX ADMIN — Medication 5 MILLIGRAM(S): at 21:25

## 2018-12-03 RX ADMIN — SODIUM CHLORIDE 50 MILLILITER(S): 9 INJECTION, SOLUTION INTRAVENOUS at 17:39

## 2018-12-03 RX ADMIN — Medication 1 APPLICATION(S): at 17:40

## 2018-12-03 RX ADMIN — CARVEDILOL PHOSPHATE 25 MILLIGRAM(S): 80 CAPSULE, EXTENDED RELEASE ORAL at 17:41

## 2018-12-03 RX ADMIN — Medication 75 MILLIGRAM(S): at 21:25

## 2018-12-03 RX ADMIN — Medication 1 APPLICATION(S): at 05:16

## 2018-12-03 RX ADMIN — CARVEDILOL PHOSPHATE 12.5 MILLIGRAM(S): 80 CAPSULE, EXTENDED RELEASE ORAL at 05:16

## 2018-12-03 RX ADMIN — Medication 75 MILLIGRAM(S): at 14:40

## 2018-12-03 RX ADMIN — HEPARIN SODIUM 5000 UNIT(S): 5000 INJECTION INTRAVENOUS; SUBCUTANEOUS at 17:40

## 2018-12-03 RX ADMIN — SODIUM CHLORIDE 50 MILLILITER(S): 9 INJECTION, SOLUTION INTRAVENOUS at 21:25

## 2018-12-03 RX ADMIN — Medication 4 MILLIGRAM(S): at 21:25

## 2018-12-03 RX ADMIN — Medication 1000 UNIT(S): at 14:40

## 2018-12-03 RX ADMIN — Medication 100 MILLIGRAM(S): at 05:16

## 2018-12-03 RX ADMIN — HEPARIN SODIUM 5000 UNIT(S): 5000 INJECTION INTRAVENOUS; SUBCUTANEOUS at 05:16

## 2018-12-03 RX ADMIN — CINACALCET 30 MILLIGRAM(S): 30 TABLET, FILM COATED ORAL at 21:24

## 2018-12-03 NOTE — CHART NOTE - NSCHARTNOTEFT_GEN_A_CORE
Assessment: Pt seen for malnutrition follow up. Per chart, pt is 89 year old Female with PMH of diastolic CHF, HTN, Multifactorial Anemia of Chronic Disease and Iron Deficiency, [facility] in the setting of Hyponatremia and SABRINA (was discharged to home) with course complicated by fevers secondary to UTI. On this admission, presented with worsening shortness of breath and lethargy for the past few days PTA. Diagnosed with moderate, chronic protein calorie malnutrition 11/30.    Subjective information obtained from daughter at bedside. States patient has not been eating much and is tolerating liquids better than solid food. For breakfast, pt consumed some spoonfuls of oatmeal, 1/4 cup of eggs. She is drinking 1 Ensure Enlive per day on average. Per daughter, pt has no had BM x5 days, added prune juice with lunch and dinner. Pt with some difficulties chewing/swallowing (daughter states she spits up some foods and sometimes refuses to eat altogether). Offered to downgrade diet to soft and pt's daughter declined. Preferences obtained and communicated to diet office to encourage po intake (ice cream, soup, egg salad sandwich etc).      Diet, DASH/TLC:   Sodium & Cholesterol Restricted (11-28-18 @ 17:27)    Intake: variable, 25-50% of meal tray    Current Weight: 92.5 pounds ([facility], 12/3)    Pertinent Medications: MEDICATIONS  (STANDING):  BACItracin   Ointment 1 Application(s) Topical daily  carvedilol 12.5 milliGRAM(s) Oral every 12 hours  cholecalciferol 1000 Unit(s) Oral <User Schedule>  cinacalcet 30 milliGRAM(s) Oral <User Schedule>  doxazosin 4 milliGRAM(s) Oral <User Schedule>  ferrous    sulfate 325 milliGRAM(s) Oral <User Schedule>  heparin  Injectable 5000 Unit(s) SubCutaneous every 12 hours  hydrALAZINE 100 milliGRAM(s) Oral every 8 hours  petrolatum white Ointment 1 Application(s) Topical two times a day  sodium chloride 0.9%. 1000 milliLiter(s) (50 mL/Hr) IV Continuous <Continuous>    MEDICATIONS  (PRN):    Pertinent Labs: 12-03 Na133 mmol/L<L> Glu 118 mg/dL<H> K+ 4.1 mmol/L Cr  3.90 mg/dL<H> BUN 88 mg/dL<H> 12-01 Phos 3.8 mg/dL 12-01 Alb 2.4 g/dL<L>     CAPILLARY BLOOD GLUCOSE      Skin: +1 generalized edema, stage 2 sacrum/L buttocks, stage 2 on coccyx    Estimated Needs:   [x] no change since previous assessment  [ ] recalculated:     Previous Nutrition Diagnosis:   [x] Malnutrition (chronic, moderate)    Nutrition Diagnosis is [X] ongoing  - being addressed with oral nutrition supplements and pt preferences    New Nutrition Diagnosis: [x] not applicable       Interventions:   Recommend  [x] Change Diet To: Liberalize to regular with Ensure Enlive TID (Chocolate, Vanilla, Strawberry) to encourage good po intake. Provide meal assistance/encouragement with focus on small frequent meals as tolerated. RD to provide pt's preferences.   [X] Nutrition Supplement: Add Ensure Enlive TID, pending verification made to covering MD.  [ ] Nutrition Support  [] Other: Multivitamin, Vitamin C 500 mg BID    Monitoring and Evaluation:   [X] PO intake [ x ] Tolerance to diet prescription [ x ] weights [ x ] labs[ x ] follow up per protocol  [X] other: RD to remain available

## 2018-12-03 NOTE — PROGRESS NOTE ADULT - ASSESSMENT
89F with PMH of diastolic CHF (TTE July 2017 EF 65%), HTN, anemia who was brought to the ED with increased dyspnea per her home aid.  She is found to have hyponatremia and SABRINA.  There is no evidence of acute ischemia, acute heart failure, or uncontrolled arrhythmia.    Recommend:  - Awake and alert, no evidence of significant volume overload despite fine rales at bases, which seems to be chronic  - Tele with episodes of PAT up to 130s overnight.  Will increase Coreg and decrease Hydralazine.  BP is stable at 130's-150's  - Continue to monitor on tele  - Anemia s/p 1 unit PRBC divided doses 11/29 with H/H responded appropriately.  Today's = 7.8/23.3.  Transfuse per primary/Heme.  Recommend to give in half unit. May need diuretic in between  - Please continue to maintain strict I/Os, monitor daily weights.  Net negative 500 cc/24H.      - SABRINA continues to worsen (3.9 <--3.5).  Avoid nephrotoxics. Continue to hold diuretic.  Renal following.  Okay with gentle hydration with close monitoring for fluid overload  - Diuretics on hold for worsening SABRINA.  Pt appears comfortable but on exam has +crackles bilaterally now requiring supplemental O2 for low RA sat.  Receiving gently hydration 2/2 poor PO intake.  Monitor volume status closely.     - Hyponatremia improving  - No need to repeat echocardiogram  - Pt is a DNR/DNI - GOC to be determined 2/2 poor prognosis.  Daughter agrees with Hospice but thinks she will be unable to care for her at home.    - To follow closely with you while admitted.    Tiffany Sim NP  Cardiology 89F with PMH of diastolic CHF (TTE July 2017 EF 65%), HTN, anemia who was brought to the ED with increased dyspnea per her home aid.  She is found to have hyponatremia and SABRINA.  There is no evidence of acute ischemia, acute heart failure, or uncontrolled arrhythmia.    Recommend:  - Lethargic but arousable, no evidence of significant volume overload despite fine rales at bases, which seems to be chronic  - Tele with episodes of PAT up to 130s overnight.  Will increase Coreg and decrease Hydralazine.  BP is stable at 130's-150's  - Continue to monitor on tele  - Anemia s/p 1 unit PRBC divided doses 11/29 with H/H responded appropriately.  Today's = 7.8/23.3.  Transfuse per primary/Heme.    - At this point I think PRBC would not change clinical course. In fact pt may go in to worsening ADHF and would likely increase her need for more diuretics which may in turn worsen her Renal failure.   - Please continue to maintain strict I/Os, monitor daily weights.  Net negative 500 cc/24H.      - SABRINA continues to worsen (3.9 <--3.5).  Avoid nephrotoxics. Continue to hold diuretic.  Renal following.  Okay with gentle hydration with close monitoring for fluid overload  - Diuretics on hold for worsening SABRINA.  Pt appears comfortable but on exam has +crackles bilaterally now requiring supplemental O2 for low RA sat.  Receiving gently hydration 2/2 poor PO intake.  Monitor volume status closely.     - Hyponatremia improving  - No need to repeat echocardiogram  - Pt is a DNR/DNI - GOC to be determined 2/2 poor prognosis.  Daughter agrees with Hospice but thinks she will be unable to care for her at home.    - To follow closely with you while admitted.    Tiffany Sim NP  Cardiology

## 2018-12-03 NOTE — PROGRESS NOTE ADULT - PROBLEM SELECTOR PLAN 1
-improving, Likely due to dehydration/decreased PO intake and diuretic use  -despite hx of CHF, patient clinically appears dry and is presenting with SABRINA  -s/p gentle hydration NS 50ml/hr, cardio recs appreciated.   -nephrology, Dr. Eaton aware, will also need to avoid rapid correction. Hold diuretics.  -Monitor BMP  -Strict Is and Os  -AM labs  -Nephrology consult Dr. Eaton  -Fall precautions  -Ambulate w assist  -SCD for dvt ppx  -Dr. Norris following -despite hx of CHF, patient clinically appears dry and is presenting with SABRINA  -s/p gentle hydration NS 50ml/hr, cardio recs appreciated. Will consider additional hydration; will discuss with team  -nephrology, Dr. Eaton aware, will also need to avoid rapid correction. Hold diuretics.  -Monitor BMP  -Strict Is and Os  -AM labs  -Nephrology consult Dr. Eaton  -Fall precautions  -Ambulate w assist  -SCD for dvt ppx  -Dr. Norris following -despite hx of CHF, patient clinically appears dry and is presenting with SABRINA  -s/p gentle hydration NS 50ml/hr on day of admission, cardio recs appreciated. Will consider additional hydration; will discuss with team  -nephrology, Dr. Eaton aware, will also need to avoid rapid correction. Hold diuretics.  -Monitor BMP  -Strict Is and Os  -AM labs  -Dr. Eaton following  -Fall precautions  -Ambulate w assist  -SCD for dvt ppx  -Cardio following

## 2018-12-03 NOTE — PROGRESS NOTE ADULT - ASSESSMENT
89 yo woman Multifactorial anemia related to probable GI blood loss and renal insufficiency. Course also complicated by CHF and deteriorating cardiac status; patient admitted in early November for aspiration pneumonia and discharged home, returned on 11/28/18 with worsening dyspnea and found to be in acute renal failure. Continued intermittent anemia with need for periodic transfusion (last 11/29/18)    Recommendations:  - patient is DNR/DNI; had extensive conversation with daughter about goals of care; she understands that patient is deteriorating but wishes to continue lab draws and transfusions as clinically indicated  - Ramos catheter replaced draining clear urine  - renal function continues to worsen; unable to get additional diuretics given worsening renal failure  - patient's daughter agreeable to hospice eval but does not wish to take patient home at this juncture as she feels she will be unable to care for her  - Prognosis poor given advanced age and multiple chronic comorbid conditions

## 2018-12-03 NOTE — PROGRESS NOTE ADULT - ASSESSMENT
1.	Hyponatremia  2.	SABRINA  3.	Hypertension  4.	Anemia  5.	Primary hyperparathyroidism, On sensipar, Low calcium    Gentle IV hydration. Monitor I & O. Monitor BP trend. Titrate BP meds as needed. Salt restriction.   Monitor h/h trend. PRBC transfusion. D/w pt's daughter at bedside. Will follow electrolytes and renal function trend.   Overall prognosis poor. Supportive care.

## 2018-12-03 NOTE — PROGRESS NOTE ADULT - SUBJECTIVE AND OBJECTIVE BOX
Patient is a 89y old  Female who presents with a chief complaint of Shortness of breath (01 Dec 2018 20:51)      INTERVAL HPI/OVERNIGHT EVENTS: Patient seen and examined. NAD. No complaints.    Vital Signs Last 24 Hrs  T(C): 36.9 (02 Dec 2018 07:13), Max: 37.4 (01 Dec 2018 20:20)  T(F): 98.5 (02 Dec 2018 07:13), Max: 99.4 (01 Dec 2018 20:20)  HR: 73 (02 Dec 2018 07:13) (68 - 87)  BP: 124/64 (02 Dec 2018 07:13) (116/68 - 137/73)  BP(mean): --  RR: 18 (02 Dec 2018 07:13) (16 - 18)  SpO2: 90% (02 Dec 2018 07:13) (90% - 97%)    12-02    132<L>  |  95<L>  |  79<H>  ----------------------------<  94  3.9   |  25  |  3.50<H>    Ca    8.0<L>      02 Dec 2018 07:21  Phos  3.8     12-01  Mg     1.9     12-02    TPro  5.9<L>  /  Alb  2.4<L>  /  TBili  0.3  /  DBili  x   /  AST  21  /  ALT  15  /  AlkPhos  61  12-01                          8.3    9.90  )-----------( 231      ( 02 Dec 2018 07:21 )             24.6       CAPILLARY BLOOD GLUCOSE                  BACItracin   Ointment 1 Application(s) Topical daily  carvedilol 12.5 milliGRAM(s) Oral every 12 hours  cholecalciferol 1000 Unit(s) Oral <User Schedule>  cinacalcet 30 milliGRAM(s) Oral <User Schedule>  doxazosin 4 milliGRAM(s) Oral <User Schedule>  ferrous    sulfate 325 milliGRAM(s) Oral <User Schedule>  heparin  Injectable 5000 Unit(s) SubCutaneous every 12 hours  hydrALAZINE 100 milliGRAM(s) Oral every 8 hours  petrolatum white Ointment 1 Application(s) Topical two times a day  sodium chloride 0.9%. 1000 milliLiter(s) IV Continuous <Continuous>              REVIEW OF SYSTEMS:  CONSTITUTIONAL: No fever, no weight loss, or no fatigue  NECK: No pain, no stiffness  RESPIRATORY: No cough, no wheezing, no chills, no hemoptysis, No shortness of breath  CARDIOVASCULAR: No chest pain, no palpitations, no dizziness, no leg swelling  GASTROINTESTINAL: No abdominal pain. No nausea, no vomiting, no hematemesis; No diarrhea, no constipation. No melena, no hematochezia.  GENITOURINARY: No dysuria, no frequency, no hematuria, no incontinence  NEUROLOGICAL: No headaches, no loss of strength, no numbness, no tremors  SKIN: No itching, no burning  MUSCULOSKELETAL: No joint pain, no swelling; No muscle, no back, no extremity pain  PSYCHIATRIC: No depression, no mood swings,   HEME/LYMPH: No easy bruising, no bleeding gums  ALLERY AND IMMUNOLOGIC: No hives       Consultant(s) Notes Reviewed:  [X] YES  [ ] NO    PHYSICAL EXAM:  GENERAL: NAD  HEAD:  Atraumatic, Normocephalic  EYES: EOMI, PERRLA, conjunctiva and sclera clear  ENMT: No tonsillar erythema, exudates, or enlargement; Moist mucous membranes  NECK: Supple, No JVD  NERVOUS SYSTEM:  Awake & alert  CHEST/LUNG: Clear to auscultation bilaterally; No rales, rhonchi, wheezing,  HEART: Regular rate and rhythm  ABDOMEN: Soft, Nontender, Nondistended; Bowel sounds present  EXTREMITIES:  No clubbing, cyanosis, or edema  LYMPH: No lymphadenopathy noted  SKIN: No rashes      Advanced care planning discussed with patient/family [X] YES   [ ] NO    Advanced care planning discussed with patient/family. Advanced care planning forms reviewed/discussed/completed. 20 minutes spent. Patient is a 89y old  Female who presents with a chief complaint of Shortness of breath (01 Dec 2018 20:51)      INTERVAL HPI/OVERNIGHT EVENTS: Patient seen and examined. NAD. No complaints. lll    Vital Signs Last 24 Hrs  T(C): 36.9 (02 Dec 2018 07:13), Max: 37.4 (01 Dec 2018 20:20)  T(F): 98.5 (02 Dec 2018 07:13), Max: 99.4 (01 Dec 2018 20:20)  HR: 73 (02 Dec 2018 07:13) (68 - 87)  BP: 124/64 (02 Dec 2018 07:13) (116/68 - 137/73)  BP(mean): --  RR: 18 (02 Dec 2018 07:13) (16 - 18)  SpO2: 90% (02 Dec 2018 07:13) (90% - 97%)    12-02    132<L>  |  95<L>  |  79<H>  ----------------------------<  94  3.9   |  25  |  3.50<H>    Ca    8.0<L>      02 Dec 2018 07:21  Phos  3.8     12-01  Mg     1.9     12-02    TPro  5.9<L>  /  Alb  2.4<L>  /  TBili  0.3  /  DBili  x   /  AST  21  /  ALT  15  /  AlkPhos  61  12-01                          8.3    9.90  )-----------( 231      ( 02 Dec 2018 07:21 )             24.6       CAPILLARY BLOOD GLUCOSE                  BACItracin   Ointment 1 Application(s) Topical daily  carvedilol 12.5 milliGRAM(s) Oral every 12 hours  cholecalciferol 1000 Unit(s) Oral <User Schedule>  cinacalcet 30 milliGRAM(s) Oral <User Schedule>  doxazosin 4 milliGRAM(s) Oral <User Schedule>  ferrous    sulfate 325 milliGRAM(s) Oral <User Schedule>  heparin  Injectable 5000 Unit(s) SubCutaneous every 12 hours  hydrALAZINE 100 milliGRAM(s) Oral every 8 hours  petrolatum white Ointment 1 Application(s) Topical two times a day  sodium chloride 0.9%. 1000 milliLiter(s) IV Continuous <Continuous>              REVIEW OF SYSTEMS:  CONSTITUTIONAL: No fever, no weight loss, or no fatigue  NECK: No pain, no stiffness  RESPIRATORY: No cough, no wheezing, no chills, no hemoptysis, No shortness of breath  CARDIOVASCULAR: No chest pain, no palpitations, no dizziness, no leg swelling  GASTROINTESTINAL: No abdominal pain. No nausea, no vomiting, no hematemesis; No diarrhea, no constipation. No melena, no hematochezia.  GENITOURINARY: No dysuria, no frequency, no hematuria, no incontinence  NEUROLOGICAL: No headaches, no loss of strength, no numbness, no tremors  SKIN: No itching, no burning  MUSCULOSKELETAL: No joint pain, no swelling; No muscle, no back, no extremity pain  PSYCHIATRIC: No depression, no mood swings,   HEME/LYMPH: No easy bruising, no bleeding gums  ALLERY AND IMMUNOLOGIC: No hives       Consultant(s) Notes Reviewed:  [X] YES  [ ] NO    PHYSICAL EXAM:  GENERAL: NAD  HEAD:  Atraumatic, Normocephalic  EYES: EOMI, PERRLA, conjunctiva and sclera clear  ENMT: No tonsillar erythema, exudates, or enlargement; Moist mucous membranes  NECK: Supple, No JVD  NERVOUS SYSTEM:  Awake & alert  CHEST/LUNG: Clear to auscultation bilaterally; No rales, rhonchi, wheezing,  HEART: Regular rate and rhythm  ABDOMEN: Soft, Nontender, Nondistended; Bowel sounds present  EXTREMITIES:  No clubbing, cyanosis, or edema  LYMPH: No lymphadenopathy noted  SKIN: No rashes      Advanced care planning discussed with patient/family [X] YES   [ ] NO    Advanced care planning discussed with patient/family. Advanced care planning forms reviewed/discussed/completed. 20 minutes spent. Patient is a 89y old  Female who presents with a chief complaint of Shortness of breath (01 Dec 2018 20:51)      INTERVAL HPI/OVERNIGHT EVENTS: Patient seen and examined this morning. Patient was seated in the chair and has no acute complaints this morning. Patient was on 2L nasal cannula. Patient was taken off O2 and sat dropped in 90%. Patient was put on 1L nasal cannula and sat increased to 95%. Spoke to daughter who states that she is barely eating or drinking. Daughter educated on end of life concerns/decisions and advance care planning. Patient's daughter understands this. Patient has not had a BM in a few days. Patient has a abbott in place that is draining well.     Vital Signs Last 24 Hrs  T(C): 36.9 (02 Dec 2018 07:13), Max: 37.4 (01 Dec 2018 20:20)  T(F): 98.5 (02 Dec 2018 07:13), Max: 99.4 (01 Dec 2018 20:20)  HR: 73 (02 Dec 2018 07:13) (68 - 87)  BP: 124/64 (02 Dec 2018 07:13) (116/68 - 137/73)  BP(mean): --  RR: 18 (02 Dec 2018 07:13) (16 - 18)  SpO2: 90% (02 Dec 2018 07:13) (90% - 97%)    12-02    132<L>  |  95<L>  |  79<H>  ----------------------------<  94  3.9   |  25  |  3.50<H>    Ca    8.0<L>      02 Dec 2018 07:21  Phos  3.8     12-01  Mg     1.9     12-02    TPro  5.9<L>  /  Alb  2.4<L>  /  TBili  0.3  /  DBili  x   /  AST  21  /  ALT  15  /  AlkPhos  61  12-01                          8.3    9.90  )-----------( 231      ( 02 Dec 2018 07:21 )             24.6       CAPILLARY BLOOD GLUCOSE      BACItracin   Ointment 1 Application(s) Topical daily  carvedilol 12.5 milliGRAM(s) Oral every 12 hours  cholecalciferol 1000 Unit(s) Oral <User Schedule>  cinacalcet 30 milliGRAM(s) Oral <User Schedule>  doxazosin 4 milliGRAM(s) Oral <User Schedule>  ferrous    sulfate 325 milliGRAM(s) Oral <User Schedule>  heparin  Injectable 5000 Unit(s) SubCutaneous every 12 hours  hydrALAZINE 100 milliGRAM(s) Oral every 8 hours  petrolatum white Ointment 1 Application(s) Topical two times a day  sodium chloride 0.9%. 1000 milliLiter(s) IV Continuous <Continuous>      REVIEW OF SYSTEMS: Unable to obtain ROS due to patients condition      Consultant(s) Notes Reviewed:  [X] YES  [ ] NO    PHYSICAL EXAM:  GENERAL: NAD  HEAD:  Atraumatic, Normocephalic  EYES: EOMI, PERRLA, conjunctiva and sclera clear  ENMT: No tonsillar erythema, exudates, or enlargement; Moist mucous membranes L upper lip sore. crusted in appearance  NECK: Supple, No JVD  NERVOUS SYSTEM:  Awake & alert  CHEST/LUNG: Clear to auscultation bilaterally; No rales, rhonchi, wheezing,  HEART: Regular rate and rhythm  ABDOMEN: Soft, Nontender, Nondistended; Bowel sounds present  EXTREMITIES:  No clubbing, cyanosis, or edema  LYMPH: No lymphadenopathy noted  SKIN: No rashes          Advanced care planning discussed with patient/family [X] YES   [ ] NO    Advanced care planning discussed with patient/family. Advanced care planning forms reviewed/discussed/completed. 20 minutes spent. Patient is a 89y old  Female who presents with a chief complaint of Shortness of breath (01 Dec 2018 20:51)      INTERVAL HPI/OVERNIGHT EVENTS: Patient seen and examined this morning. Patient was seated in the chair and has no acute complaints this morning. Patient was on 2L nasal cannula. Patient was taken off O2 and sat dropped in 90%. Patient was put on 1L nasal cannula and sat increased to 95%. Spoke to daughter who states that she is barely eating or drinking. Daughter educated on end of life concerns/decisions and advance care planning. Patient's daughter understands this. Patient has not had a BM in a few days. Patient has a abbott in place that is draining well.     Vital Signs Last 24 Hrs  T(C): 36.7 (03 Dec 2018 07:07), Max: 37.2 (02 Dec 2018 15:54)  T(F): 98.1 (03 Dec 2018 07:07), Max: 99 (02 Dec 2018 15:54)  HR: 67 (03 Dec 2018 07:07) (66 - 112)  BP: 127/69 (03 Dec 2018 07:07) (93/58 - 155/52)  BP(mean): --  RR: 17 (03 Dec 2018 07:07) (16 - 19)    LABS:                        7.8    10.42 )-----------( 215      ( 03 Dec 2018 06:13 )             23.3     12-03    133<L>  |  96  |  88<H>  ----------------------------<  118<H>  4.1   |  26  |  3.90<H>    Ca    8.4<L>      03 Dec 2018 06:13  Mg     2.0     12-03      BACItracin   Ointment 1 Application(s) Topical daily  carvedilol 12.5 milliGRAM(s) Oral every 12 hours  cholecalciferol 1000 Unit(s) Oral <User Schedule>  cinacalcet 30 milliGRAM(s) Oral <User Schedule>  doxazosin 4 milliGRAM(s) Oral <User Schedule>  ferrous    sulfate 325 milliGRAM(s) Oral <User Schedule>  heparin  Injectable 5000 Unit(s) SubCutaneous every 12 hours  hydrALAZINE 100 milliGRAM(s) Oral every 8 hours  petrolatum white Ointment 1 Application(s) Topical two times a day  sodium chloride 0.9%. 1000 milliLiter(s) IV Continuous <Continuous>      REVIEW OF SYSTEMS: Unable to obtain ROS due to patients condition    Consultant(s) Notes Reviewed:  [X] YES  [ ] NO    PHYSICAL EXAM:  GENERAL: NAD  HEAD:  Atraumatic, Normocephalic  EYES: EOMI, PERRLA, conjunctiva and sclera clear  ENMT: No tonsillar erythema, exudates, or enlargement; Moist mucous membranes L upper lip sore. crusted in appearance  NECK: Supple, No JVD  NERVOUS SYSTEM:  Awake & alert  CHEST/LUNG: lung sounds equal b/l, diffuse rales b/l, no rhonchi or wheezing  HEART: Regular rate and rhythm, no murmurs, rubs, or gallops  ABDOMEN: Soft, Nontender, Nondistended; Bowel sounds present  EXTREMITIES:  No clubbing, cyanosis, or edema  LYMPH: No lymphadenopathy noted  SKIN: No new rashes    Advanced care planning discussed with patient/family [X] YES   [ ] NO    Advanced care planning discussed with patient/family. Advanced care planning forms reviewed/discussed/completed. 20 minutes spent.

## 2018-12-03 NOTE — PROGRESS NOTE ADULT - ASSESSMENT
89 year old Female with PMH of diastolic CHF (TTE July 2017 EF 65%), HTN, Multifactorial Anemia of Chronic Disease and Iron Deficiency, recent admission to [facility] for Acute CHF exacerbation in the setting of Hyponatremia and SABRINA (was discharged to home) with course complicated by fevers secondary to UTI, Was taking antibiotics for UTI as an outpatient as per her PMD. Presented yesterday with worsening shortness of breath and lethargy, Hyponatremia, SABRINA, and worsening Anemia. Today, patient denies SOB. Drop in Hgb from 8.0 to 7.3. Rise in Na+ from 124-->127. 89 year old Female with PMH of diastolic CHF (TTE July 2017 EF 65%), HTN, Multifactorial Anemia of Chronic Disease and Iron Deficiency, recent admission to [facility] for Acute CHF exacerbation in the setting of Hyponatremia and SABRINA (was discharged to home) with course complicated by fevers secondary to UTI, Was taking antibiotics for UTI as an outpatient as per her PMD. Presented with worsening shortness of breath and lethargy, Hyponatremia, SABRINA, and worsening Anemia. Today, patient has no acute complaints. Drop in Hgb from 8.3 to 7.8. Rise in Creatinine from 3.50 --> 3.90 Rise in Na+ from 132 --> 133.

## 2018-12-03 NOTE — PROGRESS NOTE ADULT - SUBJECTIVE AND OBJECTIVE BOX
Patient seen and examined;  Chart reviewed and events noted;   sitting in chair; lethargic but easily arousable; daughter at bedside      MEDICATIONS  (STANDING):  BACItracin   Ointment 1 Application(s) Topical daily  carvedilol 12.5 milliGRAM(s) Oral every 12 hours  cholecalciferol 1000 Unit(s) Oral <User Schedule>  cinacalcet 30 milliGRAM(s) Oral <User Schedule>  doxazosin 4 milliGRAM(s) Oral <User Schedule>  ferrous    sulfate 325 milliGRAM(s) Oral <User Schedule>  heparin  Injectable 5000 Unit(s) SubCutaneous every 12 hours  hydrALAZINE 100 milliGRAM(s) Oral every 8 hours  petrolatum white Ointment 1 Application(s) Topical two times a day  sodium chloride 0.9%. 1000 milliLiter(s) (50 mL/Hr) IV Continuous <Continuous>      Vital Signs Last 24 Hrs  T(C): 36.7 (03 Dec 2018 07:07), Max: 37.2 (02 Dec 2018 15:54)  T(F): 98.1 (03 Dec 2018 07:07), Max: 99 (02 Dec 2018 15:54)  HR: 67 (03 Dec 2018 07:07) (66 - 112)  BP: 127/69 (03 Dec 2018 07:07) (93/58 - 155/52)  RR: 17 (03 Dec 2018 07:07) (16 - 19)  SpO2: 93% (03 Dec 2018 07:07) (93% - 95%)    PHYSICAL EXAM  General: adult frail elderly woman in [facility]  HEENT: clear oropharynx, anicteric sclera, pink conjunctivae  Neck: supple  CV: normal S1S2   Lungs: reduced breath sounds at bases  Abdomen: soft non-tender; minimally distended  Ext: no clubbing cyanosis or edema  Skin: no rashes and no petichiae    LABS:                        7.8    10.42 )-----------( 215      ( 03 Dec 2018 06:13 )             23.3     Hemoglobin: 7.8 g/dL (12-03 @ 06:13)  Hemoglobin: 8.3 g/dL (12-02 @ 07:21)  Hemoglobin: 9.0 g/dL (12-01 @ 06:23)  Hemoglobin: 9.0 g/dL (11-30 @ 07:46)  Hemoglobin: 9.4 g/dL (11-29 @ 17:14)    12-03    133<L>  |  96  |  88<H>  ----------------------------<  118<H>  4.1   |  26  |  3.90<H>    Ca    8.4<L>      03 Dec 2018 06:13  Mg     2.0     12-03

## 2018-12-03 NOTE — PROGRESS NOTE ADULT - PROBLEM SELECTOR PLAN 7
patient takes Doxazosin outpatient for her retention  Dr. Eaton (nephrologist) following  recommended bladder scan on 11/29  on bladder scan, patient was found to have +900 on 11/29  urology consulted patient takes Doxazosin outpatient for her retention  Dr. Eaton (nephrologist) following  urology consulted  abbtot in place  continue monitoring I's and O's

## 2018-12-03 NOTE — PROGRESS NOTE ADULT - SUBJECTIVE AND OBJECTIVE BOX
[facility] -- Christofer Isaacs, Marisa Rowell Pannella, Patel, Savella  Office # [phone]    Follow Up:  SOB/tachycardia    Subjective/Observations: Sitting on the chair, nasal cannula in use.  Unable to provide ROS but not in any discomfort.    REVIEW OF SYSTEMS: All other review of systems is negative unless indicated above    PAST MEDICAL & SURGICAL HISTORY:  Primary hyperparathyroidism  CHF (congestive heart failure)  Anemia, unspecified type  Edema of lower extremity  Heart murmur  Hypertension  Hypercalcemia  History of appendectomy  S/P tonsillectomy  H/O parathyroidectomy    MEDICATIONS  (STANDING):  BACItracin   Ointment 1 Application(s) Topical daily  bisacodyl 5 milliGRAM(s) Oral at bedtime  carvedilol 12.5 milliGRAM(s) Oral every 12 hours  cholecalciferol 1000 Unit(s) Oral <User Schedule>  cinacalcet 30 milliGRAM(s) Oral <User Schedule>  doxazosin 4 milliGRAM(s) Oral <User Schedule>  ferrous    sulfate 325 milliGRAM(s) Oral <User Schedule>  heparin  Injectable 5000 Unit(s) SubCutaneous every 12 hours  hydrALAZINE 100 milliGRAM(s) Oral every 8 hours  petrolatum white Ointment 1 Application(s) Topical two times a day  sodium chloride 0.9%. 1000 milliLiter(s) (50 mL/Hr) IV Continuous <Continuous>    MEDICATIONS  (PRN):    Allergies    Vasotec (Unknown)    Intolerances    Vital Signs Last 24 Hrs  T(C): 36.6 (03 Dec 2018 11:05), Max: 37.2 (02 Dec 2018 15:54)  T(F): 97.9 (03 Dec 2018 11:05), Max: 99 (02 Dec 2018 15:54)  HR: 68 (03 Dec 2018 11:05) (66 - 112)  BP: 143/55 (03 Dec 2018 11:05) (93/58 - 155/52)  BP(mean): --  RR: 17 (03 Dec 2018 11:05) (16 - 19)  SpO2: 95% (03 Dec 2018 11:05) (93% - 95%)    I&O's Summary    02 Dec 2018 07:01  -  03 Dec 2018 07:00  --------------------------------------------------------  IN: 0 mL / OUT: 500 mL / NET: -500 mL    PHYSICAL EXAM:  TELE: NSR w/ episodes of PSVT overnight  Constitutional: NAD, awake and alert, cachectic  HEENT: Moist Mucous Membranes, Anicteric  Pulmonary: Non-labored, breath sounds are diminished bilaterally, No wheezing or rhonchi. + fine rales at bases  Cardiovascular: Regular, S1 and S2, No murmurs, rubs, gallops or clicks  Gastrointestinal: Bowel Sounds present, soft, nontender.   Lymph: No peripheral edema. No lymphadenopathy.  Skin: No visible rashes or ulcers. + ecchymoses UE  Psych:  Mood & affect appropriate    LABS: All Labs Reviewed:                        7.8    10.42 )-----------( 215      ( 03 Dec 2018 06:13 )             23.3                         8.3    9.90  )-----------( 231      ( 02 Dec 2018 07:21 )             24.6                         9.0    10.20 )-----------( 241      ( 01 Dec 2018 06:23 )             26.6     03 Dec 2018 06:13    133    |  96     |  88     ----------------------------<  118    4.1     |  26     |  3.90   02 Dec 2018 07:21    132    |  95     |  79     ----------------------------<  94     3.9     |  25     |  3.50   01 Dec 2018 06:23    130    |  93     |  75     ----------------------------<  94     4.0     |  25     |  3.40     Ca    8.4        03 Dec 2018 06:13  Ca    8.0        02 Dec 2018 07:21  Ca    7.5        01 Dec 2018 06:23  Phos  3.8       01 Dec 2018 06:23  Mg     2.0       03 Dec 2018 06:13  Mg     1.9       02 Dec 2018 07:21  Mg     1.7       01 Dec 2018 06:23    TPro  5.9    /  Alb  2.4    /  TBili  0.3    /  DBili  x      /  AST  21     /  ALT  15     /  AlkPhos  61     01 Dec 2018 06:23    < from: TTE Echo Doppler w/o Cont (07.09.17 @ 08:32) >     EXAM:  ECHO TTE W/O CON COMP W/DOPPLR         PROCEDURE DATE:  07/09/2017        INTERPRETATION:  Ordering Physician: Unknown Doctor    Indication: Hypertension    Study Quality: Technically fair  A complete echocardiographic study was performed utilizing standard   protocol including spectral and color Doppler in all echocardiographic   windows.    Height: 152 cm  Weight: 49 kg  BSA: 1.4 sq m  Blood Pressure: 189/63    MEASUREMENTS  IVS: 1.0cm  PWT: 1.0cm  LA: 4.2cm  AO: 2.7cm  LVIDd: 4.5cm  LVIDs: 3.3cm    LVEF: 65%  RVSP: 54mmHg    FINDINGS  Left Ventricle:  Normal left ventricular systolic function.  Aortic Valve: Calcified trileaflet aortic valve. Mild aortic   insufficiency.  Mitral Valve: Mitral annular calcification and calcified mitral valve   leaflets with normal diastolic opening. Mild mitral insufficiency.  Tricuspid Valve: Normal tricuspid valve. Mild tricuspid insufficiency.  Pulmonic Valve: Normal pulmonic valve. Mild pulmonic insufficiency.  Left Atrium: Mildly enlarged.  Right Ventricle: Normal right ventricular size and systolic function.  Right Atrium: Normal  Diastolic Function: Grade 1 diastolic dysfunction.  Pericardium/Pleura: Normal pericardium with no pericardial effusion    MORENA ADORNO M.D., ATTENDING CARDIOLOGIST  This document has been electronically signed. Jul 9 2017 10:13AM    < end of copied text >    < from: Xray Chest 1 View AP/PA (11.28.18 @ 15:27) >    EXAM:  XR CHEST AP OR PA 1V                          PROCEDURE DATE:  11/28/2018      INTERPRETATION:  Clinical information: Shortness of breath    Portable study, 3:19 PM    Comparison exam dated 11/11/2018    Patient tilted towards the left.    Airspace disease left lung base, effusion-consolidation. Airspace disease   right lung base, blunted right costophrenic angle. Heart size appears   enlarged although magnified secondary to portable technique, left cardiac   border obscured by left basilar pathology. Aorta shows atherosclerotic   changes. No acute osseous abnormalities. Calcific bursitis present, left   shoulder. Surgical clips present, right paratracheal region.    IMPRESSION:    See above report    JESSICA VEGA M.D.,ATTENDING RADIOLOGIST  This document has been electronically signed. Nov 28 2018  3:32PM      < end of copied text > [facility] -- Christofer Isaacs, Marisa Rowell Pannella, Patel, Savella  Office # [phone]    Follow Up:  SOB/tachycardia    Subjective/Observations: Sitting on the chair, nasal cannula in use. lethargic but arousable  Unable to provide ROS but not in any discomfort.    REVIEW OF SYSTEMS: Limited 2/2 comorbidities     PAST MEDICAL & SURGICAL HISTORY:  Primary hyperparathyroidism  CHF (congestive heart failure)  Anemia, unspecified type  Edema of lower extremity  Heart murmur  Hypertension  Hypercalcemia  History of appendectomy  S/P tonsillectomy  H/O parathyroidectomy    MEDICATIONS  (STANDING):  BACItracin   Ointment 1 Application(s) Topical daily  bisacodyl 5 milliGRAM(s) Oral at bedtime  carvedilol 12.5 milliGRAM(s) Oral every 12 hours  cholecalciferol 1000 Unit(s) Oral <User Schedule>  cinacalcet 30 milliGRAM(s) Oral <User Schedule>  doxazosin 4 milliGRAM(s) Oral <User Schedule>  ferrous    sulfate 325 milliGRAM(s) Oral <User Schedule>  heparin  Injectable 5000 Unit(s) SubCutaneous every 12 hours  hydrALAZINE 100 milliGRAM(s) Oral every 8 hours  petrolatum white Ointment 1 Application(s) Topical two times a day  sodium chloride 0.9%. 1000 milliLiter(s) (50 mL/Hr) IV Continuous <Continuous>    MEDICATIONS  (PRN):    Allergies    Vasotec (Unknown)    Intolerances    Vital Signs Last 24 Hrs  T(C): 36.6 (03 Dec 2018 11:05), Max: 37.2 (02 Dec 2018 15:54)  T(F): 97.9 (03 Dec 2018 11:05), Max: 99 (02 Dec 2018 15:54)  HR: 68 (03 Dec 2018 11:05) (66 - 112)  BP: 143/55 (03 Dec 2018 11:05) (93/58 - 155/52)  BP(mean): --  RR: 17 (03 Dec 2018 11:05) (16 - 19)  SpO2: 95% (03 Dec 2018 11:05) (93% - 95%)    I&O's Summary    02 Dec 2018 07:01  -  03 Dec 2018 07:00  --------------------------------------------------------  IN: 0 mL / OUT: 500 mL / NET: -500 mL    PHYSICAL EXAM:  TELE: NSR w/ episodes of PSVT overnight  Constitutional: NAD, lethargict, cachectic  HEENT: Moist Mucous Membranes, Anicteric  Pulmonary: Non-labored, breath sounds are diminished bilaterally, No wheezing or rhonchi. + fine rales at bases  Cardiovascular: Regular, S1 and S2, No murmurs, rubs, gallops or clicks  Gastrointestinal: Bowel Sounds present, soft, nontender.   Lymph: No peripheral edema. No lymphadenopathy.  Skin: No visible rashes or ulcers. + ecchymoses UE  Psych:  unable to assess    LABS: All Labs Reviewed:                        7.8    10.42 )-----------( 215      ( 03 Dec 2018 06:13 )             23.3                         8.3    9.90  )-----------( 231      ( 02 Dec 2018 07:21 )             24.6                         9.0    10.20 )-----------( 241      ( 01 Dec 2018 06:23 )             26.6     03 Dec 2018 06:13    133    |  96     |  88     ----------------------------<  118    4.1     |  26     |  3.90   02 Dec 2018 07:21    132    |  95     |  79     ----------------------------<  94     3.9     |  25     |  3.50   01 Dec 2018 06:23    130    |  93     |  75     ----------------------------<  94     4.0     |  25     |  3.40     Ca    8.4        03 Dec 2018 06:13  Ca    8.0        02 Dec 2018 07:21  Ca    7.5        01 Dec 2018 06:23  Phos  3.8       01 Dec 2018 06:23  Mg     2.0       03 Dec 2018 06:13  Mg     1.9       02 Dec 2018 07:21  Mg     1.7       01 Dec 2018 06:23    TPro  5.9    /  Alb  2.4    /  TBili  0.3    /  DBili  x      /  AST  21     /  ALT  15     /  AlkPhos  61     01 Dec 2018 06:23    < from: TTE Echo Doppler w/o Cont (07.09.17 @ 08:32) >     EXAM:  ECHO TTE W/O CON COMP W/DOPPLR         PROCEDURE DATE:  07/09/2017        INTERPRETATION:  Ordering Physician: Unknown Doctor    Indication: Hypertension    Study Quality: Technically fair  A complete echocardiographic study was performed utilizing standard   protocol including spectral and color Doppler in all echocardiographic   windows.    Height: 152 cm  Weight: 49 kg  BSA: 1.4 sq m  Blood Pressure: 189/63    MEASUREMENTS  IVS: 1.0cm  PWT: 1.0cm  LA: 4.2cm  AO: 2.7cm  LVIDd: 4.5cm  LVIDs: 3.3cm    LVEF: 65%  RVSP: 54mmHg    FINDINGS  Left Ventricle:  Normal left ventricular systolic function.  Aortic Valve: Calcified trileaflet aortic valve. Mild aortic   insufficiency.  Mitral Valve: Mitral annular calcification and calcified mitral valve   leaflets with normal diastolic opening. Mild mitral insufficiency.  Tricuspid Valve: Normal tricuspid valve. Mild tricuspid insufficiency.  Pulmonic Valve: Normal pulmonic valve. Mild pulmonic insufficiency.  Left Atrium: Mildly enlarged.  Right Ventricle: Normal right ventricular size and systolic function.  Right Atrium: Normal  Diastolic Function: Grade 1 diastolic dysfunction.  Pericardium/Pleura: Normal pericardium with no pericardial effusion    MORENA ADORNO M.D., ATTENDING CARDIOLOGIST  This document has been electronically signed. Jul 9 2017 10:13AM    < end of copied text >    < from: Xray Chest 1 View AP/PA (11.28.18 @ 15:27) >    EXAM:  XR CHEST AP OR PA 1V                          PROCEDURE DATE:  11/28/2018      INTERPRETATION:  Clinical information: Shortness of breath    Portable study, 3:19 PM    Comparison exam dated 11/11/2018    Patient tilted towards the left.    Airspace disease left lung base, effusion-consolidation. Airspace disease   right lung base, blunted right costophrenic angle. Heart size appears   enlarged although magnified secondary to portable technique, left cardiac   border obscured by left basilar pathology. Aorta shows atherosclerotic   changes. No acute osseous abnormalities. Calcific bursitis present, left   shoulder. Surgical clips present, right paratracheal region.    IMPRESSION:    See above report    JESSICA VEGA M.D.,ATTENDING RADIOLOGIST  This document has been electronically signed. Nov 28 2018  3:32PM      < end of copied text >

## 2018-12-03 NOTE — PROGRESS NOTE ADULT - PROBLEM SELECTOR PLAN 3
Chronic history, sees Dr. Temple as an outpatient, likely anemia of chronic disease v iron def anemia.   -Will continue to monitor.   -type and screen  -Continue home iron  -Dr. Temple, hematologist, consulted. 1 unit of blood given 11/30 by 1/2 units at a time to evaluate for fluid overload. Chronic history, sees Dr. Temple as an outpatient, likely anemia of chronic disease v iron def anemia.   - Hgb drop from 8.3 --> 7.8. Will discuss with Dr. Temple  -Will continue to monitor.   -type and screen  -Continue home iron  -Dr. Tepmle, hematologist, consulted. 1 unit of blood given 11/30 by 1/2 units at a time to evaluate for fluid overload. Chronic history, sees Dr. Temple as an outpatient, likely anemia of chronic disease v iron def anemia.   - Hgb drop from 8.3 --> 7.8. Will discuss with heme/onc and team  -Will continue to monitor.   -type and screen  -Continue home iron  -s/p 1 unit of blood given 11/30 by 1/2 units at a time to evaluate for fluid overload.

## 2018-12-03 NOTE — PROGRESS NOTE ADULT - SUBJECTIVE AND OBJECTIVE BOX
Patient is a 89y old  Female who presents with a chief complaint of Shortness of breath (03 Dec 2018 11:53)      Patient seen in follow up for SABRINA, Urinary retention. Ramos catheter reinserted for failed TOV. Poor PO intake.     PAST MEDICAL HISTORY:  Primary hyperparathyroidism  CHF (congestive heart failure)  Anemia, unspecified type  Edema of lower extremity  Heart murmur  Hypertension  Hypercalcemia    MEDICATIONS  (STANDING):  BACItracin   Ointment 1 Application(s) Topical daily  bisacodyl 5 milliGRAM(s) Oral at bedtime  carvedilol 25 milliGRAM(s) Oral every 12 hours  cholecalciferol 1000 Unit(s) Oral <User Schedule>  cinacalcet 30 milliGRAM(s) Oral <User Schedule>  doxazosin 4 milliGRAM(s) Oral <User Schedule>  ferrous    sulfate 325 milliGRAM(s) Oral <User Schedule>  heparin  Injectable 5000 Unit(s) SubCutaneous every 12 hours  hydrALAZINE 75 milliGRAM(s) Oral every 8 hours  petrolatum white Ointment 1 Application(s) Topical two times a day  sodium chloride 0.9%. 1000 milliLiter(s) (50 mL/Hr) IV Continuous <Continuous>    MEDICATIONS  (PRN):    T(C): 36.6 (12-03-18 @ 11:05), Max: 37.2 (12-02-18 @ 15:54)  HR: 68 (12-03-18 @ 11:05) (66 - 112)  BP: 143/55 (12-03-18 @ 11:05) (93/58 - 155/52)  RR: 17 (12-03-18 @ 11:05) (16 - 19)  SpO2: 95% (12-03-18 @ 11:05) (90% - 95%)  Wt(kg): --  I&O's Detail    02 Dec 2018 07:01  -  03 Dec 2018 07:00  --------------------------------------------------------  IN:  Total IN: 0 mL    OUT:    Indwelling Catheter - Urethral: 500 mL  Total OUT: 500 mL    Total NET: -500 mL      03 Dec 2018 07:01  -  03 Dec 2018 14:55  --------------------------------------------------------  IN:  Total IN: 0 mL    OUT:    Indwelling Catheter - Urethral: 140 mL  Total OUT: 140 mL    Total NET: -140 mL          PHYSICAL EXAM:  General: NAD  Respiratory: b/l air entry  Cardiovascular: S1 S2  Gastrointestinal: soft  Extremities:  no edema                          7.8    10.42 )-----------( 215      ( 03 Dec 2018 06:13 )             23.3     12-03    133<L>  |  96  |  88<H>  ----------------------------<  118<H>  4.1   |  26  |  3.90<H>    Ca    8.4<L>      03 Dec 2018 06:13  Mg     2.0     12-03                Sodium, Serum: 133 (12-03 @ 06:13)  Sodium, Serum: 132 (12-02 @ 07:21)  Sodium, Serum: 130 (12-01 @ 06:23)  Sodium, Serum: 127 (11-30 @ 07:46)    Creatinine, Serum: 3.90 (12-03 @ 06:13)  Creatinine, Serum: 3.50 (12-02 @ 07:21)  Creatinine, Serum: 3.40 (12-01 @ 06:23)  Creatinine, Serum: 3.20 (11-30 @ 07:46)    Potassium, Serum: 4.1 (12-03 @ 06:13)  Potassium, Serum: 3.9 (12-02 @ 07:21)  Potassium, Serum: 4.0 (12-01 @ 06:23)  Potassium, Serum: 3.5 (11-30 @ 07:46)    Hemoglobin: 7.8 (12-03 @ 06:13)  Hemoglobin: 8.3 (12-02 @ 07:21)  Hemoglobin: 9.0 (12-01 @ 06:23)  Hemoglobin: 9.0 (11-30 @ 07:46)

## 2018-12-03 NOTE — PROGRESS NOTE ADULT - PROBLEM SELECTOR PLAN 2
Cr elevated at 3.5, likely due to Lasix use and dehydration  -s/p gentle dehydration NS 50ml/hr stop after 12 hours  -Holding diuretics as per cardiology  -Follow up AM BMP  -Dr. Eaton following, bladder scan showing 900+, abbott re-placed 12/1  continue to monitor urine output Cr elevated at 3.90, likely due to Lasix use and dehydration  -s/p gentle dehydration NS 50ml/hr stop after 12 hours  -Holding diuretics as per cardiology  -Follow up AM BMP  -Dr. Eaton following, bladder scan showing 900+, abbott re-placed 12/1  continue to monitor urine output Cr elevated at 3.90, likely due dehydration and failing kidneys  -s/p gentle dehydration NS 50ml/hr stop after 12 hours  -Holding diuretics as per cardiology  -Follow up AM BMP  -Dr. Eaton following  - abbott in place   continue to monitor urine output

## 2018-12-03 NOTE — PROGRESS NOTE ADULT - PROBLEM SELECTOR PLAN 5
-Although patient is reporting SOB, as per cardiology, pt does not appear volume overloaded, she appears dry given SABRINA and decreased PO intake  -Will hold diuretics  -Continue carvedilol and hydralazine at home doses with hold parameters  -Monitor K, creatinine, I and O  -No need to repeat echocardiogram -Although patient initially reported SOB, as per cardiology, pt does not appear volume overloaded, she appears dry given SABRINA and decreased PO intake  -Will hold diuretics  -Continue carvedilol and hydralazine at home doses with hold parameters  -Monitor K, creatinine, I and O  -No need to repeat echocardiogram

## 2018-12-03 NOTE — PROGRESS NOTE ADULT - PROBLEM SELECTOR PLAN 4
-Not new bilateral pleural effusions compared with prior admission CXR two weeks ago, pt not in acute CHF or volume overloaded, seen by cardiology.  -Will monitor continuous pulse ox   patient stable on RA, spo2 94%   -Will hold off abx for now   -Denies recent sick contacts -Not new bilateral pleural effusions compared with prior admission CXR two weeks ago, pt not in acute CHF or volume overloaded, seen by cardiology.  -Will monitor continuous pulse ox   patient stable on 1 L O2, sat 95%   -Will hold off abx for now   -Denies recent sick contacts

## 2018-12-04 LAB
ANION GAP SERPL CALC-SCNC: 13 MMOL/L — SIGNIFICANT CHANGE UP (ref 5–17)
BUN SERPL-MCNC: 93 MG/DL — HIGH (ref 7–23)
CALCIUM SERPL-MCNC: 9 MG/DL — SIGNIFICANT CHANGE UP (ref 8.5–10.1)
CHLORIDE SERPL-SCNC: 98 MMOL/L — SIGNIFICANT CHANGE UP (ref 96–108)
CO2 SERPL-SCNC: 25 MMOL/L — SIGNIFICANT CHANGE UP (ref 22–31)
CREAT SERPL-MCNC: 4.2 MG/DL — HIGH (ref 0.5–1.3)
GLUCOSE SERPL-MCNC: 152 MG/DL — HIGH (ref 70–99)
HCT VFR BLD CALC: 23.9 % — LOW (ref 34.5–45)
HGB BLD-MCNC: 7.8 G/DL — LOW (ref 11.5–15.5)
MAGNESIUM SERPL-MCNC: 2 MG/DL — SIGNIFICANT CHANGE UP (ref 1.6–2.6)
MCHC RBC-ENTMCNC: 27.5 PG — SIGNIFICANT CHANGE UP (ref 27–34)
MCHC RBC-ENTMCNC: 32.6 GM/DL — SIGNIFICANT CHANGE UP (ref 32–36)
MCV RBC AUTO: 84.2 FL — SIGNIFICANT CHANGE UP (ref 80–100)
NRBC # BLD: 0 /100 WBCS — SIGNIFICANT CHANGE UP (ref 0–0)
PLATELET # BLD AUTO: 201 K/UL — SIGNIFICANT CHANGE UP (ref 150–400)
POTASSIUM SERPL-MCNC: 4.4 MMOL/L — SIGNIFICANT CHANGE UP (ref 3.5–5.3)
POTASSIUM SERPL-SCNC: 4.4 MMOL/L — SIGNIFICANT CHANGE UP (ref 3.5–5.3)
RBC # BLD: 2.84 M/UL — LOW (ref 3.8–5.2)
RBC # FLD: 16.9 % — HIGH (ref 10.3–14.5)
SODIUM SERPL-SCNC: 136 MMOL/L — SIGNIFICANT CHANGE UP (ref 135–145)
WBC # BLD: 11.65 K/UL — HIGH (ref 3.8–10.5)
WBC # FLD AUTO: 11.65 K/UL — HIGH (ref 3.8–10.5)

## 2018-12-04 PROCEDURE — 99233 SBSQ HOSP IP/OBS HIGH 50: CPT

## 2018-12-04 PROCEDURE — 71045 X-RAY EXAM CHEST 1 VIEW: CPT | Mod: 26

## 2018-12-04 RX ORDER — MORPHINE SULFATE 50 MG/1
2 CAPSULE, EXTENDED RELEASE ORAL
Qty: 0 | Refills: 0 | Status: DISCONTINUED | OUTPATIENT
Start: 2018-12-04 | End: 2018-12-06

## 2018-12-04 RX ORDER — FUROSEMIDE 40 MG
40 TABLET ORAL ONCE
Qty: 0 | Refills: 0 | Status: COMPLETED | OUTPATIENT
Start: 2018-12-04 | End: 2018-12-04

## 2018-12-04 RX ORDER — MORPHINE SULFATE 50 MG/1
2 CAPSULE, EXTENDED RELEASE ORAL
Qty: 0 | Refills: 0 | Status: DISCONTINUED | OUTPATIENT
Start: 2018-12-04 | End: 2018-12-04

## 2018-12-04 RX ORDER — ATROPINE SULFATE 1 %
2 DROPS OPHTHALMIC (EYE)
Qty: 0 | Refills: 0 | Status: DISCONTINUED | OUTPATIENT
Start: 2018-12-04 | End: 2018-12-06

## 2018-12-04 RX ADMIN — Medication 1 APPLICATION(S): at 05:09

## 2018-12-04 RX ADMIN — MORPHINE SULFATE 2 MILLIGRAM(S): 50 CAPSULE, EXTENDED RELEASE ORAL at 21:01

## 2018-12-04 RX ADMIN — Medication 325 MILLIGRAM(S): at 10:14

## 2018-12-04 RX ADMIN — MORPHINE SULFATE 2 MILLIGRAM(S): 50 CAPSULE, EXTENDED RELEASE ORAL at 17:30

## 2018-12-04 RX ADMIN — MORPHINE SULFATE 2 MILLIGRAM(S): 50 CAPSULE, EXTENDED RELEASE ORAL at 17:03

## 2018-12-04 RX ADMIN — Medication 75 MILLIGRAM(S): at 05:08

## 2018-12-04 RX ADMIN — MORPHINE SULFATE 2 MILLIGRAM(S): 50 CAPSULE, EXTENDED RELEASE ORAL at 21:25

## 2018-12-04 RX ADMIN — SODIUM CHLORIDE 50 MILLILITER(S): 9 INJECTION, SOLUTION INTRAVENOUS at 05:08

## 2018-12-04 RX ADMIN — Medication 1 APPLICATION(S): at 17:02

## 2018-12-04 RX ADMIN — MORPHINE SULFATE 2 MILLIGRAM(S): 50 CAPSULE, EXTENDED RELEASE ORAL at 23:34

## 2018-12-04 RX ADMIN — MORPHINE SULFATE 2 MILLIGRAM(S): 50 CAPSULE, EXTENDED RELEASE ORAL at 14:30

## 2018-12-04 RX ADMIN — Medication 2 DROP(S): at 23:34

## 2018-12-04 RX ADMIN — Medication 40 MILLIGRAM(S): at 13:23

## 2018-12-04 RX ADMIN — Medication 1 APPLICATION(S): at 11:33

## 2018-12-04 RX ADMIN — MORPHINE SULFATE 2 MILLIGRAM(S): 50 CAPSULE, EXTENDED RELEASE ORAL at 14:16

## 2018-12-04 RX ADMIN — Medication 2 DROP(S): at 19:13

## 2018-12-04 RX ADMIN — CARVEDILOL PHOSPHATE 25 MILLIGRAM(S): 80 CAPSULE, EXTENDED RELEASE ORAL at 05:08

## 2018-12-04 RX ADMIN — HEPARIN SODIUM 5000 UNIT(S): 5000 INJECTION INTRAVENOUS; SUBCUTANEOUS at 05:08

## 2018-12-04 NOTE — PROGRESS NOTE ADULT - SUBJECTIVE AND OBJECTIVE BOX
Patient is a 89y old  Female who presents with a chief complaint of Shortness of breath (01 Dec 2018 20:51)      INTERVAL HPI/OVERNIGHT EVENTS: Patient seen and examined this morning. Patient was seated in the chair and has no acute complaints this morning. Patient comfortable on 1L nasal cannula. Spoke to daughter who states that she is barely eating or drinking. Daughter educated on end of life concerns/decisions and advance care planning. Patient's daughter understands this. Patient has a abbott in place that is draining well.     Vital Signs Last 24 Hrs  T(C): 36.8 (04 Dec 2018 07:28), Max: 36.8 (03 Dec 2018 23:21)  T(F): 98.2 (04 Dec 2018 07:28), Max: 98.3 (03 Dec 2018 23:21)  HR: 91 (04 Dec 2018 07:28) (68 - 91)  BP: 150/90 (04 Dec 2018 07:28) (138/87 - 159/80)  BP(mean): --  RR: 18 (04 Dec 2018 07:28) (16 - 18)  SpO2: 92% (04 Dec 2018 07:28) (91% - 95%)    LABS:                        7.8    11.65 )-----------( 201      ( 04 Dec 2018 06:20 )             23.9     12-04    136  |  98  |  93<H>  ----------------------------<  152<H>  4.4   |  25  |  4.20<H>    Ca    9.0      04 Dec 2018 06:20  Mg     2.0     12-04    BACItracin   Ointment 1 Application(s) Topical daily  carvedilol 12.5 milliGRAM(s) Oral every 12 hours  cholecalciferol 1000 Unit(s) Oral <User Schedule>  cinacalcet 30 milliGRAM(s) Oral <User Schedule>  doxazosin 4 milliGRAM(s) Oral <User Schedule>  ferrous    sulfate 325 milliGRAM(s) Oral <User Schedule>  heparin  Injectable 5000 Unit(s) SubCutaneous every 12 hours  hydrALAZINE 100 milliGRAM(s) Oral every 8 hours  petrolatum white Ointment 1 Application(s) Topical two times a day  sodium chloride 0.9%. 1000 milliLiter(s) IV Continuous <Continuous>      REVIEW OF SYSTEMS: Unable to obtain ROS due to patients condition    Consultant(s) Notes Reviewed:  [X] YES  [ ] NO    PHYSICAL EXAM:  GENERAL: NAD  HEAD:  Atraumatic, Normocephalic  EYES: EOMI, PERRLA, conjunctiva and sclera clear  ENMT: No tonsillar erythema, exudates, or enlargement; Moist mucous membranes L upper lip sore. crusted in appearance  NECK: Supple, No JVD  NERVOUS SYSTEM:  Awake but not alert   CHEST/LUNG: lung sounds equal b/l, diffuse rales b/l, no rhonchi or wheezing  HEART: Regular rate and rhythm, no murmurs, rubs, or gallops  ABDOMEN: Soft, Nontender, Nondistended; Bowel sounds present  EXTREMITIES:  No clubbing, cyanosis, or edema  LYMPH: No lymphadenopathy noted  SKIN: No new rashes    Advanced care planning discussed with patient/family [X] YES   [ ] NO    Advanced care planning discussed with patient/family. Advanced care planning forms reviewed/discussed/completed. 20 minutes spent.

## 2018-12-04 NOTE — PROGRESS NOTE ADULT - ASSESSMENT
89F with PMH of diastolic CHF (TTE July 2017 EF 65%), HTN, anemia who was brought to the ED with increased dyspnea per her home aid.  She is found to have hyponatremia and SABRINA.  There is no evidence of acute ischemia, acute heart failure, or uncontrolled arrhythmia.    Recommend:  - Lethargic but arousable, no evidence of significant volume overload despite fine rales at bases, which seems to be chronic  - Tele with episodes of PAT up to 130s overnight.  Coreg increased yesterday. Continue to monitor on telemetry  - BP uncontrolled.  Increase hydralazine to 100 TID if it remains high  - Anemia s/p 1 unit PRBC divided doses 11/29 with H/H responded appropriately.  Today's = 7.8/23.3.  I would defer transfusion today.   At this point I think PRBC would not change clinical course. In fact pt may go in to worsening ADHF and would likely increase her need for more diuretics which may in turn worsen her Renal failure.   - Please continue to maintain strict I/Os, monitor daily weights.  - Her renal function continues to worsen.  Avoid nephrotoxics. Continue to hold diuretic.  Renal following.  Okay with gentle hydration with close monitoring for fluid overload.  She does not appear fluid overloaded this AM.     - Hyponatremia improving  - No need to repeat echocardiogram  - Pt is a DNR/DNI - GOC to be determined 2/2 poor prognosis.  Daughter agrees with Hospice but thinks she will be unable to care for her at home.    - To follow closely with you while admitted.

## 2018-12-04 NOTE — PROGRESS NOTE ADULT - SUBJECTIVE AND OBJECTIVE BOX
SAIRA SANTANA  89y  Female    Patient is a 89y old  Female who presents with a chief complaint of Shortness of breath (04 Dec 2018 12:28)      lethargic but arousable.  patient's dtr at bed side  urine out put is marginal  low oxygen saturation today, on 4 lit now.      PAST MEDICAL & SURGICAL HISTORY:  Primary hyperparathyroidism  CHF (congestive heart failure)  Anemia, unspecified type  Edema of lower extremity  Heart murmur  Hypertension  Hypercalcemia  History of appendectomy  S/P tonsillectomy  H/O parathyroidectomy          PHYSICAL EXAM:    T(C): 36.9 (12-04-18 @ 15:20), Max: 36.9 (12-04-18 @ 15:20)  HR: 89 (12-04-18 @ 15:20) (68 - 91)  BP: 159/73 (12-04-18 @ 15:20) (130/68 - 172/75)  RR: 20 (12-04-18 @ 15:20) (17 - 22)  SpO2: 92% (12-04-18 @ 15:20) (91% - 95%)  Wt(kg): --    I&O's Detail    03 Dec 2018 07:01  -  04 Dec 2018 07:00  --------------------------------------------------------  IN:    dextrose 5% + sodium chloride 0.9%: 650 mL    Oral Fluid: 110 mL  Total IN: 760 mL    OUT:    Indwelling Catheter - Urethral: 540 mL  Total OUT: 540 mL    Total NET: 220 mL      04 Dec 2018 07:01  -  04 Dec 2018 15:41  --------------------------------------------------------  IN:  Total IN: 0 mL    OUT:    Indwelling Catheter - Urethral: 150 mL  Total OUT: 150 mL    Total NET: -150 mL          Respiratory: clear anteriorly, decreased BS at bases  Cardiovascular: S1 S2  Gastrointestinal: soft NT ND +BS  Extremities: trace edema   Neuro: Awake and alert    MEDICATIONS  (STANDING):  BACItracin   Ointment 1 Application(s) Topical daily  bisacodyl 5 milliGRAM(s) Oral at bedtime  carvedilol 25 milliGRAM(s) Oral every 12 hours  cholecalciferol 1000 Unit(s) Oral <User Schedule>  cinacalcet 30 milliGRAM(s) Oral <User Schedule>  doxazosin 4 milliGRAM(s) Oral <User Schedule>  ferrous    sulfate 325 milliGRAM(s) Oral <User Schedule>  heparin  Injectable 5000 Unit(s) SubCutaneous every 12 hours  hydrALAZINE 75 milliGRAM(s) Oral every 8 hours  petrolatum white Ointment 1 Application(s) Topical two times a day  sodium chloride 0.9%. 1000 milliLiter(s) (50 mL/Hr) IV Continuous <Continuous>    MEDICATIONS  (PRN):  morphine  - Injectable 2 milliGRAM(s) IV Push every 3 hours PRN dyspnea, discomfort, or agitation                            7.8    11.65 )-----------( 201      ( 04 Dec 2018 06:20 )             23.9       12-04    136  |  98  |  93<H>  ----------------------------<  152<H>  4.4   |  25  |  4.20<H>    Ca    9.0      04 Dec 2018 06:20  Mg     2.0     12-04

## 2018-12-04 NOTE — PROGRESS NOTE ADULT - ASSESSMENT
89 year old Female with PMH of diastolic CHF (TTE July 2017 EF 65%), HTN, Multifactorial Anemia of Chronic Disease and Iron Deficiency, recent admission to [facility] for Acute CHF exacerbation in the setting of Hyponatremia and SABRINA (was discharged to home) with course complicated by fevers secondary to UTI, Was taking antibiotics for UTI as an outpatient as per her PMD. Presented with worsening shortness of breath and lethargy, Hyponatremia, SABRINA, and worsening Anemia. Today, patient has no acute complaints. Drop in Hgb from 8.3 to 7.8. Rise in Creatinine from 3.50 --> 3.90 Rise in Na+ from 133 --> 136.

## 2018-12-04 NOTE — PROGRESS NOTE ADULT - SUBJECTIVE AND OBJECTIVE BOX
[facility] - Christofer Isaacs, Sorin, Marisa, Jr, Sandro Hagen  Office Number:  [phone]    Patient resting comfortably in bed in NAD.  Laying flat with no respiratory distress.  Patient is lethargic but arousable.  No overnight events.  She is now on low dose IVF hydration.    F/U for:  CHF    Telemetry:  NSR.  PSVT    MEDICATIONS  (STANDING):  BACItracin   Ointment 1 Application(s) Topical daily  bisacodyl 5 milliGRAM(s) Oral at bedtime  carvedilol 25 milliGRAM(s) Oral every 12 hours  cholecalciferol 1000 Unit(s) Oral <User Schedule>  cinacalcet 30 milliGRAM(s) Oral <User Schedule>  dextrose 5% + sodium chloride 0.9%. 1000 milliLiter(s) (50 mL/Hr) IV Continuous <Continuous>  doxazosin 4 milliGRAM(s) Oral <User Schedule>  ferrous    sulfate 325 milliGRAM(s) Oral <User Schedule>  heparin  Injectable 5000 Unit(s) SubCutaneous every 12 hours  hydrALAZINE 75 milliGRAM(s) Oral every 8 hours  petrolatum white Ointment 1 Application(s) Topical two times a day  sodium chloride 0.9%. 1000 milliLiter(s) (50 mL/Hr) IV Continuous <Continuous>           Allergies    Vasotec (Unknown)    Intolerances        Vital Signs Last 24 Hrs  T(C): 36.8 (04 Dec 2018 07:28), Max: 36.8 (03 Dec 2018 23:21)  T(F): 98.2 (04 Dec 2018 07:28), Max: 98.3 (03 Dec 2018 23:21)  HR: 91 (04 Dec 2018 07:28) (68 - 91)  BP: 150/90 (04 Dec 2018 07:28) (138/87 - 159/80)  BP(mean): --  RR: 18 (04 Dec 2018 07:28) (16 - 18)  SpO2: 92% (04 Dec 2018 07:28) (91% - 95%)    I&O's Summary    03 Dec 2018 07:01  -  04 Dec 2018 07:00  --------------------------------------------------------  IN: 760 mL / OUT: 540 mL / NET: 220 mL        ON EXAM:    General: NAD, lethargic, arousable  HEENT: Mucous membranes are dry, anicteric  Lungs: Non-labored, breath sounds are clear bilaterally. Chronic rales at the bases  Cardiovascular: Regular, S1 and S2, 2/6 systolic murmur  Gastrointestinal: Bowel Sounds present, soft, nontender.   Lymph: No peripheral edema. No lymphadenopathy.  Skin: No rashes or ulcers  Psych:  Unable to assess    LABS: All Labs Reviewed:                        7.8    11.65 )-----------( 201      ( 04 Dec 2018 06:20 )             23.9                         7.8    10.42 )-----------( 215      ( 03 Dec 2018 06:13 )             23.3                         8.3    9.90  )-----------( 231      ( 02 Dec 2018 07:21 )             24.6     04 Dec 2018 06:20    136    |  98     |  93     ----------------------------<  152    4.4     |  25     |  4.20   03 Dec 2018 06:13    133    |  96     |  88     ----------------------------<  118    4.1     |  26     |  3.90   02 Dec 2018 07:21    132    |  95     |  79     ----------------------------<  94     3.9     |  25     |  3.50     Ca    9.0        04 Dec 2018 06:20  Ca    8.4        03 Dec 2018 06:13  Ca    8.0        02 Dec 2018 07:21  Mg     2.0       04 Dec 2018 06:20  Mg     2.0       03 Dec 2018 06:13  Mg     1.9       02 Dec 2018 07:21            Blood Culture:

## 2018-12-04 NOTE — PROGRESS NOTE ADULT - ASSESSMENT
89 yo woman Multifactorial anemia related to probable GI blood loss and renal insufficiency. Course also complicated by CHF and deteriorating cardiac status; patient admitted in early November for aspiration pneumonia and discharged home, returned on 11/28/18 with worsening dyspnea and found to be in acute renal failure. Continues w prn transfusion PRBC support.    -prognosis very poor  -discussed w daughter, who understands terminal prognosis and that pt will pass soon, but does not want Hospice/placement, wants pt to stay in hospital and pass.   -discussed should then stop blood transfusions as this is artificially supporting the pt, daughter is leaning toward agreement  -continue supportive and comfort care    -

## 2018-12-04 NOTE — GOALS OF CARE CONVERSATION - PERSONAL ADVANCE DIRECTIVE - NS PRO AD PATIENT TYPE ON CHART
Do Not Resuscitate (DNR)/Health Care Proxy (HCP)/Medical Orders for Life-Sustaining Treatment (MOLST)/Living Will

## 2018-12-04 NOTE — PROGRESS NOTE ADULT - PROBLEM SELECTOR PLAN 3
Chronic history, sees Dr. Temple as an outpatient, likely anemia of chronic disease v iron def anemia.   - Hgb steady at 7.8. Will discuss with heme/onc and team  -Will continue to monitor.   -type and screen  -Continue home iron  -s/p 1 unit of blood given 11/30 by 1/2 units at a time to evaluate for fluid overload.

## 2018-12-04 NOTE — GOALS OF CARE CONVERSATION - PERSONAL ADVANCE DIRECTIVE - CONVERSATION DETAILS
[facility]:  12/3/18   Met with Pt's Daughter she was given information regarding hospice care at home and in patient  and verbalized a good understanding . . However refused hospice evaluation at this time.  Lyn Roberto RN CHPN

## 2018-12-04 NOTE — PROGRESS NOTE ADULT - PROBLEM SELECTOR PLAN 2
Cr elevated at 4.20, likely due dehydration and failing kidneys  -continue 1L gentle hydration NS 50ml/hr stop after 12 hours  -Holding diuretics as per cardiology  -Follow up AM BMP  -Dr. Eaton following  - Ramos in place   continue to monitor urine output

## 2018-12-04 NOTE — PROGRESS NOTE ADULT - ASSESSMENT
89 female with a history of SABRINA due to urinary retention. Now worsening renal indices and failure to thrive with CHF  will need to stop the IVF and give her lasix. Spoke to patient's dtr and advised her to consider hospice and comfort care and stop the blood work.   case D/W with PMD.

## 2018-12-04 NOTE — PROGRESS NOTE ADULT - PROBLEM SELECTOR PLAN 1
-Improving  -Despite hx of CHF, patient clinically appears dry and is presenting with SABRINA  -Continue gentle hydration NS 50ml/hr as per Dr. Eaton.  -Continue to hold diuretics.  -Monitor BMP  -Strict Is and Os  -AM labs  -Dr. Eaton following  -Fall precautions  -Ambulate w assist  -Cardio following

## 2018-12-04 NOTE — GOALS OF CARE CONVERSATION - PERSONAL ADVANCE DIRECTIVE - NS PRO AD PATIENT TYPE
Health Care Proxy (HCP)/Medical Orders for Life-Sustaining Treatment (MOLST)/Do Not Resuscitate (DNR)/Living Will

## 2018-12-04 NOTE — PROGRESS NOTE ADULT - SUBJECTIVE AND OBJECTIVE BOX
All interim records and events noted.    lethargic but does acknowledge speaker/stimulus, although does not speak  daughter present      MEDICATIONS  (STANDING):  BACItracin   Ointment 1 Application(s) Topical daily  bisacodyl 5 milliGRAM(s) Oral at bedtime  carvedilol 25 milliGRAM(s) Oral every 12 hours  cholecalciferol 1000 Unit(s) Oral <User Schedule>  cinacalcet 30 milliGRAM(s) Oral <User Schedule>  dextrose 5% + sodium chloride 0.9%. 1000 milliLiter(s) (50 mL/Hr) IV Continuous <Continuous>  doxazosin 4 milliGRAM(s) Oral <User Schedule>  ferrous    sulfate 325 milliGRAM(s) Oral <User Schedule>  heparin  Injectable 5000 Unit(s) SubCutaneous every 12 hours  hydrALAZINE 75 milliGRAM(s) Oral every 8 hours  petrolatum white Ointment 1 Application(s) Topical two times a day  sodium chloride 0.9%. 1000 milliLiter(s) (50 mL/Hr) IV Continuous <Continuous>    MEDICATIONS  (PRN):      Vital Signs Last 24 Hrs  T(C): 36.8 (04 Dec 2018 07:28), Max: 36.8 (03 Dec 2018 23:21)  T(F): 98.2 (04 Dec 2018 07:28), Max: 98.3 (03 Dec 2018 23:21)  HR: 80 (04 Dec 2018 12:17) (68 - 91)  BP: 130/68 (04 Dec 2018 12:17) (130/68 - 159/80)  BP(mean): --  RR: 19 (04 Dec 2018 12:17) (16 - 19)  SpO2: 94% (04 Dec 2018 12:17) (91% - 95%)    PHYSICAL EXAM  General: thin frail elderly woman in bed, in no acute distress  Head: atraumatic, normocephalic  ENT: sclera anicteric, buccal mucosa moist  Neck: supple, trachea midline  CV: S1 S2, regular rate and rhythm  Lungs: clear to auscultation, no wheezes/rhonchi  Abdomen: soft, nontender, bowel sounds present, no palpable masses  Skin: no significant increased ecchymosis/petechiae  Neuro: see above      LABS:             7.8    11.65 )-----------( 201      ( 12-04 @ 06:20 )             23.9                7.8    10.42 )-----------( 215      ( 12-03 @ 06:13 )             23.3                8.3    9.90  )-----------( 231      ( 12-02 @ 07:21 )             24.6       12-04    136  |  98  |  93<H>  ----------------------------<  152<H>  4.4   |  25  |  4.20<H>    Ca    9.0      04 Dec 2018 06:20  Mg     2.0     12-04          RADIOLOGY & ADDITIONAL STUDIES:    IMPRESSION/RECOMMENDATIONS:

## 2018-12-04 NOTE — PROGRESS NOTE ADULT - PROBLEM SELECTOR PLAN 4
-Not new bilateral pleural effusions compared with prior admission CXR two weeks ago, pt not in acute CHF or volume overloaded, seen by cardiology.  -Will monitor continuous pulse ox   patient stable on 1 L O2, sat 95%   -Will hold off abx for now   -Denies recent sick contacts

## 2018-12-04 NOTE — PROGRESS NOTE ADULT - PROBLEM SELECTOR PLAN 6
Chronic, stable  -Continue Carvedilol 25mg Q12, Hydralazine 75mg Q8 as per cardio, with hold parameters  -Continue Doxazosin  - Cardio following- apprec recs

## 2018-12-04 NOTE — PROGRESS NOTE ADULT - PROBLEM SELECTOR PLAN 7
patient takes Doxazosin outpatient for her retention  Dr. Eaton (nephrologist) following  urology consulted  abbott in place  continue monitoring I's and O's

## 2018-12-04 NOTE — PROGRESS NOTE ADULT - PROBLEM SELECTOR PLAN 5
-Although patient initially reported SOB, as per cardiology, pt does not appear volume overloaded, she appears dry given SABRINA and decreased PO intake  -Will hold diuretics  -Continue carvedilol and hydralazine at home doses with hold parameters  -Monitor K, creatinine, I and O  -No need to repeat echocardiogram  - Gentle hydration

## 2018-12-05 PROCEDURE — 97110 THERAPEUTIC EXERCISES: CPT

## 2018-12-05 PROCEDURE — 85027 COMPLETE CBC AUTOMATED: CPT

## 2018-12-05 PROCEDURE — 86850 RBC ANTIBODY SCREEN: CPT

## 2018-12-05 PROCEDURE — 36430 TRANSFUSION BLD/BLD COMPNT: CPT

## 2018-12-05 PROCEDURE — 71045 X-RAY EXAM CHEST 1 VIEW: CPT

## 2018-12-05 PROCEDURE — 82310 ASSAY OF CALCIUM: CPT

## 2018-12-05 PROCEDURE — 97162 PT EVAL MOD COMPLEX 30 MIN: CPT

## 2018-12-05 PROCEDURE — 82553 CREATINE MB FRACTION: CPT

## 2018-12-05 PROCEDURE — 84550 ASSAY OF BLOOD/URIC ACID: CPT

## 2018-12-05 PROCEDURE — 80048 BASIC METABOLIC PNL TOTAL CA: CPT

## 2018-12-05 PROCEDURE — 83935 ASSAY OF URINE OSMOLALITY: CPT

## 2018-12-05 PROCEDURE — 84443 ASSAY THYROID STIM HORMONE: CPT

## 2018-12-05 PROCEDURE — 97116 GAIT TRAINING THERAPY: CPT

## 2018-12-05 PROCEDURE — 93005 ELECTROCARDIOGRAM TRACING: CPT

## 2018-12-05 PROCEDURE — 86985 SPLIT BLOOD OR PRODUCTS: CPT

## 2018-12-05 PROCEDURE — 85014 HEMATOCRIT: CPT

## 2018-12-05 PROCEDURE — 85730 THROMBOPLASTIN TIME PARTIAL: CPT

## 2018-12-05 PROCEDURE — 85018 HEMOGLOBIN: CPT

## 2018-12-05 PROCEDURE — P9011: CPT

## 2018-12-05 PROCEDURE — 83735 ASSAY OF MAGNESIUM: CPT

## 2018-12-05 PROCEDURE — 85610 PROTHROMBIN TIME: CPT

## 2018-12-05 PROCEDURE — P9016: CPT

## 2018-12-05 PROCEDURE — 83930 ASSAY OF BLOOD OSMOLALITY: CPT

## 2018-12-05 PROCEDURE — 82550 ASSAY OF CK (CPK): CPT

## 2018-12-05 PROCEDURE — 97530 THERAPEUTIC ACTIVITIES: CPT

## 2018-12-05 PROCEDURE — 36415 COLL VENOUS BLD VENIPUNCTURE: CPT

## 2018-12-05 PROCEDURE — 84300 ASSAY OF URINE SODIUM: CPT

## 2018-12-05 PROCEDURE — 83970 ASSAY OF PARATHORMONE: CPT

## 2018-12-05 PROCEDURE — 99233 SBSQ HOSP IP/OBS HIGH 50: CPT

## 2018-12-05 PROCEDURE — 84480 ASSAY TRIIODOTHYRONINE (T3): CPT

## 2018-12-05 PROCEDURE — 86901 BLOOD TYPING SEROLOGIC RH(D): CPT

## 2018-12-05 PROCEDURE — 84484 ASSAY OF TROPONIN QUANT: CPT

## 2018-12-05 PROCEDURE — 86900 BLOOD TYPING SEROLOGIC ABO: CPT

## 2018-12-05 PROCEDURE — 99285 EMERGENCY DEPT VISIT HI MDM: CPT | Mod: 25

## 2018-12-05 PROCEDURE — 84436 ASSAY OF TOTAL THYROXINE: CPT

## 2018-12-05 PROCEDURE — 86923 COMPATIBILITY TEST ELECTRIC: CPT

## 2018-12-05 PROCEDURE — 83880 ASSAY OF NATRIURETIC PEPTIDE: CPT

## 2018-12-05 PROCEDURE — 80053 COMPREHEN METABOLIC PANEL: CPT

## 2018-12-05 PROCEDURE — 84100 ASSAY OF PHOSPHORUS: CPT

## 2018-12-05 RX ADMIN — MORPHINE SULFATE 2 MILLIGRAM(S): 50 CAPSULE, EXTENDED RELEASE ORAL at 06:15

## 2018-12-05 RX ADMIN — MORPHINE SULFATE 2 MILLIGRAM(S): 50 CAPSULE, EXTENDED RELEASE ORAL at 23:24

## 2018-12-05 RX ADMIN — Medication 2 DROP(S): at 05:29

## 2018-12-05 RX ADMIN — MORPHINE SULFATE 2 MILLIGRAM(S): 50 CAPSULE, EXTENDED RELEASE ORAL at 05:34

## 2018-12-05 RX ADMIN — MORPHINE SULFATE 2 MILLIGRAM(S): 50 CAPSULE, EXTENDED RELEASE ORAL at 09:00

## 2018-12-05 RX ADMIN — MORPHINE SULFATE 2 MILLIGRAM(S): 50 CAPSULE, EXTENDED RELEASE ORAL at 00:15

## 2018-12-05 RX ADMIN — Medication 2 DROP(S): at 16:55

## 2018-12-05 RX ADMIN — MORPHINE SULFATE 2 MILLIGRAM(S): 50 CAPSULE, EXTENDED RELEASE ORAL at 17:50

## 2018-12-05 RX ADMIN — Medication 1 APPLICATION(S): at 14:16

## 2018-12-05 RX ADMIN — MORPHINE SULFATE 2 MILLIGRAM(S): 50 CAPSULE, EXTENDED RELEASE ORAL at 22:38

## 2018-12-05 RX ADMIN — MORPHINE SULFATE 2 MILLIGRAM(S): 50 CAPSULE, EXTENDED RELEASE ORAL at 02:00

## 2018-12-05 RX ADMIN — MORPHINE SULFATE 2 MILLIGRAM(S): 50 CAPSULE, EXTENDED RELEASE ORAL at 07:53

## 2018-12-05 RX ADMIN — MORPHINE SULFATE 2 MILLIGRAM(S): 50 CAPSULE, EXTENDED RELEASE ORAL at 14:15

## 2018-12-05 RX ADMIN — MORPHINE SULFATE 2 MILLIGRAM(S): 50 CAPSULE, EXTENDED RELEASE ORAL at 16:54

## 2018-12-05 RX ADMIN — MORPHINE SULFATE 2 MILLIGRAM(S): 50 CAPSULE, EXTENDED RELEASE ORAL at 12:35

## 2018-12-05 RX ADMIN — MORPHINE SULFATE 2 MILLIGRAM(S): 50 CAPSULE, EXTENDED RELEASE ORAL at 11:35

## 2018-12-05 RX ADMIN — MORPHINE SULFATE 2 MILLIGRAM(S): 50 CAPSULE, EXTENDED RELEASE ORAL at 19:18

## 2018-12-05 RX ADMIN — Medication 1 APPLICATION(S): at 05:16

## 2018-12-05 RX ADMIN — MORPHINE SULFATE 2 MILLIGRAM(S): 50 CAPSULE, EXTENDED RELEASE ORAL at 01:49

## 2018-12-05 RX ADMIN — MORPHINE SULFATE 2 MILLIGRAM(S): 50 CAPSULE, EXTENDED RELEASE ORAL at 15:00

## 2018-12-05 NOTE — PROGRESS NOTE ADULT - ASSESSMENT
1.	Hyponatremia  2.	SABRINA  3.	Hypertension  4.	Anemia  5.	Primary hyperparathyroidism, On sensipar, Low calcium    For comfort care. On morphine. D/w family at bedside. Overall prognosis poor. Supportive care.   Will sign off. Please recall if needed. Thank you.

## 2018-12-05 NOTE — PROGRESS NOTE ADULT - SUBJECTIVE AND OBJECTIVE BOX
Patient is a 89y old  Female who presents with a chief complaint of Shortness of breath (03 Dec 2018 11:53)  Patient seen in follow up for SABRINA, Urinary retention. Ramos catheter reinserted for failed TOV.     For comfort care.     PAST MEDICAL HISTORY:  Primary hyperparathyroidism  CHF (congestive heart failure)  Anemia, unspecified type  Edema of lower extremity  Heart murmur  Hypertension  Hypercalcemia    MEDICATIONS  (STANDING):  BACItracin   Ointment 1 Application(s) Topical daily  bisacodyl 5 milliGRAM(s) Oral at bedtime  cholecalciferol 1000 Unit(s) Oral <User Schedule>  cinacalcet 30 milliGRAM(s) Oral <User Schedule>  doxazosin 4 milliGRAM(s) Oral <User Schedule>  ferrous    sulfate 325 milliGRAM(s) Oral <User Schedule>  heparin  Injectable 5000 Unit(s) SubCutaneous every 12 hours  petrolatum white Ointment 1 Application(s) Topical two times a day  sodium chloride 0.9%. 1000 milliLiter(s) (50 mL/Hr) IV Continuous <Continuous>    MEDICATIONS  (PRN):  atropine 1% Ophthalmic Solution for SubLingual Use 2 Drop(s) SubLingual every 3 hours PRN airway secretions  morphine  - Injectable 2 milliGRAM(s) IV Push every 2 hours PRN dyspnea, discomfort or agitation    T(C): 36.8 (12-05-18 @ 10:57), Max: 36.9 (12-04-18 @ 15:20)  HR: 85 (12-05-18 @ 10:57) (68 - 98)  BP: 107/63 (12-05-18 @ 10:57) (104/62 - 172/75)  RR: 13 (12-05-18 @ 10:57)  SpO2: 89% (12-05-18 @ 10:57)  Wt(kg): --  I&O's Detail    04 Dec 2018 07:01  -  05 Dec 2018 07:00  --------------------------------------------------------  IN:    dextrose 5% + sodium chloride 0.9%: 250 mL  Total IN: 250 mL    OUT:    Indwelling Catheter - Urethral: 400 mL  Total OUT: 400 mL    Total NET: -150 mL            PHYSICAL EXAM:  General: NAD  Respiratory: b/l air entry  Cardiovascular: S1 S2  Gastrointestinal: soft  Extremities:  no edema                     LABORATORY:                        7.8    11.65 )-----------( 201      ( 04 Dec 2018 06:20 )             23.9     12-04    136  |  98  |  93<H>  ----------------------------<  152<H>  4.4   |  25  |  4.20<H>    Ca    9.0      04 Dec 2018 06:20  Mg     2.0     12-04      Sodium, Serum: 136 mmol/L (12-04 @ 06:20)    Potassium, Serum: 4.4 mmol/L (12-04 @ 06:20)    Hemoglobin: 7.8 g/dL (12-04 @ 06:20)  Hemoglobin: 7.8 g/dL (12-03 @ 06:13)    Creatinine, Serum 4.20 (12-04 @ 06:20)  Creatinine, Serum 3.90 (12-03 @ 06:13)

## 2018-12-05 NOTE — PROGRESS NOTE ADULT - PROBLEM SELECTOR PLAN 5
-Although patient initially reported SOB, as per cardiology, pt does not appear volume overloaded, she appears dry given SABRINA and decreased PO intake  -Will hold diuretics  -Continue carvedilol and hydralazine at home doses with hold parameters  -No need to repeat echocardiogram

## 2018-12-05 NOTE — PROGRESS NOTE ADULT - ASSESSMENT
89F with PMH of diastolic CHF (TTE July 2017 EF 65%), HTN, anemia who was brought to the ED with increased dyspnea per her home aid.  She is found to have hyponatremia and SABRINA.  There is no evidence of acute ischemia, acute heart failure, or uncontrolled arrhythmia.    Recommend:  - Lethargic but arousable, no evidence of significant volume overload despite fine rales at bases, which seems to be chronic  - Tele with episodes of PAT up to 130s overnight.  Coreg increased yesterday. Continue to monitor on telemetry  - BP uncontrolled.  Increase hydralazine to 100 TID if it remains high  - Anemia s/p 1 unit PRBC divided doses 11/29 with H/H responded appropriately.  Today's = 7.8/23.3.  I would defer transfusion today.   At this point I think PRBC would not change clinical course. In fact pt may go in to worsening ADHF and would likely increase her need for more diuretics which may in turn worsen her Renal failure.   - Please continue to maintain strict I/Os, monitor daily weights.  - Her renal function continues to worsen.  Avoid nephrotoxics. Continue to hold diuretic.  Renal following.  Okay with gentle hydration with close monitoring for fluid overload.  She does not appear fluid overloaded this AM.     - Hyponatremia improving  - No need to repeat echocardiogram  - Pt is a DNR/DNI - GOC to be determined 2/2 poor prognosis.  Daughter agrees with Hospice but thinks she will be unable to care for her at home.    - To follow closely with you while admitted.      Tiffany Sim NP  Cardiology 89F with PMH of diastolic CHF (TTE July 2017 EF 65%), HTN, anemia who was brought to the ED with increased dyspnea per her home aid.  She is found to have hyponatremia and SABRINA.  There is no evidence of acute ischemia, acute heart failure, or uncontrolled arrhythmia.    Recommend:  - Lethargic but arousable, no evidence of significant volume overload despite fine rales at bases, which seems to be chronic  - Off tele.  Per EMAR, patient has not been able to take PO meds.  Can discontinue Coreg  - BP uncontrolled.  Will discontinue Hydralazine PO.  Can give Hydralazine IV if needed to keep SBP<150.  BP noted to be trending down to 100's-110's  - Anemia s/p 1 unit PRBC divided doses 11/29 with H/H responded appropriately.  Would defer transfusion today.   At this point I think PRBC would not change clinical course. In fact pt may go in to worsening ADHF and would likely increase her need for more diuretics which may in turn worsen her Renal failure.   - Her renal function continues to worsen.  Avoid nephrotoxics. Continue to hold diuretic.  Renal following.  Okay with gentle hydration with close monitoring for fluid overload.  She does not appear fluid overloaded this AM.     - Hyponatremia resolved  - No need to repeat echocardiogram  - Pt is a DNR/DNI - GOC to be determined 2/2 poor prognosis.  Daughter agrees with Hospice but thinks she will be unable to care for her at home.    - Now on comfort care  - To follow closely with you while admitted.      Tiffany Sim NP  Cardiology 89F with PMH of diastolic CHF (TTE July 2017 EF 65%), HTN, anemia who was brought to the ED with increased dyspnea per her home aid.  She is found to have hyponatremia and SABRINA.  There is no evidence of acute ischemia, acute heart failure, or uncontrolled arrhythmia.    Recommend:  - Lethargic but arousable, no evidence of significant volume overload despite fine rales at bases, which seems to be chronic  - Off tele.  Per EMAR, patient has not been able to take PO meds.  Can discontinue Coreg  - BP uncontrolled.  Will discontinue Hydralazine PO.  Can give Hydralazine IV if needed to keep SBP<150.  BP noted to be trending down to 100's-110's  - Anemia s/p 1 unit PRBC divided doses 11/29 with H/H responded appropriately.  Would defer transfusion today.   At this point I think PRBC would not change clinical course. In fact pt may go in to worsening ADHF and would likely increase her need for more diuretics which may in turn worsen her Renal failure.   - Her renal function continues to worsen.  Avoid nephrotoxics. Continue to hold diuretic.  Renal following.  Okay with gentle hydration with close monitoring for fluid overload.  She does not appear fluid overloaded this AM.     - Hyponatremia resolved  - No need to repeat echocardiogram  - Pt is a DNR/DNI - GOC to be determined 2/2 poor prognosis.  Daughter agrees with Hospice but thinks she will be unable to care for her at home.    - comfort care is appropriate if she is not for aggressive measures  - To follow closely with you while admitted.      Tiffany Sim NP  Cardiology

## 2018-12-05 NOTE — PROGRESS NOTE ADULT - PROBLEM SELECTOR PLAN 1
-Improving  -Despite hx of CHF, patient clinically appears dry and is presenting with SABRINA  -Strict Is and Os  -Dr. Eaton following  -Fall precautions  -Ambulate w assist  -Cardio following

## 2018-12-05 NOTE — PROGRESS NOTE ADULT - PROBLEM SELECTOR PLAN 8
DVT prophylaxis: Heparin SQ 5000 Q12
discussed advance care planning w family  pt is dnr  appropriate for hospice as well  family declined home hospice last admission

## 2018-12-05 NOTE — PROGRESS NOTE ADULT - ATTENDING COMMENTS
Seen/examined. agree with above. Rales on exam, with some evidence of overload, and hypoxia. Diuresis has been temporarily held in setting of SABRINA, though may need to restart.
Seen/examined. Agree with above.  PAT noted on telemetry, now in SR in 70's. If more, will need to increase Coreg to 25 mg po bid.  Watch BP carefully  daughter deciding on hospice.
Seen/examined. agree with above.
I saw and examined the patient personally. Spoke with above provider regarding this case. I reviewed the above findings completely.  I agree with the above history, physical, and plan which I have edited where appropriate.   Spoke to daughter at length in regards to pts clinical status which appears to be deteriorating. She is aware that PRBC tx today is not appropriate as it would not change her clinical course. I recommended comfort measures. She will think about it but want current level of care to continue. Hold diuretics.
The patient was personally seen and examined, in addition to being examined and evaluated by NP.  All elements of the note were edited where appropriate.
Daughter wants home hospice evaluation
pt comfortable with daughter at the bedside.  case discusse extensivly with the daughter--this pat should be on home hospice and the daughter wants to discuss with her brother and decide.  the abbott cather is removed --will need bladder sonogram to check for retention.--  may need to have abbott to be reinserted.  daughter  urged to speak to brother soon so as to determine the disposition and also to determine how aggressive we r to be.
had lengthy discussion w daughter  no further blood draws  family does not want inpatient hospice, and refusing home hospice  dc ivf, lasix now  prognosis extremely poor
pt comfortable with daughter at the bedside.  case discusse extensivly with the daughter--this pat should be on home hospice and the daughter wants to discuss with her brother and decide.  the abbott cather is removed --will need bladder sonogram to check for retention.--  may need to have abbott to be reinserted.  daughter  urged to speak to brother soon so as to determine the disposition and also to determine how aggressive we r to be.

## 2018-12-05 NOTE — PROGRESS NOTE ADULT - PROBLEM SELECTOR PLAN 2
elevated Cr, no further morning labs as per patient and daughter.  -Holding diuretics as per cardiology  -Dr. Eaton following  - Ramos in place   continue to monitor urine output

## 2018-12-05 NOTE — PROGRESS NOTE ADULT - SUBJECTIVE AND OBJECTIVE BOX
Patient is a 89y old  Female who presents with a chief complaint of Shortness of breath (01 Dec 2018 20:51)      INTERVAL HPI/OVERNIGHT EVENTS: Patient seen and examined this morning. Patient laying in bed, sleeping comfortably with oxygen mask. Daughter at bedside. As per Daughter, patient is no longer taking oral medications and no longer eating. Patient was started on IV morphine for comfort, daughter asked for no further blood draws or blood transfusions. Daughter educated on end- of- life concerns/decisions and advance care planning. Ramos in place.     Vital Signs Last 24 Hrs  T(C): 36.8 (05 Dec 2018 07:16), Max: 36.9 (04 Dec 2018 15:20)  T(F): 98.3 (05 Dec 2018 07:16), Max: 98.5 (04 Dec 2018 15:20)  HR: 93 (05 Dec 2018 07:16) (80 - 98)  BP: 104/62 (05 Dec 2018 07:16) (104/62 - 172/75)  BP(mean): --  RR: 18 (05 Dec 2018 07:16) (18 - 22)  SpO2: 89% (05 Dec 2018 07:16) (89% - 94%)    LABS:                        7.8    11.65 )-----------( 201      ( 04 Dec 2018 06:20 )             23.9     12-04    136  |  98  |  93<H>  ----------------------------<  152<H>  4.4   |  25  |  4.20<H>    Ca    9.0      04 Dec 2018 06:20  Mg     2.0     12-04      BACItracin   Ointment 1 Application(s) Topical daily  carvedilol 12.5 milliGRAM(s) Oral every 12 hours  cholecalciferol 1000 Unit(s) Oral <User Schedule>  cinacalcet 30 milliGRAM(s) Oral <User Schedule>  doxazosin 4 milliGRAM(s) Oral <User Schedule>  ferrous    sulfate 325 milliGRAM(s) Oral <User Schedule>  heparin  Injectable 5000 Unit(s) SubCutaneous every 12 hours  hydrALAZINE 100 milliGRAM(s) Oral every 8 hours  petrolatum white Ointment 1 Application(s) Topical two times a day  sodium chloride 0.9%. 1000 milliLiter(s) IV Continuous <Continuous>      REVIEW OF SYSTEMS: Unable to obtain ROS due to patients condition    Consultant(s) Notes Reviewed:  [X] YES  [ ] NO    PHYSICAL EXAM:  GENERAL: NAD  HEAD:  Atraumatic, Normocephalic  ENMT: L upper lip sore. crusted in appearance  CHEST/LUNG: lung sounds equal b/l, diffuse rales b/l, no rhonchi, no wheezing  HEART: Regular rate and rhythm, no murmurs, rubs, or gallops  ABDOMEN: Soft, Nontender, Nondistended; Bowel sounds present  EXTREMITIES:  No clubbing, cyanosis, or edema  LYMPH: No lymphadenopathy noted  SKIN: No new rashes    Advanced care planning discussed with patient/family [X] YES   [ ] NO    Advanced care planning discussed with patient/family. Advanced care planning forms reviewed/discussed/completed. 20 minutes spent.

## 2018-12-05 NOTE — CHART NOTE - NSCHARTNOTEFT_GEN_A_CORE
Assessment:     Factors impacting intake: [ ] none [ ] nausea  [ ] vomiting [ ] diarrhea [ ] constipation  [ ]chewing problems [ ] swallowing issues  [ ] other:     Diet Presciption: Diet, DASH/TLC:   Sodium & Cholesterol Restricted (11-28-18 @ 17:27)    Intake:     Current Weight: Weight (kg): 48.5 (11-28 @ 14:53)  % Weight Change    Pertinent Medications: MEDICATIONS  (STANDING):  BACItracin   Ointment 1 Application(s) Topical daily  bisacodyl 5 milliGRAM(s) Oral at bedtime  carvedilol 25 milliGRAM(s) Oral every 12 hours  cholecalciferol 1000 Unit(s) Oral <User Schedule>  cinacalcet 30 milliGRAM(s) Oral <User Schedule>  doxazosin 4 milliGRAM(s) Oral <User Schedule>  ferrous    sulfate 325 milliGRAM(s) Oral <User Schedule>  heparin  Injectable 5000 Unit(s) SubCutaneous every 12 hours  hydrALAZINE 75 milliGRAM(s) Oral every 8 hours  petrolatum white Ointment 1 Application(s) Topical two times a day  sodium chloride 0.9%. 1000 milliLiter(s) (50 mL/Hr) IV Continuous <Continuous>    MEDICATIONS  (PRN):  atropine 1% Ophthalmic Solution for SubLingual Use 2 Drop(s) SubLingual every 3 hours PRN airway secretions  morphine  - Injectable 2 milliGRAM(s) IV Push every 2 hours PRN dyspnea, discomfort or agitation    Pertinent Labs: 12-04 Na136 mmol/L Glu 152 mg/dL<H> K+ 4.4 mmol/L Cr  4.20 mg/dL<H> BUN 93 mg/dL<H> 12-01 Phos 3.8 mg/dL 12-01 Alb 2.4 g/dL<L>     CAPILLARY BLOOD GLUCOSE        Skin:     Estimated Needs:   [ ] no change since previous assessment  [ ] recalculated:     Previous Nutrition Diagnosis:   [ ] Inadequate Energy Intake [ ]Inadequate Oral Intake [ ] Excessive Energy Intake   [ ] Underweight [ ] Increased Nutrient Needs [ ] Overweight/Obesity   [ ] Altered GI Function [ ] Unintended Weight Loss [ ] Food & Nutrition Related Knowledge Deficit [ ] Malnutrition     Nutrition Diagnosis is [ ] ongoing  [ ] resolved [ ] not applicable     New Nutrition Diagnosis: [ ] not applicable       Interventions:   Recommend  [ ] Change Diet To:  [ ] Nutrition Supplement  [ ] Nutrition Support  [ ] Other:     Monitoring and Evaluation:   [ ] PO intake [ x ] Tolerance to diet prescription [ x ] weights [ x ] labs[ x ] follow up per protocol  [ ] other:

## 2018-12-05 NOTE — PROGRESS NOTE ADULT - PROBLEM SELECTOR PLAN 3
Chronic history, sees Dr. Temple as an outpatient, likely anemia of chronic disease v iron def anemia.   patient no longer taking PO medication, as per daughter, no more blood transfusions.

## 2018-12-05 NOTE — PROGRESS NOTE ADULT - PROVIDER SPECIALTY LIST ADULT
Cardiology
Heme/Onc
Internal Medicine
Nephrology
Cardiology

## 2018-12-05 NOTE — PROGRESS NOTE ADULT - ASSESSMENT
89 yo woman Multifactorial anemia related to probable GI blood loss and renal insufficiency. Course also complicated by CHF and deteriorating cardiac status; patient admitted in early November for aspiration pneumonia and discharged home, returned on 11/28/18 with worsening dyspnea and found to be in acute renal failure. Continues w prn transfusion PRBC support.    -prognosis very poor  -daughter understands terminal prognosis and that pt will pass soon, but does not want Hospice/placement, wants pt to stay in hospital and pass.   -discussed should then stop blood transfusions, blood draws as this is artificially supporting the pt, daughter is leaning toward agreement  -continue supportive and comfort care 89 yo woman Multifactorial anemia related to probable GI blood loss and renal insufficiency. Course also complicated by CHF and deteriorating cardiac status; patient admitted in early November for aspiration pneumonia and discharged home, returned on 11/28/18 with worsening dyspnea and found to be in acute renal failure. Continues w prn transfusion PRBC support.    -prognosis very poor  -daughter understands terminal prognosis and that pt will pass soon, but does not want Hospice/placement, wants pt to stay in hospital and pass.   -no blood transfusions, blood draws daughter agreed   -continue supportive and comfort care

## 2018-12-05 NOTE — PROGRESS NOTE ADULT - SUBJECTIVE AND OBJECTIVE BOX
[facility] -- Christofer Isaacs, Marisa Rowell Pannella, Patel, Savella  Office # [phone]    Follow Up:  SOB    Subjective/Observations: Very lethargic, on Face mask.  Not on distress.  On comfort care.    REVIEW OF SYSTEMS: All other review of systems is negative unless indicated above    PAST MEDICAL & SURGICAL HISTORY:  Primary hyperparathyroidism  CHF (congestive heart failure)  Anemia, unspecified type  Edema of lower extremity  Heart murmur  Hypertension  Hypercalcemia  History of appendectomy  S/P tonsillectomy  H/O parathyroidectomy    MEDICATIONS  (STANDING):  BACItracin   Ointment 1 Application(s) Topical daily  bisacodyl 5 milliGRAM(s) Oral at bedtime  carvedilol 25 milliGRAM(s) Oral every 12 hours  cholecalciferol 1000 Unit(s) Oral <User Schedule>  cinacalcet 30 milliGRAM(s) Oral <User Schedule>  doxazosin 4 milliGRAM(s) Oral <User Schedule>  ferrous    sulfate 325 milliGRAM(s) Oral <User Schedule>  heparin  Injectable 5000 Unit(s) SubCutaneous every 12 hours  hydrALAZINE 75 milliGRAM(s) Oral every 8 hours  petrolatum white Ointment 1 Application(s) Topical two times a day  sodium chloride 0.9%. 1000 milliLiter(s) (50 mL/Hr) IV Continuous <Continuous>    MEDICATIONS  (PRN):  atropine 1% Ophthalmic Solution for SubLingual Use 2 Drop(s) SubLingual every 3 hours PRN airway secretions  morphine  - Injectable 2 milliGRAM(s) IV Push every 2 hours PRN dyspnea, discomfort or agitation    Allergies    Vasotec (Unknown)    Intolerances    Vital Signs Last 24 Hrs  T(C): 36.8 (05 Dec 2018 10:57), Max: 36.9 (04 Dec 2018 15:20)  T(F): 98.3 (05 Dec 2018 10:57), Max: 98.5 (04 Dec 2018 15:20)  HR: 85 (05 Dec 2018 10:57) (80 - 98)  BP: 107/63 (05 Dec 2018 10:57) (104/62 - 172/75)  BP(mean): --  RR: 13 (05 Dec 2018 10:57) (13 - 22)  SpO2: 89% (05 Dec 2018 10:57) (89% - 92%)    I&O's Summary    04 Dec 2018 07:01  -  05 Dec 2018 07:00  --------------------------------------------------------  IN: 250 mL / OUT: 400 mL / NET: -150 mL    PHYSICAL EXAM:  TELE: Not on tele  Constitutional: NAD, lethargic, cachectic  HEENT: Moist Mucous Membranes, Anicteric  Pulmonary: Non-labored, breath sounds are clear bilaterally, No wheezing, rales or rhonchi  Cardiovascular: Regular, S1 and S2, No murmurs, rubs, gallops or clicks  Gastrointestinal: Bowel Sounds present, soft, nontender.   Lymph: No peripheral edema. No lymphadenopathy.  Skin: No visible rashes or ulcers.  Psych:  Mood & affect appropriate    LABS: All Labs Reviewed:                        7.8    11.65 )-----------( 201      ( 04 Dec 2018 06:20 )             23.9                         7.8    10.42 )-----------( 215      ( 03 Dec 2018 06:13 )             23.3     04 Dec 2018 06:20    136    |  98     |  93     ----------------------------<  152    4.4     |  25     |  4.20   03 Dec 2018 06:13    133    |  96     |  88     ----------------------------<  118    4.1     |  26     |  3.90     Ca    9.0        04 Dec 2018 06:20  Ca    8.4        03 Dec 2018 06:13  Mg     2.0       04 Dec 2018 06:20  Mg     2.0       03 Dec 2018 06:13    < from: TTE Echo Doppler w/o Cont (07.09.17 @ 08:32) >     EXAM:  ECHO TTE W/O CON COMP W/DOPPLR         PROCEDURE DATE:  07/09/2017        INTERPRETATION:  Ordering Physician: Unknown Doctor    Indication: Hypertension    Study Quality: Technically fair  A complete echocardiographic study was performed utilizing standard   protocol including spectral and color Doppler in all echocardiographic   windows.    Height: 152 cm  Weight: 49 kg  BSA: 1.4 sq m  Blood Pressure: 189/63    MEASUREMENTS  IVS: 1.0cm  PWT: 1.0cm  LA: 4.2cm  AO: 2.7cm  LVIDd: 4.5cm  LVIDs: 3.3cm    LVEF: 65%  RVSP: 54mmHg    FINDINGS  Left Ventricle:  Normal left ventricular systolic function.  Aortic Valve: Calcified trileaflet aortic valve. Mild aortic   insufficiency.  Mitral Valve: Mitral annular calcification and calcified mitral valve   leaflets with normal diastolic opening. Mild mitral insufficiency.  Tricuspid Valve: Normal tricuspid valve. Mild tricuspid insufficiency.  Pulmonic Valve: Normal pulmonic valve. Mild pulmonic insufficiency.  Left Atrium: Mildly enlarged.  Right Ventricle: Normal right ventricular size and systolic function.  Right Atrium: Normal  Diastolic Function: Grade 1 diastolic dysfunction.  Pericardium/Pleura: Normal pericardium with no pericardial effusion.    MORENA ADORNO M.D., ATTENDING CARDIOLOGIST  This document has been electronically signed. Jul 9 2017 10:13AM      < end of copied text >     < from: Xray Chest 1 View- PORTABLE-Urgent (12.04.18 @ 14:09) >    EXAM:  XR CHEST PORTABLE URGENT 1V                        PROCEDURE DATE:  12/04/2018     INTERPRETATION:  Clinical information: Dyspnea.    Technique: Frontal view of the chest.    Comparison: Prior chest x-ray examination from 11/28/2018.    Findings: There is been interval development of marked airspace disease   bilaterally raising from the hilar regions.    A left lower lobe opacity is again notable compatible with atelectasis   and/or pneumonia and/or effusion. A small right-sided pleural effusion is   also noted.    The heart size is mildly enlarged.    There are mild multilevel degenerative changes of the thoracic spine.    IMPRESSION: Interval development of marked pulmonary edema when compared   with 11/28/2018.    Similar-appearing small to moderate-sized left and small right-sided   pleural effusions with adjacent atelectasis and/or pneumonia.    LAVELL CALVILLO M.D., ATTENDING RADIOLOGIST  This document has been electronically signed. Dec  4 2018  2:16PM      < end of copied text >

## 2018-12-05 NOTE — PROGRESS NOTE ADULT - PROBLEM SELECTOR PLAN 7
patient takes Doxazosin outpatient for her retention  Dr. Eaton (nephrologist) following  urology consulted  abbott in place

## 2018-12-05 NOTE — PROGRESS NOTE ADULT - ASSESSMENT
89 year old Female with PMH of diastolic CHF (TTE July 2017 EF 65%), HTN, Multifactorial Anemia of Chronic Disease and Iron Deficiency, recent admission to [facility] for Acute CHF exacerbation in the setting of Hyponatremia and SABRINA (was discharged to home) with course complicated by fevers secondary to UTI, Was taking antibiotics for UTI as an outpatient as per her PMD. Presented with worsening shortness of breath and lethargy, Hyponatremia, SABRINA, and worsening Anemia. Today, patient has no acute complaints. No further blood draws as per daughter and patient's wishes.

## 2018-12-05 NOTE — PROGRESS NOTE ADULT - SUBJECTIVE AND OBJECTIVE BOX
All interim records and events noted.    remains lethargic       MEDICATIONS  (STANDING):  BACItracin   Ointment 1 Application(s) Topical daily  bisacodyl 5 milliGRAM(s) Oral at bedtime  carvedilol 25 milliGRAM(s) Oral every 12 hours  cholecalciferol 1000 Unit(s) Oral <User Schedule>  cinacalcet 30 milliGRAM(s) Oral <User Schedule>  dextrose 5% + sodium chloride 0.9%. 1000 milliLiter(s) (50 mL/Hr) IV Continuous <Continuous>  doxazosin 4 milliGRAM(s) Oral <User Schedule>  ferrous    sulfate 325 milliGRAM(s) Oral <User Schedule>  heparin  Injectable 5000 Unit(s) SubCutaneous every 12 hours  hydrALAZINE 75 milliGRAM(s) Oral every 8 hours  petrolatum white Ointment 1 Application(s) Topical two times a day  sodium chloride 0.9%. 1000 milliLiter(s) (50 mL/Hr) IV Continuous <Continuous>    MEDICATIONS  (PRN):      Vital Signs Last 24 Hrs  T(C): 36.8 (04 Dec 2018 07:28), Max: 36.8 (03 Dec 2018 23:21)  T(F): 98.2 (04 Dec 2018 07:28), Max: 98.3 (03 Dec 2018 23:21)  HR: 80 (04 Dec 2018 12:17) (68 - 91)  BP: 130/68 (04 Dec 2018 12:17) (130/68 - 159/80)  BP(mean): --  RR: 19 (04 Dec 2018 12:17) (16 - 19)  SpO2: 94% (04 Dec 2018 12:17) (91% - 95%)    PHYSICAL EXAM  General: thin frail elderly woman in bed, in no acute distress  Head: atraumatic, normocephalic  ENT: sclera anicteric, buccal mucosa moist  Neck: supple, trachea midline  CV: S1 S2, regular rate and rhythm  Lungs: clear to auscultation, no wheezes/rhonchi  Abdomen: soft, nontender, bowel sounds present, no palpable masses  Skin: no significant increased ecchymosis/petechiae  Neuro: see above      LABS:          LABS:    CBC Full  -  ( 04 Dec 2018 06:20 )  WBC Count : 11.65 K/uL  Hemoglobin : 7.8 g/dL  Hematocrit : 23.9 %  Platelet Count - Automated : 201 K/uL  Mean Cell Volume : 84.2 fl  Mean Cell Hemoglobin : 27.5 pg  Mean Cell Hemoglobin Concentration : 32.6 gm/dL  Auto Neutrophil # : x  Auto Lymphocyte # : x  Auto Monocyte # : x  Auto Eosinophil # : x  Auto Basophil # : x  Auto Neutrophil % : x  Auto Lymphocyte % : x  Auto Monocyte % : x  Auto Eosinophil % : x  Auto Basophil % : x    12-04    136  |  98  |  93<H>  ----------------------------<  152<H>  4.4   |  25  |  4.20<H>    Ca    9.0      04 Dec 2018 06:20  Mg     2.0     12-04                      RADIOLOGY & ADDITIONAL STUDIES:    IMPRESSION/RECOMMENDATIONS: All interim records and events noted.    remains lethargic   on morphine   not in pain  daughter bedside       MEDICATIONS  (STANDING):  BACItracin   Ointment 1 Application(s) Topical daily  bisacodyl 5 milliGRAM(s) Oral at bedtime  carvedilol 25 milliGRAM(s) Oral every 12 hours  cholecalciferol 1000 Unit(s) Oral <User Schedule>  cinacalcet 30 milliGRAM(s) Oral <User Schedule>  dextrose 5% + sodium chloride 0.9%. 1000 milliLiter(s) (50 mL/Hr) IV Continuous <Continuous>  doxazosin 4 milliGRAM(s) Oral <User Schedule>  ferrous    sulfate 325 milliGRAM(s) Oral <User Schedule>  heparin  Injectable 5000 Unit(s) SubCutaneous every 12 hours  hydrALAZINE 75 milliGRAM(s) Oral every 8 hours  petrolatum white Ointment 1 Application(s) Topical two times a day  sodium chloride 0.9%. 1000 milliLiter(s) (50 mL/Hr) IV Continuous <Continuous>    MEDICATIONS  (PRN):      Vital Signs Last 24 Hrs  T(C): 36.8 (04 Dec 2018 07:28), Max: 36.8 (03 Dec 2018 23:21)  T(F): 98.2 (04 Dec 2018 07:28), Max: 98.3 (03 Dec 2018 23:21)  HR: 80 (04 Dec 2018 12:17) (68 - 91)  BP: 130/68 (04 Dec 2018 12:17) (130/68 - 159/80)  BP(mean): --  RR: 19 (04 Dec 2018 12:17) (16 - 19)  SpO2: 94% (04 Dec 2018 12:17) (91% - 95%)    PHYSICAL EXAM  General: thin frail elderly woman in bed, in no acute distress  Head: atraumatic, normocephalic  ENT: sclera anicteric, buccal mucosa moist  Neck: supple, trachea midline  CV: S1 S2, regular rate and rhythm  Lungs: clear to auscultation, no wheezes/rhonchi  Abdomen: soft, nontender, bowel sounds present, no palpable masses  Skin: no significant increased ecchymosis/petechiae  Neuro: see above      LABS:          LABS:    CBC Full  -  ( 04 Dec 2018 06:20 )  WBC Count : 11.65 K/uL  Hemoglobin : 7.8 g/dL  Hematocrit : 23.9 %  Platelet Count - Automated : 201 K/uL  Mean Cell Volume : 84.2 fl  Mean Cell Hemoglobin : 27.5 pg  Mean Cell Hemoglobin Concentration : 32.6 gm/dL  Auto Neutrophil # : x  Auto Lymphocyte # : x  Auto Monocyte # : x  Auto Eosinophil # : x  Auto Basophil # : x  Auto Neutrophil % : x  Auto Lymphocyte % : x  Auto Monocyte % : x  Auto Eosinophil % : x  Auto Basophil % : x    12-04    136  |  98  |  93<H>  ----------------------------<  152<H>  4.4   |  25  |  4.20<H>    Ca    9.0      04 Dec 2018 06:20  Mg     2.0     12-04                      RADIOLOGY & ADDITIONAL STUDIES:    IMPRESSION/RECOMMENDATIONS:

## 2018-12-05 NOTE — PROGRESS NOTE ADULT - PROBLEM SELECTOR PROBLEM 2
Anemia, unspecified type
SABRINA (acute kidney injury)

## 2018-12-05 NOTE — PROGRESS NOTE ADULT - PROBLEM SELECTOR PROBLEM 8
Prophylactic measure
Advance care planning

## 2018-12-05 NOTE — PROGRESS NOTE ADULT - REASON FOR ADMISSION

## 2018-12-06 VITALS
DIASTOLIC BLOOD PRESSURE: 56 MMHG | RESPIRATION RATE: 20 BRPM | HEART RATE: 80 BPM | OXYGEN SATURATION: 88 % | SYSTOLIC BLOOD PRESSURE: 94 MMHG | TEMPERATURE: 100 F

## 2018-12-06 NOTE — DISCHARGE NOTE FOR THE EXPIRED PATIENT - HOSPITAL COURSE
89 year old Female with PMH of diastolic CHF (TTE July 2017 EF 65%), HTN, Multifactorial Anemia of Chronic Disease and Iron Deficiency, recent admission to [facility] for Acute CHF exacerbation in the setting of Hyponatremia and SABRINA (was discharged to home) with course complicated by fevers secondary to UTI, Was taking antibiotics for UTI as an outpatient as per her PMD, taking day 2 of Sulfatrim, presents today with worsening shortness of breath and lethargy for the past few days per her home aid. Patient presents with her daughter who reports her mother is SOB. Since discharge on 11/14/18, patient has been being seen by visiting nurse, and her sodium has been low at home. Her Lasix was held for the past few days, but she has been taking Lasix 60 QD for the past few days. According to her daughter she reports her mother had worsening SOB today which prompted the visit to the ER. Patient admits to fatigue, lethargy, and decreased PO intake, however currently the patient reports she is NOT short of breath. She denies headache, dizziness, syncope, chest pain, palpitations, cough, abdominal pain, nausea, vomiting, diarrhea. Note patient is on antibiotics for a UTI from outpatient. Note patient at baseline is A&Ox2 and ambulates with a walker.     In the ED: temp 99, HR 60, /72, RR 20, SpO2 94% RA, repeat 95% RA. H/H: 8.0/23.5, Na 122, BUN/Cr: 73/2.90, pro-BNP: 23539. Troponin 0.043. UA positive for moderate LEC, 11-25 WBC, 11-25 RBC. Chest x-ray showed airspace disease left lung base, effusion-consolidation. Airspace disease right lung base, blunted right costophrenic angle. Heart size appears enlarged although magnified secondary to portable technique, left cardiac border obscured by left basilar pathology. Aorta shows atherosclerotic changes. No acute osseous abnormalities. Calcific bursitis present, left shoulder. Surgical clips present, right paratracheal region. EKG showed NSR VR 61. Cardiologist Dr. Norris was consulted.     patient was started on gentle hydration of 50ml/hr normal saline the night of admission, despite patient's CHF history as her sodium was at 122.     After discussing patient's low hemoglobin of 7.3, and assessing her CHF status and hyponatremia of 124 with cardiology, nephrology, hematology, and the internist, it was agreed upon to give patient 1/2 unit of pRBC and then to reassess how patient is clinically (whether to give Lasix or not), and continue second 1/2 of pRBx.     On 12/6/2018 at 01:29, Called by RN as patient appears to be no longer breathing. Patient seen and examined at bedside. Patient did not respond to verbal, physical, or painful stimuli. Absent heart and breath sounds on auscultation. Pupils are fixed and dilated, absent corneal reflex. No palpable pulses at radial, carotid, or femoral arteries.

## 2018-12-06 NOTE — CHART NOTE - NSCHARTNOTEFT_GEN_A_CORE
Called by RN as patient appears to be no longer breathing. Patient seen and examined at bedside. Patient did not respond to verbal, physical, or painful stimuli. Absent heart and breath sounds on auscultation. Pupils are fixed and dilated, absent corneal reflex. No palpable pulses at radial, carotid, or femoral arteries.    Time of Death: 01:29am 12/6/2018  Dr. Perlman notified via phone.  Daughter present at bedside.

## 2019-03-18 NOTE — DIETITIAN INITIAL EVALUATION ADULT. - PROBLEM SELECTOR PLAN 8
Acute onset of periumbilical pain. Denies vomiting, diarrhea, fever. Normal stool this AM. Abdomen soft, non distended. Guarding noted on the right side IMPROVE VTE Individual Risk Assessment        RISK                                                          Points  [  ] Previous VTE                                                3    [  ] Thrombophilia                                             2  [x  ] Lower limb paralysis                                   2        (unable to hold up >15 seconds)    [  ] Current Cancer                                             2         (within 6 months)    [ x ] Immobilization > 24 hrs                              1  [  ] ICU/CCU stay > 24 hours                             1  [  x] Age > 60                                                         1    IMPROVE VTE Score: 4  DVT ppx: heparin sub q w cr cl 25.

## 2019-05-07 NOTE — ED ADULT NURSE NOTE - NS PRO PASSIVE SMOKE EXP
[facility]    Observation Unit  Discharge Summary  Hospitalist    Date of Admission:  5/6/2019  Date of Discharge:  5/7/2019  Provider:  Dolores Cunha PA-C  Date of Service (when I last saw the patient): 05/07/19    Discharge Diagnoses   Symptomatic iron deficiency anemia  Abnormal pelvic ultrasound      Other medical issues:  Past Medical History:   Diagnosis Date     Irregular periods/menstrual cycles      History of Present Illness   Radha Bryan is a 40 year old female with PMH significant for iron deficiency anemia 2/2 menorrhagia who presents to the ED on 5/6/19 for evaluation of chest pain and shortness of breath. Please see the admission history and physical for full details.    Hospital Course   Radha Bryan was admitted on 5/6/2019.  The following problems were addressed during her hospitalization:    #Symptomatic iron deficiency anemia: ongoing shortness of breath with centralized chest pressure on exertion, increased fatigue, lightheadedness/dizziness for the past month that has been worse the last few days with Hgb of 4.7 on admission. Suspect 2/2 menorrhagia. LMP 2 weeks ago. Previously instructed to take iron supplementation but has been noncompliant due to this causing constipation. Iron studies today consistent with iron deficiency anemia. Transfused 3 units of PRBCs with Hgb of 7.4 the day of discharge and patient reporting improvement in her symptoms.   - Recheck Hgb in 1 week with PCP  - Start iron supplement with stool softener to prevent constipation   - Appointment made with Dr. Carrion of gynecology for additional management of menorrhagia      #Abnormal pelvis ultrasound: ultrasound of pelvis shows focal area of hyperechogenicity within the endometrium measures 1.7x1cm and is indeterminate, possible endometrial polyp, intramural fibroid within uterine body that measures 3 cm, and 4.9 cm slightly complex cystic lesion containing septation in the left ovary. Findings reviewed  with on call gynecologist, no acute interventions needed just repeat ultrasound as outpatient for monitoring due to concern for ovarian cancer with cystic lesion present. Patient is unsure of family history of ovarian cancer but states her sister had similar findings on a pelvic ultrasound and no cancer diagnosis.   - Follow up ultrasound with gynecology in 6-12 weeks     Pending Results   Unresulted Labs Ordered in the Past 30 Days of this Admission     Date and Time Order Name Status Description    5/6/2019 1231 Haptoglobin In process         Code Status   Full Code       Primary Care Physician   [facility]    Exam:    /60 (BP Location: Left arm)   Pulse 79   Temp 98.6  F (37  C) (Oral)   Resp 20   SpO2 97%   GEN:  Alert, oriented x 3, appears comfortable, no overt distress  HEENT:  Normocephalic/atraumatic, no scleral icterus, no nasal discharge, mouth moist.  CV:  Regular rate and rhythm, no murmur or JVD.  S1 + S2 noted, no S3 or S4.  LUNGS:  Clear to auscultation bilaterally without rales/rhonchi/wheezing/retractions. Symmetric chest rise on inhalation noted.  ABD:  Active bowel sounds, soft, non-tender/non-distended.  No rebound/guarding/rigidity.  EXT:  No edema.  No cyanosis.  No acute joint synovitis noted.  SKIN:  dry to touch, no exanthems noted in the visualized areas.  NEURO:  Symmetric muscle strength, sensation to touch grossly intact.  Coordination symmetric on general exam.  No new focal deficits appreciated.    Discharge Disposition   Discharged to home    Consultations This Hospital Stay   None    Time Spent on this Encounter   I, Edie Cunha, personally saw the patient today and spent greater than 30 minutes discharging this patient.    Discharge Orders      Reason for your hospital stay    You were admitted due to concerns for chest pain and shortness of breath related to a low hemoglobin level. Your anemia is secondary to iron deficiency and heavy menstrual cycle.  You received 3 units of packed red blood cells with improvement of your hemoglobin. It is recommended you start supplemental iron to continue to improve your hemoglobin level. You will need follow up of your hemoglobin level and gynecology follow up for management of your heavy periods.     Activity    Your activity upon discharge: activity as tolerated     Follow-up and recommended labs and tests     Follow up with primary care provider, [facility], within 7 days for hospital follow-up and hemoglobin check.  The following labs/tests are recommended: CBC.  Follow up with gynecology to establish care, discuss management of menorrhagia, and follow up pelvic ultrasound.     Full Code     Diet    Follow this diet upon discharge: Orders Placed This Encounter      Regular Diet Adult     Discharge Medications   Current Discharge Medication List      START taking these medications    Details   ferrous gluconate (FERGON) 324 (38 Fe) MG tablet Take 1 tablet (324 mg) by mouth daily (with breakfast)  Refills: 0    Associated Diagnoses: Iron deficiency anemia due to chronic blood loss      senna-docusate (SENOKOT-S/PERICOLACE) 8.6-50 MG tablet Take 1 tablet by mouth daily as needed for constipation    Associated Diagnoses: Constipation, unspecified constipation type         CONTINUE these medications which have NOT CHANGED    Details   ibuprofen (ADVIL/MOTRIN) 200 MG tablet Take 400 mg by mouth daily as needed for mild pain           Allergies   No Known Allergies     Data   Results for orders placed or performed during the hospital encounter of 05/06/19   Chest XR,  PA & LAT    Narrative    XR CHEST 2 VW 5/6/2019 1:36 PM    HISTORY: chest pain      Impression    IMPRESSION: Negative.    ULISES CÁRDENAS MD   US Pelvic Complete w Transvaginal    Narrative    US PELVIC COMPLETE WITH TRANSVAGINAL    5/6/2019 3:59 PM     HISTORY: Menorrhagia. Low hemoglobin. History of uterine fibroids.    TECHNIQUE: Transvaginal imaging  was added to the transabdominal scans.    COMPARISON: 10/1/2016.    FINDINGS: The uterus is measured at 12.1 x 7.4 x 6.1 cm. There is an  intramural fibroid in the uterine body on the right measuring 3 x 2.2  x 2.6 cm, not significantly changed. Endometrial stripe measures 1.8  cm and is mildly thickened for a premenopausal patient. There is an  ill-defined area of increased echogenicity within the endometrium,  measuring 1.7 x 1 cm, with associated hypervascularity. The right  ovary is unremarkable. The left ovary contains a slightly complex  septated cystic lesion measuring 4.9 cm. No solid adnexal masses are  identified. No free pelvic fluid is present. Color Doppler blood flow  is noted within the ovaries bilaterally.      Impression    IMPRESSION:   1. The endometrium is mildly thickened and heterogeneous.  2. A focal area of hyperechogenicity within the endometrium measures  1.7 x 1 cm, and is indeterminate. An endometrial polyp could have this  appearance, and gynecologic consultation is recommended. This finding  was not seen on the previous exam.  3. An intramural fibroid within the uterine body on the right measures  3 cm, and is not significantly changed.  4. There is a 4.9 cm slightly complex cystic lesion containing  septations in the left ovary, also new since the previous exam. A  follow-up ultrasound in 6-12 weeks may be helpful to ensure  resolution.     MILLY ESCALANTE MD   CBC (platelets, no diff)   Result Value Ref Range    WBC 7.7 4.0 - 11.0 10e9/L    RBC Count 3.02 (L) 3.8 - 5.2 10e12/L    Hemoglobin 4.7 (LL) 11.7 - 15.7 g/dL    Hematocrit 18.4 (L) 35.0 - 47.0 %    MCV 61 (L) 78 - 100 fl    MCH 15.6 (L) 26.5 - 33.0 pg    MCHC 25.5 (L) 31.5 - 36.5 g/dL    RDW 18.6 (H) 10.0 - 15.0 %    Platelet Count 358 150 - 450 10e9/L   Basic metabolic panel (BMP)   Result Value Ref Range    Sodium 139 133 - 144 mmol/L    Potassium 3.7 3.4 - 5.3 mmol/L    Chloride 113 (H) 94 - 109 mmol/L    Carbon Dioxide 21  20 - 32 mmol/L    Anion Gap 5 3 - 14 mmol/L    Glucose 97 70 - 99 mg/dL    Urea Nitrogen 10 7 - 30 mg/dL    Creatinine 0.46 (L) 0.52 - 1.04 mg/dL    GFR Estimate >90 >60 mL/min/[1.73_m2]    GFR Estimate If Black >90 >60 mL/min/[1.73_m2]    Calcium 9.5 8.5 - 10.1 mg/dL   Troponin I   Result Value Ref Range    Troponin I ES <0.015 0.000 - 0.045 ug/L   HCG qualitative   Result Value Ref Range    HCG Qualitative Serum Negative NEG^Negative   WBC Differential   Result Value Ref Range    Diff Method Automated Method     Anisocytosis Moderate     Microcytes Present     Hypochromasia Present     Platelet Estimate       Automated count confirmed.  Platelet morphology is normal.   Iron and iron binding capacity   Result Value Ref Range    Iron 10 (L) 35 - 180 ug/dL    Iron Binding Cap 509 (H) 240 - 430 ug/dL    Iron Saturation Index 2 (L) 15 - 46 %   Ferritin   Result Value Ref Range    Ferritin 3 (L) 12 - 150 ng/mL   Folate   Result Value Ref Range    Folate 10.6 >5.4 ng/mL   Lactate Dehydrogenase   Result Value Ref Range    Lactate Dehydrogenase 164 81 - 234 U/L   Reticulocyte count   Result Value Ref Range    % Retic 1.8 0.5 - 2.0 %    Absolute Retic 51.8 25 - 95 10e9/L   Vitamin B12   Result Value Ref Range    Vitamin B12 516 193 - 986 pg/mL   Hemoglobin   Result Value Ref Range    Hemoglobin 5.4 (LL) 11.7 - 15.7 g/dL   Hemoglobin   Result Value Ref Range    Hemoglobin 6.7 (LL) 11.7 - 15.7 g/dL   Basic metabolic panel   Result Value Ref Range    Sodium 138 133 - 144 mmol/L    Potassium 4.0 3.4 - 5.3 mmol/L    Chloride 113 (H) 94 - 109 mmol/L    Carbon Dioxide 23 20 - 32 mmol/L    Anion Gap 2 (L) 3 - 14 mmol/L    Glucose 95 70 - 99 mg/dL    Urea Nitrogen 8 7 - 30 mg/dL    Creatinine 0.47 (L) 0.52 - 1.04 mg/dL    GFR Estimate >90 >60 mL/min/[1.73_m2]    GFR Estimate If Black >90 >60 mL/min/[1.73_m2]    Calcium 9.3 8.5 - 10.1 mg/dL   CBC with platelets differential   Result Value Ref Range    WBC 8.7 4.0 - 11.0 10e9/L    RBC  Count 3.75 (L) 3.8 - 5.2 10e12/L    Hemoglobin 7.4 (L) 11.7 - 15.7 g/dL    Hematocrit 25.5 (L) 35.0 - 47.0 %    MCV 68 (L) 78 - 100 fl    MCH 19.7 (L) 26.5 - 33.0 pg    MCHC 29.0 (L) 31.5 - 36.5 g/dL    RDW 25.2 (H) 10.0 - 15.0 %    Platelet Count 290 150 - 450 10e9/L    Diff Method Automated Method     % Neutrophils 54.1 %    % Lymphocytes 35.4 %    % Monocytes 8.1 %    % Eosinophils 1.6 %    % Basophils 0.6 %    % Immature Granulocytes 0.2 %    Nucleated RBCs 0 0 /100    Absolute Neutrophil 4.7 1.6 - 8.3 10e9/L    Absolute Lymphocytes 3.1 0.8 - 5.3 10e9/L    Absolute Monocytes 0.7 0.0 - 1.3 10e9/L    Absolute Eosinophils 0.1 0.0 - 0.7 10e9/L    Absolute Basophils 0.1 0.0 - 0.2 10e9/L    Abs Immature Granulocytes 0.0 0 - 0.4 10e9/L    Absolute Nucleated RBC 0.0    EKG 12-lead, tracing only   Result Value Ref Range    Interpretation ECG Click View Image link to view waveform and result    EKG 12 lead   Result Value Ref Range    Interpretation ECG Click View Image link to view waveform and result    ABO/Rh type and screen   Result Value Ref Range    Units Ordered 3     ABO A     RH(D) Pos     Antibody Screen Neg     Test Valid Only At [facility]        Specimen Expires 05/09/2019     Crossmatch Red Blood Cells    Blood component   Result Value Ref Range    Unit Number [id number]     Blood Component Type Red Blood Cells Leukocyte Reduced     Division Number 00     Status of Unit Released to care unit 05/06/2019 1328     Blood Product Code [id number]     Unit Status ISS    Blood component   Result Value Ref Range    Unit Number [id number]     Blood Component Type Red Blood Cells Leukocyte Reduced     Division Number 00     Status of Unit Released to care unit 05/06/2019 1644     Blood Product Code [id number]     Unit Status ISS    Blood component   Result Value Ref Range    Unit Number [id number]     Blood Component Type Red Blood Cells Leukocyte Reduced     Division Number 00     Status of Unit  Released to care unit 05/06/2019 1915     Blood Product Code [id number]     Unit Status ISS        Edie Cunha PA-C   No

## 2019-05-14 NOTE — PROGRESS NOTE ADULT - PROBLEM/PLAN-5
Clear liquids, including water, soft drinks without caffeine, such as gingerale, decaffeinated tea, dilute apple juice, or broth  Drink small amounts and gradually increase as tolerated  [place] diet as tolerated when you are feeling better  Do not force yourself to eat, kaushik if you have cramps, vomiting or diarrhea  When you start eating, avoid fatty, greasy, spicy or fried foods  Do no eat dairy products if you have diarrhea, they can make it worse  Diet handout discussed with patient  Call if no improvement in 2-3 days, or if increasing abdominal pain, continued vomiting, blood in stool, weakness, or dizziness  
DISPLAY PLAN FREE TEXT

## 2019-11-21 NOTE — PROGRESS NOTE ADULT - PROBLEM SELECTOR PLAN 1
Patient: Kumar Carranza MRN: [id number]  SSN: [id number]    YOB: 1946  Age: 68 y.o. Sex: female      Other Providers:  Donn Toledo MD.      CHIEF COMPLAINT: Breast cancer. DIAGNOSIS: Left breast invasive ductal carcinoma, [id number], Stage IA. ER 97%, ID 86%, HER2 Positive. PREVIOUS TREATMENT:  1) 5/5/17:  Left breast lumpectomy. 2) Radiation of the left breast. DOSE and FRACTIONATION:  16 fractions, 4256 cGy, 5 boost. Treatment date: 07/18 through 08/16/17    HISTORY OF PRESENT ILLNESS:  Kumra Carranza is a 68 y.o. female initially seen by Dr. Emil Fernandez at the request of Dr. Jamarcus Bishop. She previously completed routine yearly mammograms. She missed her mammogram in 2016 and was found to have a left breast mass by mammogram on 3/25/2017. The area was not palpable, and there were no associated breast changes including nipple discharge or skin change. She was sent for an ultrasound guided biopsy on 4/10/2017 with pathology positive for breast cancer, highly ER/ID positive and HER2 positive. She was then referred to Dr. Jamal Yancey and underwent a left breast needle localized lumpectomy with sentinel lymph node biopsy on 5/5/2017, which showed the tumor to be 1.1 cm with 2 negative sentinel nodes. She was seen in medical oncology 6/20/2017. she was interested in their opinion but was very reluctant after medical oncology recommended chemotherapy. She wanted time to think about her options and was referred to us for consideration of radiation. INTERVAL HISTORY: Ms Eda Markham returns today for follow up 27 months after completing radiation therapy for her left breast invasive ductal carcinoma. Tolerated radiation well. No breast issues. Tolerating Arimidex well. Followed by psychiatry  for her history of depression. Denies suicidal thoughts. OBSTETRIC HISTORY:    Menarche at the age of 6.    G1,P2. Oral Contraceptive Pills:  9-10 months.     Hormone Replacement Therapy: None  Menopause at 62    PAST MEDICAL HISTORY:    Past Medical History:   Diagnosis Date    Abdominal pain, right upper quadrant     Abnormal weight gain     Acute sinusitis, unspecified     Benign neoplasm of colon 2/25/2015    Benign paroxysmal positional vertigo 2/25/2015    [facility]    [facility]    Calculus of gallbladder without mention of cholecystitis or obstruction 2/25/2015    Depressive disorder, not elsewhere classified 2/25/2015    Dysuria     Essential hypertension, benign 2/25/2015    Insomnia, unspecified 2/25/2015    Nausea alone     Neoplasm of uncertain behavior of skin     Other malaise and fatigue     Primary localized osteoarthrosis, unspecified site 2/25/2015    Radiation therapy complication     Urinary tract infection, site not specified     Vaginitis and vulvovaginitis, unspecified 2/25/2015     The patient denies history of collagen vascular diseases, pacemaker insertion, prior radiation or prior chemotherapy. PAST SURGICAL HISTORY:   Past Surgical History:   Procedure Laterality Date    HX BREAST BIOPSY Left 04/10/2017    US Core BX  (positive)    HX BREAST BIOPSY Left 05/05/2017    Needle Local    HX BREAST BIOPSY Left 04/03/2019    US Core Bx (minimal intraductal hyperplasia    HX BREAST LUMPECTOMY Right     benign per pt    HX BREAST LUMPECTOMY Left 5/5/2017    LEFT BREAST NEEDLE LOCALIZED LUMPECTOMY performed by Lazarus Billet, MD at [facility]:     Current Outpatient Medications:     atenolol (TENORMIN) 25 mg tablet, Take 1 Tab by mouth daily. , Disp: 90 Tab, Rfl: 3    LORazepam (ATIVAN) 1 mg tablet, TAKE 1 TABLET BY MOUTH TWICE DAILY AS NEEDED FOR ANXIETY, Disp: , Rfl: 1    anastrozole (ARIMIDEX) 1 mg tablet, Take 1 mg by mouth daily. Start medication after radiation is completed. , Disp: 90 Tab, Rfl: 3    DULoxetine (CYMBALTA) 30 mg capsule, TAKE 1 CAPSULE BY MOUTH A DAY, Disp: , Rfl: 3    multivitamin (ONE A DAY) tablet, Take 1 Tab by mouth daily. , Disp: , Rfl:     GLUCOSAMINE-CHONDR-CART-COLLAG PO, Take 1 Tab by mouth two (2) times a day., Disp: , Rfl:     Calcium Carbonate-Vit D3-Min 600 mg calcium- 400 unit tab, Take 2 Tabs by mouth daily. , Disp: , Rfl:     QUEtiapine (SEROQUEL) 50 mg tablet, TAKE 3 TABLETS BY MOUTH AT BEDTIME, Disp: , Rfl: 2    temazepam (RESTORIL) 30 mg capsule, Take  by mouth nightly as needed for Sleep., Disp: , Rfl:     ALLERGIES:   Allergies   Allergen Reactions    Penicillins Other (comments)     unknown     SOCIAL HISTORY:   Social History     Socioeconomic History    Marital status:      Spouse name: Not on file    Number of children: Not on file    Years of education: Not on file    Highest education level: Not on file   Occupational History    Not on file   Social Needs    Financial resource strain: Not on file    Food insecurity:     Worry: Not on file     Inability: Not on file    Transportation needs:     Medical: Not on file     Non-medical: Not on file   Tobacco Use    Smoking status: Never Smoker    Smokeless tobacco: Never Used   Substance and Sexual Activity    Alcohol use: No    Drug use: No    Sexual activity: Not on file   Lifestyle    Physical activity:     Days per week: Not on file     Minutes per session: Not on file    Stress: Not on file   Relationships    Social connections:     Talks on phone: Not on file     Gets together: Not on file     Attends Gnosticism service: Not on file     Active member of club or organization: Not on file     Attends meetings of clubs or organizations: Not on file     Relationship status: Not on file    Intimate partner violence:     Fear of current or ex partner: Not on file     Emotionally abused: Not on file     Physically abused: Not on file     Forced sexual activity: Not on file   Other Topics Concern    Not on file   Social History Narrative    Not on file     FAMILY HISTORY:   Family History   Problem Relation Age of Onset    Cancer Maternal Aunt         breast, great aunt    Cancer Maternal Grandmother         great grandmother, breast    Breast Cancer Maternal Grandmother      REVIEW OF SYSTEMS: Please see the completed review of systems sheet in the chart that I have reviewed today. PHYSICAL EXAMINATION:   ECOG Performance status 0  VITAL SIGNS: Blood pressure 131/84  Pulse 72   Temperature  98  Weight  169.7     GENERAL: The patient is well-developed, ambulatory, alert and in no acute distress. HEENT: Head is normocephalic, atraumatic. NECK: Neck is supple with no masses. BREASTS: Examination of the unaffected breast reveals no dominant nodules or masses. The lumpectomy cavity appears well healed with good aesthetics and symmetry. Well healed surgical incision. PATHOLOGY:       DIAGNOSIS   A: LEFT BREAST TISSUE: INFILTRATING DUCT CARCINOMA WITH LOBULAR FEATURES, LOW GRADE (WELL DIFFERENTIATED) MEASURING APPROXIMATELY 1.1 X 1 X 0.7 CM IN ASSOCIATION WITH DUCTAL CARCINOMA IN SITU, INTERMEDIATE GRADE (CRIBRIFORM TYPE). INFILTRATING CARCINOMA IS APPROXIMATELY 0.1 CN FROM MARKED SUPERIOR MARGIN. A MICROSCOPIC FOCUS OF TUMOR MOST LIKELY REPRESENTING INTRAMAMMARY LYMPHATIC SPREAD IS PRESENT, APPROXIMATELY 0.3 CM FROM MARKED ANTERIOR MARGIN. OTHER MARGINS ARE GREATER THAN 1 CM FROM TUMOR. SEE COMMENT. B: LEFT SENTINEL LYMPH NODE: LYMPH NODE NEGATIVE FOR CARCINOMA. C: LEFT SENTINEL NODE #2: LYMPH NODE NEGATIVE FOR METASTATIC CARCINOMA. Comment   Infiltrating carcinoma in this specimen is similar to that in prior core biopsy [id number] which has estrogen receptors of 97%, progesterone receptors of 86% and Her2 3+ positive. If clinically indicated receptor studies can be repeated in this material      A: ANATOMIC SITE: Left breast (presumably 2 o'clock position, 4 cm from nipple based on prior core biopsy). PROCEDURE: Needle localized wide excision.    HISTOLOGIC TYPE: Infiltrating duct with lobular features. SIZE: Approximately 1.1 x 1 x 0.7 cm. UNIFOCAL OR MULTIFOCAL: Apparently unifocal with intramammary lymphatic focus. PRESENCE OF SKIN, NIPPLE OR SKELETAL MUSCLE INVOLVEMENT: Not identified. OLAYINKA MODIFICATION OF BLOOM-MAK GRADE:   ARCHITECTURAL SCORE: 2/3. NUCLEAR SCORE: 1/3. MITOTIC SCORE: 1/3. TOTAL SCORE: 4/9 = Low Grade. 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 LYMPHOVASCULAR INVASION: Apparent focus of intramammary lymphatic spread. ARE MICROCALCIFICATIONS IDENTIFIED: No.   TUMOR DISTANCE TO CLOSEST MARGIN: 0.1 cm from superior margin. ANTERIOR (SUPERFICIAL): Greater than 1 cm. POSTERIOR (DEEP): Greater than 1 cm. MEDIAL: Greater than 1 cm. LATERAL: Greater than 1 cm. INFERIOR: Approximately 0.3 cm (separate 1 mm focus away from primary). SUPERIOR: Infiltrating tumor is present approximately 0.1 from marked superior margin. LYMPH NODE STATUS:   TOTAL NUMBER OF LYMPH NODES CONTAINING CARCINOMA: 0.   TOTAL NUMBER OF LYMPH NODES EXAMINED: 2 (see B and C). METASTASIS:   SIZE OF LARGEST POSITIVE NODE: Does not apply. EXTRA-CAPSULAR EXTENSION OF TUMOR: Does not apply. OTHER FINDINGS: Changes consistent with prior biopsy site. TNM CLASSIFICATION: pT1 pN0. COLD ISCHEMIC TIME: 1 hour, 42 minutes. TOTAL TIME IN FORMALIN: 60 hours, 15 minutes. BLOCK NOT SENT BECAUSE RECEPTOR STUDIES PERFORMED ON PRIOR CORE BIOPSY (SEE [id number] FOR ESTROGEN RECEPTORS OF 97%, PROGESTERONE RECEPTOR STUDY A 86% AND HER2/3+ POSITIVE).      LABORATORY: none today    RADIOLOGY:    DIAGNOSTIC DIGITAL BILATERAL MAMMOGRAPHY  3/16/2018     CLINICAL INDICATION: Annual bilateral mammogram, surveillance of left breast  cancer status post lumpectomy and radiation, surgery on 5/5/2017, patient  reports her maternal grandmother had breast cancer     COMPARISON: 5/5/2017, also prior outside mammography going back to 5/22/2008  from [facility] [place]; breast MRI 4/19/2017      FINDINGS:      Mammogram: Digital diagnostic mammography was performed, and is interpreted in  conjunction with a computer assisted detection (CAD) system.       Standard views demonstrate scattered glandular densities bilaterally. There are  minimal typically benign calcifications on the left anteriorly, with no  suspicious cluster on either side. There is mild expected linear scarring at the  left 2:00 lumpectomy site. No suspicious nonsurgical distortion, developing  mass, nipple retraction or unexpected skin thickening is seen on either side.     IMPRESSION:   1. No evidence of residual or recurrent malignancy in either breast.  2. Left expected lumpectomy and radiation change.     Recommend screening mammography in one year unless other imaging is clinically  warranted in the interval. A reminder letter will be scheduled. This was  reported to the patient at the time of interpretation.     BI-RADS Assessment Category 2: Benign finding     IMPRESSION/PLAN:  Clifton Navarro is a 68 y.o. female with left breast invasive ductal carcinoma, mH4uH2Ox, Stage IA. ER 97%, TX 86%, HER2 Positive S/P left breast lumpectomy, 05/05/17. She completed radiation therapy, 08/16/17. She declined chemotherapy as recommended by medical oncology, but agreed to endocrine therapy and is tolerating well. DEXA, 7/12/17 was normal. Negative mammogram, 3/16/18. Ms Elida August is now 27 months out from completing radiation therapy. She has no breast issues. Endocrine therapy under the direction of medical oncology. Mammogram 3/16/19 revealed no evidence of malignancy. I spent 20 minutes in direct counseling and ongoing management of her breast cancer. All questions were answered to the best of my ability. She has recovered well from radiation therapy to left breast. She will no longer follow up with radiation therapy. To continue her care with medical oncology. We are available if needed. New onset. On lasix 40mg IVP daily.    Continue carvedilol 25mg BID. Holding acei for renal function.    Started on Imdur 30mg daily for preload reduction   Carotid dopplers neg for flow limiting stenosis.   ECHO - f/u official read   Monitor I&Os, daily wts.   f/u cardio recs (Dr. Isaacs) Nkechi Donahue NP   November 21, 2019       Portions of this note were copied from prior encounters and reviewed for accuracy, currency, and represent documentation and tasks completed during this encounter. I verify and attest these portions to be unchanged from prior visits. New onset. On lasix 40mg IVP daily.    Continue carvedilol 25mg BID. Holding acei for renal function.    Started on Imdur 30mg daily for preload reduction   Carotid dopplers neg for flow limiting stenosis.   ECHO - EF 60-65%, valvular heart disease, pulm hypertension.  Monitor I&Os, daily wts.   f/u cardio recs (Dr. Isaacs) New onset. acute hypoxic respir failure acute diastolic chf  On lasix 40mg IVP daily.    Continue carvedilol 25mg BID. Holding acei for renal function.    Started on Imdur 30mg daily for preload reduction   Carotid dopplers neg for flow limiting stenosis.   ECHO - EF 60-65%, valvular heart disease, pulm hypertension.  Monitor I&Os, daily wts.   f/u cardio recs (Dr. Isaacs) New onset. Improving with diuresis.   Continue lasix 40mg IV and transition to PO in 24-48hrs per cardio.   Continue carvedilol 25mg BID. Holding acei for renal function.    Started on Imdur 30mg daily for preload reduction   Carotid dopplers neg for flow limiting stenosis.   ECHO - EF 60-65%, valvular heart disease, pulm hypertension.  Monitor I&Os, daily wts.   f/u cardio recs (Dr. Isaacs)

## 2020-07-01 NOTE — ED ADULT TRIAGE NOTE - AS O2 DELIVERY
H&P Pre-Procedure Local Anesthetic Only     Procedure date: 7/1/2020    Indication for procedure/Pre-diagnosis/chief complaint:Preoperative diagnosis: Radiculopathy [M54.10]     Planned Procedure:  Procedure: Interlaminar Epidural Steroid Injection, Lumbar, L4/L5 VS L5/S1       PAST MEDICAL/SURGICAL/SOCIAL HISTORY AND ACTIVE PROBLEM LIST:  Has been reviewed    MEDICATION AND ALLERGIES: Has been reviewed    REVIEW OF SYSTEMS:  Constitutional: Denies fever or chills  Respiratory: Denies cough or new onset shortness of breath  Cardiovascular: Denies Chest pain or Palpitations  Gastrointestinal: Denies vomiting or diarrhea  Skin: Denies wounds or rashes around the procedure site  MSK: low back pain    Patient taking any blood thinning medications: Yes, Okay to continue taking per proceduralist   History of any bleeding disorders: none    PHYSICAL EXAM  Visit Vitals  BP (!) 167/89 (BP Location: RUE - Right upper extremity, Patient Position: Semi-Yost's)   Pulse (!) 59   Temp 97.7 °F (36.5 °C) (Temporal)   Resp 18   Ht 5' 2\" (1.575 m)   Wt 95.3 kg   SpO2 100%   BMI 38.41 kg/m²     Constitutional: No acute distress   Respiratory: Normal respiratory excursion  Cardiovascular: Palpable peripheral pulses  ?Neuro/Psych: Alert, orientated, appropriate thought content, conversation.    OTHER FINDINGS  Reviewed pertinent lab/diagnostic tests:   Bedside Blood Glucose 139 mg/dl    PLAN FOR SEDATION/ANALGESIA: Local Analgesia    EKG Monitoring: NO    Informed Consent was obtained, patient or responsible party denies additional questions regarding procedure.    Assessing Provider: JUANA Telles                         Time: 2:04 PM    
room air

## 2020-08-27 NOTE — PROGRESS NOTE ADULT - SUBJECTIVE AND OBJECTIVE BOX
DR Abraham called to update on pt status... ordered repeat cbc 4 hours post transfusion   [facility], Division of Infectious Diseases  [address]  [phone]  Name: SAIRA SANTANA  Age: 89y  Gender: Female  MRN: [id number]    Interval History--  Notes reviewed pt feels weak  daughter at bedside concerned about wbc and cough    Past Medical History--  Anemia, unspecified type  Edema of lower extremity  Heart murmur  Hypertension  Hypercalcemia  History of appendectomy  S/P tonsillectomy  H/O parathyroidectomy      For details regarding the patient's social history, family history, and other miscellaneous elements, please refer the initial infectious diseases consultation and/or the admitting history and physical examination for this admission.    Allergies    Vasotec (Unknown)    Intolerances        Medications--  Antibiotics:  cefTRIAXone   IVPB 1 Gram(s) IV Intermittent every 24 hours    Immunologic:    Other:  acetaminophen   Tablet PRN  ALBUTerol/ipratropium for Nebulization  carvedilol  cinacalcet  doxazosin  ferrous    sulfate  heparin  Injectable  hydrALAZINE  lactobacillus acidophilus      Review of Systems--  A 10-point review of systems was obtained.   unchanged    Physical Examination--  Vital Signs: T(F): 97.8 (01-07-18 @ 15:25), Max: 98.4 (01-07-18 @ 04:46)  HR: 63 (01-07-18 @ 15:25)  BP: 151/58 (01-07-18 @ 15:25)  RR: 19 (01-07-18 @ 15:25)  SpO2: 97% (01-07-18 @ 15:25)  Wt(kg): --  General: Nontoxic-appearing Female in no acute distress.  HEENT: AT/NC. Anicteric. Conjunctiva pink and moist. Oropharynx clear. Dentition fair.  Neck: Not rigid. No sense of mass.  Nodes: None palpable.  Lungs: Clear bilaterally without rales, wheezing or rhonchi  Heart: Regular rate and rhythm. No Murmur. No rub. No gallop. No palpable thrill.  Abdomen: Bowel sounds present and normoactive. Soft. Nondistended. Nontender.  Back: No spinal tenderness. No costovertebral angle tenderness.   Extremities: No cyanosis or clubbing. No edema.   Skin: Warm. Dry. Good turgor. No rash. No vasculitic stigmata.  Psychiatric: Appropriate affect and mood for situation.         Laboratory Studies--  CBC                        10.4   14.8  )-----------( 318      ( 07 Jan 2018 08:13 )             30.8       Chemistries  01-07    136  |  96  |  38<H>  ----------------------------<  107<H>  3.7   |  31  |  0.90    Ca    9.4      07 Jan 2018 08:13        Culture Data  Culture - Blood (01.02.18 @ 12:02)    Specimen Source: .Blood Blood-Peripheral    Culture Results:   No growth at 5 days.

## 2020-09-11 NOTE — PROGRESS NOTE ADULT - PROBLEM SELECTOR PLAN 6
PTH elevated. Ca wnl. Continue cincalcet 30mg QD. [FreeTextEntry1] : 67-year-old male with history of hypertension for which he takes Avapro, hyperlipidemia on Crestor and gout on allopurinol present for his yearly physical.\par Patient also with history of type 2 diabetes which was diagnosed February 2016.\par \par The patient had cardiac evaluation 2018 showing a significantly increased PVC burden with patient started on metoprolol.\par Workup included a cardiac catheterization showing severe stenosis of the right coronary artery with stenting and mild disease of the LAD and circumflex.\par He has done well since and continues on aspirin and Plavix. 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 His gout has been quiet and he has not used any colchicine for any gouty attacks.\par \par

## 2021-01-13 NOTE — ED PROVIDER NOTE - RELIEVING FACTORS
Please schedule the patient to be seen in the office in 4 months from now with Nika Hendrickson with blood work prior 
none

## 2021-01-27 NOTE — DISCHARGE NOTE ADULT - LOW SALT DIET. ACTIVITY AS TOLERATED. CALL YOUR DOCTOR FOR FOLLOW UP APPOINTMENT
"CARDIOLOGY OUTPATIENT FOLLOWUP    PCP: Ron Hernandes Relationships   • Social connections     Talks on phone: Not on file     Gets together: Not on file     Attends Islam service: Not on file     Active member of club or organization: Not on file     Attends meetings of clubs or organizations: Not on file     Relationship status: Not on file   • Intimate partner violence     Fear of current or ex partner: Not on file     Emotionally abused: Not on file     Physically abused: Not on file     Forced sexual activity: Not on file   Other Topics Concern   • Not on file   Social History Narrative   • Not on file       Studies  Lab Results   Component Value Date/Time    CHOLSTRLTOT 161 04/25/2019 06:26 AM    LDL 90 04/25/2019 06:26 AM    HDL 50 04/25/2019 06:26 AM    TRIGLYCERIDE 103 04/25/2019 06:26 AM       Lab Results   Component Value Date/Time    SODIUM 137 01/19/2021 12:00 PM    POTASSIUM 4.1 01/19/2021 12:00 PM    CHLORIDE 104 01/19/2021 12:00 PM    CO2 22 01/19/2021 12:00 PM    GLUCOSE 165 (H) 01/19/2021 12:00 PM    BUN 11 01/19/2021 12:00 PM    CREATININE 0.91 01/19/2021 12:00 PM     Lab Results   Component Value Date/Time    ALKPHOSPHAT 120 (H) 01/19/2021 12:00 PM    ASTSGOT 26 01/19/2021 12:00 PM    ALTSGPT 28 01/19/2021 12:00 PM    TBILIRUBIN 0.2 01/19/2021 12:00 PM        For this encounter I reviewed the following medical records Recent ER/hospitalization Notes, BMP, Lipid profile and Cardiac biomarkers   For this encounter I directly reviewed ECG tracings.  No ischemic changes.  Sinus rhythm..     Statement Selected

## 2021-03-10 NOTE — PROGRESS NOTE ADULT - PROBLEM/PLAN-5
----------Daily Progress Note----------    HISTORY OF PRESENT ILLNESS:  Patient is a 77y old Female who presents with a chief complaint of Jaundice (10 Mar 2021 09:00)    Currently admitted to medicine with the primary diagnosis of Pancreatic mass       Today is hospital day 5d.     INTERVAL HOSPITAL COURSE / OVERNIGHT EVENTS:    Patient was examined and seen at bedside. This morning she is resting comfortably in bed and reports no new issues or overnight events.     Review of Systems: Otherwise unremarkable     <<<<<PAST MEDICAL & SURGICAL HISTORY>>>>>  Varicose veins of both lower extremities, unspecified whether complicated    Pain  knee    SOB (shortness of breath)    History of ovarian cystectomy  left    Status post phlebectomy  10/2018    History of surgery  vascular surgery   ? variocose vein stripping?      ALLERGIES  Augmentin (Rash)  chocolate (Headache)  Novocain (Angioedema)  Steroids: Excessive swelling (Flushing; Other (Mild to Mod))  sulfa drugs (Fever)    MEDICATIONS  STANDING MEDICATIONS  apixaban 5 milliGRAM(s) Oral two times a day  chlorhexidine 4% Liquid 1 Application(s) Topical <User Schedule>  diltiazem    milliGRAM(s) Oral daily  furosemide    Tablet 20 milliGRAM(s) Oral daily  magnesium sulfate  IVPB 2 Gram(s) IV Intermittent once  pantoprazole    Tablet 40 milliGRAM(s) Oral before breakfast    PRN MEDICATIONS    VITALS:  T(F): 96.6  HR: 102  BP: 123/70  RR: 18  SpO2: --    <<<<<LABS>>>>>                        12.0   12.44 )-----------( 292      ( 10 Mar 2021 06:14 )             36.3     03-10    136  |  99  |  10  ----------------------------<  100<H>  4.1   |  28  |  0.5<L>    Ca    8.3<L>      10 Mar 2021 06:14  Mg     1.7     03-10    TPro  5.2<L>  /  Alb  2.7<L>  /  TBili  8.5<H>  /  DBili  5.8<H>  /  AST  45<H>  /  ALT  59<H>  /  AlkPhos  1600<H>  03-10            [id number]    Physical Exam: General: in bed, mild distress due to chills  head and neck: Jaundiced sclera, yellow discoloration of the skin, No JVD   Heart: S1,S2 appreciated, no added sounds  Lung: Bilateral decreased breath sounds at the bases, equal air movement bilaterally, No wheeze, crackles or crepitations   Abdomen: Soft, mild distension nontender, BS+ve  LE: bilateral lower extremities swelling, Pitting, Tender.   Neuro: A&O*3, Nonfocal exam   Psych: normal affect            EXAM:  CT ABDOMEN AND PELVIS IC            PROCEDURE DATE:  03/05/2021            INTERPRETATION:  CLINICAL STATEMENT: Painless jaundice.    TECHNIQUE: Contiguous axial CT images were obtained from the lower chest to the pubic symphysis following administration of 100cc Isovue-370 intravenous contrast.  Oral contrast was not administered.  Reformatted images in the coronal and sagittal planes were acquired.    COMPARISON CT: CT abdomen/pelvis dated 1/27/2011.      FINDINGS:    LOWER CHEST: Cardiomegaly. Bibasilar atelectasis.    HEPATOBILIARY: Severe intrahepatic and extra hepatic biliary ductal dilatation. The CBD measures up to 1.9 cm with abrupt cut off at the level of the pancreatic head. Markedly distended gallbladder (601/17) measuring up to 16.6 cm craniocaudally. Left hepatic lobe cyst. Patent main portal vein.    SPLEEN: Unremarkable.    PANCREAS: Pancreatic duct dilated to 1.2 cm with abrupt cut off at the level of the pancreatic head. The pancreatic head/neck is not well delineated raising suspicion for underlying ill-defined mass (4/126). There is a hyperattenuating structure adjacent to duodenum (4/140), possibly representing portion of normal pancreatic tissue. If this is the case, appearance of the remainder of the pancreas would be considered to BE hypoenhancing.    ADRENAL GLANDS: Unremarkable.    KIDNEYS: Left renal cyst. Bilateral renal hypodensities too small to characterize. No hydronephrosis.    ABDOMINOPELVIC NODES: Limited evaluation due to generalized mesenteric congestion.    PELVIC ORGANS: Moderate volume pelvic ascites. Urinary bladder is unremarkable.    PERITONEUM/MESENTERY/BOWEL: Generalized mesenteric edema. Small volume ascites. Bowel is difficult to trace. No definite evidence of bowel obstruction or free air.    BONES/SOFT TISSUES: Anasarca. Scoliosis. Degenerative changes of the spine.    OTHER: Atherosclerotic calcifications of aorta.      IMPRESSION:    Severe intrahepatic and extrahepatic biliary ductal dilatation. Abrupt cut off ofthe CBD at the level of the pancreatic head. Pancreatic duct also dilated with abrupt cut off at the level of the pancreatic head. Pancreatic head/neck is ill-defined, raising suspicion for underlying ill-defined mass. GI consultation recommended.    Moderate volume ascites.      < from: US Abdomen Limited (03.10.21 @ 09:03) >  Impression:  Mild abdominopelvic ascites.  Bilateral pleural effusions.    < end of copied text >      ----------------------------------------------------------------------------------------------------------------------------------------------------------------------------------------------- ----------Daily Progress Note----------    HISTORY OF PRESENT ILLNESS:  Patient is a 77y old Female who presents with a chief complaint of Jaundice (10 Mar 2021 09:00)    Currently admitted to medicine with the primary diagnosis of Pancreatic mass       Today is hospital day 5d.     INTERVAL HOSPITAL COURSE / OVERNIGHT EVENTS:    Patient was examined and seen at bedside. This morning she is resting comfortably in bed, reports abdomen more soft today. Denies pain, SOB, chest pain, abdominal pain or any other symptoms. Patient is tolerating oral foods, ambulating on own and having bowel movements. NO overnight events noted.     Review of Systems: Otherwise unremarkable     <<<<<PAST MEDICAL & SURGICAL HISTORY>>>>>  Varicose veins of both lower extremities, unspecified whether complicated    Pain  knee    SOB (shortness of breath)    History of ovarian cystectomy  left    Status post phlebectomy  10/2018    History of surgery  vascular surgery   ? variocose vein stripping?      ALLERGIES  Augmentin (Rash)  chocolate (Headache)  Novocain (Angioedema)  Steroids: Excessive swelling (Flushing; Other (Mild to Mod))  sulfa drugs (Fever)    MEDICATIONS  STANDING MEDICATIONS  apixaban 5 milliGRAM(s) Oral two times a day  chlorhexidine 4% Liquid 1 Application(s) Topical <User Schedule>  diltiazem    milliGRAM(s) Oral daily  furosemide    Tablet 20 milliGRAM(s) Oral daily  magnesium sulfate  IVPB 2 Gram(s) IV Intermittent once  pantoprazole    Tablet 40 milliGRAM(s) Oral before breakfast    PRN MEDICATIONS    VITALS:  T(F): 96.6  HR: 102  BP: 123/70  RR: 18  SpO2: --    <<<<<LABS>>>>>                        12.0   12.44 )-----------( 292      ( 10 Mar 2021 06:14 )             36.3     03-10    136  |  99  |  10  ----------------------------<  100<H>  4.1   |  28  |  0.5<L>    Ca    8.3<L>      10 Mar 2021 06:14  Mg     1.7     03-10    TPro  5.2<L>  /  Alb  2.7<L>  /  TBili  8.5<H>  /  DBili  5.8<H>  /  AST  45<H>  /  ALT  59<H>  /  AlkPhos  1600<H>  03-10            [id number]    Physical Exam: General: in bed, mild distress due to chills  head and neck: Jaundiced sclera, yellow discoloration of the skin, No JVD   Heart: S1,S2 appreciated, no added sounds  Lung: Bilateral decreased breath sounds at the bases, equal air movement bilaterally, No wheeze, crackles or crepitations   Abdomen: Soft, mild distension nontender, BS+ve  LE: bilateral lower extremities swelling, Pitting, Tender.   Neuro: A&O*3, Nonfocal exam   Psych: normal affect            EXAM:  CT ABDOMEN AND PELVIS IC            PROCEDURE DATE:  03/05/2021            INTERPRETATION:  CLINICAL STATEMENT: Painless jaundice.    TECHNIQUE: Contiguous axial CT images were obtained from the lower chest to the pubic symphysis following administration of 100cc Isovue-370 intravenous contrast.  Oral contrast was not administered.  Reformatted images in the coronal and sagittal planes were acquired.    COMPARISON CT: CT abdomen/pelvis dated 1/27/2011.      FINDINGS:    LOWER CHEST: Cardiomegaly. Bibasilar atelectasis.    HEPATOBILIARY: Severe intrahepatic and extra hepatic biliary ductal dilatation. The CBD measures up to 1.9 cm with abrupt cut off at the level of the pancreatic head. Markedly distended gallbladder (601/17) measuring up to 16.6 cm craniocaudally. Left hepatic lobe cyst. Patent main portal vein.    SPLEEN: Unremarkable.    PANCREAS: Pancreatic duct dilated to 1.2 cm with abrupt cut off at the level of the pancreatic head. The pancreatic head/neck is not well delineated raising suspicion for underlying ill-defined mass (4/126). There is a hyperattenuating structure adjacent to duodenum (4/140), possibly representing portion of normal pancreatic tissue. If this is the case, appearance of the remainder of the pancreas would be considered to BE hypoenhancing.    ADRENAL GLANDS: Unremarkable.    KIDNEYS: Left renal cyst. Bilateral renal hypodensities too small to characterize. No hydronephrosis.    ABDOMINOPELVIC NODES: Limited evaluation due to generalized mesenteric congestion.    PELVIC ORGANS: Moderate volume pelvic ascites. Urinary bladder is unremarkable.    PERITONEUM/MESENTERY/BOWEL: Generalized mesenteric edema. Small volume ascites. Bowel is difficult to trace. No definite evidence of bowel obstruction or free air.    BONES/SOFT TISSUES: Anasarca. Scoliosis. Degenerative changes of the spine.    OTHER: Atherosclerotic calcifications of aorta.      IMPRESSION:    Severe intrahepatic and extrahepatic biliary ductal dilatation. Abrupt cut off ofthe CBD at the level of the pancreatic head. Pancreatic duct also dilated with abrupt cut off at the level of the pancreatic head. Pancreatic head/neck is ill-defined, raising suspicion for underlying ill-defined mass. GI consultation recommended.    Moderate volume ascites.      < from: US Abdomen Limited (03.10.21 @ 09:03) >  Impression:  Mild abdominopelvic ascites.  Bilateral pleural effusions.    < end of copied text >      ----------------------------------------------------------------------------------------------------------------------------------------------------------------------------------------------- DISPLAY PLAN FREE TEXT

## 2021-05-18 NOTE — PROGRESS NOTE ADULT - SUBJECTIVE AND OBJECTIVE BOX
Bradley Amaro  1981     Office/Outpatient Visit    Visit Date: Wed, Feb 12, 2020 11:07 am    Provider: Alphonse Vega MD (Assistant: Jennifer Prasad MA)    Location: [facility]        Electronically signed by Alphonse Vega MD on  02/12/2020 06:47:43 PM                             Subjective:        CC: Mr. Amaro is a 38 year old White male.  This is a follow-up visit.  annual physical         HPI:           Mr. Amaro presents with encounter for general adult medical examination without abnormal findings.  His last physical exam was 1 year ago.  His last ECG was <1 year ago.  He performs testicular self-exams monthly.  Depression screening is negative for anhedonia, anxious mood, a change in appetite, altered sleep habits, crying spells, decreased ability to concentrate, guilt, sadness and feelings of worthlessness.  He is not current with his Td and influenza immunization.      Smoking Status:  Nonsmoker       A little over a year ago pt was dealing with recurrent episodes of chest pain and he went to the ER 2-3x for this. Cardiac w/u was normal including EKG, stress test, and ECHO. He is on metoprolol 25 mg qd. He is on zoloft 50 mg qd for anxiety and he feels this is working well for him.     ROS:     CONSTITUTIONAL:  Negative for fatigue and fever.      EYES:  Negative for blurred vision.      E/N/T:  Negative for diminished hearing and nasal congestion.      CARDIOVASCULAR:  Negative for chest pain ( resolved ), orthopnea, palpitations, pedal edema and tachycardia.      RESPIRATORY:  Negative for recent cough and dyspnea.      GASTROINTESTINAL:  Negative for abdominal pain, constipation, diarrhea, nausea and vomiting.      MUSCULOSKELETAL:  Negative for arthralgias and myalgias.      NEUROLOGICAL:  Negative for paresthesias and weakness.      PSYCHIATRIC:  Positive for depression.   Negative for anxiety ( (better with zoloft) ) or sleep disturbance.          Past Medical History /  Family History / Social History:         Last Reviewed on 2/12/2020 11:28 AM by Alphonse Vega    Past Medical History: stress test negative for ischemia, but SVT and chest pain noted.  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          Tobacco/Alcohol/Supplements:     Last Reviewed on 2/12/2020 11:28 AM by Alphonse Vega    Tobacco: He has never smoked.          Alcohol: Frequency: Socially     Caffeine:  He admits to consuming caffeine via soda ( 2 servings per day ).          Substance Abuse History:     Last Reviewed on 2/12/2020 11:28 AM by Alphonse Vega    NEGATIVE         Mental Health History:     Last Reviewed on 2/12/2020 11:28 AM by Alphonse Vega        Communicable Diseases (eg STDs):     Last Reviewed on 2/12/2020 11:28 AM by Alphonse Vega        Current Problems:     Last Reviewed on 2/12/2020 11:28 AM by Alphonse Vega    Dysthymic disorder    Anxiety with depression    Hyperlipidemia    Low back pain    Sciatic nerve injury    Degenerative disc disease    Elevated SGPT (ALT) US liver fatty liver 2016    Nonspecific elevation of levels of transaminase and lactic acid dehydrogenase [LDH]    Nonalcoholic fatty liver    Obesity, NOS    Dietary counseling and surveillance    Cellulitis    Obesity, unspecified    Cellulitis, unspecified    Encounter for general adult medical examination without abnormal findings        Immunizations:     Fluzone (3 + years dose) 12/7/2010    Fluzone Quadrivalent (3+ years) 10/1/2018     Patient is a 89y old  Female who presents with a chief complaint of acute on chronic CHF (2018 09:00)      INTERVAL HPI/OVERNIGHT EVENTS: Patient examined at bedside, no current complaints. Denies HA, SOB, CP, palpitations weakness, abdominal pain, dysuria, hematuria.  Urine culture done on  greater than 100,000 VRE    T(C): 36.6 (18 @ 11:28), Max: 38.2 (18 @ 13:00)  HR: 59 (18 @ 09:51) (59 - 77)  BP: 105/58 (18 @ 09:51) (98/59 - 162/68)  RR: 18 (18 @ 09:51) (16 - 18)  SpO2: 95% (18 @ 09:51) (92% - 96%)  Wt(kg): --    I&O's Summary    2018 07:01  -  2018 07:00  --------------------------------------------------------  IN: 0 mL / OUT: 300 mL / NET: -300 mL        LABS:                        7.6    9.41  )-----------( 268      ( 2018 06:34 )             23.1         134<L>  |  99  |  42<H>  ----------------------------<  97  4.5   |  29  |  1.30    Ca    7.6<L>      2018 06:34  Mg     2.2         TPro  5.6<L>  /  Alb  2.1<L>  /  TBili  0.4  /  DBili  .10  /  AST  17  /  ALT  16  /  AlkPhos  50        CAPILLARY BLOOD GLUCOSE          Urinalysis Basic - ( 2018 03:22 )    Color: Pale Yellow / Appearance: Clear / S.005 / pH: x  Gluc: x / Ketone: Negative  / Bili: Negative / Urobili: Negative   Blood: x / Protein: 25 mg/dL / Nitrite: Negative   Leuk Esterase: Small / RBC: 11-25 /HPF / WBC 11-25   Sq Epi: x / Non Sq Epi: Few / Bacteria: Moderate        Culture - Urine (collected 2018 21:48)  Source: .Urine Clean Catch (Midstream)  Final Report (2018 19:49):    >100,000 CFU/ml Enterococcus faecium (vancomycin resistant)  Organism: Enterococcus faecium (vancomycin resistant) (2018 19:49)  Organism: Enterococcus faecium (vancomycin resistant) (2018 19:49)         MEDICATIONS  (STANDING):  acetaminophen   Tablet .. 650 milliGRAM(s) Oral once  ALBUTerol    0.083% 2.5 milliGRAM(s) Nebulizer daily  carvedilol 12.5 milliGRAM(s) Oral every 12 hours  cholecalciferol 1000 Unit(s) Oral daily  cinacalcet 30 milliGRAM(s) Oral <User Schedule>  doxazosin 4 milliGRAM(s) Oral <User Schedule>  ferrous    sulfate 325 milliGRAM(s) Oral daily  furosemide    Tablet 40 milliGRAM(s) Oral every 12 hours  furosemide   Injectable 20 milliGRAM(s) IV Push once  guaiFENesin  milliGRAM(s) Oral <User Schedule>  heparin  Injectable 5000 Unit(s) SubCutaneous every 12 hours  hydrALAZINE 75 milliGRAM(s) Oral every 8 hours    MEDICATIONS  (PRN):  acetaminophen   Tablet .. 650 milliGRAM(s) Oral every 6 hours PRN Temp greater or equal to 38C (100.4F), Mild Pain (1 - 3)      REVIEW OF SYSTEMS:  CONSTITUTIONAL: No fever, or fatigue  RESPIRATORY: No cough,  chills; No shortness of breath  CARDIOVASCULAR: No chest pain, palpitations, dizziness, or leg swelling  GASTROINTESTINAL: No abdominal or epigastric pain. No nausea, vomiting,; No diarrhea or constipation  GENITOURINARY: No dysuria, frequency, hematuria  NEUROLOGICAL: No headaches,numbness, or tremors  MUSCULOSKELETAL:No muscle, back, or extremity pain    RADIOLOGY & ADDITIONAL TESTS:    EXAM:  XR CHEST PORTABLE URGENT 1V                          PROCEDURE DATE:  2018      INTERPRETATION:  Portable chest radiograph        CLINICAL INFORMATION:   Fever. Pneumonia. Follow-up.    TECHNIQUE:  Portable  AP view of the chest was obtained.    COMPARISON: 2018 available for review.    FINDINGS:   The lungs  show persistent perihilar and basilar airspace consolidations   obscuring of RIGHT LEFT hemidiaphragmatic contours . bilateral pleural   effusions cannot be excluded. There is mild vascular congestion.    The  heart is enlarged in transverse diameter. No hilar mass. Trachea   midline.            Visualized osseous structures are intact.      IMPRESSION:   Cardiomegaly. Persistent perihilar/basilar infiltrates   and/or effusions..            MINI PURI M.D., ATTENDING RADIOLOGIST      Imaging Personally Reviewed:  [x ] YES  [ ] NO    Consultant(s) Notes Reviewed:  [x ] YES  [ ] NO    PHYSICAL EXAM:  GENERAL: NAD, frail appearing  HEAD: Atraumatic, Normocephalic  EYES: EOMI, PERRLA, conjunctiva and sclera clear  ENMT:Moist mucous membrane  NECK: Supple, No JVD, Normal thyroid  NERVOUS SYSTEM: Alert & Oriented X3  CHEST/LUNG: decreased breath sounds b/l  HEART: irregular rate and rhythm  ABDOMEN: Soft, Nontender, Nondistended; Bowel sounds present  EXTREMITIES: 2+ Peripheral Pulses, trace edema LLE    Care Discussed with Consultants/Other Providers [ ] YES  [ ] NO Influenza Virus Vaccine pf (adult) 2/14/2018        Allergies:     Last Reviewed on 2/12/2020 11:28 AM by Alphonse Vega    No Known Allergies.        Current Medications:     Last Reviewed on 2/12/2020 11:28 AM by Alphonse Vega    Zoloft 50 mg oral tablet [TAKE ONE-HALF TABLET BY MOUTH DAILY FOR 3 DAYS THEN TAKE ONE TABLET BY MOUTH DAILY]    Prilosec OTC 20 mg oral tablet, delayed release (enteric coated) [1 tab daily]    Metoprolol Succinate 25 mg oral Tablet, Extended Release 24 hr [1 tablet BID]        Objective:        Vitals:         Current: 2/12/2020 11:13:28 AM    Ht:  6 ft, 2 in;  Wt: 270.2 lbs;  WC: 47 inches;  BMI: 34.7T: 98.8 F (oral);  BP: 113/71 mm Hg (right arm, sitting);  P: 84 bpm (right arm (BP Cuff), sitting);  sCr: 1.11 mg/dL;  GFR: 107.51        Exams:     PHYSICAL EXAM:     GENERAL: Vitals recorded well developed, well nourished;  well groomed;  no apparent distress;     EYES: extraocular movements intact; conjunctiva and cornea are normal; PERRLA;     E/N/T: OROPHARYNX:  normal mucosa, dentition, gingiva, and posterior pharynx;     NECK: range of motion is normal; trachea is midline;     RESPIRATORY: normal respiratory rate and pattern with no distress; normal breath sounds with no rales, rhonchi, wheezes or rubs;     CARDIOVASCULAR: normal rate; rhythm is regular;  No chest wall TTP; no systolic murmur; no edema;     GASTROINTESTINAL: nontender; normal bowel sounds;     LYMPHATIC: no enlargement of cervical or facial nodes;     MUSCULOSKELETAL: normal gait; normal overall tone     NEUROLOGIC: mental status: alert and oriented x 3; GROSSLY INTACT     PSYCHIATRIC:  appropriate affect and demeanor; normal speech pattern; grossly normal memory;         Assessment:         Z00.00   Encounter for general adult medical examination without abnormal findings       F41.1   Generalized anxiety disorder           ORDERS:         Meds Prescribed:       [Refilled] Zoloft 50 mg oral tablet [1 tab PO  qd ], #90 (ninety) tablets, Refills: 3 (three)         Lab Orders:       FUTURE  Future order to be done at patients convenience  (Send-Out)            [id number]  [facility] PHYSICAL: CMP, CBC, TSH, LIPID: [id number], [id number], [id number], [id number]  (Send-Out)            APPTO  Appointment need  (In-House)              Other Orders:         Depression screen negative  (In-House)                      Plan:         Encounter for general adult medical examination without abnormal findingsWe discussed nutrition and pt advised to eat a low sugar, non-processed diet. We discussed physical activity and healthy weight and pt is advised to exercise 150 minutes per week in addition to his activity at work. Pt does not smoke, does not use tobacco products, and does not use illicit drugs. Pt is advised to limit alcohol to 2 drinks per day at most. He is advised to stay up to date with dental health and immunizations. He is UTD on screenings.     [facility] PHQ-9 Depression Screening: Completed form scanned and in chart; Total Score 0; Negative Depression Screen     FOLLOW-UP: in 1 year:.      FOLLOW-UP TESTING #1: FOLLOW-UP LABORATORY:  Labs to be scheduled in the future include PHYSICAL PANEL; CMP, CBC, TSH, LIPID.   Patient to schedule in 3 months.            Orders:       FUTURE  Future order to be done at patients convenience  (Send-Out)            [id number]  [facility] PHYSICAL: CMP, CBC, TSH, LIPID: [id number], [id number], [id number], [id number]  (Send-Out)              Depression screen negative  (In-House)            APPTO  Appointment need  (In-House)              Generalized anxiety disorderAnxiety has improved with zoloft 50 mg qd. Chest pain has resolved as well.           Prescriptions:       [Refilled] Zoloft 50 mg oral tablet [1 tab PO qd ], #90 (ninety) tablets, Refills: 3 (three)             Patient Recommendations:        For  Encounter for general adult medical examination without abnormal findings:                    APPOINTMENT INFORMATION:         Monday Tuesday Wednesday Thursday Friday Saturday Sunday            Time:___________________AM  PM   Date:_____________________         The following laboratory testing has been ordered: Schedule the above testing in 3 months.              Charge Capture:         Primary Diagnosis:     Z00.00  Encounter for general adult medical examination without abnormal findings           Orders:      [id number]  Preventive medicine, established patient, age 18-39 years  (In-House)              Depression screen negative  (In-House)            APPTO  Appointment need  (In-House)              F41.1  Generalized anxiety disorder           Orders:      [id number]  Office/outpatient visit; established patient, level 3  (In-House)

## 2021-06-08 NOTE — PATIENT PROFILE ADULT - NSASFALLNEEDSASSISTWITH_GEN_A_NUR
Render Risk Assessment In Note?: no
Detail Level: Detailed
Additional Notes: Patient would have to use a simple cleanser, moisturizer. That are not scented. Lubraderm is okay. Switch to Dove for body wash. Next step would be to see an Allergist.
Additional Notes: Pt was seeing his dermatologist who diagnosed scabies. Bx was inconclusive per his dermatologist. He states rash had no change with ivermectin. He went to ER and he did improve on prednisone as given by ER. But was on 5 day course and rash reflared when stopped. We discussed he will need a longer taper to adequately treat this rash. It is in sun distribution therefore we discussed potential for photodermatitis. PT states did first start in [place] but has continued for last 10 weeks. I discussed we will start longer taper of prednisone, have him use fluocinonide bid consistently and then follow up with me in 2 weeks if not resolved. He may need another biopsy if rash does not improve. If rash improves but recurs he may also benefit from seeing allergist
walking/standing/toileting

## 2021-08-07 NOTE — DISCHARGE NOTE ADULT - WEIGHT IN LBS
110.2 Carine is growing and developing well  Avoid small, hard to chew foods like nuts, popcorn and grapes, which are easy to choke on.  Read to Carine every day.  Keep cleaning products and medications up and out of reach! Make sure to have the phone number to poison control available at all times.  she should always sleep on her back and in her own sleeping space. she should not sleep in the big bed with parents.  she should be in a car seat in the back seat. It can be rear facing or front facing, but we recommend keeping her rear facing as long as the car seat allows. Make sure the shoulder straps are snug and the plastic buckle should be at the nipples.  It is normal for babies to get a fever or redness/soreness after they get their shots. If Carine gets a fever, give Tylenol every 6 hours as needed. Use cool compresses at the shot site.  Come back in 1 year for her 3 year old check up    Set up your portal account (go to [email or web address] to get started)  Since Carine is a minor, a parent will need to set up their own account then link Carine's account to the parent account  Once in the parent's account on a computer (not a cell phone), click on the blue [place] with 3 lines in the top left, next to \"Your Menu\". Scroll down until you get to \"Sharing\" and click on \"Request Family Access.\"  On the next screen, click on the box on the left labeled \"Child Proxy\" and fill out the form completely. It may take 3-5 business days to link Carine's account to the parents' account  Make sure to download the [employer] andrzej to access the portal through your phone  If you are planning to do video visits in the future, make sure to have the Zoom andrzej downloaded on your phone as well    Given the coronavirus pandemic, make sure to take precautions to prevent getting sick  -Stay home and practice social distancing! If you have to go out, try to stay at least 6 feet away from other people.  -Avoid places with large crowds  -Make sure to wash your hands  frequently for at least 20 seconds   -Cover your nose and mouth when coughing or sneezing. Make sure to wash your hands or use hand  with at least 60% alcohol immediately after.  -Avoid close contact with anyone who is sick  -Clean and disinfect frequently touched surfaces daily  -Try to avoid the ER unless it is a true emergency. Call the clinic with any questions.  Check the CDC website regularly for new developments. There is a lot of false information being spread about coronavirus and it is important to get your information from a trusted source      Patient Education     Well-Child Checkup: 2 Years      Use bedtime to bond with your child. Read a book together, talk about the day, or sing bedtime songs.   At the 2-year checkup, the healthcare provider will examine your child and ask how things are going at home. At this age, checkups become less often. So this may be your child’s last checkup for a while. This checkup is a great time to have questions answered about your child’s emotional and physical development. Bring a list of your questions to the appointment so you can address all of your concerns.   This sheet describes some of what you can expect.   Development and milestones  The healthcare provider will ask questions about your child. He or she will observe your toddler to get an idea of your child’s development. By this visit, your child is likely doing some of the following:   · Using 2- to 4-word sentences  · Knowing the names of body parts and pointing to pictures in books  · Drawing or copying lines or circles  · Running and climbing  · Using one hand more than the other for eating and coloring  · Being more stubborn and testing limits  · Playing next to other children, but likely not interacting (this is called “parallel play”)  Feeding tips  Don’t worry if your child is picky about food. This is normal. How much your child eats at 1 meal or in 1 day is less important than the pattern  over a few days or weeks. To help your 2-year-old eat well and develop healthy habits:   · Keep serving different finger foods at meals. Don't give up on offering new foods. It often takes a few tries before a child starts to like a new taste.  · If your child is hungry between meals, offer healthy foods. Cut-up vegetables and fruit, cheese, peanut butter, and crackers are good choices. Save snack foods such as chips or cookies for a special treat.  · Don’t force your child to eat. A child of this age will eat when hungry. He or she will likely eat more some days than others.  · Switch from whole milk to low-fat or nonfat milk. Ask the healthcare provider which is best for your child.  · Most of your child's calories should come from solid foods, not milk.  · Besides drinking milk, water is best. Limit fruit juice. It should be100% juice and you may add water to it. Don’t give your toddler soda.  · Don't let your child walk around with food. This is a choking risk. It can also lead to overeating as the child gets older.  Hygiene tips  Advice includes:  · Many 2-year-olds are not yet ready for potty training. But your child may start to show an interest in the next year. A child often signals they are ready by regularly complaining about dirty diapers. If you have questions, ask the healthcare provider.  · Brush your child’s teeth twice a day. Use a small amount of fluoride toothpaste no larger than a grain of rice. Use a toothbrush designed for children.  · If you haven’t already done so, take your child to the dentist.  Sleeping tips  By 2 years of age, your child may be down to 1 nap a day and should be sleeping about 8 to 12 hours at night. If he or she sleeps more or less than this but seems healthy, it’s not a concern. To help your child sleep:   · Encourage your child to get enough physical activity during the day. This will help him or her sleep at night. Talk with the healthcare provider if you need ideas  for active types of play.  · Follow a bedtime routine each night, such as brushing teeth followed by reading a book. Try to stick to the same bedtime and routine each night.  · Don't put your child to bed with anything to drink.  · If getting your child to sleep through the night is a problem, ask the healthcare provider for tips.  Safety tips  Advice includes:  · Don’t let your child play outdoors without supervision. Teach caution around cars. Your child should always hold an adult’s hand when crossing the street or in a parking lot.  · Protect your toddler from falls. Use sturdy screens on windows. Put covarrubias at the tops and bottoms of staircases. Supervise the child on the stairs.  · If you have a swimming pool, put a fence around it. Close and lock covarrubias or doors leading to the pool.  · Plan ahead. At this age, children are very curious. They are likely to get into items that can be dangerous. Keep latches on cabinets. Keep products like cleansers and medicines out of reach.  · Watch out for items that are small enough to choke on. As a rule, an item small enough to fit inside a toilet paper tube can cause a child to choke.  · Teach your child to be gentle and cautious with dogs, cats, and other animals. Always supervise the child around animals, even familiar family pets. Never let your child approach an unfamiliar dog or cat.  · In the car, always put your child in a car seat in the back seat. Babies and toddlers should ride in a rear-facing car safety seat for as long as possible. That means until they reach the top weight or height allowed by their seat. Check your safety seat instructions. Most convertible safety seats have height and weight limits that will allow children to ride rear-facing for 2 years or more. All children younger than 13 should ride in the back seat. If you have questions, ask your child's healthcare provider.  · Keep this Poison Control phone number in an easy-to-see place, such as on  the refrigerator: [phone].  Vaccines  Based on recommendations from the CDC, at this visit your child may get the following vaccines:   · Hepatitis A  · Influenza (flu)  More talking  Over the next year, your child’s speech development will likely increase a lot. Each month, your child should learn new words and use longer sentences. You’ll notice the child starting to communicate more complex ideas and to carry on conversations. To help develop your child’s verbal skills:   · Read together often. Choose books that encourage participation, such as pointing at pictures or touching the page.  · Help your child learn new words. Say the names of objects and describe your surroundings. Your child will  new words that he or she hears you say. And don’t say words around your child that you don’t want repeated!  · Make an effort to understand what your child is saying. At this age, children begin to communicate their needs and wants. Reinforce this communication by answering a question your child asks, or asking your own questions for the child to answer. Don't be concerned if you can't understand many of the words your child says. This is perfectly normal.  · Talk with the healthcare provider if you’re concerned about your child’s speech development.  [employer] last reviewed this educational content on 5/1/2020 © 2000-2020 [employer]. All rights reserved. This information is not intended as a substitute for professional medical care. Always follow your healthcare professional's instructions.            108.4 108.6

## 2021-09-11 NOTE — DIETITIAN INITIAL EVALUATION ADULT. - PROBLEM SELECTOR PLAN 2
Please follow up with his pediatrician   If symptom worsens please return to the ED Unilateral L>R leg swelling, daughter reports left leg usually swells more  -Lower extremity dopplers bilaterally to r/o DVT

## 2021-10-28 NOTE — PROGRESS NOTE ADULT - PROVIDER SPECIALTY LIST ADULT
Patient is here with mom, Alena.    If any information or results need to be relayed from today's visit, the best way to contact the patient is via [phone] (mobile) - Patient gives verbal permission to leave a detailed voicemail at the number provided.      Pulmonology

## 2021-12-28 NOTE — PATIENT PROFILE ADULT. - AS SC BRADEN MOBILITY
(3) slightly limited Valtrex Counseling: I discussed with the patient the risks of valacyclovir including but not limited to kidney damage, nausea, vomiting and severe allergy.  The patient understands that if the infection seems to be worsening or is not improving, they are to call.

## 2022-01-11 NOTE — ED ADULT NURSE NOTE - CHIEF COMPLAINT
The patient is a 89y Female complaining of difficulty breathing. V-Y Plasty Text: The defect edges were debeveled with a #15 scalpel blade.  Given the location of the defect, shape of the defect and the proximity to free margins an V-Y advancement flap was deemed most appropriate.  Using a sterile surgical marker, an appropriate advancement flap was drawn incorporating the defect and placing the expected incisions within the relaxed skin tension lines where possible.    The area thus outlined was incised deep to adipose tissue with a #15 scalpel blade.  The skin margins were undermined to an appropriate distance in all directions utilizing iris scissors.

## 2022-06-01 NOTE — PROGRESS NOTE ADULT - PROBLEM/PLAN-8
Please advise scheduling 5/31 ED follow up for right knee pain. Dr. Tiffanie Quick listed on call.
DISPLAY PLAN FREE TEXT

## 2022-08-17 NOTE — DISCHARGE NOTE ADULT - DISCHARGE TO
Called pharmacy back and used the best contact number they left, phone number not in service and unsure which [facility] we are talking about  
Home with Home Care

## 2022-08-31 NOTE — ED ADULT NURSE NOTE - PMH
EMERGENCY DEPARTMENT ENCOUNTER      CHIEF COMPLAINT    Chief Complaint   Patient presents with   • Drug Problem       HPI    Michelle Washburn is a 41 year old female with pmhx opiate dependence on suboxone presenting in [facility] custody with cc opioid withdrawal. States she is feeling anxious, jittery, nauseous, vomiting, loose stools and relates to opiate withdrawal. Usually takes Suboxone. Requesting her suboxone dose. No other medical complaints or concerns.     History obtained from the patient.      I have reviewed the nurse's notes    ALLERGIES    ALLERGIES:   Allergen Reactions   • Morphine HIVES   • Prednisone SWELLING     Patient reports taking prednisone since 2013 with no issues       CURRENT MEDICATIONS    No current facility-administered medications for this encounter.     Current Outpatient Medications   Medication Sig Dispense Refill   • buprenorphine-naLOXone (SUBOXONE FILM) 8-2 MG sublingual film Place 2 each under the tongue every 12 hours.     • omeprazole (PriLOSEC) 40 MG capsule Take 40 mg by mouth daily.     • cloNIDine (CATAPRES-TTS 1) 0.1 MG/24HR Place 1 patch onto the skin 1 day a week as needed (opiate withdrawal symptoms). 1 patch 0   • ondansetron (Zofran ODT) 4 MG disintegrating tablet Place 1 tablet onto the tongue 3 times daily as needed for Nausea. 20 tablet 0   • dicyclomine (BENTYL) 20 MG tablet Take 1 tablet by mouth every 6 hours as needed (abdominal cramps). 40 tablet 0   • busPIRone (BUSPAR) 5 MG tablet Take 1 tablet by mouth 3 times daily. 90 tablet 0   • citalopram (CeleXA) 10 MG tablet Take 1 tablet by mouth daily. 30 tablet 0   • gabapentin (NEURONTIN) 300 MG capsule Take 1 capsule by mouth 3 times daily. 45 capsule 1   • naLOXone (NARCAN) 4 MG/0.1ML nasal spray Spray the content of 1 device into 1 nostril. Call 911. May repeat with 2nd device in alternate nostril if no response in 2-3 minutes. 2 each 1       PAST MEDICAL HISTORY    Past Medical History:   Diagnosis Date   •  Anemia associated with acute blood loss    • Anxiety     panic attacks   • Depression    • Dizziness    • Drug abuse (CMS/HCC)    • GERD (gastroesophageal reflux disease)     s/p fundoplication   • Hepatitis C antibody test positive    • History of Lyme disease    • Hodgkin disease (CMS/HCC) 2001    ABVD-chemotherapy   • Mental disorder    • Pneumonia    • Polysubstance abuse (CMS/HCC)    • PONV (postoperative nausea and vomiting)     nausea with sedatives   • RAD (reactive airway disease)    • Stomach pain     bowel pain   • Syncope        SURGICAL HISTORY    Past Surgical History:   Procedure Laterality Date   • Cardiac cath lab     • Colonoscopy w biopsy  1/20/2011   • Ercp  12/5/2011   • Esophagogastroduodenoscopy transoral flex w/endo us exam  9/2011   • Tonsillectomy and adenoidectomy         SOCIAL HISTORY    Social History     Tobacco Use   • Smoking status: Current Every Day Smoker     Packs/day: 1.50     Years: 16.00     Pack years: 24.00     Types: Cigarettes   • Smokeless tobacco: Never Used   • Tobacco comment: will be trying in January   Substance Use Topics   • Alcohol use: Not Currently     Alcohol/week: 0.0 standard drinks     Comment: socially   • Drug use: Yes     Types: Cocaine, Heroin, Prescription Drugs, Other     Comment: crack; pt denies heroin use since 11/2021       FAMILY HISTORY    Family History   Problem Relation Age of Onset   • Cancer Mother    • Diabetes Paternal Grandfather    • Heart disease Paternal Grandfather        REVIEW OF SYSTEMS    Constitutional:  Denies fever or chills.   Eyes:  Denies change in visual acuity, eye pain.   HENT:  Denies nasal congestion or sore throat.   Respiratory:  Denies cough or shortness of breath.   Cardiovascular:  Denies chest pain or palpitations.   GI:  +abdominal pain. +nausea, vomiting. +loose stools    :  Denies dysuria. No hematuria.  Musculoskeletal:  Denies back pain or joint pain.   Skin:  Denies rash. No petechia.  Neurologic:  Denies  headache. No paresthesias. No Weakness.  Endocrine:  Denies polyuria or polydipsia.   Lymphatic:  Denies swollen glands.   Psychiatric:  Denies depression or anxiety.  All others reviewed and are negative.    PHYSICAL EXAM    Vitals:    08/31/22 0500 08/31/22 0503   BP:  114/66   Pulse: 66 62   Resp: 16    Temp: 97.6 °F (36.4 °C)    TempSrc: Oral    SpO2: 100% 100%   Weight:  52.9 kg (116 lb 10 oz)   Height: 5' 7\" (1.702 m)        Pulse Ox / Interpretation:  100%, normal on RA     Constitutional:  Well developed, well nourished. No acute distress, non-toxic appearance.   Eyes:  PERRL, EOMI.  Conjunctivae normal.   HENT:  Atraumatic/normocephalic. External ears normal. Hearing intact. Nose normal. Oropharynx moist.  Neck:  Supple. Normal range of motion. No midline tenderness, stepoff or deformity.   Respiratory:  Effort is normal, no respiratory distress, normal breath sounds bilaterally. No wheezing/rales/rhonchi.   Cardiovascular:  Normal rate, normal rhythm. No murmurs, gallops, or rubs.  No edema in all 4 extremities.   GI:  Soft, non-distended. No tenderness. No rebound or rigidity.   Musculoskeletal:  No obvious deformities. No tenderness. Full range of motion of bilateral upper and lower extremities.   Back: No midline tenderness, stepoff, deformity. No paravertebral tenderness. ROM intact.   Skin:  Warm and dry. Well hydrated. No rashes.  No petechiae or purpura.   Neurologic:  Alert & oriented x 3.  Grossly normal motor and sensory exam. No focal deficits.   Psychiatric:  Speech and behavior appropriate.     ED MEDS  Medications   buprenorphine-naLOXone (SUBOXONE FILM) 8-2 MG sublingual film 2 each (2 each Sublingual Given 8/31/22 0552)       MEDICAL DECISION MAKING    41 year old female  Presenting in [employer] custody for med administration. Takes Suboxone daily - confirmed doses via med rec, takes two 8-2mg SL films BID. Requesting dose for opioid withdrawal. Has no further complaints or concerns at this time.      Suboxone administered.   On reassessment, pt feeling better but states she did not leave it under her tongue enough and swallowed it too soon. I informed her she will not get any additional doses. She is tolerating PO, has stable vitals.    Will dc back into [employer] custody.     IMPRESSION:  1. Encounter for medication administration         Current Discharge Medication List             Trish Chavarria, DO  08/31/22 0609       Trish Chavarria, DO  08/31/22 0613     Anemia, unspecified type    Edema of lower extremity    Heart murmur    Hypercalcemia    Hypertension

## 2023-02-19 NOTE — INPATIENT CERTIFICATION FOR MEDICARE PATIENTS - THE SEVERITY OF SIGNS/SYMPTOMS. (SEE ED/ADMIT DOCUMENTS)
[facility] Discharge Summary    Cristina Sorto MRN# [id number]   Age: 33 year old YOB: 1989     Date of Admission:  2/15/2023  Date of Discharge::  2023  Admitting Physician:  Santana Campbell MD  Discharge Physician:  Ever Armenta MD     Home clinic: [place], [place]          Admission Diagnoses:   Previous  section [Z98.891]  Indication for care in labor or delivery [O75.9]          Discharge Diagnosis:   Pregnancy, delivered  Acute blood loss anemia       Procedures:   Procedure(s): Repeat low transverse  section       No additional procedures performed during this admission           Medications Prior to Admission:     Medications Prior to Admission   Medication Sig Dispense Refill Last Dose     levothyroxine (SYNTHROID/LEVOTHROID) 88 MCG tablet 112 mcg   2/15/2023     Prenatal Vit-Fe Fumarate-FA (PNV PRENATAL PLUS MULTIVITAMIN) 27-1 MG TABS per tablet Take 1 tablet by mouth daily   2/15/2023     SUMAtriptan (IMITREX) 50 MG tablet 1 at start of migraine ,may repeat in 1 hr 30 tablet 3 More than a month     [DISCONTINUED] ketoconazole (NIZORAL) 2 % external cream Apply topically daily   Past Week             Discharge Medications:     Current Discharge Medication List      START taking these medications    Details   acetaminophen (TYLENOL) 500 MG tablet Take 2 tablets (1,000 mg) by mouth every 6 hours  Qty: 90 tablet, Refills: 0    Associated Diagnoses: Postpartum care and examination of lactating mother; Postpartum care following  delivery; Indication for care in labor or delivery      ferrous sulfate (FEROSUL) 325 (65 Fe) MG tablet Take 1 tablet (325 mg) by mouth daily  Qty: 90 tablet, Refills: 0    Associated Diagnoses: Postpartum care and examination of lactating mother; Postpartum care following  delivery; Indication for care in labor or delivery      ibuprofen (ADVIL/MOTRIN) 600 MG tablet Take 1 tablet (600 mg) by mouth  every 6 hours  Qty: 90 tablet, Refills: 0    Associated Diagnoses: Postpartum care and examination of lactating mother; Postpartum care following  delivery; Indication for care in labor or delivery      lanolin ointment Apply topically every hour as needed for dry skin (soreness)  Qty: 40 g, Refills: 0    Associated Diagnoses: Postpartum care and examination of lactating mother; Postpartum care following  delivery; Indication for care in labor or delivery      oxyCODONE (ROXICODONE) 5 MG tablet Take 1 tablet (5 mg) by mouth every 4 hours as needed for moderate to severe pain  Qty: 20 tablet, Refills: 0    Associated Diagnoses: Postpartum care and examination of lactating mother; Postpartum care following  delivery; Indication for care in labor or delivery      senna-docusate (SENOKOT-S/PERICOLACE) 8.6-50 MG tablet Take 1 tablet by mouth 2 times daily  Qty: 60 tablet, Refills: 0    Associated Diagnoses: Postpartum care and examination of lactating mother; Postpartum care following  delivery; Indication for care in labor or delivery         CONTINUE these medications which have NOT CHANGED    Details   levothyroxine (SYNTHROID/LEVOTHROID) 88 MCG tablet 112 mcg      Prenatal Vit-Fe Fumarate-FA (PNV PRENATAL PLUS MULTIVITAMIN) 27-1 MG TABS per tablet Take 1 tablet by mouth daily      SUMAtriptan (IMITREX) 50 MG tablet 1 at start of migraine ,may repeat in 1 hr  Qty: 30 tablet, Refills: 3    Associated Diagnoses: Migraine with aura and without status migrainosus, not intractable         STOP taking these medications       ketoconazole (NIZORAL) 2 % external cream Comments:   Reason for Stopping:                     Consultations:   No consultations were requested during this admission          Brief History of Labor or Admission:          Hospital Course:   The patient's hospital course was unremarkable.  She recovered as anticipated and experienced no post-operative complications.  On  1. The severity of signs/symptoms.(See ED/admit documents) discharge, her pain was well controlled. Vaginal bleeding is similar to peak menstrual flow.  Voiding without difficulty.  Ambulating well and tolerating a normal diet.  No fever or significant wound drainage.  Breastfeeding well.  Infant is stable.  No bowel movement yet.  She was discharged on post-partum day #3.    Post-partum hemoglobin:   Hemoglobin   Date Value Ref Range Status   02/17/2023 8.8 (L) 11.7 - 15.7 g/dL Final   06/08/2020 10.3 (L) 11.7 - 15.7 g/dL Final     Comment:     Delta Verified             Discharge Instructions and Follow-Up:   Discharge diet: Regular   Discharge activity: Activity as tolerated   Discharge follow-up: Follow up with primary care provider in 6 weeks   Wound care: Keep wound clean and dry           Discharge Disposition:   Discharged to home      Attestation:  I have reviewed today's vital signs, notes, medications, labs and imaging.    Ever Armenta MD

## 2023-05-17 NOTE — ED ADULT NURSE NOTE - CAS TRG GENERAL NORM CIRC DET
Avoid Alcohol use  Avoid NSAID use   Avoid herbal supplements/OTC medications  Fatty Liver disease      = Lifestyle intervention    Modification in diet and Physical exercise    3%-5% reduction in total body weight can reverse hepatic steatosis   5%-7% reduction in total body weight can reverse steatosis and inflammation   10% reduction has shown to improve scarring and fibrosis    = Dietary Modification   Reduction in 500-100 Kcal of intake   Healthy food choices    Mediterranean diet    No alcohol intake    = Physical Activity    Regular daily modest physical activity   Atleast 150 min/week of moderate- intense exercise     Strong peripheral pulses

## 2023-08-05 NOTE — PHYSICAL THERAPY INITIAL EVALUATION ADULT - PHYSICAL ASSIST/NONPHYSICAL ASSIST: SIT/STAND, REHAB EVAL
Patient seen and examined at bedside. Patient reports pain well controlled on medications. No acute events overnight. Pt denies fevers, chills, new onset numbness, weakness or tingling in the extremities.    T(C): 36.4 (08-05-23 @ 18:40), Max: 37 (08-04-23 @ 23:30)  T(F): 97.5 (08-05-23 @ 18:40), Max: 98.6 (08-04-23 @ 23:30)  HR: 80 (08-05-23 @ 18:40) (73 - 100)  BP: 117/75 (08-05-23 @ 18:40) (104/77 - 155/102)  RR: 18 (08-05-23 @ 18:40) (16 - 20)  SpO2: 98% (08-05-23 @ 18:40) (95% - 100%)    RLE:  Skin: No erythema, edema or gross lesions noted. Dressings c/d/i  Arielle-incisional TTP, otherwise NTTP  SILT L2-S1  Motor: +EHL/FHL/TA/GSc  Compartments soft and compressible  Calves NTTP b/l  +2 DP    A/p:  Pt is a 36M s/p L tibial plateau ORIF 8/5  NWB RLE in home herman/PT/OT  Pain regimen PRN  DVT ppx: Lovenox in house aspirin on DC  Post op abx ppx, begin Duracef POD2  Endo conslt for A1c 10  Dispo per PT  Plan discussed w patient, who is in agreement with the above  Plan discussed w attending, who is in agreement with the above 1 person assist

## 2023-12-13 NOTE — CONSULT NOTE ADULT - SUBJECTIVE AND OBJECTIVE BOX
HPI:  89F  in from urgent care c/o cough, sore throat and weakness. Pt states sore throat began 6 days prior to adm, 4 days prior to adm developed productive cough. States she has been feeling weak and getting more tired the past few days. Tried Delsym relieved cough, but made her feel dizzy. CXRay at Urgent care showed B/L PNA and suggestive of CHF, sent her to ED. Pt has been having increased swelling in legs L>R for the past few months, had doppler US which was negative for DVT, planned to have carotid doppler ultrasound, but has not yet gone.     In the ER O2 90% on RA, afebrile, WBC 16.6, Na 127, procalcitonin 0.07, proBNP 2,347, troponin <0.015. CXR showed Stable cardiomegaly. New vascular congestion interstitial edema consistent with fluid overload or mild/early CHF. Chronic pleural  parenchymal changes left base similar to prior. No definite focal infiltrate. Given IV Rocephin and Azithromax, 1L NS, 40 IV Lasix, Duoneb. seen in er by dr king advised zmax to continue (22 Apr 2017 18:30)    Patient reports that she feels improved since admission. She reports currently there is no orthopnea, no PND and sputum has remained sticky and clear white but not frothy.      PAST MEDICAL & SURGICAL HISTORY:  Anemia, unspecified type  Edema of lower extremity  Heart murmur  Hypertension  Hypercalcemia  History of appendectomy  S/P tonsillectomy  H/O parathyroidectomy          MEDICATIONS  (STANDING):  ALBUTerol    0.083% 2.5milliGRAM(s) Nebulizer every 8 hours  guaiFENesin    Syrup 200milliGRAM(s) Oral every 8 hours  enoxaparin Injectable 40milliGRAM(s) SubCutaneous every 24 hours  doxazosin 4milliGRAM(s) Oral at bedtime  famotidine    Tablet 20milliGRAM(s) Oral daily  carvedilol 25milliGRAM(s) Oral every 12 hours  cinacalcet 30milliGRAM(s) Oral at bedtime  hydrALAZINE 10milliGRAM(s) Oral <User Schedule>  hydrALAZINE 20milliGRAM(s) Oral two times a day  aspirin enteric coated 81milliGRAM(s) Oral daily  potassium chloride   Powder 20milliEquivalent(s) Oral daily  azithromycin  IVPB 500milliGRAM(s) IV Intermittent every 24 hours  lactobacillus acidophilus 1Tablet(s) Oral three times a day with meals  isosorbide   mononitrate ER Tablet (IMDUR) 30milliGRAM(s) Oral daily  losartan 25milliGRAM(s) Oral daily  furosemide    Tablet 40milliGRAM(s) Oral daily    MEDICATIONS  (PRN):      Allergies    Vasotec (Unknown)    Intolerances        SOCIAL HISTORY:noncontrib    FAMILY HISTORY:  No pertinent family history in first degree relatives      ROS:    EYES:  Negative  blurry vision or double vision  GASTROINTESTINAL:  Negative for nausea, vomiting, diarrhea  -otherwise negative except for subjective    Vital Signs Last 24 Hrs  T(C): 36.7, Max: 36.7 (04-25 @ 15:41)  T(F): 98.1, Max: 98.1 (04-25 @ 15:41)  HR: 95 (66 - 95)  BP: 154/71 (122/52 - 156/66)  BP(mean): --  RR: 22 (19 - 24)  SpO2: 95% (90% - 95%)    PE:  WDWN in no distress  HEENT:  NC, PERRL, sclerae anicteric, conjunctivae clear, EOMI.  Sinuses nontender, no nasal exudate.  No buccal or pharyngeal lesions, noted post phayngeal mild erythema no exudate  Neck:  Supple, no adenopathy, no JVD or hepatojugular reflux but prominent V waves  Lungs:  No accessory muscle use, occasional scattered crackles with ronchi in left base  Cor:  RRR, S1, S2, murmur appreciated  Abd:  Symmetric, normoactive BS.  Soft, nontender, no masses, guarding or rebound.  Liver and spleen not enlarged  Extrem:  No cyanosis, trace LE edema  Skin:  No rashes.  Neuro: grossly intact  Musc: moving all limbs freely, no focal deficits    LABS:                        10.9   14.0  )-----------( 276      ( 26 Apr 2017 07:01 )             31.7     04-26    132<L>  |  96  |  24<H>  ----------------------------<  104<H>  4.2   |  26  |  0.61    Ca    9.9      26 Apr 2017 07:01          MICROBIOLOGY:      RADIOLOGY & ADDITIONAL STUDIES:    --  EXAM:  CT CHEST                            PROCEDURE DATE:  04/26/2017        INTERPRETATION:  CLINICAL INFORMATION:  Shortness of breath, elevated   white blood cell count.    PROCEDURE:  Using multislice helical CT, 2.5 mm sections were obtained   from the thoracic inlet through the lung bases.  Multiplanar reformatted images.      Impression:    Multilobar airspace/groundglass opacities as discussed may reflect   multilobar pneumonia.    Small bilateral pleural effusions with underlying atelectasis.    Other findings as discussed above. Initiate Treatment: mometasone 0.1 % topical ointment:  Apply to hand rash daily as needed Detail Level: Zone Render In Strict Bullet Format?: No Plan: Recommend patient moisturize frequently.  Suggested Cutemol.  Will prescribe mometasone 0.1% topical ointment.  Will follow up as needed.

## 2023-12-14 NOTE — H&P ADULT - ASSESSMENT
Daily Note     Today's date: 2023  Patient name: Shin Childs  : 1940  MRN: [id number]  Referring provider: Lissette Byrnes DO  Dx:   Encounter Diagnosis     ICD-10-CM    1. Primary osteoarthritis of right knee  M17.11       2. Status post total right knee replacement  Z96.651       3. S/P total knee arthroplasty, right  Z96.651                      Subjective: Pt reports that he is able to walk better. Objective: See treatment diary below      Assessment: Tolerated treatment well. Patient demonstrated fatigue post treatment, exhibited good technique with therapeutic exercises, and would benefit from continued PT. Pt continued to have extension deficits. He was able to make full revolutions on the bike this visit. He was additionally able to perform LAQs this visit without the use of the other LE to assist. Instructed pt to perform more low load prolonged stretching at home in order to facilitate extension ROM. Plan: Continue per plan of care. Progress treatment as tolerated. Precautions: R TKA 23, HTN, Hx prostate CA  Access Code: [email or web address]  POC expires Unit limit Auth  expiration date PT/OT + Visit Limit?    24 N/A 24 BOMN  $20                 Visit/Unit Tracking  AUTH Status:  Date           Approved 12 Used 1 2 3 4 5           Remaining  11 10 9 8 7             Manuals         R knee PROM with OP   BE BE KB        Patellar mobility     BE KB          Tubigrip size E   BE BE                      Neuro Re-Ed             Quad sets towel under heel HEP reviewed 5"x10  5"x5 5"2x10  5" 2x10         Quad set with LAQ HEP 5"x10 5" 2x10 with AAROM from L LE 5" 2x10 with AAROM from L LE 5" 2x10 AROM        Quad set with SLR                                                                 Ther Ex             Rec bike rocking for knee ROM   NV Rocking 10' Rocking 10'        Heel slides with strap HEP 5"x10 5"2x10 Standing hamstring stretch at step  HEP sitting 3x30" sitting 3x30" step 3x30" step 3x30" step        Standing calf stretch with rockerboard HEP sitting 3x30" sitting 3x30" rockerboard 3x30" rockerboard 3x30" rockerboard                                  Pt education PT POC, HEP, post op status  HEP review, ice, compression stockings                        Ther Activity             TG squats    L19 2x10  L19 2x10         Fwd step ups    4" x10   VC 4" x10   VC        Lat step downs             Gait Training                                       Modalities 89F PMHx HTN, hyperparathyroidism s/p parathyroidectomy, anemia, LE edema admitted for new onset CHF and possible pneumonia with hyponatremia.

## 2024-01-02 NOTE — PROGRESS NOTE ADULT - PROBLEM SELECTOR PLAN 2
Problem: At Risk for Falls  Goal: # Patient does not fall  Outcome: Outcome Met, Continue evaluating goal progress toward completion  Goal: # Takes action to control fall-related risks  Outcome: Outcome Met, Continue evaluating goal progress toward completion  Goal: # Verbalizes understanding of fall risk/precautions  Description: Document education using the patient education activity  Outcome: Outcome Met, Continue evaluating goal progress toward completion     Problem: At Risk for Injury Due to Fall  Goal: # Patient does not fall  Outcome: Outcome Met, Continue evaluating goal progress toward completion  Goal: # Takes action to control condition specific risks  Outcome: Outcome Met, Continue evaluating goal progress toward completion  Goal: # Verbalizes understanding of fall-related injury personal risks  Description: Document education using the patient education activity  Outcome: Outcome Met, Continue evaluating goal progress toward completion     Problem: Stroke: Ischemic (Transient/Permanent)  Goal: Neurological status is maintained/restored to status at baseline  Description: Monitor neurological and mental status including symptoms of increasing intracranial pressure (headache, nausea/vomiting, or change in behavior). Hypertension (greater than 180 systolic) may also indicate a change in status related to stroke.  Outcome: Outcome Met, Continue evaluating goal progress toward completion  Goal: Normal temperature is maintained  Outcome: Outcome Met, Continue evaluating goal progress toward completion  Goal: Elimination status is maintained/returned to baseline  Description: Remove indwelling urinary catheter as soon as possible or collaborate with provider for order/reason for continued use.   Outcome: Outcome Met, Continue evaluating goal progress toward completion  Goal: #Depressive s/s (self-reported/observed) are recognized and monitored  Outcome: Outcome Met, Continue evaluating goal progress toward  completion  Goal: Personal stroke risk factors are identified with initial plan for risk reduction  Description: Stroke risk reduction involves taking medication, changing diet, increasing physical exercise, smoking cessation, or alcohol/drug use reduction/cessation based on identified risks.  Outcome: Outcome Met, Continue evaluating goal progress toward completion  Goal: Verbalizes understanding of condition and treatment plan  Description: Document on Patient Education Activity  Outcome: Outcome Met, Continue evaluating goal progress toward completion     Problem: Impaired Physical Mobility  Goal: # Bed mobility, ambulation, and ADLs are maintained or returned to baseline during hospitalization  Outcome: Outcome Met, Continue evaluating goal progress toward completion     Problem: Ostomy Management  Goal: Maintains skin integrity around stoma  Outcome: Outcome Met, Continue evaluating goal progress toward completion  Goal: Demonstrates ability to manage colostomy  Description: Document on Patient Education Activity   Outcome: Outcome Met, Continue evaluating goal progress toward completion  Goal: Demonstrates ability to manage ileostomy  Description: Document on Patient Education Activity  Outcome: Outcome Met, Continue evaluating goal progress toward completion  Goal: Demonstrates ability to manage urostomy  Description: Document on Patient Education Activity  Outcome: Outcome Met, Continue evaluating goal progress toward completion  Goal: Demonstrates ability to manage nephrostomy tube  Description: Document on Patient Education Activity  Outcome: Outcome Met, Continue evaluating goal progress toward completion     Problem: Pain  Goal: #Acceptable pain level achieved/maintained at rest using NRS/Faces  Description: This goal is used for patients who can self-report.  Acceptable means the level is at or below the identified comfort/function goal.  Outcome: Outcome Met, Continue evaluating goal progress toward  completion  Goal: # Acceptable pain level achieved/maintained at rest using NRS/Faces without oversedation (opioid naive or PCA/Epidural infusion)  Description: This goal is used if Opioid-naïve or on PCA/Epidural Infusion.  Outcome: Outcome Met, Continue evaluating goal progress toward completion  Goal: # Acceptable pain level achieved/maintained with activity using NRS/Faces  Description: This goal is used for patients who can self-report and are not achieving acceptable pain control during activity.  Outcome: Outcome Met, Continue evaluating goal progress toward completion  Goal: Acceptable pain/comfort level is achieved/maintained at rest (based on Pain Behaviors Scale)  Description: This goal is used for patients who are not able to self-report pain and are assessed for pain using the Pain Behaviors Scale  Outcome: Outcome Met, Continue evaluating goal progress toward completion  Goal: Acceptable pain/comfort level is achieved/maintained at rest based on PAINAID scale (Dementia)  Description: This goal is used for patients who are not able to self-report pain, have dementia, and assessed using the PAINAD scale.  Outcome: Outcome Met, Continue evaluating goal progress toward completion  Goal: Acceptable pain/comfort level is achieved/maintained at rest (based on pediatric behavior tool: NIPS, NPASS, or FLACC)  Description: This goal is used for pediatric patients who are not able to self report pain.  Outcome: Outcome Met, Continue evaluating goal progress toward completion  Goal: # Verbalizes understanding of pain management  Description: Documented in Patient Education Activity  Outcome: Outcome Met, Continue evaluating goal progress toward completion     Problem: Diabetes  Goal: Achieves glycemic balance  Description: Goal is to maintain blood sugar within range with no episodes of hypoglycemia  Outcome: Outcome Met, Continue evaluating goal progress toward completion  Goal: Verbalizes/demonstrates  understanding of NEW diagnosis of diabetes and management  Description: Document on Patient Education Activity  Outcome: Outcome Met, Continue evaluating goal progress toward completion  Goal: Verbalizes understanding of diabetes management including how to use HbA1C to evaluate status of blood sugar over time (Diabetes is NOT a new diagnosis)  Description: Diabetes Education  Outcome: Outcome Met, Continue evaluating goal progress toward completion  Goal: Demonstrates ability to self-administer insulin  Description: Document on Patient Education Activity  Outcome: Outcome Met, Continue evaluating goal progress toward completion     Problem: Pressure Injury, Risk for  Goal: # Skin remains intact  Outcome: Outcome Met, Continue evaluating goal progress toward completion  Goal: No new pressure injury (PI) development  Outcome: Outcome Met, Continue evaluating goal progress toward completion  Goal: # Verbalizes understanding of PI risk factors and prevention strategies  Description: Document education using the patient education activity.   Outcome: Outcome Met, Continue evaluating goal progress toward completion  Goal: Comfort optimized with pressure injury prevention strategies guided by patient/family preference. (Hospice)  Outcome: Outcome Met, Continue evaluating goal progress toward completion     Problem: Non-Pressure Injury Wound  Goal: # No deterioration in wound  Outcome: Outcome Met, Continue evaluating goal progress toward completion  Goal: # Verbalizes understanding of wound and wound care  Description: If abnormality is a skin tear, avoid using tape on skin including transparent dressings. Document education using the patient education activity.  Outcome: Outcome Met, Continue evaluating goal progress toward completion  Goal: Participates in wound care activities  Outcome: Outcome Met, Continue evaluating goal progress toward completion  Goal: Wound care provided to promote comfort needs  (Hospice)  Outcome: Outcome Met, Continue evaluating goal progress toward completion     Problem: VTE, Risk for  Goal: # No s/s of VTE  Outcome: Outcome Met, Continue evaluating goal progress toward completion  Goal: # Verbalizes understanding of VTE risk factors and prevention  Description: Document education using the patient education activity.   Outcome: Outcome Met, Continue evaluating goal progress toward completion  Goal: Demonstrates ability to administer injectable anticoagulants if ordered for d/c  Description: Document education using the patient education activity.  Outcome: Outcome Met, Continue evaluating goal progress toward completion      Continue with Ceftin  F/U repeat UA and Urine cx  Zofran prn for N/V  Tylenol prn for fever/pain Continue with Ceftin  F/U repeat UA and Urine cx  Continue with doxazosin   Zofran prn for N/V  Tylenol prn for fever/pain

## 2024-04-22 NOTE — PATIENT PROFILE ADULT. - ANESTHESIA, PREVIOUS REACTION, PROFILE
RX request from pharmacy on 4/20/2024 includes Gabapentin, Lidocaine, Spiriva. Added additional requested medications to patients request/[employer] message for provider review/approval    Requested Prescriptions     Pending Prescriptions Disp Refills    albuterol sulfate HFA (PROVENTIL;VENTOLIN;PROAIR) 108 (90 Base) MCG/ACT inhaler 18 g 2     Sig: Inhale 2 puffs into the lungs 2 times daily as needed for Wheezing or Shortness of Breath    ibuprofen (ADVIL;MOTRIN) 600 MG tablet 90 tablet 2     Sig: Take 1 tablet by mouth 3 times daily as needed for Pain    lisinopril (PRINIVIL;ZESTRIL) 10 MG tablet 30 tablet 2     Sig: Take 1 tablet by mouth daily        none

## 2024-09-26 NOTE — PATIENT PROFILE ADULT. - AGENT'S NAME
Laura (Daughter) Christiana Detail Level: Simple Render Risk Assessment In Note?: no Additional Notes: Pt spot treated with 5FU crm (bx proven ak in 2021 on R cheek), discussed that spot is still healing, will recheck after nae light tx.

## 2025-03-20 NOTE — PROGRESS NOTE ADULT - PROBLEM SELECTOR PLAN 3
Patient called back. Results reviewed. He is agreeable. Message sent via his portal per his request. Metoprolol updated to his pharmacy.     Both Farxiga and Entresto require PA first, then need to check OOP cost and if patient assistance is needed. ePAs flagged for both. Will route to the central auth team to assist with PA.     Current cost, this may change after the PA  Entresto is flagging for $471 for 30 day supply   Farxiga is flagging for $448 for 30 day supply    Will need to order BMP to be done in 2 weeks after meds above approved.     Held off on discontinuing the lisinopril until we have the entresto approved.    Na 128, likely secondary to dehydration and lasix  - cont. to trend BMP daily  - monitor BUN/ Cr (elevated since prior admissions, likely secondary to dehydration)  - hold fluids in setting of CHF, cont. lasix but monitor BMP

## 2025-05-31 NOTE — PHYSICAL THERAPY INITIAL EVALUATION ADULT - ADDITIONAL COMMENTS
PRE-OP DIAGNOSIS:  Cirrhosis 31-May-2025 12:21:58  Kerry Rowan  Hepatocellular carcinoma in adult 31-May-2025 12:22:06  Kerry Rowan   pt lives in a house w/ 2 steps/rail to enter.  Pt has a home health aide 6-12 hours/day x 7 days.  As per pt's daughter, pt needs assist with all functional mobility and ambulation and has not gone out into community x 5 weeks.
